# Patient Record
Sex: FEMALE | Race: WHITE | Employment: OTHER | URBAN - METROPOLITAN AREA
[De-identification: names, ages, dates, MRNs, and addresses within clinical notes are randomized per-mention and may not be internally consistent; named-entity substitution may affect disease eponyms.]

---

## 2017-05-17 ENCOUNTER — HOSPITAL ENCOUNTER (EMERGENCY)
Facility: CLINIC | Age: 32
Discharge: LEFT BEFORE DISCHARGE | End: 2017-05-17
Admitting: EMERGENCY MEDICINE

## 2017-05-17 VITALS
HEART RATE: 83 BPM | OXYGEN SATURATION: 96 % | RESPIRATION RATE: 20 BRPM | TEMPERATURE: 98.5 F | BODY MASS INDEX: 21.77 KG/M2 | WEIGHT: 160.72 LBS | SYSTOLIC BLOOD PRESSURE: 114 MMHG | HEIGHT: 72 IN | DIASTOLIC BLOOD PRESSURE: 78 MMHG

## 2017-05-17 PROCEDURE — 40000268 ZZH STATISTIC NO CHARGES

## 2017-05-18 LAB — TSH SERPL-ACNC: 2.2 ULU/ML (ref 0.3–4.5)

## 2017-05-18 NOTE — ED NOTES
Pt has had mastitis three times since the birth of her baby six weeks ago. Pt was running a fever yesterday and believes that mastitis was starting again. Today pt has a slight headache, no breast pain, and no fever. Pt has been experiencing some vision changes. /78

## 2018-01-18 LAB
C TRACH DNA SPEC QL PROBE+SIG AMP: NEGATIVE
CREAT SERPL-MCNC: 0.5 MG/DL (ref 0.57–1.11)
GFR SERPL CREATININE-BSD FRML MDRD: >60 ML/MIN/1.73M2
GLUCOSE SERPL-MCNC: 109 MG/DL (ref 65–100)
POTASSIUM SERPL-SCNC: 3.6 MMOL/L (ref 3.5–5)
SPECIMEN DESCRIPTION: NORMAL

## 2018-01-19 ENCOUNTER — TRANSFERRED RECORDS (OUTPATIENT)
Dept: HEALTH INFORMATION MANAGEMENT | Facility: CLINIC | Age: 33
End: 2018-01-19

## 2018-01-19 LAB
N GONORRHOEA DNA SPEC QL PROBE+SIG AMP: NEGATIVE
SPECIMEN DESCRIP: NORMAL

## 2018-03-08 ENCOUNTER — HOSPITAL ENCOUNTER (EMERGENCY)
Facility: CLINIC | Age: 33
Discharge: HOME | End: 2018-03-08
Attending: FAMILY MEDICINE | Admitting: FAMILY MEDICINE
Payer: MEDICARE

## 2018-03-08 ENCOUNTER — APPOINTMENT (OUTPATIENT)
Dept: ULTRASOUND IMAGING | Facility: CLINIC | Age: 33
End: 2018-03-08
Attending: FAMILY MEDICINE
Payer: MEDICARE

## 2018-03-08 VITALS
BODY MASS INDEX: 22.38 KG/M2 | RESPIRATION RATE: 16 BRPM | TEMPERATURE: 99.1 F | OXYGEN SATURATION: 94 % | WEIGHT: 165 LBS | HEART RATE: 67 BPM | DIASTOLIC BLOOD PRESSURE: 72 MMHG | SYSTOLIC BLOOD PRESSURE: 122 MMHG

## 2018-03-08 DIAGNOSIS — M79.621 PAIN OF RIGHT UPPER ARM: ICD-10-CM

## 2018-03-08 PROCEDURE — 93971 EXTREMITY STUDY: CPT | Mod: RT

## 2018-03-08 PROCEDURE — 99284 EMERGENCY DEPT VISIT MOD MDM: CPT | Mod: 25 | Performed by: FAMILY MEDICINE

## 2018-03-08 PROCEDURE — 99284 EMERGENCY DEPT VISIT MOD MDM: CPT | Mod: Z6 | Performed by: FAMILY MEDICINE

## 2018-03-08 RX ORDER — QUETIAPINE FUMARATE 50 MG/1
50 TABLET, FILM COATED ORAL
COMMUNITY
End: 2018-03-25

## 2018-03-08 RX ORDER — METOCLOPRAMIDE 5 MG/1
10 TABLET ORAL EVERY 6 HOURS PRN
COMMUNITY
End: 2018-11-05

## 2018-03-08 NOTE — DISCHARGE INSTRUCTIONS
There are no signs of a deep venous thrombosis on the ultrasound study today.    Monitor for any new or worsening symptoms.    Recheck in the clinic in 5-7 days if symptoms persist.

## 2018-03-08 NOTE — ED NOTES
"Pt comes in with complaints of R arm pain that started this AM. Pt also has petechia on her R upper arm. Pt states she has a \"clotting disorder.\" She is not able to tell me what she has been diagnosed with.   "

## 2018-03-08 NOTE — ED AVS SNAPSHOT
[facility] Emergency Department    [address] [place] [address]    Phone:  [phone]    Fax:  [phone]                                       Isaura Gray   MRN: [id number]    Department:  [facility] Emergency Department   Date of Visit:  3/8/2018           After Visit Summary Signature Page     I have received my discharge instructions, and my questions have been answered. I have discussed any challenges I see with this plan with the nurse or doctor.    ..........................................................................................................................................  Patient/Patient Representative Signature      ..........................................................................................................................................  Patient Representative Print Name and Relationship to Patient    ..................................................               ................................................  Date                                            Time    ..........................................................................................................................................  Reviewed by Signature/Title    ...................................................              ..............................................  Date                                                            Time

## 2018-03-08 NOTE — ED AVS SNAPSHOT
[facility] Emergency Department    [address] [place] [address]    Phone:  [phone]    Fax:  [phone]                                       Isaura Gray   MRN: [id number]    Department:  [facility] Emergency Department   Date of Visit:  3/8/2018           Patient Information     Date Of Birth          1985        Your diagnoses for this visit were:     Pain of right upper arm        You were seen by Zeinab Thornton MD.      Follow-up Information     Follow up with Beti Bermudez MD In 1 week.    Specialty:  OB/Gyn    Why:  if not improving    Contact information:    [facility]  [address] [place] [id number] [phone]          Follow up with [facility] Emergency Department.    Specialty:  EMERGENCY MEDICINE    Why:  If symptoms worsen    Contact information:    [address] [place] [address] [phone]    Additional information:    From [address]: Exit at [address] on south side of [place]. Turn right on [address]. Turn left at stoplight on [address]. [facility] will be in view two blocks ahead        Discharge Instructions       There are no signs of a deep venous thrombosis on the ultrasound study today.    Monitor for any new or worsening symptoms.    Recheck in the clinic in 5-7 days if symptoms persist.    24 Hour Appointment Hotline       To make an appointment at any [facility], call [phone] ([phone]). If you don't have a family doctor or clinic, we will help you find one. [place] clinics are conveniently located to serve the needs of you and your family.             Review of your medicines      Our records show that you are taking the medicines listed below. If these are incorrect, please call your family doctor or clinic.        Dose / Directions Last dose taken    enoxaparin 60 MG/0.6ML injection   Commonly known as:  LOVENOX   Dose:  60 mg        Inject  "60 mg Subcutaneous once   Refills:  0        metoclopramide 5 MG tablet   Commonly known as:  REGLAN   Dose:  10 mg        Take 10 mg by mouth every 6 hours as needed   Refills:  0        QUEtiapine 50 MG tablet   Commonly known as:  SEROquel   Dose:  50 mg        Take 50 mg by mouth nightly as needed   Refills:  0                Procedures and tests performed during your visit     US Upper Extremity Venous Duplex Right      Orders Needing Specimen Collection     None      Pending Results     No orders found from 3/6/2018 to 3/9/2018.            Pending Culture Results     No orders found from 3/6/2018 to 3/9/2018.            Pending Results Instructions     If you had any lab results that were not finalized at the time of your Discharge, you can call the ED Lab Result RN at [phone]. You will be contacted by this team for any positive Lab results or changes in treatment. The nurses are available 7 days a week from 10A to 6:30P.  You can leave a message 24 hours per day and they will return your call.        Thank you for choosing [facility]       Thank you for choosing [facility] for your care. Our goal is always to provide you with excellent care. Hearing back from our patients is one way we can continue to improve our services. Please take a few minutes to complete the written survey that you may receive in the mail after you visit with us. Thank you!        [facility] Information     [facility] lets you send messages to your doctor, view your test results, renew your prescriptions, schedule appointments and more. To sign up, go to [email or web address] . Click on \"Log in\" on the left side of the screen, which will take you to the Welcome page. Then click on \"Sign up Now\" on the right side of the page.     You will be asked to enter the access code listed below, as well as some personal information. Please follow the directions to create your username and password.     Your access code is: [email or web address]  Expires: " 2018  4:50 PM     Your access code will  in 90 days. If you need help or a new code, please call your [place] clinic or [phone].        Care EveryWhere ID     This is your Care EveryWhere ID. This could be used by other organizations to access your [facility] medical records  [id number]        Equal Access to Services     BAILEY PRADO : Lovely Wright, uzma sarmiento, ugo thomas, celso herman . So [facility] [phone].    ATENCIÓN: Si habla español, tiene a cobb disposición servicios gratuitos de asistencia lingüística. Llame al [phone].    We comply with applicable federal civil rights laws and [place] laws. We do not discriminate on the basis of race, color, national origin, age, disability, sex, sexual orientation, or gender identity.            After Visit Summary       This is your record. Keep this with you and show to your community pharmacist(s) and doctor(s) at your next visit.

## 2018-03-08 NOTE — ED PROVIDER NOTES
[facility] ED Provider Note   CC:     Chief Complaint   Patient presents with     Arm Pain     HPI:  Isaura Gray is a 32 year old female currently at 15 weeks gestation with a history of DVT and PEs who presented to the emergency department with right upper arm pain.  Patient reports onset of pain this morning, with a pain shooting up into the right shoulder.  She has a history of a clotting disorder that apparently is genetic but was negative on a complete workup.  She states that she has no history of factor V Leiden, protein C or protein S deficiency.  She is not sure whether she has had lupus anticoagulant testing.  Patient is currently on Lovenox 60 mg subcutaneously once a day during this pregnancy.  She has not used it the last 3 days.  She reports that her symptoms of her PEs were of right shoulder pain.  She does not have any current chest pain or shortness of breath.  She has not noticed any calf pain or swelling.    Problem List:  There are no active problems to display for this patient.      MEDS:   Discharge Medication List as of 3/8/2018  5:03 PM      CONTINUE these medications which have NOT CHANGED    Details   enoxaparin (LOVENOX) 60 MG/0.6ML injection Inject 60 mg Subcutaneous once, Historical      metoclopramide (REGLAN) 5 MG tablet Take 10 mg by mouth every 6 hours as needed, Historical      QUEtiapine (SEROQUEL) 50 MG tablet Take 50 mg by mouth nightly as needed, Historical             ALLERGIES:    Allergies   Allergen Reactions     Ciprofloxacin Hives     Sulfa Drugs Hives       Past Surgical History:   Procedure Laterality Date     APPENDECTOMY       ENT SURGERY      Partial thyroidectomy       Social History   Substance Use Topics     Smoking status: Never Smoker     Smokeless tobacco: Never Used     Alcohol use No         Review of Systems   Except as noted in HPI, all other systems were reviewed and are  negative    Physical Exam     Vitals were reviewed  Patient Vitals for the past 8 hrs:   BP Temp Temp src Pulse Heart Rate Resp SpO2 Weight   03/08/18 1706 122/72 - - 67 - 16 - -   03/08/18 1414 (!) 123/95 99.1  F (37.3  C) Oral - 104 16 94 % 74.8 kg (165 lb)     GENERAL APPEARANCE: Alert and oriented, no distress  FACE: normal facies  EYES: Pupils are equal  HENT: normal external exam  NECK: no adenopathy or asymmetry  RESP: normal respiratory effort; clear breath sounds bilaterally  CV: regular rate and rhythm; no significant murmurs, gallops or rubs  ABD: soft, no tenderness; no rebound or guarding; bowel sounds are normal  MS: no gross deformities noted; normal muscle tone.  EXT: Mild tenderness over the right mid humerus medial aspect, with no definite palpable masses or cords.  Good peripheral pulses  SKIN: no worrisome rash        Available Lab/Imaging Results     Results for orders placed or performed during the hospital encounter of 03/08/18 (from the past 24 hour(s))   US Upper Extremity Venous Duplex Right    Narrative    US UPPER EXTREMITY VENOUS DUPLEX RIGHT  3/8/2018 4:41 PM     HISTORY: Right arm pain. History of pulmonary embolism and deep venous  thrombosis.    COMPARISON: None.    TECHNIQUE: Examination of the upper extremity veins was performed with  graded compression and 2-D ultrasound and color doppler spectral  waveform analysis.     FINDINGS:  There is no evidence of thrombosis of the axillary,  brachial, basilic or cephalic veins.  The veins in the forearm show no  evidence of thrombosis.      Impression    IMPRESSION: Normal upper extremity venous ultrasound.    EBONY WASHINGTON MD         Impression     Final diagnoses:   Pain of right upper arm       ED Course & Medical Decision Making   Isaura Gray is a 32 year old female who presented to the emergency department with a history of multiple DVTs and PEs presenting with right upper arm pain.  Patient has no known trauma although she  states that she lays on her stomach to sleep.  She does not recall hurting herself.  She has pain in the mid medial arm radiating up into the shoulder.  Pain does not radiate into the elbow or forearm.  It is tender in an area where she is concerned might be a vein.  She is on Lovenox, but has not given herself a shot for the last 3 days.  Patient states that her typical symptoms of PE is that of right shoulder pain.  She did not have any significant central chest pain or shortness of breath with her previous episodes.  Patient was seen shortly after arrival.  Vital signs reveal a temperature 99.1, blood pressure 123/95, initial heart rate of 104 but trending down towards normal at 67.  Sats are 94%.  Patient has mild tenderness over the mid right upper arm medial aspect.  There is no definite palpable cord, redness or increased warmth.  With palpation there is increased tenderness and pain up into the shoulder and axilla.  The patient underwent a Doppler ultrasound of the right upper extremity, and there are no signs of deep venous thrombosis.  Patient will continue close monitoring for further signs and symptoms.  She should resume her Lovenox.  She is incidentally pregnant, and dispose to be on Lovenox during this entire pregnancy.  I explained to her that she may need to have a repeat ultrasound in 1-2 weeks if she has any persistent symptoms.      Written after-visit summary and instructions were given at the time of discharge.      Discharge Medication List as of 3/8/2018  5:03 PM            This note was completed in part using Dragon voice recognition, and may contain word and grammatical errors.        Zeinab Thornton MD  03/08/18 1926

## 2018-03-25 ENCOUNTER — HOSPITAL ENCOUNTER (EMERGENCY)
Facility: CLINIC | Age: 33
Discharge: HOME | End: 2018-03-26
Attending: FAMILY MEDICINE | Admitting: FAMILY MEDICINE
Payer: MEDICARE

## 2018-03-25 DIAGNOSIS — Z33.1 PREGNANCY, INCIDENTAL: ICD-10-CM

## 2018-03-25 DIAGNOSIS — O21.9 EXCESSIVE VOMITING DURING PREGNANCY: Primary | ICD-10-CM

## 2018-03-25 DIAGNOSIS — E86.0 DEHYDRATION: ICD-10-CM

## 2018-03-25 DIAGNOSIS — K22.6 MALLORY-WEISS TEAR: ICD-10-CM

## 2018-03-25 DIAGNOSIS — Z79.01 LONG TERM CURRENT USE OF ANTICOAGULANT THERAPY: ICD-10-CM

## 2018-03-25 DIAGNOSIS — G43.909 MIGRAINE WITHOUT STATUS MIGRAINOSUS, NOT INTRACTABLE, UNSPECIFIED MIGRAINE TYPE: ICD-10-CM

## 2018-03-25 LAB
ABO + RH BLD: NORMAL
ABO + RH BLD: NORMAL
ANION GAP SERPL CALCULATED.3IONS-SCNC: 10 MMOL/L (ref 3–14)
BASOPHILS # BLD AUTO: 0 10E9/L (ref 0–0.2)
BASOPHILS NFR BLD AUTO: 0.1 %
BLD GP AB SCN SERPL QL: NORMAL
BLOOD BANK CMNT PATIENT-IMP: NORMAL
BUN SERPL-MCNC: 12 MG/DL (ref 7–30)
CALCIUM SERPL-MCNC: 7.7 MG/DL (ref 8.5–10.1)
CHLORIDE SERPL-SCNC: 109 MMOL/L (ref 94–109)
CO2 SERPL-SCNC: 20 MMOL/L (ref 20–32)
CREAT SERPL-MCNC: 0.48 MG/DL (ref 0.52–1.04)
DIFFERENTIAL METHOD BLD: NORMAL
EOSINOPHIL # BLD AUTO: 0.1 10E9/L (ref 0–0.7)
EOSINOPHIL NFR BLD AUTO: 0.8 %
ERYTHROCYTE [DISTWIDTH] IN BLOOD BY AUTOMATED COUNT: 14.2 % (ref 10–15)
GFR SERPL CREATININE-BSD FRML MDRD: >90 ML/MIN/1.7M2
GLUCOSE SERPL-MCNC: 79 MG/DL (ref 70–99)
HCT VFR BLD AUTO: 35.6 % (ref 35–47)
HGB BLD-MCNC: 12 G/DL (ref 11.7–15.7)
IMM GRANULOCYTES # BLD: 0 10E9/L (ref 0–0.4)
IMM GRANULOCYTES NFR BLD: 0.3 %
INR PPP: 0.88 (ref 0.86–1.14)
LYMPHOCYTES # BLD AUTO: 1.8 10E9/L (ref 0.8–5.3)
LYMPHOCYTES NFR BLD AUTO: 23.3 %
MCH RBC QN AUTO: 29.1 PG (ref 26.5–33)
MCHC RBC AUTO-ENTMCNC: 33.7 G/DL (ref 31.5–36.5)
MCV RBC AUTO: 86 FL (ref 78–100)
MONOCYTES # BLD AUTO: 0.6 10E9/L (ref 0–1.3)
MONOCYTES NFR BLD AUTO: 7.8 %
NEUTROPHILS # BLD AUTO: 5.2 10E9/L (ref 1.6–8.3)
NEUTROPHILS NFR BLD AUTO: 67.7 %
PLATELET # BLD AUTO: 221 10E9/L (ref 150–450)
POTASSIUM SERPL-SCNC: 3.4 MMOL/L (ref 3.4–5.3)
RBC # BLD AUTO: 4.12 10E12/L (ref 3.8–5.2)
SODIUM SERPL-SCNC: 139 MMOL/L (ref 133–144)
SPECIMEN EXP DATE BLD: NORMAL
WBC # BLD AUTO: 7.7 10E9/L (ref 4–11)

## 2018-03-25 PROCEDURE — 80048 BASIC METABOLIC PNL TOTAL CA: CPT | Performed by: FAMILY MEDICINE

## 2018-03-25 PROCEDURE — 85610 PROTHROMBIN TIME: CPT | Performed by: FAMILY MEDICINE

## 2018-03-25 PROCEDURE — 99284 EMERGENCY DEPT VISIT MOD MDM: CPT | Mod: Z6 | Performed by: FAMILY MEDICINE

## 2018-03-25 PROCEDURE — 25000128 H RX IP 250 OP 636: Performed by: FAMILY MEDICINE

## 2018-03-25 PROCEDURE — 86850 RBC ANTIBODY SCREEN: CPT | Performed by: FAMILY MEDICINE

## 2018-03-25 PROCEDURE — 99284 EMERGENCY DEPT VISIT MOD MDM: CPT | Mod: 25 | Performed by: FAMILY MEDICINE

## 2018-03-25 PROCEDURE — 96361 HYDRATE IV INFUSION ADD-ON: CPT | Performed by: FAMILY MEDICINE

## 2018-03-25 PROCEDURE — 96375 TX/PRO/DX INJ NEW DRUG ADDON: CPT | Performed by: FAMILY MEDICINE

## 2018-03-25 PROCEDURE — 86900 BLOOD TYPING SEROLOGIC ABO: CPT | Performed by: FAMILY MEDICINE

## 2018-03-25 PROCEDURE — 85025 COMPLETE CBC W/AUTO DIFF WBC: CPT | Performed by: FAMILY MEDICINE

## 2018-03-25 PROCEDURE — 86901 BLOOD TYPING SEROLOGIC RH(D): CPT | Performed by: FAMILY MEDICINE

## 2018-03-25 PROCEDURE — 96374 THER/PROPH/DIAG INJ IV PUSH: CPT | Performed by: FAMILY MEDICINE

## 2018-03-25 RX ORDER — DEXAMETHASONE SODIUM PHOSPHATE 10 MG/ML
10 INJECTION, SOLUTION INTRAMUSCULAR; INTRAVENOUS ONCE
Status: COMPLETED | OUTPATIENT
Start: 2018-03-25 | End: 2018-03-25

## 2018-03-25 RX ORDER — MAGNESIUM SULFATE 1 G/100ML
1 INJECTION INTRAVENOUS ONCE
Status: COMPLETED | OUTPATIENT
Start: 2018-03-25 | End: 2018-03-25

## 2018-03-25 RX ORDER — DIPHENHYDRAMINE HYDROCHLORIDE 50 MG/ML
25 INJECTION INTRAMUSCULAR; INTRAVENOUS ONCE
Status: COMPLETED | OUTPATIENT
Start: 2018-03-25 | End: 2018-03-25

## 2018-03-25 RX ORDER — ONDANSETRON 4 MG/1
4 TABLET, ORALLY DISINTEGRATING ORAL EVERY 6 HOURS PRN
Qty: 12 TABLET | Refills: 0 | Status: SHIPPED | OUTPATIENT
Start: 2018-03-25 | End: 2018-03-28

## 2018-03-25 RX ORDER — SODIUM CHLORIDE 9 MG/ML
1000 INJECTION, SOLUTION INTRAVENOUS CONTINUOUS
Status: DISCONTINUED | OUTPATIENT
Start: 2018-03-25 | End: 2018-03-26 | Stop reason: HOSPADM

## 2018-03-25 RX ORDER — METOCLOPRAMIDE HYDROCHLORIDE 5 MG/ML
10 INJECTION INTRAMUSCULAR; INTRAVENOUS ONCE
Status: COMPLETED | OUTPATIENT
Start: 2018-03-25 | End: 2018-03-25

## 2018-03-25 RX ADMIN — SODIUM CHLORIDE 1000 ML: 9 INJECTION, SOLUTION INTRAVENOUS at 22:02

## 2018-03-25 RX ADMIN — DEXAMETHASONE SODIUM PHOSPHATE 10 MG: 10 INJECTION, SOLUTION INTRAMUSCULAR; INTRAVENOUS at 23:10

## 2018-03-25 RX ADMIN — MAGNESIUM SULFATE IN DEXTROSE 1 G: 10 INJECTION, SOLUTION INTRAVENOUS at 23:14

## 2018-03-25 RX ADMIN — METOCLOPRAMIDE 10 MG: 5 INJECTION, SOLUTION INTRAMUSCULAR; INTRAVENOUS at 22:07

## 2018-03-25 RX ADMIN — DIPHENHYDRAMINE HYDROCHLORIDE 25 MG: 50 INJECTION, SOLUTION INTRAMUSCULAR; INTRAVENOUS at 22:04

## 2018-03-25 NOTE — ED AVS SNAPSHOT
[facility] Emergency Department    [address] [place] [address]    Phone:  [phone]    Fax:  [phone]                                       Isaura Gray   MRN: [id number]    Department:  [facility] Emergency Department   Date of Visit:  3/25/2018           Patient Information     Date Of Birth          1985        Your diagnoses for this visit were:     Melissa-Villarreal tear     Dehydration     Migraine without status migrainosus, not intractable, unspecified migraine type     Long term current use of anticoagulant therapy     Pregnancy, incidental 18 weeks    Excessive vomiting during pregnancy        You were seen by Michael Juarez MD.      Follow-up Information     Follow up with Timmy Umana MD On 3/27/2018.    Specialty:  Family Practice    Why:  as scheduled    Contact information:    [address] [place] [address] [phone]          Discharge Instructions       You may use the Reglan or Zofran as needed for nausea.  Encourage fluids.  Diet as tolerated.  Keep your appointment this next week in clinic for your OB visit.  Tylenol is the safest thing to use for pain during pregnancy.  Your blood work was reassuring.  It was nice visiting with you this evening.  I am glad you are feeling at least a little bit better and hope you continue to improve.  Good luck with the rest of your pregnancy and delivery!    Thank you for choosing [facility]. We appreciate the opportunity to meet your urgent medical needs. Please let us know if we could have done anything to make your stay more satisfying.    After discharge, please closely monitor for any new or worsening symptoms. Return to the Emergency Department if you develop any acute worsening signs or symptoms.    If you had lab work, cultures or imaging studies done during your stay, the final results may still be pending. We will call you if your plan of care needs to change.  However, if you are not improving as expected, please follow up with your primary care provider or clinic.     Start any prescription medications that were prescribed to you and take them as directed.     Please see additional handouts that may be pertinent to your condition.        Your next 10 appointments already scheduled     Mar 27, 2018 11:00 AM CDT   [facility]ren[facility] with [facility]TA[facility]   [facility]    [address] [address] [phone]              24 Hour Appointment Hotline       To make an appointment at any [facility], call [phone] ([phone]). If you don't have a family doctor or clinic, we will help you find one. [place] clinics are conveniently located to serve the needs of you and your family.             Review of your medicines      START taking        Dose / Directions Last dose taken    ondansetron 4 MG ODT tab   Commonly known as:  ZOFRAN ODT   Dose:  4 mg   Quantity:  12 tablet        Take 1 tablet (4 mg) by mouth every 6 hours as needed for nausea   Refills:  0          Our records show that you are taking the medicines listed below. If these are incorrect, please call your family doctor or clinic.        Dose / Directions Last dose taken    enoxaparin 60 MG/0.6ML injection   Commonly known as:  LOVENOX   Dose:  60 mg        Inject 60 mg Subcutaneous once   Refills:  0        metoclopramide 5 MG tablet   Commonly known as:  REGLAN   Dose:  10 mg        Take 10 mg by mouth every 6 hours as needed   Refills:  0        vitamin B complex with vitamin C Tabs tablet   Dose:  1 tablet        Take 1 tablet by mouth daily   Refills:  0        VITAMIN B6 PO        Take by mouth daily   Refills:  0        VITAMIN D (CHOLECALCIFEROL) PO   Dose:  5000 Units        Take 5,000 Units by mouth daily   Refills:  0                Prescriptions were sent or printed at these locations (1 Prescription)                   [facility] [place]  "[facility] - [place] - [address]   [address] [address]    Telephone:  [phone]   Fax:  [phone]   Hours:                  E-Prescribed (1 of 1)         ondansetron (ZOFRAN ODT) 4 MG ODT tab                Procedures and tests performed during your visit     ABO/Rh type and screen    Basic metabolic panel    CBC with platelets differential    INR      Orders Needing Specimen Collection     None      Pending Results     No orders found for last 3 day(s).            Pending Culture Results     No orders found for last 3 day(s).            Pending Results Instructions     If you had any lab results that were not finalized at the time of your Discharge, you can call the ED Lab Result RN at [phone]. You will be contacted by this team for any positive Lab results or changes in treatment. The nurses are available 7 days a week from 10A to 6:30P.  You can leave a message 24 hours per day and they will return your call.        Thank you for choosing [facility]       Thank you for choosing [facility] for your care. Our goal is always to provide you with excellent care. Hearing back from our patients is one way we can continue to improve our services. Please take a few minutes to complete the written survey that you may receive in the mail after you visit with us. Thank you!        [facility] Information     [facility] lets you send messages to your doctor, view your test results, renew your prescriptions, schedule appointments and more. To sign up, go to [email or web address] . Click on \"Log in\" on the left side of the screen, which will take you to the Welcome page. Then click on \"Sign up Now\" on the right side of the page.     You will be asked to enter the access code listed below, as well as some personal information. Please follow the directions to create your username and password.     Your access code is: [email or web address]  Expires: 6/6/2018  5:50 PM     Your access code " will  in 90 days. If you need help or a new code, please call your [facility] clinic or [phone].        Care EveryWhere ID     This is your Care EveryWhere ID. This could be used by other organizations to access your [facility] medical records  [id number]        Equal Access to Services     BAILEY PRADO : Lovely Wright, waaxnoé luqadaha, qaybta kaalmanoé thomas, celso jennings. So [facility] [phone].    ATENCIÓN: Si habla español, tiene a cobb disposición servicios gratuitos de asistencia lingüística. Llame al [phone].    We comply with applicable federal civil rights laws and [place] laws. We do not discriminate on the basis of race, color, national origin, age, disability, sex, sexual orientation, or gender identity.            After Visit Summary       This is your record. Keep this with you and show to your community pharmacist(s) and doctor(s) at your next visit.

## 2018-03-25 NOTE — ED AVS SNAPSHOT
[facility] Emergency Department    [address] [place] [address]    Phone:  [phone]    Fax:  [phone]                                       Isaura Gray   MRN: [id number]    Department:  [facility] Emergency Department   Date of Visit:  3/25/2018           After Visit Summary Signature Page     I have received my discharge instructions, and my questions have been answered. I have discussed any challenges I see with this plan with the nurse or doctor.    ..........................................................................................................................................  Patient/Patient Representative Signature      ..........................................................................................................................................  Patient Representative Print Name and Relationship to Patient    ..................................................               ................................................  Date                                            Time    ..........................................................................................................................................  Reviewed by Signature/Title    ...................................................              ..............................................  Date                                                            Time

## 2018-03-26 VITALS
SYSTOLIC BLOOD PRESSURE: 114 MMHG | OXYGEN SATURATION: 99 % | WEIGHT: 167 LBS | HEART RATE: 72 BPM | HEIGHT: 72 IN | DIASTOLIC BLOOD PRESSURE: 78 MMHG | TEMPERATURE: 97.3 F | BODY MASS INDEX: 22.62 KG/M2 | RESPIRATION RATE: 16 BRPM

## 2018-03-26 RX ORDER — ONDANSETRON 4 MG/1
8 TABLET, ORALLY DISINTEGRATING ORAL ONCE
Status: DISCONTINUED | OUTPATIENT
Start: 2018-03-26 | End: 2018-03-26 | Stop reason: HOSPADM

## 2018-03-26 NOTE — ED PROVIDER NOTES
History     Chief Complaint   Patient presents with     Headache     HPI  Isaura Gray is a 32 year old female with a past medical history of DVT, follicular cancer of thyroid and PE who presents to the ED via EMS for evaluation of hematemesis. Patient is currently 18 weeks pregnant. She is on Lovenox 60 mg qd for previous DVT and PE's.     She states that she has been feeling fatigue and more nauseated over the past week. This morning around 0730 she started vomiting dark emesis. She has had about 6 episodes of hematemesis today. Each time she vomited, the vomit would get darker. She is also complaining of a headache behind the right eye, shortness of breath with exertions, heart palpitations and dizziness.     She denies diarrhea. Onset of headache was 2030. She doesn't typically get headaches. When she arrived at the ED she felt diaphoretic and has an acidic taste in her mouth. She takes Reglan prn for nausea. She has a history of excessive vomiting in pregnancy.     Blood pressure in the ambulance was 130/78 and glucose was 83. She was given 4 mg of Zofran as well.     Has a history of Melissa-Villarreal tears with previous pregnancies and vomiting.    Problem List:    There are no active problems to display for this patient.       Past Medical History:    Past Medical History:   Diagnosis Date     Cancer (H)      DVT (deep vein thrombosis) in pregnancy (H)      Follicular cancer of thyroid (H)      Mitral valve prolapse      PE (pulmonary thromboembolism) (H)      Thyroid disease        Past Surgical History:    Past Surgical History:   Procedure Laterality Date     APPENDECTOMY       ENT SURGERY      Partial thyroidectomy       Family History:    No family history on file.    Social History:  Marital Status:  Single [1]  Social History   Substance Use Topics     Smoking status: Never Smoker     Smokeless tobacco: Never Used     Alcohol use No        Medications:      VITAMIN D, CHOLECALCIFEROL, PO    Pyridoxine HCl (VITAMIN B6 PO)   vitamin B complex with vitamin C (VITAMIN  B COMPLEX) TABS tablet   ondansetron (ZOFRAN ODT) 4 MG ODT tab   enoxaparin (LOVENOX) 60 MG/0.6ML injection   metoclopramide (REGLAN) 5 MG tablet         Review of Systems   All other systems reviewed and are negative.       All other ROS reviewed and are negative or non-contributory except as stated in HPI.    Physical Exam   BP: 111/77  Pulse: 90  Temp: 97.3  F (36.3  C)  Resp: 16  Height: 182.9 cm (6')  Weight: 75.8 kg (167 lb)  SpO2: 95 %    Physical Exam   Constitutional: She is oriented to person, place, and time. She appears well-developed and well-nourished. No distress.   HENT:   Head: Normocephalic and atraumatic.   Mouth/Throat: Uvula is midline and oropharynx is clear and moist. Mucous membranes are dry. No oral lesions. No oropharyngeal exudate, posterior oropharyngeal edema or posterior oropharyngeal erythema.   Eyes: Conjunctivae are normal. No scleral icterus.   Neck: Normal range of motion. Neck supple.   Cardiovascular: Normal rate, regular rhythm, normal heart sounds and intact distal pulses.  Exam reveals no gallop and no friction rub.    No murmur heard.  Pulmonary/Chest: Effort normal and breath sounds normal. No respiratory distress. She has no decreased breath sounds. She has no wheezes. She has no rhonchi. She has no rales.   Abdominal: Soft. Bowel sounds are normal. She exhibits no distension. There is generalized tenderness. There is no rigidity, no rebound and no guarding.   Lymphadenopathy:     She has no cervical adenopathy.   Neurological: She is alert and oriented to person, place, and time. She has normal strength. No cranial nerve deficit or sensory deficit. She displays a negative Romberg sign. GCS eye subscore is 4. GCS verbal subscore is 5. GCS motor subscore is 6.   Skin: Skin is warm and dry. No rash noted. There is pallor.   Psychiatric: She has a normal mood and affect. Her behavior is normal.    Nursing note and vitals reviewed.    ED Course (with Medical Decision Making)      IV placed.  Labs drawn.  Reglan and Benadryl for nausea and headache.  Slight relief from the headache.  Magnesium sulfate and Decadron ordered.    Hemoglobin normal at 12.0.  The rest of her labs were unremarkable.    Blood type O+.    She is feeling much better.  Headache has improved markedly.  She has no focal neurological deficits.  I do not think a head CT is indicated and she would rather avoid that in pregnancy as well.      Nausea has improved.  She has had no further emesis in the ED.  Vitals remained stable.  She had a Melissa-Villarreal tear with previous pregnancy and vomiting and I suspect that is what is going on again.  Medics noted bilious emesis rather than blood.  She has oral Reglan at home which was ineffective when she was vomiting.  She would like to have some Zofran ODT available and that was provided.  She has an OB appointment in [facility] early this coming week.             ED Course     Procedures               Critical Care time:  none                Results for orders placed or performed during the hospital encounter of 03/25/18 (from the past 24 hour(s))   Basic metabolic panel   Result Value Ref Range    Sodium 139 133 - 144 mmol/L    Potassium 3.4 3.4 - 5.3 mmol/L    Chloride 109 94 - 109 mmol/L    Carbon Dioxide 20 20 - 32 mmol/L    Anion Gap 10 3 - 14 mmol/L    Glucose 79 70 - 99 mg/dL    Urea Nitrogen 12 7 - 30 mg/dL    Creatinine 0.48 (L) 0.52 - 1.04 mg/dL    GFR Estimate >90 >60 mL/min/1.7m2    GFR Estimate If Black >90 >60 mL/min/1.7m2    Calcium 7.7 (L) 8.5 - 10.1 mg/dL   CBC with platelets differential   Result Value Ref Range    WBC 7.7 4.0 - 11.0 10e9/L    RBC Count 4.12 3.8 - 5.2 10e12/L    Hemoglobin 12.0 11.7 - 15.7 g/dL    Hematocrit 35.6 35.0 - 47.0 %    MCV 86 78 - 100 fl    MCH 29.1 26.5 - 33.0 pg    MCHC 33.7 31.5 - 36.5 g/dL    RDW 14.2 10.0 - 15.0 %    Platelet Count 221 150 -  450 10e9/L    Diff Method Automated Method     % Neutrophils 67.7 %    % Lymphocytes 23.3 %    % Monocytes 7.8 %    % Eosinophils 0.8 %    % Basophils 0.1 %    % Immature Granulocytes 0.3 %    Absolute Neutrophil 5.2 1.6 - 8.3 10e9/L    Absolute Lymphocytes 1.8 0.8 - 5.3 10e9/L    Absolute Monocytes 0.6 0.0 - 1.3 10e9/L    Absolute Eosinophils 0.1 0.0 - 0.7 10e9/L    Absolute Basophils 0.0 0.0 - 0.2 10e9/L    Abs Immature Granulocytes 0.0 0 - 0.4 10e9/L   INR   Result Value Ref Range    INR 0.88 0.86 - 1.14   ABO/Rh type and screen   Result Value Ref Range    ABO O     RH(D) Pos     Antibody Screen Neg     Test Valid Only At [facility]        Specimen Expires 03/28/2018        Medications   0.9% sodium chloride BOLUS (0 mLs Intravenous Stopped 3/25/18 2306)   diphenhydrAMINE (BENADRYL) injection 25 mg (25 mg Intravenous Given 3/25/18 2204)   metoclopramide (REGLAN) injection 10 mg (10 mg Intravenous Given 3/25/18 2207)   magnesium sulfate 1 gm in 100mL D5W intermittent infusion (0 g Intravenous Stopped 3/25/18 2324)   dexamethasone (DECADRON) injection 10 mg (10 mg Intravenous Given 3/25/18 2310)       Assessments & Plan     (O21.9) Excessive vomiting during pregnancy  (primary encounter diagnosis)  Comment:   Plan: ondansetron (ZOFRAN ODT) 4 MG ODT tab       (K22.6) Melissa-Villarreal tear  (E86.0) Dehydration  (G43.909) Migraine without status migrainosus, not intractable, unspecified migraine type  (Z79.01) Long term current use of anticoagulant therapy  (Z33.1) Pregnancy, incidental  Comment: 18 weeks    See discussion above and d/c instructions.            I have reviewed the nursing notes.    I have reviewed the findings, diagnosis, plan and need for follow up with the patient.       Discharge Medication List as of 3/26/2018 12:06 AM      START taking these medications    Details   ondansetron (ZOFRAN ODT) 4 MG ODT tab Take 1 tablet (4 mg) by mouth every 6 hours as needed for nausea, Disp-12  tablet, R-0, E-Prescribe             Final diagnoses:   Melissa-Villarreal tear   Dehydration   Migraine without status migrainosus, not intractable, unspecified migraine type   Long term current use of anticoagulant therapy   Pregnancy, incidental - 18 weeks   Excessive vomiting during pregnancy     This document serves as a record of services personally performed by Michael Juarez MD. It was created on their behalf by Zakiya Dick, a trained medical scribe. The creation of this record is based on the provider's personal observations and the statements of the patient. This document has been checked and approved by the attending provider.    Note: Chart documentation done in part with Dragon Voice Recognition software. Although reviewed after completion, some word and grammatical errors may remain.        3/25/2018   [facility] EMERGENCY DEPARTMENT     Michael Juarez MD  03/26/18 0705

## 2018-03-26 NOTE — ED NOTES
2 zofran 4 mg given po for home. Insurance could not cash out zofran Rx. Given to pt to take for later. Instructions given.

## 2018-03-26 NOTE — DISCHARGE INSTRUCTIONS
You may use the Reglan or Zofran as needed for nausea.  Encourage fluids.  Diet as tolerated.  Keep your appointment this next week in clinic for your OB visit.  Tylenol is the safest thing to use for pain during pregnancy.  Your blood work was reassuring.  It was nice visiting with you this evening.  I am glad you are feeling at least a little bit better and hope you continue to improve.  Good luck with the rest of your pregnancy and delivery!    Thank you for choosing [facility]. We appreciate the opportunity to meet your urgent medical needs. Please let us know if we could have done anything to make your stay more satisfying.    After discharge, please closely monitor for any new or worsening symptoms. Return to the Emergency Department if you develop any acute worsening signs or symptoms.    If you had lab work, cultures or imaging studies done during your stay, the final results may still be pending. We will call you if your plan of care needs to change. However, if you are not improving as expected, please follow up with your primary care provider or clinic.     Start any prescription medications that were prescribed to you and take them as directed.     Please see additional handouts that may be pertinent to your condition.

## 2018-03-26 NOTE — ED NOTES
18 weeks gestation. Started with nausea at 1930, then rapid HR and SOA and HA. on lovenox. Hx of PE, and DVT. Pt alert. Blurred vision right eye.

## 2018-03-27 ENCOUNTER — PRENATAL OFFICE VISIT (OUTPATIENT)
Dept: FAMILY MEDICINE | Facility: CLINIC | Age: 33
End: 2018-03-27
Payer: MEDICARE

## 2018-03-27 DIAGNOSIS — Z32.01 PREGNANCY, CONFIRMED, NOT FIRST: Primary | ICD-10-CM

## 2018-03-27 PROCEDURE — 99207 ZZC NO CHARGE NURSE ONLY: CPT

## 2018-03-27 NOTE — PROGRESS NOTES
1. Have you had chicken pox?   Yes    Genetic Screening    Has the patient, baby's father, or anyone in either family had:     1. Thalassemia and and MCV result less than 80?No   2.  Neural tube defect? No   3.  Congenital heart defect?No   4. Down's syndrome?No   5.  Delmar-Sachs disease?No   6. Sickle Cell disease or trait?No   7. Hemophilia or other inherited problems of blood?No   8. Muscular dystropy?No   9.  Cystic fibrosis?No  10. [place]'s Chorea?No  11. Mental Retardation or autism?   Isaura's son and possibly her brother have autism (Asperger's).         If yes, was the person tested for Fragile X?   12. Any other inherited genetic or chromosomal disorders?No  13. Metabolic disorders such as diabetes or PKU?No  14. A child born with defects not listed above?No  15. Recurrent pregnancy loss or stillbirth?No  16.  Has the patient had any medications/street drugs/alcohol since her last menstrual period?No  18.  Does the patient or baby's father have any other genetic risks. No

## 2018-03-27 NOTE — MR AVS SNAPSHOT
"              After Visit Summary   3/27/2018    Isaura Gray    MRN: [id number]           Patient Information     Date Of Birth          1985        Visit Information        Provider Department      3/27/2018 11:00 AM NL OB [facility]TA[facility]        Today's Diagnoses     Pregnancy, confirmed, not first    -  1       Follow-ups after your visit        Your next 10 appointments already scheduled     Mar 28, 2018 10:30 AM CDT   New P[facility] with Timmy Umana MD   [facility]    [address], [place] [address] [phone]              Who to contact     If you have questions or need follow up information about today's clinic visit or your schedule please contact [facility] directly at [phone].  Normal or non-critical lab and imaging results will be communicated to you by MyChart, letter or phone within 4 business days after the clinic has received the results. If you do not hear from us within 7 days, please contact the clinic through MyChart or phone. If you have a critical or abnormal lab result, we will notify you by phone as soon as possible.  Submit refill requests through [facility] or call your pharmacy and they will forward the refill request to us. Please allow 3 business days for your refill to be completed.          Additional Information About Your Visit        MyChart Information     [facility]ita lets you send messages to your doctor, view your test results, renew your prescriptions, schedule appointments and more. To sign up, go to [email or web address] . Click on \"Log in\" on the left side of the screen, which will take you to the Welcome page. Then click on \"Sign up Now\" on the right side of the page.     You will be asked to enter the access code listed below, as well as some personal information. Please follow the directions to create your username and password.     Your access code is: " [email or web address]  Expires: 2018  5:50 PM     Your access code will  in 90 days. If you need help or a new code, please call your [facility] clinic or [phone].        Care EveryWhere ID     This is your Care EveryWhere ID. This could be used by other organizations to access your [facility] medical records  [id number]        Your Vitals Were     Last Period                   (LMP Unknown)            Blood Pressure from Last 3 Encounters:   18 114/78   18 122/72   17 114/78    Weight from Last 3 Encounters:   18 167 lb (75.8 kg)   18 165 lb (74.8 kg)   17 160 lb 11.5 oz (72.9 kg)              Today, you had the following     No orders found for display       Primary Care Provider Office Phone # Fax #    Timmy Umana [phone] [address] [place] [address]        Equal Access to Services     [facility]: Hadii aad ku hadasho Soomaali, waaxda luqadaha, qaybta kaalmada adeegyada, waxay idiin hayaan basil lunaaraerica herman . So [facility] [phone].    ATENCIÓN: Si habla español, tiene a cobb disposición servicios gratuitos de asistencia lingüística. Ll[facility] [phone].    We comply with applicable federal civil rights laws and [place] laws. We do not discriminate on the basis of race, color, national origin, age, disability, sex, sexual orientation, or gender identity.            Thank you!     Thank you for choosing [facility]  for your care. Our goal is always to provide you with excellent care. Hearing back from our patients is one way we can continue to improve our services. Please take a few minutes to complete the written survey that you may receive in the mail after your visit with us. Thank you!             Your Updated Medication List - Protect others around you: Learn how to safely use, store and throw away your medicines at www.disposemymeds.org.          This list is accurate as of 3/27/18 11:17 AM.  Always use  your most recent med list.                   Brand Name Dispense Instructions for use Diagnosis    enoxaparin 60 MG/0.6ML injection    LOVENOX     Inject 60 mg Subcutaneous once        metoclopramide 5 MG tablet    REGLAN     Take 10 mg by mouth every 6 hours as needed        ondansetron 4 MG ODT tab    ZOFRAN ODT    12 tablet    Take 1 tablet (4 mg) by mouth every 6 hours as needed for nausea    Excessive vomiting during pregnancy       vitamin B complex with vitamin C Tabs tablet      Take 1 tablet by mouth daily        VITAMIN B6 PO      Take by mouth daily        VITAMIN D (CHOLECALCIFEROL) PO      Take 5,000 Units by mouth daily

## 2018-03-28 ENCOUNTER — TELEPHONE (OUTPATIENT)
Dept: FAMILY MEDICINE | Facility: OTHER | Age: 33
End: 2018-03-28

## 2018-03-28 ENCOUNTER — PRENATAL OFFICE VISIT (OUTPATIENT)
Dept: FAMILY MEDICINE | Facility: OTHER | Age: 33
End: 2018-03-28
Payer: MEDICARE

## 2018-03-28 VITALS
WEIGHT: 169.3 LBS | RESPIRATION RATE: 16 BRPM | HEART RATE: 92 BPM | HEIGHT: 72 IN | TEMPERATURE: 97.4 F | DIASTOLIC BLOOD PRESSURE: 54 MMHG | SYSTOLIC BLOOD PRESSURE: 110 MMHG | BODY MASS INDEX: 22.93 KG/M2

## 2018-03-28 DIAGNOSIS — G44.019 EPISODIC CLUSTER HEADACHE, NOT INTRACTABLE: ICD-10-CM

## 2018-03-28 DIAGNOSIS — E22.1 HYPERPROLACTINEMIA (H): ICD-10-CM

## 2018-03-28 DIAGNOSIS — E55.9 VITAMIN D DEFICIENCY: ICD-10-CM

## 2018-03-28 DIAGNOSIS — R51.9 SINUS HEADACHE: ICD-10-CM

## 2018-03-28 DIAGNOSIS — Z86.718 HISTORY OF VENOUS THROMBOEMBOLISM: ICD-10-CM

## 2018-03-28 DIAGNOSIS — E73.9 LACTOSE INTOLERANCE IN ADULT: ICD-10-CM

## 2018-03-28 DIAGNOSIS — O09.899 HISTORY OF PRETERM DELIVERY, CURRENTLY PREGNANT: ICD-10-CM

## 2018-03-28 DIAGNOSIS — O21.9 EXCESSIVE VOMITING DURING PREGNANCY: ICD-10-CM

## 2018-03-28 DIAGNOSIS — O09.899 SUPERVISION OF OTHER HIGH RISK PREGNANCIES, UNSPECIFIED TRIMESTER: Primary | ICD-10-CM

## 2018-03-28 DIAGNOSIS — C73 FOLLICULAR CANCER OF THYROID (H): ICD-10-CM

## 2018-03-28 LAB
CA-I SERPL ISE-MCNC: 4.5 MG/DL (ref 4.4–5.2)
MISCELLANEOUS TEST: NORMAL
PROLACTIN SERPL-MCNC: 163 UG/L (ref 3–27)
TSH SERPL DL<=0.005 MIU/L-ACNC: 1.48 MU/L (ref 0.4–4)

## 2018-03-28 PROCEDURE — 99207 ZZC COMPLICATED OB VISIT: CPT | Performed by: FAMILY MEDICINE

## 2018-03-28 PROCEDURE — 36415 COLL VENOUS BLD VENIPUNCTURE: CPT | Performed by: FAMILY MEDICINE

## 2018-03-28 PROCEDURE — 84146 ASSAY OF PROLACTIN: CPT | Performed by: FAMILY MEDICINE

## 2018-03-28 PROCEDURE — 82330 ASSAY OF CALCIUM: CPT | Performed by: FAMILY MEDICINE

## 2018-03-28 PROCEDURE — 82306 VITAMIN D 25 HYDROXY: CPT | Performed by: FAMILY MEDICINE

## 2018-03-28 PROCEDURE — 84443 ASSAY THYROID STIM HORMONE: CPT | Performed by: FAMILY MEDICINE

## 2018-03-28 RX ORDER — VERAPAMIL HYDROCHLORIDE 40 MG/1
40 TABLET ORAL 3 TIMES DAILY
Qty: 90 TABLET | Refills: 1 | Status: SHIPPED | OUTPATIENT
Start: 2018-03-28 | End: 2018-04-23 | Stop reason: SINTOL

## 2018-03-28 RX ORDER — METOCLOPRAMIDE 5 MG/1
10 TABLET ORAL EVERY 6 HOURS PRN
Qty: 240 TABLET | Status: CANCELLED | OUTPATIENT
Start: 2018-03-28

## 2018-03-28 RX ORDER — ONDANSETRON 4 MG/1
4 TABLET, ORALLY DISINTEGRATING ORAL EVERY 8 HOURS PRN
Qty: 20 TABLET | Refills: 1 | Status: SHIPPED | OUTPATIENT
Start: 2018-03-28 | End: 2018-11-05

## 2018-03-28 ASSESSMENT — PAIN SCALES - GENERAL: PAINLEVEL: MODERATE PAIN (4)

## 2018-03-28 NOTE — PATIENT INSTRUCTIONS
Thank you for visiting [facility]    Try the verapamil to reduce frequency of your cluster headaches.  Can use oxygen for treatment of them as well.    Try Flonase along with Zyrtec (cetirizine) for your sinus symptoms.  If not improving over the next week, I would treat with antibiotics.    If your blurry vision isn't improving, then I want you to get an eye exam just to be safe.    Continue your Lovenox.  See hematology when convenient.    As we discussed, Zofran should help, but there are slight risks.    We'll let you know your lab results as soon as we can.     Get your ultrasound in about 2 weeks.    Please see me in 2 weeks for follow up.       If you had imaging scheduled please refer to your radiology prep sheet.    Appointment    Date_______________     Time_____________    Day:   M TU W TH F    With____________________________    Location_________________________    If you need medication refills, please contact your pharmacy 3 days before your prescriptions runs out. If you are out of refills, your pharmacy will contact contact the clinic.    Contact us or return if questions or concerns.     -Your Care Team:  MD Taylor Hernandez PA-C Joel De Haan, PA-C Elizabeth McLean, APRN CNP    General information about your clinic      Clinic hours:     Lab hours:  Phone [phone]  Monday 7:30 am-7 pm    Monday 8:30 am-6:30 pm  Tuesday-Friday 7:30 am-5 pm   Tuesday-Friday 8:30 am-4:30 pm    Pharmacy hours:  Phone [phone]  Monday 8:30 am-7pm  Tuesday-Friday 8:30am-6 pm                                       Mychart assistance [phone]        We would like to hear from you, how was your visit today?    Brook Peace  Patient Information Supervisor   Patient Care Supervisor  [place] [place] [place] [place]  [phone]

## 2018-03-28 NOTE — TELEPHONE ENCOUNTER
Reason for Call: Request for an order or referral:    Order or referral being requested: oxygen     Date needed: as soon as possible    Has the patient been seen by the PCP for this problem? YES    Additional comments: [facility] home oxygen and medical supply needs order faxed. Call patient when done.    Phone number Patient can be reached at:  Home number on file [phone] (home)    Best Time:  any    Can we leave a detailed message on this number?  YES    Call taken on 3/28/2018 at 3:15 PM by Soni Rodríguez

## 2018-03-28 NOTE — MR AVS SNAPSHOT
After Visit Summary   3/28/2018    Isaura Gray    MRN: [id number]           Patient Information     Date Of Birth          1985        Visit Information        Provider Department      3/28/2018 10:30 AM Timmy Umana MD [facility]        Today's Diagnoses     Supervision of other high risk pregnancies, unspecified trimester    -  1    Excessive vomiting during pregnancy        Vitamin D deficiency        Hyperprolactinemia (H)        Follicular cancer of thyroid (H)        Lactose intolerance in adult        History of  delivery, currently pregnant        Episodic cluster headache, not intractable        Sinus headache        History of venous thromboembolism          Care Instructions    Thank you for visiting [facility]    Try the verapamil to reduce frequency of your cluster headaches.  Can use oxygen for treatment of them as well.    Try Flonase along with Zyrtec (cetirizine) for your sinus symptoms.  If not improving over the next week, I would treat with antibiotics.    If your blurry vision isn't improving, then I want you to get an eye exam just to be safe.    Continue your Lovenox.  See hematology when convenient.    As we discussed, Zofran should help, but there are slight risks.    We'll let you know your lab results as soon as we can.     Get your ultrasound in about 2 weeks.    Please see me in 2 weeks for follow up.       If you had imaging scheduled please refer to your radiology prep sheet.    Appointment    Date_______________     Time_____________    Day:       With____________________________    Location_________________________    If you need medication refills, please contact your pharmacy 3 days before your prescriptions runs out. If you are out of refills, your pharmacy will contact contact the clinic.    Contact us or return if questions or concerns.     -Your Care Team:  MD Taylor Hernandez  MARCI Stark PA-C Elizabeth McLean, [place] CHAO    General information about your clinic      Clinic hours:     Lab hours:  Phone [phone]  Monday 7:30 am-7 pm    Monday 8:30 am-6:30 pm  Tuesday-Friday 7:30 am-5 pm   Tuesday-Friday 8:30 am-4:30 pm    Pharmacy hours:  Phone [phone]  Monday 8:30 am-7pm  Tuesday-Friday 8:30am-6 pm                                       Mychart assistance [phone]        We would like to hear from you, how was your visit today?    Brook Peace  Patient Information Supervisor   Patient Care Supervisor  [facility], and [facility], and [facility]  [phone]             Follow-ups after your visit        Additional Services     ONC/HEME ADULT REFERRAL       Your provider has referred you to: [facility]: Cancer Care/Hematology (All Cancer Related Services) - [phone]   [email or web address]    Please be aware that coverage of these services is subject to the terms and limitations of your health insurance plan.  Call member services at your health plan with any benefit or coverage questions.      Please bring the following with you to your appointment:    (1) Any X-Rays, CTs or MRIs which have been performed.  Contact the facility where they were done to arrange for  prior to your scheduled appointment.   (2) List of current medications  (3) This referral request   (4) Any documents/labs given to you for this referral                  Your next 10 appointments already scheduled     Apr 11, 2018  3:30 PM CDT   (Arrive by 3:15 PM)   US OB > 14 WEEKS COMPLETE SINGLE with PHUS1   [facility] ([facility])    [address] [address] [phone]           Please bring a list of your medicines (including vitamins, minerals and over-the-counter drugs). Also, tell your  "doctor about any allergies you may have. Wear comfortable clothes and leave your valuables at home.  If you re less than 20 weeks drink four 8-ounce glasses of fluid an hour before your exam. If you need to empty your bladder before your exam, try to release only a little urine. Then, drink another glass of fluid.  You may have up to two family members in the exam room. If you bring a small child, an adult must be there to care for him or her.  Please call the Imaging Department at your exam site with any questions.              Future tests that were ordered for you today     Open Future Orders        Priority Expected Expires Ordered    US OB > 14 Weeks Complete Single Routine  3/28/2019 3/28/2018            Who to contact     If you have questions or need follow up information about today's clinic visit or your schedule please contact [facility] directly at [phone].  Normal or non-critical lab and imaging results will be communicated to you by MyChart, letter or phone within 4 business days after the clinic has received the results. If you do not hear from us within 7 days, please contact the clinic through [employer] or phone. If you have a critical or abnormal lab result, we will notify you by phone as soon as possible.  Submit refill requests through Wedit or call your pharmacy and they will forward the refill request to us. Please allow 3 business days for your refill to be completed.          Additional Information About Your Visit        [employer] Information     Wedit lets you send messages to your doctor, view your test results, renew your prescriptions, schedule appointments and more. To sign up, go to [email or web address] . Click on \"Log in\" on the left side of the screen, which will take you to the Welcome page. Then click on \"Sign up Now\" on the right side of the page.     You will be asked to enter the access code listed below, as well as some personal information. Please " follow the directions to create your username and password.     Your access code is: [email or web address]  Expires: 2018  5:50 PM     Your access code will  in 90 days. If you need help or a new code, please call your [place] clinic or [phone].        Care EveryWhere ID     This is your Care EveryWhere ID. This could be used by other organizations to access your [place] medical records  [id number]        Your Vitals Were     Pulse Temperature Respirations Height Last Period BMI (Body Mass Index)    92 97.4  F (36.3  C) (Temporal) 16 6' (1.829 m) (LMP Unknown) 22.96 kg/m2       Blood Pressure from Last 3 Encounters:   18 110/54   18 114/78   18 122/72    Weight from Last 3 Encounters:   18 169 lb 4.8 oz (76.8 kg)   18 167 lb (75.8 kg)   18 165 lb (74.8 kg)              We Performed the Following     25 OH Vit D therapy monitoring     Calcium ionized     Maternal quad screen     ONC/HEME ADULT REFERRAL     Prolactin     TSH with free T4 reflex          Today's Medication Changes          These changes are accurate as of 3/28/18 11:19 AM.  If you have any questions, ask your nurse or doctor.               Start taking these medicines.        Dose/Directions    order for DME   Used for:  Episodic cluster headache, not intractable   Started by:  Timmy Umana MD        Equipment being ordered: Oxygen by nasal cannula at 2 L/minute at onset of cluster headaches   Quantity:  1 each   Refills:  0       verapamil 40 MG tablet   Commonly known as:  CALAN   Used for:  Episodic cluster headache, not intractable   Started by:  Timmy Umana MD        Dose:  40 mg   Take 1 tablet (40 mg) by mouth 3 times daily   Quantity:  90 tablet   Refills:  1         These medicines have changed or have updated prescriptions.        Dose/Directions    ondansetron 4 MG ODT tab   Commonly known as:  ZOFRAN ODT   This may have changed:  when to take this   Used for:  Excessive  vomiting during pregnancy   Changed by:  Timmy Umana MD        Dose:  4 mg   Take 1 tablet (4 mg) by mouth every 8 hours as needed for nausea   Quantity:  20 tablet   Refills:  1            Where to get your medicines      These medications were sent to [facility] [place] - [address]   [address] [place] [address]     Phone:  [phone]     ondansetron 4 MG ODT tab    verapamil 40 MG tablet         Some of these will need a paper prescription and others can be bought over the counter.  Ask your nurse if you have questions.     Bring a paper prescription for each of these medications     order for DME                Primary Care Provider Office Phone # Fax #    Timmy Umana [phone] [address] [place] [address]        Equal Access to Services     [facility] AH: Hadii aad ku hadasho Soomaali, waaxda luqadaha, qaybta kaalmada adeegyada, waxay idiin hayaan basil kharaerica herman . So [facility] [phone].    ATENCIÓN: Si habla español, tiene a cobb disposición servicios gratuitos de asistencia lingüística. Ll[facility] [phone].    We comply with applicable federal civil rights laws and [place] laws. We do not discriminate on the basis of race, color, national origin, age, disability, sex, sexual orientation, or gender identity.            Thank you!     Thank you for choosing [facility]  for your care. Our goal is always to provide you with excellent care. Hearing back from our patients is one way we can continue to improve our services. Please take a few minutes to complete the written survey that you may receive in the mail after your visit with us. Thank you!             Your Updated Medication List - Protect others around you: Learn how to safely use, store and throw away your medicines at www.disposemymeds.org.          This list is accurate as of 3/28/18 11:19 AM.  Always use your most recent med list.                    Brand Name Dispense Instructions for use Diagnosis    enoxaparin 60 MG/0.6ML injection    LOVENOX     Inject 60 mg Subcutaneous once        metoclopramide 5 MG tablet    REGLAN     Take 10 mg by mouth every 6 hours as needed        ondansetron 4 MG ODT tab    ZOFRAN ODT    20 tablet    Take 1 tablet (4 mg) by mouth every 8 hours as needed for nausea    Excessive vomiting during pregnancy       order for DME     1 each    Equipment being ordered: Oxygen by nasal cannula at 2 L/minute at onset of cluster headaches    Episodic cluster headache, not intractable       verapamil 40 MG tablet    CALAN    90 tablet    Take 1 tablet (40 mg) by mouth 3 times daily    Episodic cluster headache, not intractable       vitamin B complex with vitamin C Tabs tablet      Take 1 tablet by mouth daily        VITAMIN B6 PO      Take by mouth daily        VITAMIN D (CHOLECALCIFEROL) PO      Take 5,000 Units by mouth daily

## 2018-03-28 NOTE — TELEPHONE ENCOUNTER
Spoke with pt.  Will call [employer] per pt and get their fax number.  Once faxed I will call pt and inform her that's been done.  Jodi Morocho, CMA

## 2018-03-28 NOTE — PROGRESS NOTES
"Pt went to the ER  night, had very significant emesis, some hematemesis.    Then had a HA, briefly lost vision, has been blurry for days.    Has a constant dull HA.  Sharper-type HA at times associated with \"white floaties\" in her eye when they occur.  Does get tears in her eyes.  Also has constant runny nose.  Denies sinus pain.  Pain is all right-sided.  All the HA started .  Suspect cluster HA.  Pt had one in clinic that responded to oxygen by nasal cannula.  Will treat with oxygen for abortive treatment and cautiously try verapamil for preventive treatment.  Still nauseated.  No more vomiting, no hematemesis, no dark tarry stools or bloody stools.  Pt has antiemetics at home.  She does have a history of  delivery, but pt and her previous providers feel this was related to poor nutrition from severe hyperemesis.  She is not interested in progesterone therapy for prevention of  delivery, but will measure her cervical length with her 20-week ultrasound.  Prenatal flowsheet information is reviewed.  Discussed triple/quad screening.  She accepts testing at this time.  Reportable signs and symptoms discussed.  F/u in 2-4 weeks.   "

## 2018-03-28 NOTE — TELEPHONE ENCOUNTER
Pt calling for follow up on getting order faxed to home oxygen. Please advise and contact pt for follow up

## 2018-03-28 NOTE — NURSING NOTE
Chief Complaint   Patient presents with     Prenatal Care     Panel Management     cecile, vince, pap, care everywhere, pregnancy folder, prenatal screen, urine culture, maternal quad screen, 20 week US needs to be scheduled, place gct and hgb orders       Initial /54 (BP Location: Right arm, Patient Position: Chair, Cuff Size: Adult Regular)  Pulse 92  Temp 97.4  F (36.3  C) (Temporal)  Resp 16  Ht 6' (1.829 m)  Wt 169 lb 4.8 oz (76.8 kg)  LMP  (LMP Unknown)  BMI 22.96 kg/m2 Estimated body mass index is 22.96 kg/(m^2) as calculated from the following:    Height as of this encounter: 6' (1.829 m).    Weight as of this encounter: 169 lb 4.8 oz (76.8 kg).  Medication Reconciliation: complete  Jodi Morocho, CMA

## 2018-03-29 NOTE — TELEPHONE ENCOUNTER
Called and informed patient that [facility] does not have O2 and we will need to fax Rx to [facility]. Patient is aware, she questions how she will get the O2 home. She states she will call [facility] to figure that out.     Rx faxed to [facility]    Trina Rose MA

## 2018-03-29 NOTE — TELEPHONE ENCOUNTER
Per [facility] they cannot supply patient with oxygen due to being covered through Medicare,  Here is a list of companies that can supply oxygen: [facility] ph#[phone], fax#[phone], [employer] in [place] ph#[phone], [facility] in [place] ph#[phone], [facility] ph#[phone].  I have informed patient of this, she would like us to send it to [facility].    I have faxed order to [facility] fax#[phone]  Closing encounter  Jemima New RT (R)

## 2018-03-29 NOTE — TELEPHONE ENCOUNTER
[employer] doesn't have oxygen please send to [employer] there phone number is [phone].    Thank you Angela

## 2018-03-31 LAB
RESULT: NORMAL
SEND OUTS MISC TEST CODE: NORMAL
SEND OUTS MISC TEST SPECIMEN: NORMAL
TEST NAME: NORMAL

## 2018-04-01 ENCOUNTER — HEALTH MAINTENANCE LETTER (OUTPATIENT)
Age: 33
End: 2018-04-01

## 2018-04-02 LAB
DEPRECATED CALCIDIOL+CALCIFEROL SERPL-MC: <39 UG/L (ref 20–75)
VITAMIN D2 SERPL-MCNC: <5 UG/L
VITAMIN D3 SERPL-MCNC: 34 UG/L

## 2018-04-03 NOTE — PROGRESS NOTES
Isaura,    All of your labs were normal for you except your prolactin.  This level isn't particularly high for pregnancy.  Any idea what your levels were previously?    Have a nice day!    Dr. Umana

## 2018-04-11 ENCOUNTER — HOSPITAL ENCOUNTER (OUTPATIENT)
Dept: ULTRASOUND IMAGING | Facility: CLINIC | Age: 33
Discharge: HOME | End: 2018-04-11
Attending: FAMILY MEDICINE | Admitting: FAMILY MEDICINE
Payer: MEDICARE

## 2018-04-11 DIAGNOSIS — O09.899 HISTORY OF PRETERM DELIVERY, CURRENTLY PREGNANT: ICD-10-CM

## 2018-04-11 DIAGNOSIS — O09.899 SUPERVISION OF OTHER HIGH RISK PREGNANCIES, UNSPECIFIED TRIMESTER: ICD-10-CM

## 2018-04-11 PROCEDURE — 76805 OB US >/= 14 WKS SNGL FETUS: CPT

## 2018-04-16 ENCOUNTER — TELEPHONE (OUTPATIENT)
Dept: FAMILY MEDICINE | Facility: OTHER | Age: 33
End: 2018-04-16

## 2018-04-16 NOTE — LETTER
[facility]  [address] [address]  Phone: [phone]        April 16, 2018      Isaura Gray                                                                                                                                [address] [address] [place] [id number]            Dear Ms. Gray,    We are concerned about your health care. We received a notice that you are to be scheduled with a specialty clinic. The referral has been placed by your provider and you can call to schedule an appointment directly.     Enclosed, you will find the referral with the phone number to call to schedule an appointment.  If you have already scheduled this, you may disregard this letter.    Please call us if you have any questions or concerns.      Thank you,      Your [facility] Team

## 2018-04-16 NOTE — TELEPHONE ENCOUNTER
You placed a referral for patient to onc/heme on 3/28/18.  Patient has not scheduled as of yet.      Please review and forward to team if follow up with the patient is needed.     Thank you!  /Clinic Referrals Dyad II

## 2018-04-23 ENCOUNTER — PRENATAL OFFICE VISIT (OUTPATIENT)
Dept: FAMILY MEDICINE | Facility: OTHER | Age: 33
End: 2018-04-23
Payer: MEDICARE

## 2018-04-23 VITALS
TEMPERATURE: 97.3 F | WEIGHT: 172 LBS | SYSTOLIC BLOOD PRESSURE: 106 MMHG | HEART RATE: 92 BPM | BODY MASS INDEX: 23.3 KG/M2 | RESPIRATION RATE: 16 BRPM | DIASTOLIC BLOOD PRESSURE: 70 MMHG | HEIGHT: 72 IN

## 2018-04-23 DIAGNOSIS — K21.9 GASTROESOPHAGEAL REFLUX DISEASE, ESOPHAGITIS PRESENCE NOT SPECIFIED: ICD-10-CM

## 2018-04-23 DIAGNOSIS — R82.90 ABNORMAL URINE ODOR: ICD-10-CM

## 2018-04-23 DIAGNOSIS — R11.2 INTRACTABLE VOMITING WITH NAUSEA, UNSPECIFIED VOMITING TYPE: ICD-10-CM

## 2018-04-23 DIAGNOSIS — C73 FOLLICULAR CANCER OF THYROID (H): ICD-10-CM

## 2018-04-23 DIAGNOSIS — I26.99 PE (PULMONARY THROMBOEMBOLISM) (H): ICD-10-CM

## 2018-04-23 DIAGNOSIS — E22.1 HYPERPROLACTINEMIA (H): ICD-10-CM

## 2018-04-23 DIAGNOSIS — O09.899 SUPERVISION OF OTHER HIGH RISK PREGNANCIES, UNSPECIFIED TRIMESTER: Primary | ICD-10-CM

## 2018-04-23 LAB
ALBUMIN UR-MCNC: NEGATIVE MG/DL
APPEARANCE UR: CLEAR
BILIRUB UR QL STRIP: NEGATIVE
COLOR UR AUTO: YELLOW
GLUCOSE UR STRIP-MCNC: NEGATIVE MG/DL
HGB UR QL STRIP: NEGATIVE
KETONES UR STRIP-MCNC: NEGATIVE MG/DL
LEUKOCYTE ESTERASE UR QL STRIP: NEGATIVE
NITRATE UR QL: NEGATIVE
PH UR STRIP: 6 PH (ref 5–7)
SOURCE: NORMAL
SP GR UR STRIP: 1.02 (ref 1–1.03)
UROBILINOGEN UR STRIP-ACNC: 0.2 EU/DL (ref 0.2–1)

## 2018-04-23 PROCEDURE — 81003 URINALYSIS AUTO W/O SCOPE: CPT | Performed by: FAMILY MEDICINE

## 2018-04-23 PROCEDURE — 99207 ZZC COMPLICATED OB VISIT: CPT | Performed by: FAMILY MEDICINE

## 2018-04-23 ASSESSMENT — PAIN SCALES - GENERAL: PAINLEVEL: NO PAIN (0)

## 2018-04-23 NOTE — NURSING NOTE
Chief Complaint   Patient presents with     Prenatal Care       Initial /70 (BP Location: Right arm, Patient Position: Chair, Cuff Size: Adult Regular)  Pulse 92  Temp 97.3  F (36.3  C) (Temporal)  Resp 16  Ht 6' (1.829 m)  Wt 172 lb (78 kg)  LMP  (LMP Unknown)  BMI 23.33 kg/m2 Estimated body mass index is 23.33 kg/(m^2) as calculated from the following:    Height as of this encounter: 6' (1.829 m).    Weight as of this encounter: 172 lb (78 kg).  Medication Reconciliation: complete  Jodi Morocho, CMA

## 2018-04-23 NOTE — MR AVS SNAPSHOT
After Visit Summary   4/23/2018    Isaura Gray    MRN: [id number]           Patient Information     Date Of Birth          1985        Visit Information        Provider Department      4/23/2018 6:15 PM Timmy Umana MD [facility]        Today's Diagnoses     Supervision of other high risk pregnancies, unspecified trimester    -  1    Intractable vomiting with nausea, unspecified vomiting type        Abnormal urine odor        Gastroesophageal reflux disease, esophagitis presence not specified          Care Instructions    Thank you for visiting [facility]    We'll let you know your lab results as soon as we can.     Try ranitidine to help with reflux and nausea at night.    OK to use milk of magnesia and glycerin suppositories for your constipation.    Have your oxygen company get me paperwork to hopefully get your oxygen covered.    Resume lovenox.  See hematology as planned.      Please Follow Up when indicated on the section below this one on your After-Visit Summary.      If you had imaging scheduled please refer to your radiology prep sheet.    Appointment    Date_______________     Time_____________    Day:   M TU W TH F    With____________________________    Location_________________________    If you need medication refills, please contact your pharmacy 3 days before your prescriptions runs out. If you are out of refills, your pharmacy will contact contact the clinic.    Contact us or return if questions or concerns.     -Your Care Team:  MD Taylor Hernandez PA-C Joel De Haan, PA-C Elizabeth McLean, APRN CNP    General information about your clinic      Clinic hours:     Lab hours:  Phone [phone]  Monday 7:30 am-7 pm    Monday 8:30 am-6:30 pm  Tuesday-Friday 7:30 am-5 pm   Tuesday-Friday 8:30 am-4:30 pm    Pharmacy hours:  Phone [phone]  Monday 8:30 am-7pm  Tuesday-Friday 8:30am-6 pm                                        Mychart assistance [phone]        We would like to hear from you, how was your visit today?    Brook Peace  Patient Information Supervisor   Patient Care Supervisor  [facility], [facility], and [facility]  [phone]             Follow-ups after your visit        Follow-up notes from your care team     Return in about 4 weeks (around 5/21/2018) for Recheck.      Who to contact     If you have questions or need follow up information about today's clinic visit or your schedule please contact [facility] directly at [phone].  Normal or non-critical lab and imaging results will be communicated to you by [employer], letter or phone within 4 business days after the clinic has received the results. If you do not hear from us within 7 days, please contact the clinic through [employer] or phone. If you have a critical or abnormal lab result, we will notify you by phone as soon as possible.  Submit refill requests through [employer] or call your pharmacy and they will forward the refill request to us. Please allow 3 business days for your refill to be completed.          Additional Information About Your Visit        [email or web address] Information     [employer] gives you secure access to your electronic health record. If you see a primary care provider, you can also send messages to your care team and make appointments. If you have questions, please call your primary care clinic.  If you do not have a primary care provider, please call [phone] and they will assist you.        Care EveryWhere ID     This is your Care EveryWhere ID. This could be used by other organizations to access your [place] medical records  [id number]        Your Vitals Were     Pulse Temperature Respirations Height Last Period BMI (Body Mass Index)    92 97.3  F (36.3  C) (Temporal) 16 6' (1.829 m) (LMP Unknown) 23.33 kg/m2       Blood  Pressure from Last 3 Encounters:   04/23/18 106/70   03/28/18 110/54   03/26/18 114/78    Weight from Last 3 Encounters:   04/23/18 172 lb (78 kg)   03/28/18 169 lb 4.8 oz (76.8 kg)   03/25/18 167 lb (75.8 kg)              We Performed the Following     *UA reflex to Microscopic and Culture ([place] and [facility] (except [place] and [place])          Today's Medication Changes          These changes are accurate as of 4/23/18  7:06 PM.  If you have any questions, ask your nurse or doctor.               Start taking these medicines.        Dose/Directions    ranitidine 300 MG tablet   Commonly known as:  ZANTAC   Used for:  Gastroesophageal reflux disease, esophagitis presence not specified, Intractable vomiting with nausea, unspecified vomiting type   Started by:  Timmy Umana MD        Dose:  300 mg   Take 1 tablet (300 mg) by mouth At Bedtime   Quantity:  30 tablet   Refills:  1         Stop taking these medicines if you haven't already. Please contact your care team if you have questions.     verapamil 40 MG tablet   Commonly known as:  CALAN   Stopped by:  Timmy Umana MD                Where to get your medicines      These medications were sent to [facility] [place] - [address]   [address] [place] [address]     Phone:  [phone]     ranitidine 300 MG tablet                Primary Care Provider Office Phone # Fax #    Timmy Umana [phone] [address] [place] [address]        Equal Access to Services     [facility] AH: Hadii aad ku hadasho Soomaali, waaxda luqadaha, qaybta kaalmada adeegyada, waxay idiin hayjacieln basil jennings. So [facility] [phone].    ATENCIÓN: Si habla español, tiene a cobb disposición servicios gratuitos de asistencia lingüística. Llame al [phone].    We comply with applicable federal civil rights laws and [place] laws. We do not discriminate on the basis of  race, color, national origin, age, disability, sex, sexual orientation, or gender identity.            Thank you!     Thank you for choosing [facility]  for your care. Our goal is always to provide you with excellent care. Hearing back from our patients is one way we can continue to improve our services. Please take a few minutes to complete the written survey that you may receive in the mail after your visit with us. Thank you!             Your Updated Medication List - Protect others around you: Learn how to safely use, store and throw away your medicines at www.disposemymeds.org.          This list is accurate as of 4/23/18  7:06 PM.  Always use your most recent med list.                   Brand Name Dispense Instructions for use Diagnosis    enoxaparin 60 MG/0.6ML injection    LOVENOX     Inject 60 mg Subcutaneous once        metoclopramide 5 MG tablet    REGLAN     Take 10 mg by mouth every 6 hours as needed        ondansetron 4 MG ODT tab    ZOFRAN ODT    20 tablet    Take 1 tablet (4 mg) by mouth every 8 hours as needed for nausea    Excessive vomiting during pregnancy       order for DME     1 each    Equipment being ordered: Oxygen by nasal cannula at 2 L/minute at onset of cluster headaches    Episodic cluster headache, not intractable       ranitidine 300 MG tablet    ZANTAC    30 tablet    Take 1 tablet (300 mg) by mouth At Bedtime    Gastroesophageal reflux disease, esophagitis presence not specified, Intractable vomiting with nausea, unspecified vomiting type       vitamin B complex with vitamin C Tabs tablet      Take 1 tablet by mouth daily        VITAMIN B6 PO      Take by mouth daily        VITAMIN D (CHOLECALCIFEROL) PO      Take 5,000 Units by mouth daily

## 2018-04-23 NOTE — PROGRESS NOTES
Feeling really tired, bloating and vomiting still - Will feel bloated and very full all day long, then as soon as 10 pm comes around, regardless of what she eats, she vomits until around 4 am. Did have a recent GI bug 1 week ago, but this is unrelated. Zofran does not help at all. Not sure if the Reglan helps at all. Takes Unisom and vitamin B6, this doesn't seem to help either. Feels like it's getting worse. No hematemesis, no dark tarry stools, or bloody stools. She reports terrible heartburn. Will start patient on Ranitidine 300 mg every night, to see if this is helpful.   She also reports harder and more volume of stools. She only goes every 3-4 days normally, and still going this amount of time between. She's more concerned about the amount of stool. Has had to do digital extractions - sometimes blood with this. Has been taking Miralax - 17 gm (capful) every day, with no change. Metamucil makes it worse. Has not tried any stool softeners or anything else. Will have patient do milk of magnesia and glycerin suppositories for constipation - hopeful this will help with her N/V as well.   Still has HAs every 2-3 days, oxygen works really well for these. Unfortunately, her insurance does not cover this. Instructed her to have [facility] [employer] contact clinic for authorization. Not taking the verapamil, stopped taking 2 days ago because she was getting dizzy. Still has runny nose constantly, and prolactin was elevated   Had her ultrasound (they're having a boy!) and everything looked great.  Was taken off Lovenox ~ 1.5 weeks ago, per patient, until she sees hematology. She reports she was feeling very lightheaded, and thinks she had a very high INR. No record of this in chart, may be she is thinking of 3/25/18 ED visit but unclear. No note of stopping Lovenox at that time. I'm not comfortable with patient being off of Lovenox, will have her restart it (can do 40 mg) and make appt with heme onc.   She endorses a strange  smell when she pees, x a couple of days. Cloudy in the morning. Light yellow. No dysuria, no hematuria. Will test urine.   No chest pain. SOB with walking because of feeling so bloated.   Denies LOF, no vaginal bleeding, no discharge.    F/u in 2-4 weeks.    This patient was seen and examined by myself as well as the medical student.  The medical student has scribed the note and I have reviewed it, edited it appropriately and agree with the final documentation. Electronically signed by Timmy Umana MD

## 2018-04-24 NOTE — PATIENT INSTRUCTIONS
Thank you for visiting [facility]    We'll let you know your lab results as soon as we can.     Try ranitidine to help with reflux and nausea at night.    OK to use milk of magnesia and glycerin suppositories for your constipation.    Have your oxygen company get me paperwork to hopefully get your oxygen covered.    Resume lovenox.  See hematology as planned.      Please Follow Up when indicated on the section below this one on your After-Visit Summary.      If you had imaging scheduled please refer to your radiology prep sheet.    Appointment    Date_______________     Time_____________    Day:   M TU W TH F    With____________________________    Location_________________________    If you need medication refills, please contact your pharmacy 3 days before your prescriptions runs out. If you are out of refills, your pharmacy will contact contact the clinic.    Contact us or return if questions or concerns.     -Your Care Team:  MD Taylor Hernandez PA-C Joel De Haan, PA-C Elizabeth McLean, APRN CNP    General information about your clinic      Clinic hours:     Lab hours:  Phone [phone]  Monday 7:30 am-7 pm    Monday 8:30 am-6:30 pm  Tuesday-Friday 7:30 am-5 pm   Tuesday-Friday 8:30 am-4:30 pm    Pharmacy hours:  Phone [phone]  Monday 8:30 am-7pm  Tuesday-Friday 8:30am-6 pm                                       Mychart assistance [phone]        We would like to hear from you, how was your visit today?    Bro[place]  Patient Information Supervisor   Patient Care Supervisor  [facility], and hospitals, and [facility]  [phone]

## 2018-05-09 ENCOUNTER — OFFICE VISIT (OUTPATIENT)
Dept: URGENT CARE | Facility: URGENT CARE | Age: 33
End: 2018-05-09
Payer: MEDICARE

## 2018-05-09 ENCOUNTER — RADIANT APPOINTMENT (OUTPATIENT)
Dept: GENERAL RADIOLOGY | Facility: CLINIC | Age: 33
End: 2018-05-09
Attending: NURSE PRACTITIONER
Payer: MEDICARE

## 2018-05-09 VITALS
HEART RATE: 87 BPM | TEMPERATURE: 97.6 F | BODY MASS INDEX: 23.46 KG/M2 | OXYGEN SATURATION: 95 % | RESPIRATION RATE: 20 BRPM | DIASTOLIC BLOOD PRESSURE: 71 MMHG | SYSTOLIC BLOOD PRESSURE: 120 MMHG | WEIGHT: 173 LBS

## 2018-05-09 DIAGNOSIS — S96.911A MUSCLE STRAIN OF RIGHT FOOT, INITIAL ENCOUNTER: ICD-10-CM

## 2018-05-09 DIAGNOSIS — S99.921A INJURY OF RIGHT FOOT, INITIAL ENCOUNTER: Primary | ICD-10-CM

## 2018-05-09 PROCEDURE — 73630 X-RAY EXAM OF FOOT: CPT | Mod: RT

## 2018-05-09 PROCEDURE — 99213 OFFICE O/P EST LOW 20 MIN: CPT | Performed by: NURSE PRACTITIONER

## 2018-05-09 ASSESSMENT — ENCOUNTER SYMPTOMS
NAUSEA: 0
DIARRHEA: 0
COUGH: 0
VOMITING: 0
HEADACHES: 0
RHINORRHEA: 0
SORE THROAT: 0
SHORTNESS OF BREATH: 0
FEVER: 0
CHILLS: 0

## 2018-05-09 ASSESSMENT — PAIN SCALES - GENERAL: PAINLEVEL: SEVERE PAIN (7)

## 2018-05-09 NOTE — PATIENT INSTRUCTIONS
Muscle Strain in the Extremities  A muscle strain is a stretching and tearing of muscle fibers. This causes pain, especially when you move that muscle. There may also be some swelling and bruising.  Home care    Keep the hurt area raised to reduce pain and swelling. This is especially important during the first 48 hours.    Apply an ice pack over the injured area for 15 to 20 minutes every 3 to 6 hours. You should do this for the first 24 to 48 hours. You can make an ice pack by filling a plastic bag that seals at the top with ice cubes and then wrapping it with a thin towel. Be careful not to injure your skin with the ice treatments. Ice should never be applied directly to skin. Continue the use of ice packs for relief of pain and swelling as needed. After 48 hours, apply heat (warm shower or warm bath) for 15 to 20 minutes several times a day, or alternate ice and heat.    You may use over-the-counter pain medicine to control pain, unless another medicine was prescribed. If you have chronic liver or kidney disease or ever had a stomach ulcer or GI bleeding, talk with your healthcare provider before using these medicines.    For leg strains: If crutches have been recommended, don t put full weight on the hurt leg until you can do so without pain. You can return to sports when you are able to hop and run on the injured leg without pain.  Follow-up care  Follow up with your healthcare provider, or as advised.  When to seek medical advice  Call your healthcare provider right away if any of these occur:    The toes of the injured leg become swollen, cold, blue, numb, or tingly    Pain or swelling increases  Date Last Reviewed: 11/19/2015 2000-2017 [employer]. [address] [place] [address], [place] [address]. All rights reserved. This information is not intended as a substitute for professional medical care. Always follow your healthcare professional's instructions.

## 2018-05-09 NOTE — PROGRESS NOTES
"SUBJECTIVE:   Isaura Gray is a 32 year old female presenting with a chief complaint of   Chief Complaint   Patient presents with     Musculoskeletal Problem     right foot pain       She is an established patient of [facility] [place].    MS Injury/Pain    Onset of symptoms was 2 day(s) ago.  Location: right foot  Context:       The injury happened while at home      Mechanism: fall , twisting      Patient experienced immediate pain, delayed swelling  Course of symptoms is worsening.    Severity moderate  Current and Associated symptoms: Decreased range of motion  Denies  Bruising, Redness and Stiffness  Aggravating Factors: walking, repetitive motion and flexion/extension  Therapies to improve symptoms include: none  This is the first time this type of problem has occurred for this patient.   Review of Systems   Constitutional: Negative for chills and fever.   HENT: Negative for congestion, ear pain, rhinorrhea and sore throat.    Respiratory: Negative for cough and shortness of breath.    Gastrointestinal: Negative for diarrhea, nausea and vomiting.   Musculoskeletal:        Positive for right foot injury   Neurological: Negative for headaches.       Past Medical History:   Diagnosis Date     Cancer (H)      Follicular cancer of thyroid (H)      Hyperprolactinemia (H)      Mitral valve prolapse      PE (pulmonary thromboembolism) (H)      Pituitary tumor      Thyroid disease      Family History   Problem Relation Age of Onset     No Known Problems Mother      Hypertension Father      CEREBROVASCULAR DISEASE Father 56      of stroke      Asthma Brother      CANCER Maternal Grandfather      lung     No Known Problems Paternal Grandmother      No Known Problems Paternal Grandfather      Autism Spectrum Disorder Son      KIDNEY DISEASE Son      \"has a third kidney\"     No Known Problems Daughter      Current Outpatient Prescriptions   Medication Sig Dispense Refill     enoxaparin (LOVENOX) 60 MG/0.6ML injection Inject " 60 mg Subcutaneous once       order for DME Equipment being ordered: Oxygen by nasal cannula at 2 L/minute at onset of cluster headaches 1 each 0     Pyridoxine HCl (VITAMIN B6 PO) Take by mouth daily       ranitidine (ZANTAC) 300 MG tablet Take 1 tablet (300 mg) by mouth At Bedtime 30 tablet 1     vitamin B complex with vitamin C (VITAMIN  B COMPLEX) TABS tablet Take 1 tablet by mouth daily       VITAMIN D, CHOLECALCIFEROL, PO Take 5,000 Units by mouth daily       metoclopramide (REGLAN) 5 MG tablet Take 10 mg by mouth every 6 hours as needed       ondansetron (ZOFRAN ODT) 4 MG ODT tab Take 1 tablet (4 mg) by mouth every 8 hours as needed for nausea (Patient not taking: Reported on 4/23/2018) 20 tablet 1     Social History   Substance Use Topics     Smoking status: Never Smoker     Smokeless tobacco: Never Used     Alcohol use No       OBJECTIVE  /71 (BP Location: Left arm, Patient Position: Chair, Cuff Size: Adult Regular)  Pulse 87  Temp 97.6  F (36.4  C) (Oral)  Resp 20  Wt 173 lb (78.5 kg)  LMP  (LMP Unknown)  SpO2 95%  BMI 23.46 kg/m2    Physical Exam   Constitutional: She appears well-developed and well-nourished. No distress.   HENT:   Head: Normocephalic and atraumatic.   Right Ear: Tympanic membrane and external ear normal.   Left Ear: Tympanic membrane and external ear normal.   Mouth/Throat: Oropharynx is clear and moist.   Eyes: EOM are normal. Pupils are equal, round, and reactive to light.   Neck: Normal range of motion. Neck supple.   Pulmonary/Chest: Effort normal and breath sounds normal. No respiratory distress.   Musculoskeletal:   Tenderness and swelling of anterior right upper foot. Tenderness worse with bearing weight and inversion and eversion of foot.  Reduced ROM on flexion and extension.   Lymphadenopathy:     She has no cervical adenopathy.   Neurological: She is alert. No cranial nerve deficit.   Skin: Skin is warm and dry. She is not diaphoretic.   Psychiatric: She has a  normal mood and affect.   Nursing note and vitals reviewed.    ASSESSMENT:    ICD-10-CM    1. Injury of right foot, initial encounter S99.921A XR Foot Right G/E 3 Views   2. Muscle strain of right foot, initial encounter S96.911A       PLAN:  I discussed xray results with the patient.  Rest the foot as much as possible.  Apply ice for 15-20 minutes intermittently as needed and especially after any offending activity. Daily stretching.  As pain recedes, begin normal activities slowly as tolerated.  Consider Physical Therapy if symptoms not better with symptomatic care.      Patient Instructions     Muscle Strain in the Extremities  A muscle strain is a stretching and tearing of muscle fibers. This causes pain, especially when you move that muscle. There may also be some swelling and bruising.  Home care    Keep the hurt area raised to reduce pain and swelling. This is especially important during the first 48 hours.    Apply an ice pack over the injured area for 15 to 20 minutes every 3 to 6 hours. You should do this for the first 24 to 48 hours. You can make an ice pack by filling a plastic bag that seals at the top with ice cubes and then wrapping it with a thin towel. Be careful not to injure your skin with the ice treatments. Ice should never be applied directly to skin. Continue the use of ice packs for relief of pain and swelling as needed. After 48 hours, apply heat (warm shower or warm bath) for 15 to 20 minutes several times a day, or alternate ice and heat.    You may use over-the-counter pain medicine to control pain, unless another medicine was prescribed. If you have chronic liver or kidney disease or ever had a stomach ulcer or GI bleeding, talk with your healthcare provider before using these medicines.    For leg strains: If crutches have been recommended, don t put full weight on the hurt leg until you can do so without pain. You can return to sports when you are able to hop and run on the injured leg  without pain.  Follow-up care  Follow up with your healthcare provider, or as advised.  When to seek medical advice  Call your healthcare provider right away if any of these occur:    The toes of the injured leg become swollen, cold, blue, numb, or tingly    Pain or swelling increases  Date Last Reviewed: 11/19/2015 2000-2017 [employer]. [address] [place] [address]. All rights reserved. This information is not intended as a substitute for professional medical care. Always follow your healthcare professional's instructions.

## 2018-05-09 NOTE — MR AVS SNAPSHOT
After Visit Summary   5/9/2018    Isaura Gray    MRN: [id number]           Patient Information     Date Of Birth          1985        Visit Information        Provider Department      5/9/2018 11:40 AM Stephy Kunz NP [facility]        Today's Diagnoses     Injury of right foot, initial encounter    -  1      Care Instructions      Muscle Strain in the Extremities  A muscle strain is a stretching and tearing of muscle fibers. This causes pain, especially when you move that muscle. There may also be some swelling and bruising.  Home care    Keep the hurt area raised to reduce pain and swelling. This is especially important during the first 48 hours.    Apply an ice pack over the injured area for 15 to 20 minutes every 3 to 6 hours. You should do this for the first 24 to 48 hours. You can make an ice pack by filling a plastic bag that seals at the top with ice cubes and then wrapping it with a thin towel. Be careful not to injure your skin with the ice treatments. Ice should never be applied directly to skin. Continue the use of ice packs for relief of pain and swelling as needed. After 48 hours, apply heat (warm shower or warm bath) for 15 to 20 minutes several times a day, or alternate ice and heat.    You may use over-the-counter pain medicine to control pain, unless another medicine was prescribed. If you have chronic liver or kidney disease or ever had a stomach ulcer or GI bleeding, talk with your healthcare provider before using these medicines.    For leg strains: If crutches have been recommended, don t put full weight on the hurt leg until you can do so without pain. You can return to sports when you are able to hop and run on the injured leg without pain.  Follow-up care  Follow up with your healthcare provider, or as advised.  When to seek medical advice  Call your healthcare provider right away if any of these occur:    The toes of the injured leg  become swollen, cold, blue, numb, or tingly    Pain or swelling increases  Date Last Reviewed: 11/19/2015 2000-2017 [employer]. [address] [place] [address], [place] [address]. All rights reserved. This information is not intended as a substitute for professional medical care. Always follow your healthcare professional's instructions.                Follow-ups after your visit        Who to contact     If you have questions or need follow up information about today's clinic visit or your schedule please contact [facility] directly at [phone].  Normal or non-critical lab and imaging results will be communicated to you by [employer], letter or phone within 4 business days after the clinic has received the results. If you do not hear from us within 7 days, please contact the clinic through [employer] or phone. If you have a critical or abnormal lab result, we will notify you by phone as soon as possible.  Submit refill requests through [employer] or call your pharmacy and they will forward the refill request to us. Please allow 3 business days for your refill to be completed.          Additional Information About Your Visit        MyChart Information     [employer] gives you secure access to your electronic health record. If you see a primary care provider, you can also send messages to your care team and make appointments. If you have questions, please call your primary care clinic.  If you do not have a primary care provider, please call [phone] and they will assist you.        Care EveryWhere ID     This is your Care EveryWhere ID. This could be used by other organizations to access your [facility] medical records  [id number]        Your Vitals Were     Pulse Temperature Respirations Last Period Pulse Oximetry BMI (Body Mass Index)    87 97.6  F (36.4  C) (Oral) 20 (LMP Unknown) 95% 23.46 kg/m2       Blood Pressure from Last 3 Encounters:   05/09/18 120/71   04/23/18 106/70   03/28/18  110/54    Weight from Last 3 Encounters:   05/09/18 173 lb (78.5 kg)   04/23/18 172 lb (78 kg)   03/28/18 169 lb 4.8 oz (76.8 kg)              We Performed the Following     XR Foot Right G/E 3 Views        Primary Care Provider Office Phone # Fax #    Timmy Markos Umana [phone] [address] [place] [address]        Equal Access to Services     [facility]: Hadii aad ku hadasho Soomaali, waaxda luqadaha, qaybta kaalmada adeegyada, waxay idiin hayaan adeeg kharash la'aan . So [facility] [phone].    ATENCIÓN: Si habla español, tiene a cobb disposición servicios gratuitos de asistencia lingüística. [facility] [phone].    We comply with applicable federal civil rights laws and [place] laws. We do not discriminate on the basis of race, color, national origin, age, disability, sex, sexual orientation, or gender identity.            Thank you!     Thank you for choosing [facility]  for your care. Our goal is always to provide you with excellent care. Hearing back from our patients is one way we can continue to improve our services. Please take a few minutes to complete the written survey that you may receive in the mail after your visit with us. Thank you!             Your Updated Medication List - Protect others around you: Learn how to safely use, store and throw away your medicines at www.disposemymeds.org.          This list is accurate as of 5/9/18 12:46 PM.  Always use your most recent med list.                   Brand Name Dispense Instructions for use Diagnosis    enoxaparin 60 MG/0.6ML injection    LOVENOX     Inject 60 mg Subcutaneous once        metoclopramide 5 MG tablet    REGLAN     Take 10 mg by mouth every 6 hours as needed        ondansetron 4 MG ODT tab    ZOFRAN ODT    20 tablet    Take 1 tablet (4 mg) by mouth every 8 hours as needed for nausea    Excessive vomiting during pregnancy       order for DME     1 each    Equipment being ordered:  Oxygen by nasal cannula at 2 L/minute at onset of cluster headaches    Episodic cluster headache, not intractable       ranitidine 300 MG tablet    ZANTAC    30 tablet    Take 1 tablet (300 mg) by mouth At Bedtime    Gastroesophageal reflux disease, esophagitis presence not specified, Intractable vomiting with nausea, unspecified vomiting type       vitamin B complex with vitamin C Tabs tablet      Take 1 tablet by mouth daily        VITAMIN B6 PO      Take by mouth daily        VITAMIN D (CHOLECALCIFEROL) PO      Take 5,000 Units by mouth daily

## 2018-05-23 ENCOUNTER — PRENATAL OFFICE VISIT (OUTPATIENT)
Dept: FAMILY MEDICINE | Facility: OTHER | Age: 33
End: 2018-05-23
Payer: MEDICARE

## 2018-05-23 VITALS
DIASTOLIC BLOOD PRESSURE: 64 MMHG | HEART RATE: 92 BPM | SYSTOLIC BLOOD PRESSURE: 110 MMHG | RESPIRATION RATE: 16 BRPM | TEMPERATURE: 98 F | WEIGHT: 176.6 LBS | BODY MASS INDEX: 23.95 KG/M2

## 2018-05-23 DIAGNOSIS — E22.1 HYPERPROLACTINEMIA (H): ICD-10-CM

## 2018-05-23 DIAGNOSIS — I26.99 PE (PULMONARY THROMBOEMBOLISM) (H): ICD-10-CM

## 2018-05-23 DIAGNOSIS — Z13.1 ENCOUNTER FOR SCREENING FOR DIABETES MELLITUS: ICD-10-CM

## 2018-05-23 DIAGNOSIS — O09.899 SUPERVISION OF OTHER HIGH RISK PREGNANCIES, UNSPECIFIED TRIMESTER: Primary | ICD-10-CM

## 2018-05-23 PROCEDURE — 99207 ZZC COMPLICATED OB VISIT: CPT | Performed by: FAMILY MEDICINE

## 2018-05-23 ASSESSMENT — PAIN SCALES - GENERAL: PAINLEVEL: NO PAIN (0)

## 2018-05-23 NOTE — MR AVS SNAPSHOT
After Visit Summary   5/23/2018    Isaura Gray    MRN: [id number]           Patient Information     Date Of Birth          1985        Visit Information        Provider Department      5/23/2018 3:00 PM Timmy Umana MD [facility]        Today's Diagnoses     Supervision of other high risk pregnancies, unspecified trimester    -  1    PE (pulmonary thromboembolism) (H)        Hyperprolactinemia (H)        Encounter for screening for diabetes mellitus           Care Instructions    Thank you for visiting [facility]    Please return for diabetes screen in the next week or two.  This will take at least an hour.    Contact us or return if questions or concerns.     Can try the decongestant for your nasal symptoms.      Please Follow Up when indicated on the section below this one on your After-Visit Summary.      If you had imaging scheduled please refer to your radiology prep sheet.    Appointment    Date_______________     Time_____________    Day:   M TU W TH F    With____________________________    Location_________________________    If you need medication refills, please contact your pharmacy 3 days before your prescriptions runs out. If you are out of refills, your pharmacy will contact contact the clinic.    Contact us or return if questions or concerns.     -Your Care Team:  MD Taylor Hernandez PA-C Joel De Haan, PA-C Elizabeth McLean, [place] CHAO    General information about your clinic      Clinic hours:     Lab hours:  Phone [phone]  Monday 7:30 am-7 pm    Monday 8:30 am-6:30 pm  Tuesday-Friday 7:30 am-5 pm   Tuesday-Friday 8:30 am-4:30 pm    Pharmacy hours:  Phone [phone]  Monday 8:30 am-7pm  Tuesday-Friday 8:30am-6 pm                                       Mychart assistance [phone]        We would like to hear from you, how was your visit today?    Brook Peace  Patient  Information Supervisor   Patient Care Supervisor  [facility]  [phone]             Follow-ups after your visit        Follow-up notes from your care team     Return in about 2 weeks (around 6/6/2018).      Future tests that were ordered for you today     Open Future Orders        Priority Expected Expires Ordered    OB Hemoglobin Routine  6/23/2018 5/23/2018    Glucose Challenge Test (GCT) 1 Hour Routine  6/23/2018 5/23/2018    Treponema Abs w Reflex to RPR and Titer Routine  5/23/2019 5/23/2018            Who to contact     If you have questions or need follow up information about today's clinic visit or your schedule please contact [facility] directly at [phone].  Normal or non-critical lab and imaging results will be communicated to you by MyChart, letter or phone within 4 business days after the clinic has received the results. If you do not hear from us within 7 days, please contact the clinic through [employer]Mil[employer] or phone. If you have a critical or abnormal lab result, we will notify you by phone as soon as possible.  Submit refill requests through [employer] or call your pharmacy and they will forward the refill request to us. Please allow 3 business days for your refill to be completed.          Additional Information About Your Visit        MyChart Information     [employer] gives you secure access to your electronic health record. If you see a primary care provider, you can also send messages to your care team and make appointments. If you have questions, please call your primary care clinic.  If you do not have a primary care provider, please call [phone] and they will assist you.        Care EveryWhere ID     This is your Care EveryWhere ID. This could be used by other organizations to access your [place] medical records  [id number]        Your Vitals Were     Pulse Temperature Respirations  Last Period BMI (Body Mass Index)       92 98  F (36.7  C) (Temporal) 16 (LMP Unknown) 23.95 kg/m2        Blood Pressure from Last 3 Encounters:   05/23/18 110/64   05/09/18 120/71   04/23/18 106/70    Weight from Last 3 Encounters:   05/23/18 176 lb 9.6 oz (80.1 kg)   05/09/18 173 lb (78.5 kg)   04/23/18 172 lb (78 kg)               Primary Care Provider Office Phone # Fax #    Timmy Umana [phone] [address] [place] [address]        Equal Access to Services     [facility]: Hadii aad ku hadasho Sozina, waaxda luqadaha, qaybta kaalmada basilyanoé, celso jennings. So [facility] [phone].    ATENCIÓN: Si habla español, tiene a cobb disposición servicios gratuitos de asistencia lingüística. Llame al [phone].    We comply with applicable federal civil rights laws and [place] laws. We do not discriminate on the basis of race, color, national origin, age, disability, sex, sexual orientation, or gender identity.            Thank you!     Thank you for choosing [facility]  for your care. Our goal is always to provide you with excellent care. Hearing back from our patients is one way we can continue to improve our services. Please take a few minutes to complete the written survey that you may receive in the mail after your visit with us. Thank you!             Your Updated Medication List - Protect others around you: Learn how to safely use, store and throw away your medicines at www.disposemymeds.org.          This list is accurate as of 5/23/18  3:50 PM.  Always use your most recent med list.                   Brand Name Dispense Instructions for use Diagnosis    enoxaparin 60 MG/0.6ML injection    LOVENOX     Inject 60 mg Subcutaneous once        metoclopramide 5 MG tablet    REGLAN     Take 10 mg by mouth every 6 hours as needed        ondansetron 4 MG ODT tab    ZOFRAN ODT    20 tablet    Take 1 tablet (4 mg) by mouth every 8  hours as needed for nausea    Excessive vomiting during pregnancy       order for DME     1 each    Equipment being ordered: Oxygen by nasal cannula at 2 L/minute at onset of cluster headaches    Episodic cluster headache, not intractable       ranitidine 300 MG tablet    ZANTAC    30 tablet    Take 1 tablet (300 mg) by mouth At Bedtime    Gastroesophageal reflux disease, esophagitis presence not specified, Intractable vomiting with nausea, unspecified vomiting type       TUMS PO       Supervision of other high risk pregnancies, unspecified trimester       UNISOM PO       Supervision of other high risk pregnancies, unspecified trimester       vitamin B complex with vitamin C Tabs tablet      Take 1 tablet by mouth daily        VITAMIN B6 PO      Take by mouth daily        VITAMIN D (CHOLECALCIFEROL) PO      Take 5,000 Units by mouth daily        ZYRTEC ALLERGY PO       Supervision of other high risk pregnancies, unspecified trimester

## 2018-05-23 NOTE — PATIENT INSTRUCTIONS
Thank you for visiting [facility]    Please return for diabetes screen in the next week or two.  This will take at least an hour.    Contact us or return if questions or concerns.     Can try the decongestant for your nasal symptoms.      Please Follow Up when indicated on the section below this one on your After-Visit Summary.      If you had imaging scheduled please refer to your radiology prep sheet.    Appointment    Date_______________     Time_____________    Day:   M TU W TH F    With____________________________    Location_________________________    If you need medication refills, please contact your pharmacy 3 days before your prescriptions runs out. If you are out of refills, your pharmacy will contact contact the clinic.    Contact us or return if questions or concerns.     -Your Care Team:  MD Taylor Hernandez PA-C Joel De Haan, PA-C Elizabeth McLean, APRN CNP    General information about your clinic      Clinic hours:     Lab hours:  Phone [phone]  Monday 7:30 am-7 pm    Monday 8:30 am-6:30 pm  Tuesday-Friday 7:30 am-5 pm   Tuesday-Friday 8:30 am-4:30 pm    Pharmacy hours:  Phone [phone]  Monday 8:30 am-7pm  Tuesday-Friday 8:30am-6 pm                                       Mychart assistance [phone]        We would like to hear from you, how was your visit today?    Brook Peace  Patient Information Supervisor   Patient Care Supervisor  [facility] [place]  [phone]

## 2018-05-23 NOTE — PROGRESS NOTES
"  Feels like her nasal membranes, sinuses, and throat are all swollen. She thought she was congested but cannot actually blow nose. Endorses post-nasal drip. Tried zyrtec daily for 1 week. Tried mucinex. Neither helped. Tried Flonase, didn't help at all. Neti pot didn't help either. Only thing that sort of helps is hot compress on nose and Vicks vapor rub. Not taking anything currently. Recommended decongestant.     Baby feels really low. He'll kick and she feels a lot of pressure in her pelvis.     Has had really bad diarrhea for the past 3 days - last time was yesterday. Only going 1-2 times per day, but it's immediate and watery stools. Nobody else is sick. Previously had been having very hard stools. Did the MOM and that helped her be more regular. Then wasn't doing anything, and now diarrhea. Likely a viral GI infection, hopeful it is resolved.      Nausea has been improved, she has to slowly eat constantly and that has been helpful. Eating a lot of fruit. Just taking TUMs for the heartburn. Ranitidine made her feel \"gross\", only took 3 times. \"Like she needed to run but was tired\".    Will do GTT in the next week.     HAs still being maintained with oxygen. Insurance still not covering this. None of her medications have been covered lately. She is working on this. Back on the Lovenox, doing 60 mg. Has not been able to get in with heme oncology, she is working on this. Still has strange smell and cloudy urine, but clears up during the day.     No LOF, no bleeding, no discharge. No chest pain, no shortness of breath. She is reporting [place], some more painful than usual. Very irregular/rare. Reportable signs and symptoms discussed.     This document serves as a record of the services and decisions personally performed and made by Timmy Umana MD.  It was created on his/her behalf by Nichole Tena, a trained medical student and scribe.  The creation of this record is based on the provider's personal " observations and the statements of the patient.     Nichole Tena, MS3  May 23, 2018    This patient was seen and examined by myself as well as the medical student.  The medical student has scribed the note and I have reviewed it, edited it appropriately and agree with the final documentation. Electronically signed by Timmy Umana MD    Depression, unspecified depression type

## 2018-06-01 ENCOUNTER — OFFICE VISIT (OUTPATIENT)
Dept: OBGYN | Facility: OTHER | Age: 33
End: 2018-06-01
Payer: MEDICARE

## 2018-06-01 VITALS
BODY MASS INDEX: 23.73 KG/M2 | HEART RATE: 84 BPM | WEIGHT: 175 LBS | DIASTOLIC BLOOD PRESSURE: 68 MMHG | SYSTOLIC BLOOD PRESSURE: 104 MMHG

## 2018-06-01 DIAGNOSIS — N89.8 VAGINAL DISCHARGE: ICD-10-CM

## 2018-06-01 DIAGNOSIS — F20.89 OTHER SCHIZOPHRENIA (H): ICD-10-CM

## 2018-06-01 DIAGNOSIS — R30.0 DYSURIA: Primary | ICD-10-CM

## 2018-06-01 DIAGNOSIS — Z33.1 PREGNANCY, INCIDENTAL: ICD-10-CM

## 2018-06-01 LAB
ALBUMIN UR-MCNC: NEGATIVE MG/DL
APPEARANCE UR: CLEAR
BILIRUB UR QL STRIP: NEGATIVE
COLOR UR AUTO: YELLOW
GLUCOSE UR STRIP-MCNC: NEGATIVE MG/DL
HGB UR QL STRIP: NEGATIVE
KETONES UR STRIP-MCNC: NEGATIVE MG/DL
LEUKOCYTE ESTERASE UR QL STRIP: NEGATIVE
NITRATE UR QL: NEGATIVE
PH UR STRIP: 7 PH (ref 5–7)
SOURCE: NORMAL
SP GR UR STRIP: 1.02 (ref 1–1.03)
SPECIMEN SOURCE: ABNORMAL
UROBILINOGEN UR STRIP-ACNC: 0.2 EU/DL (ref 0.2–1)
WET PREP SPEC: ABNORMAL

## 2018-06-01 PROCEDURE — 81003 URINALYSIS AUTO W/O SCOPE: CPT | Performed by: ADVANCED PRACTICE MIDWIFE

## 2018-06-01 PROCEDURE — 99203 OFFICE O/P NEW LOW 30 MIN: CPT | Performed by: ADVANCED PRACTICE MIDWIFE

## 2018-06-01 PROCEDURE — 87210 SMEAR WET MOUNT SALINE/INK: CPT | Performed by: ADVANCED PRACTICE MIDWIFE

## 2018-06-01 RX ORDER — METRONIDAZOLE 500 MG/1
500 TABLET ORAL 2 TIMES DAILY
Qty: 14 TABLET | Refills: 0 | Status: SHIPPED | OUTPATIENT
Start: 2018-06-01 | End: 2018-07-31

## 2018-06-01 NOTE — PROGRESS NOTES
SUBJECTIVE:    Isaura Gray is a 32 year old female who presents today with 2 day(s) of dysuria.   Also noted pink vaginal discharge running down her leg today.   UTI HX: occasional.  ADDITIONAL SX: pregnancy incidental.  Baby active no contra/LOF    Patient Active Problem List   Diagnosis     Vitamin D deficiency     Supervision of other high risk pregnancies, unspecified trimester     PE (pulmonary thromboembolism) (H)     Hyperprolactinemia (H)     Follicular cancer of thyroid (H)     Lactose intolerance in adult     Schizophrenia (H)     Past Medical History:   Diagnosis Date     Cancer (H)      Follicular cancer of thyroid (H)      Hyperprolactinemia (H)      Mitral valve prolapse      PE (pulmonary thromboembolism) (H)      Pituitary tumor      Schizophrenia (H)     reports spontaneous remission during last pregnancy     Thyroid disease      Past Surgical History:   Procedure Laterality Date     APPENDECTOMY       ENT SURGERY      Partial thyroidectomy     Current Outpatient Prescriptions   Medication Sig Dispense Refill     Calcium Carbonate Antacid (TUMS PO)        Cetirizine HCl (ZYRTEC ALLERGY PO)        Doxylamine Succinate, Sleep, (UNISOM PO)        metroNIDAZOLE (FLAGYL) 500 MG tablet Take 1 tablet (500 mg) by mouth 2 times daily 14 tablet 0     Pyridoxine HCl (VITAMIN B6 PO) Take by mouth daily       vitamin B complex with vitamin C (VITAMIN  B COMPLEX) TABS tablet Take 1 tablet by mouth daily       enoxaparin (LOVENOX) 60 MG/0.6ML injection Inject 60 mg Subcutaneous once       metoclopramide (REGLAN) 5 MG tablet Take 10 mg by mouth every 6 hours as needed       ondansetron (ZOFRAN ODT) 4 MG ODT tab Take 1 tablet (4 mg) by mouth every 8 hours as needed for nausea (Patient not taking: Reported on 5/23/2018) 20 tablet 1     order for DME Equipment being ordered: Oxygen by nasal cannula at 2 L/minute at onset of cluster headaches (Patient not taking: Reported on 5/23/2018) 1 each 0     ranitidine  (ZANTAC) 300 MG tablet Take 1 tablet (300 mg) by mouth At Bedtime (Patient not taking: Reported on 5/23/2018) 30 tablet 1     VITAMIN D, CHOLECALCIFEROL, PO Take 5,000 Units by mouth daily       Allergies   Allergen Reactions     Ciprofloxacin Hives     Sulfa Drugs Hives     Oxycodone-Acetaminophen Itching         ROS:   ROS: 10 point ROS neg other than the symptoms noted above in the HPI.    EXAM  /68 (BP Location: Left arm, Patient Position: Chair, Cuff Size: Adult Regular)  Pulse 84  Wt 175 lb (79.4 kg)  LMP  (LMP Unknown)  BMI 23.73 kg/m2  Patient appears well, afebrile.   ABD: Soft without supra pubic pain or tenderness  BACK: Neg CVAT.   Urine dip shows   Color Urine (no units)   Date Value   06/01/2018 Yellow     Appearance Urine (no units)   Date Value   06/01/2018 Clear     Glucose Urine (mg/dL)   Date Value   06/01/2018 Negative     Bilirubin Urine (no units)   Date Value   06/01/2018 Negative     Ketones Urine (mg/dL)   Date Value   06/01/2018 Negative     Specific Gravity Urine (no units)   Date Value   06/01/2018 1.025     pH Urine (pH)   Date Value   06/01/2018 7.0     Protein Albumin Urine (mg/dL)   Date Value   06/01/2018 Negative     Urobilinogen Urine (EU/dL)   Date Value   06/01/2018 0.2     Nitrite Urine (no units)   Date Value   06/01/2018 Negative     Leukocyte Esterase Urine (no units)   Date Value   06/01/2018 Negative   PELVIC EXAM:  Vulva: No external lesions, normal hair distribution, no adenopathy, BUS WNL  Vagina: Moist, pink, discharge consistent with BV  well rugated, no lesions  Cervix: smooth, pink, no visible lesions,closed  Rectal exam: deferred    Wet prep with clue  ASSESSMENT: no evidence of UTI  Evidence of BV with Hx of PTL    PLAN:   (R30.0) Dysuria  (primary encounter diagnosis)  Comment:   Plan: *UA reflex to Microscopic and Culture ([place]         and [place] [facility] (except [place] and         [place])            (N89.8) Vaginal discharge  Comment:   Plan:  Wet prep, metroNIDAZOLE (FLAGYL) 500 MG tablet            (F20.89) Other schizophrenia (H)  Comment:  Noted from CE.  States that she is in spontaneous remission since third trimester of her last pregnancy.  Talks to a therapist weekly  Plan:     (Z33.1) Pregnancy, incidental  Comment:   Plan:       Urine was not sent for culture.   Push fluids    Follow up with PCP for pregnancy.

## 2018-06-01 NOTE — MR AVS SNAPSHOT
After Visit Summary   6/1/2018    Isaura Gray    MRN: [id number]           Patient Information     Date Of Birth          1985        Visit Information        Provider Department      6/1/2018 2:45 PM Maritza Shipman APRN CNM [facility]        Today's Diagnoses     Dysuria    -  1    Vaginal discharge        Other schizophrenia (H)        Pregnancy, incidental           Follow-ups after your visit        Follow-up notes from your care team     Return if symptoms worsen or fail to improve.      Your next 10 appointments already scheduled     Jun 04, 2018  8:30 AM CDT   LAB with NL LAB [facility] [place] ([facility])    [address] [address] [phone]           Please do not eat 10-12 hours before your appointment if you are coming in fasting for labs on lipids, cholesterol, or glucose (sugar). This does not apply to pregnant women. Water, hot tea and black coffee (with nothing added) are okay. Do not drink other fluids, diet soda or chew gum.              Who to contact     If you have questions or need follow up information about today's clinic visit or your schedule please contact [facility] directly at [phone].  Normal or non-critical lab and imaging results will be communicated to you by MyChart, letter or phone within 4 business days after the clinic has received the results. If you do not hear from us within 7 days, please contact the clinic through [employer] or phone. If you have a critical or abnormal lab result, we will notify you by phone as soon as possible.  Submit refill requests through Class[employer] or call your pharmacy and they will forward the refill request to us. Please allow 3 business days for your refill to be completed.          Additional Information About Your Visit        [employer] Information     Class[employer] gives you secure access to your electronic health record. If you see a  primary care provider, you can also send messages to your care team and make appointments. If you have questions, please call your primary care clinic.  If you do not have a primary care provider, please call [phone] and they will assist you.        Care EveryWhere ID     This is your Care EveryWhere ID. This could be used by other organizations to access your [place] medical records  [id number]        Your Vitals Were     Pulse Last Period BMI (Body Mass Index)             84 (LMP Unknown) 23.73 kg/m2          Blood Pressure from Last 3 Encounters:   06/01/18 104/68   05/23/18 110/64   05/09/18 120/71    Weight from Last 3 Encounters:   06/01/18 175 lb (79.4 kg)   05/23/18 176 lb 9.6 oz (80.1 kg)   05/09/18 173 lb (78.5 kg)              We Performed the Following     *UA reflex to Microscopic and Culture ([place] and [facility] (except [place] and [place])     Wet prep          Today's Medication Changes          These changes are accurate as of 6/1/18  3:48 PM.  If you have any questions, ask your nurse or doctor.               Start taking these medicines.        Dose/Directions    metroNIDAZOLE 500 MG tablet   Commonly known as:  FLAGYL   Used for:  Vaginal discharge   Started by:  Maritza Shipman APRN CNELIZABETH        Dose:  500 mg   Take 1 tablet (500 mg) by mouth 2 times daily   Quantity:  14 tablet   Refills:  0            Where to get your medicines      These medications were sent to [facility] - [address]   [address] [place] [address]     Phone:  [phone]     metroNIDAZOLE 500 MG tablet                Primary Care Provider Office Phone # Fax #    Timmy Umana [phone] [address] [place] [address]        Equal Access to Services     DEBORAH PRADO AH: Lovely Wright, uzma sarmiento, qaybta kaalmanoé thomas, celso jennings. So [facility]  [phone].    ATENCIÓN: Si eduardo askew, tiene a cobb disposición servicios gratuitos de asistencia lingüística. Oscar baez [phone].    We comply with applicable federal civil rights laws and [place] laws. We do not discriminate on the basis of race, color, national origin, age, disability, sex, sexual orientation, or gender identity.            Thank you!     Thank you for choosing [facility]  for your care. Our goal is always to provide you with excellent care. Hearing back from our patients is one way we can continue to improve our services. Please take a few minutes to complete the written survey that you may receive in the mail after your visit with us. Thank you!             Your Updated Medication List - Protect others around you: Learn how to safely use, store and throw away your medicines at www.disposemymeds.org.          This list is accurate as of 6/1/18  3:48 PM.  Always use your most recent med list.                   Brand Name Dispense Instructions for use Diagnosis    enoxaparin 60 MG/0.6ML injection    LOVENOX     Inject 60 mg Subcutaneous once        metoclopramide 5 MG tablet    REGLAN     Take 10 mg by mouth every 6 hours as needed        metroNIDAZOLE 500 MG tablet    FLAGYL    14 tablet    Take 1 tablet (500 mg) by mouth 2 times daily    Vaginal discharge       ondansetron 4 MG ODT tab    ZOFRAN ODT    20 tablet    Take 1 tablet (4 mg) by mouth every 8 hours as needed for nausea    Excessive vomiting during pregnancy       order for DME     1 each    Equipment being ordered: Oxygen by nasal cannula at 2 L/minute at onset of cluster headaches    Episodic cluster headache, not intractable       ranitidine 300 MG tablet    ZANTAC    30 tablet    Take 1 tablet (300 mg) by mouth At Bedtime    Gastroesophageal reflux disease, esophagitis presence not specified, Intractable vomiting with nausea, unspecified vomiting type       TUMS PO       Supervision of other high risk  pregnancies, unspecified trimester       UNISOM PO       Supervision of other high risk pregnancies, unspecified trimester       vitamin B complex with vitamin C Tabs tablet      Take 1 tablet by mouth daily        VITAMIN B6 PO      Take by mouth daily        VITAMIN D (CHOLECALCIFEROL) PO      Take 5,000 Units by mouth daily        ZYRTEC ALLERGY PO       Supervision of other high risk pregnancies, unspecified trimester

## 2018-06-01 NOTE — NURSING NOTE
Chief Complaint   Patient presents with     Prenatal Care     check for UTI       Initial /68 (BP Location: Left arm, Patient Position: Chair, Cuff Size: Adult Regular)  Pulse 84  Wt 175 lb (79.4 kg)  LMP  (LMP Unknown)  BMI 23.73 kg/m2 Estimated body mass index is 23.73 kg/(m^2) as calculated from the following:    Height as of 4/23/18: 6' (1.829 m).    Weight as of this encounter: 175 lb (79.4 kg).  Medication Reconciliation: bella Ayoub CMA

## 2018-06-04 ENCOUNTER — PRENATAL OFFICE VISIT (OUTPATIENT)
Dept: FAMILY MEDICINE | Facility: OTHER | Age: 33
End: 2018-06-04
Payer: MEDICARE

## 2018-06-04 VITALS
TEMPERATURE: 97 F | BODY MASS INDEX: 23.54 KG/M2 | DIASTOLIC BLOOD PRESSURE: 60 MMHG | SYSTOLIC BLOOD PRESSURE: 98 MMHG | WEIGHT: 173.6 LBS | RESPIRATION RATE: 16 BRPM | HEART RATE: 94 BPM

## 2018-06-04 DIAGNOSIS — C73 FOLLICULAR CANCER OF THYROID (H): ICD-10-CM

## 2018-06-04 DIAGNOSIS — L29.9 ITCHING: ICD-10-CM

## 2018-06-04 DIAGNOSIS — Z13.1 ENCOUNTER FOR SCREENING FOR DIABETES MELLITUS: ICD-10-CM

## 2018-06-04 DIAGNOSIS — O09.899 SUPERVISION OF OTHER HIGH RISK PREGNANCIES, UNSPECIFIED TRIMESTER: Primary | ICD-10-CM

## 2018-06-04 DIAGNOSIS — R10.11 ABDOMINAL PAIN, RIGHT UPPER QUADRANT: ICD-10-CM

## 2018-06-04 DIAGNOSIS — R73.9 BORDERLINE HYPERGLYCEMIA: ICD-10-CM

## 2018-06-04 DIAGNOSIS — O09.899 SUPERVISION OF OTHER HIGH RISK PREGNANCIES, UNSPECIFIED TRIMESTER: ICD-10-CM

## 2018-06-04 DIAGNOSIS — I26.99 PE (PULMONARY THROMBOEMBOLISM) (H): ICD-10-CM

## 2018-06-04 DIAGNOSIS — F20.89 OTHER SCHIZOPHRENIA (H): ICD-10-CM

## 2018-06-04 LAB
ALBUMIN SERPL-MCNC: 2.7 G/DL (ref 3.4–5)
ALP SERPL-CCNC: 71 U/L (ref 40–150)
ALT SERPL W P-5'-P-CCNC: 18 U/L (ref 0–50)
ANION GAP SERPL CALCULATED.3IONS-SCNC: 10 MMOL/L (ref 3–14)
AST SERPL W P-5'-P-CCNC: 14 U/L (ref 0–45)
BILIRUB SERPL-MCNC: 0.7 MG/DL (ref 0.2–1.3)
BUN SERPL-MCNC: 10 MG/DL (ref 7–30)
CALCIUM SERPL-MCNC: 9.2 MG/DL (ref 8.5–10.1)
CHLORIDE SERPL-SCNC: 106 MMOL/L (ref 94–109)
CO2 SERPL-SCNC: 22 MMOL/L (ref 20–32)
CREAT SERPL-MCNC: 0.67 MG/DL (ref 0.52–1.04)
GFR SERPL CREATININE-BSD FRML MDRD: >90 ML/MIN/1.7M2
GLUCOSE 1H P 50 G GLC PO SERPL-MCNC: 130 MG/DL (ref 60–129)
GLUCOSE SERPL-MCNC: 130 MG/DL (ref 70–99)
HGB BLD-MCNC: 11.9 G/DL (ref 11.7–15.7)
POTASSIUM SERPL-SCNC: 3.7 MMOL/L (ref 3.4–5.3)
PROT SERPL-MCNC: 6.8 G/DL (ref 6.8–8.8)
SODIUM SERPL-SCNC: 138 MMOL/L (ref 133–144)
TSH SERPL DL<=0.005 MIU/L-ACNC: 2.36 MU/L (ref 0.4–4)

## 2018-06-04 PROCEDURE — 83789 MASS SPECTROMETRY QUAL/QUAN: CPT | Mod: 90 | Performed by: FAMILY MEDICINE

## 2018-06-04 PROCEDURE — 36415 COLL VENOUS BLD VENIPUNCTURE: CPT | Performed by: FAMILY MEDICINE

## 2018-06-04 PROCEDURE — 99207 ZZC COMPLICATED OB VISIT: CPT | Performed by: FAMILY MEDICINE

## 2018-06-04 PROCEDURE — 80053 COMPREHEN METABOLIC PANEL: CPT | Performed by: FAMILY MEDICINE

## 2018-06-04 PROCEDURE — 00000218 ZZHCL STATISTIC OBHBG - HEMOGLOBIN: Performed by: FAMILY MEDICINE

## 2018-06-04 PROCEDURE — 82950 GLUCOSE TEST: CPT | Performed by: FAMILY MEDICINE

## 2018-06-04 PROCEDURE — 84443 ASSAY THYROID STIM HORMONE: CPT | Performed by: FAMILY MEDICINE

## 2018-06-04 PROCEDURE — 86780 TREPONEMA PALLIDUM: CPT | Performed by: FAMILY MEDICINE

## 2018-06-04 ASSESSMENT — PAIN SCALES - GENERAL: PAINLEVEL: NO PAIN (0)

## 2018-06-04 NOTE — MR AVS SNAPSHOT
After Visit Summary   6/4/2018    Isaura Gray    MRN: [id number]           Patient Information     Date Of Birth          1985        Visit Information        Provider Department      6/4/2018 9:30 AM Timmy Umana MD [facility]        Today's Diagnoses     Supervision of other high risk pregnancies, unspecified trimester    -  1    PE (pulmonary thromboembolism) (H)        Itching        Abdominal pain, right upper quadrant        Follicular cancer of thyroid (H)        Other schizophrenia (H)          Care Instructions    Thank you for visiting [facility]    We'll let you know your lab results as soon as we can.     OK to use hydrocortisone cream and antihistamines for your itching.  If not improving, let us know.    Contact us or return if questions or concerns.     Please Follow Up when indicated on the section below this one on your After-Visit Summary.      If you had imaging scheduled please refer to your radiology prep sheet.    Appointment    Date_______________     Time_____________    Day:   M TU W TH F    With____________________________    Location_________________________    If you need medication refills, please contact your pharmacy 3 days before your prescriptions runs out. If you are out of refills, your pharmacy will contact contact the clinic.    Contact us or return if questions or concerns.     -Your Care Team:  MD Taylor Hernandez PA-C Joel De Haan, PA-C Elizabeth McLean, APRN CNP    General information about your clinic      Clinic hours:     Lab hours:  Phone [phone]  Monday 7:30 am-7 pm    Monday 8:30 am-6:30 pm  Tuesday-Friday 7:30 am-5 pm   Tuesday-Friday 8:30 am-4:30 pm    Pharmacy hours:  Phone [phone]  Monday 8:30 am-7pm  Tuesday-Friday 8:30am-6 pm                                       Mychart assistance [phone]        We would like to hear from you, how was your visit  today?    Brook Peace  Patient Information Supervisor   Patient Care Supervisor  [facility] [place], [facility] [place] [place], and [facility]  [phone]             Follow-ups after your visit        Your next 10 appointments already scheduled     Jun 04, 2018  9:30 AM CDT   [facility] Established [facility] with [facility]my [facility]   [facility]    [address] [address] [phone]              Future tests that were ordered for you today     Open Future Orders        Priority Expected Expires Ordered    US Abdomen Limited Routine  6/4/2019 6/4/2018            Who to contact     If you have questions or need follow up information about today's clinic visit or your schedule please contact [facility] directly at [phone].  Normal or non-critical lab and imaging results will be communicated to you by MyChart, letter or phone within 4 business days after the clinic has received the results. If you do not hear from us within 7 days, please contact the clinic through [employer] or phone. If you have a critical or abnormal lab result, we will notify you by phone as soon as possible.  Submit refill requests through [employer] or call your pharmacy and they will forward the refill request to us. Please allow 3 business days for your refill to be completed.          Additional Information About Your Visit        MyChart Information     [employer] gives you secure access to your electronic health record. If you see a primary care provider, you can also send messages to your care team and make appointments. If you have questions, please call your primary care clinic.  If you do not have a primary care provider, please call [phone] and they will assist you.        Care EveryWhere ID     This is your Care EveryWhere ID. This could be used by other organizations to  access your [place] medical records  [id number]        Your Vitals Were     Pulse Temperature Respirations Last Period BMI (Body Mass Index)       94 97  F (36.1  C) (Temporal) 16 (LMP Unknown) 23.54 kg/m2        Blood Pressure from Last 3 Encounters:   06/04/18 98/60   06/01/18 104/68   05/23/18 110/64    Weight from Last 3 Encounters:   06/04/18 173 lb 9.6 oz (78.7 kg)   06/01/18 175 lb (79.4 kg)   05/23/18 176 lb 9.6 oz (80.1 kg)              We Performed the Following     Bile acids fractionated and total     Comprehensive metabolic panel     TSH with free T4 reflex        Primary Care Provider Office Phone # Fax #    Timmy Umana [phone] [address] [place] [address]        Equal Access to Services     [facility]: Hadii vika ng hadasho Soomaali, waaxda luqadaha, qaybta kaalmada adelaniyanoé, celso herman . So [facility] [phone].    ATENCIÓN: Si habla español, tiene a cobb disposición servicios gratuitos de asistencia lingüística. Llame al [phone].    We comply with applicable federal civil rights laws and [place] laws. We do not discriminate on the basis of race, color, national origin, age, disability, sex, sexual orientation, or gender identity.            Thank you!     Thank you for choosing [facility]  for your care. Our goal is always to provide you with excellent care. Hearing back from our patients is one way we can continue to improve our services. Please take a few minutes to complete the written survey that you may receive in the mail after your visit with us. Thank you!             Your Updated Medication List - Protect others around you: Learn how to safely use, store and throw away your medicines at www.disposemymeds.org.          This list is accurate as of 6/4/18  8:47 AM.  Always use your most recent med list.                   Brand Name Dispense Instructions for use Diagnosis    enoxaparin 60 MG/0.6ML  injection    LOVENOX     Inject 60 mg Subcutaneous once        metoclopramide 5 MG tablet    REGLAN     Take 10 mg by mouth every 6 hours as needed        metroNIDAZOLE 500 MG tablet    FLAGYL    14 tablet    Take 1 tablet (500 mg) by mouth 2 times daily    Vaginal discharge       ondansetron 4 MG ODT tab    ZOFRAN ODT    20 tablet    Take 1 tablet (4 mg) by mouth every 8 hours as needed for nausea    Excessive vomiting during pregnancy       order for DME     1 each    Equipment being ordered: Oxygen by nasal cannula at 2 L/minute at onset of cluster headaches    Episodic cluster headache, not intractable       ranitidine 300 MG tablet    ZANTAC    30 tablet    Take 1 tablet (300 mg) by mouth At Bedtime    Gastroesophageal reflux disease, esophagitis presence not specified, Intractable vomiting with nausea, unspecified vomiting type       TUMS PO       Supervision of other high risk pregnancies, unspecified trimester       UNISOM PO       Supervision of other high risk pregnancies, unspecified trimester       vitamin B complex with vitamin C Tabs tablet      Take 1 tablet by mouth daily        VITAMIN B6 PO      Take by mouth daily        VITAMIN D (CHOLECALCIFEROL) PO      Take 5,000 Units by mouth daily        ZYRTEC ALLERGY PO       Supervision of other high risk pregnancies, unspecified trimester

## 2018-06-04 NOTE — PATIENT INSTRUCTIONS
Thank you for visiting [facility]    We'll let you know your lab results as soon as we can.     OK to use hydrocortisone cream and antihistamines for your itching.  If not improving, let us know.    Contact us or return if questions or concerns.     Please Follow Up when indicated on the section below this one on your After-Visit Summary.      If you had imaging scheduled please refer to your radiology prep sheet.    Appointment    Date_______________     Time_____________    Day:   M TU W TH F    With____________________________    Location_________________________    If you need medication refills, please contact your pharmacy 3 days before your prescriptions runs out. If you are out of refills, your pharmacy will contact contact the clinic.    Contact us or return if questions or concerns.     -Your Care Team:  MD Taylor Hernandez PA-C Joel De Haan, PA-C Elizabeth McLean, APRN CNP    General information about your clinic      Clinic hours:     Lab hours:  Phone [phone]  Monday 7:30 am-7 pm    Monday 8:30 am-6:30 pm  Tuesday-Friday 7:30 am-5 pm   Tuesday-Friday 8:30 am-4:30 pm    Pharmacy hours:  Phone [phone]  Monday 8:30 am-7pm  Tuesday-Friday 8:30am-6 pm                                       Mychart assistance [phone]        We would like to hear from you, how was your visit today?    Brook Peace  Patient Information Supervisor   Patient Care Supervisor  [facility] [place], and [facility], and [facility]  [phone]

## 2018-06-04 NOTE — PROGRESS NOTES
Pt notes much more itching on palms and feet.  Started in the last few days.  Also getting RUQ pain if she eats too much or eats fatty food.  Not really that improved with antihistamines.  Some Dewey-Mathur contractions.  No bleeding, no leaking.  Did recently get diagnosed with BV.  Slight pink discharge when this was diagnosed.  Now better.    Cephalic position confirmed by Leopold maneuvers.  1 hour GTT done today.  Discussed PTL, PROM, and when to call or come in.  Reportable signs and symptoms discussed.  F/u in 2 weeks.

## 2018-06-05 LAB — T PALLIDUM AB SER QL: NONREACTIVE

## 2018-06-05 NOTE — PROGRESS NOTES
Isaura,    All of your labs were normal for you except your diabetes screen was barely abnormal.      This does not mean that you definitely have gestational diabetes, but we need to do a 3-hr test to verify if you have gestation diabetes or not.  Please schedule this as soon as convenient in the next week or two.  The orders have been placed.  If this confirms gestational diabetes, then we'll meet again or talk by phone to determine how to manage it best in your situation.    Have a nice day!    Dr. Umana

## 2018-06-09 LAB
BILE AC SERPL-SCNC: 0.4 UMOL/L (ref 0–2.5)
CDCAE SERPL-SCNC: 0.3 UMOL/L (ref 0–3.4)
CHOLATE SERPL-SCNC: 1.3 UMOL/L (ref 0–7)
DO-CHOLATE SERPL-SCNC: 0.3 UMOL/L (ref 0–1.9)
URSODEOXYCHOLATE SERPL-SCNC: 0.3 UMOL/L (ref 0–1)

## 2018-06-12 ENCOUNTER — MYC MEDICAL ADVICE (OUTPATIENT)
Dept: FAMILY MEDICINE | Facility: OTHER | Age: 33
End: 2018-06-12

## 2018-06-12 NOTE — TELEPHONE ENCOUNTER
Spoke with patient informed her of 3 hour test procedure, patient will call back to schedule   Closing encounter  Jemima New RT (R)

## 2018-06-14 ENCOUNTER — RADIANT APPOINTMENT (OUTPATIENT)
Dept: ULTRASOUND IMAGING | Facility: CLINIC | Age: 33
End: 2018-06-14
Attending: FAMILY MEDICINE
Payer: MEDICARE

## 2018-06-14 DIAGNOSIS — L29.9 ITCHING: ICD-10-CM

## 2018-06-14 DIAGNOSIS — R10.11 ABDOMINAL PAIN, RIGHT UPPER QUADRANT: ICD-10-CM

## 2018-06-14 DIAGNOSIS — O09.899 SUPERVISION OF OTHER HIGH RISK PREGNANCIES, UNSPECIFIED TRIMESTER: ICD-10-CM

## 2018-06-14 PROCEDURE — 76705 ECHO EXAM OF ABDOMEN: CPT

## 2018-06-19 ENCOUNTER — NURSE TRIAGE (OUTPATIENT)
Dept: NURSING | Facility: CLINIC | Age: 33
End: 2018-06-19

## 2018-06-19 ENCOUNTER — MYC MEDICAL ADVICE (OUTPATIENT)
Dept: FAMILY MEDICINE | Facility: OTHER | Age: 33
End: 2018-06-19

## 2018-06-19 ENCOUNTER — HOSPITAL ENCOUNTER (OUTPATIENT)
Facility: CLINIC | Age: 33
Discharge: HOME | End: 2018-06-19
Attending: FAMILY MEDICINE | Admitting: FAMILY MEDICINE
Payer: MEDICARE

## 2018-06-19 VITALS
HEIGHT: 72 IN | DIASTOLIC BLOOD PRESSURE: 68 MMHG | HEART RATE: 80 BPM | TEMPERATURE: 98.2 F | BODY MASS INDEX: 22.75 KG/M2 | SYSTOLIC BLOOD PRESSURE: 106 MMHG | RESPIRATION RATE: 16 BRPM | WEIGHT: 168 LBS

## 2018-06-19 DIAGNOSIS — R73.9 BORDERLINE HYPERGLYCEMIA: ICD-10-CM

## 2018-06-19 DIAGNOSIS — K80.50 GALL BLADDER PAIN: ICD-10-CM

## 2018-06-19 DIAGNOSIS — O09.899 SUPERVISION OF OTHER HIGH RISK PREGNANCIES, UNSPECIFIED TRIMESTER: ICD-10-CM

## 2018-06-19 DIAGNOSIS — R10.11 ABDOMINAL PAIN, RIGHT UPPER QUADRANT: Primary | ICD-10-CM

## 2018-06-19 DIAGNOSIS — Z13.1 ENCOUNTER FOR SCREENING FOR DIABETES MELLITUS: ICD-10-CM

## 2018-06-19 PROBLEM — Z36.89 ENCOUNTER FOR TRIAGE IN PREGNANT PATIENT: Status: ACTIVE | Noted: 2018-06-19

## 2018-06-19 LAB
ALBUMIN UR-MCNC: NEGATIVE MG/DL
APPEARANCE UR: CLEAR
BILIRUB UR QL STRIP: NEGATIVE
COLOR UR AUTO: YELLOW
FIBRONECTIN FETAL VAG QL: NEGATIVE
GLUCOSE 1H P 100 G GLC PO SERPL-MCNC: 129 MG/DL (ref 60–179)
GLUCOSE 2H P 100 G GLC PO SERPL-MCNC: 120 MG/DL (ref 60–154)
GLUCOSE 3H P 100 G GLC PO SERPL-MCNC: 120 MG/DL (ref 60–139)
GLUCOSE P FAST SERPL-MCNC: 76 MG/DL (ref 60–94)
GLUCOSE UR STRIP-MCNC: NEGATIVE MG/DL
HGB UR QL STRIP: NEGATIVE
KETONES UR STRIP-MCNC: 80 MG/DL
LEUKOCYTE ESTERASE UR QL STRIP: NEGATIVE
MUCOUS THREADS #/AREA URNS LPF: PRESENT /LPF
NITRATE UR QL: NEGATIVE
PH UR STRIP: 5 PH (ref 5–7)
RBC #/AREA URNS AUTO: 1 /HPF (ref 0–2)
SOURCE: ABNORMAL
SP GR UR STRIP: 1.02 (ref 1–1.03)
SPECIMEN SOURCE: NORMAL
SQUAMOUS #/AREA URNS AUTO: 2 /HPF (ref 0–1)
UROBILINOGEN UR STRIP-MCNC: 0 MG/DL (ref 0–2)
WBC #/AREA URNS AUTO: 2 /HPF (ref 0–5)
WET PREP SPEC: NORMAL

## 2018-06-19 PROCEDURE — 87210 SMEAR WET MOUNT SALINE/INK: CPT | Performed by: FAMILY MEDICINE

## 2018-06-19 PROCEDURE — 82952 GTT-ADDED SAMPLES: CPT | Performed by: FAMILY MEDICINE

## 2018-06-19 PROCEDURE — G0463 HOSPITAL OUTPT CLINIC VISIT: HCPCS

## 2018-06-19 PROCEDURE — 82731 ASSAY OF FETAL FIBRONECTIN: CPT | Performed by: FAMILY MEDICINE

## 2018-06-19 PROCEDURE — 36415 COLL VENOUS BLD VENIPUNCTURE: CPT | Performed by: FAMILY MEDICINE

## 2018-06-19 PROCEDURE — 81001 URINALYSIS AUTO W/SCOPE: CPT | Performed by: FAMILY MEDICINE

## 2018-06-19 PROCEDURE — 82951 GLUCOSE TOLERANCE TEST (GTT): CPT | Performed by: FAMILY MEDICINE

## 2018-06-19 NOTE — IP AVS SNAPSHOT
MRN:[id number]                      After Visit Summary   6/19/2018    Isaura Gray    MRN: [id number]           Thank you!     Thank you for choosing [facility] for your care. Our goal is always to provide you with excellent care. Hearing back from our patients is one way we can continue to improve our services. Please take a few minutes to complete the written survey that you may receive in the mail after you visit with us. Thank you!        Patient Information     Date Of Birth          1985        About your hospital stay     You were admitted on:  June 19, 2018 You last received care in the:  [facility]    You were discharged on:  June 19, 2018       Who to Call     For medical emergencies, please call 911.  For non-urgent questions about your medical care, please call your primary care provider or clinic, [phone]          Attending Provider     Provider Specialty    Lyle Champagne MD Family Practice       Primary Care Provider Office Phone # Fax #    Timmy Markos Umana [phone]      Your next 10 appointments already scheduled     Jun 20, 2018  7:40 AM CDT   Office Visit with [place] Prabhakar [facility]    [address] [address] [phone]           Bring a current list of meds and any records pertaining to this visit. For Physicals, please bring immunization records and any forms needing to be filled out. Please arrive 10 minutes early to complete paperwork.              Further instructions from your care team                OB Triage Discharge Instructions    Diet:  Regular diet as tolerated  Drink 8-10 glasses of water and / or juice every day    Activity:  As tolerated    Other Special Instructions: follow up with provider tomorrow morning as scheduled    Reason for being seen in L&D: right sided abdominal pain with pain shooting to  shoulder, abdominal cramping, low back pain    Treatment/procedures performed in L&D: Uterine and Fetal monitoring, Urinalysis, Wet Prep, Fetal Fibronectin    Call the [facility] at [phone] if you have:  ? 5 or more contractions in one hour  ? Leaking of fluid from your vaginal area  ? Decreased fetal movement (follow kick count instructions)  ? Bleeding from your vaginal area  ? Swelling in your face, or increased swelling in your hands or legs  ? Headaches or vision problems such as blurring or seeing spots or starts  ? Nausea or vomiting lasting for more than 24 hours  ? An increase in weight (5lbs/week)  ? Epigastric pain (sometimes confused with heartburn that does not go away or a very bad stomach ache)    If you have any questions, please follow up with your doctor.        Patient Signature: ______________________________________________________________  By signing the above I acknowledge that a nurse or my care provider has explained the instruction to me and I have had any questions regarding my care explained to me.        Discharge Nurse Signature: _______________________________ 6/19/2018  10:00 PM    Method of discharge: ambulatory    Time of discharge: 2215        Pending Results     No orders found from 6/17/2018 to 6/20/2018.            Admission Information     Date & Time Provider Department Dept. Phone    6/19/2018 Lyle Chapmagne MD [facility] [phone]      Your Vitals Were     Blood Pressure Pulse Temperature Respirations Height Weight    106/68 80 98.2  F (36.8  C) (Oral) 16 1.829 m (6') 76.2 kg (168 lb)    Last Period BMI (Body Mass Index)                (LMP Unknown) 22.78 kg/m2          MyChart Information     [employer] gives you secure access to your electronic health record. If you see a primary care provider, you can also send messages to your care team and make appointments. If you have questions, please call your primary care clinic.  If you do not have  a primary care provider, please call [phone] and they will assist you.        Care EveryWhere ID     This is your Care EveryWhere ID. This could be used by other organizations to access your [place] medical records  [id number]        Equal Access to Services     BAILEY PRADO : Hadii aad ku hadnidiagene Kyle, waleanneda luqadaha, qaybta kaalmada adestevo, waxnidhi gato stormymauricio lunakristenerica jennings. So [facility] [phone].    ATENCIÓN: Si habla español, tiene a cobb disposición servicios gratuitos de asistencia lingüística. Llame al [phone].    We comply with applicable federal civil rights laws and [place] laws. We do not discriminate on the basis of race, color, national origin, age, disability, sex, sexual orientation, or gender identity.               Review of your medicines      UNREVIEWED medicines. Ask your doctor about these medicines        Dose / Directions    enoxaparin 60 MG/0.6ML injection   Commonly known as:  LOVENOX        Dose:  60 mg   Inject 60 mg Subcutaneous once   Refills:  0       metoclopramide 5 MG tablet   Commonly known as:  REGLAN        Dose:  10 mg   Take 10 mg by mouth every 6 hours as needed   Refills:  0       metroNIDAZOLE 500 MG tablet   Commonly known as:  FLAGYL   Used for:  Vaginal discharge        Dose:  500 mg   Take 1 tablet (500 mg) by mouth 2 times daily   Quantity:  14 tablet   Refills:  0       ondansetron 4 MG ODT tab   Commonly known as:  ZOFRAN ODT   Used for:  Excessive vomiting during pregnancy        Dose:  4 mg   Take 1 tablet (4 mg) by mouth every 8 hours as needed for nausea   Quantity:  20 tablet   Refills:  1       ranitidine 300 MG tablet   Commonly known as:  ZANTAC   Used for:  Gastroesophageal reflux disease, esophagitis presence not specified, Intractable vomiting with nausea, unspecified vomiting type        Dose:  300 mg   Take 1 tablet (300 mg) by mouth At Bedtime   Quantity:  30 tablet   Refills:  1       TUMS PO   Used for:  Supervision of other  high risk pregnancies, unspecified trimester        Dose:  500-1000 mg   Take 500-1,000 mg by mouth as needed   Refills:  0       UNISOM PO   Used for:  Supervision of other high risk pregnancies, unspecified trimester        Dose:  50 mg   Take 50 mg by mouth nightly as needed   Refills:  0       vitamin B complex with vitamin C Tabs tablet        Dose:  1 tablet   Take 1 tablet by mouth daily   Refills:  0       VITAMIN B6 PO        Dose:  100 mg   Take 100 mg by mouth daily   Refills:  0       VITAMIN D (CHOLECALCIFEROL) PO        Dose:  5000 Units   Take 5,000 Units by mouth daily   Refills:  0         CONTINUE these medicines which have NOT CHANGED        Dose / Directions    order for DME   Used for:  Episodic cluster headache, not intractable        Equipment being ordered: Oxygen by nasal cannula at 2 L/minute at onset of cluster headaches   Quantity:  1 each   Refills:  0                Protect others around you: Learn how to safely use, store and throw away your medicines at www.disposemymeds.org.             Medication List: This is a list of all your medications and when to take them. Check marks below indicate your daily home schedule. Keep this list as a reference.      Medications           Morning Afternoon Evening Bedtime As Needed    enoxaparin 60 MG/0.6ML injection   Commonly known as:  LOVENOX   Inject 60 mg Subcutaneous once                                metoclopramide 5 MG tablet   Commonly known as:  REGLAN   Take 10 mg by mouth every 6 hours as needed                                metroNIDAZOLE 500 MG tablet   Commonly known as:  FLAGYL   Take 1 tablet (500 mg) by mouth 2 times daily                                ondansetron 4 MG ODT tab   Commonly known as:  ZOFRAN ODT   Take 1 tablet (4 mg) by mouth every 8 hours as needed for nausea                                order for DME   Equipment being ordered: Oxygen by nasal cannula at 2 L/minute at onset of cluster headaches                                 ranitidine 300 MG tablet   Commonly known as:  ZANTAC   Take 1 tablet (300 mg) by mouth At Bedtime                                TUMS PO   Take 500-1,000 mg by mouth as needed                                UNISOM PO   Take 50 mg by mouth nightly as needed                                vitamin B complex with vitamin C Tabs tablet   Take 1 tablet by mouth daily                                VITAMIN B6 PO   Take 100 mg by mouth daily                                VITAMIN D (CHOLECALCIFEROL) PO   Take 5,000 Units by mouth daily

## 2018-06-19 NOTE — IP AVS SNAPSHOT
[facility]    [address]     [place] [address]    Phone:  [phone]                                       After Visit Summary   6/19/2018    Isaura Gray    MRN: [id number]           After Visit Summary Signature Page     I have received my discharge instructions, and my questions have been answered. I have discussed any challenges I see with this plan with the nurse or doctor.    ..........................................................................................................................................  Patient/Patient Representative Signature      ..........................................................................................................................................  Patient Representative Print Name and Relationship to Patient    ..................................................               ................................................  Date                                            Time    ..........................................................................................................................................  Reviewed by Signature/Title    ...................................................              ..............................................  Date                                                            Time

## 2018-06-19 NOTE — TELEPHONE ENCOUNTER
"  Reason for Disposition    [1] SEVERE pain (e.g., excruciating) AND [2] present > 1 hour     \"I have been having upper right side abdominal pain for 4 days. I am 30 weeks pregnant and the MD already did an US of my gallbladder that was normal. I am still having them on and off. When it happens the pain is so severe it's a 10/10.\" denies other sx, denies bleeding or decreased fetal movement. Advised ER. Patient prefers to make an appt with PCP in am. Transferred to scheduling, call back if needed.    Additional Information    Negative: Severe difficulty breathing (e.g., struggling for each breath, speaks in single words)    Negative: Shock suspected (e.g., cold/pale/clammy skin, too weak to stand, low BP, rapid pulse)    Negative: Difficult to awaken or acting confused  (e.g., disoriented, slurred speech)    Negative: Passed out (i.e., lost consciousness, collapsed and was not responding)    Negative: Visible sweat on face or sweat dripping down face    Negative: Sounds like a life-threatening emergency to the triager    Negative: Followed an abdomen (stomach) injury    Negative: Chest pain    Protocols used: ABDOMINAL PAIN - UPPER-ADULT-AH    "

## 2018-06-20 ENCOUNTER — OFFICE VISIT (OUTPATIENT)
Dept: OBGYN | Facility: OTHER | Age: 33
End: 2018-06-20
Payer: MEDICARE

## 2018-06-20 VITALS
HEART RATE: 88 BPM | RESPIRATION RATE: 16 BRPM | WEIGHT: 172 LBS | BODY MASS INDEX: 23.33 KG/M2 | TEMPERATURE: 98.1 F | SYSTOLIC BLOOD PRESSURE: 112 MMHG | DIASTOLIC BLOOD PRESSURE: 70 MMHG

## 2018-06-20 DIAGNOSIS — R10.11 RIGHT UPPER QUADRANT PAIN: Primary | ICD-10-CM

## 2018-06-20 LAB
ALBUMIN SERPL-MCNC: 2.7 G/DL (ref 3.4–5)
ALP SERPL-CCNC: 71 U/L (ref 40–150)
ALT SERPL W P-5'-P-CCNC: 22 U/L (ref 0–50)
AMYLASE SERPL-CCNC: 47 U/L (ref 30–110)
AST SERPL W P-5'-P-CCNC: 21 U/L (ref 0–45)
BASOPHILS # BLD AUTO: 0 10E9/L (ref 0–0.2)
BASOPHILS NFR BLD AUTO: 0.3 %
BILIRUB DIRECT SERPL-MCNC: 0.3 MG/DL (ref 0–0.2)
BILIRUB SERPL-MCNC: 1.2 MG/DL (ref 0.2–1.3)
DIFFERENTIAL METHOD BLD: ABNORMAL
EOSINOPHIL # BLD AUTO: 0 10E9/L (ref 0–0.7)
EOSINOPHIL NFR BLD AUTO: 0.5 %
ERYTHROCYTE [DISTWIDTH] IN BLOOD BY AUTOMATED COUNT: 15.3 % (ref 10–15)
HCT VFR BLD AUTO: 34.6 % (ref 35–47)
HGB BLD-MCNC: 11.6 G/DL (ref 11.7–15.7)
LIPASE SERPL-CCNC: 91 U/L (ref 73–393)
LYMPHOCYTES # BLD AUTO: 1.3 10E9/L (ref 0.8–5.3)
LYMPHOCYTES NFR BLD AUTO: 20.4 %
MCH RBC QN AUTO: 26.1 PG (ref 26.5–33)
MCHC RBC AUTO-ENTMCNC: 33.5 G/DL (ref 31.5–36.5)
MCV RBC AUTO: 78 FL (ref 78–100)
MONOCYTES # BLD AUTO: 0.5 10E9/L (ref 0–1.3)
MONOCYTES NFR BLD AUTO: 7 %
NEUTROPHILS # BLD AUTO: 4.6 10E9/L (ref 1.6–8.3)
NEUTROPHILS NFR BLD AUTO: 71.8 %
PLATELET # BLD AUTO: 194 10E9/L (ref 150–450)
PROT SERPL-MCNC: 6.7 G/DL (ref 6.8–8.8)
RBC # BLD AUTO: 4.45 10E12/L (ref 3.8–5.2)
WBC # BLD AUTO: 6.4 10E9/L (ref 4–11)

## 2018-06-20 PROCEDURE — 82150 ASSAY OF AMYLASE: CPT | Performed by: OBSTETRICS & GYNECOLOGY

## 2018-06-20 PROCEDURE — 36415 COLL VENOUS BLD VENIPUNCTURE: CPT | Performed by: OBSTETRICS & GYNECOLOGY

## 2018-06-20 PROCEDURE — 83690 ASSAY OF LIPASE: CPT | Performed by: OBSTETRICS & GYNECOLOGY

## 2018-06-20 PROCEDURE — 85025 COMPLETE CBC W/AUTO DIFF WBC: CPT | Performed by: OBSTETRICS & GYNECOLOGY

## 2018-06-20 PROCEDURE — 80076 HEPATIC FUNCTION PANEL: CPT | Performed by: OBSTETRICS & GYNECOLOGY

## 2018-06-20 PROCEDURE — 99207 ZZC PRENATAL VISIT: CPT | Performed by: OBSTETRICS & GYNECOLOGY

## 2018-06-20 ASSESSMENT — PAIN SCALES - GENERAL: PAINLEVEL: SEVERE PAIN (6)

## 2018-06-20 NOTE — PROGRESS NOTES
S: MD update  A:  Dr. Champagne informed of patient C/O right sided abdominal pain just below rib cage with shooting pain up to shoulder as well as some low back pain, reviewed lab results from 6/4/18 and US from 6/14/18.  FHT's130 with moderate variability, accels present, 1 early decel noted.  Contractions occasional. UA, wet prep and fetal fibronectin results reviewed with MD.  R: Orders received for D/C to home

## 2018-06-20 NOTE — DISCHARGE INSTRUCTIONS
OB Triage Discharge Instructions    Diet:  Regular diet as tolerated  Drink 8-10 glasses of water and / or juice every day    Activity:  As tolerated    Other Special Instructions: follow up with provider tomorrow morning as scheduled    Reason for being seen in L&D: right sided abdominal pain with pain shooting to shoulder, abdominal cramping, low back pain    Treatment/procedures performed in L&D: Uterine and Fetal monitoring, Urinalysis, Wet Prep, Fetal Fibronectin    Call the [facility] at [phone] if you have:  ? 5 or more contractions in one hour  ? Leaking of fluid from your vaginal area  ? Decreased fetal movement (follow kick count instructions)  ? Bleeding from your vaginal area  ? Swelling in your face, or increased swelling in your hands or legs  ? Headaches or vision problems such as blurring or seeing spots or starts  ? Nausea or vomiting lasting for more than 24 hours  ? An increase in weight (5lbs/week)  ? Epigastric pain (sometimes confused with heartburn that does not go away or a very bad stomach ache)    If you have any questions, please follow up with your doctor.        Patient Signature: ______________________________________________________________  By signing the above I acknowledge that a nurse or my care provider has explained the instruction to me and I have had any questions regarding my care explained to me.        Discharge Nurse Signature: _______________________________ 6/19/2018  10:00 PM    Method of discharge: ambulatory    Time of discharge: 9638

## 2018-06-20 NOTE — TELEPHONE ENCOUNTER
Patient was seen by Dr Gilbert 6/20/2018  Flagging for PCP for FYI  Thanks  Jemima New RT (R)

## 2018-06-20 NOTE — PROGRESS NOTES
Patient is a [facility] patient with continuing RUQ pain. Original workup included a neg RUQ U/S for gallstones. Patient reports she has had gallstones in the past. Her pain gets worse throughout the day with eating and is not present when she awakes in the morning. She is already on Zantac once daily. Good fm, no ob issues. Will check further gb issues and get LFT's, amylase, lipase, CBC with diff.  Consider altering zantac to twice a day.  Will call patient with results. She is going to make her routine ob appt with her provider.

## 2018-06-20 NOTE — MR AVS SNAPSHOT
After Visit Summary   6/20/2018    Isaura Gray    MRN: [id number]           Patient Information     Date Of Birth          1985        Visit Information        Provider Department      6/20/2018 8:00 AM Carina Gilbert MD [facility]        Today's Diagnoses     Right upper quadrant pain    -  1       Follow-ups after your visit        Follow-up notes from your care team     Return in about 2 weeks (around 7/4/2018).      Who to contact     If you have questions or need follow up information about today's clinic visit or your schedule please contact [facility] directly at [phone].  Normal or non-critical lab and imaging results will be communicated to you by MyChart, letter or phone within 4 business days after the clinic has received the results. If you do not hear from us within 7 days, please contact the clinic through [employer] or phone. If you have a critical or abnormal lab result, we will notify you by phone as soon as possible.  Submit refill requests through [facility] [employer] or call your pharmacy and they will forward the refill request to us. Please allow 3 business days for your refill to be completed.          Additional Information About Your Visit        MyChart Information     [employer] gives you secure access to your electronic health record. If you see a primary care provider, you can also send messages to your care team and make appointments. If you have questions, please call your primary care clinic.  If you do not have a primary care provider, please call [phone] and they will assist you.        Care EveryWhere ID     This is your Care EveryWhere ID. This could be used by other organizations to access your [place] medical records  [id number]        Your Vitals Were     Pulse Temperature Respirations Last Period BMI (Body Mass Index)       88 98.1  F (36.7  C) (Temporal) 16 (LMP Unknown) 23.33 kg/m2        Blood Pressure from Last 3  Encounters:   06/20/18 112/70   06/19/18 106/68   06/04/18 98/60    Weight from Last 3 Encounters:   06/20/18 172 lb (78 kg)   06/19/18 168 lb (76.2 kg)   06/04/18 173 lb 9.6 oz (78.7 kg)              We Performed the Following     Amylase     CBC with platelets and differential     Hepatic panel (Albumin, ALT, AST, Bili, Alk Phos, TP)     Lipase        Primary Care Provider Office Phone # Fax #    Timmy Umana [phone] [address] [place] [address]        Equal Access to Services     [facility]: Hadii vika ng hadasho Sozina, waaxda luqadaha, qaybta kaalmada adelaniyanoé, celso herman . So [facility] [phone].    ATENCIÓN: Si habla español, tiene a cobb disposición servicios gratuitos de asistencia lingüística. Llame al [phone].    We comply with applicable federal civil rights laws and [place] laws. We do not discriminate on the basis of race, color, national origin, age, disability, sex, sexual orientation, or gender identity.            Thank you!     Thank you for choosing [facility]  for your care. Our goal is always to provide you with excellent care. Hearing back from our patients is one way we can continue to improve our services. Please take a few minutes to complete the written survey that you may receive in the mail after your visit with us. Thank you!             Your Updated Medication List - Protect others around you: Learn how to safely use, store and throw away your medicines at www.disposemymeds.org.          This list is accurate as of 6/20/18  8:45 AM.  Always use your most recent med list.                   Brand Name Dispense Instructions for use Diagnosis    enoxaparin 60 MG/0.6ML injection    LOVENOX     Inject 60 mg Subcutaneous once        metoclopramide 5 MG tablet    REGLAN     Take 10 mg by mouth every 6 hours as needed        metroNIDAZOLE 500 MG tablet    FLAGYL    14 tablet    Take 1 tablet (500  mg) by mouth 2 times daily    Vaginal discharge       ondansetron 4 MG ODT tab    ZOFRAN ODT    20 tablet    Take 1 tablet (4 mg) by mouth every 8 hours as needed for nausea    Excessive vomiting during pregnancy       order for DME     1 each    Equipment being ordered: Oxygen by nasal cannula at 2 L/minute at onset of cluster headaches    Episodic cluster headache, not intractable       ranitidine 300 MG tablet    ZANTAC    30 tablet    Take 1 tablet (300 mg) by mouth At Bedtime    Gastroesophageal reflux disease, esophagitis presence not specified, Intractable vomiting with nausea, unspecified vomiting type       TUMS PO      Take 500-1,000 mg by mouth as needed    Supervision of other high risk pregnancies, unspecified trimester       UNISOM PO      Take 50 mg by mouth nightly as needed    Supervision of other high risk pregnancies, unspecified trimester       vitamin B complex with vitamin C Tabs tablet      Take 1 tablet by mouth daily        VITAMIN B6 PO      Take 100 mg by mouth daily        VITAMIN D (CHOLECALCIFEROL) PO      Take 5,000 Units by mouth daily

## 2018-06-20 NOTE — PROGRESS NOTES
S:  Discharge from triage.   A:  FHT's 130, Moderate variability, Accels present, 1 early decel and 1 variable decel noted. Contractions occasional.    R:  Written and verbal D/C instruction provided. Pt. Verbalized understanding of D/C instructions and will follow up with Primary provider tomorrow morning as previously scheduled. Encouraged use of Tylenol for discomfort as well as tub bath or shower for comfort  Verbalizes understanding that she will call or return to [facility] with any further questions or concerns.   Pt. D/C'd via ambulation    Nursing Discharge Checklist  Discharge order entered: Yes  Patient care order entered: Yes  Charges entered: Yes

## 2018-06-20 NOTE — PROGRESS NOTES
S: Triage Arrival  B: Isaura is a 32 y.o.  @ 30w 2d who presents with complaint of increasing abdominal pain in right side by rib cage with shooting pain into shoulder as well as some low back pain. Completed antibiotic treatment 2 days ago for BV. Had ultrasound and labs 4 days ago for the right sided pain.  A: EFM applied. FHT's130 with moderate variability, accels present,1 early decels noted on strip. Contractions occasional  R: Will notify MD to obtain further orders.

## 2018-06-25 ENCOUNTER — PRENATAL OFFICE VISIT (OUTPATIENT)
Dept: FAMILY MEDICINE | Facility: OTHER | Age: 33
End: 2018-06-25
Payer: MEDICARE

## 2018-06-25 VITALS
TEMPERATURE: 97.5 F | BODY MASS INDEX: 23.19 KG/M2 | SYSTOLIC BLOOD PRESSURE: 98 MMHG | RESPIRATION RATE: 16 BRPM | DIASTOLIC BLOOD PRESSURE: 70 MMHG | WEIGHT: 171 LBS | HEART RATE: 80 BPM

## 2018-06-25 DIAGNOSIS — Z23 NEED FOR VACCINATION: ICD-10-CM

## 2018-06-25 DIAGNOSIS — F20.89 OTHER SCHIZOPHRENIA (H): ICD-10-CM

## 2018-06-25 DIAGNOSIS — R10.11 ABDOMINAL PAIN, RIGHT UPPER QUADRANT: ICD-10-CM

## 2018-06-25 DIAGNOSIS — I26.99 PE (PULMONARY THROMBOEMBOLISM) (H): ICD-10-CM

## 2018-06-25 DIAGNOSIS — L29.9 ITCHING: ICD-10-CM

## 2018-06-25 DIAGNOSIS — C73 FOLLICULAR CANCER OF THYROID (H): ICD-10-CM

## 2018-06-25 DIAGNOSIS — O09.899 SUPERVISION OF OTHER HIGH RISK PREGNANCIES, UNSPECIFIED TRIMESTER: Primary | ICD-10-CM

## 2018-06-25 DIAGNOSIS — E22.1 HYPERPROLACTINEMIA (H): ICD-10-CM

## 2018-06-25 PROCEDURE — 99207 ZZC COMPLICATED OB VISIT: CPT | Performed by: FAMILY MEDICINE

## 2018-06-25 PROCEDURE — 90471 IMMUNIZATION ADMIN: CPT | Performed by: FAMILY MEDICINE

## 2018-06-25 PROCEDURE — 90715 TDAP VACCINE 7 YRS/> IM: CPT | Performed by: FAMILY MEDICINE

## 2018-06-25 ASSESSMENT — PAIN SCALES - GENERAL: PAINLEVEL: NO PAIN (0)

## 2018-06-25 NOTE — NURSING NOTE
Prior to injection verified patient identity using patient's name and date of birth.  Due to injection administration, patient instructed to remain in clinic for 15 minutes  afterwards, and to report any adverse reaction to me immediately.    Screening Questionnaire for Adult Immunization    Are you sick today?   No   Do you have allergies to medications, food, a vaccine component or latex?   No   Have you ever had a serious reaction after receiving a vaccination?   No   Do you have a long-term health problem with heart disease, lung disease, asthma, kidney disease, metabolic disease (e.g. diabetes), anemia, or other blood disorder?   No   Do you have cancer, leukemia, HIV/AIDS, or any other immune system problem?   No   In the past 3 months, have you taken medications that affect  your immune system, such as prednisone, other steroids, or anticancer drugs; drugs for the treatment of rheumatoid arthritis, Crohn s disease, or psoriasis; or have you had radiation treatments?   No   Have you had a seizure, or a brain or other nervous system problem?   No   During the past year, have you received a transfusion of blood or blood     products, or been given immune (gamma) globulin or antiviral drug?   No   For women: Are you pregnant or is there a chance you could become        pregnant during the next month?   Yes   Have you received any vaccinations in the past 4 weeks?   No     Immunization questionnaire was positive for at least one answer.  Dr. Umana is aware.        Per orders of Dr. Umana, injection of Tdap given by Jodi Morocho. Patient instructed to remain in clinic for 15 minutes afterwards, and to report any adverse reaction to me immediately.       Screening performed by Jodi Morocho on 6/25/2018 at 10:18 AM.

## 2018-06-25 NOTE — PATIENT INSTRUCTIONS
Thank you for visiting [facility]    Can try to slightly liberalize your diet.  If you'd like a stronger acid blocker, or other medications for itching, let me know.    Contact us or return if questions or concerns.     Please Follow Up when indicated on the section below this one on your After-Visit Summary.      If you had imaging scheduled please refer to your radiology prep sheet.    Appointment    Date_______________     Time_____________    Day:   M TU W TH F    With____________________________    Location_________________________    If you need medication refills, please contact your pharmacy 3 days before your prescriptions runs out. If you are out of refills, your pharmacy will contact contact the clinic.    Contact us or return if questions or concerns.     -Your Care Team:  MD Taylor Hernandez PA-C Joel De Haan, PA-C Elizabeth McLean, APRN CNP    General information about your clinic      Clinic hours:     Lab hours:  Phone [phone]  Monday 7:30 am-7 pm    Monday 8:30 am-6:30 pm  Tuesday-Friday 7:30 am-5 pm   Tuesday-Friday 8:30 am-4:30 pm    Pharmacy hours:  Phone [phone]  Monday 8:30 am-7pm  Tuesday-Friday 8:30am-6 pm                                       Mychart assistance [phone]        We would like to hear from you, how was your visit today?    Brook Peace  Patient Information Supervisor   Patient Care Supervisor  [facility] Ayush Byram, and [facility] Ayush Byram, and [facility]  [phone]

## 2018-06-25 NOTE — PROGRESS NOTES
Pt has changed her diet to the sphincter of Oddi dysfunction diet.  But now feels like she's not eating anything.  Helped a lot.  Does feel full in the morning with her vegetable shake, but not the rest of the day.  Still some episodes of RUQ pain.  Some itching.  Not interested in hydroxyzine or topical steroids yet.  Discussed slight liberalization of her diet.  Changed her ranitidine to 150 TWICE DAILY.  This hasn't made much difference.  Declined PPI for this.  No bleeding, no LOF, few ctx recently.  This was when she had more severe pain.  Cephalic position confirmed by Leopold maneuvers.  Prenatal flowsheet information is reviewed.  Discussed PTL, PROM, and when to call or come in.  Reportable signs and symptoms discussed.  Tdap given today.  F/u in 2 weeks.

## 2018-07-03 ENCOUNTER — RADIANT APPOINTMENT (OUTPATIENT)
Dept: ULTRASOUND IMAGING | Facility: OTHER | Age: 33
End: 2018-07-03
Attending: FAMILY MEDICINE
Payer: MEDICARE

## 2018-07-03 ENCOUNTER — PRENATAL OFFICE VISIT (OUTPATIENT)
Dept: FAMILY MEDICINE | Facility: OTHER | Age: 33
End: 2018-07-03
Payer: MEDICARE

## 2018-07-03 VITALS
SYSTOLIC BLOOD PRESSURE: 96 MMHG | HEART RATE: 78 BPM | RESPIRATION RATE: 14 BRPM | BODY MASS INDEX: 23.19 KG/M2 | WEIGHT: 171 LBS | DIASTOLIC BLOOD PRESSURE: 70 MMHG | TEMPERATURE: 97.8 F

## 2018-07-03 DIAGNOSIS — M25.552 HIP PAIN, LEFT: ICD-10-CM

## 2018-07-03 DIAGNOSIS — O47.00: ICD-10-CM

## 2018-07-03 DIAGNOSIS — R82.90 NONSPECIFIC FINDING ON EXAMINATION OF URINE: ICD-10-CM

## 2018-07-03 DIAGNOSIS — R23.8 PAPULE OF SKIN: ICD-10-CM

## 2018-07-03 DIAGNOSIS — M54.50 ACUTE BILATERAL LOW BACK PAIN WITHOUT SCIATICA: ICD-10-CM

## 2018-07-03 DIAGNOSIS — N76.0 BV (BACTERIAL VAGINOSIS): ICD-10-CM

## 2018-07-03 DIAGNOSIS — O09.899 SUPERVISION OF OTHER HIGH RISK PREGNANCIES, UNSPECIFIED TRIMESTER: Primary | ICD-10-CM

## 2018-07-03 DIAGNOSIS — N89.8 VAGINAL DISCHARGE: ICD-10-CM

## 2018-07-03 DIAGNOSIS — O09.899 SUPERVISION OF OTHER HIGH RISK PREGNANCIES, UNSPECIFIED TRIMESTER: ICD-10-CM

## 2018-07-03 DIAGNOSIS — B96.89 BV (BACTERIAL VAGINOSIS): ICD-10-CM

## 2018-07-03 DIAGNOSIS — L29.9 ITCHING: ICD-10-CM

## 2018-07-03 DIAGNOSIS — O36.5990 POOR FETAL GROWTH AFFECTING MANAGEMENT OF MOTHER, ANTEPARTUM, SINGLE OR UNSPECIFIED FETUS: ICD-10-CM

## 2018-07-03 LAB
ALBUMIN UR-MCNC: 30 MG/DL
APPEARANCE UR: CLEAR
BACTERIA #/AREA URNS HPF: ABNORMAL /HPF
BILIRUB UR QL STRIP: ABNORMAL
COLOR UR AUTO: ABNORMAL
GLUCOSE UR STRIP-MCNC: NEGATIVE MG/DL
HGB UR QL STRIP: NEGATIVE
KETONES UR STRIP-MCNC: ABNORMAL MG/DL
LEUKOCYTE ESTERASE UR QL STRIP: ABNORMAL
MUCOUS THREADS #/AREA URNS LPF: PRESENT /LPF
NITRATE UR QL: POSITIVE
NON-SQ EPI CELLS #/AREA URNS LPF: ABNORMAL /LPF
PH UR STRIP: 6 PH (ref 5–7)
RBC #/AREA URNS AUTO: ABNORMAL /HPF
RUPTURE OF FETAL MEMBRANES BY ROM PLUS: NEGATIVE
SOURCE: ABNORMAL
SP GR UR STRIP: 1.02 (ref 1–1.03)
SPECIMEN SOURCE: ABNORMAL
UROBILINOGEN UR STRIP-ACNC: 1 EU/DL (ref 0.2–1)
WBC #/AREA URNS AUTO: ABNORMAL /HPF
WET PREP SPEC: ABNORMAL

## 2018-07-03 PROCEDURE — 99207 ZZC COMPLICATED OB VISIT: CPT | Performed by: FAMILY MEDICINE

## 2018-07-03 PROCEDURE — 87086 URINE CULTURE/COLONY COUNT: CPT | Performed by: FAMILY MEDICINE

## 2018-07-03 PROCEDURE — 76816 OB US FOLLOW-UP PER FETUS: CPT

## 2018-07-03 PROCEDURE — 87210 SMEAR WET MOUNT SALINE/INK: CPT | Performed by: FAMILY MEDICINE

## 2018-07-03 PROCEDURE — 81001 URINALYSIS AUTO W/SCOPE: CPT | Performed by: FAMILY MEDICINE

## 2018-07-03 PROCEDURE — 84112 EVAL AMNIOTIC FLUID PROTEIN: CPT | Mod: 22 | Performed by: FAMILY MEDICINE

## 2018-07-03 RX ORDER — METRONIDAZOLE 500 MG/1
500 TABLET ORAL 2 TIMES DAILY
Qty: 14 TABLET | Refills: 0 | Status: ON HOLD | OUTPATIENT
Start: 2018-07-03 | End: 2018-07-14

## 2018-07-03 RX ORDER — TRIAMCINOLONE ACETONIDE 1 MG/G
CREAM TOPICAL
Qty: 80 G | Refills: 0 | Status: SHIPPED | OUTPATIENT
Start: 2018-07-03 | End: 2018-11-05

## 2018-07-03 NOTE — MR AVS SNAPSHOT
After Visit Summary   7/3/2018    Isaura Gray    MRN: [id number]           Patient Information     Date Of Birth          1985        Visit Information        Provider Department      7/3/2018 8:30 AM Timmy Umana MD [facility]        Today's Diagnoses     Supervision of other high risk pregnancies, unspecified trimester    -  1    Itching        Papule of skin        Acute bilateral low back pain without sciatica        Hip pain, left        Vaginal discharge        Premature uterine contractions, antepartum        Poor fetal growth affecting management of mother, antepartum, single or unspecified fetus        Nonspecific finding on examination of urine        BV (bacterial vaginosis)          Care Instructions    Thank you for visiting [facility]    Take the antibiotics for your infection.    We'll let you know your urine culture results ASAP.    We'll let you know your lab and imaging results as soon as we can.     If not improving, let me know.    Please Follow Up when indicated on the section below this one on your After-Visit Summary.      If you had imaging scheduled please refer to your radiology prep sheet.    Appointment    Date_______________     Time_____________    Day:   M TU W TH F    With____________________________    Location_________________________    If you need medication refills, please contact your pharmacy 3 days before your prescriptions runs out. If you are out of refills, your pharmacy will contact contact the clinic.    Contact us or return if questions or concerns.     -Your Care Team:  MD Taylor Hernandez PA-C Joel De Haan, PA-C Elizabeth McLean, [place] CHAO    General information about your clinic      Clinic hours:     Lab hours:  Phone [phone]  Monday 7:30 am-7 pm    Monday 8:30 am-6:30 pm  Tuesday-Friday 7:30 am-5 pm   Tuesday-Friday 8:30 am-4:30 pm    Pharmacy hours:  Phone  "[phone]  Monday 8:30 am-7pm  Tuesday-Friday 8:30am-6 pm                                       Jose redding [phone]        We would like to hear from you, how was your visit today?    Brook Peace  Patient Information Supervisor   Patient Care Supervisor  [facility], and [facility]  [phone]             Follow-ups after your visit        Additional Services     PHYSICAL THERAPY REFERRAL       *This therapy referral will be filtered to a centralized scheduling office at [facility] and the patient will receive a call to schedule an appointment at a [place] location most convenient for them. *     [facility] provides Physical Therapy evaluation and treatment and many specialty services across the [place] system.  If requesting a specialty program, please choose from the list below.    If you have not heard from the scheduling office within 2 business days, please call [phone] for all locations, with the exception of [place], please call [phone] and [facility], please call [phone]  Treatment: Evaluation & Treatment  Special Instructions/Modalities: pregnancy related joint laxity/pain  Special Programs: as indicated    Please be aware that coverage of these services is subject to the terms and limitations of your health insurance plan.  Call member services at your health plan with any benefit or coverage questions.      **Note to Provider:  If you are referring outside of [place] for the therapy appointment, please list the name of the location in the \"special instructions\" above, print the referral and give to the patient to schedule the appointment.                  Follow-up notes from your care team     Return in about 2 weeks (around 7/17/2018) for Recheck.      Your next 10 appointments already scheduled     Jul 03, 2018  1:40 PM CDT "   US OB SINGLE FOLLOW UP REPEAT with [facility]   [facility]    [address] [address] [phone]           Please bring a list of your medicines (including vitamins, minerals and over-the-counter drugs). Also, tell your doctor about any allergies you may have. Wear comfortable clothes and leave your valuables at home.  If you re less than 20 weeks drink four 8-ounce glasses of fluid an hour before your exam. If you need to empty your bladder before your exam, try to release only a little urine. Then, drink another glass of fluid.  You may have up to two family members in the exam room. If you bring a small child, an adult must be there to care for him or her.  Please call the Imaging Department at your exam site with any questions.              Future tests that were ordered for you today     Open Future Orders        Priority Expected Expires Ordered    US OB Single Follow Up Repeat Routine  7/3/2019 7/3/2018            Who to contact     If you have questions or need follow up information about today's clinic visit or your schedule please contact [facility] directly at [phone].  Normal or non-critical lab and imaging results will be communicated to you by MyChart, letter or phone within 4 business days after the clinic has received the results. If you do not hear from us within 7 days, please contact the clinic through [email or web address] or phone. If you have a critical or abnormal lab result, we will notify you by phone as soon as possible.  Submit refill requests through [email or web address] or call your pharmacy and they will forward the refill request to us. Please allow 3 business days for your refill to be completed.          Additional Information About Your Visit        [email or web address] Information     [email or web address] gives you secure access to your electronic health record. If you see a primary care provider, you can also send messages to your care team and  make appointments. If you have questions, please call your primary care clinic.  If you do not have a primary care provider, please call [phone] and they will assist you.        Care EveryWhere ID     This is your Care EveryWhere ID. This could be used by other organizations to access your [place] medical records  [id number]        Your Vitals Were     Pulse Temperature Respirations Last Period BMI (Body Mass Index)       78 97.8  F (36.6  C) (Oral) 14 (LMP Unknown) 23.19 kg/m2        Blood Pressure from Last 3 Encounters:   07/03/18 96/70   06/25/18 98/70   06/20/18 112/70    Weight from Last 3 Encounters:   07/03/18 171 lb (77.6 kg)   06/25/18 171 lb (77.6 kg)   06/20/18 172 lb (78 kg)              We Performed the Following     *UA reflex to Microscopic and Culture ([facility] and [place] [facility] (except [place] and [place])     PHYSICAL THERAPY REFERRAL     Rupture of Fetal Membranes by ROM Plus     Urine Culture Aerobic Bacterial     Urine Microscopic     Wet prep          Today's Medication Changes          These changes are accurate as of 7/3/18  9:53 AM.  If you have any questions, ask your nurse or doctor.               Start taking these medicines.        Dose/Directions    triamcinolone 0.1 % cream   Commonly known as:  KENALOG   Used for:  Itching, Papule of skin   Started by:  Timmy Umana MD        Apply sparingly to affected area three times daily as needed   Quantity:  80 g   Refills:  0         These medicines have changed or have updated prescriptions.        Dose/Directions    * metroNIDAZOLE 500 MG tablet   Commonly known as:  FLAGYL   This may have changed:  Another medication with the same name was added. Make sure you understand how and when to take each.   Used for:  Vaginal discharge   Changed by:  Timmy Umana MD        Dose:  500 mg   Take 1 tablet (500 mg) by mouth 2 times daily   Quantity:  14 tablet   Refills:  0       * metroNIDAZOLE 500 MG tablet   Commonly  known as:  FLAGYL   This may have changed:  You were already taking a medication with the same name, and this prescription was added. Make sure you understand how and when to take each.   Used for:  BV (bacterial vaginosis)   Changed by:  Timmy Umana MD        Dose:  500 mg   Take 1 tablet (500 mg) by mouth 2 times daily   Quantity:  14 tablet   Refills:  0       * Notice:  This list has 2 medication(s) that are the same as other medications prescribed for you. Read the directions carefully, and ask your doctor or other care provider to review them with you.         Where to get your medicines      These medications were sent to [facility] [place] - [address]   [address] [place] [address]     Phone:  [phone]     metroNIDAZOLE 500 MG tablet    triamcinolone 0.1 % cream                Primary Care Provider Office Phone # Fax #    Timmy Umana [phone] [address] [place] [address]        Equal Access to Services     [facility]: Hadii aad ku hadasho Soomaali, waaxda luqadaha, qaybta kaalmada adeegyada, waxay idiin hayaan sawyereg mat herman . So [facility] [phone].    ATENCIÓN: Si habla español, tiene a cobb disposición servicios gratuitos de asistencia lingüística. Llame al [phone].    We comply with applicable federal civil rights laws and [place] laws. We do not discriminate on the basis of race, color, national origin, age, disability, sex, sexual orientation, or gender identity.            Thank you!     Thank you for choosing [facility]  for your care. Our goal is always to provide you with excellent care. Hearing back from our patients is one way we can continue to improve our services. Please take a few minutes to complete the written survey that you may receive in the mail after your visit with us. Thank you!             Your Updated Medication List - Protect others around you: Learn  how to safely use, store and throw away your medicines at www.disposemymeds.org.          This list is accurate as of 7/3/18  9:53 AM.  Always use your most recent med list.                   Brand Name Dispense Instructions for use Diagnosis    enoxaparin 60 MG/0.6ML injection    LOVENOX     Inject 60 mg Subcutaneous once        metoclopramide 5 MG tablet    REGLAN     Take 10 mg by mouth every 6 hours as needed        * metroNIDAZOLE 500 MG tablet    FLAGYL    14 tablet    Take 1 tablet (500 mg) by mouth 2 times daily    Vaginal discharge       * metroNIDAZOLE 500 MG tablet    FLAGYL    14 tablet    Take 1 tablet (500 mg) by mouth 2 times daily    BV (bacterial vaginosis)       ondansetron 4 MG ODT tab    ZOFRAN ODT    20 tablet    Take 1 tablet (4 mg) by mouth every 8 hours as needed for nausea    Excessive vomiting during pregnancy       order for DME     1 each    Equipment being ordered: Oxygen by nasal cannula at 2 L/minute at onset of cluster headaches    Episodic cluster headache, not intractable       ranitidine 300 MG tablet    ZANTAC    30 tablet    Take 1 tablet (300 mg) by mouth At Bedtime    Gastroesophageal reflux disease, esophagitis presence not specified, Intractable vomiting with nausea, unspecified vomiting type       triamcinolone 0.1 % cream    KENALOG    80 g    Apply sparingly to affected area three times daily as needed    Itching, Papule of skin       TUMS PO      Take 500-1,000 mg by mouth as needed    Supervision of other high risk pregnancies, unspecified trimester       UNISOM PO      Take 50 mg by mouth nightly as needed    Supervision of other high risk pregnancies, unspecified trimester       vitamin B complex with vitamin C Tabs tablet      Take 1 tablet by mouth daily        VITAMIN B6 PO      Take 100 mg by mouth daily        VITAMIN D (CHOLECALCIFEROL) PO      Take 5,000 Units by mouth daily        * Notice:  This list has 2 medication(s) that are the same as other medications  prescribed for you. Read the directions carefully, and ask your doctor or other care provider to review them with you.
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
## 2018-07-03 NOTE — PATIENT INSTRUCTIONS
Thank you for visiting [facility]    Take the antibiotics for your infection.    We'll let you know your urine culture results ASAP.    We'll let you know your lab and imaging results as soon as we can.     If not improving, let me know.    Please Follow Up when indicated on the section below this one on your After-Visit Summary.      If you had imaging scheduled please refer to your radiology prep sheet.    Appointment    Date_______________     Time_____________    Day:   M TU W TH F    With____________________________    Location_________________________    If you need medication refills, please contact your pharmacy 3 days before your prescriptions runs out. If you are out of refills, your pharmacy will contact contact the clinic.    Contact us or return if questions or concerns.     -Your Care Team:  MD Taylor Hernandez PA-C Joel De Haan, PA-C Elizabeth McLean, APRN CNP    General information about your clinic      Clinic hours:     Lab hours:  Phone [phone]  Monday 7:30 am-7 pm    Monday 8:30 am-6:30 pm  Tuesday-Friday 7:30 am-5 pm   Tuesday-Friday 8:30 am-4:30 pm    Pharmacy hours:  Phone [phone]  Monday 8:30 am-7pm  Tuesday-Friday 8:30am-6 pm                                       Mychart assistance [phone]        We would like to hear from you, how was your visit today?    Brook Peace  Patient Information Supervisor   Patient Care Supervisor  [facility], and [facility], and [facility]  [phone]

## 2018-07-03 NOTE — PROGRESS NOTES
Isaura,    All of your labs were normal for you except the ones we discussed at your visit.  Let me know if not improving.    Have a nice day!    Dr. Umana

## 2018-07-03 NOTE — NURSING NOTE
Chief Complaint   Patient presents with     Prenatal Care     cramping and pain       Initial BP 96/70 (BP Location: Left arm, Patient Position: Chair, Cuff Size: Adult Regular)  Pulse 78  Temp 97.8  F (36.6  C) (Oral)  Resp 14  Wt 171 lb (77.6 kg)  LMP  (LMP Unknown)  BMI 23.19 kg/m2 Estimated body mass index is 23.19 kg/(m^2) as calculated from the following:    Height as of 18: 6' (1.829 m).    Weight as of this encounter: 171 lb (77.6 kg).  BP completed using cuff size: regular        Jennifer Reynolds, AVINASH  July 3, 2018

## 2018-07-04 LAB
BACTERIA SPEC CULT: NORMAL
Lab: NORMAL
SPECIMEN SOURCE: NORMAL

## 2018-07-14 ENCOUNTER — HOSPITAL ENCOUNTER (OUTPATIENT)
Facility: CLINIC | Age: 33
Discharge: HOME | End: 2018-07-14
Attending: FAMILY MEDICINE | Admitting: FAMILY MEDICINE
Payer: MEDICARE

## 2018-07-14 ENCOUNTER — APPOINTMENT (OUTPATIENT)
Dept: ULTRASOUND IMAGING | Facility: CLINIC | Age: 33
End: 2018-07-14
Attending: FAMILY MEDICINE
Payer: MEDICARE

## 2018-07-14 VITALS
DIASTOLIC BLOOD PRESSURE: 74 MMHG | TEMPERATURE: 98.4 F | HEART RATE: 126 BPM | RESPIRATION RATE: 16 BRPM | SYSTOLIC BLOOD PRESSURE: 114 MMHG

## 2018-07-14 DIAGNOSIS — Z3A.30 30 WEEKS GESTATION OF PREGNANCY: ICD-10-CM

## 2018-07-14 DIAGNOSIS — Z36.89 ENCOUNTER FOR TRIAGE IN PREGNANT PATIENT: Primary | ICD-10-CM

## 2018-07-14 LAB
ALBUMIN UR-MCNC: 30 MG/DL
APPEARANCE UR: ABNORMAL
BACTERIA #/AREA URNS HPF: ABNORMAL /HPF
BASOPHILS # BLD AUTO: 0 10E9/L (ref 0–0.2)
BASOPHILS NFR BLD AUTO: 0.2 %
BILIRUB UR QL STRIP: NEGATIVE
COLOR UR AUTO: ABNORMAL
DIFFERENTIAL METHOD BLD: NORMAL
EOSINOPHIL NFR BLD AUTO: 0.4 %
GLUCOSE UR STRIP-MCNC: NEGATIVE MG/DL
HGB UR QL STRIP: NEGATIVE
IMM GRANULOCYTES # BLD: 0 10E9/L (ref 0–0.4)
IMM GRANULOCYTES NFR BLD: 0.4 %
KETONES UR STRIP-MCNC: NEGATIVE MG/DL
LEUKOCYTE ESTERASE UR QL STRIP: ABNORMAL
LYMPHOCYTES # BLD AUTO: 1.2 10E9/L (ref 0.8–5.3)
LYMPHOCYTES NFR BLD AUTO: 21.3 %
MONOCYTES # BLD AUTO: 0.4 10E9/L (ref 0–1.3)
MONOCYTES NFR BLD AUTO: 6.8 %
MUCOUS THREADS #/AREA URNS LPF: PRESENT /LPF
NEUTROPHILS # BLD AUTO: 3.9 10E9/L (ref 1.6–8.3)
NEUTROPHILS NFR BLD AUTO: 70.9 %
NITRATE UR QL: NEGATIVE
NRBC # BLD AUTO: 0 10*3/UL
NRBC BLD AUTO-RTO: 0 /100
PH UR STRIP: 5 PH (ref 5–7)
RBC #/AREA URNS AUTO: 7 /HPF (ref 0–2)
SOURCE: ABNORMAL
SP GR UR STRIP: 1.03 (ref 1–1.03)
SPECIMEN SOURCE: NORMAL
SQUAMOUS #/AREA URNS AUTO: 16 /HPF (ref 0–1)
UROBILINOGEN UR STRIP-MCNC: 0 MG/DL (ref 0–2)
WBC # BLD AUTO: 5.4 10E9/L (ref 4–11)
WBC #/AREA URNS AUTO: 18 /HPF (ref 0–5)
WET PREP SPEC: NORMAL

## 2018-07-14 PROCEDURE — 76815 OB US LIMITED FETUS(S): CPT

## 2018-07-14 PROCEDURE — 85004 AUTOMATED DIFF WBC COUNT: CPT | Performed by: FAMILY MEDICINE

## 2018-07-14 PROCEDURE — 81001 URINALYSIS AUTO W/SCOPE: CPT | Performed by: FAMILY MEDICINE

## 2018-07-14 PROCEDURE — G0463 HOSPITAL OUTPT CLINIC VISIT: HCPCS | Mod: 25

## 2018-07-14 PROCEDURE — 87086 URINE CULTURE/COLONY COUNT: CPT | Performed by: FAMILY MEDICINE

## 2018-07-14 PROCEDURE — 87210 SMEAR WET MOUNT SALINE/INK: CPT | Performed by: FAMILY MEDICINE

## 2018-07-14 PROCEDURE — 85048 AUTOMATED LEUKOCYTE COUNT: CPT | Performed by: FAMILY MEDICINE

## 2018-07-14 RX ORDER — NITROFURANTOIN 25; 75 MG/1; MG/1
100 CAPSULE ORAL 2 TIMES DAILY
Qty: 10 CAPSULE | Refills: 0 | Status: SHIPPED | OUTPATIENT
Start: 2018-07-14 | End: 2018-07-31

## 2018-07-14 NOTE — PROGRESS NOTES
S:  Discharge from triage.   A:  FHT's 135, Moderate variability, Accels present, no decels noted. Contractions 0.  Cervical exam 0  R:  Written and verbal D/C instruction provided. Pt. Verbalized understanding of D/C instructions and will follow up with provider as previously scheduled.   Verbalizes understanding that she will call or return to [facility] with any further questions or concerns.   Pt. D/C'd via walk with self    Nursing Discharge Checklist  Discharge order entered: Yes  Patient care order entered: Yes  Charges entered: Yes  IV start and stop times have been documented in Epic? No  NST start and stop times have been documented in Epic Doc F/S? No

## 2018-07-14 NOTE — IP AVS SNAPSHOT
[facility]    [address]     [place] [address]    Phone:  [phone]                                       After Visit Summary   7/14/2018    Isaura Gray    MRN: [id number]           After Visit Summary Signature Page     I have received my discharge instructions, and my questions have been answered. I have discussed any challenges I see with this plan with the nurse or doctor.    ..........................................................................................................................................  Patient/Patient Representative Signature      ..........................................................................................................................................  Patient Representative Print Name and Relationship to Patient    ..................................................               ................................................  Date                                            Time    ..........................................................................................................................................  Reviewed by Signature/Title    ...................................................              ..............................................  Date                                                            Time

## 2018-07-14 NOTE — PROGRESS NOTES
S: Triage Arrival  B: Isaura is a 32 y.o.  @ 33w 6d who presents with complaint of low, constant 4/10 pelvic pain.  Finished second dose of flagyl yesterday.  Whitish clear vaginal discharge.  A: EFM applied. FHT's135  with moderate variability, accels present, no decels noted on strip. Contractions 0  R: Will notify MD to obtain further orders.

## 2018-07-14 NOTE — IP AVS SNAPSHOT
MRN:[id number]                      After Visit Summary   7/14/2018    Isaura Gray    MRN: [id number]           Thank you!     Thank you for choosing [facility] for your care. Our goal is always to provide you with excellent care. Hearing back from our patients is one way we can continue to improve our services. Please take a few minutes to complete the written survey that you may receive in the mail after you visit with us. Thank you!        Patient Information     Date Of Birth          1985        About your hospital stay     You were admitted on:  July 14, 2018 You last received care in the:  [facility]    You were discharged on:  July 14, 2018       Who to Call     For medical emergencies, please call 911.  For non-urgent questions about your medical care, please call your primary care provider or clinic, [phone]          Attending Provider     Provider Specialty    Timmy Umana MD [facility]    Joshua Hinton MD [facility]       Primary Care Provider Office Phone # Fax #    Timmy Umana [phone]      Further instructions from your care team                OB Triage Discharge Instructions    Diet:  Regular diet as tolerated    Activity:  As tolerated    Other Special Instructions: follow up with Dr Umana this week    Reason for being seen in L&D: abd pain    Treatment/procedures performed in L&D: labs, moniotr    Call the [facility] at [phone] if you have:  ? 5 or more contractions in one hour  ? Leaking of fluid from your vaginal area  ? Decreased fetal movement (follow kick count instructions)  ? Bleeding from your vaginal area  ? Swelling in your face, or increased swelling in your hands or legs  ? Headaches or vision problems such as blurring or seeing spots or starts  ? Nausea or vomiting lasting for more than 24 hours  ? An increase in weight (5lbs/week)  ? Epigastric pain (sometimes confused with  heartburn that does not go away or a very bad stomach ache)    If you have any questions, please follow up with your doctor.        Patient Signature: ______________________________________________________________  By signing the above I acknowledge that a nurse or my care provider has explained the instruction to me and I have had any questions regarding my care explained to me.        Discharge Nurse Signature: _______________________________ 7/14/2018  1:36 PM    Method of discharge: walk    Accompanied by: self  Time of discharge: 130        Pending Results     Date and Time Order Name Status Description    7/14/2018 1202 US OB Limited One Or More Fetuses In process     7/14/2018 1130 Urine Culture Aerobic Bacterial In process             Admission Information     Date & Time Provider Department Dept. Phone    7/14/2018 Joshua Hinton MD [facility] [phone]      Your Vitals Were     Blood Pressure Pulse Temperature Respirations Last Period       114/74 126 98.4  F (36.9  C) (Oral) 16 (LMP Unknown)       MyChart Information     [employer] gives you secure access to your electronic health record. If you see a primary care provider, you can also send messages to your care team and make appointments. If you have questions, please call your primary care clinic.  If you do not have a primary care provider, please call [phone] and they will assist you.        Care EveryWhere ID     This is your Care EveryWhere ID. This could be used by other organizations to access your [facility] medical records  [id number]        Equal Access to Services     BAILEY PRADO : Lovely Wright, uzma sarmiento, celso hopkins. So [facility] [phone].    ATENCIÓN: Si habla español, tiene a cobb disposición servicios gratuitos de asistencia lingüística. Llame al [phone].    We comply with applicable federal civil rights laws and [place] laws.  We do not discriminate on the basis of race, color, national origin, age, disability, sex, sexual orientation, or gender identity.               Review of your medicines      UNREVIEWED medicines. Ask your doctor about these medicines        Dose / Directions    enoxaparin 60 MG/0.6ML injection   Commonly known as:  LOVENOX        Dose:  60 mg   Inject 60 mg Subcutaneous once   Refills:  0       metoclopramide 5 MG tablet   Commonly known as:  REGLAN        Dose:  10 mg   Take 10 mg by mouth every 6 hours as needed   Refills:  0       metroNIDAZOLE 500 MG tablet   Commonly known as:  FLAGYL   Used for:  Vaginal discharge        Dose:  500 mg   Take 1 tablet (500 mg) by mouth 2 times daily   Quantity:  14 tablet   Refills:  0       ondansetron 4 MG ODT tab   Commonly known as:  ZOFRAN ODT   Used for:  Excessive vomiting during pregnancy        Dose:  4 mg   Take 1 tablet (4 mg) by mouth every 8 hours as needed for nausea   Quantity:  20 tablet   Refills:  1       ranitidine 300 MG tablet   Commonly known as:  ZANTAC   Used for:  Gastroesophageal reflux disease, esophagitis presence not specified, Intractable vomiting with nausea, unspecified vomiting type        Dose:  300 mg   Take 1 tablet (300 mg) by mouth At Bedtime   Quantity:  30 tablet   Refills:  1       triamcinolone 0.1 % cream   Commonly known as:  KENALOG   Used for:  Itching, Papule of skin        Apply sparingly to affected area three times daily as needed   Quantity:  80 g   Refills:  0       TUMS PO   Used for:  Supervision of other high risk pregnancies, unspecified trimester        Dose:  500-1000 mg   Take 500-1,000 mg by mouth as needed   Refills:  0       UNISOM PO   Used for:  Supervision of other high risk pregnancies, unspecified trimester        Dose:  50 mg   Take 50 mg by mouth nightly as needed   Refills:  0       vitamin B complex with vitamin C Tabs tablet        Dose:  1 tablet   Take 1 tablet by mouth daily   Refills:  0       VITAMIN  B6 PO        Dose:  100 mg   Take 100 mg by mouth daily   Refills:  0       VITAMIN D (CHOLECALCIFEROL) PO        Dose:  5000 Units   Take 5,000 Units by mouth daily   Refills:  0         START taking        Dose / Directions    nitroFURantoin (macrocrystal-monohydrate) 100 MG capsule   Commonly known as:  MACROBID   Used for:  Encounter for triage in pregnant patient        Dose:  100 mg   Take 1 capsule (100 mg) by mouth 2 times daily   Quantity:  10 capsule   Refills:  0         CONTINUE these medicines which have NOT CHANGED        Dose / Directions    order for DME   Used for:  Episodic cluster headache, not intractable        Equipment being ordered: Oxygen by nasal cannula at 2 L/minute at onset of cluster headaches   Quantity:  1 each   Refills:  0            Where to get your medicines      These medications were sent to [facility], [place] - [address]   [address], [facility] [address]     Phone:  [phone]     nitroFURantoin (macrocrystal-monohydrate) 100 MG capsule                Protect others around you: Learn how to safely use, store and throw away your medicines at www.disposemymeds.org.             Medication List: This is a list of all your medications and when to take them. Check marks below indicate your daily home schedule. Keep this list as a reference.      Medications           Morning Afternoon Evening Bedtime As Needed    enoxaparin 60 MG/0.6ML injection   Commonly known as:  LOVENOX   Inject 60 mg Subcutaneous once                                metoclopramide 5 MG tablet   Commonly known as:  REGLAN   Take 10 mg by mouth every 6 hours as needed                                metroNIDAZOLE 500 MG tablet   Commonly known as:  FLAGYL   Take 1 tablet (500 mg) by mouth 2 times daily                                nitroFURantoin (macrocrystal-monohydrate) 100 MG capsule   Commonly known as:  MACROBID   Take 1 capsule (100 mg) by mouth 2 times daily                                 ondansetron 4 MG ODT tab   Commonly known as:  ZOFRAN ODT   Take 1 tablet (4 mg) by mouth every 8 hours as needed for nausea                                order for DME   Equipment being ordered: Oxygen by nasal cannula at 2 L/minute at onset of cluster headaches                                ranitidine 300 MG tablet   Commonly known as:  ZANTAC   Take 1 tablet (300 mg) by mouth At Bedtime                                triamcinolone 0.1 % cream   Commonly known as:  KENALOG   Apply sparingly to affected area three times daily as needed                                TUMS PO   Take 500-1,000 mg by mouth as needed                                UNISOM PO   Take 50 mg by mouth nightly as needed                                vitamin B complex with vitamin C Tabs tablet   Take 1 tablet by mouth daily                                VITAMIN B6 PO   Take 100 mg by mouth daily                                VITAMIN D (CHOLECALCIFEROL) PO   Take 5,000 Units by mouth daily

## 2018-07-14 NOTE — DISCHARGE INSTRUCTIONS
OB Triage Discharge Instructions    Diet:  Regular diet as tolerated    Activity:  As tolerated    Other Special Instructions: follow up with Dr Umana this week    Reason for being seen in L&D: abd pain    Treatment/procedures performed in L&D: labs, moniotr    Call the [facility] at [phone] if you have:  ? 5 or more contractions in one hour  ? Leaking of fluid from your vaginal area  ? Decreased fetal movement (follow kick count instructions)  ? Bleeding from your vaginal area  ? Swelling in your face, or increased swelling in your hands or legs  ? Headaches or vision problems such as blurring or seeing spots or starts  ? Nausea or vomiting lasting for more than 24 hours  ? An increase in weight (5lbs/week)  ? Epigastric pain (sometimes confused with heartburn that does not go away or a very bad stomach ache)    If you have any questions, please follow up with your doctor.        Patient Signature: ______________________________________________________________  By signing the above I acknowledge that a nurse or my care provider has explained the instruction to me and I have had any questions regarding my care explained to me.        Discharge Nurse Signature: _______________________________ 7/14/2018  1:36 PM    Method of discharge: walk    Accompanied by: self  Time of discharge: 130

## 2018-07-15 ENCOUNTER — NURSE TRIAGE (OUTPATIENT)
Dept: NURSING | Facility: CLINIC | Age: 33
End: 2018-07-15

## 2018-07-15 LAB
BACTERIA SPEC CULT: NORMAL
Lab: NORMAL
SPECIMEN SOURCE: NORMAL

## 2018-07-15 NOTE — TELEPHONE ENCOUNTER
Patient calling. Currently 35w 6D and being treated for bladder/kidney infection. Was in ER yesterday. At ER HR was at times over 200's, discharged with 's. Now today it is in 190's and staying there. Caller talking clear and has no CP, difficulty breathing. Does have side pain. Warm transferred to L&D due to pregnancy and that is where she was yesterday when in hospital.   Reason for Disposition    [1] Heart beating very rapidly (e.g., > 140 / minute) AND [2] present now  (Exception: during exercise)    Additional Information    Negative: Passed out (i.e., lost consciousness, collapsed and was not responding)    Negative: Shock suspected (e.g., cold/pale/clammy skin, too weak to stand, low BP, rapid pulse)    Negative: Difficult to awaken or acting confused  (e.g., disoriented, slurred speech)    Negative: Visible sweat on face or sweat dripping down face    Negative: Unable to walk, or can only walk with assistance (e.g., requires support)    Negative: [1] Received SHOCK from implantable cardiac defibrillator AND [2] persisting symptoms (i.e., palpitations, lightheadedness)    Negative: Sounds like a life-threatening emergency to the triager    Negative: Chest pain    Negative: Difficulty breathing    Negative: Dizziness, lightheadedness, or weakness    Protocols used: HEART RATE AND HEARTBEAT QUESTIONS-ADULT-

## 2018-07-16 NOTE — PROGRESS NOTES
"  SUBJECTIVE:   Isaura Gray is a 32 year old  at 34w3d gestational age female who presents to clinic today for the following health issues:    F/u from [facility] ED evaluation 18 for lightheadedness, nausea and urinary symptoms    HPI  Isaura states that she has been feeling \"unwell\" for much of this pregnancy.  She was previously treated for a UTI on 3 separate occasions during this pregnancy.  She presented to the ED 3 days ago for symptoms noted above.  She states that her heart rate was elevated to 190 bpm, had urge to urinate but only able to void a small amount of concentrated urine.  She also notes bilateral back pain, abdominal irregular cramping intermittently.  Isaura reports that a recent ultrasound of the kidneys and ureter was normal.  She notes good fetal movements.  She denies vaginal bleeding, leakage of fluid from the vagina.  In addition to the ED, she was also evaluated in L&D.  She was told that FHR was within normal limits.      She is concerned about the \"scabs\" she develops on her legs with shaving.  She has similar pruritic lesions on her arms.  She was started on antibiotics for this and feels that now they become pustules and then \"pop\".    She is no longer smoking.  Quit more than 2 years ago.    Problem list and histories reviewed & adjusted, as indicated.  Additional history: as documented    ED/UC Followup:    Facility:  [facility]  Date of visit: 18  Reason for visit: lightheaded, nausea, urinary urgency  Current Status: stable, afebrile, nontender, no leakage of fluid        Back Pain       Duration: months        Specific cause: worsening as pregnancy progresses    Description:   Location of pain: middle of back bilateral  Character of pain: intermittent  Pain radiation:none  New numbness or weakness in legs, not attributed to pain:  no     Intensity: moderate    History:   Pain interferes with job: No  History of back problems: no " "prior back problems  Any previous MRI or X-rays: no  Sees a specialist for back pain:  No  Therapies tried without relief: none    Alleviating factors:   Improved by: none      Precipitating factors:  Worsened by: Nothing    Functional and Psychosocial Screen (Jarocho STarT Back):      Not performed today    URINARY TRACT SYMPTOMS      Duration: months    Description  urgency, incontinence and back pain    Intensity:  mild    Accompanying signs and symptoms:  Fever/chills: no   Flank pain YES  Nausea and vomiting: no   Vaginal symptoms: none  Abdominal/Pelvic Pain: YES    History  History of frequent UTI's: YES  History of kidney stones: no   Sexually Active: YES  Possibility of pregnancy: Yes    Precipitating or alleviating factors: None    Therapies tried and outcome: course of antibiotics - Macrobid   Outcome: no change in sx's      Patient Active Problem List   Diagnosis     Vitamin D deficiency     Supervision of other high risk pregnancies, unspecified trimester     PE (pulmonary thromboembolism) (H)     Hyperprolactinemia (H)     Follicular cancer of thyroid (H)     Lactose intolerance in adult     Schizophrenia (H)     Encounter for triage in pregnant patient     Cough     Past Surgical History:   Procedure Laterality Date     APPENDECTOMY       ENT SURGERY      Partial thyroidectomy       Social History   Substance Use Topics     Smoking status: Former Smoker     Packs/day: 0.50     Years: 5.00     Types: Cigarettes     Smokeless tobacco: Never Used     Alcohol use No     Family History   Problem Relation Age of Onset     No Known Problems Mother      Hypertension Father      Cerebrovascular Disease Father 56     Passed away from double brain stem clot     Mental Illness Father      Asthma Brother      Cancer Maternal Grandfather      lung     No Known Problems Paternal Grandmother      No Known Problems Paternal Grandfather      Autism Spectrum Disorder Son      KIDNEY DISEASE Son      \"has a third kidney\" "     No Known Problems Daughter          Current Outpatient Prescriptions   Medication Sig Dispense Refill     Calcium Carbonate Antacid (TUMS PO) Take 500-1,000 mg by mouth as needed        Doxylamine Succinate, Sleep, (UNISOM PO) Take 50 mg by mouth nightly as needed        enoxaparin (LOVENOX) 60 MG/0.6ML injection Inject 60 mg Subcutaneous once       nitroFURantoin, macrocrystal-monohydrate, (MACROBID) 100 MG capsule Take 1 capsule (100 mg) by mouth 2 times daily 10 capsule 0     Pyridoxine HCl (VITAMIN B6 PO) Take 100 mg by mouth daily        ranitidine (ZANTAC) 300 MG tablet Take 1 tablet (300 mg) by mouth At Bedtime 30 tablet 1     triamcinolone (KENALOG) 0.1 % cream Apply sparingly to affected area three times daily as needed 80 g 0     vitamin B complex with vitamin C (VITAMIN  B COMPLEX) TABS tablet Take 1 tablet by mouth daily       metoclopramide (REGLAN) 5 MG tablet Take 10 mg by mouth every 6 hours as needed       metroNIDAZOLE (FLAGYL) 500 MG tablet Take 1 tablet (500 mg) by mouth 2 times daily (Patient not taking: Reported on 7/17/2018) 14 tablet 0     ondansetron (ZOFRAN ODT) 4 MG ODT tab Take 1 tablet (4 mg) by mouth every 8 hours as needed for nausea (Patient not taking: Reported on 7/17/2018) 20 tablet 1     order for DME Equipment being ordered: Oxygen by nasal cannula at 2 L/minute at onset of cluster headaches (Patient not taking: Reported on 7/17/2018) 1 each 0     VITAMIN D, CHOLECALCIFEROL, PO Take 5,000 Units by mouth daily       Allergies   Allergen Reactions     Ciprofloxacin Hives     Sulfa Drugs Hives     Oxycodone-Acetaminophen Itching     BP Readings from Last 3 Encounters:   07/17/18 94/66   07/14/18 114/74   07/03/18 96/70    Wt Readings from Last 3 Encounters:   07/17/18 170 lb 3.2 oz (77.2 kg)   07/03/18 171 lb (77.6 kg)   06/25/18 171 lb (77.6 kg)                  Labs reviewed in EPIC    7/1418:  WBC 5.4  Urine Cx mpf <10K  Wet prep negative for clue cells, trich, yeast    U/s:   vtx fetus with normal AFV, closed cx    ROS:  Constitutional, HEENT, cardiovascular, pulmonary, gi and gu systems are negative, except as otherwise noted.    OBJECTIVE:     BP 94/66 (Patient Position: Sitting, Cuff Size: Adult Regular)  Pulse 100  Temp 98.1  F (36.7  C) (Oral)  Wt 170 lb 3.2 oz (77.2 kg)  LMP  (LMP Unknown)  BMI 23.08 kg/m2  Body mass index is 23.08 kg/(m^2).  GENERAL: healthy, alert and no distress  RESP: bronchial breath sounds with decreased aeration in LLL  CV: regular rate and rhythm, normal S1 S2  ABDOMEN: soft, nontender  MS: no gross musculoskeletal defects noted, no edema  BACK: no CVA tenderness, no paralumbar tenderness  PSYCH: mentation appears normal, affect normal/bright    Diagnostic Test Results:  No results found for this or any previous visit (from the past 24 hour(s)).    ASSESSMENT/PLAN:     Urinary urgency  Likely due to advanced gestation status.  Urine culture obtained 3 days ago shows no significant growth.  Assessed for ROM 3 days ago with negative amnisure, normal AFV and closed cx.    Back pain  No evidence of pyelonephritis.  Decreased aeration of LLL  H/o PE;  On lovenox  Obtain CXR      Tobacco Cessation:   reports that she has quit smoking. Her smoking use included Cigarettes. She has a 2.50 pack-year smoking history. She has never used smokeless tobacco.    Cough  -     XR Chest 2 Views; Standing      Keep routine prenatal appointment    Deion Woods MD  [facility]

## 2018-07-17 ENCOUNTER — OFFICE VISIT (OUTPATIENT)
Dept: OBGYN | Facility: OTHER | Age: 33
End: 2018-07-17
Payer: MEDICARE

## 2018-07-17 ENCOUNTER — RADIANT APPOINTMENT (OUTPATIENT)
Dept: GENERAL RADIOLOGY | Facility: OTHER | Age: 33
End: 2018-07-17
Attending: OBSTETRICS & GYNECOLOGY
Payer: MEDICARE

## 2018-07-17 ENCOUNTER — TELEPHONE (OUTPATIENT)
Dept: FAMILY MEDICINE | Facility: OTHER | Age: 33
End: 2018-07-17

## 2018-07-17 VITALS
HEART RATE: 100 BPM | TEMPERATURE: 98.1 F | DIASTOLIC BLOOD PRESSURE: 66 MMHG | WEIGHT: 170.2 LBS | BODY MASS INDEX: 23.08 KG/M2 | SYSTOLIC BLOOD PRESSURE: 94 MMHG

## 2018-07-17 DIAGNOSIS — Z12.4 SCREENING FOR MALIGNANT NEOPLASM OF CERVIX: ICD-10-CM

## 2018-07-17 DIAGNOSIS — G89.29 CHRONIC BILATERAL LOW BACK PAIN WITHOUT SCIATICA: ICD-10-CM

## 2018-07-17 DIAGNOSIS — R05.9 COUGH: ICD-10-CM

## 2018-07-17 DIAGNOSIS — Z11.4 SCREENING FOR HIV (HUMAN IMMUNODEFICIENCY VIRUS): ICD-10-CM

## 2018-07-17 DIAGNOSIS — M54.50 CHRONIC BILATERAL LOW BACK PAIN WITHOUT SCIATICA: ICD-10-CM

## 2018-07-17 PROCEDURE — 99214 OFFICE O/P EST MOD 30 MIN: CPT | Performed by: OBSTETRICS & GYNECOLOGY

## 2018-07-17 PROCEDURE — 71046 X-RAY EXAM CHEST 2 VIEWS: CPT | Mod: FY

## 2018-07-17 NOTE — TELEPHONE ENCOUNTER
Spoke to patient to offer OBGYN. Patient wanted to switch. Patient rescheduled.  Shanice Lewis CMA ([facility]SE [facility]IGUEL)

## 2018-07-17 NOTE — MR AVS SNAPSHOT
After Visit Summary   7/17/2018    sIaura Gray    MRN: [id number]           Patient Information     Date Of Birth          1985        Visit Information        Provider Department      7/17/2018 1:00 PM Deion Woods MD [facility]        Today's Diagnoses     Screening for malignant neoplasm of cervix        Screening for HIV (human immunodeficiency virus)        Cough          Care Instructions    Please have chest xray done.    Keep routine prenatal appointment.    Notify your provider if symptoms worsen or additional ones develop.          Follow-ups after your visit        Follow-up notes from your care team     Return in about 1 week (around 7/24/2018) for routine prenatal appointment.      Who to contact     If you have questions or need follow up information about today's clinic visit or your schedule please contact [facility] directly at [phone].  Normal or non-critical lab and imaging results will be communicated to you by Marketfishhart, letter or phone within 4 business days after the clinic has received the results. If you do not hear from us within 7 days, please contact the clinic through Market[employer] or phone. If you have a critical or abnormal lab result, we will notify you by phone as soon as possible.  Submit refill requests through [employer]Ox or call your pharmacy and they will forward the refill request to us. Please allow 3 business days for your refill to be completed.          Additional Information About Your Visit        MyChart Information     [employer] gives you secure access to your electronic health record. If you see a primary care provider, you can also send messages to your care team and make appointments. If you have questions, please call your primary care clinic.  If you do not have a primary care provider, please call [phone] and they will assist you.        Care EveryWhere ID     This is your Care EveryWhere ID. This could  be used by other organizations to access your [place] medical records  [id number]        Your Vitals Were     Pulse Temperature Last Period BMI (Body Mass Index)          100 98.1  F (36.7  C) (Oral) (LMP Unknown) 23.08 kg/m2         Blood Pressure from Last 3 Encounters:   07/17/18 94/66   07/14/18 114/74   07/03/18 96/70    Weight from Last 3 Encounters:   07/17/18 170 lb 3.2 oz (77.2 kg)   07/03/18 171 lb (77.6 kg)   06/25/18 171 lb (77.6 kg)               Primary Care Provider Office Phone # Fax #    Timmy Markos Umana [phone] [address] [place] [address]        Equal Access to Services     BAILEY PRADO : Hadii aad ku hadasho Soomaali, waaxda luqadaha, qaybta kaalmada adeegyada, celso herman . So [facility] [phone].    ATENCIÓN: Si habla español, tiene a cobb disposición servicios gratuitos de asistencia lingüística. Llame al [phone].    We comply with applicable federal civil rights laws and [place] laws. We do not discriminate on the basis of race, color, national origin, age, disability, sex, sexual orientation, or gender identity.            Thank you!     Thank you for choosing [facility]  for your care. Our goal is always to provide you with excellent care. Hearing back from our patients is one way we can continue to improve our services. Please take a few minutes to complete the written survey that you may receive in the mail after your visit with us. Thank you!             Your Updated Medication List - Protect others around you: Learn how to safely use, store and throw away your medicines at www.disposemymeds.org.          This list is accurate as of 7/17/18  1:49 PM.  Always use your most recent med list.                   Brand Name Dispense Instructions for use Diagnosis    enoxaparin 60 MG/0.6ML injection    LOVENOX     Inject 60 mg Subcutaneous once        metoclopramide 5 MG tablet    REGLAN     Take 10 mg by  mouth every 6 hours as needed        metroNIDAZOLE 500 MG tablet    FLAGYL    14 tablet    Take 1 tablet (500 mg) by mouth 2 times daily    Vaginal discharge       nitroFURantoin (macrocrystal-monohydrate) 100 MG capsule    MACROBID    10 capsule    Take 1 capsule (100 mg) by mouth 2 times daily    Encounter for triage in pregnant patient       ondansetron 4 MG ODT tab    ZOFRAN ODT    20 tablet    Take 1 tablet (4 mg) by mouth every 8 hours as needed for nausea    Excessive vomiting during pregnancy       order for DME     1 each    Equipment being ordered: Oxygen by nasal cannula at 2 L/minute at onset of cluster headaches    Episodic cluster headache, not intractable       ranitidine 300 MG tablet    ZANTAC    30 tablet    Take 1 tablet (300 mg) by mouth At Bedtime    Gastroesophageal reflux disease, esophagitis presence not specified, Intractable vomiting with nausea, unspecified vomiting type       triamcinolone 0.1 % cream    KENALOG    80 g    Apply sparingly to affected area three times daily as needed    Itching, Papule of skin       TUMS PO      Take 500-1,000 mg by mouth as needed    Supervision of other high risk pregnancies, unspecified trimester       UNISOM PO      Take 50 mg by mouth nightly as needed    Supervision of other high risk pregnancies, unspecified trimester       vitamin B complex with vitamin C Tabs tablet      Take 1 tablet by mouth daily        VITAMIN B6 PO      Take 100 mg by mouth daily        VITAMIN D (CHOLECALCIFEROL) PO      Take 5,000 Units by mouth daily

## 2018-07-17 NOTE — PATIENT INSTRUCTIONS
Please have chest xray done.    Keep routine prenatal appointment.    Notify your provider if symptoms worsen or additional ones develop.

## 2018-07-19 ENCOUNTER — TELEPHONE (OUTPATIENT)
Dept: FAMILY MEDICINE | Facility: OTHER | Age: 33
End: 2018-07-19

## 2018-07-19 NOTE — TELEPHONE ENCOUNTER
Results given to patient, patient had another question regarding UTI, she has been told that she had a UTI and was started in Macrobid, but then she was told that she did not have a UTI, please confirm if she does and if she needs to keep taking Macrobid  Thanks    Jemima New RT (R)           Notes Recorded by Deion Woods MD on 7/18/2018 at 10:11 AM  Please call the patient with the results. No evidence of pneumonia or pleural effusion.     Deion Woods MD

## 2018-07-31 ENCOUNTER — PRENATAL OFFICE VISIT (OUTPATIENT)
Dept: OBGYN | Facility: OTHER | Age: 33
End: 2018-07-31
Payer: MEDICARE

## 2018-07-31 VITALS
BODY MASS INDEX: 23.15 KG/M2 | HEART RATE: 96 BPM | SYSTOLIC BLOOD PRESSURE: 108 MMHG | WEIGHT: 170.7 LBS | DIASTOLIC BLOOD PRESSURE: 78 MMHG

## 2018-07-31 DIAGNOSIS — I26.99 PE (PULMONARY THROMBOEMBOLISM) (H): ICD-10-CM

## 2018-07-31 DIAGNOSIS — R10.2 PELVIC PRESSURE IN PREGNANCY: ICD-10-CM

## 2018-07-31 DIAGNOSIS — O26.899 PELVIC PRESSURE IN PREGNANCY: ICD-10-CM

## 2018-07-31 DIAGNOSIS — E22.1 HYPERPROLACTINEMIA (H): ICD-10-CM

## 2018-07-31 DIAGNOSIS — O09.623 HIGH RISK PREGNANCY IN YOUNG MULTIGRAVIDA, THIRD TRIMESTER: Primary | ICD-10-CM

## 2018-07-31 DIAGNOSIS — G89.29 CHRONIC BILATERAL LOW BACK PAIN WITHOUT SCIATICA: ICD-10-CM

## 2018-07-31 DIAGNOSIS — F20.9 SCHIZOPHRENIA, UNSPECIFIED TYPE (H): ICD-10-CM

## 2018-07-31 DIAGNOSIS — M54.50 CHRONIC BILATERAL LOW BACK PAIN WITHOUT SCIATICA: ICD-10-CM

## 2018-07-31 PROCEDURE — 99207 ZZC PRENATAL VISIT: CPT | Performed by: OBSTETRICS & GYNECOLOGY

## 2018-07-31 PROCEDURE — 59025 FETAL NON-STRESS TEST: CPT | Performed by: OBSTETRICS & GYNECOLOGY

## 2018-07-31 PROCEDURE — 87653 STREP B DNA AMP PROBE: CPT | Performed by: OBSTETRICS & GYNECOLOGY

## 2018-07-31 ASSESSMENT — PATIENT HEALTH QUESTIONNAIRE - PHQ9
SUM OF ALL RESPONSES TO PHQ QUESTIONS 1-9: 11
SUM OF ALL RESPONSES TO PHQ QUESTIONS 1-9: 11
10. IF YOU CHECKED OFF ANY PROBLEMS, HOW DIFFICULT HAVE THESE PROBLEMS MADE IT FOR YOU TO DO YOUR WORK, TAKE CARE OF THINGS AT HOME, OR GET ALONG WITH OTHER PEOPLE: EXTREMELY DIFFICULT

## 2018-07-31 ASSESSMENT — ANXIETY QUESTIONNAIRES
GAD7 TOTAL SCORE: 10
6. BECOMING EASILY ANNOYED OR IRRITABLE: MORE THAN HALF THE DAYS
7. FEELING AFRAID AS IF SOMETHING AWFUL MIGHT HAPPEN: NOT AT ALL
4. TROUBLE RELAXING: NEARLY EVERY DAY
2. NOT BEING ABLE TO STOP OR CONTROL WORRYING: MORE THAN HALF THE DAYS
1. FEELING NERVOUS, ANXIOUS, OR ON EDGE: SEVERAL DAYS
5. BEING SO RESTLESS THAT IT IS HARD TO SIT STILL: NOT AT ALL
GAD7 TOTAL SCORE: 10
3. WORRYING TOO MUCH ABOUT DIFFERENT THINGS: MORE THAN HALF THE DAYS
7. FEELING AFRAID AS IF SOMETHING AWFUL MIGHT HAPPEN: NOT AT ALL
GAD7 TOTAL SCORE: 10

## 2018-07-31 NOTE — MR AVS SNAPSHOT
After Visit Summary   7/31/2018    Isaura Grya    MRN: [id number]           Patient Information     Date Of Birth          1985        Visit Information        Provider Department      7/31/2018 4:00 PM Carina Gilbert MD [facility]        Today's Diagnoses     High risk pregnancy in young multigravida, third trimester    -  1    Small for gestational age        Schizophrenia, unspecified type (H)        Pelvic pressure in pregnancy        Chronic bilateral low back pain without sciatica        PE (pulmonary thromboembolism) (H)        Hyperprolactinemia (H)           Follow-ups after your visit        Follow-up notes from your care team     Return in about 1 day (around 8/1/2018) for ultrasound.      Your next 10 appointments already scheduled     Aug 01, 2018 11:20 AM CDT   US FETAL BIOPHYS PROFILE W/O NON STRESS TEST with ERUS1   [facility]    [address] [place] [id number] [phone]           Please bring a list of your medicines (including vitamins, minerals and over-the-counter drugs). Also, tell your doctor about any allergies you may have. Wear comfortable clothes and leave your valuables at home.  Drink four 8-ounce glasses of fluid an hour before your exam. If you need to empty your bladder before your exam, try to release only a little urine. Then, drink another glass of fluid.  You may have up to two family members in the exam room. If you bring a small child, an adult must be there to care for him or her. No video or camera photography during the procedure.  Please call the Imaging Department at your exam site with any questions.              Who to contact     If you have questions or need follow up information about today's clinic visit or your schedule please contact [facility] [place] directly at [phone].  Normal or non-critical lab and imaging results will be communicated to you  by Eyesquadt, letter or phone within 4 business days after the clinic has received the results. If you do not hear from us within 7 days, please contact the clinic through [employer] or phone. If you have a critical or abnormal lab result, we will notify you by phone as soon as possible.  Submit refill requests through [employer] or call your pharmacy and they will forward the refill request to us. Please allow 3 business days for your refill to be completed.          Additional Information About Your Visit        WaveMAX[employer]Crowd Sense Information     [employer] gives you secure access to your electronic health record. If you see a primary care provider, you can also send messages to your care team and make appointments. If you have questions, please call your primary care clinic.  If you do not have a primary care provider, please call [phone] and they will assist you.        Care EveryWhere ID     This is your Care EveryWhere ID. This could be used by other organizations to access your [place] medical records  [id number]        Your Vitals Were     Pulse Last Period BMI (Body Mass Index)             96 (LMP Unknown) 23.15 kg/m2          Blood Pressure from Last 3 Encounters:   07/31/18 108/78   07/17/18 94/66   07/14/18 114/74    Weight from Last 3 Encounters:   07/31/18 170 lb 11.2 oz (77.4 kg)   07/17/18 170 lb 3.2 oz (77.2 kg)   07/03/18 171 lb (77.6 kg)              We Performed the Following     FETAL NON-STRESS TEST     Group B strep PCR     US OB Biophys Single Gestation Measure     US OB Ltd One Or More Fetus FU/Repeat        Primary Care Provider Office Phone # Fax #    Timmy Umana [phone] [address] [place] [address]        Equal Access to Services     [facility] : Hadii vika Wright, uzma sarmiento, qaybta celso villar. So [facility] [phone].    ATENCIÓN: Si habla español, tiene a cobb disposición servicios gratuitos  de asistencia lingüística. Oscar baez [phone].    We comply with applicable federal civil rights laws and [place] laws. We do not discriminate on the basis of race, color, national origin, age, disability, sex, sexual orientation, or gender identity.            Thank you!     Thank you for choosing [facility]  for your care. Our goal is always to provide you with excellent care. Hearing back from our patients is one way we can continue to improve our services. Please take a few minutes to complete the written survey that you may receive in the mail after your visit with us. Thank you!             Your Updated Medication List - Protect others around you: Learn how to safely use, store and throw away your medicines at www.disposemymeds.org.          This list is accurate as of 7/31/18 11:59 PM.  Always use your most recent med list.                   Brand Name Dispense Instructions for use Diagnosis    enoxaparin 60 MG/0.6ML injection    LOVENOX     Inject 60 mg Subcutaneous once        metoclopramide 5 MG tablet    REGLAN     Take 10 mg by mouth every 6 hours as needed        ondansetron 4 MG ODT tab    ZOFRAN ODT    20 tablet    Take 1 tablet (4 mg) by mouth every 8 hours as needed for nausea    Excessive vomiting during pregnancy       order for DME     1 each    Equipment being ordered: Oxygen by nasal cannula at 2 L/minute at onset of cluster headaches    Episodic cluster headache, not intractable       ranitidine 300 MG tablet    ZANTAC    30 tablet    Take 1 tablet (300 mg) by mouth At Bedtime    Gastroesophageal reflux disease, esophagitis presence not specified, Intractable vomiting with nausea, unspecified vomiting type       triamcinolone 0.1 % cream    KENALOG    80 g    Apply sparingly to affected area three times daily as needed    Itching, Papule of skin       TUMS PO      Take 500-1,000 mg by mouth as needed    Supervision of other high risk pregnancies, unspecified trimester        UNISOM PO      Take 50 mg by mouth nightly as needed    Supervision of other high risk pregnancies, unspecified trimester       vitamin B complex with vitamin C Tabs tablet      Take 1 tablet by mouth daily        VITAMIN B6 PO      Take 100 mg by mouth daily        VITAMIN D (CHOLECALCIFEROL) PO      Take 5,000 Units by mouth daily

## 2018-08-01 ENCOUNTER — RADIANT APPOINTMENT (OUTPATIENT)
Dept: ULTRASOUND IMAGING | Facility: OTHER | Age: 33
End: 2018-08-01
Attending: OBSTETRICS & GYNECOLOGY
Payer: MEDICARE

## 2018-08-01 ENCOUNTER — HOSPITAL ENCOUNTER (OUTPATIENT)
Facility: CLINIC | Age: 33
Discharge: HOME | End: 2018-08-02
Attending: FAMILY MEDICINE | Admitting: FAMILY MEDICINE
Payer: MEDICARE

## 2018-08-01 VITALS
DIASTOLIC BLOOD PRESSURE: 69 MMHG | TEMPERATURE: 98.3 F | HEART RATE: 88 BPM | SYSTOLIC BLOOD PRESSURE: 106 MMHG | RESPIRATION RATE: 16 BRPM

## 2018-08-01 DIAGNOSIS — Z36.89 ENCOUNTER FOR TRIAGE IN PREGNANT PATIENT: Primary | ICD-10-CM

## 2018-08-01 PROBLEM — Z37.9 NORMAL LABOR: Status: ACTIVE | Noted: 2018-08-01

## 2018-08-01 LAB
GP B STREP DNA SPEC QL NAA+PROBE: NEGATIVE
RUPTURE OF FETAL MEMBRANES BY ROM PLUS: NEGATIVE
SPECIMEN SOURCE: NORMAL

## 2018-08-01 PROCEDURE — 76819 FETAL BIOPHYS PROFIL W/O NST: CPT

## 2018-08-01 PROCEDURE — 25000128 H RX IP 250 OP 636: Performed by: FAMILY MEDICINE

## 2018-08-01 PROCEDURE — 76816 OB US FOLLOW-UP PER FETUS: CPT

## 2018-08-01 PROCEDURE — 84112 EVAL AMNIOTIC FLUID PROTEIN: CPT | Performed by: FAMILY MEDICINE

## 2018-08-01 PROCEDURE — 25000132 ZZH RX MED GY IP 250 OP 250 PS 637: Mod: GY | Performed by: FAMILY MEDICINE

## 2018-08-01 PROCEDURE — 59025 FETAL NON-STRESS TEST: CPT | Mod: 26 | Performed by: FAMILY MEDICINE

## 2018-08-01 PROCEDURE — 96374 THER/PROPH/DIAG INJ IV PUSH: CPT

## 2018-08-01 PROCEDURE — A9270 NON-COVERED ITEM OR SERVICE: HCPCS | Mod: GY | Performed by: FAMILY MEDICINE

## 2018-08-01 RX ORDER — ONDANSETRON 2 MG/ML
INJECTION INTRAMUSCULAR; INTRAVENOUS
Status: DISCONTINUED
Start: 2018-08-01 | End: 2018-08-02 | Stop reason: HOSPADM

## 2018-08-01 RX ORDER — CALCIUM CARBONATE 500 MG/1
1000 TABLET, CHEWABLE ORAL DAILY PRN
Status: DISCONTINUED | OUTPATIENT
Start: 2018-08-01 | End: 2018-08-02 | Stop reason: HOSPADM

## 2018-08-01 RX ORDER — ONDANSETRON 2 MG/ML
4 INJECTION INTRAMUSCULAR; INTRAVENOUS EVERY 6 HOURS PRN
Status: DISCONTINUED | OUTPATIENT
Start: 2018-08-01 | End: 2018-08-02 | Stop reason: HOSPADM

## 2018-08-01 RX ORDER — FENTANYL CITRATE 50 UG/ML
50 INJECTION, SOLUTION INTRAMUSCULAR; INTRAVENOUS
Status: DISCONTINUED | OUTPATIENT
Start: 2018-08-01 | End: 2018-08-02 | Stop reason: HOSPADM

## 2018-08-01 RX ADMIN — CALCIUM CARBONATE (ANTACID) CHEW TAB 500 MG 1000 MG: 500 CHEW TAB at 23:41

## 2018-08-01 RX ADMIN — ONDANSETRON HYDROCHLORIDE 4 MG: 2 INJECTION, SOLUTION INTRAMUSCULAR; INTRAVENOUS at 21:59

## 2018-08-01 ASSESSMENT — ANXIETY QUESTIONNAIRES: GAD7 TOTAL SCORE: 10

## 2018-08-01 ASSESSMENT — PATIENT HEALTH QUESTIONNAIRE - PHQ9: SUM OF ALL RESPONSES TO PHQ QUESTIONS 1-9: 11

## 2018-08-01 NOTE — PROGRESS NOTES
Visit 7/31/18. Patient without complaints except for vaginal pressure. No lof/vb/contractions. Good fm. Cervix exam 3cm/60%/-1.  Measuring small but presenting part low. Patient states measuring small throughoug pregnancy but recent ultrasound shows normal fetal size.  Will review record to verify this. NST today category 1. Tocometer shows no contractions or uterine irritability.  Patient still on her Lovenox.  out of town. GBS performed today.  RTC in 1 week, fkc's every day counseled, labor precautions.    8/1/18  Reviewed the ultrasound report from 7/3/18 for size less than dates.  EFW at 25th% but radiologist comments on asymmetric fetal growth. Normal maria m. Will call patient today and schedule for follow up ultrasound to be done today for efw, maria m, and bpp.  Recommendations will be based on ultrasound result.

## 2018-08-01 NOTE — IP AVS SNAPSHOT
[facility]    [address]     [place] [address]    Phone:  [phone]                                       After Visit Summary   8/1/2018    Isaura Gray    MRN: [id number]           After Visit Summary Signature Page     I have received my discharge instructions, and my questions have been answered. I have discussed any challenges I see with this plan with the nurse or doctor.    ..........................................................................................................................................  Patient/Patient Representative Signature      ..........................................................................................................................................  Patient Representative Print Name and Relationship to Patient    ..................................................               ................................................  Date                                            Time    ..........................................................................................................................................  Reviewed by Signature/Title    ...................................................              ..............................................  Date                                                            Time

## 2018-08-01 NOTE — IP AVS SNAPSHOT
MRN:[id number]                      After Visit Summary   8/1/2018    Isaura Gray    MRN: [id number]           Thank you!     Thank you for choosing [facility] for your care. Our goal is always to provide you with excellent care. Hearing back from our patients is one way we can continue to improve our services. Please take a few minutes to complete the written survey that you may receive in the mail after you visit with us. Thank you!        Patient Information     Date Of Birth          1985        Designated Caregiver       Most Recent Value    Caregiver    Will someone help with your care after discharge? no      About your hospital stay     You were admitted on:  August 1, 2018 You last received care in the:  [facility]    You were discharged on:  August 2, 2018       Who to Call     For medical emergencies, please call 911.  For non-urgent questions about your medical care, please call your primary care provider or clinic, [phone]          Attending Provider     Provider Specialty    [facility]       Primary Care Provider Office Phone # Fax #    Timmy Umana [phone]      Further instructions from your care team                OB Triage Discharge Instructions    Diet:  Regular diet as tolerated  Drink 8-10 glasses of water and / or juice every day    Activity:  As tolerated    Other Special Instructions: Take extra strength Tylenol (975mg-1,000mg) every 6 hrs as needed for pain. Tub baths for comfort. Vistaril 50 mg every 8 hrs as needed for comfort/sleep.   *Macrobid 100 mg twice a day x 3 days for UTI.   Follow up with regular OB provider next week in clinic.     Reason for being seen in L&D: Contractions     Treatment/procedures performed in L&D: fetal and contraction monitoring, vital signs, urinalysis, cervical exam     Call [facility] at [phone] if  you have:  ? 5 or more contractions in one hour  ? Leaking of fluid from your vaginal area  ? Decreased fetal movement (follow kick count instructions)  ? Bleeding from your vaginal area  ? Swelling in your face, or increased swelling in your hands or legs  ? Headaches or vision problems such as blurring or seeing spots or starts  ? Nausea or vomiting lasting for more than 24 hours  ? An increase in weight (5lbs/week)  ? Epigastric pain (sometimes confused with heartburn that does not go away or a very bad stomach ache)    If you have any questions, please follow up with your doctor.        Patient Signature: ______________________________________________________________  By signing the above I acknowledge that a nurse or my care provider has explained the instruction to me and I have had any questions regarding my care explained to me.        Discharge Nurse Signature: _______________________________ 8/2/2018  12:31 AM    Method of discharge: ambulatory    Accompanied by: self  Time of discharge: 0100        Pending Results     Date and Time Order Name Status Description    8/2/2018 0001 Urine Culture Aerobic Bacterial In process             Admission Information     Date & Time Provider Department Dept. Phone    8/1/2018 [facility] [phone]      Your Vitals Were     Blood Pressure Pulse Temperature Respirations Last Period       106/69 88 98.3  F (36.8  C) (Oral) 16 (LMP Unknown)       MyChart Information     [employer] gives you secure access to your electronic health record. If you see a primary care provider, you can also send messages to your care team and make appointments. If you have questions, please call your primary care clinic.  If you do not have a primary care provider, please call [phone] and they will assist you.        Care EveryWhere ID     This is your Care EveryWhere ID. This could be used by other organizations to access your [facility]  records  [id number]        Equal Access to Services     DEBORAH JOHAN : Hadii vika ng marikagene Sonorbertali, waaxda luqadaha, qaybta kadarlenenoé thomas, celso lunakristeneirca jennings. So [facility] [phone].    ATENCIÓN: Si habla español, tiene a cobb disposición servicios gratuitos de asistencia lingüística. Llame al [phone].    We comply with applicable federal civil rights laws and [place] laws. We do not discriminate on the basis of race, color, national origin, age, disability, sex, sexual orientation, or gender identity.               Review of your medicines      UNREVIEWED medicines. Ask your doctor about these medicines        Dose / Directions    enoxaparin 60 MG/0.6ML injection   Commonly known as:  LOVENOX        Dose:  60 mg   Inject 60 mg Subcutaneous once   Refills:  0       metoclopramide 5 MG tablet   Commonly known as:  REGLAN        Dose:  10 mg   Take 10 mg by mouth every 6 hours as needed   Refills:  0       ondansetron 4 MG ODT tab   Commonly known as:  ZOFRAN ODT   Used for:  Excessive vomiting during pregnancy        Dose:  4 mg   Take 1 tablet (4 mg) by mouth every 8 hours as needed for nausea   Quantity:  20 tablet   Refills:  1       ranitidine 300 MG tablet   Commonly known as:  ZANTAC   Used for:  Gastroesophageal reflux disease, esophagitis presence not specified, Intractable vomiting with nausea, unspecified vomiting type        Dose:  300 mg   Take 1 tablet (300 mg) by mouth At Bedtime   Quantity:  30 tablet   Refills:  1       triamcinolone 0.1 % cream   Commonly known as:  KENALOG   Used for:  Itching, Papule of skin        Apply sparingly to affected area three times daily as needed   Quantity:  80 g   Refills:  0       TUMS PO   Used for:  Supervision of other high risk pregnancies, unspecified trimester        Dose:  500-1000 mg   Take 500-1,000 mg by mouth as needed   Refills:  0       UNISOM PO   Used for:  Supervision of other high risk pregnancies, unspecified  trimester        Dose:  50 mg   Take 50 mg by mouth nightly as needed   Refills:  0       vitamin B complex with vitamin C Tabs tablet        Dose:  1 tablet   Take 1 tablet by mouth daily   Refills:  0       VITAMIN B6 PO        Dose:  100 mg   Take 100 mg by mouth daily   Refills:  0       VITAMIN D (CHOLECALCIFEROL) PO        Dose:  5000 Units   Take 5,000 Units by mouth daily   Refills:  0         START taking        Dose / Directions    hydrOXYzine 25 MG tablet   Commonly known as:  ATARAX   Used for:  Encounter for triage in pregnant patient        Dose:  50 mg   Take 2 tablets (50 mg) by mouth every 8 hours as needed for other (sleep)   Quantity:  12 tablet   Refills:  0       nitroFURantoin (macrocrystal-monohydrate) 100 MG capsule   Commonly known as:  MACROBID   Used for:  Encounter for triage in pregnant patient        Dose:  100 mg   Take 1 capsule (100 mg) by mouth 2 times daily for 3 days   Quantity:  6 capsule   Refills:  0         CONTINUE these medicines which have NOT CHANGED        Dose / Directions    order for DME   Used for:  Episodic cluster headache, not intractable        Equipment being ordered: Oxygen by nasal cannula at 2 L/minute at onset of cluster headaches   Quantity:  1 each   Refills:  0            Where to get your medicines      Some of these will need a paper prescription and others can be bought over the counter. Ask your nurse if you have questions.     Bring a paper prescription for each of these medications     hydrOXYzine 25 MG tablet    nitroFURantoin (macrocrystal-monohydrate) 100 MG capsule                Protect others around you: Learn how to safely use, store and throw away your medicines at www.disposemymeds.org.             Medication List: This is a list of all your medications and when to take them. Check marks below indicate your daily home schedule. Keep this list as a reference.      Medications           Morning Afternoon Evening Bedtime As Needed    enoxaparin  60 MG/0.6ML injection   Commonly known as:  LOVENOX   Inject 60 mg Subcutaneous once                                hydrOXYzine 25 MG tablet   Commonly known as:  ATARAX   Take 2 tablets (50 mg) by mouth every 8 hours as needed for other (sleep)                                metoclopramide 5 MG tablet   Commonly known as:  REGLAN   Take 10 mg by mouth every 6 hours as needed                                nitroFURantoin (macrocrystal-monohydrate) 100 MG capsule   Commonly known as:  MACROBID   Take 1 capsule (100 mg) by mouth 2 times daily for 3 days                                ondansetron 4 MG ODT tab   Commonly known as:  ZOFRAN ODT   Take 1 tablet (4 mg) by mouth every 8 hours as needed for nausea                                order for DME   Equipment being ordered: Oxygen by nasal cannula at 2 L/minute at onset of cluster headaches                                ranitidine 300 MG tablet   Commonly known as:  ZANTAC   Take 1 tablet (300 mg) by mouth At Bedtime                                triamcinolone 0.1 % cream   Commonly known as:  KENALOG   Apply sparingly to affected area three times daily as needed                                TUMS PO   Take 500-1,000 mg by mouth as needed   Last time this was given:  1,000 mg on 8/1/2018 11:41 PM                                UNISOM PO   Take 50 mg by mouth nightly as needed                                vitamin B complex with vitamin C Tabs tablet   Take 1 tablet by mouth daily                                VITAMIN B6 PO   Take 100 mg by mouth daily                                VITAMIN D (CHOLECALCIFEROL) PO   Take 5,000 Units by mouth daily

## 2018-08-02 ENCOUNTER — TELEPHONE (OUTPATIENT)
Dept: OBGYN | Facility: OTHER | Age: 33
End: 2018-08-02

## 2018-08-02 ENCOUNTER — NURSE TRIAGE (OUTPATIENT)
Dept: NURSING | Facility: CLINIC | Age: 33
End: 2018-08-02

## 2018-08-02 ENCOUNTER — TELEPHONE (OUTPATIENT)
Dept: OBGYN | Facility: CLINIC | Age: 33
End: 2018-08-02

## 2018-08-02 VITALS
HEART RATE: 79 BPM | DIASTOLIC BLOOD PRESSURE: 77 MMHG | RESPIRATION RATE: 16 BRPM | TEMPERATURE: 98.3 F | SYSTOLIC BLOOD PRESSURE: 119 MMHG

## 2018-08-02 LAB
ALBUMIN UR-MCNC: NEGATIVE MG/DL
APPEARANCE UR: ABNORMAL
BACTERIA #/AREA URNS HPF: ABNORMAL /HPF
BILIRUB UR QL STRIP: NEGATIVE
COLOR UR AUTO: YELLOW
GLUCOSE UR STRIP-MCNC: NEGATIVE MG/DL
HGB UR QL STRIP: NEGATIVE
HYALINE CASTS #/AREA URNS LPF: 1 /LPF (ref 0–2)
KETONES UR STRIP-MCNC: NEGATIVE MG/DL
LEUKOCYTE ESTERASE UR QL STRIP: ABNORMAL
MUCOUS THREADS #/AREA URNS LPF: PRESENT /LPF
NITRATE UR QL: NEGATIVE
PH UR STRIP: 5 PH (ref 5–7)
RBC #/AREA URNS AUTO: 1 /HPF (ref 0–2)
SOURCE: ABNORMAL
SP GR UR STRIP: 1.02 (ref 1–1.03)
SQUAMOUS #/AREA URNS AUTO: 1 /HPF (ref 0–1)
UROBILINOGEN UR STRIP-MCNC: 0 MG/DL (ref 0–2)
WBC #/AREA URNS AUTO: 3 /HPF (ref 0–5)

## 2018-08-02 PROCEDURE — 59025 FETAL NON-STRESS TEST: CPT

## 2018-08-02 PROCEDURE — 87086 URINE CULTURE/COLONY COUNT: CPT | Performed by: FAMILY MEDICINE

## 2018-08-02 PROCEDURE — 96374 THER/PROPH/DIAG INJ IV PUSH: CPT

## 2018-08-02 PROCEDURE — 81001 URINALYSIS AUTO W/SCOPE: CPT | Performed by: FAMILY MEDICINE

## 2018-08-02 PROCEDURE — G0463 HOSPITAL OUTPT CLINIC VISIT: HCPCS | Mod: 25

## 2018-08-02 PROCEDURE — G0463 HOSPITAL OUTPT CLINIC VISIT: HCPCS

## 2018-08-02 RX ORDER — HYDROXYZINE HYDROCHLORIDE 25 MG/1
50 TABLET, FILM COATED ORAL EVERY 8 HOURS PRN
Qty: 12 TABLET | Refills: 0 | Status: SHIPPED | OUTPATIENT
Start: 2018-08-02 | End: 2018-11-05

## 2018-08-02 RX ORDER — NITROFURANTOIN 25; 75 MG/1; MG/1
100 CAPSULE ORAL 2 TIMES DAILY
Qty: 6 CAPSULE | Refills: 0 | Status: SHIPPED | OUTPATIENT
Start: 2018-08-02 | End: 2018-08-05

## 2018-08-02 NOTE — DISCHARGE INSTRUCTIONS
OB Triage Discharge Instructions    Diet:  Regular diet as tolerated  Drink 8-10 glasses of water and / or juice every day    Activity:  As tolerated    Other Special Instructions: Take extra strength Tylenol (975mg-1,000mg) every 6 hrs as needed for pain. Tub baths for comfort. Vistaril 50 mg every 8 hrs as needed for comfort/sleep.   *Macrobid 100 mg twice a day x 3 days for UTI.   Follow up with regular OB provider next week in clinic.     Reason for being seen in L&D: Contractions     Treatment/procedures performed in L&D: fetal and contraction monitoring, vital signs, urinalysis, cervical exam     Call the [facility] at [phone] if you have:  ? 5 or more contractions in one hour  ? Leaking of fluid from your vaginal area  ? Decreased fetal movement (follow kick count instructions)  ? Bleeding from your vaginal area  ? Swelling in your face, or increased swelling in your hands or legs  ? Headaches or vision problems such as blurring or seeing spots or starts  ? Nausea or vomiting lasting for more than 24 hours  ? An increase in weight (5lbs/week)  ? Epigastric pain (sometimes confused with heartburn that does not go away or a very bad stomach ache)    If you have any questions, please follow up with your doctor.        Patient Signature: ______________________________________________________________  By signing the above I acknowledge that a nurse or my care provider has explained the instruction to me and I have had any questions regarding my care explained to me.        Discharge Nurse Signature: _______________________________ 8/2/2018  12:31 AM    Method of discharge: ambulatory    Accompanied by: self  Time of discharge: 0100

## 2018-08-02 NOTE — PROGRESS NOTES
S:  Discharge from triage @ 0100.  A:  FHT's 135 bpm, Moderate variability, Accels present, no decels noted. Contractions every 3-5 minutes apart. Patient is breathing through them at times. Cervical exam 2.5cm/70%/-2 station. Membranes intact. No bleeding.U/A results read to Dr. Galvez. Macrobid ordered along with Vistaril for sleep.   R:  Written and verbal D/C instruction provided. Pt. Verbalized understanding of D/C instructions and will follow up with Dr. Umana or other OB provider as previously scheduled. Pt will call to make appt for next week.  Verbalizes understanding that she will call or return to the [facility] with any further questions or concerns.   Pt. D/C'd via ambulatory with S.O. Will pickup prescriptions at registration desk downstairs.     Nursing Discharge Checklist  Discharge order entered: Yes  Patient care order entered: Yes  Charges entered: Yes  IV start and stop times have been documented in Epic? N/A   NST start and stop times have been documented in Epic Doc F/S? Yes

## 2018-08-02 NOTE — TELEPHONE ENCOUNTER
"Seen in L and D for contractions every 2-3 minutes last night/they sent her home because she was not progressing/ calling to day because she is still having contractions as often/no bleeding/no water leakage/ no fetal parts or cord at vaginal opening/due /but this is her 3rd pregnancy and the other 2 were early/is 2 days short of 37 weeks/ there is fetal movement but the pain has made it difficult to tell but there is still movement/ says the pain is severe and she does have back pain/transferred to the RN at Labor and delivery/as per the guideline  Jaden Arroyo RN [phone]  Reason for Disposition    MODERATE-SEVERE abdominal pain    Additional Information    Negative: Passed out (i.e., lost consciousness, collapsed and was not responding)    Negative: Shock suspected (e.g., cold/pale/clammy skin, too weak to stand, low BP, rapid pulse)    Negative: Difficult to awaken or acting confused  (e.g., disoriented, slurred speech)    Negative: [1] SEVERE abdominal pain (e.g., excruciating) AND [2] constant AND [3] present > 1 hour    Negative: Severe bleeding (e.g., continuous red blood from vagina, or large blood clots)    Negative: Umbilical cord hanging out of the vagina (shiny, white, curled appearance, \"like telephone cord\")    Negative: Uncontrollable urge to push (i.e., feels like baby is coming out now)    Negative: Can see baby    Negative: Sounds like a life-threatening emergency to the triager    Pregnant < 37 weeks (i.e., )    Negative: Passed out (i.e., lost consciousness, collapsed and was not responding)    Negative: Shock suspected (e.g., cold/pale/clammy skin, too weak to stand, low BP, rapid pulse)    Negative: Difficult to awaken or acting confused  (e.g., disoriented, slurred speech)    Negative: [1] SEVERE abdominal pain (e.g., excruciating) AND [2] constant AND [3] present > 1 hour    Negative: Severe bleeding (e.g., continuous red blood from vagina, or large blood clots)    " "Negative: Umbilical cord hanging out of the vagina (shiny, white, curled appearance, \"like telephone cord\")    Negative: Uncontrollable urge to push (i.e., feels like baby is coming out now)    Negative: Can see baby    Negative: Sounds like a life-threatening emergency to the triager    Protocols used: PREGNANCY - LABOR - -ADULT-, PREGNANCY - LABOR-ADULT-AH    "

## 2018-08-02 NOTE — TELEPHONE ENCOUNTER
Reason for Call:  Other appointment    Detailed comments: please call to fu on appt and patient has a few questions to ask. She declined giving any other information    Phone Number Patient can be reached at: Home number on file [phone] (home)    Best Time: any    Can we leave a detailed message on this number? YES    Call taken on 8/2/2018 at 12:21 PM by Soni Rodríguez

## 2018-08-02 NOTE — TELEPHONE ENCOUNTER
"Pt was prescribed Macrobid today for possible UTI.  She is wondering if this is appropriate since this is the \"3rd round of Macrobid\" and it doesn't seem to be helping.  Huddled with Dr. Gilbert who recommends continuing on the Macrobid as prescribed until the UC comes back.      Attempted to reach pt to let her know this.  Left message to return call to clinic.    Flora Carrillo RN    "

## 2018-08-02 NOTE — TELEPHONE ENCOUNTER
Spoke with pt regarding the antibiotic for her UTI.  Pt verbalized understanding and agreed to plan.    Flora Carrillo RN

## 2018-08-02 NOTE — IP AVS SNAPSHOT
[facility]    [address]     [place] [address]    Phone:  [phone]                                       After Visit Summary   8/2/2018    Isaura Gray    MRN: [id number]           After Visit Summary Signature Page     I have received my discharge instructions, and my questions have been answered. I have discussed any challenges I see with this plan with the nurse or doctor.    ..........................................................................................................................................  Patient/Patient Representative Signature      ..........................................................................................................................................  Patient Representative Print Name and Relationship to Patient    ..................................................               ................................................  Date                                            Time    ..........................................................................................................................................  Reviewed by Signature/Title    ...................................................              ..............................................  Date                                                            Time

## 2018-08-02 NOTE — TELEPHONE ENCOUNTER
Isaura Gray is a 32 year old female who calls with contractions.    NURSING ASSESSMENT:  Description:  Pt is wondering when she should go up to L and D.  She was assessed in clinic with Dr. Gilbert on 7/31.  She was dilated to 4 with no contractions.  Pt is now having contractions.  Onset/duration:  yesterday  Precip. factors:  36w4d pregnant  Associated symptoms:  Luis every 5-8 minutes, lasting 60-90 seconds each.  Denies vaginal bleeding, a gush of fluid.  Improves/worsens symptoms:  none  Pain scale (0-10)   5/10    Allergies:   Allergies   Allergen Reactions     Ciprofloxacin Hives     Sulfa Drugs Hives     Oxycodone-Acetaminophen Itching         NURSING PLAN: Huddle with provider, plan includes can either come to clinic to be assessed or go to L and D up at [place]    RECOMMENDED DISPOSITION:  To office now, another person to drive - pt wants to monitor herself for an hour to make sure they are consistent.  If so, she will go up to L&D at [place].  Will comply with recommendation: Yes  If further questions/concerns or if symptoms do not improve, worsen or new symptoms develop, call your PCP or [facility] as soon as possible.      Guideline used: 3rd Trimester Recognizing labor  Telephone Triage for Obstetrics and Gynecology, Jennifer Qureshi and Nanda Carrillo RN

## 2018-08-02 NOTE — TELEPHONE ENCOUNTER
S: Triage telephone call    B: Isaura is a 32 y.o.  @ 36w 4d who presents with complaint of low back pain and contractions    A: [facility] stating that she was her yesterday evening for contractions. At that time, charting states that she was 2 cm. She was sent home with Macrobid and Atarax to treat a diagnosed UTI. She was able to rest during the night with the atarax but has been up today with her toddler and is having consistent low back pain with irregular contractions. Membranes are intact and baby is active.     R: Isaura was advised to use tylenol OTC and the atarax that she was prescribed to help relax the back pain and uterus. She was encouraged to rest and use resources for help with her toddler. She agreed to follow up with the [facility] if needed for increasing pain and frequency with contractions.

## 2018-08-02 NOTE — PROGRESS NOTES
"S: Triage Arrival; late enrty  B: Isaura is a 32 y.o.  @ 36w 3d who presents with complaint of spontaneous labor; reported was 5 cm in clinic yesterday. Hx prodromal labor for 3 weeks with second child. Reports hx Lovenox use for hx PE x 2 from birth control use. 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 Currently on low Vit K diet due to the Lovanox and also low fat since reports so gall bladder issues.   A: EFM applied. FHT's 135 with moderate variability, accels present, no decels noted on strip. Contractions every 4-6 minutes. Prefers to lie low fowlers for greater comfort. 3 cm, 70%-2 on admission at 2100. Dr. Umana unavailable; Leonor Barrios called for orders; will begin client as an outpatient. ROM+ done as bed wet and wanted to confirm it was from toddlers diaper or herself.   R: Will notify MD to obtain further orders.    "

## 2018-08-02 NOTE — IP AVS SNAPSHOT
MRN:[id number]                      After Visit Summary   8/2/2018    Isaura Gray    MRN: [id number]           Thank you!     Thank you for choosing [facility] for your care. Our goal is always to provide you with excellent care. Hearing back from our patients is one way we can continue to improve our services. Please take a few minutes to complete the written survey that you may receive in the mail after you visit with us. Thank you!        Patient Information     Date Of Birth          1985        About your hospital stay     You were admitted on:  August 2, 2018 You last received care in the:  [facility]    You were discharged on:  August 2, 2018       Who to Call     For medical emergencies, please call 911.  For non-urgent questions about your medical care, please call your primary care provider or clinic, [phone]          Attending Provider     Provider Specialty    Lyle Champagne MD Family Practice       Primary Care Provider Office Phone # Fax #    Timmy Umana [phone]      Further instructions from your care team                OB Triage Discharge Instructions    Diet:  Regular diet as tolerated    Activity:  As tolerated    Other Special Instructions: Return if contractions worsen.  Or follow up at nearest hospital.     Reason for being seen in L&D: Back pain    Treatment/procedures performed in L&D: Fetal monitoring, cervical exam.     Call the [facility] at [phone] if you have:  ? 5 or more contractions in one hour  ? Leaking of fluid from your vaginal area  ? Decreased fetal movement (follow kick count instructions)  ? Bleeding from your vaginal area  ? Swelling in your face, or increased swelling in your hands or legs  ? Headaches or vision problems such as blurring or seeing spots or starts  ? Nausea or vomiting lasting for more than 24 hours  ? An increase in weight (5lbs/week)  ? Epigastric pain (sometimes  confused with heartburn that does not go away or a very bad stomach ache)    If you have any questions, please follow up with your doctor.        Patient Signature: ______________________________________________________________  By signing the above I acknowledge that a nurse or my care provider has explained the instruction to me and I have had any questions regarding my care explained to me.        Discharge Nurse Signature: _______________________________ 8/2/2018  8:15 PM    Method of discharge: Ambulatory    Accompanied by: Spouse  Time of discharge: 2020        Pending Results     Date and Time Order Name Status Description    8/2/2018 0001 Urine Culture Aerobic Bacterial Preliminary             Admission Information     Date & Time Provider Department Dept. Phone    8/2/2018 Lyle Champagne MD [facility] [phone]      Your Vitals Were     Blood Pressure Pulse Temperature Respirations Last Period       117/78 79 98.3  F (36.8  C) (Oral) 16 (LMP Unknown)       MyChart Information     [facility] gives you secure access to your electronic health record. If you see a primary care provider, you can also send messages to your care team and make appointments. If you have questions, please call your primary care clinic.  If you do not have a primary care provider, please call [phone] and they will assist you.        Care EveryWhere ID     This is your Care EveryWhere ID. This could be used by other organizations to access your [place] medical records  [id number]        Equal Access to Services     BAILEY PRADO : uzma Cintron qaybta kaalmada adeegyada, waxay idiin hayaan adeeg kharash la'aan ah. So [facility] [phone].    ATENCIÓN: Si habla español, tiene a cobb disposición servicios gratuitos de asistencia lingüística. Llame al [phone].    We comply with applicable federal civil rights laws and [place] laws. We do not discriminate on the basis of  race, color, national origin, age, disability, sex, sexual orientation, or gender identity.               Review of your medicines      UNREVIEWED medicines. Ask your doctor about these medicines        Dose / Directions    enoxaparin 60 MG/0.6ML injection   Commonly known as:  LOVENOX        Dose:  60 mg   Inject 60 mg Subcutaneous once   Refills:  0       hydrOXYzine 25 MG tablet   Commonly known as:  ATARAX   Used for:  Encounter for triage in pregnant patient        Dose:  50 mg   Take 2 tablets (50 mg) by mouth every 8 hours as needed for other (sleep)   Quantity:  12 tablet   Refills:  0       metoclopramide 5 MG tablet   Commonly known as:  REGLAN        Dose:  10 mg   Take 10 mg by mouth every 6 hours as needed   Refills:  0       nitroFURantoin (macrocrystal-monohydrate) 100 MG capsule   Commonly known as:  MACROBID   Used for:  Encounter for triage in pregnant patient        Dose:  100 mg   Take 1 capsule (100 mg) by mouth 2 times daily for 3 days   Quantity:  6 capsule   Refills:  0       ondansetron 4 MG ODT tab   Commonly known as:  ZOFRAN ODT   Used for:  Excessive vomiting during pregnancy        Dose:  4 mg   Take 1 tablet (4 mg) by mouth every 8 hours as needed for nausea   Quantity:  20 tablet   Refills:  1       ranitidine 300 MG tablet   Commonly known as:  ZANTAC   Used for:  Gastroesophageal reflux disease, esophagitis presence not specified, Intractable vomiting with nausea, unspecified vomiting type        Dose:  300 mg   Take 1 tablet (300 mg) by mouth At Bedtime   Quantity:  30 tablet   Refills:  1       triamcinolone 0.1 % cream   Commonly known as:  KENALOG   Used for:  Itching, Papule of skin        Apply sparingly to affected area three times daily as needed   Quantity:  80 g   Refills:  0       TUMS PO   Used for:  Supervision of other high risk pregnancies, unspecified trimester        Dose:  500-1000 mg   Take 500-1,000 mg by mouth as needed   Refills:  0       UNISOM PO   Used for:   Supervision of other high risk pregnancies, unspecified trimester        Dose:  50 mg   Take 50 mg by mouth nightly as needed   Refills:  0       vitamin B complex with vitamin C Tabs tablet        Dose:  1 tablet   Take 1 tablet by mouth daily   Refills:  0       VITAMIN B6 PO        Dose:  100 mg   Take 100 mg by mouth daily   Refills:  0       VITAMIN D (CHOLECALCIFEROL) PO        Dose:  5000 Units   Take 5,000 Units by mouth daily   Refills:  0         CONTINUE these medicines which have NOT CHANGED        Dose / Directions    order for DME   Used for:  Episodic cluster headache, not intractable        Equipment being ordered: Oxygen by nasal cannula at 2 L/minute at onset of cluster headaches   Quantity:  1 each   Refills:  0                Protect others around you: Learn how to safely use, store and throw away your medicines at www.disposemymeds.org.             Medication List: This is a list of all your medications and when to take them. Check marks below indicate your daily home schedule. Keep this list as a reference.      Medications           Morning Afternoon Evening Bedtime As Needed    enoxaparin 60 MG/0.6ML injection   Commonly known as:  LOVENOX   Inject 60 mg Subcutaneous once                                hydrOXYzine 25 MG tablet   Commonly known as:  ATARAX   Take 2 tablets (50 mg) by mouth every 8 hours as needed for other (sleep)                                metoclopramide 5 MG tablet   Commonly known as:  REGLAN   Take 10 mg by mouth every 6 hours as needed                                nitroFURantoin (macrocrystal-monohydrate) 100 MG capsule   Commonly known as:  MACROBID   Take 1 capsule (100 mg) by mouth 2 times daily for 3 days                                ondansetron 4 MG ODT tab   Commonly known as:  ZOFRAN ODT   Take 1 tablet (4 mg) by mouth every 8 hours as needed for nausea                                order for DME   Equipment being ordered: Oxygen by nasal cannula at 2  L/minute at onset of cluster headaches                                ranitidine 300 MG tablet   Commonly known as:  ZANTAC   Take 1 tablet (300 mg) by mouth At Bedtime                                triamcinolone 0.1 % cream   Commonly known as:  KENALOG   Apply sparingly to affected area three times daily as needed                                TUMS PO   Take 500-1,000 mg by mouth as needed                                UNISOM PO   Take 50 mg by mouth nightly as needed                                vitamin B complex with vitamin C Tabs tablet   Take 1 tablet by mouth daily                                VITAMIN B6 PO   Take 100 mg by mouth daily                                VITAMIN D (CHOLECALCIFEROL) PO   Take 5,000 Units by mouth daily

## 2018-08-03 LAB
BACTERIA SPEC CULT: NORMAL
Lab: NORMAL
SPECIMEN SOURCE: NORMAL

## 2018-08-03 NOTE — PROGRESS NOTES
S:  Discharge from triage.   A:  FHT's 135, Moderate variability, Accels present, no decels noted. Contractions q2-4, mild.  Cervical exam unchanged at 3 cm.    R:  Written and verbal D/C instruction provided. Pt. Verbalized understanding of D/C instructions and will follow up with maxine sanders as pt is unwilling to stay for observation.  Verbalizes understanding that she will call or return to the [facility] with any further questions or concerns.   Pt. D/C'd via ambulatory with spouse.     Nursing Discharge Checklist  Discharge order entered: Yes  Patient care order entered: Yes  Charges entered: Yes

## 2018-08-03 NOTE — DISCHARGE INSTRUCTIONS
OB Triage Discharge Instructions    Diet:  Regular diet as tolerated    Activity:  As tolerated    Other Special Instructions: Return if contractions worsen.  Or follow up at nearest hospital.     Reason for being seen in L&D: Back pain    Treatment/procedures performed in L&D: Fetal monitoring, cervical exam.     Call the [facility] at [phone] if you have:  ? 5 or more contractions in one hour  ? Leaking of fluid from your vaginal area  ? Decreased fetal movement (follow kick count instructions)  ? Bleeding from your vaginal area  ? Swelling in your face, or increased swelling in your hands or legs  ? Headaches or vision problems such as blurring or seeing spots or starts  ? Nausea or vomiting lasting for more than 24 hours  ? An increase in weight (5lbs/week)  ? Epigastric pain (sometimes confused with heartburn that does not go away or a very bad stomach ache)    If you have any questions, please follow up with your doctor.        Patient Signature: ______________________________________________________________  By signing the above I acknowledge that a nurse or my care provider has explained the instruction to me and I have had any questions regarding my care explained to me.        Discharge Nurse Signature: _______________________________ 8/2/2018  8:15 PM    Method of discharge: Ambulatory    Accompanied by: Spouse  Time of discharge: 2020

## 2018-08-03 NOTE — PROGRESS NOTES
Pt seen twice by Dr Champagne.  C/O back pain.  Contractons increasing from very irregular Q7 to q2-4.  No cervical change after watching for one hour.  Does complain of vaginal pain and back pain.  Plan with Dr Champagne was to continue to watch pt and if progresses to labor with cervical change we would transfer, otherwise watch and discharge to home.  DARYL Arreola is on diversion at this time.    Pt was not in agreement with this plan.  Stated that she did not want to wait.  Felt that if she did go into labor she wouldn't tolerate the transfer.  Very anxious.  Pt requested to be discharged.  Will go to [facility] by private vehicle with spouse.  Risks reviewed with patient.    Dr Champagne in to see pt and make plans for discharge.    Pt discharged by private vehicle. Was able to to ambulate without discomfort.  Tolerated activity at discharge.

## 2018-08-03 NOTE — PROGRESS NOTES
"S: Triage Arrival  B: Isaura is a 32 y.o.  @ 36w 4d who presents with complaint of vaginal swelling, low back pain, \"popping\" in perineum when she kneeled.  A: EFM applied. FHT's135 with moderate variability, accels present, no decels noted on strip. Contractions irregular, occasional.  R: Will notify MD to obtain further orders.  "

## 2018-08-06 ENCOUNTER — PRENATAL OFFICE VISIT (OUTPATIENT)
Dept: OBGYN | Facility: CLINIC | Age: 33
End: 2018-08-06
Payer: MEDICARE

## 2018-08-06 VITALS
DIASTOLIC BLOOD PRESSURE: 78 MMHG | WEIGHT: 170.5 LBS | BODY MASS INDEX: 23.12 KG/M2 | SYSTOLIC BLOOD PRESSURE: 112 MMHG | HEART RATE: 86 BPM

## 2018-08-06 DIAGNOSIS — F20.9 SCHIZOPHRENIA, UNSPECIFIED TYPE (H): ICD-10-CM

## 2018-08-06 DIAGNOSIS — I26.99 PE (PULMONARY THROMBOEMBOLISM) (H): ICD-10-CM

## 2018-08-06 DIAGNOSIS — O09.899 SUPERVISION OF OTHER HIGH RISK PREGNANCIES, UNSPECIFIED TRIMESTER: Primary | ICD-10-CM

## 2018-08-06 PROCEDURE — 99207 ZZC PRENATAL VISIT: CPT | Performed by: OBSTETRICS & GYNECOLOGY

## 2018-08-06 NOTE — MR AVS SNAPSHOT
After Visit Summary   8/6/2018    Isaura Gray    MRN: [id number]           Patient Information     Date Of Birth          1985        Visit Information        Provider Department      8/6/2018 1:00 PM Carina Gilbert MD [facility]        Today's Diagnoses     Supervision of other high risk pregnancies, unspecified trimester    -  1    PE (pulmonary thromboembolism) (H)        Schizophrenia, unspecified type (H)           Follow-ups after your visit        Follow-up notes from your care team     Return in about 1 week (around 8/13/2018).      Who to contact     If you have questions or need follow up information about today's clinic visit or your schedule please contact [facility] directly at [phone].  Normal or non-critical lab and imaging results will be communicated to you by [employer], letter or phone within 4 business days after the clinic has received the results. If you do not hear from us within 7 days, please contact the clinic through [employer] or phone. If you have a critical or abnormal lab result, we will notify you by phone as soon as possible.  Submit refill requests through [employer] or call your pharmacy and they will forward the refill request to us. Please allow 3 business days for your refill to be completed.          Additional Information About Your Visit        MyChart Information     [employer] gives you secure access to your electronic health record. If you see a primary care provider, you can also send messages to your care team and make appointments. If you have questions, please call your primary care clinic.  If you do not have a primary care provider, please call [phone] and they will assist you.        Care EveryWhere ID     This is your Care EveryWhere ID. This could be used by other organizations to access your [place] medical records  [id number]        Your Vitals Were     Pulse Last Period BMI (Body Mass Index)              86 (LMP Unknown) 23.12 kg/m2          Blood Pressure from Last 3 Encounters:   08/06/18 112/78   08/02/18 119/77   08/01/18 106/69    Weight from Last 3 Encounters:   08/06/18 170 lb 8 oz (77.3 kg)   07/31/18 170 lb 11.2 oz (77.4 kg)   07/17/18 170 lb 3.2 oz (77.2 kg)              Today, you had the following     No orders found for display       Primary Care Provider Office Phone # Fax #    Timmy Markos Umana [phone] [address] [place] [address]        Equal Access to Services     [facility]: Hadii vika Wright, waaxda luqadaha, qaybta kaalmada william, celso jennings. So [facility] [phone].    ATENCIÓN: Si habla español, tiene a cobb disposición servicios gratuitos de asistencia lingüística. [facility] [phone].    We comply with applicable federal civil rights laws and [place] laws. We do not discriminate on the basis of race, color, national origin, age, disability, sex, sexual orientation, or gender identity.            Thank you!     Thank you for choosing [facility]  for your care. Our goal is always to provide you with excellent care. Hearing back from our patients is one way we can continue to improve our services. Please take a few minutes to complete the written survey that you may receive in the mail after your visit with us. Thank you!             Your Updated Medication List - Protect others around you: Learn how to safely use, store and throw away your medicines at www.disposemymeds.org.          This list is accurate as of 8/6/18  1:29 PM.  Always use your most recent med list.                   Brand Name Dispense Instructions for use Diagnosis    hydrOXYzine 25 MG tablet    ATARAX    12 tablet    Take 2 tablets (50 mg) by mouth every 8 hours as needed for other (sleep)    Encounter for triage in pregnant patient       metoclopramide 5 MG tablet    REGLAN     Take 10 mg by mouth every 6 hours as  needed        ondansetron 4 MG ODT tab    ZOFRAN ODT    20 tablet    Take 1 tablet (4 mg) by mouth every 8 hours as needed for nausea    Excessive vomiting during pregnancy       order for DME     1 each    Equipment being ordered: Oxygen by nasal cannula at 2 L/minute at onset of cluster headaches    Episodic cluster headache, not intractable       ranitidine 300 MG tablet    ZANTAC    30 tablet    Take 1 tablet (300 mg) by mouth At Bedtime    Gastroesophageal reflux disease, esophagitis presence not specified, Intractable vomiting with nausea, unspecified vomiting type       triamcinolone 0.1 % cream    KENALOG    80 g    Apply sparingly to affected area three times daily as needed    Itching, Papule of skin       TUMS PO      Take 500-1,000 mg by mouth as needed    Supervision of other high risk pregnancies, unspecified trimester       UNISOM PO      Take 50 mg by mouth nightly as needed    Supervision of other high risk pregnancies, unspecified trimester       vitamin B complex with vitamin C Tabs tablet      Take 1 tablet by mouth daily        VITAMIN B6 PO      Take 100 mg by mouth daily        VITAMIN D (CHOLECALCIFEROL) PO      Take 5,000 Units by mouth daily

## 2018-08-06 NOTE — PROGRESS NOTES
Patient presents because she can no longer sleep. The atarax rx given to her is not helping enough. She is always uncomfortable both with irregular contractions and with low presenting part. At hospital Saturday she was 5 cm and stopped jenniffer. She would like her membranes stripped today. Stated she had that done in her prior pregnancy and it helped with labor. Follow up ultrasound 8/1/18 improved with bpp 8/8, maria m 15, and efw 51st% and no evidence of asymmetric fetal growth. Patient is not jenniffer now, denies lof, vb. Normal fkc's daily. Cervix checked and membranes stripped. RTC in 1 week. Labor precautions. Continue with fkc's daily.  Patient was instructed to discontinue her Lovenox and the decision was made by her provider at the hospital this weekend to not start heparin.  Patient's  is back home.

## 2018-08-07 ENCOUNTER — TELEPHONE (OUTPATIENT)
Dept: FAMILY MEDICINE | Facility: OTHER | Age: 33
End: 2018-08-07

## 2018-08-07 NOTE — TELEPHONE ENCOUNTER
Please abstract the following data from this visit with this patient into the appropriate field in Epic:    Pap smear done on this date: 09/19/2016 (approximately), by this group: [facility], results were in care everywhere.    Geri Nicholson       Summary:    Patient is due/failing the following:   PAP    Action needed:   Patient needs office visit for PAP.    Type of outreach:    none per care everywhere UTD    Questions for provider review:    None                                                                                                                                    Geri Nicholson       Chart routed to Care Team .        Panel Management Review      Patient has the following on her problem list: None      Composite cancer screening  Chart review shows that this patient is due/due soon for the following Pap Smear

## 2018-11-02 NOTE — PROGRESS NOTES
"  SUBJECTIVE:   Isaura Gray is a 33 year old female who presents to clinic today for the following health issues:      History of Present Illness     Depression & Anxiety Follow-up:     Depression/Anxiety:  Depression & Anxiety    Status since last visit::  Worsened    Other associated symptoms of depression and anxiety::  YES (sleeping issues, aggatated, up and down emtions that can be really bad )    Significant life event::  No    Current substance use::  None       Today's PHQ-9         PHQ-9 Total Score:     (P) 26   PHQ-9 Q9 Suicidal ideation:   (P) More than half the days   Thoughts of suicide or self harm:  (P) No   Self-harm Plan:        Self-harm Action:          Safety concerns for self or others: (P) No     AVERY-7 Total Score: (P) 20    Diet:  Regular (no restrictions)  Frequency of exercise:  2-3 days/week  Duration of exercise:  15-30 minutes  Taking medications regularly:  Yes  Medication side effects:  Not applicable  Additional concerns today:  No     has been feeling this way for a year.  It was bad when she was pregnant.  Worse since delivery.  Does not feel it is post-partum.  No thoughts of self harm.  States \" would be okay if didn't wake up- so she could get sleep\".  Last on medication was on seroquel with her- quit as she was not able to get up during night.  Has a online therapist that she speaks with almost every day.  Was told to get in for check. She did start a psoriasis cream with black cohar.  Is formula feeding.  Baby is a good sleeper.  She has trouble falling asleep and staying sleep.  Got care in [place] prior to here.  Has schizophrenia- was hospitalized for six months in past.  Was on maintenance.  Has been on abilify, resperdal, xanax, sdderal, invega- insurance did not cover.  On a monthly shot that she tried.  Then tried lithium for awhile.  She is having more positive symptoms.     Problem list and histories reviewed & adjusted, as indicated.  Additional history: as " "documented    Patient Active Problem List   Diagnosis     Vitamin D deficiency     Supervision of other high risk pregnancies, unspecified trimester     PE (pulmonary thromboembolism) (H)     Hyperprolactinemia (H)     Follicular cancer of thyroid (H)     Lactose intolerance in adult     Schizophrenia (H)     Encounter for triage in pregnant patient     Cough     Chronic bilateral low back pain without sciatica     Normal labor     Past Surgical History:   Procedure Laterality Date     APPENDECTOMY       ENT SURGERY      Partial thyroidectomy       Social History   Substance Use Topics     Smoking status: Former Smoker     Packs/day: 0.50     Years: 5.00     Types: Cigarettes     Quit date: 7/31/2016     Smokeless tobacco: Never Used     Alcohol use No     Family History   Problem Relation Age of Onset     No Known Problems Mother      Hypertension Father      Cerebrovascular Disease Father 56     Passed away from double brain stem clot     Mental Illness Father      Asthma Brother      Cancer Maternal Grandfather      lung     No Known Problems Paternal Grandmother      No Known Problems Paternal Grandfather      Autism Spectrum Disorder Son      KIDNEY DISEASE Son      \"has a third kidney\"     No Known Problems Daughter          No current outpatient prescriptions on file.     Allergies   Allergen Reactions     Ciprofloxacin Hives     Sulfa Drugs Hives     Oxycodone-Acetaminophen Itching       ROS:  Constitutional, HEENT, cardiovascular, pulmonary, gi and gu systems are negative, except as otherwise noted.    OBJECTIVE:     BP 98/62  Pulse 94  Temp 99.2  F (37.3  C) (Temporal)  Resp 12  Wt 150 lb (68 kg)  LMP 10/08/2018  Breastfeeding? Unknown  BMI 20.34 kg/m2  Body mass index is 20.34 kg/(m^2).  GENERAL: healthy, alert and no distress  EYES: Eyes grossly normal to inspection, PERRL and conjunctivae and sclerae normal  HENT: ear canals and TM's normal, nose and mouth without ulcers or lesions  NECK: no " adenopathy, no asymmetry, masses, or scars and thyroid normal to palpation  RESP: lungs clear to auscultation - no rales, rhonchi or wheezes  CV: regular rate and rhythm, normal S1 S2, no S3 or S4, no murmur, click or rub, no peripheral edema and peripheral pulses strong  ABDOMEN: soft, nontender, no hepatosplenomegaly, no masses and bowel sounds normal  MS: no gross musculoskeletal defects noted, no edema  SKIN: no suspicious lesions or rashes  NEURO: Normal strength and tone, mentation intact and speech normal  PSYCH: mentation appears normal, affect flat, judgement and insight intact and appearance well groomed    Diagnostic Test Results:  No results found for this or any previous visit (from the past 24 hour(s)).    ASSESSMENT/PLAN:       1. Schizophrenia, unspecified type (H)  Patient here to discuss sleep and anxiety.  Has known schizophrenia and has been on numerous meds in the past.  She does have a complex medical history that includes hyperprolactinemia, PE, follicular cancer of thyroid.  She was lasting taking medication for her schizophrenia after her daughter was born but the seroquel made it hard for her to wake up at night with her.  Discussed medication management with Dr. Umana as I do not normally treat schizophrenia and he is her PCP.  Could start paxil or prozac or low dose seroquel, zyprexa.  She would like to try paxil and low dose zyprexa until the paxil takes effect.  Option given to follow up with Dr. Umana or Psychiatry.  She would like to follow up with Dr. Umana- suggested 4-6 weeks- sooner if concerns.   She will continue therapy in the meantime.  Will check some basic labwork as she is three months post-partum.      2. Postpartum anxiety  As above  - TSH with free T4 reflex    3. Hyperprolactinemia (H)  Managed by PCP    4. PE (pulmonary thromboembolism) (H)  Was on lovenox with pregnancy.  Currently not on any prevention.      5. Follicular cancer of thyroid (H)  S/p resection.   Monitor.        Juana Bhatti, FEDERICO  [facility]  Answers for HPI/ROS submitted by the patient on 11/5/2018   PHQ-2 Score: 6  If you checked off any problems, how difficult have these problems made it for you to do your work, take care of things at home, or get along with other people?: Extremely difficult  PHQ9 TOTAL SCORE: 26  AVERY 7 TOTAL SCORE: 20  cbc

## 2018-11-05 ENCOUNTER — OFFICE VISIT (OUTPATIENT)
Dept: FAMILY MEDICINE | Facility: OTHER | Age: 33
End: 2018-11-05
Payer: MEDICARE

## 2018-11-05 VITALS
BODY MASS INDEX: 20.34 KG/M2 | TEMPERATURE: 99.2 F | HEART RATE: 94 BPM | SYSTOLIC BLOOD PRESSURE: 98 MMHG | WEIGHT: 150 LBS | RESPIRATION RATE: 12 BRPM | DIASTOLIC BLOOD PRESSURE: 62 MMHG

## 2018-11-05 DIAGNOSIS — C73 FOLLICULAR CANCER OF THYROID (H): ICD-10-CM

## 2018-11-05 DIAGNOSIS — I26.99 PE (PULMONARY THROMBOEMBOLISM) (H): ICD-10-CM

## 2018-11-05 DIAGNOSIS — F41.8 POSTPARTUM ANXIETY: ICD-10-CM

## 2018-11-05 DIAGNOSIS — F41.9 ANXIETY: ICD-10-CM

## 2018-11-05 DIAGNOSIS — F20.9 SCHIZOPHRENIA, UNSPECIFIED TYPE (H): Primary | ICD-10-CM

## 2018-11-05 DIAGNOSIS — E22.1 HYPERPROLACTINEMIA (H): ICD-10-CM

## 2018-11-05 LAB
ALBUMIN SERPL-MCNC: 4.2 G/DL (ref 3.4–5)
ALP SERPL-CCNC: 76 U/L (ref 40–150)
ALT SERPL W P-5'-P-CCNC: 27 U/L (ref 0–50)
ANION GAP SERPL CALCULATED.3IONS-SCNC: 10 MMOL/L (ref 3–14)
AST SERPL W P-5'-P-CCNC: 17 U/L (ref 0–45)
BILIRUB SERPL-MCNC: 1.1 MG/DL (ref 0.2–1.3)
BUN SERPL-MCNC: 15 MG/DL (ref 7–30)
CALCIUM SERPL-MCNC: 9.1 MG/DL (ref 8.5–10.1)
CHLORIDE SERPL-SCNC: 106 MMOL/L (ref 94–109)
CO2 SERPL-SCNC: 23 MMOL/L (ref 20–32)
CREAT SERPL-MCNC: 0.73 MG/DL (ref 0.52–1.04)
ERYTHROCYTE [DISTWIDTH] IN BLOOD BY AUTOMATED COUNT: 16.4 % (ref 10–15)
GFR SERPL CREATININE-BSD FRML MDRD: >90 ML/MIN/1.7M2
GLUCOSE SERPL-MCNC: 89 MG/DL (ref 70–99)
HCT VFR BLD AUTO: 38.8 % (ref 35–47)
HGB BLD-MCNC: 12.9 G/DL (ref 11.7–15.7)
MCH RBC QN AUTO: 26.9 PG (ref 26.5–33)
MCHC RBC AUTO-ENTMCNC: 33.2 G/DL (ref 31.5–36.5)
MCV RBC AUTO: 81 FL (ref 78–100)
PLATELET # BLD AUTO: 287 10E9/L (ref 150–450)
POTASSIUM SERPL-SCNC: 4.1 MMOL/L (ref 3.4–5.3)
PROT SERPL-MCNC: 8.1 G/DL (ref 6.8–8.8)
RBC # BLD AUTO: 4.8 10E12/L (ref 3.8–5.2)
SODIUM SERPL-SCNC: 139 MMOL/L (ref 133–144)
TSH SERPL DL<=0.005 MIU/L-ACNC: 2.05 MU/L (ref 0.4–4)
WBC # BLD AUTO: 6.4 10E9/L (ref 4–11)

## 2018-11-05 PROCEDURE — 36415 COLL VENOUS BLD VENIPUNCTURE: CPT | Performed by: NURSE PRACTITIONER

## 2018-11-05 PROCEDURE — 84443 ASSAY THYROID STIM HORMONE: CPT | Performed by: NURSE PRACTITIONER

## 2018-11-05 PROCEDURE — 99214 OFFICE O/P EST MOD 30 MIN: CPT | Performed by: NURSE PRACTITIONER

## 2018-11-05 PROCEDURE — 85027 COMPLETE CBC AUTOMATED: CPT | Performed by: NURSE PRACTITIONER

## 2018-11-05 PROCEDURE — 80053 COMPREHEN METABOLIC PANEL: CPT | Performed by: NURSE PRACTITIONER

## 2018-11-05 RX ORDER — PAROXETINE HYDROCHLORIDE HEMIHYDRATE 12.5 MG/1
12.5 TABLET, FILM COATED, EXTENDED RELEASE ORAL EVERY MORNING
Qty: 60 TABLET | Refills: 1 | Status: SHIPPED | OUTPATIENT
Start: 2018-11-05 | End: 2018-12-27

## 2018-11-05 RX ORDER — OLANZAPINE 5 MG/1
TABLET ORAL
Qty: 60 TABLET | Refills: 0 | Status: SHIPPED | OUTPATIENT
Start: 2018-11-05 | End: 2018-12-27

## 2018-11-05 ASSESSMENT — ANXIETY QUESTIONNAIRES
7. FEELING AFRAID AS IF SOMETHING AWFUL MIGHT HAPPEN: NEARLY EVERY DAY
5. BEING SO RESTLESS THAT IT IS HARD TO SIT STILL: MORE THAN HALF THE DAYS
GAD7 TOTAL SCORE: 20
4. TROUBLE RELAXING: NEARLY EVERY DAY
6. BECOMING EASILY ANNOYED OR IRRITABLE: NEARLY EVERY DAY
7. FEELING AFRAID AS IF SOMETHING AWFUL MIGHT HAPPEN: NEARLY EVERY DAY
GAD7 TOTAL SCORE: 20
3. WORRYING TOO MUCH ABOUT DIFFERENT THINGS: NEARLY EVERY DAY
GAD7 TOTAL SCORE: 20
2. NOT BEING ABLE TO STOP OR CONTROL WORRYING: NEARLY EVERY DAY
1. FEELING NERVOUS, ANXIOUS, OR ON EDGE: NEARLY EVERY DAY

## 2018-11-05 ASSESSMENT — PATIENT HEALTH QUESTIONNAIRE - PHQ9
10. IF YOU CHECKED OFF ANY PROBLEMS, HOW DIFFICULT HAVE THESE PROBLEMS MADE IT FOR YOU TO DO YOUR WORK, TAKE CARE OF THINGS AT HOME, OR GET ALONG WITH OTHER PEOPLE: EXTREMELY DIFFICULT
SUM OF ALL RESPONSES TO PHQ QUESTIONS 1-9: 26
SUM OF ALL RESPONSES TO PHQ QUESTIONS 1-9: 26

## 2018-11-05 ASSESSMENT — PAIN SCALES - GENERAL: PAINLEVEL: NO PAIN (0)

## 2018-11-05 NOTE — MR AVS SNAPSHOT
After Visit Summary   11/5/2018    Isaura Gray    MRN: [id number]           Patient Information     Date Of Birth          1985        Visit Information        Provider Department      11/5/2018 2:10 PM Juana Bhatti NP [facility]        Today's Diagnoses     Schizophrenia, unspecified type (H)    -  1    Postpartum anxiety        Hyperprolactinemia (H)        PE (pulmonary thromboembolism) (H)        Follicular cancer of thyroid (H)           Follow-ups after your visit        Follow-up notes from your care team     Return in about 3 weeks (around 11/26/2018).      Who to contact     If you have questions or need follow up information about today's clinic visit or your schedule please contact [facility] directly at [phone].  Normal or non-critical lab and imaging results will be communicated to you by Doculogyhart, letter or phone within 4 business days after the clinic has received the results. If you do not hear from us within 7 days, please contact the clinic through Doculogyhart or phone. If you have a critical or abnormal lab result, we will notify you by phone as soon as possible.  Submit refill requests through Instaclustr or call your pharmacy and they will forward the refill request to us. Please allow 3 business days for your refill to be completed.          Additional Information About Your Visit        MyChart Information     Instaclustr gives you secure access to your electronic health record. If you see a primary care provider, you can also send messages to your care team and make appointments. If you have questions, please call your primary care clinic.  If you do not have a primary care provider, please call [phone] and they will assist you.        Care EveryWhere ID     This is your Care EveryWhere ID. This could be used by other organizations to access your [facility] medical records  [id number]        Your Vitals Were     Pulse Temperature  Respirations Last Period Breastfeeding? BMI (Body Mass Index)    94 99.2  F (37.3  C) (Temporal) 12 10/08/2018 Unknown 20.34 kg/m2       Blood Pressure from Last 3 Encounters:   11/05/18 98/62   08/06/18 112/78   08/02/18 119/77    Weight from Last 3 Encounters:   11/05/18 150 lb (68 kg)   08/06/18 170 lb 8 oz (77.3 kg)   07/31/18 170 lb 11.2 oz (77.4 kg)              We Performed the Following     CBC with platelets     Comprehensive metabolic panel (BMP + Alb, Alk Phos, ALT, AST, Total. Bili, TP)     TSH with free T4 reflex          Today's Medication Changes          These changes are accurate as of 11/5/18  5:00 PM.  If you have any questions, ask your nurse or doctor.               Start taking these medicines.        Dose/Directions    OLANZapine 5 MG tablet   Commonly known as:  zyPREXA   Used for:  Schizophrenia, unspecified type (H)   Started by:  Juana Bhatti NP        Take 1 tablet (5 mg) orally at bedtime   Quantity:  60 tablet   Refills:  0       PARoxetine 12.5 MG 24 hr tablet   Commonly known as:  PAXIL-CR   Used for:  Schizophrenia, unspecified type (H)   Started by:  Juana Bhatti NP        Dose:  12.5 mg   Take 1 tablet (12.5 mg) by mouth every morning For seven days then increase to two pills   Quantity:  60 tablet   Refills:  1            Where to get your medicines      These medications were sent to [facility] - [address]   [address] [place] [address]     Phone:  [phone]     OLANZapine 5 MG tablet    PARoxetine 12.5 MG 24 hr tablet                Primary Care Provider Office Phone # Fax #    Timmy Markos Umana [phone] [address] [place] [address]        Equal Access to Services     [facility] JOHAN : Lovely hairstono Sozina, waaxda luqadaha, qaybta kaalmada adeegyada, celso jennings. So [facility] [phone].    ATENCIÓN: Si terrila espagustin, antwon baca cobb  disposición servicios gratuitos de asistencia lingüística. Oscar baez [phone].    We comply with applicable federal civil rights laws and [place] laws. We do not discriminate on the basis of race, color, national origin, age, disability, sex, sexual orientation, or gender identity.            Thank you!     Thank you for choosing [facility]  for your care. Our goal is always to provide you with excellent care. Hearing back from our patients is one way we can continue to improve our services. Please take a few minutes to complete the written survey that you may receive in the mail after your visit with us. Thank you!             Your Updated Medication List - Protect others around you: Learn how to safely use, store and throw away your medicines at www.disposemymeds.org.          This list is accurate as of 11/5/18  5:00 PM.  Always use your most recent med list.                   Brand Name Dispense Instructions for use Diagnosis    OLANZapine 5 MG tablet    zyPREXA    60 tablet    Take 1 tablet (5 mg) orally at bedtime    Schizophrenia, unspecified type (H)       PARoxetine 12.5 MG 24 hr tablet    PAXIL-CR    60 tablet    Take 1 tablet (12.5 mg) by mouth every morning For seven days then increase to two pills    Schizophrenia, unspecified type (H)

## 2018-11-06 ASSESSMENT — ANXIETY QUESTIONNAIRES: GAD7 TOTAL SCORE: 20

## 2018-11-19 PROBLEM — F41.8 POSTPARTUM ANXIETY: Status: ACTIVE | Noted: 2018-11-19

## 2018-11-19 PROBLEM — F41.9 ANXIETY: Status: ACTIVE | Noted: 2018-11-19

## 2018-12-21 NOTE — PROGRESS NOTES
SUBJECTIVE:   Isaura Gray is a 33 year old female who presents to clinic today for the following health issues:      Depression     Depression & Anxiety Follow-up:     Depression/Anxiety:  Depression & Anxiety       Today's PHQ-9         PHQ-9 Total Score:     22   PHQ-9 Q9 Suicidal ideation:   More than half the days   Thoughts of suicide or self harm:  Yes   Self-harm Plan:    Yes   Self-harm Action:      No   Safety concerns for self or others: No     AVERY-7 Total Score: 20    Medication Followup of Paxil, not covered by patient insurance. zyprexa not working    Taking Medication as prescribed: NO zyprexa sometimes takes 10mg    Side Effects:  Sometime makes her sleeping    Medication Helping Symptoms:  NO    Patient is also complaining of dry scalp and feet, peeling and itchy. Patient has tried coal tar remedy. No improvement, present for years, worse in the last 4 months         Pt is interested in getting in with a local therapist.  Has been doing therapy online.      She admits to suicidal ideation.  She did share this with her boyfriend.  Has removed items that would be dangerous.  Was considering pill overdose.      She's had some problem with telling random lies.      Denies improvement in her mood.  Apparently Paxil or Zyprexa isn't covered.  Looks like the Paxil wasn't covered.  Zyprexa isn't helping that much at this point.  Seroquel worked better for sleep, but unable to wake up when taking Seroquel.  Zyprexa doesn't help as much.      Has previously been on Seroquel, Risperdal, Invega, Lithium (didn't like).  Risperdal caused elevated prolactin.      No longer breastfeeding.      Also notes some callus, cracking of skin on her feet, some scalp issues as well.      Problem list and histories reviewed & adjusted, as indicated.  Additional history: as documented      Current Outpatient Medications   Medication Sig Dispense Refill     OLANZapine (ZYPREXA) 5 MG tablet Take 1 tablet (5 mg) orally at  "bedtime 60 tablet 0     Recent Labs   Lab Test 11/05/18  1600 06/20/18  0826 06/04/18  0818   ALT 27 22 18   CR 0.73  --  0.67   GFRESTIMATED >90  --  >90   GFRESTBLACK >90  --  >90   POTASSIUM 4.1  --  3.7   TSH 2.05  --  2.36      BP Readings from Last 3 Encounters:   12/27/18 102/72   11/05/18 98/62   08/06/18 112/78    Wt Readings from Last 3 Encounters:   12/27/18 71.2 kg (157 lb)   11/05/18 68 kg (150 lb)   08/06/18 77.3 kg (170 lb 8 oz)                  ROS:  Constitutional, HEENT, cardiovascular, pulmonary, gi and gu systems are negative, except as otherwise noted.    OBJECTIVE:     /72   Pulse 70   Temp 99.4  F (37.4  C) (Temporal)   Resp 16   Ht 1.784 m (5' 10.25\")   Wt 71.2 kg (157 lb)   LMP 12/16/2018 (Approximate)   BMI 22.37 kg/m    Body mass index is 22.37 kg/m .  GENERAL: healthy, alert and no distress  NECK: no adenopathy, no asymmetry, masses, or scars and thyroid normal to palpation  RESP: lungs clear to auscultation - no rales, rhonchi or wheezes  CV: regular rate and rhythm, normal S1 S2, no S3 or S4, no murmur, click or rub, no peripheral edema and peripheral pulses strong  ABDOMEN: soft, nontender, no hepatosplenomegaly, no masses and bowel sounds normal  MS: no gross musculoskeletal defects noted, no edema  SKIN: dry, callused, cracked skin on hands, feet.  Slight maculopapular erythematous rash on posterior scalp.      Diagnostic Test Results:  Results for orders placed or performed in visit on 11/05/18   CBC with platelets   Result Value Ref Range    WBC 6.4 4.0 - 11.0 10e9/L    RBC Count 4.80 3.8 - 5.2 10e12/L    Hemoglobin 12.9 11.7 - 15.7 g/dL    Hematocrit 38.8 35.0 - 47.0 %    MCV 81 78 - 100 fl    MCH 26.9 26.5 - 33.0 pg    MCHC 33.2 31.5 - 36.5 g/dL    RDW 16.4 (H) 10.0 - 15.0 %    Platelet Count 287 150 - 450 10e9/L   Comprehensive metabolic panel (BMP + Alb, Alk Phos, ALT, AST, Total. Bili, TP)   Result Value Ref Range    Sodium 139 133 - 144 mmol/L    Potassium 4.1 " 3.4 - 5.3 mmol/L    Chloride 106 94 - 109 mmol/L    Carbon Dioxide 23 20 - 32 mmol/L    Anion Gap 10 3 - 14 mmol/L    Glucose 89 70 - 99 mg/dL    Urea Nitrogen 15 7 - 30 mg/dL    Creatinine 0.73 0.52 - 1.04 mg/dL    GFR Estimate >90 >60 mL/min/1.7m2    GFR Estimate If Black >90 >60 mL/min/1.7m2    Calcium 9.1 8.5 - 10.1 mg/dL    Bilirubin Total 1.1 0.2 - 1.3 mg/dL    Albumin 4.2 3.4 - 5.0 g/dL    Protein Total 8.1 6.8 - 8.8 g/dL    Alkaline Phosphatase 76 40 - 150 U/L    ALT 27 0 - 50 U/L    AST 17 0 - 45 U/L   TSH with free T4 reflex   Result Value Ref Range    TSH 2.05 0.40 - 4.00 mU/L       ASSESSMENT/PLAN:       ICD-10-CM    1. Schizophrenia, unspecified type (H) F20.9 MENTAL HEALTH REFERRAL  - Adult; Psychiatry and Medication Management; Psychiatry; Other: [facility] [phone]; We will contact you to schedule the appointment or please call with any questions     FLUoxetine (PROZAC) 20 MG capsule     lamoTRIgine (LAMICTAL) 25 MG tablet     OLANZapine (ZYPREXA) 10 MG tablet   2. Bipolar affective disorder, currently depressed, moderate (H) F31.32    3. Anxiety F41.9 MENTAL HEALTH REFERRAL  - Adult; Psychiatry and Medication Management; Psychiatry; Other: [facility] [phone]; We will contact you to schedule the appointment or please call with any questions     FLUoxetine (PROZAC) 20 MG capsule     lamoTRIgine (LAMICTAL) 25 MG tablet   4. Hyperprolactinemia (H) E22.1 Prolactin   5. Cracked skin on feet R23.4 clobetasol (TEMOVATE) 0.05 % external ointment   6. Need for prophylactic vaccination and inoculation against influenza Z23 FLU VACCINE, SPLIT VIRUS, IM (QUADRIVALENT) [86876]- >3 YRS     Vaccine Administration, Initial [88638]     1, 2, 3.  Mood disorder questionnaire screening today was suspicious for bipolar disorder.  It is possible that she is previously had hallucinations related to this.  But schizoaffective disorder is also certainly possible.  I did  recommend psychiatry referral and management and patient agreed.  Referral was placed.  She will also contact her insurance to determine if there is a preferred provider that she needs to go with.  She does not appear to be under good control at this time with regard to mood.  She is not having any hallucinations, which is reassuring.  Discussed a few treatment options while waiting for her to get in with psychiatry.  Since she seems to be tolerating Zyprexa, will increase the dose to 10 mg nightly.  Can increase further if she notes some benefit with the slight increase.  Additionally, will try to better stabilize her mood with the addition of Lamictal.  Discussed that this needs to be gradually titrated up over time.  Finally, her depression is concerning, and she indicated some desire to take an antidepressant to help with this.  I did discuss the possibility of rapid cycling if she does not have adequate mood stabilization on board, but hopefully the Lamictal and Zyprexa will provide that.  We will cautiously add fluoxetine to her current regimen and follow-up in 2-4 weeks.  Of course if she gets in with psychiatry sooner, can transfer care there.  4.  Unclear if this is simply related to medications or other process.  We will recheck prolactin now that she is no longer pregnant or lactating.  5.  Appears consistent with severe eczema.  Will try topical steroids.  6.  Immunized.    Portions of this note were completed using Dragon dictation software.  Although reviewed, there may be typographical and other inadvertent errors that remain.     Spent at least half of a 40 minute visit counseling patient on options and coordinating patient's care.  See above for additional details.             Patient Instructions   Thank you for visiting [facility] [place]    Increase Zyprexa to 10 mg nightly.  If you notice some benefit from this without too much sedation, can take another 5-10 mg in the morning as  well.    Start Lamictal for mood stabilization.  Increase in 2 weeks if no side effects.    Cautiously start fluoxetine to help with depression/anxiety.  If you notice rapid mood changes every few days, stop the medication and let me know.    We'll let you know your lab results as soon as we can.     See psychiatry as soon as can be arranged.      Let's try clobetasol for your feet    Please Follow Up when indicated on the section below this one on your After-Visit Summary.      If you had imaging scheduled please refer to your radiology prep sheet.    Appointment    Date_______________     Time_____________    Day:   M TU W TH F    With____________________________    Location_________________________    If you need medication refills, please contact your pharmacy 3 days before your prescriptions runs out. If you are out of refills, your pharmacy will contact contact the clinic.    Contact us or return if questions or concerns.     -Your Care Team:  MD Taylor Hernandez PA-C Joel De Haan, PA-C Elizabeth McLean, APRN CNP  FLORIDA Wall, FLORIDA Jaramillo, CNP     General information about your clinic      Clinic hours:     Lab hours:  Phone [phone]  Monday 7:30 am-7 pm    Monday 8:30 am-6:30 pm  Tuesday-Friday 7:30 am-5 pm   Tuesday-Friday 8:30 am-4:30 pm    Pharmacy hours:  Phone [phone]  Monday 8:30 am-7pm  Tuesday-Friday 8:30am-6 pm                                     Mychart assistance [phone]    We would like to hear from you, how was your visit today?    Brook Peace  Patient Information Supervisor   Patient Care Supervisor  [facility], and [facility], [facility]3]  [phone]          Timmy Umana MD, MD  [facility]

## 2018-12-27 ENCOUNTER — OFFICE VISIT (OUTPATIENT)
Dept: FAMILY MEDICINE | Facility: OTHER | Age: 33
End: 2018-12-27
Payer: MEDICARE

## 2018-12-27 VITALS
HEART RATE: 70 BPM | SYSTOLIC BLOOD PRESSURE: 102 MMHG | WEIGHT: 157 LBS | RESPIRATION RATE: 16 BRPM | BODY MASS INDEX: 22.48 KG/M2 | DIASTOLIC BLOOD PRESSURE: 72 MMHG | HEIGHT: 70 IN | TEMPERATURE: 99.4 F

## 2018-12-27 DIAGNOSIS — R23.4 CRACKED SKIN ON FEET: ICD-10-CM

## 2018-12-27 DIAGNOSIS — F41.9 ANXIETY: ICD-10-CM

## 2018-12-27 DIAGNOSIS — Z23 NEED FOR PROPHYLACTIC VACCINATION AND INOCULATION AGAINST INFLUENZA: ICD-10-CM

## 2018-12-27 DIAGNOSIS — E22.1 HYPERPROLACTINEMIA (H): ICD-10-CM

## 2018-12-27 DIAGNOSIS — F20.9 SCHIZOPHRENIA, UNSPECIFIED TYPE (H): Primary | ICD-10-CM

## 2018-12-27 DIAGNOSIS — F31.32 BIPOLAR AFFECTIVE DISORDER, CURRENTLY DEPRESSED, MODERATE (H): ICD-10-CM

## 2018-12-27 LAB — PROLACTIN SERPL-MCNC: 9 UG/L (ref 3–27)

## 2018-12-27 PROCEDURE — 36415 COLL VENOUS BLD VENIPUNCTURE: CPT | Performed by: FAMILY MEDICINE

## 2018-12-27 PROCEDURE — 99215 OFFICE O/P EST HI 40 MIN: CPT | Mod: 25 | Performed by: FAMILY MEDICINE

## 2018-12-27 PROCEDURE — 90686 IIV4 VACC NO PRSV 0.5 ML IM: CPT | Performed by: FAMILY MEDICINE

## 2018-12-27 PROCEDURE — 84146 ASSAY OF PROLACTIN: CPT | Performed by: FAMILY MEDICINE

## 2018-12-27 PROCEDURE — G0008 ADMIN INFLUENZA VIRUS VAC: HCPCS | Performed by: FAMILY MEDICINE

## 2018-12-27 RX ORDER — OLANZAPINE 10 MG/1
10 TABLET ORAL AT BEDTIME
Qty: 30 TABLET | Refills: 1 | Status: SHIPPED | OUTPATIENT
Start: 2018-12-27 | End: 2020-02-13

## 2018-12-27 RX ORDER — LAMOTRIGINE 25 MG/1
TABLET ORAL
Qty: 42 TABLET | Refills: 0 | Status: SHIPPED | OUTPATIENT
Start: 2018-12-27 | End: 2019-01-25

## 2018-12-27 RX ORDER — CLOBETASOL PROPIONATE 0.5 MG/G
OINTMENT TOPICAL 2 TIMES DAILY
Qty: 60 G | Refills: 1 | Status: SHIPPED | OUTPATIENT
Start: 2018-12-27 | End: 2020-07-27

## 2018-12-27 ASSESSMENT — ANXIETY QUESTIONNAIRES
7. FEELING AFRAID AS IF SOMETHING AWFUL MIGHT HAPPEN: NEARLY EVERY DAY
6. BECOMING EASILY ANNOYED OR IRRITABLE: NEARLY EVERY DAY
5. BEING SO RESTLESS THAT IT IS HARD TO SIT STILL: MORE THAN HALF THE DAYS
3. WORRYING TOO MUCH ABOUT DIFFERENT THINGS: NEARLY EVERY DAY
2. NOT BEING ABLE TO STOP OR CONTROL WORRYING: NEARLY EVERY DAY
1. FEELING NERVOUS, ANXIOUS, OR ON EDGE: NEARLY EVERY DAY
GAD7 TOTAL SCORE: 20
IF YOU CHECKED OFF ANY PROBLEMS ON THIS QUESTIONNAIRE, HOW DIFFICULT HAVE THESE PROBLEMS MADE IT FOR YOU TO DO YOUR WORK, TAKE CARE OF THINGS AT HOME, OR GET ALONG WITH OTHER PEOPLE: EXTREMELY DIFFICULT

## 2018-12-27 ASSESSMENT — PATIENT HEALTH QUESTIONNAIRE - PHQ9
5. POOR APPETITE OR OVEREATING: NEARLY EVERY DAY
SUM OF ALL RESPONSES TO PHQ QUESTIONS 1-9: 22

## 2018-12-27 ASSESSMENT — PAIN SCALES - GENERAL: PAINLEVEL: NO PAIN (0)

## 2018-12-27 ASSESSMENT — MIFFLIN-ST. JEOR: SCORE: 1511.37

## 2018-12-27 NOTE — PROGRESS NOTES
Prior to injection, verified patient identity using patient's name and date of birth.  Due to injection administration, patient instructed to remain in clinic for 15 minutes  afterwards, and to report any adverse reaction to me immediately.    Injectable Influenza Immunization Documentation    1.  Is the person to be vaccinated sick today?   No    2. Does the person to be vaccinated have an allergy to a component   of the vaccine?   No  Egg Allergy Algorithm Link    3. Has the person to be vaccinated ever had a serious reaction   to influenza vaccine in the past?   No    4. Has the person to be vaccinated ever had Guillain-Barré syndrome?   No    Form completed by patient

## 2018-12-27 NOTE — PATIENT INSTRUCTIONS
Thank you for visiting [facility]    Increase Zyprexa to 10 mg nightly.  If you notice some benefit from this without too much sedation, can take another 5-10 mg in the morning as well.    Start Lamictal for mood stabilization.  Increase in 2 weeks if no side effects.    Cautiously start fluoxetine to help with depression/anxiety.  If you notice rapid mood changes every few days, stop the medication and let me know.    We'll let you know your lab results as soon as we can.     See psychiatry as soon as can be arranged.      Let's try clobetasol for your feet    Please Follow Up when indicated on the section below this one on your After-Visit Summary.      If you had imaging scheduled please refer to your radiology prep sheet.    Appointment    Date_______________     Time_____________    Day:   M TU W TH F    With____________________________    Location_________________________    If you need medication refills, please contact your pharmacy 3 days before your prescriptions runs out. If you are out of refills, your pharmacy will contact contact the clinic.    Contact us or return if questions or concerns.     -Your Care Team:  MD Taylor Hernandez PA-C Joel De Haan, PA-C Elizabeth McLean, APRN CNP  Hoa Retana, APRILIANA, CNP  Juana Bhatti, APRILIANA, CNP     General information about your clinic      Clinic hours:     Lab hours:  Phone [phone]  Monday 7:30 am-7 pm    Monday 8:30 am-6:30 pm  Tuesday-Friday 7:30 am-5 pm   Tuesday-Friday 8:30 am-4:30 pm    Pharmacy hours:  Phone [phone]  Monday 8:30 am-7pm  Tuesday-Friday 8:30am-6 pm                                     Mychart assistance [phone]    We would like to hear from you, how was your visit today?    Brook Peace  Patient Information Supervisor   Patient Care Supervisor  [place] [place], and [facility] [place], and [facility]  [phone]

## 2018-12-29 ASSESSMENT — ANXIETY QUESTIONNAIRES: GAD7 TOTAL SCORE: 20

## 2019-01-25 ENCOUNTER — MYC REFILL (OUTPATIENT)
Dept: FAMILY MEDICINE | Facility: OTHER | Age: 34
End: 2019-01-25

## 2019-01-25 DIAGNOSIS — F20.9 SCHIZOPHRENIA, UNSPECIFIED TYPE (H): ICD-10-CM

## 2019-01-25 DIAGNOSIS — F41.9 ANXIETY: ICD-10-CM

## 2019-01-25 RX ORDER — OLANZAPINE 10 MG/1
10 TABLET ORAL AT BEDTIME
Qty: 30 TABLET | Refills: 1 | OUTPATIENT
Start: 2019-01-25

## 2019-01-25 NOTE — TELEPHONE ENCOUNTER
"Requested Prescriptions   Pending Prescriptions Disp Refills     FLUoxetine (PROZAC) 20 MG capsule 30 capsule 1     Sig: Take 1 capsule (20 mg) by mouth daily    SSRIs Protocol Passed - 1/25/2019 10:17 AM       Passed - Recent (12 mo) or future (30 days) visit within the authorizing provider's specialty    Patient had office visit in the last 12 months or has a visit in the next 30 days with authorizing provider or within the authorizing provider's specialty.  See \"Patient Info\" tab in inbasket, or \"Choose Columns\" in Meds & Orders section of the refill encounter.    PHQ-9 SCORE 7/31/2018 11/5/2018 12/27/2018   PHQ-9 Total Score MyChart 11 (Moderate depression) 26 (Severe depression) -   PHQ-9 Total Score 11 26 22          Passed - Medication is active on med list       Passed - Patient is age 18 or older       Passed - No active pregnancy on record       Passed - No positive pregnancy test in last 12 months        lamoTRIgine (LAMICTAL) 25 MG tablet 42 tablet 0     Sig: Take 1 tablet (25 mg) by mouth daily for 14 days, THEN 2 tablets (50 mg) daily for 14 days.    Anti-Seizure Meds Protocol  Failed - 1/25/2019 10:17 AM       Failed - Review Authorizing provider's last note.     Refer to last progress notes: confirm request is for original authorizing provider (cannot be through other providers).       Passed - Recent (12 mo) or future (30 days) visit within the authorizing provider's specialty    Patient had office visit in the last 12 months or has a visit in the next 30 days with authorizing provider or within the authorizing provider's specialty.  See \"Patient Info\" tab in inbasket, or \"Choose Columns\" in Meds & Orders section of the refill encounter.           Passed - Normal CBC on file in past 26 months    Recent Labs   Lab Test 11/05/18  1600   WBC 6.4   RBC 4.80   HGB 12.9   HCT 38.8             Passed - Normal ALT or AST on file in past 26 months    Recent Labs   Lab Test 11/05/18  1600   ALT 27 " "    Recent Labs   Lab Test 11/05/18  1600   AST 17          Passed - Normal platelet count on file in past 26 months    Recent Labs   Lab Test 11/05/18  1600             Passed - Medication is active on med list       Passed - No active pregnancy on record       Passed - No positive pregnancy test in last 12 months        OLANZapine (ZYPREXA) 10 MG tablet 30 tablet 1     Sig: Take 1 tablet (10 mg) by mouth At Bedtime    Antipsychotic Medications Failed - 1/25/2019 10:17 AM       Failed - Lipid panel on file within the past 12 months    No lab results found.       Passed - Blood pressure under 140/90 in past 12 months    BP Readings from Last 3 Encounters:   12/27/18 102/72   11/05/18 98/62   08/06/18 112/78          Passed - Patient is 12 years of age or older       Passed - CBC on file in past 12 months    Recent Labs   Lab Test 11/05/18  1600   WBC 6.4   RBC 4.80   HGB 12.9   HCT 38.8             Passed - Heart Rate on file within past 12 months    Pulse Readings from Last 3 Encounters:   12/27/18 70   11/05/18 94   08/06/18 86          Passed - A1c or Glucose on file in past 12 months    Recent Labs   Lab Test 11/05/18  1600   GLC 89   Please review patients last 3 weights. If a weight gain of >10 lbs exists, you may refill the prescription once after instructing the patient to schedule an appointment within the next 30 days.    Wt Readings from Last 3 Encounters:   12/27/18 71.2 kg (157 lb)   11/05/18 68 kg (150 lb)   08/06/18 77.3 kg (170 lb 8 oz)          Passed - Medication is active on med list       Passed - Patient is not pregnant       Passed - No positve pregnancy test on file in past 12 months       Passed - Recent (6 mo) or future (30 days) visit within the authorizing provider's specialty    Patient had office visit in the last 6 months or has a visit in the next 30 days with authorizing provider or within the authorizing provider's specialty.  See \"Patient Info\" tab in inbasket, or " "\"Choose Columns\" in Meds & Orders section of the refill encounter.            FLUoxetine (PROZAC) 20 MG capsule  Rx was sent 12/27/2018 for 30 tabs and 1 refills. Should have 1 refill remaining  Pharmacy notified via E-prescribe refusal.    OLANZapine (ZYPREXA) 10 MG tablet  Rx was sent 12/27/2018 for 30 tabs and 1 refills. Should have 1 refill remaining  Pharmacy notified via E-prescribe refusal.  Gill Bar RN, BSN     lamoTRIgine (LAMICTAL) 25 MG tablet  Routing refill request to provider for review/approval because:  Provider to review per LOV notes 12/27/2018, \"Start Lamictal for mood stabilization.  Increase in 2 weeks if no side effects.\"    When should patient be following up?    Gill Bar RN, BSN     "

## 2019-01-28 DIAGNOSIS — F20.9 SCHIZOPHRENIA, UNSPECIFIED TYPE (H): ICD-10-CM

## 2019-01-28 DIAGNOSIS — F41.9 ANXIETY: ICD-10-CM

## 2019-01-28 NOTE — TELEPHONE ENCOUNTER
Pt is overdue for follow up.  Please schedule follow up visit.  Once scheduled, can refill enough medication to get patient to the visit. E-visit or phone visit would be acceptable if pt is doing OK.    If pt has established with psychiatry, should request from them.

## 2019-01-29 RX ORDER — LAMOTRIGINE 25 MG/1
TABLET ORAL
Qty: 42 TABLET | Refills: 0 | OUTPATIENT
Start: 2019-01-29

## 2019-01-29 NOTE — TELEPHONE ENCOUNTER
Lamictal    Routing refill request to provider for review/approval because:  Update sig  LOV 12/27/2018 states return in 4 weeks    Odessa Calix RN, BSN

## 2019-01-30 RX ORDER — LAMOTRIGINE 100 MG/1
100 TABLET ORAL DAILY
Qty: 14 TABLET | Refills: 0 | Status: SHIPPED | OUTPATIENT
Start: 2019-01-30 | End: 2020-02-13

## 2019-01-30 NOTE — TELEPHONE ENCOUNTER
Patient returned call and was given message below, patient states she only needs a refill because she was referred by Dr Umana to see psychiatrist and they rescheduled her appointment to 02/01/2019. Patient was under the understanding that she was to she the psychiatrist for medication management but they rescheduled her appointment.    Thank you Angela

## 2019-01-30 NOTE — TELEPHONE ENCOUNTER
Left message for pt to return call, when call is returned give information below and help schedule appt. Once scheduled, can refill enough medication to get patient to the visit. E-visit or phone visit would be acceptable if pt is doing OK.    Violet Fulton CMA ([facility])

## 2019-01-31 DIAGNOSIS — F20.9 SCHIZOPHRENIA, UNSPECIFIED TYPE (H): ICD-10-CM

## 2019-01-31 DIAGNOSIS — F41.9 ANXIETY: ICD-10-CM

## 2019-01-31 RX ORDER — LAMOTRIGINE 25 MG/1
TABLET ORAL
Qty: 42 TABLET | Refills: 0 | OUTPATIENT
Start: 2019-01-31

## 2019-01-31 NOTE — TELEPHONE ENCOUNTER
"Requested Prescriptions   Pending Prescriptions Disp Refills     lamoTRIgine (LAMICTAL) 25 MG tablet [Pharmacy Med Name: LAMOTRIGINE 25MG TABS] 42 tablet 0     Sig: TAKE ONE TABLET BY MOUTH DAILY FOR 14 DAYS, THEN TAKE TWO TABLETS BY MOUTH DAILY FOR 14 DAYS    Anti-Seizure Meds Protocol  Failed - 1/31/2019 12:27 PM       Failed - Review Authorizing provider's last note.     Refer to last progress notes: confirm request is for original authorizing provider (cannot be through other providers).         Passed - Recent (12 mo) or future (30 days) visit within the authorizing provider's specialty    Patient had office visit in the last 12 months or has a visit in the next 30 days with authorizing provider or within the authorizing provider's specialty.  See \"Patient Info\" tab in inbasket, or \"Choose Columns\" in Meds & Orders section of the refill encounter.             Passed - Normal CBC on file in past 26 months    Recent Labs   Lab Test 11/05/18  1600   WBC 6.4   RBC 4.80   HGB 12.9   HCT 38.8                   Passed - Normal ALT or AST on file in past 26 months    Recent Labs   Lab Test 11/05/18  1600   ALT 27     Recent Labs   Lab Test 11/05/18  1600   AST 17            Passed - Normal platelet count on file in past 26 months    Recent Labs   Lab Test 11/05/18  1600                 Passed - Medication is active on med list       Passed - No active pregnancy on record       Passed - No positive pregnancy test in last 12 months        Routing refill request to provider for review/approval because:  Drug not on the [facility] refill protocol   Jenny Wells RN          "

## 2019-03-12 ENCOUNTER — HOSPITAL ENCOUNTER (EMERGENCY)
Facility: CLINIC | Age: 34
Discharge: HOME | End: 2019-03-12
Attending: EMERGENCY MEDICINE | Admitting: EMERGENCY MEDICINE
Payer: MEDICARE

## 2019-03-12 ENCOUNTER — APPOINTMENT (OUTPATIENT)
Dept: CT IMAGING | Facility: CLINIC | Age: 34
End: 2019-03-12
Attending: EMERGENCY MEDICINE
Payer: MEDICARE

## 2019-03-12 ENCOUNTER — APPOINTMENT (OUTPATIENT)
Dept: MRI IMAGING | Facility: CLINIC | Age: 34
End: 2019-03-12
Attending: EMERGENCY MEDICINE
Payer: MEDICARE

## 2019-03-12 VITALS
WEIGHT: 172.4 LBS | HEART RATE: 70 BPM | DIASTOLIC BLOOD PRESSURE: 79 MMHG | HEIGHT: 68 IN | RESPIRATION RATE: 18 BRPM | TEMPERATURE: 98.7 F | OXYGEN SATURATION: 99 % | SYSTOLIC BLOOD PRESSURE: 121 MMHG | BODY MASS INDEX: 26.13 KG/M2

## 2019-03-12 DIAGNOSIS — C80.1 CANCER (H): ICD-10-CM

## 2019-03-12 DIAGNOSIS — G43.109 OCULAR MIGRAINE: ICD-10-CM

## 2019-03-12 LAB
ANION GAP SERPL CALCULATED.3IONS-SCNC: 7 MMOL/L (ref 3–14)
APTT PPP: 29 SEC (ref 22–37)
BASOPHILS # BLD AUTO: 0 10E9/L (ref 0–0.2)
BASOPHILS NFR BLD AUTO: 0.3 %
BUN SERPL-MCNC: 14 MG/DL (ref 7–30)
CALCIUM SERPL-MCNC: 8.7 MG/DL (ref 8.5–10.1)
CHLORIDE SERPL-SCNC: 110 MMOL/L (ref 94–109)
CO2 SERPL-SCNC: 24 MMOL/L (ref 20–32)
CREAT SERPL-MCNC: 0.58 MG/DL (ref 0.52–1.04)
DIFFERENTIAL METHOD BLD: ABNORMAL
EOSINOPHIL NFR BLD AUTO: 0.7 %
ERYTHROCYTE [DISTWIDTH] IN BLOOD BY AUTOMATED COUNT: 15.2 % (ref 10–15)
GFR SERPL CREATININE-BSD FRML MDRD: >90 ML/MIN/{1.73_M2}
GLUCOSE BLDC GLUCOMTR-MCNC: 98 MG/DL (ref 70–99)
GLUCOSE SERPL-MCNC: 91 MG/DL (ref 70–99)
HCT VFR BLD AUTO: 39.9 % (ref 35–47)
HGB BLD-MCNC: 13 G/DL (ref 11.7–15.7)
IMM GRANULOCYTES # BLD: 0 10E9/L (ref 0–0.4)
IMM GRANULOCYTES NFR BLD: 0.5 %
INR PPP: 0.91 (ref 0.86–1.14)
LYMPHOCYTES # BLD AUTO: 1.9 10E9/L (ref 0.8–5.3)
LYMPHOCYTES NFR BLD AUTO: 31.1 %
MCH RBC QN AUTO: 26.7 PG (ref 26.5–33)
MCHC RBC AUTO-ENTMCNC: 32.6 G/DL (ref 31.5–36.5)
MCV RBC AUTO: 82 FL (ref 78–100)
MONOCYTES # BLD AUTO: 0.4 10E9/L (ref 0–1.3)
MONOCYTES NFR BLD AUTO: 6.9 %
NEUTROPHILS # BLD AUTO: 3.6 10E9/L (ref 1.6–8.3)
NEUTROPHILS NFR BLD AUTO: 60.5 %
NRBC # BLD AUTO: 0 10*3/UL
NRBC BLD AUTO-RTO: 0 /100
PLATELET # BLD AUTO: 250 10E9/L (ref 150–450)
POTASSIUM SERPL-SCNC: 3.9 MMOL/L (ref 3.4–5.3)
RBC # BLD AUTO: 4.87 10E12/L (ref 3.8–5.2)
SODIUM SERPL-SCNC: 141 MMOL/L (ref 133–144)
TROPONIN I SERPL-MCNC: <0.015 UG/L (ref 0–0.04)
WBC # BLD AUTO: 5.9 10E9/L (ref 4–11)

## 2019-03-12 PROCEDURE — 99285 EMERGENCY DEPT VISIT HI MDM: CPT | Mod: 25 | Performed by: EMERGENCY MEDICINE

## 2019-03-12 PROCEDURE — 99291 CRITICAL CARE FIRST HOUR: CPT | Mod: 25 | Performed by: EMERGENCY MEDICINE

## 2019-03-12 PROCEDURE — 70553 MRI BRAIN STEM W/O & W/DYE: CPT

## 2019-03-12 PROCEDURE — 25000125 ZZHC RX 250: Performed by: RADIOLOGY

## 2019-03-12 PROCEDURE — 93005 ELECTROCARDIOGRAM TRACING: CPT | Performed by: EMERGENCY MEDICINE

## 2019-03-12 PROCEDURE — 96375 TX/PRO/DX INJ NEW DRUG ADDON: CPT | Performed by: EMERGENCY MEDICINE

## 2019-03-12 PROCEDURE — 93010 ELECTROCARDIOGRAM REPORT: CPT | Mod: Z6 | Performed by: EMERGENCY MEDICINE

## 2019-03-12 PROCEDURE — 85730 THROMBOPLASTIN TIME PARTIAL: CPT | Performed by: EMERGENCY MEDICINE

## 2019-03-12 PROCEDURE — 85025 COMPLETE CBC W/AUTO DIFF WBC: CPT | Performed by: EMERGENCY MEDICINE

## 2019-03-12 PROCEDURE — 85610 PROTHROMBIN TIME: CPT | Performed by: EMERGENCY MEDICINE

## 2019-03-12 PROCEDURE — 96361 HYDRATE IV INFUSION ADD-ON: CPT | Performed by: EMERGENCY MEDICINE

## 2019-03-12 PROCEDURE — 84484 ASSAY OF TROPONIN QUANT: CPT | Performed by: EMERGENCY MEDICINE

## 2019-03-12 PROCEDURE — A9585 GADOBUTROL INJECTION: HCPCS | Performed by: RADIOLOGY

## 2019-03-12 PROCEDURE — 96374 THER/PROPH/DIAG INJ IV PUSH: CPT | Mod: 59 | Performed by: EMERGENCY MEDICINE

## 2019-03-12 PROCEDURE — 25500064 ZZH RX 255 OP 636: Performed by: RADIOLOGY

## 2019-03-12 PROCEDURE — 70498 CT ANGIOGRAPHY NECK: CPT

## 2019-03-12 PROCEDURE — 25000128 H RX IP 250 OP 636: Performed by: EMERGENCY MEDICINE

## 2019-03-12 PROCEDURE — 25000128 H RX IP 250 OP 636: Performed by: RADIOLOGY

## 2019-03-12 PROCEDURE — 80048 BASIC METABOLIC PNL TOTAL CA: CPT | Performed by: EMERGENCY MEDICINE

## 2019-03-12 PROCEDURE — 00000146 ZZHCL STATISTIC GLUCOSE BY METER IP

## 2019-03-12 PROCEDURE — 70450 CT HEAD/BRAIN W/O DYE: CPT

## 2019-03-12 RX ORDER — LORAZEPAM 2 MG/ML
1 INJECTION INTRAMUSCULAR ONCE
Status: DISCONTINUED | OUTPATIENT
Start: 2019-03-12 | End: 2019-03-12 | Stop reason: HOSPADM

## 2019-03-12 RX ORDER — DIPHENHYDRAMINE HYDROCHLORIDE 50 MG/ML
25 INJECTION INTRAMUSCULAR; INTRAVENOUS ONCE
Status: COMPLETED | OUTPATIENT
Start: 2019-03-12 | End: 2019-03-12

## 2019-03-12 RX ORDER — KETOROLAC TROMETHAMINE 15 MG/ML
15 INJECTION, SOLUTION INTRAMUSCULAR; INTRAVENOUS ONCE
Status: COMPLETED | OUTPATIENT
Start: 2019-03-12 | End: 2019-03-12

## 2019-03-12 RX ORDER — IOPAMIDOL 755 MG/ML
500 INJECTION, SOLUTION INTRAVASCULAR ONCE
Status: COMPLETED | OUTPATIENT
Start: 2019-03-12 | End: 2019-03-12

## 2019-03-12 RX ORDER — GADOBUTROL 604.72 MG/ML
7.5 INJECTION INTRAVENOUS ONCE
Status: COMPLETED | OUTPATIENT
Start: 2019-03-12 | End: 2019-03-12

## 2019-03-12 RX ADMIN — SODIUM CHLORIDE 500 ML: 9 INJECTION, SOLUTION INTRAVENOUS at 11:37

## 2019-03-12 RX ADMIN — SODIUM CHLORIDE 75 ML: 9 INJECTION, SOLUTION INTRAVENOUS at 11:14

## 2019-03-12 RX ADMIN — KETOROLAC TROMETHAMINE 15 MG: 15 INJECTION, SOLUTION INTRAMUSCULAR; INTRAVENOUS at 14:18

## 2019-03-12 RX ADMIN — GADOBUTROL 7.5 ML: 604.72 INJECTION INTRAVENOUS at 13:22

## 2019-03-12 RX ADMIN — IOPAMIDOL 80 ML: 755 INJECTION, SOLUTION INTRAVENOUS at 11:13

## 2019-03-12 RX ADMIN — PROCHLORPERAZINE EDISYLATE 10 MG: 5 INJECTION INTRAMUSCULAR; INTRAVENOUS at 14:18

## 2019-03-12 RX ADMIN — DIPHENHYDRAMINE HYDROCHLORIDE 25 MG: 50 INJECTION, SOLUTION INTRAMUSCULAR; INTRAVENOUS at 14:17

## 2019-03-12 ASSESSMENT — MIFFLIN-ST. JEOR: SCORE: 1545.5

## 2019-03-12 NOTE — ED NOTES
Prosper RANKIN [facility] notified we are waiting for results for possible TPA order. Paula Allen RN

## 2019-03-12 NOTE — ED TRIAGE NOTES
Pt arrived with symptoms of blurred vision that started yesterday at 1600. Now today, having drooping eyelid on the right, and some confusion, in that she cannot remember her birthday or her phone number. She has a headache.She does have a history of PE's and her Dad  of a Stroke in his 50's. Brought back to room STAT, and called [facility], and 2 RN's and MD, met pt in room immediately.

## 2019-03-12 NOTE — ED NOTES
"Pt up to BR ind. Pt states she still does not feel \"normal.\" Pt complains of a \"really bad\" HA.   "

## 2019-03-12 NOTE — ED NOTES
Called Miguel Angel, pts , at [phone] per pts request. Miguel Angel states pt is normally confused about dates and phone numbers.

## 2019-03-12 NOTE — ED PROVIDER NOTES
History     Chief Complaint   Patient presents with     Altered Mental Status     HPI  Isaura Gray is a 33 year old female who presents to the ED with right eyelid droop that started at 1000 one hour pta in the ED. Yesterday she started having blurriness without diploplia. Nausea this morning. She has a headache that comes and goes and currently is not present.  She couldn't remember her phone number or her birthday at triage. That has never happened before.  She knows it now. She feels off balance but she was able to drive here. No weakness of her arms or legs.  She had cluster headaches but never a migraine.     Father  of stroke in his 50's.  She has a hx of pulmonary embolism - 3 years ago of uncertain etiology.     Allergies:  Allergies   Allergen Reactions     Ciprofloxacin Hives     Sulfa Drugs Hives     Oxycodone-Acetaminophen Itching       Problem List:    Patient Active Problem List    Diagnosis Date Noted     Cancer (H)      Priority: Medium     Anxiety 2018     Priority: Medium     Normal labor 2018     Priority: Medium     Cough 2018     Priority: Medium     Chronic bilateral low back pain without sciatica 2018     Priority: Medium     Encounter for triage in pregnant patient 2018     Priority: Medium     Schizophrenia (H)      Priority: Medium     reports spontaneous remission during last pregnancy       Vitamin D deficiency 2018     Priority: Medium     Supervision of other high risk pregnancies, unspecified trimester 2018     Priority: Medium     Lactose intolerance in adult 2018     Priority: Medium     PE (pulmonary thromboembolism) (H)      Priority: Medium     Hyperprolactinemia (H)      Priority: Medium     Follicular cancer of thyroid (H)      Priority: Medium        Past Medical History:    Past Medical History:   Diagnosis Date     Cancer (H)      Depressive disorder      Follicular cancer of thyroid (H)      Hyperprolactinemia (H)   "    Mitral valve prolapse      PE (pulmonary thromboembolism) (H)      Pituitary tumor      Schizophrenia (H)      Thyroid disease        Past Surgical History:    Past Surgical History:   Procedure Laterality Date     APPENDECTOMY       ENT SURGERY      Partial thyroidectomy       Family History:    Family History   Problem Relation Age of Onset     No Known Problems Mother      Hypertension Father      Cerebrovascular Disease Father 56        Passed away from double brain stem clot     Mental Illness Father      Asthma Brother      Cancer Maternal Grandfather         lung     No Known Problems Paternal Grandmother      No Known Problems Paternal Grandfather      Autism Spectrum Disorder Son      Kidney Disease Son         \"has a third kidney\"     No Known Problems Daughter        Social History:  Marital Status:  Single [1]  Social History     Tobacco Use     Smoking status: Former Smoker     Packs/day: 0.50     Years: 5.00     Pack years: 2.50     Types: Cigarettes     Last attempt to quit: 2016     Years since quittin.6     Smokeless tobacco: Never Used   Substance Use Topics     Alcohol use: No     Drug use: No        Medications:      clobetasol (TEMOVATE) 0.05 % external ointment   FLUoxetine (PROZAC) 20 MG capsule   lamoTRIgine (LAMICTAL) 100 MG tablet   OLANZapine (ZYPREXA) 10 MG tablet         Review of Systems   All other systems reviewed and are negative.      Physical Exam   BP: (!) 138/96  Pulse: 80  Heart Rate: 73  Temp: 98.7  F (37.1  C)  Resp: 12  Height: 172.7 cm (5' 8\")  Weight: 78 kg (172 lb)(Bed scale)  SpO2: 96 %      Physical Exam   Constitutional: She is oriented to person, place, and time. She appears well-developed and well-nourished. No distress.   HENT:   Head: Normocephalic and atraumatic.   Eyes: Conjunctivae and EOM are normal. Pupils are equal, round, and reactive to light. Right eye exhibits no discharge. Left eye exhibits no discharge. No scleral icterus.   Neck: Normal " range of motion. Neck supple.   Cardiovascular: Normal rate and regular rhythm.   Pulmonary/Chest: Effort normal and breath sounds normal.   Abdominal: There is no tenderness.   Musculoskeletal: Normal range of motion. She exhibits no edema or deformity.   Neurological: She is alert and oriented to person, place, and time. No sensory deficit. Coordination normal.   Right eyelid droop, mild   Skin: Skin is warm and dry. No rash noted. She is not diaphoretic. No erythema. No pallor.       ED Course        Procedures               EKG Interpretation:      Interpreted by Timmy Zavaleta  Time reviewed: 1131  Symptoms at time of EKG: visual change   Rhythm: normal sinus   Rate: normal  Axis: normal  Ectopy: none  Conduction: normal  ST Segments/ T Waves: No ST-T wave changes  Q Waves: none  Comparison to prior: No old EKG available    Clinical Impression: normal EKG            The patient has stroke symptoms:           ED Stroke specific documentation           NIHSS PDF          Protocol PDF     Patient last known well time: yesterday 1600   ED Provider first to bedside at: 1101  CT Results received at: 1115  Patient was not treated with TPA due to the following reason(s):  Mild stroke symptoms ( NIHSS < 4 and not globally aphasic)    National Institutes of Health Stroke Scale (Baseline)  Time Performed: 1101      Score    Level of consciousness: (0)   Alert, keenly responsive    LOC questions: (0)   Answers both questions correctly    LOC commands: (0)   Performs both tasks correctly    Best gaze: (0)   Normal    Visual: (1)   Partial left sided homonymous hemianopia    Facial palsy: (1)   Minor paralysis (flat nasolabial fold, smile asymmetry)    Motor arm (left): (0)   No drift    Motor arm (right): (0)   No drift    Motor leg (left): (0)   No drift    Motor leg (right): (0)   No drift    Limb ataxia: (0)   Absent    Sensory: (0)   Normal- no sensory loss    Best language: (0)   Normal- no aphasia    Dysarthria: (0)    Normal    Extinction and inattention: (0)   No abnormality        Total Score:  2        Stroke Mimics were considered (including migraine headache, seizure disorder, hypoglycemia (or hyperglycemia), head or spinal trauma, CNS infection, Toxin ingestion and shock state (e.g. sepsis) .                         Results for orders placed or performed during the hospital encounter of 03/12/19 (from the past 24 hour(s))   Glucose by meter   Result Value Ref Range    Glucose 98 70 - 99 mg/dL   CBC with platelets differential   Result Value Ref Range    WBC 5.9 4.0 - 11.0 10e9/L    RBC Count 4.87 3.8 - 5.2 10e12/L    Hemoglobin 13.0 11.7 - 15.7 g/dL    Hematocrit 39.9 35.0 - 47.0 %    MCV 82 78 - 100 fl    MCH 26.7 26.5 - 33.0 pg    MCHC 32.6 31.5 - 36.5 g/dL    RDW 15.2 (H) 10.0 - 15.0 %    Platelet Count 250 150 - 450 10e9/L    Diff Method Automated Method     % Neutrophils 60.5 %    % Lymphocytes 31.1 %    % Monocytes 6.9 %    % Eosinophils 0.7 %    % Basophils 0.3 %    % Immature Granulocytes 0.5 %    Nucleated RBCs 0 0 /100    Absolute Neutrophil 3.6 1.6 - 8.3 10e9/L    Absolute Lymphocytes 1.9 0.8 - 5.3 10e9/L    Absolute Monocytes 0.4 0.0 - 1.3 10e9/L    Absolute Basophils 0.0 0.0 - 0.2 10e9/L    Abs Immature Granulocytes 0.0 0 - 0.4 10e9/L    Absolute Nucleated RBC 0.0    Basic metabolic panel   Result Value Ref Range    Sodium 141 133 - 144 mmol/L    Potassium 3.9 3.4 - 5.3 mmol/L    Chloride 110 (H) 94 - 109 mmol/L    Carbon Dioxide 24 20 - 32 mmol/L    Anion Gap 7 3 - 14 mmol/L    Glucose 91 70 - 99 mg/dL    Urea Nitrogen 14 7 - 30 mg/dL    Creatinine 0.58 0.52 - 1.04 mg/dL    GFR Estimate >90 >60 mL/min/[1.73_m2]    GFR Estimate If Black >90 >60 mL/min/[1.73_m2]    Calcium 8.7 8.5 - 10.1 mg/dL   INR   Result Value Ref Range    INR 0.91 0.86 - 1.14   Partial thromboplastin time   Result Value Ref Range    PTT 29 22 - 37 sec   Troponin I   Result Value Ref Range    Troponin I ES <0.015 0.000 - 0.045 ug/L   CT  Head w/o Contrast    Narrative    CT SCAN OF THE HEAD WITHOUT CONTRAST   3/12/2019 11:10 AM     HISTORY:  Focal neuro deficit, less than six hours, stroke suspected.    TECHNIQUE:  Axial images of the head and coronal reformations without  IV contrast material. Radiation dose for this scan was reduced using  automated exposure control, adjustment of the mA and/or kV according  to patient size, or iterative reconstruction technique.    COMPARISON: None.    FINDINGS:  The cerebral hemispheres, brainstem, and cerebellum  demonstrate normal morphology and attenuation. No evidence of acute  ischemia, hemorrhage, mass, mass effect, or hydrocephalus. The  visualized calvarium, skull base, paranasal sinuses, and extracranial  soft tissues are unremarkable.      Impression    IMPRESSION:  No evidence of acute ischemia or hemorrhage.    Findings were discussed by phone between Dr. Felder and Dr. Zavaleta at  11:13 AM on 3/12/2019.    EBONY FELDER MD   CT Head Neck Angio w/o & w Contrast    Narrative    CT ANGIOGRAM OF THE HEAD AND NECK WITH CONTRAST  3/12/2019 11:20 AM     HISTORY: Focal neuro deficit.    TECHNIQUE: CT angiography with an injection of 80 mL, Isovue 370 IV  with scans through the head and neck. Images were transferred to a  separate 3-D workstation where multiplanar reformations and 3-D images  were created. Estimates of carotid stenoses are made relative to the  distal internal carotid artery diameters except as noted. Radiation  dose for this scan was reduced using automated exposure control,  adjustment of the mA and/or kV according to patient size, or iterative  reconstruction technique.    COMPARISON: None.     CT ANGIOGRAM HEAD FINDINGS: The vertebral arteries, basilar artery,  and internal carotid arteries, anterior cerebral arteries, middle  cerebral arteries, and posterior cerebral arteries are patent. There  is fetal origin of the right posterior cerebral artery. Patent  anterior communicating  artery is present. No evidence of aneurysm or  vascular malformation. Dural venous sinuses are unremarkable. Right  transverse sinus is dominant.     CT ANGIOGRAM NECK FINDINGS: A four-vessel aortic arch is present with  left vertebral artery origin directly from the arch. The bilateral  common carotid, internal carotid, external carotid, and vertebral  arteries are patent. No evidence of occlusion, dissection, or  stenosis.     Left thyroid gland is hypoplastic or surgically absent. Bilateral  palatine tonsilliths are present.       Impression    IMPRESSION: Patent arteries in the head and neck.     EBONY JALLOH MD   MR Brain w/o & w Contrast    Narrative    MRI OF THE BRAIN WITHOUT AND WITH CONTRAST March 12, 2019 1:47 PM     HISTORY: Focal neuro deficit less than six hours, stroke suspected.     TECHNIQUE: Multisequence, multiplanar MRI images of the brain were  acquired before and after the administration of IV gadolinium (7.5 mL  Gadavist).    COMPARISON: None.    FINDINGS: The ventricles and basal cisterns are normal in  configuration. There is no midline shift. There are no extra-axial  fluid collections. Gray-white differentiation is well maintained.  There is no evidence for stroke or acute intracranial hemorrhage.     There is vascular enhancement in the medial aspect of the mid left  frontal lobe consistent with a developmental venous anomaly. There is  no abnormal contrast enhancement in the brain or its coverings.    There is no sinusitis or mastoiditis.      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 Otherwise, normal brain MRI. No evidence for  acute intracranial pathology.    KELLEN AYALA MD       Medications   LORazepam (ATIVAN) injection 1 mg (1 mg Intravenous Not Given 3/12/19 1411)   0.9% sodium chloride BOLUS (0 mLs Intravenous Stopped 3/12/19 1240)   iopamidol (ISOVUE-370) solution 500 mL (80 mLs Intravenous Given 3/12/19  1113)   sodium chloride (PF) 0.9% PF flush 3 mL (3 mLs Intravenous Given 3/12/19 1113)   sodium chloride 0.9 % bag 100mL for CT scan flush use (75 mLs Intravenous Given 3/12/19 1114)   gadobutrol (GADAVIST) injection 7.5 mL (7.5 mLs Intravenous Given 3/12/19 1322)   ketorolac (TORADOL) injection 15 mg (15 mg Intravenous Given 3/12/19 1418)   prochlorperazine (COMPAZINE) injection 10 mg (10 mg Intravenous Given 3/12/19 1418)   diphenhydrAMINE (BENADRYL) injection 25 mg (25 mg Intravenous Given 3/12/19 1417)       Assessments & Plan (with Medical Decision Making)  D/w Dr. Zamudio at 1135 am, no tpa,. Doubts it is a cva. Recommends mri to definitively rule out.  That was ordered. No aspirin recommended.  MRI performed and unremarkable.  The patient had vast improvement of her symptoms after being treated for migraine.  She had a headache and visual disturbance.  This was improved and her drooping eyelid went away after treatment with Toradol, Compazine, Benadryl and IV fluids.  She was happy to go home at that time.  The differential diagnosis, treatment options, risks and follow up discussed with a competent patient and/or competent family member who agrees with the plan.       I have reviewed the nursing notes.     I have reviewed the findings, diagnosis, plan and need for follow up with the patient.         Medication List      There are no discharge medications for this visit.         Final diagnoses:   Ocular migraine       3/12/2019   [facility] EMERGENCY DEPARTMENT     Timmy Zavaleta MD  03/12/19 [id number]

## 2019-03-12 NOTE — DISCHARGE INSTRUCTIONS
Your symptoms are most likely related to a migraine headache.  Your CT scan, CT angiogram and MRI were fairly unremarkable/normal.  There is no evidence for stroke or other causes.  Return to the ER if you develop new or worsening symptoms.

## 2019-03-13 ENCOUNTER — OFFICE VISIT (OUTPATIENT)
Dept: FAMILY MEDICINE | Facility: OTHER | Age: 34
End: 2019-03-13
Payer: MEDICARE

## 2019-03-13 VITALS
SYSTOLIC BLOOD PRESSURE: 104 MMHG | BODY MASS INDEX: 26.52 KG/M2 | HEIGHT: 68 IN | OXYGEN SATURATION: 98 % | DIASTOLIC BLOOD PRESSURE: 70 MMHG | TEMPERATURE: 98.2 F | RESPIRATION RATE: 16 BRPM | WEIGHT: 175 LBS | HEART RATE: 70 BPM

## 2019-03-13 DIAGNOSIS — G43.001 MIGRAINE WITHOUT AURA AND WITH STATUS MIGRAINOSUS, NOT INTRACTABLE: Primary | ICD-10-CM

## 2019-03-13 PROBLEM — I05.9 MITRAL VALVE DISORDER: Status: ACTIVE | Noted: 2018-01-19

## 2019-03-13 PROBLEM — Z85.850 HISTORY OF THYROID CANCER: Status: ACTIVE | Noted: 2018-08-07

## 2019-03-13 PROCEDURE — 99213 OFFICE O/P EST LOW 20 MIN: CPT | Performed by: NURSE PRACTITIONER

## 2019-03-13 ASSESSMENT — MIFFLIN-ST. JEOR: SCORE: 1557.29

## 2019-03-13 NOTE — PROGRESS NOTES
SUBJECTIVE:   Isaura Gray is a 33 year old female who presents to clinic today for the following health issues:      HPI     ED/UC Followup:    Facility:  [facility]   Date of visit: 3/14/19  Reason for visit: migraine with dropping eye, concerns of a stroke. No stroke just migraine   Current Status: headache is still there but feeling better    Patient was seen in ED yesterday with stroke like symptoms work up included negative MRI, CT and normal lab workup.     Patient states she is doing better today does not know what triggered her migraine has history of cluster headache but not migraine.   States she had been very busy with young children at home and likely had not been drinking enough water.   Today is having mild pain behind right eye denies visual disturbance. She is concerned as she has h/o pituitary adenoma and is wondering if this has changed. Discussed with patient PCP TAMRA and he states that he had completed previous negative lab workup and did not recommend further imaging.     Problem list and histories reviewed & adjusted, as indicated.  Additional history: as documented      Patient Active Problem List   Diagnosis     Vitamin D deficiency     Supervision of other high risk pregnancies, unspecified trimester     PE (pulmonary thromboembolism) (H)     Hyperprolactinemia (H)     Follicular cancer of thyroid (H)     Lactose intolerance in adult     Schizophrenia (H)     Encounter for triage in pregnant patient     Cough     Chronic bilateral low back pain without sciatica     Normal labor     Anxiety     Cancer (H)     H/O  delivery, currently pregnant     History of pulmonary embolism     History of thyroid cancer     Mitral valve disorder      (normal spontaneous vaginal delivery)     Past Surgical History:   Procedure Laterality Date     APPENDECTOMY       ENT SURGERY      Partial thyroidectomy       Social History     Tobacco Use     Smoking status: Former Smoker     Packs/day: 0.50  "    Years: 5.00     Pack years: 2.50     Types: Cigarettes     Last attempt to quit: 2016     Years since quittin.6     Smokeless tobacco: Never Used   Substance Use Topics     Alcohol use: No     Family History   Problem Relation Age of Onset     No Known Problems Mother      Hypertension Father      Cerebrovascular Disease Father 56        Passed away from double brain stem clot     Mental Illness Father      Asthma Brother      Cancer Maternal Grandfather         lung     No Known Problems Paternal Grandmother      No Known Problems Paternal Grandfather      Autism Spectrum Disorder Son      Kidney Disease Son         \"has a third kidney\"     No Known Problems Daughter          Current Outpatient Medications   Medication Sig Dispense Refill     clobetasol (TEMOVATE) 0.05 % external ointment Apply topically 2 times daily 60 g 1     FLUoxetine (PROZAC) 20 MG capsule Take 1 capsule (20 mg) by mouth daily 30 capsule 1     lamoTRIgine (LAMICTAL) 100 MG tablet Take 1 tablet (100 mg) by mouth daily for 14 days 14 tablet 0     OLANZapine (ZYPREXA) 10 MG tablet Take 1 tablet (10 mg) by mouth At Bedtime 30 tablet 1     Allergies   Allergen Reactions     Ciprofloxacin Hives     Sulfa Drugs Hives     Oxycodone-Acetaminophen Itching     BP Readings from Last 3 Encounters:   19 104/70   19 121/79   18 102/72    Wt Readings from Last 3 Encounters:   19 79.4 kg (175 lb)   19 78.2 kg (172 lb 6.4 oz)   18 71.2 kg (157 lb)                  Labs reviewed in EPIC    ROS:  Constitutional, HEENT, cardiovascular, pulmonary, gi and gu systems are negative, except as otherwise noted.    OBJECTIVE:     /70   Pulse 70   Temp 98.2  F (36.8  C) (Temporal)   Resp 16   Ht 1.727 m (5' 8\")   Wt 79.4 kg (175 lb)   LMP 2019   SpO2 98%   BMI 26.61 kg/m    Body mass index is 26.61 kg/m .  GENERAL: healthy, alert and no distress  NECK: no adenopathy, no asymmetry, masses, or scars and " thyroid normal to palpation  RESP: lungs clear to auscultation - no rales, rhonchi or wheezes  CV: regular rate and rhythm, normal S1 S2, no S3 or S4, no murmur, click or rub, no peripheral edema and peripheral pulses strong  NEURO: Normal strength and tone, mentation intact and speech normal  NEURO: Normal strength and tone, sensory exam grossly normal, mentation intact and cranial nerves 2-12 intact    ASSESSMENT/PLAN:     1. Migraine without aura and with status migrainosus, not intractable  No change in treatment plan at this time will monitor symptoms if reoccur will return to clinic      FLORIDA Guo [facility]

## 2019-05-03 ENCOUNTER — MYC REFILL (OUTPATIENT)
Dept: FAMILY MEDICINE | Facility: OTHER | Age: 34
End: 2019-05-03

## 2019-05-03 DIAGNOSIS — F20.9 SCHIZOPHRENIA, UNSPECIFIED TYPE (H): ICD-10-CM

## 2019-05-03 DIAGNOSIS — F41.9 ANXIETY: ICD-10-CM

## 2019-05-03 NOTE — TELEPHONE ENCOUNTER
Lamotrigine    Routing refill request to provider for review/approval because:  A break in medication    Olanzapine    Routing refill request to provider for review/approval because:  A break in medication  Labs out of date: Lipids    Fluoxetine    Routing refill request to provider for review/approval because:  A break in medication  LOV 3/13/2019    Odessa Calix RN, BSN

## 2019-05-06 RX ORDER — LAMOTRIGINE 100 MG/1
100 TABLET ORAL DAILY
Qty: 14 TABLET | Refills: 0 | Status: CANCELLED | OUTPATIENT
Start: 2019-05-06

## 2019-05-06 RX ORDER — OLANZAPINE 10 MG/1
10 TABLET ORAL AT BEDTIME
Qty: 30 TABLET | Refills: 1 | Status: CANCELLED | OUTPATIENT
Start: 2019-05-06

## 2019-05-06 NOTE — TELEPHONE ENCOUNTER
Called patient and she stated that she did tranfers these to her psychiatrist.     Shiloh Garcia MA

## 2019-07-01 ENCOUNTER — MYC REFILL (OUTPATIENT)
Dept: FAMILY MEDICINE | Facility: OTHER | Age: 34
End: 2019-07-01

## 2019-07-01 DIAGNOSIS — F41.9 ANXIETY: ICD-10-CM

## 2019-07-01 DIAGNOSIS — F20.9 SCHIZOPHRENIA, UNSPECIFIED TYPE (H): ICD-10-CM

## 2019-07-03 ENCOUNTER — ANCILLARY PROCEDURE (OUTPATIENT)
Dept: GENERAL RADIOLOGY | Facility: OTHER | Age: 34
End: 2019-07-03
Attending: NURSE PRACTITIONER
Payer: MEDICARE

## 2019-07-03 ENCOUNTER — OFFICE VISIT (OUTPATIENT)
Dept: FAMILY MEDICINE | Facility: OTHER | Age: 34
End: 2019-07-03
Payer: MEDICARE

## 2019-07-03 VITALS
WEIGHT: 174 LBS | RESPIRATION RATE: 16 BRPM | SYSTOLIC BLOOD PRESSURE: 118 MMHG | HEIGHT: 68 IN | BODY MASS INDEX: 26.37 KG/M2 | HEART RATE: 98 BPM | TEMPERATURE: 101 F | OXYGEN SATURATION: 97 % | DIASTOLIC BLOOD PRESSURE: 70 MMHG

## 2019-07-03 DIAGNOSIS — R50.9 FEVER, UNSPECIFIED FEVER CAUSE: ICD-10-CM

## 2019-07-03 DIAGNOSIS — R11.2 NAUSEA AND VOMITING, INTRACTABILITY OF VOMITING NOT SPECIFIED, UNSPECIFIED VOMITING TYPE: Primary | ICD-10-CM

## 2019-07-03 DIAGNOSIS — J01.10 ACUTE NON-RECURRENT FRONTAL SINUSITIS: ICD-10-CM

## 2019-07-03 DIAGNOSIS — K59.00 CONSTIPATION, UNSPECIFIED CONSTIPATION TYPE: ICD-10-CM

## 2019-07-03 LAB
BASOPHILS # BLD AUTO: 0 10E9/L (ref 0–0.2)
BASOPHILS NFR BLD AUTO: 0.3 %
DIFFERENTIAL METHOD BLD: NORMAL
EOSINOPHIL # BLD AUTO: 0 10E9/L (ref 0–0.7)
EOSINOPHIL NFR BLD AUTO: 0.3 %
ERYTHROCYTE [DISTWIDTH] IN BLOOD BY AUTOMATED COUNT: 14.4 % (ref 10–15)
HCT VFR BLD AUTO: 43.1 % (ref 35–47)
HGB BLD-MCNC: 14.8 G/DL (ref 11.7–15.7)
LYMPHOCYTES # BLD AUTO: 1.2 10E9/L (ref 0.8–5.3)
LYMPHOCYTES NFR BLD AUTO: 16.6 %
MCH RBC QN AUTO: 29.8 PG (ref 26.5–33)
MCHC RBC AUTO-ENTMCNC: 34.3 G/DL (ref 31.5–36.5)
MCV RBC AUTO: 87 FL (ref 78–100)
MONOCYTES # BLD AUTO: 0.5 10E9/L (ref 0–1.3)
MONOCYTES NFR BLD AUTO: 7.2 %
NEUTROPHILS # BLD AUTO: 5.6 10E9/L (ref 1.6–8.3)
NEUTROPHILS NFR BLD AUTO: 75.6 %
PLATELET # BLD AUTO: 229 10E9/L (ref 150–450)
RBC # BLD AUTO: 4.96 10E12/L (ref 3.8–5.2)
WBC # BLD AUTO: 7.4 10E9/L (ref 4–11)

## 2019-07-03 PROCEDURE — 99213 OFFICE O/P EST LOW 20 MIN: CPT | Performed by: NURSE PRACTITIONER

## 2019-07-03 PROCEDURE — 74019 RADEX ABDOMEN 2 VIEWS: CPT | Mod: FY

## 2019-07-03 PROCEDURE — 85025 COMPLETE CBC W/AUTO DIFF WBC: CPT | Performed by: NURSE PRACTITIONER

## 2019-07-03 PROCEDURE — 36415 COLL VENOUS BLD VENIPUNCTURE: CPT | Performed by: NURSE PRACTITIONER

## 2019-07-03 RX ORDER — TRAZODONE HYDROCHLORIDE 100 MG/1
100 TABLET ORAL
COMMUNITY
End: 2020-05-20

## 2019-07-03 RX ORDER — ONDANSETRON 8 MG/1
8 TABLET, FILM COATED ORAL EVERY 8 HOURS PRN
Qty: 10 TABLET | Refills: 0 | Status: SHIPPED | OUTPATIENT
Start: 2019-07-03 | End: 2020-01-27

## 2019-07-03 RX ORDER — AMOXICILLIN AND CLAVULANATE POTASSIUM 500; 125 MG/1; MG/1
1 TABLET, FILM COATED ORAL 2 TIMES DAILY
Qty: 20 TABLET | Refills: 0 | Status: SHIPPED | OUTPATIENT
Start: 2019-07-03 | End: 2019-07-24

## 2019-07-03 ASSESSMENT — MIFFLIN-ST. JEOR: SCORE: 1552.76

## 2019-07-03 NOTE — RESULT ENCOUNTER NOTE
Please advise Isaura Gray,  1985, that her lab results were normal complete blood count and xray is indicating moderate amount of stool.   [phone] (home)   Thank you  Hoa Retana CNP

## 2019-07-03 NOTE — PATIENT INSTRUCTIONS
I have prescribed an antibiotic for you today. You will take 2 tablet daily with food for 10 days. Please take a probiotic or yogurt while on this medication as this will help protect the good bacteria in your colon. Drink plenty of fluids. Use saline sinus spray in your sinuses as directed to help with the nasal congestion.I also recommend a nightly humidifier if you have one available.    Zofran for nausea continue to sip on water and bland diet    I will call you with your xray and lab results and any further treatment as indicated    If symptoms are not improving with treatment plan please return to clinic for further evaluation.

## 2019-07-03 NOTE — TELEPHONE ENCOUNTER
Prozacamelia zyprexa, lamictal    Routing refill request to provider for review/approval because:  A break in medication    Mira Li RN on 7/3/2019 at 7:48 AM

## 2019-07-03 NOTE — TELEPHONE ENCOUNTER
Unable to reach pt as only number (home and cell) is no longer in service. Will send [employer] message.    Violet Fulton CMA ([facility])

## 2019-07-05 ENCOUNTER — MYC MEDICAL ADVICE (OUTPATIENT)
Dept: FAMILY MEDICINE | Facility: OTHER | Age: 34
End: 2019-07-05

## 2019-07-05 ENCOUNTER — TELEPHONE (OUTPATIENT)
Dept: FAMILY MEDICINE | Facility: OTHER | Age: 34
End: 2019-07-05

## 2019-07-05 NOTE — TELEPHONE ENCOUNTER
Notes recorded by Noelle Irwin CMA on 2019 at 8:47 AM CDT  Left message for pt to call back.  Please give pt information below.     Noelle Irwin MA    ------    Notes recorded by Hoa Retana APRN CNP on 7/3/2019 at 4:06 PM CDT  Please advise Isaura Gray,  1985, that her lab results were normal complete blood count and xray is indicating moderate amount of stool.   [phone] (home)   Thank you  Hoa Retana CNP

## 2019-07-05 NOTE — LETTER
[facility]  [id number] [place] [id number] [phone]          July 9, 2019    Isaura Gray                                                                                                                     [id number] [address] [place] [id number]            Dear Isaura,    We have tried to contact you by phone but have been unable to connect with you. We wanted to let you know that your lab results were normal and your xray indicated a moderate amount of stool.     If you have any questions please call us at [phone].    Sincerely,     Your [facility] Team

## 2019-07-09 RX ORDER — LAMOTRIGINE 100 MG/1
100 TABLET ORAL DAILY
Qty: 14 TABLET | Refills: 0 | Status: CANCELLED | OUTPATIENT
Start: 2019-07-09

## 2019-07-09 RX ORDER — OLANZAPINE 10 MG/1
10 TABLET ORAL AT BEDTIME
Qty: 30 TABLET | Refills: 1 | Status: CANCELLED | OUTPATIENT
Start: 2019-07-09

## 2019-07-24 ENCOUNTER — OFFICE VISIT (OUTPATIENT)
Dept: FAMILY MEDICINE | Facility: OTHER | Age: 34
End: 2019-07-24
Payer: MEDICARE

## 2019-07-24 VITALS
HEIGHT: 68 IN | HEART RATE: 95 BPM | OXYGEN SATURATION: 98 % | WEIGHT: 177.4 LBS | DIASTOLIC BLOOD PRESSURE: 70 MMHG | RESPIRATION RATE: 16 BRPM | TEMPERATURE: 98 F | SYSTOLIC BLOOD PRESSURE: 104 MMHG | BODY MASS INDEX: 26.89 KG/M2

## 2019-07-24 DIAGNOSIS — R19.7 DIARRHEA, UNSPECIFIED TYPE: Primary | ICD-10-CM

## 2019-07-24 DIAGNOSIS — J32.0 MAXILLARY SINUSITIS, UNSPECIFIED CHRONICITY: ICD-10-CM

## 2019-07-24 LAB
BASOPHILS # BLD AUTO: 0 10E9/L (ref 0–0.2)
BASOPHILS NFR BLD AUTO: 0.3 %
DIFFERENTIAL METHOD BLD: NORMAL
EOSINOPHIL # BLD AUTO: 0 10E9/L (ref 0–0.7)
EOSINOPHIL NFR BLD AUTO: 0.4 %
LYMPHOCYTES # BLD AUTO: 1.4 10E9/L (ref 0.8–5.3)
LYMPHOCYTES NFR BLD AUTO: 21.4 %
MONOCYTES # BLD AUTO: 0.5 10E9/L (ref 0–1.3)
MONOCYTES NFR BLD AUTO: 7.3 %
NEUTROPHILS # BLD AUTO: 4.7 10E9/L (ref 1.6–8.3)
NEUTROPHILS NFR BLD AUTO: 70.6 %
WBC # BLD AUTO: 6.7 10E9/L (ref 4–11)

## 2019-07-24 PROCEDURE — 36415 COLL VENOUS BLD VENIPUNCTURE: CPT | Performed by: PHYSICIAN ASSISTANT

## 2019-07-24 PROCEDURE — 85004 AUTOMATED DIFF WBC COUNT: CPT | Performed by: PHYSICIAN ASSISTANT

## 2019-07-24 PROCEDURE — 85048 AUTOMATED LEUKOCYTE COUNT: CPT | Performed by: PHYSICIAN ASSISTANT

## 2019-07-24 PROCEDURE — 99214 OFFICE O/P EST MOD 30 MIN: CPT | Performed by: PHYSICIAN ASSISTANT

## 2019-07-24 RX ORDER — PREDNISONE 20 MG/1
20 TABLET ORAL DAILY
Qty: 5 TABLET | Refills: 0 | Status: SHIPPED | OUTPATIENT
Start: 2019-07-24 | End: 2020-01-27

## 2019-07-24 ASSESSMENT — MIFFLIN-ST. JEOR: SCORE: 1568.18

## 2019-07-24 ASSESSMENT — PAIN SCALES - GENERAL: PAINLEVEL: NO PAIN (0)

## 2019-07-24 NOTE — PROGRESS NOTES
"Subjective     Isaura Gray is a 33 year old female who presents to clinic today for the following health issues:    HPI   Acute Illness   Acute illness concerns: nausea, vomiting, diarrhea, stuffed nose  Onset: 3 days    Fever: YES- 102 yesterday, no fever today    Chills/Sweats: YES- both    Headache (location?): YES    Sinus Pressure:YES, augmentin seemed to help a bit but symptoms increased 3 days into the prescription.  She did complete all 10 days    Conjunctivitis:  no    Ear Pain: YES- feels itchy    Rhinorrhea: YES clear to dark yellow-green    Congestion: YES, tried Flonase 3 times anything sprayed in her nose increases the pressure she does not wish to continue    Sore Throat: no     Cough: no    Wheeze: no    Decreased Appetite: YES    Nausea: YES    Vomiting: YES, vomited before coming she vomits mucus does not vomit stomach contents    Diarrhea:  YES, she has had diarrhea for 3 days, 4-5 times each day thankfully there is no blood in her stool    Dysuria/Freq.: no    Fatigue/Achiness: YES    Sick/Strep Exposure: no     Therapies Tried and outcome: amoxicillin-augmentin , tylenol, ibuprofen  Humidifier, just used flonase but caused pain so she stopped          BP Readings from Last 3 Encounters:   07/24/19 104/70   07/03/19 118/70   03/13/19 104/70    Wt Readings from Last 3 Encounters:   07/24/19 80.5 kg (177 lb 6.4 oz)   07/03/19 78.9 kg (174 lb)   03/13/19 79.4 kg (175 lb)                    Reviewed and updated as needed this visit by Provider         Review of Systems   As documented above       Objective    /70   Pulse 95   Temp 98  F (36.7  C) (Temporal)   Resp 16   Ht 1.727 m (5' 8\")   Wt 80.5 kg (177 lb 6.4 oz)   SpO2 98%   BMI 26.97 kg/m    Body mass index is 26.97 kg/m .  Physical Exam   GENERAL: healthy, alert and no distress  EYES: Eyes grossly normal to inspection  HENT: normal cephalic/atraumatic, ear canals and TM's normal, nose and mouth without ulcers or lesions, nasal " mucosa edematous , oropharynx clear, oral mucous membranes moist and sinuses: not tender in fact it improves her pressure when palpating the frontal and maxillary sinuses  NECK: no adenopathy, no asymmetry, masses, or scars and thyroid normal to palpation  RESP: lungs clear to auscultation - no rales, rhonchi or wheezes  CV: regular rate and rhythm, normal S1 S2, no S3 or S4, no murmur, click or rub, no peripheral edema and peripheral pulses strong  PSYCH: mentation appears normal, affect normal/bright    Diagnostic Test Results:  Labs reviewed in Epic  Results for orders placed or performed in visit on 07/24/19 (from the past 24 hour(s))   WBC with Diff   Result Value Ref Range    WBC 6.7 4.0 - 11.0 10e9/L    % Neutrophils 70.6 %    % Lymphocytes 21.4 %    % Monocytes 7.3 %    % Eosinophils 0.4 %    % Basophils 0.3 %    Absolute Neutrophil 4.7 1.6 - 8.3 10e9/L    Absolute Lymphocytes 1.4 0.8 - 5.3 10e9/L    Absolute Monocytes 0.5 0.0 - 1.3 10e9/L    Absolute Eosinophils 0.0 0.0 - 0.7 10e9/L    Absolute Basophils 0.0 0.0 - 0.2 10e9/L    Diff Method Automated Method            Assessment & Plan     1. Diarrhea, unspecified type  Concerned with C. difficile since she has recently been on Augmentin,  - Clostridium difficile Toxin B PCR; Future  - WBC with Diff    2. Maxillary sinusitis, unspecified chronicity  We will try prednisone, I did warn her of increase in mental health symptoms especially anxiety and irritability, increased appetite and insomnia, I am hopeful that the prednisone will relieve the symptoms and help to finish resolving the infection without potentially increasing diarrhea.  If C. difficile returns negative and prednisone is not effective we could consider a another antibiotic  - predniSONE (DELTASONE) 20 MG tablet; Take 1 tablet (20 mg) by mouth daily  Dispense: 5 tablet; Refill: 0  Patient is agreeable to our plan      BMI:   Estimated body mass index is 26.97 kg/m  as calculated from the  "following:    Height as of this encounter: 1.727 m (5' 8\").    Weight as of this encounter: 80.5 kg (177 lb 6.4 oz).   Weight management plan: Patient was referred to their PCP to discuss a diet and exercise plan.        This chart documentation was completed in part with Dragon voice recognition software.  Documentation is reviewed after completion, however, some words and grammatical errors may remain.  Taylor Stark PA-C      No follow-ups on file.    Taylor Stark PA-C  [facility]    "

## 2019-08-06 ENCOUNTER — TRANSFERRED RECORDS (OUTPATIENT)
Dept: HEALTH INFORMATION MANAGEMENT | Facility: CLINIC | Age: 34
End: 2019-08-06

## 2019-08-30 ENCOUNTER — OFFICE VISIT (OUTPATIENT)
Dept: FAMILY MEDICINE | Facility: OTHER | Age: 34
End: 2019-08-30
Payer: MEDICARE

## 2019-08-30 VITALS
WEIGHT: 179.5 LBS | BODY MASS INDEX: 27.29 KG/M2 | DIASTOLIC BLOOD PRESSURE: 70 MMHG | OXYGEN SATURATION: 97 % | TEMPERATURE: 98.8 F | SYSTOLIC BLOOD PRESSURE: 122 MMHG | HEART RATE: 90 BPM | RESPIRATION RATE: 16 BRPM

## 2019-08-30 DIAGNOSIS — R40.0 HAS DAYTIME DROWSINESS: Primary | ICD-10-CM

## 2019-08-30 DIAGNOSIS — R06.83 SNORING: ICD-10-CM

## 2019-08-30 DIAGNOSIS — R06.81 APNEA: ICD-10-CM

## 2019-08-30 DIAGNOSIS — F51.3 SLEEP WALKINGS: ICD-10-CM

## 2019-08-30 PROCEDURE — 99213 OFFICE O/P EST LOW 20 MIN: CPT | Performed by: NURSE PRACTITIONER

## 2019-08-30 RX ORDER — BUSPIRONE HYDROCHLORIDE 10 MG/1
TABLET ORAL
COMMUNITY
Start: 2019-08-06 | End: 2020-02-13

## 2019-08-30 NOTE — PROGRESS NOTES
Subjective     Isaura Gray is a 33 year old female who presents to clinic today for the following health issues:    HPI   Concern - Possible Sleep Apnea  Onset: a few weeks    Description:   Pt is lately having some chronic sinus congestion and her boyfriend is concerned about sleep apnea as she is snoring more and stops breathing multiple times a night. Pt is feeling exhausted, not well rested at all.    Therapies Tried and outcome: trazedone for sleep    She was recently treated for sinus infection. She states she feels sinus fullness most of the time she denies headaches from this symptoms have been going on for about 3 years ago noticed symptoms started when she moved to [place].   She states other symptoms include; itchy watery eyes, congestion, and sinus drainage. She has history of asthma as a child she does not have treatment for this.   She lives in home with two dogs long and medium length hair. The home is new has carpet in upstairs only.     She has tried otc for sinus; zyrtec, Claritin, and Flonase with out help.   She states she takes trazodone 100 mg at night for sleep. She states she sleep walks on regular basis this started about two weeks ago same time snoring started and apnea reported by s.o    Patient Active Problem List   Diagnosis     Vitamin D deficiency     Supervision of other high risk pregnancies, unspecified trimester     PE (pulmonary thromboembolism) (H)     Hyperprolactinemia (H)     Follicular cancer of thyroid (H)     Lactose intolerance in adult     Schizophrenia (H)     Encounter for triage in pregnant patient     Cough     Chronic bilateral low back pain without sciatica     Normal labor     Anxiety     Cancer (H)     H/O  delivery, currently pregnant     History of pulmonary embolism     History of thyroid cancer     Mitral valve disorder      (normal spontaneous vaginal delivery)     Past Surgical History:   Procedure Laterality Date     APPENDECTOMY       ENT  "SURGERY      Partial thyroidectomy       Social History     Tobacco Use     Smoking status: Former Smoker     Packs/day: 0.50     Years: 5.00     Pack years: 2.50     Types: Cigarettes     Last attempt to quit: 7/31/2016     Years since quitting: 3.0     Smokeless tobacco: Never Used   Substance Use Topics     Alcohol use: No     Family History   Problem Relation Age of Onset     No Known Problems Mother      Hypertension Father      Cerebrovascular Disease Father 56        Passed away from double brain stem clot     Mental Illness Father      Asthma Brother      Cancer Maternal Grandfather         lung     No Known Problems Paternal Grandmother      No Known Problems Paternal Grandfather      Autism Spectrum Disorder Son      Kidney Disease Son         \"has a third kidney\"     No Known Problems Daughter          Current Outpatient Medications   Medication Sig Dispense Refill     busPIRone (BUSPAR) 10 MG tablet        clobetasol (TEMOVATE) 0.05 % external ointment Apply topically 2 times daily 60 g 1     FLUoxetine (PROZAC) 20 MG capsule Take 1 capsule (20 mg) by mouth daily 30 capsule 1     OLANZapine (ZYPREXA) 10 MG tablet Take 1 tablet (10 mg) by mouth At Bedtime 30 tablet 1     traZODone (DESYREL) 100 MG tablet Take 100 mg by mouth       lamoTRIgine (LAMICTAL) 100 MG tablet Take 1 tablet (100 mg) by mouth daily for 14 days 14 tablet 0     ondansetron (ZOFRAN) 8 MG tablet Take 1 tablet (8 mg) by mouth every 8 hours as needed for nausea (Patient not taking: Reported on 8/30/2019) 10 tablet 0     predniSONE (DELTASONE) 20 MG tablet Take 1 tablet (20 mg) by mouth daily (Patient not taking: Reported on 8/30/2019) 5 tablet 0     Allergies   Allergen Reactions     Ciprofloxacin Hives     Sulfa Drugs Hives     Oxycodone-Acetaminophen Itching     Recent Labs   Lab Test 03/12/19  1110 11/05/18  1600 06/20/18  0826 06/04/18  0818   ALT  --  27 22 18   CR 0.58 0.73  --  0.67   GFRESTIMATED >90 >90  --  >90   GFRESTBLACK >90 " >90  --  >90   POTASSIUM 3.9 4.1  --  3.7   TSH  --  2.05  --  2.36      BP Readings from Last 3 Encounters:   08/30/19 122/70   07/24/19 104/70   07/03/19 118/70    Wt Readings from Last 3 Encounters:   08/30/19 81.4 kg (179 lb 8 oz)   07/24/19 80.5 kg (177 lb 6.4 oz)   07/03/19 78.9 kg (174 lb)                    Reviewed and updated as needed this visit by Provider         Review of Systems   ROS COMP: Constitutional, HEENT, cardiovascular, pulmonary, GI, , musculoskeletal, neuro, skin, endocrine and psych systems are negative, except as otherwise noted.      Objective    /70   Pulse 90   Temp 98.8  F (37.1  C) (Temporal)   Resp 16   Wt 81.4 kg (179 lb 8 oz)   SpO2 97%   BMI 27.29 kg/m    Body mass index is 27.29 kg/m .  Physical Exam   GENERAL: healthy, alert and no distress  EYES: Eyes grossly normal to inspection, PERRL and conjunctivae and sclerae normal  HENT: normal cephalic/atraumatic, ear canals and TM's normal, nose and mouth without ulcers or lesions, nasal mucosa edematous , oropharynx clear, oral mucous membranes moist and tonsillar erythema  NECK: no adenopathy, no asymmetry, masses, or scars and thyroid normal to palpation  RESP: lungs clear to auscultation - no rales, rhonchi or wheezes  CV: regular rate and rhythm, normal S1 S2, no S3 or S4, no murmur, click or rub, no peripheral edema and peripheral pulses strong  ABDOMEN: soft, nontender, no hepatosplenomegaly, no masses and bowel sounds normal  MS: no gross musculoskeletal defects noted, no edema  SKIN: no suspicious lesions or rashes  NEURO: Normal strength and tone, mentation intact and speech normal  PSYCH: mentation appears normal, affect normal/bright         Assessment & Plan     1. Has daytime drowsiness    - SLEEP EVALUATION & MANAGEMENT REFERRAL - [facility] [phone] (Age 13 and up if over 100 lbs); Future    2. Snoring    - SLEEP EVALUATION & MANAGEMENT REFERRAL - [facility] [place] [phone] (Age 13 and up if over 100 lbs); Future    3. Apnea    - SLEEP EVALUATION & MANAGEMENT REFERRAL - [facility] [place] [phone] (Age 13 and up if over 100 lbs); Future    4. Sleep walkings    - SLEEP EVALUATION & MANAGEMENT REFERRAL - [facility] [place] [phone] (Age 13 and up if over 100 lbs); Future    Discussed sleep hygiene, sleep posture currently sleeps on stomach,   1-4 recommend further evaluation through specialty if not improving in 3 mos return to clinic    If cleared through ENT/sleep eval would recommend allergy specialty as well due to history of asthma      BMI:   Estimated body mass index is 27.29 kg/m  as calculated from the following:    Height as of 7/24/19: 1.727 m (5' 8\").    Weight as of this encounter: 81.4 kg (179 lb 8 oz).           Patient Instructions   Recommend follow up with sleep specialty would also be beneficial to see ENT specialty due to symptoms of sinusitis.     If not improving or worsening in 3 mos return to clinic             FLORIDA Guo [facility]    "

## 2019-08-30 NOTE — PATIENT INSTRUCTIONS
Recommend follow up with sleep specialty would also be beneficial to see ENT specialty due to symptoms of sinusitis.     If not improving or worsening in 3 mos return to clinic

## 2019-12-17 ENCOUNTER — TELEPHONE (OUTPATIENT)
Dept: FAMILY MEDICINE | Facility: OTHER | Age: 34
End: 2019-12-17

## 2019-12-17 NOTE — TELEPHONE ENCOUNTER
Panel Management Review      Patient has the following on her problem list: None      Composite cancer screening  Chart review shows that this patient is due/due soon for the following Pap Smear  Summary:    Patient is due/failing the following:   PAP and PHYSICAL    Action needed:   Patient needs office visit for Physical and PAP .    Type of outreach:    Sent [employer] message.    Questions for provider review:    None                                                                                                                                    Geri Ahumada CMA       Chart routed to Care Team .

## 2020-01-24 NOTE — PROGRESS NOTES
Subjective     Isaura Gray is a 34 year old female who presents to clinic today for the following health issues:    HPI   Patients has 2 moles in her armpit and 2 on her abdomen that have changes. They are getting bigger and raised. She also states she has brown spots in her inner thighs. She also states that she has scabs on her both legs that have been there since pregnancy.    She has a few lesions in her inner thighs near her pubic area that have been there for a couple of weeks.  One is pink, thinks it looked like ringworm, but now is brown.      Then lesion on abdomen and both axilla.  Some of these are getting caught on clothing, getting irritated or have changed recently.      She is having a hard time holding in her urine. No other UTI symptoms. Patient would also like to talk about her bowel movements. She is only going about 1 time a week. She has tried mirilax for it. When she does have to poop there is a really sharp pain.     She has been urinating herself twice daily.  Has been having difficulty stooling, fecal urgency, and pain with defecation.  Stools are loose.  Miralax makes it worse.      These symptoms just started about a month ago.  Stools have been loose for the last 2 weeks. No recent antibiotics.  No recent travel.  2 kids with diarrhea.  Both also recently had RSV.      Starting feeling lethargic about 2 months ago.  Not sure why.        Current Outpatient Medications   Medication Sig Dispense Refill     busPIRone (BUSPAR) 10 MG tablet        FLUoxetine (PROZAC) 20 MG capsule Take 1 capsule (20 mg) by mouth daily 90 capsule 0     ketoconazole (NIZORAL) 2 % external cream Apply topically daily 60 g 1     lamoTRIgine (LAMICTAL) 100 MG tablet Take 1 tablet (100 mg) by mouth daily for 14 days 14 tablet 0     OLANZapine (ZYPREXA) 10 MG tablet Take 1 tablet (10 mg) by mouth At Bedtime 30 tablet 1     traZODone (DESYREL) 100 MG tablet Take 100 mg by mouth       Allergies   Allergen Reactions  "    Ciprofloxacin Hives     Sulfa Drugs Hives     Oxycodone-Acetaminophen Itching     Recent Labs   Lab Test 01/27/20  1828 03/12/19  1110 11/05/18  1600 06/20/18  0826 06/04/18  0818   ALT PENDING  --  27 22 18   CR PENDING 0.58 0.73  --  0.67   GFRESTIMATED PENDING >90 >90  --  >90   GFRESTBLACK PENDING >90 >90  --  >90   POTASSIUM 3.8 3.9 4.1  --  3.7   TSH  --   --  2.05  --  2.36      BP Readings from Last 3 Encounters:   01/27/20 124/76   08/30/19 122/70   07/24/19 104/70    Wt Readings from Last 3 Encounters:   01/27/20 81.2 kg (179 lb)   08/30/19 81.4 kg (179 lb 8 oz)   07/24/19 80.5 kg (177 lb 6.4 oz)                  Reviewed and updated as needed this visit by Provider         Review of Systems   ROS COMP: Constitutional, HEENT, cardiovascular, pulmonary, gi and gu systems are negative, except as otherwise noted.  See above.  Constantly cold.        Objective    /76   Pulse 82   Temp 98.3  F (36.8  C) (Temporal)   Resp 16   Ht 1.8 m (5' 10.87\")   Wt 81.2 kg (179 lb)   LMP 12/23/2019 (Approximate)   SpO2 100%   BMI 25.06 kg/m    Body mass index is 25.06 kg/m .  Physical Exam   GENERAL: healthy, alert and no distress  NECK: no adenopathy, no asymmetry, masses, or scars and thyroid normal to palpation  RESP: lungs clear to auscultation - no rales, rhonchi or wheezes  CV: regular rate and rhythm, normal S1 S2, no S3 or S4, no murmur, click or rub, no peripheral edema and peripheral pulses strong  ABDOMEN: mild RLQ tenderness, no masses or HSM  MS: no gross musculoskeletal defects noted, no edema  SKIN: Several salmon-colored brown spots on her inner thighs somewhat suspicious for tinea versicolor.  Also with raised pigmented nevi/skin tags on left thigh, right trunk, and bilateral axillae.    Diagnostic Test Results:  Labs reviewed in Epic  Results for orders placed or performed in visit on 01/27/20   Comprehensive metabolic panel     Status: None (In process)   Result Value Ref Range    Sodium " 141 133 - 144 mmol/L    Potassium 3.8 3.4 - 5.3 mmol/L    Chloride 109 94 - 109 mmol/L    Carbon Dioxide PENDING 20 - 32 mmol/L    Anion Gap PENDING 3 - 14 mmol/L    Glucose PENDING 70 - 99 mg/dL    Urea Nitrogen PENDING 7 - 30 mg/dL    Creatinine PENDING 0.52 - 1.04 mg/dL    GFR Estimate PENDING >60 mL/min/[1.73_m2]    GFR Estimate If Black PENDING >60 mL/min/[1.73_m2]    Calcium PENDING 8.5 - 10.1 mg/dL    Bilirubin Total PENDING 0.2 - 1.3 mg/dL    Albumin PENDING 3.4 - 5.0 g/dL    Protein Total PENDING 6.8 - 8.8 g/dL    Alkaline Phosphatase PENDING 40 - 150 U/L    ALT PENDING 0 - 50 U/L    AST PENDING 0 - 45 U/L   CBC with platelets     Status: None   Result Value Ref Range    WBC 7.0 4.0 - 11.0 10e9/L    RBC Count 4.46 3.8 - 5.2 10e12/L    Hemoglobin 12.6 11.7 - 15.7 g/dL    Hematocrit 38.9 35.0 - 47.0 %    MCV 87 78 - 100 fl    MCH 28.3 26.5 - 33.0 pg    MCHC 32.4 31.5 - 36.5 g/dL    RDW 14.4 10.0 - 15.0 %    Platelet Count 250 150 - 450 10e9/L   *UA reflex to Microscopic and Culture ([facility] (except [place] and [place])     Status: Abnormal   Result Value Ref Range    Color Urine Yellow     Appearance Urine Clear     Glucose Urine Negative NEG^Negative mg/dL    Bilirubin Urine Negative NEG^Negative    Ketones Urine Negative NEG^Negative mg/dL    Specific Gravity Urine 1.025 1.003 - 1.035    Blood Urine Negative NEG^Negative    pH Urine 5.5 5.0 - 7.0 pH    Protein Albumin Urine Negative NEG^Negative mg/dL    Urobilinogen Urine 0.2 0.2 - 1.0 EU/dL    Nitrite Urine Negative NEG^Negative    Leukocyte Esterase Urine Trace (A) NEG^Negative    Source Unspecified Urine    Urine Microscopic     Status: Abnormal   Result Value Ref Range    WBC Urine 0 - 5 OTO5^0 - 5 /HPF    RBC Urine O - 2 OTO2^O - 2 /HPF    Squamous Epithelial /LPF Urine Few FEW^Few /LPF    Bacteria Urine Few (A) NEG^Negative /HPF           Assessment & Plan       ICD-10-CM    1. Tinea versicolor B36.0 ketoconazole (NIZORAL) 2 %  external cream   2. Dermal nevus D23.9    3. Skin tag L91.8    4. Urinary incontinence, unspecified type R32 TSH with free T4 reflex     *UA reflex to Microscopic and Culture ([facility] (except [place] and [place])   5. Fecal urgency R15.2 TSH with free T4 reflex   6. Cold intolerance R68.89 TSH with free T4 reflex     Comprehensive metabolic panel     CBC with platelets   7. Schizophrenia, unspecified type (H) F20.9 FLUoxetine (PROZAC) 20 MG capsule     Urine Microscopic   8. Anxiety F41.9 FLUoxetine (PROZAC) 20 MG capsule   9. Encounter for therapeutic drug monitoring Z51.81 Comprehensive metabolic panel     CBC with platelets     1.  Suspicious for tinea versicolor.  Will treat with topical antifungal.  Discussed that if I am correct on this, it will not ever go away completely, but should be very manageable.  Follow-up at future visit.  2, 3.  Unclear if these are nevi versus skin tags.  Discussed removal at a future visit.  Patient will schedule this when convenient for her.  4, 5.  Clinically I am suspicious that these are connected.  We will do some basic lab evaluation looking at potential causes.  If unable to find a cause on this, would want to pursue abdominal imaging to rule out the possibility of obstipation as a cause.  Could also refer to gastroenterology if need be.  6.  Unclear etiology.  Will recheck TSH and other labs today.  Follow-up if not improving.  7, 8.  Currently under fairly good control.  Primarily seen psychiatry for this, but requests a refill on fluoxetine today.  This was given.  9.  Patient indicates that she has not had any lab monitoring done by her psychiatrist, so we will check some screening labs today.    Patient is also due for a physical.  Recommended she get this scheduled in the next few weeks.    Portions of this note were completed using Dragon dictation software.  Although reviewed, there may be typographical and other inadvertent errors that  "remain.              Patient Instructions   Thank you for visiting [facility]    Follow up appointment for physical and lesion removal.     We will call when we get the lab results. If these are normal we will do a x-ray.     If loose stools persist let me know.     Cream for pink spots has been sent over.     Contact us or return if questions or concerns.     Have a nice day!    Dr. Umana     Return in about 4 weeks (around 2/24/2020) for excision.      If you had imaging scheduled please refer to your radiology prep sheet.      If you need medication refills, please contact your pharmacy 3 days before your prescriptions runs out or download the [facility] henrietta for your smart phone.   If you are out of refills, your pharmacy will contact contact the clinic.    Contact us or return if questions or concerns.     -Your [facility] Care Team:    MD Taylor Hernandez, MARCI Jean Baptiste PA-C Elizabeth \"Gemma\" FLORIDA Baltazar CNP, APRN, [place] Jaramillo, JEREMIE Mata, RN, BSN       General information about your   [facility]      Clinic hours:     Lab hours:  Phone [phone]  Monday 7:30 am-7 pm    Monday 8:30 am-6:30 pm  Tuesday-Friday 7:30 am-5 pm   Tuesday-Friday 8:30 am-4:30 pm    Pharmacy hours:  Phone [phone]  Monday 8:30 am-7pm  Tuesday-Friday 8:30am-6 pm                                     Mychart assistance [phone]    We would like to hear from you, how was your visit today?    Brook Parham  Patient Information Supervisor   Patient Care Supervisor  [facility], and [facility], and [facility]                Return in about 4 weeks (around 2/24/2020) for excision.    Timmy Umana MD, MD  [facility]    "

## 2020-01-27 ENCOUNTER — OFFICE VISIT (OUTPATIENT)
Dept: FAMILY MEDICINE | Facility: OTHER | Age: 35
End: 2020-01-27
Payer: MEDICARE

## 2020-01-27 VITALS
TEMPERATURE: 98.3 F | SYSTOLIC BLOOD PRESSURE: 124 MMHG | WEIGHT: 179 LBS | RESPIRATION RATE: 16 BRPM | HEIGHT: 71 IN | DIASTOLIC BLOOD PRESSURE: 76 MMHG | HEART RATE: 82 BPM | BODY MASS INDEX: 25.06 KG/M2 | OXYGEN SATURATION: 100 %

## 2020-01-27 DIAGNOSIS — Z51.81 ENCOUNTER FOR THERAPEUTIC DRUG MONITORING: ICD-10-CM

## 2020-01-27 DIAGNOSIS — F41.9 ANXIETY: ICD-10-CM

## 2020-01-27 DIAGNOSIS — B36.0 TINEA VERSICOLOR: Primary | ICD-10-CM

## 2020-01-27 DIAGNOSIS — F20.9 SCHIZOPHRENIA, UNSPECIFIED TYPE (H): ICD-10-CM

## 2020-01-27 DIAGNOSIS — R15.2 FECAL URGENCY: ICD-10-CM

## 2020-01-27 DIAGNOSIS — L91.8 SKIN TAG: ICD-10-CM

## 2020-01-27 DIAGNOSIS — R32 URINARY INCONTINENCE, UNSPECIFIED TYPE: ICD-10-CM

## 2020-01-27 DIAGNOSIS — D23.9 DERMAL NEVUS: ICD-10-CM

## 2020-01-27 DIAGNOSIS — R68.89 COLD INTOLERANCE: ICD-10-CM

## 2020-01-27 LAB
ALBUMIN UR-MCNC: NEGATIVE MG/DL
APPEARANCE UR: CLEAR
BACTERIA #/AREA URNS HPF: ABNORMAL /HPF
BILIRUB UR QL STRIP: NEGATIVE
COLOR UR AUTO: YELLOW
ERYTHROCYTE [DISTWIDTH] IN BLOOD BY AUTOMATED COUNT: 14.4 % (ref 10–15)
GLUCOSE UR STRIP-MCNC: NEGATIVE MG/DL
HCT VFR BLD AUTO: 38.9 % (ref 35–47)
HGB BLD-MCNC: 12.6 G/DL (ref 11.7–15.7)
HGB UR QL STRIP: NEGATIVE
KETONES UR STRIP-MCNC: NEGATIVE MG/DL
LEUKOCYTE ESTERASE UR QL STRIP: ABNORMAL
MCH RBC QN AUTO: 28.3 PG (ref 26.5–33)
MCHC RBC AUTO-ENTMCNC: 32.4 G/DL (ref 31.5–36.5)
MCV RBC AUTO: 87 FL (ref 78–100)
NITRATE UR QL: NEGATIVE
NON-SQ EPI CELLS #/AREA URNS LPF: ABNORMAL /LPF
PH UR STRIP: 5.5 PH (ref 5–7)
PLATELET # BLD AUTO: 250 10E9/L (ref 150–450)
RBC # BLD AUTO: 4.46 10E12/L (ref 3.8–5.2)
RBC #/AREA URNS AUTO: ABNORMAL /HPF
SOURCE: ABNORMAL
SP GR UR STRIP: 1.02 (ref 1–1.03)
UROBILINOGEN UR STRIP-ACNC: 0.2 EU/DL (ref 0.2–1)
WBC # BLD AUTO: 7 10E9/L (ref 4–11)
WBC #/AREA URNS AUTO: ABNORMAL /HPF

## 2020-01-27 PROCEDURE — 85027 COMPLETE CBC AUTOMATED: CPT | Performed by: FAMILY MEDICINE

## 2020-01-27 PROCEDURE — 84443 ASSAY THYROID STIM HORMONE: CPT | Performed by: FAMILY MEDICINE

## 2020-01-27 PROCEDURE — 99214 OFFICE O/P EST MOD 30 MIN: CPT | Performed by: FAMILY MEDICINE

## 2020-01-27 PROCEDURE — 36415 COLL VENOUS BLD VENIPUNCTURE: CPT | Performed by: FAMILY MEDICINE

## 2020-01-27 PROCEDURE — 81001 URINALYSIS AUTO W/SCOPE: CPT | Performed by: FAMILY MEDICINE

## 2020-01-27 PROCEDURE — 80053 COMPREHEN METABOLIC PANEL: CPT | Performed by: FAMILY MEDICINE

## 2020-01-27 RX ORDER — KETOCONAZOLE 20 MG/G
CREAM TOPICAL DAILY
Qty: 60 G | Refills: 1 | Status: SHIPPED | OUTPATIENT
Start: 2020-01-27 | End: 2020-07-27

## 2020-01-27 ASSESSMENT — PAIN SCALES - GENERAL: PAINLEVEL: NO PAIN (0)

## 2020-01-27 ASSESSMENT — MIFFLIN-ST. JEOR: SCORE: 1615.94

## 2020-01-28 LAB
ALBUMIN SERPL-MCNC: 3.9 G/DL (ref 3.4–5)
ALP SERPL-CCNC: 70 U/L (ref 40–150)
ALT SERPL W P-5'-P-CCNC: 15 U/L (ref 0–50)
ANION GAP SERPL CALCULATED.3IONS-SCNC: 7 MMOL/L (ref 3–14)
AST SERPL W P-5'-P-CCNC: 11 U/L (ref 0–45)
BILIRUB SERPL-MCNC: 0.8 MG/DL (ref 0.2–1.3)
BUN SERPL-MCNC: 17 MG/DL (ref 7–30)
CALCIUM SERPL-MCNC: 8.4 MG/DL (ref 8.5–10.1)
CHLORIDE SERPL-SCNC: 109 MMOL/L (ref 94–109)
CO2 SERPL-SCNC: 25 MMOL/L (ref 20–32)
CREAT SERPL-MCNC: 0.79 MG/DL (ref 0.52–1.04)
GFR SERPL CREATININE-BSD FRML MDRD: >90 ML/MIN/{1.73_M2}
GLUCOSE SERPL-MCNC: 83 MG/DL (ref 70–99)
POTASSIUM SERPL-SCNC: 3.8 MMOL/L (ref 3.4–5.3)
PROT SERPL-MCNC: 7.1 G/DL (ref 6.8–8.8)
SODIUM SERPL-SCNC: 141 MMOL/L (ref 133–144)
TSH SERPL DL<=0.005 MIU/L-ACNC: 2.25 MU/L (ref 0.4–4)

## 2020-01-28 NOTE — RESULT ENCOUNTER NOTE
Isaura,    All of your labs were normal for you.  Let's get an abdominal xray to evaluate what's going on with your gut.    Have a nice day!    Dr. Umana

## 2020-01-28 NOTE — PATIENT INSTRUCTIONS
"Thank you for visiting [facility]    Follow up appointment for physical and lesion removal.     We will call when we get the lab results. If these are normal we will do a x-ray.     If loose stools persist let me know.     Cream for pink spots has been sent over.     Contact us or return if questions or concerns.     Have a nice day!    Dr. Umana     Return in about 4 weeks (around 2/24/2020) for excision.      If you had imaging scheduled please refer to your radiology prep sheet.      If you need medication refills, please contact your pharmacy 3 days before your prescriptions runs out or download the [facility] henrietta for your smart phone.   If you are out of refills, your pharmacy will contact contact the clinic.    Contact us or return if questions or concerns.     -Your [facility] Care Team:    MD Taylor Hernandez, MARCI Jean Baptiste PA-C Elizabeth \"Gemma\" FLORIDA Baltazar CNP, APRN, [place] Jaramillo, JEREMIE Mata, RN, BSN       General information about your   [facility]      Clinic hours:     Lab hours:  Phone [phone]  Monday 7:30 am-7 pm    Monday 8:30 am-6:30 pm  Tuesday-Friday 7:30 am-5 pm   Tuesday-Friday 8:30 am-4:30 pm    Pharmacy hours:  Phone [phone]  Monday 8:30 am-7pm  Tuesday-Friday 8:30am-6 pm                                     Mychart assistance [phone]    We would like to hear from you, how was your visit today?    Brook Parham  Patient Information Supervisor   Patient Care Supervisor  [facility], and hospitals, and [facility]            "

## 2020-02-06 NOTE — PATIENT INSTRUCTIONS
Wound Care Instructions    1.  Keep Vaseline or antibiotic ointment on wound for a few days until the skin edges have come together.  Do not let it dry out and form a scab as this will make the resulting scar more noticeable.    2.  After 24 hours, may wash gently with soap and water.  After 48 hours, you can soak it, if needed.    3.  If desired, vitamin E oil for 2 weeks after antibiotic ointment may help to decrease scarring.    4.  Protect the area from sun for up to one year afterward as the scar is continuing to remodel.  Sun exposure will also make the resulting scar more noticeable.    5.  Call if the area is very red, tender, has a discharge or is very itchy while healing, or if you have any other questions.  These may be signs of early infection or allergy.      If you have increasing abdominal pain, fever, foul-smelling vaginal discharge or bleeding more than a period, let me know.      Can try Flonase to help with your ear symptoms.   If not improving next month, we can consider oral steroids.    Cut your dose of Zyprexa to 7.5 mg to help with your morning sleepiness.    Increase your fluoxetine to 40 mg daily to help with mood.      We'll let you know your lab results as soon as we can.       Preventive Health Recommendations  Female Ages 26 - 39  Yearly exam:   See your health care provider every year in order to    Review health changes.     Discuss preventive care.      Review your medicines if you your doctor has prescribed any.    Until age 30: Get a Pap test every three years (more often if you have had an abnormal result).    After age 30: Talk to your doctor about whether you should have a Pap test every 3 years or have a Pap test with HPV screening every 5 years.   You do not need a Pap test if your uterus was removed (hysterectomy) and you have not had cancer.  You should be tested each year for STDs (sexually transmitted diseases), if you're at risk.   Talk to your provider about how often to  have your cholesterol checked.  If you are at risk for diabetes, you should have a diabetes test (fasting glucose).  Shots: Get a flu shot each year. Get a tetanus shot every 10 years.   Nutrition:     Eat at least 5 servings of fruits and vegetables each day.    Eat whole-grain bread, whole-wheat pasta and brown rice instead of white grains and rice.    Get adequate Calcium and Vitamin D.     Lifestyle    Exercise at least 150 minutes a week (30 minutes a day, 5 days of the week). This will help you control your weight and prevent disease.    Limit alcohol to one drink per day.    No smoking.     Wear sunscreen to prevent skin cancer.    See your dentist every six months for an exam and cleaning.    Patient Education   Personalized Prevention Plan  You are due for the preventive services outlined below.  Your care team is available to assist you in scheduling these services.  If you have already completed any of these items, please share that information with your care team to update in your medical record.  Health Maintenance Due   Topic Date Due     HIV Screening  10/14/2002     Annual Wellness Visit  10/14/2005     Flu Vaccine (1) 09/01/2019     HPV Screening  09/19/2019     PAP Smear  09/19/2019       Signs of Hearing Loss     Hearing much better with one ear can be a sign of hearing loss.     Hearing loss is a problem shared by many people. In fact, it is one of the most common health conditions, particularly as people age. Most people over age 65 have some hearing loss, and by age 80, almost everyone does. Because hearing loss usually occurs slowly over the years, you may not realize your hearing ability has gotten worse.  Have your hearing checked  Contact your healthcare provider if you:    Have to strain to hear normal conversation    Have to watch other people s faces very carefully to follow what they re saying    Need to ask people to repeat what they ve said    Often misunderstand what people are  saying    Turn the volume of the television or radio up so high that others complain    Feel that people are mumbling when they re talking to you    Find that the effort to hear leaves you feeling tired and irritated    Notice, when using the phone, that you hear better with one ear than the other  Date Last Reviewed: 12/1/2016 2000-2019 [employer]. [address] [place] [address]. All rights reserved. This information is not intended as a substitute for professional medical care. Always follow your healthcare professional's instructions.          Urinary Incontinence, Female (Adult)  Urinary incontinence means loss of control of the bladder. This problem affects many women, especially as they get older. If you have incontinence, you may be embarrassed to ask for help. But know that this problem can be treated.  Types of Incontinence  There are different types of incontinence. Two of the main types are described here. You can have more than one type.    Stress incontinence. With this type, urine leaks when pressure (stress) is put on the bladder. This may happen when you cough, sneeze, or laugh. Stress incontinence most often occurs because the pelvic floor muscles that support the bladder and urethra are weak. This can happen after pregnancy and vaginal childbirth or a hysterectomy. It can also be due to excess body weight or hormone changes.    Urge incontinence (also called overactive bladder). With this type, a sudden urge to urinate is felt often. This may happen even though there may not be much urine in the bladder. The need to urinate often during the night is common. Urge incontinence most often occurs because of bladder spasms. This may be due to bladder irritation or infection. Damage to bladder nerves or pelvic muscles, constipation, and certain medicines can also lead to urge incontinence.  Treatment of urinary incontinence depends on the cause. Further evaluation is needed  to find the type you have. This will likely include an exam and certain tests. Based on the results, you and your healthcare provider can then plan treatment. Until a diagnosis is made, the home care tips below can help relieve symptoms.  Home care    Do pelvic floor muscle exercises, if they are prescribed. The pelvic floor muscles help support the bladder and urethra. Many women find that their symptoms improve when doing special exercises that strengthen these muscles. To do the exercises contract the muscles you would use to stop your stream of urine, but do this when you re not urinating. Hold for 10 seconds, then relax. Repeat 10 to 20 times in a row, at least 3 times a day. Your provider may give you other instructions for how to do the exercises and how often.    Keep a bladder diary. This helps track how often and how much you urinate over a set period of time. Bring this diary with you to your next visit with the provider. The information can help your provider learn more about your bladder problem.    Lose weight, if advised to by your provider. Excess weight puts pressure on the bladder. Your provider can help you create a weight-loss plan that s right for you. This may include exercising more and making certain diet changes.    Don't consume foods and drinks that may irritate the bladder. These can include alcohol and caffeinated drinks.    Quit smoking. Smoking and other tobacco use can lead to chronic cough that strains the pelvic floor muscles. Smoking may also damage the bladder and urethra. Talk with your provider about treatments or methods you can use to quit smoking.    If drinking large amounts of fluid causes you to have symptoms, you may be advised to limit your fluid intake. You may also be advised to drink most of your fluids during the day and to limit fluids at night.    If you re worried about urine leakage or accidents, you may wear absorbent pads to catch urine. Change the pads  often. This helps reduce discomfort. It may also reduce the risk of skin or bladder infections.  Follow-up care  Follow up with your healthcare provider, or as directed. It may take some to find the right treatment for your problem. Your treatment plan may include special therapies or medicines. Certain procedures or surgery may also be options. Be sure to discuss any questions you have with your provider.  When to seek medical advice  Call the healthcare provider right away if any of these occur:    Fever of 100.4 F (38 C) or higher, or as directed by your provider    Bladder pain or fullness    Abdominal swelling    Nausea or vomiting    Back pain    Weakness, dizziness or fainting  Date Last Reviewed: 10/1/2017    3540-9391 [employer]. [address] [place] [address], [place] [address]. All rights reserved. This information is not intended as a substitute for professional medical care. Always follow your healthcare professional's instructions.        Your Health Risk Assessment indicates you feel you are not in good emotional health.    Recreation   Recreation is not limited to sports and team events. It includes any activity that provides relaxation, interest, enjoyment, and exercise. Recreation provides an outlet for physical, mental, and social energy. It can give a sense of worth and achievement. It can help you stay healthy.    Mental Exercise and Social Involvement  Mental and emotional health is as important as physical health. Keep in touch with friends and family. Stay as active as possible. Continue to learn and challenge yourself.   Things you can do to stay mentally active are:    Learn something new, like a foreign language or musical instrument.     Play SCRABBLE or do crossword puzzles. If you cannot find people to play these games with you at home, you can play them with others on your computer through the Internet.     Join a games club--anything from card games to chess or checkers or lawn  bowling.     Start a new hobby.     Go back to school.     Volunteer.     Read.   Keep up with world events.

## 2020-02-06 NOTE — PROGRESS NOTES
"   SUBJECTIVE:   CC: Isaura Gray is an 34 year old woman who presents for preventive health visit.     Healthy Habits:     In general, how would you rate your overall health?  Good    Frequency of exercise:  4-5 days/week    Duration of exercise:  15-30 minutes    Do you usually eat at least 4 servings of fruit and vegetables a day, include whole grains    & fiber and avoid regularly eating high fat or \"junk\" foods?  Yes    Taking medications regularly:  No    Barriers to taking medications:  Side effects    Medication side effects:  Other    Ability to successfully perform activities of daily living:  No assistance needed    Home Safety:  No safety concerns identified    Hearing Impairment:  Feel that people are mumbling or not speaking clearly, difficulty following dialogue in the theater, need to ask people to speak up or repeat themselves, difficulty understanding soft or whispered speech and difficulty understanding speech on the telephone    In the past 6 months, have you been bothered by leaking of urine? Yes    In general, how would you rate your overall mental or emotional health?  Fair      PHQ-2 Total Score: 3    Additional concerns today:  No    Discussed that any evaluation and treatment that is not considered preventive will incur additional charges.  Pt indicated that he understood and did want to proceed with an evaluation and management portion of the visit in addition to the preventive portion.    Notes a lot of sedation in the mornings from her medications.  Only taking trazodone on the weekends.      Has had more trouble with hearing people.  Sounds like there's a tin can over her ears at times.      Mood has been worse lately.  Will cry for no reason.  Has a hard time getting up and doing her job.      PHQ-9 SCORE 11/5/2018 12/27/2018 2/13/2020   PHQ-9 Total Score Bobby 26 (Severe depression) - 18 (Moderately severe depression)   PHQ-9 Total Score 26 22 18         Today's PHQ-2 Score: "   PHQ-2 (  [employer]) 2020   Q1: Little interest or pleasure in doing things 1   Q2: Feeling down, depressed or hopeless 2   PHQ-2 Score 3   Q1: Little interest or pleasure in doing things Several days   Q2: Feeling down, depressed or hopeless More than half the days   PHQ-2 Score 3       Abuse: Current or Past(Physical, Sexual or Emotional)- No  Do you feel safe in your environment? Yes        Social History     Tobacco Use     Smoking status: Former Smoker     Packs/day: 0.50     Years: 5.00     Pack years: 2.50     Types: Cigarettes     Last attempt to quit: 2016     Years since quitting: 3.5     Smokeless tobacco: Never Used   Substance Use Topics     Alcohol use: No         Alcohol Use 2020   Prescreen: >3 drinks/day or >7 drinks/week? No     Did try medical marijuana recently.  Worked well for her, she feels.      Reviewed orders with patient.  Reviewed health maintenance and updated orders accordingly - Yes  BP Readings from Last 3 Encounters:   20 117/79   20 124/76   19 122/70    Wt Readings from Last 3 Encounters:   20 77.9 kg (171 lb 12.8 oz)   20 81.2 kg (179 lb)   19 81.4 kg (179 lb 8 oz)                  Patient Active Problem List   Diagnosis     Vitamin D deficiency     Supervision of other high risk pregnancies, unspecified trimester     PE (pulmonary thromboembolism) (H)     Hyperprolactinemia (H)     Follicular cancer of thyroid (H)     Lactose intolerance in adult     Schizophrenia (H)     Encounter for triage in pregnant patient     Cough     Chronic bilateral low back pain without sciatica     Normal labor     Anxiety     Cancer (H)     H/O  delivery, currently pregnant     History of pulmonary embolism     History of thyroid cancer     Mitral valve disorder      (normal spontaneous vaginal delivery)     Past Surgical History:   Procedure Laterality Date     APPENDECTOMY       ENT SURGERY      Partial thyroidectomy       Social  "History     Tobacco Use     Smoking status: Former Smoker     Packs/day: 0.50     Years: 5.00     Pack years: 2.50     Types: Cigarettes     Last attempt to quit: 7/31/2016     Years since quitting: 3.5     Smokeless tobacco: Never Used   Substance Use Topics     Alcohol use: No     Family History   Problem Relation Age of Onset     No Known Problems Mother      Hypertension Father      Cerebrovascular Disease Father 56        Passed away from double brain stem clot     Mental Illness Father      Asthma Brother      Cancer Maternal Grandfather         lung     No Known Problems Paternal Grandmother      No Known Problems Paternal Grandfather      Autism Spectrum Disorder Son      Kidney Disease Son         \"has a third kidney\"     No Known Problems Daughter          Current Outpatient Medications   Medication Sig Dispense Refill     busPIRone (BUSPAR) 10 MG tablet        FLUoxetine (PROZAC) 20 MG capsule Take 1 capsule (20 mg) by mouth daily 90 capsule 0     ketoconazole (NIZORAL) 2 % external cream Apply topically daily 60 g 1     OLANZapine (ZYPREXA) 10 MG tablet Take 1 tablet (10 mg) by mouth At Bedtime 30 tablet 1     traZODone (DESYREL) 100 MG tablet Take 100 mg by mouth       lamoTRIgine (LAMICTAL) 100 MG tablet Take 1 tablet (100 mg) by mouth daily for 14 days 14 tablet 0     Allergies   Allergen Reactions     Ciprofloxacin Hives     Sulfa Drugs Hives     Oxycodone-Acetaminophen Itching     Recent Labs   Lab Test 01/27/20  1828 03/12/19  1110 11/05/18  1600 06/20/18  0826   ALT 15  --  27 22   CR 0.79 0.58 0.73  --    GFRESTIMATED >90 >90 >90  --    GFRESTBLACK >90 >90 >90  --    POTASSIUM 3.8 3.9 4.1  --    TSH 2.25  --  2.05  --         Mammogram not appropriate for this patient based on age.    Pertinent mammograms are reviewed under the imaging tab.  History of abnormal Pap smear: NO - age 30-65 PAP every 5 years with negative HPV co-testing recommended     Reviewed and updated as needed this visit by " "clinical staff  Tobacco  Allergies  Meds  Med Hx  Surg Hx  Fam Hx  Soc Hx        Reviewed and updated as needed this visit by Provider          Review of Systems   Constitutional: Negative for chills and fever.   HENT: Negative for congestion, ear pain, hearing loss and sore throat.    Eyes: Negative for pain and visual disturbance.   Respiratory: Negative for cough and shortness of breath.    Cardiovascular: Negative for chest pain, palpitations and peripheral edema.   Gastrointestinal: Negative for abdominal pain, constipation, diarrhea, heartburn, hematochezia and nausea.   Breasts:  Negative for tenderness, breast mass and discharge.   Genitourinary: Negative for dysuria, frequency, genital sores, hematuria, pelvic pain, urgency, vaginal bleeding and vaginal discharge.   Musculoskeletal: Negative for arthralgias, joint swelling and myalgias.   Skin: Negative for rash.   Neurological: Negative for dizziness, weakness, headaches and paresthesias.   Psychiatric/Behavioral: Positive for mood changes. The patient is not nervous/anxious.           OBJECTIVE:   /79   Pulse 90   Temp 97.6  F (36.4  C) (Oral)   Resp 16   Ht 1.82 m (5' 11.65\")   Wt 77.9 kg (171 lb 12.8 oz)   LMP 01/14/2020 (Approximate)   SpO2 99%   BMI 23.53 kg/m    Physical Exam  GENERAL: healthy, alert and no distress  EYES: Eyes grossly normal to inspection, PERRL and conjunctivae and sclerae normal  HENT: ear canals and TM's normal, ear effusion on the left, nose and mouth without ulcers or lesions  NECK: no adenopathy, no asymmetry, masses, or scars and thyroid normal to palpation  RESP: lungs clear to auscultation - no rales, rhonchi or wheezes  BREAST: normal without masses, tenderness or nipple discharge and no palpable axillary masses or adenopathy  CV: regular rate and rhythm, normal S1 S2, no S3 or S4, no murmur, click or rub, no peripheral edema and peripheral pulses strong  ABDOMEN: soft, nontender, no hepatosplenomegaly, " no masses and bowel sounds normal   (female): normal female external genitalia, normal urethral meatus, vaginal mucosa pink, moist, well rugated, and normal cervix/adnexa/retroverted uterus without masses or discharge  MS: no gross musculoskeletal defects noted, no edema  SKIN: 4 raised  Pigmented lesions (left thigh, right  Chest, bilateral axilllae)  NEURO: Normal strength and tone, mentation intact and speech normal  PSYCH: mentation appears normal, affect normal/bright    Diagnostic Test Results:  Labs reviewed in Epic  Results for orders placed or performed in visit on 02/13/20   HCG Qual, Urine ([id number])     Status: None   Result Value Ref Range    HCG Qual Urine Negative NEG^Negative       ASSESSMENT/PLAN:       ICD-10-CM    1. Encounter for Medicare annual wellness exam Z00.00    2. Screening for malignant neoplasm of cervix Z12.4 Pap imaged thin layer screen with HPV - recommended age 30 - 65 years (select HPV order below)     HPV High Risk Types DNA Cervical   3. Schizophrenia, unspecified type (H) F20.9 lamoTRIgine (LAMICTAL) 100 MG tablet     OLANZapine (ZYPREXA) 7.5 MG tablet     FLUoxetine (PROZAC) 40 MG capsule   4. Anxiety F41.9 lamoTRIgine (LAMICTAL) 100 MG tablet     busPIRone (BUSPAR) 10 MG tablet     FLUoxetine (PROZAC) 40 MG capsule   5. Moderate major depression (H) F32.1    6. Encounter for female birth control Z30.019 HCG Qual, Urine ([id number])   7. Encounter for intrauterine device placement Z30.430 NEISSERIA GONORRHOEA PCR     CHLAMYDIA TRACHOMATIS PCR     levonorgestrel (MIRENA) 20 MCG/24HR IUD     levonorgestrel (MIRENA) 20 MCG/24HR IUD 20 mcg     INSERTION INTRAUTERINE DEVICE   8. Dermal nevus D23.9 <5MM TRUNK,ARM,LEG     0.6-1CM TRUNK,ARM,LEG     Dermatological path order and indications   9. Need for prophylactic vaccination and inoculation against influenza Z23 INFLUENZA VACCINE IM > 6 MONTHS VALENT IIV4 [42093]     Vaccine Administration, Initial [84072]   10. Screen for STD  (sexually transmitted disease) Z11.3 NEISSERIA GONORRHOEA PCR     CHLAMYDIA TRACHOMATIS PCR   11. CARDIOVASCULAR SCREENING; LDL GOAL LESS THAN 130 Z13.6 Lipid panel reflex to direct LDL Fasting     1, 2, 11, 12.  Reviewed recommended screenings and ordered appropriate testing for pt's risks and per pt's request(s).   3, 4, 5.  Mood is not fully controlled.  Patient reports excessive sedation in early mornings.  Discussed a trial of reducing her Zyprexa to 7.5 mg nightly.  We will also increase her fluoxetine to 40 mg daily and follow-up in 1 month.  6.  Suspected ear effusion is present.  Trial of Flonase to help with this.  If not responding next month, consider oral steroids or referral to ENT.  7, 8.  See procedure note.  Discussed various options for contraception and benefits/risks of each.  Patient has a history of blood clots, so estrogen-containing products were not an option for her.  9.  See procedure note.  10.  Immunized.    Portions of this note were completed using Dragon dictation software.  Although reviewed, there may be typographical and other inadvertent errors that remain.     Procedure: Shave Removal x 4  The patient was informed of the nature of the procedure and was informed that the procedure will heal with a scar. She  was also informed of the risk of unattractive scarring, recurrence of the lesion, infection, bleeding and pain and that further treatment may be needed. She  consents to the procedure.  The area surrounding the lesion(s) were  prepped with alcohol. Local anesthesia (1% xylocaine with epinephrine) was injected. The lesion(s) were  biopsied with a blue blade. The specimen was sent to Pathology. Electrodessication was used for hemostasis. The wound(s) were  dressed with bacitracin ointment and a bandage. Wound care instructions were explained and a wound care sheet was given. Follow up with any wound problems, poor healing or infection.     The following lesions were  "removed/biopsied:  A) Location: left thigh (4 mm) Clinical Diagnosis: SK vs atypical nevus, r/o melanoma  B) Location: right chest (7 mm) Clinical Diagnosis: SK vs atypical nevus, r/o melanoma  C) Location: left axilla (5 mm) Clinical Diagnosis: SK vs atypical nevus, r/o melanoma  D) Location: right axilla (5 mm) Clinical Diagnosis: SK vs atypical nevus, r/o melanoma          COUNSELING:  Reviewed preventive health counseling, as reflected in patient instructions       Regular exercise       Healthy diet/nutrition       Immunizations    Vaccinated for: Influenza             Contraception       Osteoporosis Prevention/Bone Health    Estimated body mass index is 23.53 kg/m  as calculated from the following:    Height as of this encounter: 1.82 m (5' 11.65\").    Weight as of this encounter: 77.9 kg (171 lb 12.8 oz).         reports that she quit smoking about 3 years ago. Her smoking use included cigarettes. She has a 2.50 pack-year smoking history. She has never used smokeless tobacco.      Counseling Resources:  ATP IV Guidelines  Pooled Cohorts Equation Calculator  Breast Cancer Risk Calculator  FRAX Risk Assessment  ICSI Preventive Guidelines  Dietary Guidelines for Americans,   [employer]'s MyPlate  ASA Prophylaxis  Lung CA Screening    Timmy Umana MD, MD  [facility]  Answers for HPI/ROS submitted by the patient on 2020   Annual Exam:  If you checked off any problems, how difficult have these problems made it for you to do your work, take care of things at home, or get along with other people?: Very difficult  PHQ9 TOTAL SCORE: 18    IUD Insertion:  CONSULT:    Is a pregnancy test required: Yes.  Was it positive or negative?  Negative  Was a consent obtained?  Yes    Subjective: Isaura Gray is a 34 year old  presents for IUD and desires Mirena type IUD.    Patient has been given the opportunity to ask questions about all forms of birth control, including all options " "appropriate for Isaura Gray. Discussed that no method of birth control, except abstinence is 100% effective against pregnancy or sexually transmitted infection.     Isaura Gray understands she may have the IUD removed at any time. IUD should be removed by a health care provider.    The entire insertion procedure was reviewed with the patient, including care after placement.    Patient's last menstrual period was 01/14/2020 (approximate). . No allergy to betadine or shellfish. Patient desires STD screening  HCG Qual Urine   Date Value Ref Range Status   02/13/2020 Negative NEG^Negative Final     Comment:     This test is for screening purposes.  Results should be interpreted along with   the clinical picture.  Confirmation testing is available if warranted by   ordering [id number], HCG Quantitative Pregnancy.           /79   Pulse 90   Temp 97.6  F (36.4  C) (Oral)   Resp 16   Ht 1.82 m (5' 11.65\")   Wt 77.9 kg (171 lb 12.8 oz)   LMP 01/14/2020 (Approximate)   SpO2 99%   BMI 23.53 kg/m      Pelvic Exam:   EG/BUS: normal genital architecture without lesions, erythema or abnormal secretions.   Vagina: moist, pink, rugae with physiologic discharge and secretions  Cervix: parous no lesions and pink, moist, closed, without lesion or CMT  Uterus: retroverted position, mobile, no pain  Adnexa: within normal limits and no masses, nodularity, tenderness    PROCEDURE NOTE: -- IUD Insertion    Reason for Insertion: contraception    Premedicated with ibuprofen.  Under sterile technique, cervix was visualized with speculum and prepped with Betadine solution swab x 3. Tenaculum was placed for stability. The uterus was gently straightened and sounded to 7.0 cm. IUD prepared for placement, and IUD inserted according to 's instructions without difficulty or significant resitance, and deployed at the fundus. The strings were visualized and trimmed to 6.0 cm from the external os. Tenaculum was removed " and hemostasis noted. Speculum removed.  Patient tolerated procedure well.    Lot # [id number]  Exp: May 2021    EBL: minimal    Complications: none    ASSESSMENT:     ICD-10-CM    1. Encounter for female birth control Z30.019 HCG Qual, Urine ([id number])   2. Routine general medical examination at a health care facility Z00.00 Lipid panel reflex to direct LDL Fasting     NEISSERIA GONORRHOEA PCR     CHLAMYDIA TRACHOMATIS PCR   3. Screening for malignant neoplasm of cervix Z12.4 Pap imaged thin layer screen with HPV - recommended age 30 - 65 years (select HPV order below)     HPV High Risk Types DNA Cervical   4. Schizophrenia, unspecified type (H) F20.9 lamoTRIgine (LAMICTAL) 100 MG tablet     OLANZapine (ZYPREXA) 7.5 MG tablet     FLUoxetine (PROZAC) 40 MG capsule   5. Anxiety F41.9 lamoTRIgine (LAMICTAL) 100 MG tablet     busPIRone (BUSPAR) 10 MG tablet     FLUoxetine (PROZAC) 40 MG capsule   6. Moderate major depression (H) F32.1    7. Encounter for intrauterine device placement Z30.430 NEISSERIA GONORRHOEA PCR     CHLAMYDIA TRACHOMATIS PCR     levonorgestrel (MIRENA) 20 MCG/24HR IUD     levonorgestrel (MIRENA) 20 MCG/24HR IUD 20 mcg     INSERTION INTRAUTERINE DEVICE   8. Dermal nevus D23.9 <5MM TRUNK,ARM,LEG     0.6-1CM TRUNK,ARM,LEG     Dermatological path order and indications   9. Encounter for insertion of intrauterine contraceptive device Z30.430         PLAN:    Given 's handouts, including when to have IUD removed, list of danger s/sx, side effects and follow up recommended. Encouraged condom use for prevention of STD. Back up contraception advised for 7 days if progestin method. Advised to call for any fever, for prolonged or severe pain or bleeding, abnormal vaginal discharge, or unable to palpate strings. She was advised to use pain medications (ibuprofen) as needed for mild to moderate pain. Advised to follow-up in clinic in 4-6 weeks for IUD string check if unable to find strings or as  directed by provider.     Timmy Umana MD, MD      The patient was provided with written information regarding signs of hearing loss.  Information on urinary incontinence and treatment options given to patient.  The patient was provided with suggestions to help her develop a healthy emotional lifestyle.

## 2020-02-11 NOTE — PROGRESS NOTES
Subjective     Isaura Gray is a 33 year old female who presents to clinic today for the following health issues:    HPI   Acute Illness   Acute illness concerns: vomiting   Onset: one week     Fever: no     Chills/Sweats: YES- chills     Headache (location?): YES- after vomiting     Sinus Pressure:no    Conjunctivitis:  no    Ear Pain: YES- feels plugged     Rhinorrhea: YES    Congestion: no     Sore Throat: no      Cough: no    Wheeze: no     Decreased Appetite: no     Nausea: YES    Vomiting: YES    Diarrhea:  no (no BM for 4 days)    Dysuria/Freq.: no     Fatigue/Achiness: YES- exhausted     Sick/Strep Exposure: no      Therapies Tried and outcome: excedrin, patient states noticed about a week ago having throat pain, then progressed into nausea and is vomiting this am was brown.   She is drinking water and able to keep soup down.   No BM in 3 days she is passing gas denies abdominal pain other than nausea.   Fever LG today 101 this is after taking Tylenol  She has taken 3 neg pregnancy test.         Patient Active Problem List   Diagnosis     Vitamin D deficiency     Supervision of other high risk pregnancies, unspecified trimester     PE (pulmonary thromboembolism) (H)     Hyperprolactinemia (H)     Follicular cancer of thyroid (H)     Lactose intolerance in adult     Schizophrenia (H)     Encounter for triage in pregnant patient     Cough     Chronic bilateral low back pain without sciatica     Normal labor     Anxiety     Cancer (H)     H/O  delivery, currently pregnant     History of pulmonary embolism     History of thyroid cancer     Mitral valve disorder      (normal spontaneous vaginal delivery)     Past Surgical History:   Procedure Laterality Date     APPENDECTOMY       ENT SURGERY      Partial thyroidectomy       Social History     Tobacco Use     Smoking status: Former Smoker     Packs/day: 0.50     Years: 5.00     Pack years: 2.50     Types: Cigarettes     Last attempt to quit:  "2016     Years since quittin.9     Smokeless tobacco: Never Used   Substance Use Topics     Alcohol use: No     Family History   Problem Relation Age of Onset     No Known Problems Mother      Hypertension Father      Cerebrovascular Disease Father 56        Passed away from double brain stem clot     Mental Illness Father      Asthma Brother      Cancer Maternal Grandfather         lung     No Known Problems Paternal Grandmother      No Known Problems Paternal Grandfather      Autism Spectrum Disorder Son      Kidney Disease Son         \"has a third kidney\"     No Known Problems Daughter          Current Outpatient Medications   Medication Sig Dispense Refill     amoxicillin-clavulanate (AUGMENTIN) 500-125 MG tablet Take 1 tablet by mouth 2 times daily for 10 days 20 tablet 0     clobetasol (TEMOVATE) 0.05 % external ointment Apply topically 2 times daily 60 g 1     FLUoxetine (PROZAC) 20 MG capsule Take 1 capsule (20 mg) by mouth daily 30 capsule 1     lamoTRIgine (LAMICTAL) 100 MG tablet Take 1 tablet (100 mg) by mouth daily for 14 days 14 tablet 0     OLANZapine (ZYPREXA) 10 MG tablet Take 1 tablet (10 mg) by mouth At Bedtime 30 tablet 1     ondansetron (ZOFRAN) 8 MG tablet Take 1 tablet (8 mg) by mouth every 8 hours as needed for nausea 10 tablet 0     traZODone (DESYREL) 100 MG tablet Take 100 mg by mouth       Allergies   Allergen Reactions     Ciprofloxacin Hives     Sulfa Drugs Hives     Oxycodone-Acetaminophen Itching     Recent Labs   Lab Test 19  1110 18  1600 18  0826 18  0818   ALT  --  27 22 18   CR 0.58 0.73  --  0.67   GFRESTIMATED >90 >90  --  >90   GFRESTBLACK >90 >90  --  >90   POTASSIUM 3.9 4.1  --  3.7   TSH  --  2.05  --  2.36      BP Readings from Last 3 Encounters:   19 118/70   19 104/70   19 121/79    Wt Readings from Last 3 Encounters:   19 78.9 kg (174 lb)   19 79.4 kg (175 lb)   19 78.2 kg (172 lb 6.4 oz)          " "          Reviewed and updated as needed this visit by Provider  Allergies  Meds  Problems  Med Hx  Surg Hx         Review of Systems   ROS COMP: Constitutional, HEENT, cardiovascular, pulmonary, GI, , musculoskeletal, neuro, skin, endocrine and psych systems are negative, except as otherwise noted.      Objective    /70   Pulse 98   Temp 101  F (38.3  C) (Temporal)   Resp 16   Ht 1.727 m (5' 8\")   Wt 78.9 kg (174 lb)   SpO2 97%   BMI 26.46 kg/m    Body mass index is 26.46 kg/m .  Physical Exam   GENERAL: alert, no distress and fatigued  EYES: Eyes grossly normal to inspection, PERRL and conjunctivae and sclerae normal  HENT: normal cephalic/atraumatic, both ears: erythematous, nose and mouth without ulcers or lesions, nasal mucosa edematous , rhinorrhea yellow, oropharynx clear, oral mucous membranes moist, tonsillar erythema and sinuses: maxillary tenderness on bilateral  NECK: bilateral anterior cervical adenopathy, no asymmetry, masses, or scars and thyroid normal to palpation  RESP: lungs clear to auscultation - no rales, rhonchi or wheezes  CV: regular rate and rhythm, normal S1 S2, no S3 or S4, no murmur, click or rub, no peripheral edema and peripheral pulses strong  ABDOMEN: tenderness epigastric, bowel sounds normal, no palpable or pulsatile masses, no bruits heard and no palpable renal abnormalities   SKIN: no suspicious lesions or rashes            Assessment & Plan     1. Nausea and vomiting, intractability of vomiting not specified, unspecified vomiting type  zofran given for nausea continue to sip water.   - ondansetron (ZOFRAN) 8 MG tablet; Take 1 tablet (8 mg) by mouth every 8 hours as needed for nausea  Dispense: 10 tablet; Refill: 0    2. Constipation, unspecified constipation type  Recommend due to constipation and brown vomit negative for severe abd pain mild epigastric  - XR Abdomen 2 Views; Future    3. Acute non-recurrent frontal sinusitis  Home care instructions were " "reviewed with the patient. The risks, benefits and treatment options of prescribed medications or other treatments have been discussed with the patient. The patient verbalized their understanding and should call or follow up if no improvement or if they develop further problems.    - amoxicillin-clavulanate (AUGMENTIN) 500-125 MG tablet; Take 1 tablet by mouth 2 times daily for 10 days  Dispense: 20 tablet; Refill: 0    4. Fever, unspecified fever cause    - CBC with platelets and differential     BMI:   Estimated body mass index is 26.46 kg/m  as calculated from the following:    Height as of this encounter: 1.727 m (5' 8\").    Weight as of this encounter: 78.9 kg (174 lb).           Patient Instructions   I have prescribed an antibiotic for you today. You will take 2 tablet daily with food for 10 days. Please take a probiotic or yogurt while on this medication as this will help protect the good bacteria in your colon. Drink plenty of fluids. Use saline sinus spray in your sinuses as directed to help with the nasal congestion.I also recommend a nightly humidifier if you have one available.    Zofran for nausea continue to sip on water and bland diet    I will call you with your xray and lab results and any further treatment as indicated    If symptoms are not improving with treatment plan please return to clinic for further evaluation.        No follow-ups on file.    FLORIDA Guo [facility]    " no

## 2020-02-13 ENCOUNTER — OFFICE VISIT (OUTPATIENT)
Dept: FAMILY MEDICINE | Facility: OTHER | Age: 35
End: 2020-02-13
Payer: MEDICARE

## 2020-02-13 VITALS
BODY MASS INDEX: 23.27 KG/M2 | WEIGHT: 171.8 LBS | OXYGEN SATURATION: 99 % | HEIGHT: 72 IN | SYSTOLIC BLOOD PRESSURE: 117 MMHG | RESPIRATION RATE: 16 BRPM | TEMPERATURE: 97.6 F | HEART RATE: 90 BPM | DIASTOLIC BLOOD PRESSURE: 79 MMHG

## 2020-02-13 DIAGNOSIS — Z00.00 ENCOUNTER FOR MEDICARE ANNUAL WELLNESS EXAM: Primary | ICD-10-CM

## 2020-02-13 DIAGNOSIS — Z13.6 CARDIOVASCULAR SCREENING; LDL GOAL LESS THAN 130: ICD-10-CM

## 2020-02-13 DIAGNOSIS — F32.1 MODERATE MAJOR DEPRESSION (H): ICD-10-CM

## 2020-02-13 DIAGNOSIS — Z30.019 ENCOUNTER FOR FEMALE BIRTH CONTROL: ICD-10-CM

## 2020-02-13 DIAGNOSIS — Z12.4 SCREENING FOR MALIGNANT NEOPLASM OF CERVIX: ICD-10-CM

## 2020-02-13 DIAGNOSIS — F41.9 ANXIETY: ICD-10-CM

## 2020-02-13 DIAGNOSIS — Z30.430 ENCOUNTER FOR INTRAUTERINE DEVICE PLACEMENT: ICD-10-CM

## 2020-02-13 DIAGNOSIS — F20.9 SCHIZOPHRENIA, UNSPECIFIED TYPE (H): ICD-10-CM

## 2020-02-13 DIAGNOSIS — D23.9 DERMAL NEVUS: ICD-10-CM

## 2020-02-13 DIAGNOSIS — H91.90 DECREASED HEARING, UNSPECIFIED LATERALITY: ICD-10-CM

## 2020-02-13 DIAGNOSIS — Z23 NEED FOR PROPHYLACTIC VACCINATION AND INOCULATION AGAINST INFLUENZA: ICD-10-CM

## 2020-02-13 DIAGNOSIS — Z11.3 SCREEN FOR STD (SEXUALLY TRANSMITTED DISEASE): ICD-10-CM

## 2020-02-13 LAB — HCG UR QL: NEGATIVE

## 2020-02-13 PROCEDURE — G0476 HPV COMBO ASSAY CA SCREEN: HCPCS | Performed by: FAMILY MEDICINE

## 2020-02-13 PROCEDURE — 90686 IIV4 VACC NO PRSV 0.5 ML IM: CPT | Performed by: FAMILY MEDICINE

## 2020-02-13 PROCEDURE — 81025 URINE PREGNANCY TEST: CPT | Performed by: FAMILY MEDICINE

## 2020-02-13 PROCEDURE — 11103 TANGNTL BX SKIN EA SEP/ADDL: CPT | Performed by: FAMILY MEDICINE

## 2020-02-13 PROCEDURE — 11102 TANGNTL BX SKIN SINGLE LES: CPT | Performed by: FAMILY MEDICINE

## 2020-02-13 PROCEDURE — 99395 PREV VISIT EST AGE 18-39: CPT | Mod: 25 | Performed by: FAMILY MEDICINE

## 2020-02-13 PROCEDURE — 87491 CHLMYD TRACH DNA AMP PROBE: CPT | Performed by: FAMILY MEDICINE

## 2020-02-13 PROCEDURE — G0145 SCR C/V CYTO,THINLAYER,RESCR: HCPCS | Performed by: FAMILY MEDICINE

## 2020-02-13 PROCEDURE — 88305 TISSUE EXAM BY PATHOLOGIST: CPT | Mod: TC | Performed by: FAMILY MEDICINE

## 2020-02-13 PROCEDURE — G0008 ADMIN INFLUENZA VIRUS VAC: HCPCS | Performed by: FAMILY MEDICINE

## 2020-02-13 PROCEDURE — 87591 N.GONORRHOEAE DNA AMP PROB: CPT | Performed by: FAMILY MEDICINE

## 2020-02-13 PROCEDURE — 58300 INSERT INTRAUTERINE DEVICE: CPT | Performed by: FAMILY MEDICINE

## 2020-02-13 PROCEDURE — 99214 OFFICE O/P EST MOD 30 MIN: CPT | Mod: 25 | Performed by: FAMILY MEDICINE

## 2020-02-13 RX ORDER — LAMOTRIGINE 100 MG/1
100 TABLET ORAL DAILY
Qty: 90 TABLET | Refills: 0 | Status: SHIPPED | OUTPATIENT
Start: 2020-02-13 | End: 2020-05-20

## 2020-02-13 RX ORDER — BUSPIRONE HYDROCHLORIDE 10 MG/1
10 TABLET ORAL 3 TIMES DAILY
COMMUNITY
Start: 2020-02-13 | End: 2020-07-27

## 2020-02-13 RX ORDER — FLUOXETINE 40 MG/1
40 CAPSULE ORAL DAILY
Qty: 30 CAPSULE | Refills: 1 | Status: SHIPPED | OUTPATIENT
Start: 2020-02-13 | End: 2020-05-20

## 2020-02-13 RX ORDER — OLANZAPINE 7.5 MG/1
7.5 TABLET, FILM COATED ORAL AT BEDTIME
Qty: 30 TABLET | Refills: 1 | Status: SHIPPED | OUTPATIENT
Start: 2020-02-13 | End: 2020-05-20

## 2020-02-13 ASSESSMENT — ENCOUNTER SYMPTOMS
EYE PAIN: 0
JOINT SWELLING: 0
CONSTIPATION: 0
HEARTBURN: 0
ARTHRALGIAS: 0
WEAKNESS: 0
FREQUENCY: 0
HEADACHES: 0
SORE THROAT: 0
NAUSEA: 0
COUGH: 0
ABDOMINAL PAIN: 0
HEMATURIA: 0
SHORTNESS OF BREATH: 0
MYALGIAS: 0
HEMATOCHEZIA: 0
PALPITATIONS: 0
DIZZINESS: 0
FEVER: 0
BREAST MASS: 0
DYSURIA: 0
CHILLS: 0
PARESTHESIAS: 0
DIARRHEA: 0
NERVOUS/ANXIOUS: 0

## 2020-02-13 ASSESSMENT — PATIENT HEALTH QUESTIONNAIRE - PHQ9
SUM OF ALL RESPONSES TO PHQ QUESTIONS 1-9: 18
10. IF YOU CHECKED OFF ANY PROBLEMS, HOW DIFFICULT HAVE THESE PROBLEMS MADE IT FOR YOU TO DO YOUR WORK, TAKE CARE OF THINGS AT HOME, OR GET ALONG WITH OTHER PEOPLE: VERY DIFFICULT
SUM OF ALL RESPONSES TO PHQ QUESTIONS 1-9: 18

## 2020-02-13 ASSESSMENT — MIFFLIN-ST. JEOR: SCORE: 1595.79

## 2020-02-13 ASSESSMENT — PAIN SCALES - GENERAL: PAINLEVEL: NO PAIN (0)

## 2020-02-13 ASSESSMENT — ACTIVITIES OF DAILY LIVING (ADL): CURRENT_FUNCTION: NO ASSISTANCE NEEDED

## 2020-02-14 LAB
C TRACH DNA SPEC QL NAA+PROBE: NEGATIVE
N GONORRHOEA DNA SPEC QL NAA+PROBE: NEGATIVE
SPECIMEN SOURCE: NORMAL
SPECIMEN SOURCE: NORMAL

## 2020-02-14 ASSESSMENT — PATIENT HEALTH QUESTIONNAIRE - PHQ9: SUM OF ALL RESPONSES TO PHQ QUESTIONS 1-9: 18

## 2020-02-17 LAB — COPATH REPORT: NORMAL
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
## 2020-02-17 NOTE — RESULT ENCOUNTER NOTE
Isaura,    These were benign (NOT cancerous) growths, basically various types of moles.  It's possible that they could recur.    Labs were OK.    Thanks,    Dr. Umana

## 2020-02-18 ENCOUNTER — OFFICE VISIT (OUTPATIENT)
Dept: FAMILY MEDICINE | Facility: OTHER | Age: 35
End: 2020-02-18
Payer: MEDICARE

## 2020-02-18 VITALS
WEIGHT: 174.8 LBS | DIASTOLIC BLOOD PRESSURE: 72 MMHG | HEART RATE: 92 BPM | SYSTOLIC BLOOD PRESSURE: 100 MMHG | BODY MASS INDEX: 23.94 KG/M2 | TEMPERATURE: 98.9 F | RESPIRATION RATE: 16 BRPM

## 2020-02-18 DIAGNOSIS — F20.89 OTHER SCHIZOPHRENIA (H): ICD-10-CM

## 2020-02-18 DIAGNOSIS — Z79.899 OTHER LONG TERM (CURRENT) DRUG THERAPY: ICD-10-CM

## 2020-02-18 DIAGNOSIS — F41.9 ANXIETY: ICD-10-CM

## 2020-02-18 DIAGNOSIS — C73 FOLLICULAR CANCER OF THYROID (H): ICD-10-CM

## 2020-02-18 DIAGNOSIS — F32.1 MODERATE MAJOR DEPRESSION (H): ICD-10-CM

## 2020-02-18 DIAGNOSIS — F43.10 POSTTRAUMATIC STRESS DISORDER: Primary | ICD-10-CM

## 2020-02-18 LAB
COPATH REPORT: NORMAL
PAP: NORMAL
VIT B12 SERPL-MCNC: 459 PG/ML (ref 193–986)

## 2020-02-18 PROCEDURE — 82607 VITAMIN B-12: CPT | Performed by: PHYSICIAN ASSISTANT

## 2020-02-18 PROCEDURE — 36415 COLL VENOUS BLD VENIPUNCTURE: CPT | Performed by: PHYSICIAN ASSISTANT

## 2020-02-18 PROCEDURE — 82306 VITAMIN D 25 HYDROXY: CPT | Performed by: PHYSICIAN ASSISTANT

## 2020-02-18 PROCEDURE — 96127 BRIEF EMOTIONAL/BEHAV ASSMT: CPT | Performed by: PHYSICIAN ASSISTANT

## 2020-02-18 PROCEDURE — 99214 OFFICE O/P EST MOD 30 MIN: CPT | Performed by: PHYSICIAN ASSISTANT

## 2020-02-18 ASSESSMENT — ANXIETY QUESTIONNAIRES
4. TROUBLE RELAXING: MORE THAN HALF THE DAYS
7. FEELING AFRAID AS IF SOMETHING AWFUL MIGHT HAPPEN: NEARLY EVERY DAY
3. WORRYING TOO MUCH ABOUT DIFFERENT THINGS: MORE THAN HALF THE DAYS
1. FEELING NERVOUS, ANXIOUS, OR ON EDGE: NEARLY EVERY DAY
6. BECOMING EASILY ANNOYED OR IRRITABLE: NEARLY EVERY DAY
GAD7 TOTAL SCORE: 16
5. BEING SO RESTLESS THAT IT IS HARD TO SIT STILL: SEVERAL DAYS
GAD7 TOTAL SCORE: 16
2. NOT BEING ABLE TO STOP OR CONTROL WORRYING: MORE THAN HALF THE DAYS
7. FEELING AFRAID AS IF SOMETHING AWFUL MIGHT HAPPEN: NEARLY EVERY DAY
GAD7 TOTAL SCORE: 16

## 2020-02-18 ASSESSMENT — PATIENT HEALTH QUESTIONNAIRE - PHQ9
SUM OF ALL RESPONSES TO PHQ QUESTIONS 1-9: 17
SUM OF ALL RESPONSES TO PHQ QUESTIONS 1-9: 17
10. IF YOU CHECKED OFF ANY PROBLEMS, HOW DIFFICULT HAVE THESE PROBLEMS MADE IT FOR YOU TO DO YOUR WORK, TAKE CARE OF THINGS AT HOME, OR GET ALONG WITH OTHER PEOPLE: VERY DIFFICULT

## 2020-02-18 ASSESSMENT — PAIN SCALES - GENERAL: PAINLEVEL: NO PAIN (0)

## 2020-02-19 LAB
DEPRECATED CALCIDIOL+CALCIFEROL SERPL-MC: 15 UG/L (ref 20–75)
FINAL DIAGNOSIS: NORMAL
HPV HR 12 DNA CVX QL NAA+PROBE: NEGATIVE
HPV16 DNA SPEC QL NAA+PROBE: NEGATIVE
HPV18 DNA SPEC QL NAA+PROBE: NEGATIVE
SPECIMEN DESCRIPTION: NORMAL
SPECIMEN SOURCE CVX/VAG CYTO: NORMAL

## 2020-02-19 ASSESSMENT — PATIENT HEALTH QUESTIONNAIRE - PHQ9: SUM OF ALL RESPONSES TO PHQ QUESTIONS 1-9: 17

## 2020-02-19 ASSESSMENT — ANXIETY QUESTIONNAIRES: GAD7 TOTAL SCORE: 16

## 2020-03-19 ENCOUNTER — NURSE TRIAGE (OUTPATIENT)
Dept: FAMILY MEDICINE | Facility: OTHER | Age: 35
End: 2020-03-19

## 2020-03-19 NOTE — TELEPHONE ENCOUNTER
Was seen at [facility] for fever, cough, and fatigue starting about 10 days ago. Was tested for COVID-19, with a negative result she reports. Reinforced with continued symptoms she should stay home. Discussed according to [employer] employers should not be seeking notes for acute respiratory illnesses.    Is still having dry cough, fever, fatigue and now developing diarrhea for past 3 days. No recent antibiotics, denies recent illness, denies any blood in stool.  developed similar loose stools after patient.    Went over home cares for loose stools, to maintain hydration. If continued stools through weekend, recommended to re contact clinic.      Additional Information    Negative: Shock suspected (e.g., cold/pale/clammy skin, too weak to stand, low BP, rapid pulse)    Negative: Difficult to awaken or acting confused (e.g., disoriented, slurred speech)    Negative: Sounds like a life-threatening emergency to the triager    Negative: Vomiting also present and worse than the diarrhea    Negative: Blood in stool and without diarrhea    Negative: SEVERE abdominal pain (e.g., excruciating) and present > 1 hour    Negative: SEVERE abdominal pain and age > 60    Negative: Bloody, black, or tarry bowel movements    Negative: SEVERE diarrhea (e.g., 7 or more times / day more than normal) and age > 60 years    Negative: Constant abdominal pain lasting > 2 hours    Negative: Drinking very little and has signs of dehydration (e.g., no urine > 12 hours, very dry mouth, very lightheaded)    Negative: Patient sounds very sick or weak to the triager    Negative: SEVERE diarrhea (e.g., 7 or more times / day more than normal) and present > 24 hours (1 day)    Negative: MODERATE diarrhea (e.g., 4-6 times / day more than normal) and present > 48 hours (2 days)    Negative: MODERATE diarrhea (e.g., 4-6 times / day more than normal) and age > 70 years    Negative: Abdominal pain  (Exception: pain clears completely with  "each passage of diarrhea stool)    Negative: Fever > 101 F (38.3 C)    Negative: Blood in the stool    Negative: Mucus or pus in stool has been present > 2 days and diarrhea is more than mild    Negative: Weak immune system (e.g., HIV positive, cancer chemo, splenectomy, organ transplant, chronic steroids)    Negative: Travel to a foreign country in past month    Negative: Recent antibiotic therapy (i.e., within last 2 months) and diarrhea present > 3 days since antibiotic was stopped    Negative: Recent hospitalization and diarrhea present > 3 days    Negative: Tube feedings (e.g., nasogastric, g-tube, j-tube)    Negative: MILD diarrhea (e.g., 1-3 or more stools than normal in past 24 hours) diarrhea without known cause and present > 7 days    Negative: Patient wants to be seen    Negative: Diarrhea is a chronic symptom (recurrent or ongoing AND lasting > 4 weeks)    MILD-MODERATE diarrhea (e.g., 1-6 times / day more than normal)    Answer Assessment - Initial Assessment Questions  1. DIARRHEA SEVERITY: \"How bad is the diarrhea?\" \"How many extra stools have you had in the past 24 hours than normal?\"     - MILD: Few loose or mushy BMs; increase of 1-3 stools over normal daily number of stools; mild increase in ostomy output.    - MODERATE: Increase of 4-6 stools daily over normal; moderate increase in ostomy output.    - SEVERE (or Worst Possible): Increase of 7 or more stools daily over normal; moderate increase in ostomy output; incontinence.      Moderate  2. ONSET: \"When did the diarrhea begin?\"       3 days ago  3. BM CONSISTENCY: \"How loose or watery is the diarrhea?\"       Loose/watery  4. VOMITING: \"Are you also vomiting?\" If so, ask: \"How many times in the past 24 hours?\"       No  5. ABDOMINAL PAIN: \"Are you having any abdominal pain?\" If yes: \"What does it feel like?\" (e.g., crampy, dull, intermittent, constant)       No  6. ABDOMINAL PAIN SEVERITY: If present, ask: \"How bad is the pain?\"  (e.g., Scale " "1-10; mild, moderate, or severe)     - MILD (1-3): doesn't interfere with normal activities, abdomen soft and not tender to touch      - MODERATE (4-7): interferes with normal activities or awakens from sleep, tender to touch      - SEVERE (8-10): excruciating pain, doubled over, unable to do any normal activities        No  7. ORAL INTAKE: If vomiting, \"Have you been able to drink liquids?\" \"How much fluids have you had in the past 24 hours?\"      Yes - using gatorade  8. HYDRATION: \"Any signs of dehydration?\" (e.g., dry mouth [not just dry lips], too weak to stand, dizziness, new weight loss) \"When did you last urinate?\"      Denies  9. EXPOSURE: \"Have you traveled to a foreign country recently?\" \"Have you been exposed to anyone with diarrhea?\" \"Could you have eaten any food that was spoiled?\"       travels and developed symptoms after patient  10. ANTIBIOTIC USE: \"Are you taking antibiotics now or have you taken antibiotics in the past 2 months?\"        Denies  11. OTHER SYMPTOMS: \"Do you have any other symptoms?\" (e.g., fever, blood in stool)        Has had fever, cough prior to diarrhea  12. PREGNANCY: \"Is there any chance you are pregnant?\" \"When was your last menstrual period?\"        Denies.    Protocols used: DIARRHEA-A-OH      Solo Richards, YAMILA, BSN    "

## 2020-03-19 NOTE — TELEPHONE ENCOUNTER
Reason for call:  Patient reporting a symptom    Symptom or request: fever and diarrhea    Duration (how long have symptoms been present):     Have you been treated for this before? No    Additional comments: needs a letter to go back to work    Phone Number patient can be reached at: [phone]     Best Time:      Can we leave a detailed message on this number:  NO    Call taken on 3/19/2020 at 10:05 AM by Flora Parsons

## 2020-05-15 ENCOUNTER — TELEPHONE (OUTPATIENT)
Dept: FAMILY MEDICINE | Facility: OTHER | Age: 35
End: 2020-05-15

## 2020-05-15 NOTE — TELEPHONE ENCOUNTER
Panel Management Review    Summary:    Patient is due/failing the following:   FOLLOW UP, PAP and PHQ9    Action needed:   Patient needs video/telephone visit for Depression, Anxiety, PTSD.  Patient needs to do PHQ9.    Type of outreach:    Phone, left message for patient to call back.     Questions for provider review:    None                                                                                                                                    Paula Cuellar CMA ([facility])       Chart routed to Care Team .      Patient has the following on her problem list:     Depression / Dysthymia review    Measure:  Needs PHQ-9 score of 4 or less during index window.  Administer PHQ-9 and if score is 5 or more, send encounter to provider for next steps.    5 - 7 month window range:     PHQ-9 SCORE 12/27/2018 2/13/2020 2/18/2020   PHQ-9 Total Score MyChart - 18 (Moderately severe depression) 17 (Moderately severe depression)   PHQ-9 Total Score 22 18 17       If PHQ-9 recheck is 5 or more, route to provider for next steps.    Patient is due for:  PHQ9      Composite cancer screening  Chart review shows that this patient is due/due soon for the following Pap Smear

## 2020-05-20 ENCOUNTER — VIRTUAL VISIT (OUTPATIENT)
Dept: FAMILY MEDICINE | Facility: OTHER | Age: 35
End: 2020-05-20
Payer: MEDICARE

## 2020-05-20 DIAGNOSIS — F41.9 ANXIETY: Primary | ICD-10-CM

## 2020-05-20 DIAGNOSIS — F20.9 SCHIZOPHRENIA, UNSPECIFIED TYPE (H): ICD-10-CM

## 2020-05-20 DIAGNOSIS — F32.1 MODERATE MAJOR DEPRESSION (H): ICD-10-CM

## 2020-05-20 PROCEDURE — 99214 OFFICE O/P EST MOD 30 MIN: CPT | Mod: 95 | Performed by: FAMILY MEDICINE

## 2020-05-20 PROCEDURE — 96127 BRIEF EMOTIONAL/BEHAV ASSMT: CPT | Mod: 95 | Performed by: FAMILY MEDICINE

## 2020-05-20 RX ORDER — LAMOTRIGINE 150 MG/1
150 TABLET ORAL DAILY
Qty: 30 TABLET | Refills: 1 | Status: SHIPPED | OUTPATIENT
Start: 2020-05-20 | End: 2020-07-27

## 2020-05-20 RX ORDER — FLUOXETINE 40 MG/1
40 CAPSULE ORAL DAILY
Qty: 30 CAPSULE | Refills: 1 | Status: SHIPPED | OUTPATIENT
Start: 2020-05-20 | End: 2020-07-27

## 2020-05-20 RX ORDER — OLANZAPINE 5 MG/1
5 TABLET ORAL AT BEDTIME
Qty: 30 TABLET | Refills: 1 | Status: SHIPPED | OUTPATIENT
Start: 2020-05-20 | End: 2020-07-27

## 2020-05-20 ASSESSMENT — ANXIETY QUESTIONNAIRES
6. BECOMING EASILY ANNOYED OR IRRITABLE: NEARLY EVERY DAY
1. FEELING NERVOUS, ANXIOUS, OR ON EDGE: NEARLY EVERY DAY
7. FEELING AFRAID AS IF SOMETHING AWFUL MIGHT HAPPEN: NOT AT ALL
5. BEING SO RESTLESS THAT IT IS HARD TO SIT STILL: NEARLY EVERY DAY
GAD7 TOTAL SCORE: 15
2. NOT BEING ABLE TO STOP OR CONTROL WORRYING: NEARLY EVERY DAY
IF YOU CHECKED OFF ANY PROBLEMS ON THIS QUESTIONNAIRE, HOW DIFFICULT HAVE THESE PROBLEMS MADE IT FOR YOU TO DO YOUR WORK, TAKE CARE OF THINGS AT HOME, OR GET ALONG WITH OTHER PEOPLE: VERY DIFFICULT
3. WORRYING TOO MUCH ABOUT DIFFERENT THINGS: NEARLY EVERY DAY

## 2020-05-20 ASSESSMENT — PATIENT HEALTH QUESTIONNAIRE - PHQ9
SUM OF ALL RESPONSES TO PHQ QUESTIONS 1-9: 17
5. POOR APPETITE OR OVEREATING: NOT AT ALL

## 2020-05-20 NOTE — PROGRESS NOTES
"Isaura Gray is a 34 year old female who is being evaluated via a billable video visit.      The patient has been notified of following:     \"This video visit will be conducted via a call between you and your physician/provider. We have found that certain health care needs can be provided without the need for an in-person physical exam.  This service lets us provide the care you need with a video conversation.  If a prescription is necessary we can send it directly to your pharmacy.  If lab work is needed we can place an order for that and you can then stop by our lab to have the test done at a later time.    Video visits are billed at different rates depending on your insurance coverage.  Please reach out to your insurance provider with any questions.    If during the course of the call the physician/provider feels a video visit is not appropriate, you will not be charged for this service.\"    Patient has given verbal consent for Video visit? Yes    How would you like to obtain your AVS? [facility]    Patient would like the video invitation sent by: Text to cell phone: [phone]    Will anyone else be joining your video visit? No      Subjective     Isaura Gray is a 34 year old female who presents today via video visit for the following health issues:    HPI     Currently not working.  Was working before.      Currently working on a certification for further work.    Depression and Anxiety Follow-Up    How are you doing with your depression since your last visit? Worsened, recent manic symptoms.    How are you doing with your anxiety since your last visit?  Worsened, worries about being able to get everything done in the day.      Are you having other symptoms that might be associated with depression or anxiety? No    Have you had a significant life event? No     Do you have any concerns with your use of alcohol or other drugs? No    Some days, she's really tired.  She knows it may be related to her " medications.  These were all started around the same time.  She stopped the trazodone because of this, but isn't improving all that much.        Social History     Tobacco Use     Smoking status: Former Smoker     Packs/day: 0.50     Years: 5.00     Pack years: 2.50     Types: Cigarettes     Last attempt to quit: 7/31/2016     Years since quitting: 3.8     Smokeless tobacco: Never Used   Substance Use Topics     Alcohol use: No     Drug use: No     PHQ 2/13/2020 2/18/2020 5/20/2020   PHQ-9 Total Score 18 17 17   Q9: Thoughts of better off dead/self-harm past 2 weeks Several days Several days Not at all   F/U: Thoughts of suicide or self-harm No No -   F/U: Self harm-plan - - -   F/U: Self-harm action - - -   F/U: Safety concerns No No -     AVERY-7 SCORE 12/27/2018 2/18/2020 5/20/2020   Total Score - 16 (severe anxiety) -   Total Score 20 16 15     Last PHQ-9 5/20/2020   1.  Little interest or pleasure in doing things 2   2.  Feeling down, depressed, or hopeless 2   3.  Trouble falling or staying asleep, or sleeping too much 3   4.  Feeling tired or having little energy 3   5.  Poor appetite or overeating 3   6.  Feeling bad about yourself 1   7.  Trouble concentrating 3   8.  Moving slowly or restless 0   Q9: Thoughts of better off dead/self-harm past 2 weeks 0   PHQ-9 Total Score 17   Difficulty at work, home, or with people Very difficult   In the past two weeks have you had thoughts of suicide or self harm? -   Do you have concerns about your personal safety or the safety of others? -   In the past 2 weeks have you thought about a plan or had intention to harm yourself? -   In the past 2 weeks have you acted on these thoughts in any way? -     AVERY-7  5/20/2020   1. Feeling nervous, anxious, or on edge 3   2. Not being able to stop or control worrying 3   3. Worrying too much about different things 3   4. Trouble relaxing 0   5. Being so restless that it is hard to sit still 3   6. Becoming easily annoyed or  irritable 3   7. Feeling afraid, as if something awful might happen 0   AVERY-7 Total Score 15   If you checked any problems, how difficult have they made it for you to do your work, take care of things at home, or get along with other people? Very difficult         Suicide Assessment Five-step Evaluation and Treatment (SAFE-T)      How many servings of fruits and vegetables do you eat daily?  2-3    On average, how many sweetened beverages do you drink each day (Examples: soda, juice, sweet tea, etc.  Do NOT count diet or artificially sweetened beverages)?   0    How many days per week do you exercise enough to make your heart beat faster? 3 or less    How many minutes a day do you exercise enough to make your heart beat faster? 9 or less    How many days per week do you miss taking your medication? 0         Video Start Time: 5:30 PM      Current Outpatient Medications   Medication Sig Dispense Refill     busPIRone (BUSPAR) 10 MG tablet Take 1 tablet (10 mg) by mouth 3 times daily       FLUoxetine (PROZAC) 40 MG capsule Take 1 capsule (40 mg) by mouth daily 30 capsule 1     lamoTRIgine (LAMICTAL) 150 MG tablet Take 1 tablet (150 mg) by mouth daily 30 tablet 1     levonorgestrel (MIRENA) 20 MCG/24HR IUD 1 each (20 mcg) by Intrauterine route once       OLANZapine (ZYPREXA) 5 MG tablet Take 1 tablet (5 mg) by mouth At Bedtime 30 tablet 1     ketoconazole (NIZORAL) 2 % external cream Apply topically daily (Patient not taking: Reported on 5/20/2020) 60 g 1     Allergies   Allergen Reactions     Ciprofloxacin Hives     Sulfa Drugs Hives     Oxycodone-Acetaminophen Itching     Recent Labs   Lab Test 01/27/20  1828 03/12/19  1110 11/05/18  1600 06/20/18  0826   ALT 15  --  27 22   CR 0.79 0.58 0.73  --    GFRESTIMATED >90 >90 >90  --    GFRESTBLACK >90 >90 >90  --    POTASSIUM 3.8 3.9 4.1  --    TSH 2.25  --  2.05  --       BP Readings from Last 3 Encounters:   02/18/20 100/72   02/13/20 117/79   01/27/20 124/76    Wt  "Readings from Last 3 Encounters:   02/18/20 79.3 kg (174 lb 12.8 oz)   02/13/20 77.9 kg (171 lb 12.8 oz)   01/27/20 81.2 kg (179 lb)                  Reviewed and updated as needed this visit by Provider         Review of Systems   Constitutional, HEENT, cardiovascular, pulmonary, gi and gu systems are negative, except as otherwise noted.      Objective    There were no vitals taken for this visit.  Estimated body mass index is 23.94 kg/m  as calculated from the following:    Height as of 2/13/20: 1.82 m (5' 11.65\").    Weight as of 2/18/20: 79.3 kg (174 lb 12.8 oz).  Physical Exam     GENERAL: Healthy, alert and no distress  EYES: Eyes grossly normal to inspection.  No discharge or erythema, or obvious scleral/conjunctival abnormalities.  RESP: No audible wheeze, cough, or visible cyanosis.  No visible retractions or increased work of breathing.    SKIN: Visible skin clear. No significant rash, abnormal pigmentation or lesions.  NEURO: Cranial nerves grossly intact.  Mentation and speech appropriate for age.  PSYCH: Mentation appears normal, affect normal/bright, judgement and insight intact, normal speech and appearance well-groomed.      Diagnostic Test Results:  Labs reviewed in Epic        Assessment & Plan       ICD-10-CM    1. Anxiety  F41.9 lamoTRIgine (LAMICTAL) 150 MG tablet     FLUoxetine (PROZAC) 40 MG capsule   2. Moderate major depression (H)  F32.1    3. Schizophrenia, unspecified type (H)  F20.9 lamoTRIgine (LAMICTAL) 150 MG tablet     OLANZapine (ZYPREXA) 5 MG tablet     FLUoxetine (PROZAC) 40 MG capsule     Patient noted significant increase in manic symptoms and anxiety lately.  She also reports significant sedation.  She feels is related to her medications, but is uncertain which ones.  Olanzapine would be the most likely to contribute to her symptoms.  We will go ahead and reduce her dose of this medication while increasing the Motrin gene for added mood stabilization.  This will also hopefully " provide some improvement with her anxiety.  If not, we could consider an increase in BuSpar, but patient indicates that she does not feel like it is doing anything for her lately.  That could be secondary to interaction between the BuSpar and the fluoxetine.  We will need to consider that possibility if continued difficulties in managing this.  We may also have to refer back to psychiatry to help with this.  I did not clarify today with patient why she has been following up with me rather than with psychiatry.    Portions of this note were completed using Dragon dictation software.  Although reviewed, there may be typographical and other inadvertent errors that remain.     This virtual visit was performed in an attempt to minimize potential patient exposures in clinic during the COVID-19 pandemic.          Patient Instructions   [facility] you [facility]    Let us reduce your Zyprexa to 5 mg nightly to see if that helps with your sedation.  Increase your Lamictal to 150 mg daily.  If you feel that Lamictal contributes to sedation, you can dose it at night.    If significant worsening of your mood over the week following these adjustments, let me know, and will probably return to your previous regimen.    If continued increased anxiety despite these adjustments, we can consider an adjustment to your buspirone.  Changes in fluoxetine might also be beneficial.    Follow-up in 2 to 4 weeks to ensure improvement.    Contact us or return if questions or concerns.     Have a nice day!    Dr. Umana     No follow-ups on file.      If you had imaging scheduled please refer to your radiology prep sheet.      If you need medication refills, please contact your pharmacy 3 days before your prescriptions runs out or download the [facility] henrietta for your smart phone.   If you are out of refills, your pharmacy will contact contact the clinic.    Contact us or return if questions or concerns.  "    -Your [facility] Care Team:    MD Taylor Hernandez PA-C Joel De Haan, PA-C Anna Niesen, PA-C Elizabeth \"Gemma\" Giovanny, APRN CNP  Hoa Retana, APRN, CNP  Juana Bhatti, APRN, CNP  Charles Mata, RN, BSN       General information about your   [facility]      Clinic hours:     Lab hours:  Phone [phone]  Monday 7:30 am-7 pm    Monday 8:30 am-6:30 pm  Tuesday-Friday 7:30 am-5 pm   Tuesday-Friday 8:30 am-4:30 pm    Pharmacy hours:  Phone [phone]  Monday 8:30 am-7pm  Tuesday-Friday 8:30am-6 pm                                     Mychart assistance [phone]    We would like to hear from you, how was your visit today?    Brook Parham  Patient Information Supervisor   Patient Care Supervisor  [facility], and [facility], and [facility]                Return in about 4 weeks (around 6/17/2020) for E-visit, video, or phone visit.    Timmy Umana MD, MD  [facility]      Video-Visit Details    Type of service:  Video Visit    Video End Time:5:45 PM    Originating Location (pt. Location): Home    Distant Location (provider location):  [facility]     Platform used for Video Visit: Doximity, started on [place]well, but had too many technical issues    Return in about 4 weeks (around 6/17/2020) for E-visit, video, or phone visit.       Timmy Umana MD, MD      "

## 2020-05-20 NOTE — PATIENT INSTRUCTIONS
"Thank you for visiting [facility]    Let us reduce your Zyprexa to 5 mg nightly to see if that helps with your sedation.  Increase your Lamictal to 150 mg daily.  If you feel that Lamictal contributes to sedation, you can dose it at night.    If significant worsening of your mood over the week following these adjustments, let me know, and will probably return to your previous regimen.    If continued increased anxiety despite these adjustments, we can consider an adjustment to your buspirone.  Changes in fluoxetine might also be beneficial.    Follow-up in 2 to 4 weeks to ensure improvement.    Contact us or return if questions or concerns.     Have a nice day!    Dr. Umana     No follow-ups on file.      If you had imaging scheduled please refer to your radiology prep sheet.      If you need medication refills, please contact your pharmacy 3 days before your prescriptions runs out or download the [facility] henrietta for your smart phone.   If you are out of refills, your pharmacy will contact contact the clinic.    Contact us or return if questions or concerns.     -Your [facility] Care Team:    MD Taylor Hernandez PA-C Joel De Haan, PA-C Anna Niesen, PA-C Elizabeth \"Gemma\" [place] Baltazar CNP, APRN, [place] Jaramillo, JEREMIE Mata, RN, BSN       General information about your   [facility]      Clinic hours:     Lab hours:  Phone [phone]  Monday 7:30 am-7 pm    Monday 8:30 am-6:30 pm  Tuesday-Friday 7:30 am-5 pm   Tuesday-Friday 8:30 am-4:30 pm    Pharmacy hours:  Phone [phone]  Monday 8:30 am-7pm  Tuesday-Friday 8:30am-6 pm                                     Mychart assistance [phone]    We would like to hear from you, how was your visit today?    Brook Parham  Patient Information Supervisor   Patient Care Supervisor  [facility], and [facility], " [place] [facility], and [facility]

## 2020-05-21 ASSESSMENT — ANXIETY QUESTIONNAIRES: GAD7 TOTAL SCORE: 15

## 2020-05-28 ENCOUNTER — TELEPHONE (OUTPATIENT)
Dept: FAMILY MEDICINE | Facility: OTHER | Age: 35
End: 2020-05-28

## 2020-05-28 NOTE — TELEPHONE ENCOUNTER
Reason for call:  Patient reporting a symptom    Symptom or request: nausea diarrhea since changing dose of olanzapine    Duration (how long have symptoms been present):     Have you been treated for this before? No    Additional comments: please call to advise    Phone Number patient can be reached at:  Home number on file [phone] (home)    Best Time:  any    Can we leave a detailed message on this number:  YES    Call taken on 5/28/2020 at 10:36 AM by Soni Rodríguez

## 2020-05-28 NOTE — TELEPHONE ENCOUNTER
DJ: with her does changes: would this be common? Its only been a week, would you like her to wait another week then follow up?    From visit: Let us reduce your Zyprexa to 5 mg nightly to see if that helps with your sedation.  Increase your Lamictal to 150 mg daily.     Charles Mata, RN, BSN

## 2020-05-28 NOTE — TELEPHONE ENCOUNTER
Diarrhea from the Lamictal increased would be uncommon.  Nausea is possible, but given the concomittant diarrhea, I wonder if she just happened to  a virus at the same time that her medications were adjusted.    I personally would recommend she given another week.  If she is not willing to, can resume her previous medical regimen.

## 2020-05-28 NOTE — TELEPHONE ENCOUNTER
Spoke with patient.  Will try another week and follow up as planned.    Charles Mata, RN, BSN

## 2020-07-22 ENCOUNTER — TELEPHONE (OUTPATIENT)
Dept: FAMILY MEDICINE | Facility: OTHER | Age: 35
End: 2020-07-22

## 2020-07-22 NOTE — TELEPHONE ENCOUNTER
Summary:    Patient is due/failing the following:   Mood follow up, LDL and PHQ9    Action needed:   Patient needs office visit for follow up, PHQ9 and fasting lab.    Type of outreach:    Phone, spoke to patient.  patient scheduled      Questions for provider review:    None                                                                                                                                    Geri Ahumada CMA       Chart routed to Care Team .

## 2020-07-23 NOTE — PROGRESS NOTES
"Isaura Gray is a 34 year old female who is being evaluated via a billable video visit.      The patient has been notified of following:     \"This video visit will be conducted via a call between you and your physician/provider. We have found that certain health care needs can be provided without the need for an in-person physical exam.  This service lets us provide the care you need with a video conversation.  If a prescription is necessary we can send it directly to your pharmacy.  If lab work is needed we can place an order for that and you can then stop by our lab to have the test done at a later time.    Video visits are billed at different rates depending on your insurance coverage.  Please reach out to your insurance provider with any questions.    If during the course of the call the physician/provider feels a video visit is not appropriate, you will not be charged for this service.\"    Patient has given verbal consent for Video visit? Yes  How would you like to obtain your AVS? MyChart  If you are dropped from the video visit, the video invite should be resent to: Text to cell phone: [phone]  Will anyone else be joining your video visit? No       Subjective     Isaura Gray is a 34 year old female who presents today via video visit for the following health issues:    HPI    Depression and Anxiety Follow-Up    How are you doing with your depression since your last visit? Worsened     How are you doing with your anxiety since your last visit?  Worsened     Are you having other symptoms that might be associated with depression or anxiety? Yes:  fatigue    Have you had a significant life event? No     Do you have any concerns with your use of alcohol or other drugs? No    Social History     Tobacco Use     Smoking status: Former Smoker     Packs/day: 0.50     Years: 5.00     Pack years: 2.50     Types: Cigarettes     Last attempt to quit: 7/31/2016     Years since quitting: 3.9     Smokeless " "tobacco: Never Used   Substance Use Topics     Alcohol use: No     Drug use: No     PHQ 2/13/2020 2/18/2020 5/20/2020   PHQ-9 Total Score 18 17 17   Q9: Thoughts of better off dead/self-harm past 2 weeks Several days Several days Not at all   F/U: Thoughts of suicide or self-harm No No -   F/U: Self harm-plan - - -   F/U: Self-harm action - - -   F/U: Safety concerns No No -     AVERY-7 SCORE 12/27/2018 2/18/2020 5/20/2020   Total Score - 16 (severe anxiety) -   Total Score 20 16 15     {Last PHQ9 or GAD7 Responses (Optional):733034}    Suicide Assessment Five-step Evaluation and Treatment (SAFE-T)        {PEDS Chronic and Acute Problems (Optional):194795}     Video Start Time: {video visit start/end time for provider to select:436450}    {additonal problems for provider to add (Optional):668252}    {HIST REVIEW/ LINKS 2 (Optional):873910}    Reviewed and updated as needed this visit by Provider         Review of Systems   {ROS COMP (Optional):348453}      Objective             Physical Exam     {video visit exam brief selected:693639::\"GENERAL: Healthy, alert and no distress\",\"EYES: Eyes grossly normal to inspection.  No discharge or erythema, or obvious scleral/conjunctival abnormalities.\",\"RESP: No audible wheeze, cough, or visible cyanosis.  No visible retractions or increased work of breathing.  \",\"SKIN: Visible skin clear. No significant rash, abnormal pigmentation or lesions.\",\"NEURO: Cranial nerves grossly intact.  Mentation and speech appropriate for age.\",\"PSYCH: Mentation appears normal, affect normal/bright, judgement and insight intact, normal speech and appearance well-groomed.\"}      {Diagnostic Test Results (Optional):259436::\"Diagnostic Test Results:\",\"Labs reviewed in Epic\"}        {PROVIDER CHARTING PREFERENCE:837208}      Video-Visit Details    Type of service:  Video Visit    Video End Time:{video visit start/end time for provider to select:888421}    Originating Location (pt. Location): {video " "visit patient location:200878::\"Home\"}    Distant Location (provider location):  [facility]     Platform used for Video Visit: {Virtual Visit Platforms:908376::\"AmWell\"}    No follow-ups on file.       {signature options:772955}        "

## 2020-07-27 ENCOUNTER — VIRTUAL VISIT (OUTPATIENT)
Dept: FAMILY MEDICINE | Facility: OTHER | Age: 35
End: 2020-07-27
Payer: MEDICARE

## 2020-07-27 DIAGNOSIS — F20.9 SCHIZOPHRENIA, UNSPECIFIED TYPE (H): ICD-10-CM

## 2020-07-27 DIAGNOSIS — F32.1 MODERATE MAJOR DEPRESSION (H): Primary | ICD-10-CM

## 2020-07-27 DIAGNOSIS — F41.9 ANXIETY: ICD-10-CM

## 2020-07-27 DIAGNOSIS — F20.89 OTHER SCHIZOPHRENIA (H): ICD-10-CM

## 2020-07-27 PROCEDURE — 99214 OFFICE O/P EST MOD 30 MIN: CPT | Mod: 95 | Performed by: PHYSICIAN ASSISTANT

## 2020-07-27 RX ORDER — OLANZAPINE 5 MG/1
5 TABLET ORAL AT BEDTIME
Qty: 30 TABLET | Refills: 1 | Status: SHIPPED | OUTPATIENT
Start: 2020-07-27 | End: 2020-10-10

## 2020-07-27 RX ORDER — LAMOTRIGINE 150 MG/1
150 TABLET ORAL DAILY
Qty: 30 TABLET | Refills: 1 | Status: SHIPPED | OUTPATIENT
Start: 2020-07-27 | End: 2020-10-10

## 2020-07-27 RX ORDER — FLUOXETINE 40 MG/1
40 CAPSULE ORAL DAILY
Qty: 30 CAPSULE | Refills: 1 | Status: SHIPPED | OUTPATIENT
Start: 2020-07-27 | End: 2020-10-10

## 2020-07-27 RX ORDER — BUSPIRONE HYDROCHLORIDE 10 MG/1
20 TABLET ORAL 3 TIMES DAILY
Qty: 180 TABLET | Refills: 1 | Status: SHIPPED | OUTPATIENT
Start: 2020-07-27 | End: 2020-12-07

## 2020-07-27 RX ORDER — TRAZODONE HYDROCHLORIDE 100 MG/1
TABLET ORAL
COMMUNITY
Start: 2020-04-16 | End: 2020-12-07

## 2020-07-27 ASSESSMENT — ANXIETY QUESTIONNAIRES
6. BECOMING EASILY ANNOYED OR IRRITABLE: NEARLY EVERY DAY
3. WORRYING TOO MUCH ABOUT DIFFERENT THINGS: NEARLY EVERY DAY
GAD7 TOTAL SCORE: 18
7. FEELING AFRAID AS IF SOMETHING AWFUL MIGHT HAPPEN: NEARLY EVERY DAY
2. NOT BEING ABLE TO STOP OR CONTROL WORRYING: NEARLY EVERY DAY
1. FEELING NERVOUS, ANXIOUS, OR ON EDGE: NEARLY EVERY DAY
5. BEING SO RESTLESS THAT IT IS HARD TO SIT STILL: NOT AT ALL
IF YOU CHECKED OFF ANY PROBLEMS ON THIS QUESTIONNAIRE, HOW DIFFICULT HAVE THESE PROBLEMS MADE IT FOR YOU TO DO YOUR WORK, TAKE CARE OF THINGS AT HOME, OR GET ALONG WITH OTHER PEOPLE: EXTREMELY DIFFICULT

## 2020-07-27 ASSESSMENT — PATIENT HEALTH QUESTIONNAIRE - PHQ9
5. POOR APPETITE OR OVEREATING: NEARLY EVERY DAY
SUM OF ALL RESPONSES TO PHQ QUESTIONS 1-9: 23

## 2020-07-27 NOTE — PATIENT INSTRUCTIONS
1. Await call from [facility] psychiatrist scheduling team     2. Increase Buspar from 10 mg three times a day to 20 mg (2 tablets) three times a day     3. Recheck 1 month

## 2020-07-27 NOTE — PROGRESS NOTES
"Isaura Gray is a 34 year old female who is being evaluated via a billable video visit.      The patient has been notified of following:     \"This video visit will be conducted via a call between you and your physician/provider. We have found that certain health care needs can be provided without the need for an in-person physical exam.  This service lets us provide the care you need with a video conversation.  If a prescription is necessary we can send it directly to your pharmacy.  If lab work is needed we can place an order for that and you can then stop by our lab to have the test done at a later time.    Video visits are billed at different rates depending on your insurance coverage.  Please reach out to your insurance provider with any questions.    If during the course of the call the physician/provider feels a video visit is not appropriate, you will not be charged for this service.\"    Patient has given verbal consent for Video visit? Yes  How would you like to obtain your AVS? MyChart  If you are dropped from the video visit, the video invite should be resent to: Text to cell phone: [phone]  Will anyone else be joining your video visit? No    Subjective     Isaura Gray is a 34 year old female who presents today via video visit for the following health issues:    HPI    Depression and Anxiety Follow-Up    How are you doing with your depression since your last visit? Worsened     How are you doing with your anxiety since your last visit?  Worsened     Are you having other symptoms that might be associated with depression or anxiety? Yes:  fatigue    Have you had a significant life event? No     Do you have any concerns with your use of alcohol or other drugs? No    - Increased Lamictal and decreased Zyprex at the last visit   - Was good for about 2 weeks but then things went down   - Not sleeping, no motivation to get things done   - Had a few panic attacks but small   - Zyprexa made really " tired       Social History     Tobacco Use     Smoking status: Former Smoker     Packs/day: 0.50     Years: 5.00     Pack years: 2.50     Types: Cigarettes     Last attempt to quit: 7/31/2016     Years since quitting: 3.9     Smokeless tobacco: Never Used   Substance Use Topics     Alcohol use: No     Drug use: No     PHQ 2/18/2020 5/20/2020 7/27/2020   PHQ-9 Total Score 17 17 23   Q9: Thoughts of better off dead/self-harm past 2 weeks Several days Not at all Not at all   F/U: Thoughts of suicide or self-harm No - -   F/U: Self harm-plan - - -   F/U: Self-harm action - - -   F/U: Safety concerns No - -     AVERY-7 SCORE 2/18/2020 5/20/2020 7/27/2020   Total Score 16 (severe anxiety) - -   Total Score 16 15 18       Plan from last visit   Patient noted significant increase in manic symptoms and anxiety lately.  She also reports significant sedation.  She feels is related to her medications, but is uncertain which ones.  Olanzapine would be the most likely to contribute to her symptoms.  We will go ahead and reduce her dose of this medication while increasing the Motrin gene for added mood stabilization.  This will also hopefully provide some improvement with her anxiety.  If not, we could consider an increase in BuSpar, but patient indicates that she does not feel like it is doing anything for her lately.  That could be secondary to interaction between the BuSpar and the fluoxetine.  We will need to consider that possibility if continued difficulties in managing this.  We may also have to refer back to psychiatry to help with this.  I did not clarify today with patient why she has been following up with me rather than with psychiatry.      Video Start Time: 3:00 PM      Reviewed and updated as needed this visit by Provider  Allergies  Meds  Problems  Med Hx  Surg Hx         Review of Systems   Constitutional, HEENT, cardiovascular, pulmonary, gi and gu systems are negative, except as otherwise noted.       Objective       Physical Exam   GENERAL: Healthy, alert and no distress  EYES: Eyes grossly normal to inspection.  No discharge or erythema, or obvious scleral/conjunctival abnormalities.  RESP: No audible wheeze, cough, or visible cyanosis.  No visible retractions or increased work of breathing.    SKIN: Visible skin clear. No significant rash, abnormal pigmentation or lesions.  NEURO: Cranial nerves grossly intact.  Mentation and speech appropriate for age.  PSYCH: Mentation appears normal, affect normal/bright, judgement and insight intact, normal speech and appearance well-groomed.      Diagnostic Test Results:  Labs reviewed in Epic  none         Assessment & Plan       ICD-10-CM    1. Moderate major depression (H)  F32.1 MENTAL HEALTH REFERRAL  - Adult; Psychiatry; Psychiatry; G: [facility]/Bridge to Long-Term Psychiatry as indicated ([phone]); Yes; Several Medications tried and failed; Yes; We will contact you to schedule the a...   2. Anxiety  F41.9 busPIRone (BUSPAR) 10 MG tablet     FLUoxetine (PROZAC) 40 MG capsule     lamoTRIgine (LAMICTAL) 150 MG tablet     MENTAL HEALTH REFERRAL  - Adult; Psychiatry; Psychiatry; G: [facility] Psych[facility]/Bridge to Long-Term Psychiatry as indicated ([phone]); Yes; Several Medications tried and failed; Yes; We will contact you to schedule the a...   3. Other schizophrenia (H)  F20.89 MENTAL HEALTH REFERRAL  - Adult; Psychiatry; Psychiatry; FMG: [facility]/Bridge to Long-Term Psychiatry as indicated ([phone]); Yes; Several Medications tried and failed; Yes; We will contact you to schedule the a...   4. Schizophrenia, unspecified type (H)  F20.9 FLUoxetine (PROZAC) 40 MG capsule     lamoTRIgine (LAMICTAL) 150 MG tablet     OLANZapine (ZYPREXA) 5 MG tablet     MENTAL HEALTH REFERRAL  - Adult; Psychiatry; Psychiatry; G: [facility] Psych[facility]ry [facility]/Bridge to Long-Term  Psychiatry as indicated ([phone]); Yes; Several Medications tried and failed; Yes; We will contact you to schedule the a...     - Patient with known mental health issues, was having issues with fatigue and sedation previously, Zyprexa was decreased and Lamictal was increased   - Reports was doing well for 2 weeks but now worsened again   - Her symptoms are not quite clear for arsh or depression   - Reviewed PCP's recommendations with patient   - Discussed increase Zyprexa vs. Increase Buspar vs. Psychiatry referral   - Patient elects the last 2     Will increase Buspar from 10 mg TID to 20 mg TID, reviewed use and side effects     Also refer to collaborative care psychiatry  - Reviewed all medication use and side effects, refilled as needed   - Recheck 1 month with PCP        The patient indicates understanding of these issues and agrees with the plan.    Return in about 1 month (around 8/27/2020) for Recheck.    Matilde Jiang PA-C  [facility]      Video-Visit Details    Type of service:  Video Visit    Video End Time:3:07 PM    Originating Location (pt. Location): Home    Distant Location (provider location):  [facility] - provider off site      Platform used for Video Visit: Laura

## 2020-07-28 ASSESSMENT — ANXIETY QUESTIONNAIRES: GAD7 TOTAL SCORE: 18

## 2020-07-30 ENCOUNTER — TELEPHONE (OUTPATIENT)
Dept: FAMILY MEDICINE | Facility: OTHER | Age: 35
End: 2020-07-30

## 2020-07-30 NOTE — TELEPHONE ENCOUNTER
We did not reach Isaura Gray, we left a message with our contact number [phone].  If we do not hear from them within the next 3 business days we will mail a letter offering our scheduling services.    If they do call to schedule, in the future, we will inform you of the outcome.    Thank you for your referral,  [facility]

## 2020-08-27 NOTE — TELEPHONE ENCOUNTER
Summary:    Patient is due/failing the following:   Depression follow up    Action needed:   Patient needs office visit for Follow up.    Type of outreach:    Phone, spoke to patient.  patient scheduled     Questions for provider review:    None                                                                                                                                    Geri Ahumada CMA       Chart routed to Care Team .          Panel Management Review      Patient has the following on her problem list:     Depression / Dysthymia review    Measure:  Needs PHQ-9 score of 4 or less during index window.  Administer PHQ-9 and if score is 5 or more, send encounter to provider for next steps.      PHQ-9 SCORE 2/18/2020 5/20/2020 7/27/2020   PHQ-9 Total Score MyChart 17 (Moderately severe depression) - -   PHQ-9 Total Score 17 17 23       If PHQ-9 recheck is 5 or more, route to provider for next steps.    Patient is due for:  PHQ9      Composite cancer screening  Chart review shows that this patient is due/due soon for the following None

## 2020-10-09 DIAGNOSIS — F41.9 ANXIETY: ICD-10-CM

## 2020-10-09 DIAGNOSIS — F20.9 SCHIZOPHRENIA, UNSPECIFIED TYPE (H): ICD-10-CM

## 2020-10-10 RX ORDER — OLANZAPINE 5 MG/1
5 TABLET ORAL AT BEDTIME
Qty: 30 TABLET | Refills: 0 | Status: SHIPPED | OUTPATIENT
Start: 2020-10-10 | End: 2020-12-07

## 2020-10-10 RX ORDER — FLUOXETINE 40 MG/1
40 CAPSULE ORAL DAILY
Qty: 30 CAPSULE | Refills: 0 | Status: SHIPPED | OUTPATIENT
Start: 2020-10-10 | End: 2020-12-07

## 2020-10-10 RX ORDER — LAMOTRIGINE 150 MG/1
150 TABLET ORAL DAILY
Qty: 30 TABLET | Refills: 0 | Status: SHIPPED | OUTPATIENT
Start: 2020-10-10 | End: 2020-12-07

## 2020-10-10 NOTE — TELEPHONE ENCOUNTER
Pending Prescriptions:                       Disp   Refills    lamoTRIgine (LAMICTAL) 150 MG tablet      30 tab*0            Sig: Take 1 tablet (150 mg) by mouth daily    OLANZapine (ZYPREXA) 5 MG tablet          30 tab*0            Sig: Take 1 tablet (5 mg) by mouth At Bedtime    FLUoxetine (PROZAC) 40 MG capsule         30 cap*0            Sig: Take 1 capsule (40 mg) by mouth daily    Medication is being filled for 1 time cecil refill only due to:  Patient is due for med check with labs    Please call and help schedule.  Thank you!

## 2020-10-16 NOTE — TELEPHONE ENCOUNTER
Called patient left a message. Need to schedule a medication check and labs per note below. Thank you

## 2020-11-23 ENCOUNTER — NURSE TRIAGE (OUTPATIENT)
Dept: NURSING | Facility: CLINIC | Age: 35
End: 2020-11-23

## 2020-11-23 ENCOUNTER — HOSPITAL ENCOUNTER (EMERGENCY)
Facility: CLINIC | Age: 35
Discharge: HOME | End: 2020-11-23
Attending: FAMILY MEDICINE | Admitting: FAMILY MEDICINE
Payer: MEDICARE

## 2020-11-23 VITALS
SYSTOLIC BLOOD PRESSURE: 115 MMHG | OXYGEN SATURATION: 98 % | RESPIRATION RATE: 16 BRPM | HEART RATE: 63 BPM | TEMPERATURE: 97.3 F | DIASTOLIC BLOOD PRESSURE: 87 MMHG

## 2020-11-23 DIAGNOSIS — R53.83 OTHER FATIGUE: ICD-10-CM

## 2020-11-23 LAB
ALBUMIN SERPL-MCNC: 4 G/DL (ref 3.4–5)
ALP SERPL-CCNC: 61 U/L (ref 40–150)
ALT SERPL W P-5'-P-CCNC: 13 U/L (ref 0–50)
ANION GAP SERPL CALCULATED.3IONS-SCNC: 7 MMOL/L (ref 3–14)
AST SERPL W P-5'-P-CCNC: 10 U/L (ref 0–45)
BASE DEFICIT BLDV-SCNC: 0.4 MMOL/L
BASOPHILS # BLD AUTO: 0 10E9/L (ref 0–0.2)
BASOPHILS NFR BLD AUTO: 0.6 %
BILIRUB SERPL-MCNC: 2.1 MG/DL (ref 0.2–1.3)
BUN SERPL-MCNC: 18 MG/DL (ref 7–30)
CALCIUM SERPL-MCNC: 8.8 MG/DL (ref 8.5–10.1)
CHLORIDE SERPL-SCNC: 109 MMOL/L (ref 94–109)
CO2 SERPL-SCNC: 24 MMOL/L (ref 20–32)
CREAT SERPL-MCNC: 0.82 MG/DL (ref 0.52–1.04)
D DIMER PPP FEU-MCNC: 0.6 UG/ML FEU (ref 0–0.5)
DIFFERENTIAL METHOD BLD: NORMAL
EOSINOPHIL NFR BLD AUTO: 0.4 %
ERYTHROCYTE [DISTWIDTH] IN BLOOD BY AUTOMATED COUNT: 12.9 % (ref 10–15)
GFR SERPL CREATININE-BSD FRML MDRD: >90 ML/MIN/{1.73_M2}
GLUCOSE SERPL-MCNC: 90 MG/DL (ref 70–99)
HCO3 BLDV-SCNC: 24 MMOL/L (ref 21–28)
HCT VFR BLD AUTO: 40.2 % (ref 35–47)
HGB BLD-MCNC: 14 G/DL (ref 11.7–15.7)
IMM GRANULOCYTES # BLD: 0 10E9/L (ref 0–0.4)
IMM GRANULOCYTES NFR BLD: 0.1 %
LYMPHOCYTES # BLD AUTO: 1.6 10E9/L (ref 0.8–5.3)
LYMPHOCYTES NFR BLD AUTO: 22.3 %
MCH RBC QN AUTO: 30.4 PG (ref 26.5–33)
MCHC RBC AUTO-ENTMCNC: 34.8 G/DL (ref 31.5–36.5)
MCV RBC AUTO: 87 FL (ref 78–100)
MONOCYTES # BLD AUTO: 0.5 10E9/L (ref 0–1.3)
MONOCYTES NFR BLD AUTO: 7.3 %
NEUTROPHILS # BLD AUTO: 4.9 10E9/L (ref 1.6–8.3)
NEUTROPHILS NFR BLD AUTO: 69.3 %
NRBC # BLD AUTO: 0 10*3/UL
NRBC BLD AUTO-RTO: 0 /100
O2/TOTAL GAS SETTING VFR VENT: ABNORMAL %
PCO2 BLDV: 39 MM HG (ref 40–50)
PH BLDV: 7.4 PH (ref 7.32–7.43)
PLATELET # BLD AUTO: 255 10E9/L (ref 150–450)
PO2 BLDV: 37 MM HG (ref 25–47)
POTASSIUM SERPL-SCNC: 3.6 MMOL/L (ref 3.4–5.3)
PROT SERPL-MCNC: 7.1 G/DL (ref 6.8–8.8)
RBC # BLD AUTO: 4.61 10E12/L (ref 3.8–5.2)
SODIUM SERPL-SCNC: 140 MMOL/L (ref 133–144)
TROPONIN I SERPL-MCNC: <0.015 UG/L (ref 0–0.04)
WBC # BLD AUTO: 7 10E9/L (ref 4–11)

## 2020-11-23 PROCEDURE — 99284 EMERGENCY DEPT VISIT MOD MDM: CPT | Mod: 25 | Performed by: FAMILY MEDICINE

## 2020-11-23 PROCEDURE — 80053 COMPREHEN METABOLIC PANEL: CPT | Performed by: FAMILY MEDICINE

## 2020-11-23 PROCEDURE — 82803 BLOOD GASES ANY COMBINATION: CPT | Performed by: FAMILY MEDICINE

## 2020-11-23 PROCEDURE — 99284 EMERGENCY DEPT VISIT MOD MDM: CPT | Performed by: FAMILY MEDICINE

## 2020-11-23 PROCEDURE — 93010 ELECTROCARDIOGRAM REPORT: CPT | Performed by: FAMILY MEDICINE

## 2020-11-23 PROCEDURE — 85379 FIBRIN DEGRADATION QUANT: CPT | Performed by: FAMILY MEDICINE

## 2020-11-23 PROCEDURE — 85025 COMPLETE CBC W/AUTO DIFF WBC: CPT | Performed by: FAMILY MEDICINE

## 2020-11-23 PROCEDURE — 93005 ELECTROCARDIOGRAM TRACING: CPT | Performed by: FAMILY MEDICINE

## 2020-11-23 PROCEDURE — 84484 ASSAY OF TROPONIN QUANT: CPT | Performed by: FAMILY MEDICINE

## 2020-11-23 ASSESSMENT — ENCOUNTER SYMPTOMS
MYALGIAS: 1
SHORTNESS OF BREATH: 1
NUMBNESS: 0
ENDOCRINE NEGATIVE: 1
GASTROINTESTINAL NEGATIVE: 1
PSYCHIATRIC NEGATIVE: 1
TREMORS: 0
EYES NEGATIVE: 1
WEAKNESS: 0
PALPITATIONS: 0
FEVER: 1
HEADACHES: 1
ACTIVITY CHANGE: 1
DIZZINESS: 0
FATIGUE: 1
LIGHT-HEADEDNESS: 0
COUGH: 1
CHILLS: 1

## 2020-11-23 NOTE — ED AVS SNAPSHOT
[facility]  [address] [place] [address]  Phone: [phone]  Fax: [phone]                                    Isaura Gray   MRN: [id number]    Department: [facility]   Date of Visit: 11/23/2020           After Visit Summary Signature Page    I have received my discharge instructions, and my questions have been answered. I have discussed any challenges I see with this plan with the nurse or doctor.    ..........................................................................................................................................  Patient/Patient Representative Signature      ..........................................................................................................................................  Patient Representative Print Name and Relationship to Patient    ..................................................               ................................................  Date                                   Time    ..........................................................................................................................................  Reviewed by Signature/Title    ...................................................              ..............................................  Date                                               Time          22EPIC Rev 08/18

## 2020-11-23 NOTE — TELEPHONE ENCOUNTER
"Chills, temperature  tympanic, feels a little short of breath \"all the time.\" Able to complete sentences and O2 sats are 98%.    Dr. Pau Jeffries paged for second level triage, and recommended for patient's history of PE should be seen in ER.  Patient updated who will go to ER.    Brynn Velasquez RN  [facility]       Reason for Disposition    MODERATE difficulty breathing (e.g., speaks in phrases, SOB even at rest, pulse 100-120)    Additional Information    Negative: SEVERE difficulty breathing (e.g., struggling for each breath, speaks in single words)    Negative: Difficult to awaken or acting confused (e.g., disoriented, slurred speech)    Negative: Bluish (or gray) lips or face now    Negative: Shock suspected (e.g., cold/pale/clammy skin, too weak to stand, low BP, rapid pulse)    Negative: Sounds like a life-threatening emergency to the triager    Negative: SEVERE or constant chest pain or pressure (Exception: mild central chest pain, present only when coughing)    Protocols used: CORONAVIRUS (COVID-19) DIAGNOSED OR [id number] 8.4.20      "

## 2020-11-24 ENCOUNTER — TELEPHONE (OUTPATIENT)
Dept: FAMILY MEDICINE | Facility: OTHER | Age: 35
End: 2020-11-24

## 2020-11-24 NOTE — DISCHARGE INSTRUCTIONS
Your screening test results looked normal today.  I included liver function tests which is what I assume your clinic was going to check because of your medications.  Please call them tomorrow to notify them that your blood test came back normal so that you can get your refills and restarted on her medications.  Hopefully, your COVID-19 test comes back negative as well.  I hope you feel better soon.

## 2020-11-24 NOTE — ED TRIAGE NOTES
Swabbed for  COVID last week and does not have results yet.  Exposed to COVid.  Having headache, body aches, chills and shortness of breath.
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
## 2020-11-24 NOTE — LETTER
[facility]  [address]  [place] [address]  Phone: [phone]  November 25, 2020      Isaura Gray  [address] [address] [place] [id number]      Dear Isaura,    We care about your health and have reviewed your health plan including your medical conditions, medications, and lab results.  Based on this review, it is recommended that you follow up regarding the following health topic(s):  -Depression  -Cervical Cancer Screening    We recommend you take the following action(s):  -schedule a FOLLOWUP OFFICE APPOINTMENT.  We will perform the following labs:  Lipids (fasting cholesterol - nothing to eat except water and/or meds for 8-10 hours).  -schedule a PAP SMEAR EXAM which is due.  Please disregard this reminder if you have had this exam elsewhere within the last year.  It would be helpful for us to have a copy of your recent pap smear report to update your records.     Please call us at the [facility] - [phone] (or use [employer]) to address the above recommendations.     Thank you for trusting [facility] and we appreciate the opportunity to serve you.  We look forward to supporting your healthcare needs in the future.    Healthy Regards,    Your Health Care Team  [facility]

## 2020-11-25 NOTE — TELEPHONE ENCOUNTER
LM for patient to return phone call to clinic about message below.  Letter sent.  Paula Cuellar CMA ([facility])

## 2020-12-04 ENCOUNTER — NURSE TRIAGE (OUTPATIENT)
Dept: NURSING | Facility: CLINIC | Age: 35
End: 2020-12-04

## 2020-12-04 DIAGNOSIS — F41.9 ANXIETY: ICD-10-CM

## 2020-12-04 DIAGNOSIS — F20.9 SCHIZOPHRENIA, UNSPECIFIED TYPE (H): ICD-10-CM

## 2020-12-04 NOTE — TELEPHONE ENCOUNTER
3 weeks ago psych meds ran out. MD wanted a blood test before renewing her prescriptions. Blood tests were done in ER - has been off meds for 2 weeks now and she isn't feeling good. ER MD said that it wasn't Covid - that the symptoms were withdrawal symptoms from the medications.     Medications: Lamictal, Buspar, trazodone, olanzepine and Prozac.     Pharmacy - [facility] [place]     Kassi Sadler RN on 12/4/2020 at 4:54 PM

## 2020-12-06 ENCOUNTER — NURSE TRIAGE (OUTPATIENT)
Dept: NURSING | Facility: CLINIC | Age: 35
End: 2020-12-06

## 2020-12-06 ENCOUNTER — OFFICE VISIT (OUTPATIENT)
Dept: URGENT CARE | Facility: URGENT CARE | Age: 35
End: 2020-12-06
Payer: MEDICARE

## 2020-12-06 VITALS
HEART RATE: 90 BPM | TEMPERATURE: 98.2 F | BODY MASS INDEX: 22.57 KG/M2 | WEIGHT: 164.8 LBS | OXYGEN SATURATION: 100 % | SYSTOLIC BLOOD PRESSURE: 121 MMHG | DIASTOLIC BLOOD PRESSURE: 84 MMHG | RESPIRATION RATE: 20 BRPM

## 2020-12-06 DIAGNOSIS — N30.00 ACUTE CYSTITIS WITHOUT HEMATURIA: ICD-10-CM

## 2020-12-06 DIAGNOSIS — L29.9 ITCH OF SKIN: Primary | ICD-10-CM

## 2020-12-06 DIAGNOSIS — R35.0 URINARY FREQUENCY: ICD-10-CM

## 2020-12-06 LAB
ALBUMIN SERPL-MCNC: 4.2 G/DL (ref 3.4–5)
ALBUMIN UR-MCNC: NEGATIVE MG/DL
ALP SERPL-CCNC: 52 U/L (ref 40–150)
ALT SERPL W P-5'-P-CCNC: 18 U/L (ref 0–50)
APPEARANCE UR: ABNORMAL
AST SERPL W P-5'-P-CCNC: 8 U/L (ref 0–45)
BACTERIA #/AREA URNS HPF: ABNORMAL /HPF
BILIRUB DIRECT SERPL-MCNC: 0.3 MG/DL (ref 0–0.2)
BILIRUB SERPL-MCNC: 2.4 MG/DL (ref 0.2–1.3)
BILIRUB UR QL STRIP: NEGATIVE
COLOR UR AUTO: YELLOW
GLUCOSE UR STRIP-MCNC: NEGATIVE MG/DL
HGB UR QL STRIP: ABNORMAL
KETONES UR STRIP-MCNC: NEGATIVE MG/DL
LEUKOCYTE ESTERASE UR QL STRIP: ABNORMAL
NITRATE UR QL: NEGATIVE
NON-SQ EPI CELLS #/AREA URNS LPF: ABNORMAL /LPF
PH UR STRIP: 6 PH (ref 5–7)
PROT SERPL-MCNC: 7.6 G/DL (ref 6.8–8.8)
RBC #/AREA URNS AUTO: ABNORMAL /HPF
SOURCE: ABNORMAL
SP GR UR STRIP: 1.02 (ref 1–1.03)
UROBILINOGEN UR STRIP-ACNC: 0.2 EU/DL (ref 0.2–1)
WBC #/AREA URNS AUTO: ABNORMAL /HPF

## 2020-12-06 PROCEDURE — 80076 HEPATIC FUNCTION PANEL: CPT | Performed by: NURSE PRACTITIONER

## 2020-12-06 PROCEDURE — 99214 OFFICE O/P EST MOD 30 MIN: CPT | Performed by: NURSE PRACTITIONER

## 2020-12-06 PROCEDURE — 36415 COLL VENOUS BLD VENIPUNCTURE: CPT | Performed by: NURSE PRACTITIONER

## 2020-12-06 PROCEDURE — 81001 URINALYSIS AUTO W/SCOPE: CPT | Performed by: NURSE PRACTITIONER

## 2020-12-06 RX ORDER — NITROFURANTOIN 25; 75 MG/1; MG/1
100 CAPSULE ORAL 2 TIMES DAILY
Qty: 10 CAPSULE | Refills: 0 | Status: SHIPPED | OUTPATIENT
Start: 2020-12-06 | End: 2020-12-11

## 2020-12-06 RX ORDER — HYDROXYZINE HYDROCHLORIDE 25 MG/1
25 TABLET, FILM COATED ORAL 3 TIMES DAILY PRN
Qty: 21 TABLET | Refills: 0 | Status: SHIPPED | OUTPATIENT
Start: 2020-12-06 | End: 2020-12-13

## 2020-12-06 ASSESSMENT — ENCOUNTER SYMPTOMS
RHINORRHEA: 0
ROS SKIN COMMENTS: ITCHY SKIN
VOMITING: 0
FREQUENCY: 1
COUGH: 0
SORE THROAT: 0
FEVER: 0
NAUSEA: 0
SHORTNESS OF BREATH: 0
HEADACHES: 0
CHILLS: 0
DIARRHEA: 0

## 2020-12-06 NOTE — TELEPHONE ENCOUNTER
"Pt calls in with concern of widespread \" itching \"  Started 2-3 days ago     Pt says it is all over her body  No new linen - soaps - med's - etc    Has tried benadryl - hydrocortisone cream with minimal relief    Says it is especially bad > eyes and bottom of her feet     No resp issues   No other symptoms    Per protocol > to be seen within 24 hours > may go to  Urgent care yet today for evaluation     Protocol and care advice reviewed  Caller states understanding of the recommended disposition  Advised to call back if further questions or concerns    Yomi Bangura RN / [facility]    Additional Information    Negative: Patient sounds very sick or weak to the triager    [1] MODERATE-SEVERE widespread itching (i.e., interferes with sleep, normal activities or school) AND [2] not improved after 24 hours of itching Care Advice    Negative: [1] Life-threatening reaction (anaphylaxis) in the past to similar substance (e.g., food, insect bite/sting, chemical, etc.) AND [2] < 2 hours since exposure    Negative: Difficulty breathing or wheezing    Negative: [1] Difficulty swallowing or slurred speech AND [2] sudden onset    Negative: Sounds like a life-threatening emergency to the triager    Protocols used: ITCHING - [id number]-AH      "

## 2020-12-06 NOTE — PROGRESS NOTES
"SUBJECTIVE:   Isaura Gray is a 35 year old female presenting with a chief complaint of   Chief Complaint   Patient presents with     Fatigue     \"really tired\" for about a week     Derm Problem     bumps-itching all over for about a week, worse on eyes, wrists, and bottom of feet        She is an established patient of [facility].    Itchy skin and fatigue    Onset of symptoms was 1 week(s) ago.  Course of illness is worsening.    Severity moderate  Current and Associated symptoms: fatigue, itchy skin  Treatment measures tried include benadryl, not helping much.  Predisposing factors include None.  Seen in ER on 11/23/2020 with fatigue, bilirubin was elevated.     UTI    Onset of symptoms was 2day(s).  Course of illness is worsening  Severity moderate  Current and associated symptoms: urinary frequency  Treatment and measures tried None  Predisposing factors include none  Patient denies rigors, flank pain, temperature > 101 degrees F. and vomiting          Review of Systems   Constitutional: Negative for chills and fever.   HENT: Negative for congestion, ear pain, rhinorrhea and sore throat.    Respiratory: Negative for cough and shortness of breath.    Gastrointestinal: Negative for diarrhea, nausea and vomiting.   Genitourinary: Positive for frequency.   Skin: Negative for rash.        Itchy skin   Neurological: Negative for headaches.   All other systems reviewed and are negative.      Past Medical History:   Diagnosis Date     ASCUS of cervix with negative high risk HPV 09/19/2016    [facility]     Cancer (H)      Depressive disorder      Follicular cancer of thyroid (H)      Hyperprolactinemia (H)      Mitral valve prolapse      PE (pulmonary thromboembolism) (H)      Pituitary tumor      Schizophrenia (H)     reports spontaneous remission during last pregnancy     Thyroid disease      Family History   Problem Relation Age of Onset     No Known Problems Mother      Hypertension Father      " "Cerebrovascular Disease Father 56        Passed away from double brain stem clot     Mental Illness Father      Asthma Brother      Cancer Maternal Grandfather         lung     No Known Problems Paternal Grandmother      No Known Problems Paternal Grandfather      Autism Spectrum Disorder Son      Kidney Disease Son         \"has a third kidney\"     No Known Problems Daughter      Current Outpatient Medications   Medication Sig Dispense Refill     hydrOXYzine (ATARAX) 25 MG tablet Take 1 tablet (25 mg) by mouth 3 times daily as needed for itching 21 tablet 0     levonorgestrel (MIRENA) 20 MCG/24HR IUD 1 each (20 mcg) by Intrauterine route once       nitroFURantoin macrocrystal-monohydrate (MACROBID) 100 MG capsule Take 1 capsule (100 mg) by mouth 2 times daily for 5 days 10 capsule 0     busPIRone (BUSPAR) 10 MG tablet Take 2 tablets (20 mg) by mouth 3 times daily (Patient not taking: Reported on 2020) 180 tablet 1     FLUoxetine (PROZAC) 40 MG capsule Take 1 capsule (40 mg) by mouth daily (Patient not taking: Reported on 2020) 30 capsule 0     lamoTRIgine (LAMICTAL) 150 MG tablet Take 1 tablet (150 mg) by mouth daily (Patient not taking: Reported on 2020) 30 tablet 0     OLANZapine (ZYPREXA) 5 MG tablet Take 1 tablet (5 mg) by mouth At Bedtime (Patient not taking: Reported on 2020) 30 tablet 0     traZODone (DESYREL) 100 MG tablet        Social History     Tobacco Use     Smoking status: Former Smoker     Packs/day: 0.50     Years: 5.00     Pack years: 2.50     Types: Cigarettes     Quit date: 2016     Years since quittin.3     Smokeless tobacco: Never Used   Substance Use Topics     Alcohol use: No       OBJECTIVE  /84 (BP Location: Left arm, Patient Position: Sitting, Cuff Size: Adult Regular)   Pulse 90   Temp 98.2  F (36.8  C) (Tympanic)   Resp 20   Wt 74.8 kg (164 lb 12.8 oz)   SpO2 100%   BMI 22.57 kg/m      Physical Exam  Vitals signs and nursing note reviewed. "   Constitutional:       General: She is not in acute distress.     Appearance: She is well-developed. She is not diaphoretic.   HENT:      Head: Normocephalic and atraumatic.      Right Ear: Tympanic membrane and external ear normal.      Left Ear: Tympanic membrane and external ear normal.   Eyes:      Pupils: Pupils are equal, round, and reactive to light.   Neck:      Musculoskeletal: Normal range of motion and neck supple.   Pulmonary:      Effort: Pulmonary effort is normal. No respiratory distress.      Breath sounds: Normal breath sounds.   Lymphadenopathy:      Cervical: No cervical adenopathy.   Skin:     General: Skin is warm and dry.   Neurological:      Mental Status: She is alert.      Cranial Nerves: No cranial nerve deficit.           ASSESSMENT:      ICD-10-CM    1. Itch of skin  L29.9 Hepatic panel (Albumin, ALT, AST, Bili, Alk Phos, TP)     hydrOXYzine (ATARAX) 25 MG tablet     Urine Microscopic   2. Urinary frequency  R35.0 *UA reflex to Microscopic and Culture ([facility] (except [place] and [place])   3. Acute cystitis without hematuria  N30.00 nitroFURantoin macrocrystal-monohydrate (MACROBID) 100 MG capsule        PLAN:  I discussed lab results with the patient.  Patient will follow up with the rest of the results with her PCP  Patient educational/instructional material provided including reasons for follow-up    The patient indicates understanding of these issues and agrees with the plan.    .There are no Patient Instructions on file for this visit.

## 2020-12-07 ENCOUNTER — VIRTUAL VISIT (OUTPATIENT)
Dept: FAMILY MEDICINE | Facility: OTHER | Age: 35
End: 2020-12-07
Payer: MEDICARE

## 2020-12-07 DIAGNOSIS — E80.6 UNCONJUGATED HYPERBILIRUBINEMIA: Primary | ICD-10-CM

## 2020-12-07 DIAGNOSIS — Z11.59 ENCOUNTER FOR SCREENING FOR OTHER VIRAL DISEASES: ICD-10-CM

## 2020-12-07 DIAGNOSIS — L29.9 ITCHING: ICD-10-CM

## 2020-12-07 DIAGNOSIS — N30.01 ACUTE CYSTITIS WITH HEMATURIA: ICD-10-CM

## 2020-12-07 DIAGNOSIS — T14.8XXA BRUISING: ICD-10-CM

## 2020-12-07 PROCEDURE — 99442 PR PHYSICIAN TELEPHONE EVALUATION 11-20 MIN: CPT | Mod: 95 | Performed by: FAMILY MEDICINE

## 2020-12-07 RX ORDER — TRIAMCINOLONE ACETONIDE 1 MG/G
CREAM TOPICAL 2 TIMES DAILY
Qty: 80 G | Refills: 1 | Status: SHIPPED | OUTPATIENT
Start: 2020-12-07 | End: 2021-10-28

## 2020-12-07 RX ORDER — CEPHALEXIN 500 MG/1
500 CAPSULE ORAL 3 TIMES DAILY
Qty: 9 CAPSULE | Refills: 0 | Status: SHIPPED | OUTPATIENT
Start: 2020-12-07 | End: 2020-12-10

## 2020-12-07 NOTE — PROGRESS NOTES
"Isaura Gray is a 35 year old female who is being evaluated via a billable telephone visit.      The patient has been notified of following:     \"This telephone visit will be conducted via a call between you and your physician/provider. We have found that certain health care needs can be provided without the need for a physical exam.  This service lets us provide the care you need with a short phone conversation.  If a prescription is necessary we can send it directly to your pharmacy.  If lab work is needed we can place an order for that and you can then stop by our lab to have the test done at a later time.    Telephone visits are billed at different rates depending on your insurance coverage. During this emergency period, for some insurers they may be billed the same as an in-person visit.  Please reach out to your insurance provider with any questions.    If during the course of the call the physician/provider feels a telephone visit is not appropriate, you will not be charged for this service.\"    Patient has given verbal consent for Telephone visit?  Yes    What phone number would you like to be contacted at? [phone] MUST DIAL 1    How would you like to obtain your AVS? Xen[place]art    Subjective     Isaura Gray is a 35 year old female who presents via phone visit today for the following health issues:    HPI     ED/UC Followup:    Facility:  [facility]  Date of visit: 12/6/2020  Reason for visit: itching and fatigue. Bladder symptoms    Current Status: itching is much worse. Now has body aches as well      During the last week or two.  Has had a lot of itching, is really tired.  Also having some body aches.  She denies a fever.  But will have chills or hot flashes, but no fever.  This has been going on for the past week or so.      She went to the ER, who felt she was going through medication withdrawal.    She noted the elevated bilirubin level, so she went to the " "urgent care to further evaluate this.       Hydroxyzine isn't helping with her itching.      Has also tried benadryl, hydrocortisone cream, oatmeal baths, otc psoriasis medication.      She denies any new medications.  Denies herbs/supplements.      Started on nitrofurantoin for suspected UTI as well at the urgent care.  She report polyuria, nausea if she holds her urine much at all.  Did have some back pain, but that has resolved, now has pain \"everywhere\".      Does note bruising easily for years.  Most of her life.      Denies recent illness either.      Review of Systems   Constitutional, HEENT, cardiovascular, pulmonary, gi and gu systems are negative, except as otherwise noted.       Objective          Vitals:  No vitals were obtained today due to virtual visit.      PSYCH: Alert and oriented times 3; coherent speech, normal   rate and volume, able to articulate logical thoughts, able   to abstract reason, no tangential thoughts, no hallucinations   or delusions  Her affect is normal  RESP: No cough, no audible wheezing, able to talk in full sentences  Remainder of exam unable to be completed due to telephone visits    No results found for any visits on 12/07/20.  Office Visit on 12/06/2020   Component Date Value Ref Range Status     Bilirubin Direct 12/06/2020 0.3* 0.0 - 0.2 mg/dL Final     Bilirubin Total 12/06/2020 2.4* 0.2 - 1.3 mg/dL Final     Albumin 12/06/2020 4.2  3.4 - 5.0 g/dL Final     Protein Total 12/06/2020 7.6  6.8 - 8.8 g/dL Final     Alkaline Phosphatase 12/06/2020 52  40 - 150 U/L Final     ALT 12/06/2020 18  0 - 50 U/L Final     AST 12/06/2020 8  0 - 45 U/L Final     Color Urine 12/06/2020 Yellow   Final     Appearance Urine 12/06/2020 Slightly Cloudy   Final     Glucose Urine 12/06/2020 Negative  NEG^Negative mg/dL Final     Bilirubin Urine 12/06/2020 Negative  NEG^Negative Final     Ketones Urine 12/06/2020 Negative  NEG^Negative mg/dL Final     Specific Gravity Urine 12/06/2020 1.025  " 1.003 - 1.035 Final     Blood Urine 12/06/2020 Trace* NEG^Negative Final     pH Urine 12/06/2020 6.0  5.0 - 7.0 pH Final     Protein Albumin Urine 12/06/2020 Negative  NEG^Negative mg/dL Final     Urobilinogen Urine 12/06/2020 0.2  0.2 - 1.0 EU/dL Final     Nitrite Urine 12/06/2020 Negative  NEG^Negative Final     Leukocyte Esterase Urine 12/06/2020 Small* NEG^Negative Final     Source 12/06/2020 Midstream Urine   Final     WBC Urine 12/06/2020 5-10* OTO5^0 - 5 /HPF Final     RBC Urine 12/06/2020 O - 2  OTO2^O - 2 /HPF Final     Squamous Epithelial /LPF Urine 12/06/2020 Moderate* FEW^Few /LPF Final     Bacteria Urine 12/06/2020 Many* NEG^Negative /HPF Final           Assessment/Plan:    Assessment & Plan       ICD-10-CM    1. Unconjugated hyperbilirubinemia  E80.6 US Abdomen Limited     Comprehensive metabolic panel     TSH with free T4 reflex     CBC with platelets     Hepatitis B surface antigen     Hepatitis C antibody     Anti Nuclear Columba IgG by IFA with Reflex     **Ferritin FUTURE 2mo     Iron and iron binding capacity     Ceruloplasmin   2. Itching  L29.9 US Abdomen Limited     Comprehensive metabolic panel     TSH with free T4 reflex     CBC with platelets     triamcinolone (KENALOG) 0.1 % external cream     Hepatitis B surface antigen     Hepatitis C antibody     Anti Nuclear Columba IgG by IFA with Reflex     **Ferritin FUTURE 2mo     Iron and iron binding capacity     Ceruloplasmin   3. Bruising  T14.8XXA US Abdomen Limited     Comprehensive metabolic panel     TSH with free T4 reflex     CBC with platelets   4. Encounter for screening for other viral diseases   Z11.59 Hepatitis B surface antigen   5. Acute cystitis with hematuria  N30.01 cephALEXin (KEFLEX) 500 MG capsule      1.  Etiology is not her percent clear.  Gilbert's is unlikely given her previously normal bilirubin levels over several years.  It is certainly possible that she is having increased turnover of red cells given her bruising, but it  sounds like this is actually lifelong.  I am worried about a a problem with her liver, so we will check some screening labs in a week if her ultrasound is normal.  Patient is adamant that she is not taking any herbs, supplements, vitamins, or over-the-counter or prescription medications that are likely to be affecting this.  If this work-up is negative, may need to refer to gastroenterology.  2.  Unclear etiology.  Likely related to what ever is causing her hyperbilirubinemia.  Can continue current treatments, but will also add some triamcinolone cream to help with symptom control while we are working up her liver.  3.  Sounds like is actually quite longstanding, but it is present, so certainly could be contributing to above.  Check labs as noted.  Consider referral to hematology.  4.  Testing ordered.  5.  Nitrofurantoin can worsen bilirubin conjugation, so will change to Keflex.  This was diagnosed elsewhere.  Unfortunately, no culture was done.    Portions of this note were completed using Dragon dictation software.  Although reviewed, there may be typographical and other inadvertent errors that remain.           Patient Instructions   Thank you for visiting [facility]    Let's get a liver/GB ultrasound to further evaluate what's going on.    If this is normal, we should repeat labs in about a week.      Let's change your antibiotics to Keflex from what you're currently taking.  Let me know if worsening.    You can try triamcinolone cream for your itching.    For your eye symptoms, please try Zaditor or similar eye antihistamine.      Contact us or return if questions or concerns.     Have a nice day!    Dr. Umana     No follow-ups on file.      If you need medication refills, please contact your pharmacy 3 days before your prescriptions runs out or download the [facility] henrietta for your smart phone.   If you are out of refills, your pharmacy will contact contact the clinic.                                      [facility] assistance [phone]                       Return in about 2 weeks (around 12/21/2020) for Recheck, E-visit, video, or phone visit.    Timmy Umana MD, MD  [facility]    Phone call duration:  19 minutes

## 2020-12-07 NOTE — PATIENT INSTRUCTIONS
Thank you for visiting [facility]    Let's get a liver/GB ultrasound to further evaluate what's going on.    If this is normal, we should repeat labs in about a week.      Let's change your antibiotics to Keflex from what you're currently taking.  Let me know if worsening.    You can try triamcinolone cream for your itching.    For your eye symptoms, please try Zaditor or similar eye antihistamine.      Contact us or return if questions or concerns.     Have a nice day!    Dr. Umana     No follow-ups on file.      If you need medication refills, please contact your pharmacy 3 days before your prescriptions runs out or download the [facility] henrietta for your smart phone.   If you are out of refills, your pharmacy will contact contact the clinic.                                     [facility] assistance [phone]

## 2020-12-08 NOTE — TELEPHONE ENCOUNTER
I had a visit with patient yesterday.  She stated she stopped these on her own, that she didn't run out.  Does she want to resume them?  She didn't seem to yesterday when I spoke with her.

## 2020-12-09 RX ORDER — FLUOXETINE 40 MG/1
40 CAPSULE ORAL DAILY
Qty: 30 CAPSULE | Refills: 0 | Status: CANCELLED | OUTPATIENT
Start: 2020-12-09

## 2020-12-09 RX ORDER — OLANZAPINE 5 MG/1
5 TABLET ORAL AT BEDTIME
Qty: 30 TABLET | Refills: 0 | Status: CANCELLED | OUTPATIENT
Start: 2020-12-09

## 2020-12-09 RX ORDER — BUSPIRONE HYDROCHLORIDE 10 MG/1
20 TABLET ORAL 3 TIMES DAILY
Qty: 180 TABLET | Refills: 1 | Status: CANCELLED | OUTPATIENT
Start: 2020-12-09

## 2020-12-09 RX ORDER — TRAZODONE HYDROCHLORIDE 100 MG/1
TABLET ORAL
Status: CANCELLED | OUTPATIENT
Start: 2020-12-09

## 2020-12-09 RX ORDER — LAMOTRIGINE 150 MG/1
150 TABLET ORAL DAILY
Qty: 30 TABLET | Refills: 0 | Status: CANCELLED | OUTPATIENT
Start: 2020-12-09

## 2020-12-11 ENCOUNTER — ANCILLARY PROCEDURE (OUTPATIENT)
Dept: ULTRASOUND IMAGING | Facility: OTHER | Age: 35
End: 2020-12-11
Attending: FAMILY MEDICINE
Payer: MEDICARE

## 2020-12-11 ENCOUNTER — VIRTUAL VISIT (OUTPATIENT)
Dept: FAMILY MEDICINE | Facility: OTHER | Age: 35
End: 2020-12-11
Payer: MEDICARE

## 2020-12-11 ENCOUNTER — MYC MEDICAL ADVICE (OUTPATIENT)
Dept: FAMILY MEDICINE | Facility: OTHER | Age: 35
End: 2020-12-11

## 2020-12-11 DIAGNOSIS — T14.8XXA BRUISING: ICD-10-CM

## 2020-12-11 DIAGNOSIS — N30.01 ACUTE CYSTITIS WITH HEMATURIA: ICD-10-CM

## 2020-12-11 DIAGNOSIS — E80.6 UNCONJUGATED HYPERBILIRUBINEMIA: ICD-10-CM

## 2020-12-11 DIAGNOSIS — Z13.6 CARDIOVASCULAR SCREENING; LDL GOAL LESS THAN 130: ICD-10-CM

## 2020-12-11 DIAGNOSIS — Z11.59 ENCOUNTER FOR SCREENING FOR OTHER VIRAL DISEASES: ICD-10-CM

## 2020-12-11 DIAGNOSIS — F51.04 PSYCHOPHYSIOLOGICAL INSOMNIA: ICD-10-CM

## 2020-12-11 DIAGNOSIS — Z83.49 FAMILY HISTORY OF HASHIMOTO THYROIDITIS: ICD-10-CM

## 2020-12-11 DIAGNOSIS — L29.9 ITCHING: ICD-10-CM

## 2020-12-11 DIAGNOSIS — K76.0 NON-ALCOHOLIC FATTY LIVER DISEASE: ICD-10-CM

## 2020-12-11 DIAGNOSIS — F32.1 MODERATE MAJOR DEPRESSION (H): ICD-10-CM

## 2020-12-11 DIAGNOSIS — F20.9 SCHIZOPHRENIA, UNSPECIFIED TYPE (H): Primary | ICD-10-CM

## 2020-12-11 LAB
ALBUMIN SERPL-MCNC: 4.3 G/DL (ref 3.4–5)
ALP SERPL-CCNC: 62 U/L (ref 40–150)
ALT SERPL W P-5'-P-CCNC: 21 U/L (ref 0–50)
ANION GAP SERPL CALCULATED.3IONS-SCNC: 5 MMOL/L (ref 3–14)
AST SERPL W P-5'-P-CCNC: 15 U/L (ref 0–45)
BILIRUB SERPL-MCNC: 2.1 MG/DL (ref 0.2–1.3)
BUN SERPL-MCNC: 19 MG/DL (ref 7–30)
CALCIUM SERPL-MCNC: 8.9 MG/DL (ref 8.5–10.1)
CHLORIDE SERPL-SCNC: 107 MMOL/L (ref 94–109)
CHOLEST SERPL-MCNC: 130 MG/DL
CO2 SERPL-SCNC: 25 MMOL/L (ref 20–32)
CREAT SERPL-MCNC: 0.69 MG/DL (ref 0.52–1.04)
ERYTHROCYTE [DISTWIDTH] IN BLOOD BY AUTOMATED COUNT: 13.5 % (ref 10–15)
FERRITIN SERPL-MCNC: 50 NG/ML (ref 12–150)
GFR SERPL CREATININE-BSD FRML MDRD: >90 ML/MIN/{1.73_M2}
GLUCOSE SERPL-MCNC: 87 MG/DL (ref 70–99)
HCT VFR BLD AUTO: 41.7 % (ref 35–47)
HDLC SERPL-MCNC: 48 MG/DL
HGB BLD-MCNC: 14.4 G/DL (ref 11.7–15.7)
IRON SATN MFR SERPL: 62 % (ref 15–46)
IRON SERPL-MCNC: 194 UG/DL (ref 35–180)
LDLC SERPL CALC-MCNC: 68 MG/DL
MCH RBC QN AUTO: 30 PG (ref 26.5–33)
MCHC RBC AUTO-ENTMCNC: 34.5 G/DL (ref 31.5–36.5)
MCV RBC AUTO: 87 FL (ref 78–100)
NONHDLC SERPL-MCNC: 82 MG/DL
PLATELET # BLD AUTO: 291 10E9/L (ref 150–450)
POTASSIUM SERPL-SCNC: 3.8 MMOL/L (ref 3.4–5.3)
PROT SERPL-MCNC: 7.6 G/DL (ref 6.8–8.8)
RBC # BLD AUTO: 4.8 10E12/L (ref 3.8–5.2)
SODIUM SERPL-SCNC: 137 MMOL/L (ref 133–144)
T3FREE SERPL-MCNC: 2.6 PG/ML (ref 2.3–4.2)
T4 FREE SERPL-MCNC: 0.98 NG/DL (ref 0.76–1.46)
TIBC SERPL-MCNC: 315 UG/DL (ref 240–430)
TRIGL SERPL-MCNC: 70 MG/DL
TSH SERPL DL<=0.005 MIU/L-ACNC: 2.07 MU/L (ref 0.4–4)
WBC # BLD AUTO: 7.3 10E9/L (ref 4–11)

## 2020-12-11 PROCEDURE — 36415 COLL VENOUS BLD VENIPUNCTURE: CPT | Performed by: FAMILY MEDICINE

## 2020-12-11 PROCEDURE — 84439 ASSAY OF FREE THYROXINE: CPT | Performed by: FAMILY MEDICINE

## 2020-12-11 PROCEDURE — 83540 ASSAY OF IRON: CPT | Performed by: FAMILY MEDICINE

## 2020-12-11 PROCEDURE — 87340 HEPATITIS B SURFACE AG IA: CPT | Performed by: FAMILY MEDICINE

## 2020-12-11 PROCEDURE — 82728 ASSAY OF FERRITIN: CPT | Performed by: FAMILY MEDICINE

## 2020-12-11 PROCEDURE — 82390 ASSAY OF CERULOPLASMIN: CPT | Performed by: FAMILY MEDICINE

## 2020-12-11 PROCEDURE — 84443 ASSAY THYROID STIM HORMONE: CPT | Performed by: FAMILY MEDICINE

## 2020-12-11 PROCEDURE — 80053 COMPREHEN METABOLIC PANEL: CPT | Performed by: FAMILY MEDICINE

## 2020-12-11 PROCEDURE — 86038 ANTINUCLEAR ANTIBODIES: CPT | Performed by: FAMILY MEDICINE

## 2020-12-11 PROCEDURE — 83550 IRON BINDING TEST: CPT | Performed by: FAMILY MEDICINE

## 2020-12-11 PROCEDURE — 86803 HEPATITIS C AB TEST: CPT | Performed by: FAMILY MEDICINE

## 2020-12-11 PROCEDURE — 80061 LIPID PANEL: CPT | Performed by: FAMILY MEDICINE

## 2020-12-11 PROCEDURE — 84481 FREE ASSAY (FT-3): CPT | Performed by: FAMILY MEDICINE

## 2020-12-11 PROCEDURE — 99443 PR PHYSICIAN TELEPHONE EVALUATION 21-30 MIN: CPT | Mod: 95 | Performed by: FAMILY MEDICINE

## 2020-12-11 PROCEDURE — 85027 COMPLETE CBC AUTOMATED: CPT | Performed by: FAMILY MEDICINE

## 2020-12-11 PROCEDURE — 86039 ANTINUCLEAR ANTIBODIES (ANA): CPT | Performed by: FAMILY MEDICINE

## 2020-12-11 RX ORDER — QUETIAPINE FUMARATE 25 MG/1
25-50 TABLET, FILM COATED ORAL
Qty: 60 TABLET | Refills: 1 | Status: SHIPPED | OUTPATIENT
Start: 2020-12-11 | End: 2021-01-11

## 2020-12-11 RX ORDER — ZIPRASIDONE HYDROCHLORIDE 40 MG/1
40 CAPSULE ORAL 2 TIMES DAILY WITH MEALS
Qty: 60 CAPSULE | Refills: 1 | Status: SHIPPED | OUTPATIENT
Start: 2020-12-11 | End: 2021-01-11

## 2020-12-11 NOTE — PATIENT INSTRUCTIONS
Thank you for visiting [facility]    Let us go ahead and try Geodon to help with your mood stabilization and schizophrenia.  We will also try to get you in with psychiatry for further help.  If there is a delay in this, we could consider trying Invega down the road.  Latuda would also be another option.    Can try Seroquel to help with sleep.  Let me know if any problems.    We will let you know the rest of your lab results as soon as they are available.    Continue to try to eat healthy and maintain a healthy weight while avoiding simple carbohydrates and alcohol.  This should help with your fatty liver disease.  This may be related to past medications you have been on.    Follow-up in 2 to 4 weeks.    Contact us or return if questions or concerns.     Have a nice day!    Dr. Umana     Return in about 4 weeks (around 1/8/2021) for Recheck.      If you need medication refills, please contact your pharmacy 3 days before your prescriptions runs out or download the [facility] henrietta for your smart phone.   If you are out of refills, your pharmacy will contact contact the clinic.                                     [facility] assistance [phone]

## 2020-12-11 NOTE — TELEPHONE ENCOUNTER
Spoke to patient and she is wanting to change one of the medications. Scheduled a phone visit today to review all meds to see what is needed for refills and adjustments.

## 2020-12-11 NOTE — PROGRESS NOTES
"Isaura Gray is a 35 year old female who is being evaluated via a billable telephone visit.      The patient has been notified of following:     \"This telephone visit will be conducted via a call between you and your physician/provider. We have found that certain health care needs can be provided without the need for a physical exam.  This service lets us provide the care you need with a short phone conversation.  If a prescription is necessary we can send it directly to your pharmacy.  If lab work is needed we can place an order for that and you can then stop by our lab to have the test done at a later time.    Telephone visits are billed at different rates depending on your insurance coverage. During this emergency period, for some insurers they may be billed the same as an in-person visit.  Please reach out to your insurance provider with any questions.    If during the course of the call the physician/provider feels a telephone visit is not appropriate, you will not be charged for this service.\"    Patient has given verbal consent for Telephone visit?  Yes    What phone number would you like to be contacted at? [phone]    How would you like to obtain your AVS? Bobby    Subjective     Isaura Gray is a 35 year old female who presents via phone visit today for the following health issues:    HPI    Pt was on medication for her mood previously.  She stopped the medication because she was due for a blood test, then had a covid test.  Then, she went through withdrawals, so chose to stay off for a while.  Also didn't really like the medications she was taking at the time.      She notes that her bipolar/schizophrenia symptoms  Have been recurring.      Depression Followup    How are you doing with your depression since your last visit? Worsened along with Schizophrenia symptoms worsening since stopping previous medications     Are you having other symptoms that might be associated with depression? " Yes:  .    Have you had a significant life event?  No     Are you feeling anxious or having panic attacks?   No    Do you have any concerns with your use of alcohol or other drugs? No    Social History     Tobacco Use     Smoking status: Former Smoker     Packs/day: 0.50     Years: 5.00     Pack years: 2.50     Types: Cigarettes     Quit date: 2016     Years since quittin.3     Smokeless tobacco: Never Used   Substance Use Topics     Alcohol use: No     Drug use: No     PHQ 2020   PHQ-9 Total Score 17 17 23   Q9: Thoughts of better off dead/self-harm past 2 weeks Several days Not at all Not at all   F/U: Thoughts of suicide or self-harm No - -   F/U: Self harm-plan - - -   F/U: Self-harm action - - -   F/U: Safety concerns No - -     AVERY-7 SCORE 2020   Total Score 16 (severe anxiety) - -   Total Score 16 15 18       Invega worked well for her, but it wasn't covered by her insurance.      Had hyperprolactinemia with Risperdal.  Didn't like Seroquel as it made her agitated.      Also, she never really slept well with things.    lamotrigine - noted withdrawal effects if she missed her medications.      She was also interested in additional thyroid testing.          Suicide Assessment Five-step Evaluation and Treatment (SAFE-T)             Review of Systems   Constitutional, HEENT, cardiovascular, pulmonary, gi and gu systems are negative, except as otherwise noted.  Chills, chest tightness, feet feel numb.  At night, feels like someone is choking her.  Eyes feel like they're swelling.         Objective          Vitals:  No vitals were obtained today due to virtual visit.      PSYCH: Alert and oriented times 3; coherent speech, normal   rate and volume, able to articulate logical thoughts, able   to abstract reason, no tangential thoughts, no hallucinations   or delusions  Her affect is normal  RESP: No cough, no audible wheezing, able to talk in full  sentences  Remainder of exam unable to be completed due to telephone visits    Results for orders placed or performed in visit on 12/11/20   CBC with platelets     Status: None   Result Value Ref Range    WBC 7.3 4.0 - 11.0 10e9/L    RBC Count 4.80 3.8 - 5.2 10e12/L    Hemoglobin 14.4 11.7 - 15.7 g/dL    Hematocrit 41.7 35.0 - 47.0 %    MCV 87 78 - 100 fl    MCH 30.0 26.5 - 33.0 pg    MCHC 34.5 31.5 - 36.5 g/dL    RDW 13.5 10.0 - 15.0 %    Platelet Count 291 150 - 450 10e9/L   Results for orders placed or performed in visit on 12/11/20   US Abdomen Limited     Status: None    Narrative    ULTRASOUND ABDOMEN LIMITED  12/11/2020 8:53 AM    CLINICAL HISTORY: Unconjugated hyperbilirubinemia with associated  itching. Unconjugated hyperbilirubinemia. Bruising. Itching.    TECHNIQUE: Limited abdominal ultrasound.    COMPARISON: None.    FINDINGS:  GALLBLADDER: The gallbladder is normal. No gallstones, wall  thickening, or pericholecystic fluid. Negative sonographic Pinon's  sign.    BILE DUCTS: There is no biliary dilatation. The common duct measures 3  mm.    LIVER: The liver is increased in echogenicity without focal mass.     RIGHT KIDNEY: Unremarkable.    PANCREAS: The visualized portions of the pancreas are normal.    No ascites.      Impression    IMPRESSION:  Fatty infiltration of the liver. No gallstones or bile  duct dilatation.    GLADYS RAJPUT MD     Orders Only on 12/11/2020   Component Date Value Ref Range Status     WBC 12/11/2020 7.3  4.0 - 11.0 10e9/L Final     RBC Count 12/11/2020 4.80  3.8 - 5.2 10e12/L Final     Hemoglobin 12/11/2020 14.4  11.7 - 15.7 g/dL Final     Hematocrit 12/11/2020 41.7  35.0 - 47.0 % Final     MCV 12/11/2020 87  78 - 100 fl Final     MCH 12/11/2020 30.0  26.5 - 33.0 pg Final     MCHC 12/11/2020 34.5  31.5 - 36.5 g/dL Final     RDW 12/11/2020 13.5  10.0 - 15.0 % Final     Platelet Count 12/11/2020 291  150 - 450 10e9/L Final           Assessment/Plan:    Assessment & Plan        ICD-10-CM    1. Schizophrenia, unspecified type (H)  F20.9 ziprasidone (GEODON) 40 MG capsule     MENTAL HEALTH REFERRAL  - Adult; Psychiatry; [place]iat[place] [facility] [place] [facility]/Bridge to Long-Term Psychiatry as indicated ([phone]); Yes; Several Medications tried and failed; Yes; We will contact you to schedule the a...     T4, free     T3, Free     QUEtiapine (SEROQUEL) 25 MG tablet   2. Moderate major depression (H)  F32.1 ziprasidone (GEODON) 40 MG capsule     MENTAL HEALTH REFERRAL  - Adult; Psychiatry; [place]iatry; [facility] [place] [facility]/Bridge to Long-Term Psychiatry as indicated ([phone]); Yes; Several Medications tried and failed; Yes; We will contact you to schedule the a...     T4, free     T3, Free     QUEtiapine (SEROQUEL) 25 MG tablet   3. Family history of Hashimoto thyroiditis  Z83.49 T4, free     T3, Free   4. Acute cystitis with hematuria  N30.01 UA reflex to Microscopic and Culture   5. Psychophysiological insomnia  F51.04 QUEtiapine (SEROQUEL) 25 MG tablet   6. Non-alcoholic fatty liver disease  K76.0       1, 2.  Patient starting to have some hallucinations and increased mood instability.  Discussed various options with her.  We will go and start Geodon as this is somewhat different from anything she is tried before.  It is unlikely to make her more sedated.  We will also refer her to psychiatry for additional assistance.  Check monitoring labs today.  3.  Check labs.  4.  Recheck urinalysis in the near future.  Patient has just finished her antibiotic regimen but still has some residual symptoms.  5.  Trial of Seroquel to help with insomnia.  This will possibly interact with the Geodon, but hopefully will be synergistic.  6.  Ultrasound showed evidence of this.  May need to refer to gastroenterology.  Discussed lifestyle modifications to help with this.    Portions of this note were completed using Dragon dictation software.  Although  reviewed, there may be typographical and other inadvertent errors that remain.               Patient Instructions   Thank you for visiting [facility]    Let us go ahead and try Geodon to help with your mood stabilization and schizophrenia.  We will also try to get you in with psychiatry for further help.  If there is a delay in this, we could consider trying Invega down the road.  Latuda would also be another option.    Can try Seroquel to help with sleep.  Let me know if any problems.    We will let you know the rest of your lab results as soon as they are available.    Continue to try to eat healthy and maintain a healthy weight while avoiding simple carbohydrates and alcohol.  This should help with your fatty liver disease.  This may be related to past medications you have been on.    Follow-up in 2 to 4 weeks.    Contact us or return if questions or concerns.     Have a nice day!    Dr. Umana     Return in about 4 weeks (around 1/8/2021) for Recheck.      If you need medication refills, please contact your pharmacy 3 days before your prescriptions runs out or download the [facility] henrietta for your smart phone.   If you are out of refills, your pharmacy will contact contact the clinic.                                     [facility] assistance [phone]                       Return in about 4 weeks (around 1/8/2021) for Recheck.    Timmy Umana MD, MD  [facility]    Phone call duration:  21 minutes

## 2020-12-13 ENCOUNTER — MYC MEDICAL ADVICE (OUTPATIENT)
Dept: FAMILY MEDICINE | Facility: OTHER | Age: 35
End: 2020-12-13

## 2020-12-13 DIAGNOSIS — R79.0 ABNORMAL IRON SATURATION: ICD-10-CM

## 2020-12-13 DIAGNOSIS — K76.0 NON-ALCOHOLIC FATTY LIVER DISEASE: Primary | ICD-10-CM

## 2020-12-13 DIAGNOSIS — E80.6 UNCONJUGATED HYPERBILIRUBINEMIA: ICD-10-CM

## 2020-12-13 DIAGNOSIS — E83.10 DISORDER OF IRON METABOLISM, UNSPECIFIED: ICD-10-CM

## 2020-12-13 LAB
HBV SURFACE AG SERPL QL IA: NONREACTIVE
HCV AB SERPL QL IA: NONREACTIVE

## 2020-12-13 NOTE — RESULT ENCOUNTER NOTE
Isaura,    All of your labs were normal for you except your bilirubin and some of your iron studies.  It's possible that you have an iron absorption disorder called hemochromatosis based on these studies.  We can consider a genetic test or refer you to a gastroenterologist for the next step in diagnosis.      Bilirubin is slightly better, so we should recheck this in a few weeks regardless of what we decide.  Let me know what you'd like to do next.    Have a nice day!    Dr. Umana

## 2020-12-14 LAB
ANA PAT SER IF-IMP: ABNORMAL
ANA SER QL IF: ABNORMAL
ANA TITR SER IF: ABNORMAL {TITER}
CERULOPLASMIN SERPL-MCNC: 24 MG/DL (ref 20–60)

## 2020-12-15 ENCOUNTER — TELEPHONE (OUTPATIENT)
Dept: FAMILY MEDICINE | Facility: OTHER | Age: 35
End: 2020-12-15

## 2020-12-15 NOTE — TELEPHONE ENCOUNTER
This is a screening test for autoimmune disease.  This is not a convincing result, but not a reassuring result either.  If confirmed to be positive, this could explain her elevated bilirubin and/or fatigue.  I would still recommend a GI consult as the next step.  IF she has other symptoms, we could consider other evaluation to look for other causes of this borderline result.

## 2020-12-15 NOTE — TELEPHONE ENCOUNTER
Patient informed of below message and no further questions at time of call. Closing encounter.    Antonella Hunt MA

## 2020-12-15 NOTE — TELEPHONE ENCOUNTER
RECORDS RECEIVED FROM: Internal   Appt Date: 12.17.2020   NOTES STATUS DETAILS   OFFICE NOTE from referring provider Internal 12.13.2020 Timmy Umana MD   OFFICE NOTES from other specialists N/A    DISCHARGE SUMMARY from hospital N/A    MEDICATION LIST Internal / CE    LIVER BIOSPY (IF APPLICABLE)      PATHOLOGY REPORTS  N/A    IMAGING     ENDOSCOPY (IF AVAILABLE) N/A    COLONOSCOPY (IF AVAILABLE) N/A    ULTRASOUND LIVER Internal 12.11.2020 Limited abdominal ultrasound   CT OF ABDOMEN N/A    MRI OF LIVER N/A    FIBROSCAN, US ELASTOGRAPHY, FIBROSIS SCAN, MR ELASTOGRAPHY N/A    LABS     HEPATIC PANEL (LIVER PANEL) Internal 12.06.2020   BASIC METABOLIC PANEL Internal 03.12.2019   COMPLETE METABOLIC PANEL Internal 12.11.2020   COMPLETE BLOOD COUNT (CBC) Internal 12.11.2020   INTERNATIONAL NORMALIZED RATIO (INR) Internal 03.12.2019   HEPATITIS C ANTIBODY Internal 12.11.2020   HEPATITIS C VIRAL LOAD/PCR N/A    HEPATITIS C GENOTYPE N/A    HEPATITIS B SURFACE ANTIGEN Internal 12.11.2020   HEPATITIS B SURFACE ANTIBODY N/A    HEPATITIS B DNA QUANT LEVEL N/A    HEPATITIS B CORE ANTIBODY N/A

## 2020-12-15 NOTE — RESULT ENCOUNTER NOTE
Isaura,    Your SUDHA is borderline positive.  It's possible that you are developing an autoimmune disease (or this could be a false result).  Until we have more definitive findings, these can be difficult to diagnose and nearly impossible to treat without a definitive diagnosis.  If you have other specific symptoms, we should reevaluate things.  Otherwise, follow up with GI for next steps.    Have a nice day!    Dr. Umana

## 2020-12-15 NOTE — TELEPHONE ENCOUNTER
Called patient about labs, her question is about    SUDHA interpretation NEG^Negative Borderline PositiveAbnormal      Comment:                                    Reference range:   <1:40  NEGATIVE   1:40 - 1:80  BORDERLINE POSITIVE   >1:80 POSITIVE     USDHA pattern 1  SPECKLED     SUDHA titer 1  1:80    Resulting Agency           She is wondering what this is and being border line positive, is this something she should be concerned about.     Patient aware the DJ is out today and we may not respond to her until 12/16

## 2020-12-16 DIAGNOSIS — E80.6 UNCONJUGATED HYPERBILIRUBINEMIA: ICD-10-CM

## 2020-12-16 DIAGNOSIS — E83.10 DISORDER OF IRON METABOLISM, UNSPECIFIED: ICD-10-CM

## 2020-12-16 DIAGNOSIS — N30.01 ACUTE CYSTITIS WITH HEMATURIA: ICD-10-CM

## 2020-12-16 DIAGNOSIS — R79.0 ABNORMAL IRON SATURATION: ICD-10-CM

## 2020-12-16 DIAGNOSIS — K76.0 NON-ALCOHOLIC FATTY LIVER DISEASE: ICD-10-CM

## 2020-12-16 LAB
ALBUMIN UR-MCNC: NEGATIVE MG/DL
APPEARANCE UR: CLEAR
BILIRUB UR QL STRIP: NEGATIVE
COLOR UR AUTO: YELLOW
GLUCOSE UR STRIP-MCNC: NEGATIVE MG/DL
HGB UR QL STRIP: NEGATIVE
KETONES UR STRIP-MCNC: NEGATIVE MG/DL
LEUKOCYTE ESTERASE UR QL STRIP: NEGATIVE
NITRATE UR QL: NEGATIVE
PH UR STRIP: 7 PH (ref 5–7)
SOURCE: NORMAL
SP GR UR STRIP: 1.02 (ref 1–1.03)
UROBILINOGEN UR STRIP-ACNC: 0.2 EU/DL (ref 0.2–1)

## 2020-12-16 PROCEDURE — 81003 URINALYSIS AUTO W/O SCOPE: CPT | Performed by: FAMILY MEDICINE

## 2020-12-16 PROCEDURE — G0452 MOLECULAR PATHOLOGY INTERPR: HCPCS | Mod: 59 | Performed by: PATHOLOGY

## 2020-12-16 PROCEDURE — 36415 COLL VENOUS BLD VENIPUNCTURE: CPT | Performed by: FAMILY MEDICINE

## 2020-12-16 PROCEDURE — 81256 HFE GENE: CPT | Performed by: FAMILY MEDICINE

## 2020-12-17 ENCOUNTER — VIRTUAL VISIT (OUTPATIENT)
Dept: GASTROENTEROLOGY | Facility: CLINIC | Age: 35
End: 2020-12-17
Attending: INTERNAL MEDICINE
Payer: MEDICARE

## 2020-12-17 ENCOUNTER — PRE VISIT (OUTPATIENT)
Dept: GASTROENTEROLOGY | Facility: CLINIC | Age: 35
End: 2020-12-17

## 2020-12-17 DIAGNOSIS — E80.6 UNCONJUGATED HYPERBILIRUBINEMIA: ICD-10-CM

## 2020-12-17 DIAGNOSIS — K76.0 NON-ALCOHOLIC FATTY LIVER DISEASE: ICD-10-CM

## 2020-12-17 DIAGNOSIS — T50.915A ADVERSE EFFECT OF MULTIPLE UNSPECIFIED DRUGS, MEDICAMENTS AND BIOLOGICAL SUBSTANCES, INITIAL ENCOUNTER: ICD-10-CM

## 2020-12-17 DIAGNOSIS — E80.4 GILBERT SYNDROME: Primary | ICD-10-CM

## 2020-12-17 DIAGNOSIS — R79.0 ABNORMAL IRON SATURATION: ICD-10-CM

## 2020-12-17 PROCEDURE — 99203 OFFICE O/P NEW LOW 30 MIN: CPT | Mod: 95 | Performed by: INTERNAL MEDICINE

## 2020-12-17 ASSESSMENT — PAIN SCALES - GENERAL: PAINLEVEL: MODERATE PAIN (5)

## 2020-12-17 NOTE — PROGRESS NOTES
"Isaura Gray is a 35 year old female who is being evaluated via a billable video visit.      The patient has been notified of following:     \"This video visit will be conducted via a call between you and your physician/provider. We have found that certain health care needs can be provided without the need for an in-person physical exam.  This service lets us provide the care you need with a video conversation.  If a prescription is necessary we can send it directly to your pharmacy.  If lab work is needed we can place an order for that and you can then stop by our lab to have the test done at a later time.    Video visits are billed at different rates depending on your insurance coverage.  Please reach out to your insurance provider with any questions.    If during the course of the call the physician/provider feels a video visit is not appropriate, you will not be charged for this service.\"     Patient has given verbal consent for Video visit? Yes  How would you like to obtain your AVS? MyChart  If you are dropped from the video visit, the video invite should be resent to: Text to cell phone: [phone]  Will anyone else be joining your video visit? No    Date of Service: 12/17/2020     Referring Provider: Timmy Umana MD    Subjective:            Isaura Gray is a 35 year old female presenting for evaluation of abnormal liver tests    History of Present Illness   Isaura Gray is a 35 year old female with past medical history of depression and schizophrenia who presents with concerns of elevated serum bilirubin and multiple systemic concerns including profound fatigue.    Notes that she has had significant changes in overall mental health and drug regimen over the past 3 months, initially with escalations and ultimately discontinuation of BuSpar, Lamictal, olanzapine, Prozac all within the past month or so.  Notes that she has symptoms of profound fatigue, intermittent pruritus, and leg " swelling.  She was seen by her primary care doctor that given her symptoms ordered liver tests.  Liver tests from November 23 demonstrated AST 10, ALT 13, alkaline phosphatase 61, total bilirubin 2.1.  Repeat labs on December 6 demonstrated total bilirubin 2.4 with direct bilirubin of 0.3.  These labs were repeated again on December 11 and demonstrated total bilirubin of 2.1, ferritin 50, iron 194, iron binding capacity 315, iron saturation 62%.  Hepatitis B surface antigen was negative, hepatitis C antibody negative, SUDHA 1: 80, ceruloplasmin 24.  An abdominal ultrasound was performed on December 11 which demonstrated changes consistent with fatty liver, otherwise was within normal limits.    She notes a rare history of use of cannabis-containing products.  Notes that she does use nicotine products on a daily basis.  Denies significant alcohol use currently, notes that approximately 3 months ago that she would have several drinks per week, typically on the weekends, but none since that time.  Denies a history of IV or inhaled drugs of abuse.  Denies a significant family history of cirrhosis or liver disease.  Notes that she has a paternal grandmother who has rheumatoid arthritis    She notes that she was significantly jaundiced at birth and required the use of bili lights and a BiliBlanket.  Notes that on that her son also had similar issues.    Past Medical History:  Past Medical History:   Diagnosis Date     ASCUS of cervix with negative high risk HPV 09/19/2016    [facility]     Cancer (H)      Depressive disorder      Follicular cancer of thyroid (H)      Hyperprolactinemia (H)      Mitral valve prolapse      PE (pulmonary thromboembolism) (H)      Pituitary tumor      Schizophrenia (H)     reports spontaneous remission during last pregnancy     Thyroid disease        Surgical History:  Past Surgical History:   Procedure Laterality Date     APPENDECTOMY       ENT SURGERY      Partial thyroidectomy  "      Social History:  Social History     Tobacco Use     Smoking status: Former Smoker     Packs/day: 0.50     Years: 5.00     Pack years: 2.50     Types: Cigarettes     Quit date: 2016     Years since quittin.3     Smokeless tobacco: Never Used   Substance Use Topics     Alcohol use: No     Drug use: No        Family History:  Family History   Problem Relation Age of Onset     No Known Problems Mother      Hypertension Father      Cerebrovascular Disease Father 56        Passed away from double brain stem clot     Mental Illness Father      Asthma Brother      Cancer Maternal Grandfather         lung     No Known Problems Paternal Grandmother      No Known Problems Paternal Grandfather      Autism Spectrum Disorder Son      Kidney Disease Son         \"has a third kidney\"     No Known Problems Daughter        Medications:  Current Outpatient Medications   Medication     levonorgestrel (MIRENA) 20 MCG/24HR IUD     QUEtiapine (SEROQUEL) 25 MG tablet     triamcinolone (KENALOG) 0.1 % external cream     ziprasidone (GEODON) 40 MG capsule     No current facility-administered medications for this visit.        Review of Systems  A complete 10 point review of systems was asked and answered in the negative unless specifically commented upon in the HPI    Objective:         There were no vitals filed for this visit.  There is no height or weight on file to calculate BMI.     Physical Exam    Vitals reviewed.   Constitutional: Well-developed, well-nourished, in no apparent distress.    HEENT: Normocephalic. no scleral icterus.   Neck/Lymph: Normal ROM  Respiratory: Normal respiratory excursion   Skin:  No overt jaundice.   Musculoskeletal:  ROM intact  Psychiatric: Normal mood and affect. Behavior is normal.    Labs and Diagnostic tests:  Lab Results   Component Value Date    BILITOTAL 2.1 2020    ALT 21 2020    AST 15 2020    ALKPHOS 62 2020     Lab Results   Component Value Date    ALBUMIN " 4.3 12/11/2020    PROTTOTAL 7.6 12/11/2020          Imaging:  Abd US 12/11/2020  FINDINGS:  GALLBLADDER: The gallbladder is normal. No gallstones, wall  thickening, or pericholecystic fluid. Negative sonographic Pinon's  sign.  BILE DUCTS: There is no biliary dilatation. The common duct measures 3  mm.  LIVER: The liver is increased in echogenicity without focal mass.   RIGHT KIDNEY: Unremarkable.  PANCREAS: The visualized portions of the pancreas are normal.  No ascites.                                                          IMPRESSION:  Fatty infiltration of the liver. No gallstones or bile  duct dilatation.    Assessment and Plan:    Abnormal Liver Tests:    -Based on review of labs, she does have an indirect hyperbilirubinemia.  Based on normal CBC and otherwise review of health history, I do feel that this is most likely consistent with Gilbert's syndrome (as per below)  -It is noted that she does have an SUDHA of 1:80 and she does have an elevated iron saturation on the setting of a fatty liver.  Unclear how all these issues tied together.  I doubt that the SUDHA is of clinical significance for her liver given her normal liver tests.  While I initially have concerns about occult alcohol consumption, she denies alcohol use over the past 3+ months.  - She has lost weight in the past 6 months, and is not obese, so would be more likely that the fatty liver was due to drug effect than organic metabolic condition.  - We will await HFE gene testing  - We will screen for autoimmune liver serologies  - We will complete lab eval for hemolysis    Gilbert s Syndrome  The most common inherited disorder of bilirubin glucuronidation is Gilbert syndrome, which is characterized by recurrent episodes of jaundice due to unconjugated (or indirect) hyperbilirubinemia. Gilbert syndrome is the result of a defect in the promotor of the gene that encodes the enzyme uridine diphosphoglucuronate-glucuronosyltransferase 1A1, which is  responsible for the conjugation of bilirubin with glucuronic acid. With the exception of intermittent episode of jaundice, patients with Gilbert syndrome are asymptomatic and have normal physical examination findings. Laboratory testing reveals unconjugated hyperbilirubinemia, with total bilirubin levels that are usually less than 3 mg/dL (though in the setting of increased bilirubin production the levels may be higher.)  Periods of physiologic stress, illness, or fasting may exacerbate the hyperbilirubinemia in these patients.    No specific therapy is required for patients with Gilbert syndrome. The most important aspect of the care of these patients is recognition of the disorder and its benign nature.  This condition may be based on to offspring, but reassuringly, this is a benign condition and of little clinical importance    Follow Up:  6 months     Thank you very much for the opportunity to participate in the care of this patient.  If you have any further questions, please don't hesitate to contact our office.    Thomas M. Leventhal, M.D.   of Medicine  Advanced & Transplant Hepatology  [facility]    Video-Visit Details    Type of service:  Video Visit    Video Start Time: 0830  Video End Time: 0852    Originating Location (pt. Location): Home    Distant Location (provider location):  [facility] [place]     Platform used for Video Visit: Comic Reply

## 2020-12-17 NOTE — LETTER
"    12/17/2020         RE: Isaura Gray  [address] [place] [address]        Dear Colleague,    Thank you for referring your patient, Isaura Gray, to the [facility] [place]. Please see a copy of my visit note below.    Isaura Gray is a 35 year old female who is being evaluated via a billable video visit.      The patient has been notified of following:     \"This video visit will be conducted via a call between you and your physician/provider. We have found that certain health care needs can be provided without the need for an in-person physical exam.  This service lets us provide the care you need with a video conversation.  If a prescription is necessary we can send it directly to your pharmacy.  If lab work is needed we can place an order for that and you can then stop by our lab to have the test done at a later time.    Video visits are billed at different rates depending on your insurance coverage.  Please reach out to your insurance provider with any questions.    If during the course of the call the physician/provider feels a video visit is not appropriate, you will not be charged for this service.\"     Patient has given verbal consent for Video visit? Yes  How would you like to obtain your AVS? MyChart  If you are dropped from the video visit, the video invite should be resent to: Text to cell phone: [phone]  Will anyone else be joining your video visit? No    Date of Service: 12/17/2020     Referring Provider: Timmy Umana MD    Subjective:            Isaura Gray is a 35 year old female presenting for evaluation of abnormal liver tests    History of Present Illness   Isaura Gray is a 35 year old female with past medical history of depression and schizophrenia who presents with concerns of elevated serum bilirubin and multiple systemic concerns including profound fatigue.    Notes that she has had significant changes in overall mental " health and drug regimen over the past 3 months, initially with escalations and ultimately discontinuation of BuSpar, Lamictal, olanzapine, Prozac all within the past month or so.  Notes that she has symptoms of profound fatigue, intermittent pruritus, and leg swelling.  She was seen by her primary care doctor that given her symptoms ordered liver tests.  Liver tests from November 23 demonstrated AST 10, ALT 13, alkaline phosphatase 61, total bilirubin 2.1.  Repeat labs on December 6 demonstrated total bilirubin 2.4 with direct bilirubin of 0.3.  These labs were repeated again on December 11 and demonstrated total bilirubin of 2.1, ferritin 50, iron 194, iron binding capacity 315, iron saturation 62%.  Hepatitis B surface antigen was negative, hepatitis C antibody negative, SUDHA 1: 80, ceruloplasmin 24.  An abdominal ultrasound was performed on December 11 which demonstrated changes consistent with fatty liver, otherwise was within normal limits.    She notes a rare history of use of cannabis-containing products.  Notes that she does use nicotine products on a daily basis.  Denies significant alcohol use currently, notes that approximately 3 months ago that she would have several drinks per week, typically on the weekends, but none since that time.  Denies a history of IV or inhaled drugs of abuse.  Denies a significant family history of cirrhosis or liver disease.  Notes that she has a paternal grandmother who has rheumatoid arthritis    She notes that she was significantly jaundiced at birth and required the use of bili lights and a BiliBlanket.  Notes that on that her son also had similar issues.    Past Medical History:  Past Medical History:   Diagnosis Date     ASCUS of cervix with negative high risk HPV 09/19/2016    [facility]     Cancer (H)      Depressive disorder      Follicular cancer of thyroid (H)      Hyperprolactinemia (H)      Mitral valve prolapse      PE (pulmonary thromboembolism) (H)       "Pituitary tumor      Schizophrenia (H)     reports spontaneous remission during last pregnancy     Thyroid disease        Surgical History:  Past Surgical History:   Procedure Laterality Date     APPENDECTOMY       ENT SURGERY      Partial thyroidectomy       Social History:  Social History     Tobacco Use     Smoking status: Former Smoker     Packs/day: 0.50     Years: 5.00     Pack years: 2.50     Types: Cigarettes     Quit date: 2016     Years since quittin.3     Smokeless tobacco: Never Used   Substance Use Topics     Alcohol use: No     Drug use: No        Family History:  Family History   Problem Relation Age of Onset     No Known Problems Mother      Hypertension Father      Cerebrovascular Disease Father 56        Passed away from double brain stem clot     Mental Illness Father      Asthma Brother      Cancer Maternal Grandfather         lung     No Known Problems Paternal Grandmother      No Known Problems Paternal Grandfather      Autism Spectrum Disorder Son      Kidney Disease Son         \"has a third kidney\"     No Known Problems Daughter        Medications:  Current Outpatient Medications   Medication     levonorgestrel (MIRENA) 20 MCG/24HR IUD     QUEtiapine (SEROQUEL) 25 MG tablet     triamcinolone (KENALOG) 0.1 % external cream     ziprasidone (GEODON) 40 MG capsule     No current facility-administered medications for this visit.        Review of Systems  A complete 10 point review of systems was asked and answered in the negative unless specifically commented upon in the HPI    Objective:         There were no vitals filed for this visit.  There is no height or weight on file to calculate BMI.     Physical Exam    Vitals reviewed.   Constitutional: Well-developed, well-nourished, in no apparent distress.    HEENT: Normocephalic. no scleral icterus.   Neck/Lymph: Normal ROM  Respiratory: Normal respiratory excursion   Skin:  No overt jaundice.   Musculoskeletal:  ROM intact  Psychiatric: " Normal mood and affect. Behavior is normal.    Labs and Diagnostic tests:  Lab Results   Component Value Date    BILITOTAL 2.1 12/11/2020    ALT 21 12/11/2020    AST 15 12/11/2020    ALKPHOS 62 12/11/2020     Lab Results   Component Value Date    ALBUMIN 4.3 12/11/2020    PROTTOTAL 7.6 12/11/2020          Imaging:  Abd US 12/11/2020  FINDINGS:  GALLBLADDER: The gallbladder is normal. No gallstones, wall  thickening, or pericholecystic fluid. Negative sonographic Pinon's  sign.  BILE DUCTS: There is no biliary dilatation. The common duct measures 3  mm.  LIVER: The liver is increased in echogenicity without focal mass.   RIGHT KIDNEY: Unremarkable.  PANCREAS: The visualized portions of the pancreas are normal.  No ascites.                                                          IMPRESSION:  Fatty infiltration of the liver. No gallstones or bile  duct dilatation.    Assessment and Plan:    Abnormal Liver Tests:    -Based on review of labs, she does have an indirect hyperbilirubinemia.  Based on normal CBC and otherwise review of health history, I do feel that this is most likely consistent with Gilbert's syndrome (as per below)  -It is noted that she does have an SUDHA of 1:80 and she does have an elevated iron saturation on the setting of a fatty liver.  Unclear how all these issues tied together.  I doubt that the SUDHA is of clinical significance for her liver given her normal liver tests.  While I initially have concerns about occult alcohol consumption, she denies alcohol use over the past 3+ months.  - She has lost weight in the past 6 months, and is not obese, so would be more likely that the fatty liver was due to drug effect than organic metabolic condition.  - We will await HFE gene testing  - We will screen for autoimmune liver serologies  - We will complete lab eval for hemolysis    Gilbert s Syndrome  The most common inherited disorder of bilirubin glucuronidation is Gilbert syndrome, which is characterized  by recurrent episodes of jaundice due to unconjugated (or indirect) hyperbilirubinemia. Gilbert syndrome is the result of a defect in the promotor of the gene that encodes the enzyme uridine diphosphoglucuronate-glucuronosyltransferase 1A1, which is responsible for the conjugation of bilirubin with glucuronic acid. With the exception of intermittent episode of jaundice, patients with Gilbert syndrome are asymptomatic and have normal physical examination findings. Laboratory testing reveals unconjugated hyperbilirubinemia, with total bilirubin levels that are usually less than 3 mg/dL (though in the setting of increased bilirubin production the levels may be higher.)  Periods of physiologic stress, illness, or fasting may exacerbate the hyperbilirubinemia in these patients.    No specific therapy is required for patients with Gilbert syndrome. The most important aspect of the care of these patients is recognition of the disorder and its benign nature.  This condition may be based on to offspring, but reassuringly, this is a benign condition and of little clinical importance    Follow Up:  6 months     Thank you very much for the opportunity to participate in the care of this patient.  If you have any further questions, please don't hesitate to contact our office.    Thomas M. Leventhal, M.D.   of Medicine  Advanced & Transplant Hepatology  [facility]    Video-Visit Details    Type of service:  Video Visit    Video Start Time: 0830  Video End Time: 0852    Originating Location (pt. Location): Home    Distant Location (provider location):  [facility] [place]     Platform used for Video Visit: JalenWell            Again, thank you for allowing me to participate in the care of your patient.        Sincerely,        Thomas M. Leventhal, MD

## 2020-12-24 LAB — COPATH REPORT: NORMAL

## 2020-12-24 NOTE — RESULT ENCOUNTER NOTE
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  This probably reflects the minor abnormalities in your iron studies that we saw, but is probably not related to your bilirubin elevation or your fatty liver findings on the ultrasound.    Have a nice day!    Dr. Umana

## 2020-12-27 ENCOUNTER — HEALTH MAINTENANCE LETTER (OUTPATIENT)
Age: 35
End: 2020-12-27

## 2021-01-01 ENCOUNTER — MYC MEDICAL ADVICE (OUTPATIENT)
Dept: FAMILY MEDICINE | Facility: OTHER | Age: 36
End: 2021-01-01

## 2021-01-05 NOTE — TELEPHONE ENCOUNTER
Noted in appt note and message sent to management.    Violet Fulton CMA ([facility])

## 2021-01-05 NOTE — TELEPHONE ENCOUNTER
Spoke with pt and gave information from "[employer] message. She is scheduled and is wondering if its ok for her to bring her 2 toddlers with as she does not have anyone else to watch them? Provider please review and advise.    Violet Fulton CMA ([facility])

## 2021-01-06 ENCOUNTER — HOSPITAL ENCOUNTER (OUTPATIENT)
Dept: CT IMAGING | Facility: CLINIC | Age: 36
Discharge: HOME | End: 2021-01-06
Attending: PHYSICIAN ASSISTANT | Admitting: PHYSICIAN ASSISTANT
Payer: MEDICARE

## 2021-01-06 ENCOUNTER — OFFICE VISIT (OUTPATIENT)
Dept: FAMILY MEDICINE | Facility: OTHER | Age: 36
End: 2021-01-06
Payer: MEDICARE

## 2021-01-06 VITALS
BODY MASS INDEX: 24.26 KG/M2 | OXYGEN SATURATION: 98 % | HEIGHT: 70 IN | RESPIRATION RATE: 16 BRPM | HEART RATE: 94 BPM | TEMPERATURE: 98.4 F | WEIGHT: 169.5 LBS | DIASTOLIC BLOOD PRESSURE: 80 MMHG | SYSTOLIC BLOOD PRESSURE: 112 MMHG

## 2021-01-06 DIAGNOSIS — R10.11 ABDOMINAL PAIN, RIGHT UPPER QUADRANT: ICD-10-CM

## 2021-01-06 DIAGNOSIS — R10.11 ABDOMINAL PAIN, RIGHT UPPER QUADRANT: Primary | ICD-10-CM

## 2021-01-06 DIAGNOSIS — E80.6 UNCONJUGATED HYPERBILIRUBINEMIA: ICD-10-CM

## 2021-01-06 LAB
ALBUMIN SERPL-MCNC: 4 G/DL (ref 3.4–5)
ALP SERPL-CCNC: 67 U/L (ref 40–150)
ALT SERPL W P-5'-P-CCNC: 44 U/L (ref 0–50)
ANION GAP SERPL CALCULATED.3IONS-SCNC: 4 MMOL/L (ref 3–14)
AST SERPL W P-5'-P-CCNC: 24 U/L (ref 0–45)
BASOPHILS # BLD AUTO: 0 10E9/L (ref 0–0.2)
BASOPHILS NFR BLD AUTO: 0.2 %
BILIRUB SERPL-MCNC: 1.5 MG/DL (ref 0.2–1.3)
BUN SERPL-MCNC: 18 MG/DL (ref 7–30)
CALCIUM SERPL-MCNC: 8.9 MG/DL (ref 8.5–10.1)
CHLORIDE SERPL-SCNC: 109 MMOL/L (ref 94–109)
CO2 SERPL-SCNC: 25 MMOL/L (ref 20–32)
CREAT SERPL-MCNC: 0.69 MG/DL (ref 0.52–1.04)
DIFFERENTIAL METHOD BLD: NORMAL
EOSINOPHIL # BLD AUTO: 0 10E9/L (ref 0–0.7)
EOSINOPHIL NFR BLD AUTO: 0.8 %
ERYTHROCYTE [DISTWIDTH] IN BLOOD BY AUTOMATED COUNT: 13.5 % (ref 10–15)
GFR SERPL CREATININE-BSD FRML MDRD: >90 ML/MIN/{1.73_M2}
GLUCOSE SERPL-MCNC: 92 MG/DL (ref 70–99)
HCT VFR BLD AUTO: 39.6 % (ref 35–47)
HGB BLD-MCNC: 13.6 G/DL (ref 11.7–15.7)
LDH SERPL L TO P-CCNC: 163 U/L (ref 81–234)
LYMPHOCYTES # BLD AUTO: 1.4 10E9/L (ref 0.8–5.3)
LYMPHOCYTES NFR BLD AUTO: 28.7 %
MCH RBC QN AUTO: 29.8 PG (ref 26.5–33)
MCHC RBC AUTO-ENTMCNC: 34.3 G/DL (ref 31.5–36.5)
MCV RBC AUTO: 87 FL (ref 78–100)
MONOCYTES # BLD AUTO: 0.4 10E9/L (ref 0–1.3)
MONOCYTES NFR BLD AUTO: 8.1 %
NEUTROPHILS # BLD AUTO: 3 10E9/L (ref 1.6–8.3)
NEUTROPHILS NFR BLD AUTO: 62.2 %
PLATELET # BLD AUTO: 250 10E9/L (ref 150–450)
POTASSIUM SERPL-SCNC: 4.1 MMOL/L (ref 3.4–5.3)
PROT SERPL-MCNC: 7.3 G/DL (ref 6.8–8.8)
RBC # BLD AUTO: 4.56 10E12/L (ref 3.8–5.2)
SODIUM SERPL-SCNC: 138 MMOL/L (ref 133–144)
WBC # BLD AUTO: 4.8 10E9/L (ref 4–11)

## 2021-01-06 PROCEDURE — 250N000009 HC RX 250: Performed by: PHYSICIAN ASSISTANT

## 2021-01-06 PROCEDURE — 99214 OFFICE O/P EST MOD 30 MIN: CPT | Performed by: PHYSICIAN ASSISTANT

## 2021-01-06 PROCEDURE — 83010 ASSAY OF HAPTOGLOBIN QUANT: CPT | Performed by: PHYSICIAN ASSISTANT

## 2021-01-06 PROCEDURE — 36415 COLL VENOUS BLD VENIPUNCTURE: CPT | Performed by: PHYSICIAN ASSISTANT

## 2021-01-06 PROCEDURE — 82784 ASSAY IGA/IGD/IGG/IGM EACH: CPT | Performed by: PHYSICIAN ASSISTANT

## 2021-01-06 PROCEDURE — G1004 CDSM NDSC: HCPCS

## 2021-01-06 PROCEDURE — 80053 COMPREHEN METABOLIC PANEL: CPT | Performed by: PHYSICIAN ASSISTANT

## 2021-01-06 PROCEDURE — 250N000011 HC RX IP 250 OP 636: Performed by: PHYSICIAN ASSISTANT

## 2021-01-06 PROCEDURE — 83615 LACTATE (LD) (LDH) ENZYME: CPT | Performed by: PHYSICIAN ASSISTANT

## 2021-01-06 PROCEDURE — 85025 COMPLETE CBC W/AUTO DIFF WBC: CPT | Performed by: PHYSICIAN ASSISTANT

## 2021-01-06 PROCEDURE — 83516 IMMUNOASSAY NONANTIBODY: CPT | Performed by: PHYSICIAN ASSISTANT

## 2021-01-06 PROCEDURE — 83516 IMMUNOASSAY NONANTIBODY: CPT | Mod: 90 | Performed by: PHYSICIAN ASSISTANT

## 2021-01-06 RX ORDER — IOPAMIDOL 755 MG/ML
100 INJECTION, SOLUTION INTRAVASCULAR ONCE
Status: COMPLETED | OUTPATIENT
Start: 2021-01-06 | End: 2021-01-06

## 2021-01-06 RX ADMIN — SODIUM CHLORIDE 60 ML: 9 INJECTION, SOLUTION INTRAVENOUS at 13:18

## 2021-01-06 RX ADMIN — IOPAMIDOL 85 ML: 755 INJECTION, SOLUTION INTRAVENOUS at 13:19

## 2021-01-06 ASSESSMENT — MIFFLIN-ST. JEOR: SCORE: 1558.48

## 2021-01-06 NOTE — PROGRESS NOTES
Assessment & Plan     Abdominal pain, right upper quadrant  Differential diagnosis includes but not limited to gallbladder or liver cause including infection and stone, gastritis, GERD, abdominal mass, Crohns, ulcerative colitis, IBS, constipation.  Her symptoms have been chronic but severe in the last 2 weeks.  She is still able to eat/drink but causing her to feel nauseated and vomit because she feels full, she has been having bowel movements but still constipation that she treats with OTC therapies. Given the tenderness on exam and more severe worsening symptoms recommended CT Abdomen/Pelvis, we discussed risks, benefits of this.  Did obtain labs as well. CBC shows normal WBC which is reassuring in regards to acute infection or acute abdomen.  We will notify of other results as well.  Consider Endoscopy as well given her type of symptoms if the imaging is negative.     CT showed no signs of cause of her symptoms.  Possibility constipation is so severe it is causing her to feel full which makes her feel nauseated.  Recommend treating the constipation more regularly with Miralax.  But this was an acute change 4 months ago dealing with more constipation therefore consider endoscopy but recommend further evaluation with GI.   - CBC with platelets differential  - Comprehensive metabolic panel  - CT Abdomen Pelvis w Contrast; Future    Unconjugated hyperbilirubinemia  Labs obtained for GI  - IgM  - Mitochondrial M2 Antibody IgG  - IgG  - F Actin EIA with reflex  - Lactate Dehydrogenase  - Haptoglobin    Review of the result(s) of each unique test - CBC, CMP, CT abdomen/Pelvis    25 minutes spent on the date of the encounter doing chart review, review of test results, interpretation of tests, patient visit and documentation     Return in about 1 week (around 1/13/2021) for If not improving, sooner if worse or new concerns.     Options for treatment and follow-up care were reviewed with the patient and/or guardian.  Patient and/or guardian engaged in the decision making process and verbalized understanding of the options discussed and agreed with the final plan.     MARCI Ruff [facility]     Isaura Gray is a 35 year old who presents to clinic today for the following health issues     HPI     Abdominal/Flank Pain  Onset/Duration: about 4 months  Description:   Character: Sharp if bending over a lot, goes to her shoulder. Full and uncomfortable.   Location: right upper quadrant  Radiation: Shoulder  Intensity: 6/10  Progression of Symptoms:  Intermittent, always there for pain.   Accompanying Signs & Symptoms:  Fever/Chills: YES, chills  Gas/Bloating: YES- bloating.   Nausea: YES  Vomitting: YES- when she eats, anything solid comes up.   Diarrhea: no  Constipation: YES  Dysuria or Hematuria: no  History:   Trauma: no  Previous similar pain: no  Previous tests done: Ultrasound of liver about a month ago.   Precipitating factors:   Does the pain change with:     Food: YES- gets worse.     Bowel Movement: no    Urination: YES- frequency.    Other factors:  no  Therapies tried and outcome: increasing fluid intake, magnesium citrate.     Pain started 2 weeks ago.  Constant feeling like something is stuck on the ribs on right side.  Sharp pain.  Rest of stomach is very tight.  Worse with movement.  Last couple of days she has noticed more bloating.   Nausea and vomiting for 4 months.  Last 2 weeks are worse, she more so feels this way because when she eats/drinks she feels so full already.  Anything she eats it is worse but again not necessarily because of the food it's more because she feels so full to begin with.   Constipation is chronic. Mag citrate helped. This started 4 months ago as well but hasn't really changed since it started.   She did see GI and they recommended lab work which she has not yet done.   No problems with urination.  No fevers but has felt chilled.    No  "blood in stool.  No chest pain or shortness of breath.  No heartburn.  No diet changes prior to symptoms starting or recently with worsening symptoms.   Has a history of appendectomy but no other abdominal surgeries.   She denies family history of GI disorders.   No LMP recorded. (Menstrual status: IUD).      Review of Systems   Constitutional, HEENT, cardiovascular, pulmonary, gi and gu systems are negative, except as otherwise noted.      Objective    /80   Pulse 94   Temp 98.4  F (36.9  C) (Temporal)   Resp 16   Ht 1.785 m (5' 10.28\")   Wt 76.9 kg (169 lb 8 oz)   SpO2 98%   BMI 24.13 kg/m    Body mass index is 24.13 kg/m .  Physical Exam   GENERAL: appears alert and no distress  NECK: no adenopathy, no asymmetry, masses, or scars and thyroid normal to palpation  RESP: lungs clear to auscultation - no rales, rhonchi or wheezes  CV: regular rate and rhythm, normal S1 S2, no S3 or S4, no murmur, click or rub, no peripheral edema and peripheral pulses strong  ABDOMEN: bowel sounds normal and soft, no HSM, mildly distended, moderate tenderness to palpation RUQ, epigastric, periumbilical, no abdominal rigidity, positive rebound tenderness. Positive [place].   MS: no gross musculoskeletal defects noted, no edema  PSYCH: mentation appears normal    Results for orders placed or performed in visit on 01/06/21 (from the past 24 hour(s))   CBC with platelets differential   Result Value Ref Range    WBC 4.8 4.0 - 11.0 10e9/L    RBC Count 4.56 3.8 - 5.2 10e12/L    Hemoglobin 13.6 11.7 - 15.7 g/dL    Hematocrit 39.6 35.0 - 47.0 %    MCV 87 78 - 100 fl    MCH 29.8 26.5 - 33.0 pg    MCHC 34.3 31.5 - 36.5 g/dL    RDW 13.5 10.0 - 15.0 %    Platelet Count 250 150 - 450 10e9/L    % Neutrophils 62.2 %    % Lymphocytes 28.7 %    % Monocytes 8.1 %    % Eosinophils 0.8 %    % Basophils 0.2 %    Absolute Neutrophil 3.0 1.6 - 8.3 10e9/L    Absolute Lymphocytes 1.4 0.8 - 5.3 10e9/L    Absolute Monocytes 0.4 0.0 - 1.3 10e9/L    " Absolute Eosinophils 0.0 0.0 - 0.7 10e9/L    Absolute Basophils 0.0 0.0 - 0.2 10e9/L    Diff Method Automated Method    Comprehensive metabolic panel   Result Value Ref Range    Sodium 138 133 - 144 mmol/L    Potassium 4.1 3.4 - 5.3 mmol/L    Chloride 109 94 - 109 mmol/L    Carbon Dioxide 25 20 - 32 mmol/L    Anion Gap 4 3 - 14 mmol/L    Glucose 92 70 - 99 mg/dL    Urea Nitrogen 18 7 - 30 mg/dL    Creatinine 0.69 0.52 - 1.04 mg/dL    GFR Estimate >90 >60 mL/min/[1.73_m2]    GFR Estimate If Black >90 >60 mL/min/[1.73_m2]    Calcium 8.9 8.5 - 10.1 mg/dL    Bilirubin Total 1.5 (H) 0.2 - 1.3 mg/dL    Albumin 4.0 3.4 - 5.0 g/dL    Protein Total 7.3 6.8 - 8.8 g/dL    Alkaline Phosphatase 67 40 - 150 U/L    ALT 44 0 - 50 U/L    AST 24 0 - 45 U/L   Lactate Dehydrogenase   Result Value Ref Range    Lactate Dehydrogenase 163 81 - 234 U/L     Ct Abdomen Pelvis W Contrast    Result Date: 1/6/2021  CT ABDOMEN PELVIS W CONTRAST 1/6/2021 1:28 PM CLINICAL HISTORY: Right upper quadrant pain, nausea and vomiting; abnormal bilirubin. TECHNIQUE: CT scan of the abdomen and pelvis was performed following injection of IV contrast. Multiplanar reformats were obtained. Dose reduction techniques were used. CONTRAST: Isovue-370, 85mL COMPARISON: Ultrasound 12/11/2020 FINDINGS: LOWER CHEST: Normal. HEPATOBILIARY: Normal. No biliary ductal dilation. PANCREAS: Normal. SPLEEN: Normal. ADRENAL GLANDS: Normal. KIDNEYS/BLADDER: Normal. BOWEL: Moderate in fecal debris throughout the colon. No evidence for obstruction. PELVIC ORGANS: Satisfactory position of IUD. Corpus luteum right ovary, no follow-up needed. ADDITIONAL FINDINGS: None. MUSCULOSKELETAL: Normal.     IMPRESSION: 1.  No CT abnormality present to explain right upper quadrant pain, nausea and vomiting. 2.  No biliary ductal dilation present. 3.  Moderate retained fecal debris throughout the colon. No intestinal obstruction. CHARLY ALVARENGA MD

## 2021-01-07 LAB
HAPTOGLOB SERPL-MCNC: 152 MG/DL (ref 32–197)
IGG SERPL-MCNC: 958 MG/DL (ref 610–1616)
IGM SERPL-MCNC: 205 MG/DL (ref 35–242)
MITOCHONDRIA M2 IGG SER-ACNC: 1 U/ML

## 2021-01-07 NOTE — PROGRESS NOTES
Assessment & Plan       ICD-10-CM    1. Schizophrenia, unspecified type (H)  F20.9 ziprasidone (GEODON) 80 MG capsule     QUEtiapine (SEROQUEL) 25 MG tablet   2. Anxiety  F41.9    3. Moderate major depression (H)  F32.1 ziprasidone (GEODON) 80 MG capsule     QUEtiapine (SEROQUEL) 25 MG tablet   4. Multiple joint pain  M25.50    5. Nausea  R11.0    6. Psychophysiological insomnia  F51.04 QUEtiapine (SEROQUEL) 25 MG tablet   7. Need for prophylactic vaccination and inoculation against influenza  Z23 INFLUENZA VACCINE IM > 6 MONTHS VALENT IIV4 [06485]      1, 2, 3.  Not fully controlled.  Discussed a few options with patient.  Offered to start an SSRI, but patient wished to try increasing Geodon first.  We will increase her dose to 80 mg twice daily.  Can use Seroquel on an as-needed basis for more significant anxiety symptoms.  Follow-up in 1 month.  Continue plans to have her see psychiatry.  Unfortunately, her appointment is not until March.  4, 5.  Unclear etiology for symptoms.  Extensive work-up has been relatively reassuring.  These may be manifestations of her underlying anxiety/schizophrenia.  See management plan for these above.  Consider further evaluation if not improving at next visit.  6.  Currently controlled.  Will continue current regimen of Seroquel, but she can take extra doses this as needed for anxiety and other symptom control.  7.  Immunized.    Portions of this note were completed using Dragon dictation software.  Although reviewed, there may be typographical and other inadvertent errors that remain.           Review of the result(s) of each unique test - SUDHA, CMP, haptoglobin, etc.  Discussion of management or test interpretation with external physician/other qualified healthcare professional/appropriate source - GI      38 minutes spent on the date of the encounter doing chart review, history and exam, documentation and further activities as noted above           Patient Instructions   Thank  you for visiting [facility]    Let's try increasing your Geodon to 80 mg twice daily to help with your mood and other symptoms.      Can use Seroquel as needed for anxiety/panic symptoms.  If desired, we can add back Buspar or try a different selective serotonin reuptake inhibitor like sertraline.      If your joint/other symptoms aren't improving, then we can consider lab workup down the road like we talked about today.  There are inflammatory joint conditions we can screen for.  You might find out what inflammatory condition your mother has.      Contact us or return if questions or concerns.     Have a nice day!    Dr. Umana     Return in about 4 weeks (around 2/8/2021).      If you need medication refills, please contact your pharmacy 3 days before your prescriptions runs out or download the [facility] henrietta for your smart phone.   If you are out of refills, your pharmacy will contact contact the clinic.                                     [employer] assistance [phone]                       Return in about 4 weeks (around 2/8/2021) for Recheck, E-visit, video, or phone visit.    Timmy Umana MD, MD  [facility]     Isaura Gray is a 35 year old who presents to clinic today for the following health issues  accompanied by her SELF:    HPI     Ongoing symptoms unchanged since last visit except she is only experiencing symptoms starting at about 4pm.     Feels good in the morning.      As the day goes on, gets nausea, feels like she's going to vomit, at it's worst, feels like her throat is closing off.  Occasionally does vomit.  Also reports joint pain, mostly in her hips.  Feels a bit like she's having a period.  Also getting some shoulder pain.      Denies reflux/heartburn symptoms.  Feels like a long-term panic attack.      Busy all day with children, trying to keep quiet since her  works nights.      If she doesn't lie down and  "sleep, feels even worse.      Her rib pain resolved after stooling.  Taking miralax nightly now.  Working well for her.      Has been \"over-eating\".      Previously was on fluoxetine, but was feeling \"all over the place\".  Also was taking Lamictal at the same time.      Currently sleeping well with Seroquel.  She does feel the geodon has helped with her mood.      She didn't like how she felt on the Buspar.  Made her feel \"high\".      Some hallucinations, but not \"terrible\".  She wakes up at night a lot in sheer terror.  Thinks there's someone in the room or hearing noises.      Review of Systems   Constitutional, HEENT, cardiovascular, pulmonary, gi and gu systems are negative, except as otherwise noted.  Chills.        Objective    /62   Pulse 78   Temp 98.2  F (36.8  C) (Temporal)   Resp 18   Ht 1.78 m (5' 10.08\")   Wt 77.3 kg (170 lb 6.4 oz)   SpO2 100%   BMI 24.39 kg/m    Body mass index is 24.39 kg/m .  Physical Exam   GENERAL: healthy, alert and no distress  NECK: no adenopathy, no asymmetry, masses, or scars and thyroid normal to palpation  RESP: lungs clear to auscultation - no rales, rhonchi or wheezes  CV: regular rate and rhythm, normal S1 S2, no S3 or S4, no murmur, click or rub, no peripheral edema and peripheral pulses strong  ABDOMEN: soft, nontender, no hepatosplenomegaly, no masses and bowel sounds normal  MS: no gross musculoskeletal defects noted, no edema    Office Visit on 01/06/2021   Component Date Value Ref Range Status     WBC 01/06/2021 4.8  4.0 - 11.0 10e9/L Final     RBC Count 01/06/2021 4.56  3.8 - 5.2 10e12/L Final     Hemoglobin 01/06/2021 13.6  11.7 - 15.7 g/dL Final     Hematocrit 01/06/2021 39.6  35.0 - 47.0 % Final     MCV 01/06/2021 87  78 - 100 fl Final     MCH 01/06/2021 29.8  26.5 - 33.0 pg Final     MCHC 01/06/2021 34.3  31.5 - 36.5 g/dL Final     RDW 01/06/2021 13.5  10.0 - 15.0 % Final     Platelet Count 01/06/2021 250  150 - 450 10e9/L Final     % Neutrophils " 01/06/2021 62.2  % Final     % Lymphocytes 01/06/2021 28.7  % Final     % Monocytes 01/06/2021 8.1  % Final     % Eosinophils 01/06/2021 0.8  % Final     % Basophils 01/06/2021 0.2  % Final     Absolute Neutrophil 01/06/2021 3.0  1.6 - 8.3 10e9/L Final     Absolute Lymphocytes 01/06/2021 1.4  0.8 - 5.3 10e9/L Final     Absolute Monocytes 01/06/2021 0.4  0.0 - 1.3 10e9/L Final     Absolute Eosinophils 01/06/2021 0.0  0.0 - 0.7 10e9/L Final     Absolute Basophils 01/06/2021 0.0  0.0 - 0.2 10e9/L Final     Diff Method 01/06/2021 Automated Method   Final     Sodium 01/06/2021 138  133 - 144 mmol/L Final     Potassium 01/06/2021 4.1  3.4 - 5.3 mmol/L Final     Chloride 01/06/2021 109  94 - 109 mmol/L Final     Carbon Dioxide 01/06/2021 25  20 - 32 mmol/L Final     Anion Gap 01/06/2021 4  3 - 14 mmol/L Final     Glucose 01/06/2021 92  70 - 99 mg/dL Final     Urea Nitrogen 01/06/2021 18  7 - 30 mg/dL Final     Creatinine 01/06/2021 0.69  0.52 - 1.04 mg/dL Final     GFR Estimate 01/06/2021 >90  >60 mL/min/[1.73_m2] Final    Comment: Non  GFR Calc  Starting 12/18/2018, serum creatinine based estimated GFR (eGFR) will be   calculated using the Chronic Kidney Disease Epidemiology Collaboration   (CKD-EPI) equation.       GFR Estimate If Black 01/06/2021 >90  >60 mL/min/[1.73_m2] Final    Comment:  GFR Calc  Starting 12/18/2018, serum creatinine based estimated GFR (eGFR) will be   calculated using the Chronic Kidney Disease Epidemiology Collaboration   (CKD-EPI) equation.       Calcium 01/06/2021 8.9  8.5 - 10.1 mg/dL Final     Bilirubin Total 01/06/2021 1.5* 0.2 - 1.3 mg/dL Final     Albumin 01/06/2021 4.0  3.4 - 5.0 g/dL Final     Protein Total 01/06/2021 7.3  6.8 - 8.8 g/dL Final     Alkaline Phosphatase 01/06/2021 67  40 - 150 U/L Final     ALT 01/06/2021 44  0 - 50 U/L Final     AST 01/06/2021 24  0 - 45 U/L Final     IGM 01/06/2021 205  35 - 242 mg/dL Final     Mitochondrial M2 Antibody  IgG 01/06/2021 1  <4 U/mL Final    Negative     IGG 01/06/2021 958  610 - 1,616 mg/dL Final     F-Actin Antibody IgG 01/06/2021 8  0 - 19 Units Final    Comment: (Note)  If F-Actin (Smooth Muscle) Antibody, IgG is negative, the   Smooth Muscle Antibody titer by IFA is not performed.  REFERENCE INTERVAL: F-Actin (Smooth Muscle) Antibody, IgG   by                      GENE   19 Units or less ....... Negative   20 - 30 Units .......... Weak Positive-Suggest repeat                            testing in two to three weeks                            with fresh specimen.   31 Units or greater..... Positive-Suggestive of                            autoimmune hepatitis type 1                            or chronic active hepatitis.  F-actin IgG antibodies have been shown to have increased   sensitivity for autoimmune hepatitis (AIH) but lower   specificity than smooth muscle antibodies (SMA). F-actin   IgG antibodies can also be seen in SMA-negative disease   controls (non-AIH), especially in patients with primary   biliary cirrhosis and chronic hepatitis C infections. Some   patients with AIH may be SMA-positive but negative for   F-actin IgG. Consider                            testing for SMA by IFA if suspicion   for AIH is strong.  Performed By: [facility]  [address] [place] [id number]  : Genoveva Dumont MD       Lactate Dehydrogenase 01/06/2021 163  81 - 234 U/L Final     Haptoglobin 01/06/2021 152  32 - 197 mg/dL Final

## 2021-01-08 LAB — SMA IGG SER-ACNC: 8 UNITS (ref 0–19)

## 2021-01-11 ENCOUNTER — OFFICE VISIT (OUTPATIENT)
Dept: FAMILY MEDICINE | Facility: OTHER | Age: 36
End: 2021-01-11
Payer: MEDICARE

## 2021-01-11 VITALS
OXYGEN SATURATION: 100 % | RESPIRATION RATE: 18 BRPM | SYSTOLIC BLOOD PRESSURE: 104 MMHG | BODY MASS INDEX: 24.39 KG/M2 | HEART RATE: 78 BPM | TEMPERATURE: 98.2 F | WEIGHT: 170.4 LBS | HEIGHT: 70 IN | DIASTOLIC BLOOD PRESSURE: 62 MMHG

## 2021-01-11 DIAGNOSIS — Z23 NEED FOR PROPHYLACTIC VACCINATION AND INOCULATION AGAINST INFLUENZA: ICD-10-CM

## 2021-01-11 DIAGNOSIS — M25.50 MULTIPLE JOINT PAIN: ICD-10-CM

## 2021-01-11 DIAGNOSIS — F51.04 PSYCHOPHYSIOLOGICAL INSOMNIA: ICD-10-CM

## 2021-01-11 DIAGNOSIS — F32.1 MODERATE MAJOR DEPRESSION (H): ICD-10-CM

## 2021-01-11 DIAGNOSIS — R11.0 NAUSEA: ICD-10-CM

## 2021-01-11 DIAGNOSIS — F41.9 ANXIETY: ICD-10-CM

## 2021-01-11 DIAGNOSIS — F20.9 SCHIZOPHRENIA, UNSPECIFIED TYPE (H): Primary | ICD-10-CM

## 2021-01-11 PROCEDURE — G0008 ADMIN INFLUENZA VIRUS VAC: HCPCS | Performed by: FAMILY MEDICINE

## 2021-01-11 PROCEDURE — 90686 IIV4 VACC NO PRSV 0.5 ML IM: CPT | Performed by: FAMILY MEDICINE

## 2021-01-11 PROCEDURE — 99214 OFFICE O/P EST MOD 30 MIN: CPT | Mod: 25 | Performed by: FAMILY MEDICINE

## 2021-01-11 RX ORDER — ZIPRASIDONE HYDROCHLORIDE 80 MG/1
80 CAPSULE ORAL 2 TIMES DAILY WITH MEALS
Qty: 60 CAPSULE | Refills: 1 | Status: SHIPPED | OUTPATIENT
Start: 2021-01-11 | End: 2021-03-16

## 2021-01-11 RX ORDER — QUETIAPINE FUMARATE 25 MG/1
25-50 TABLET, FILM COATED ORAL AT BEDTIME
Qty: 90 TABLET | Refills: 1 | Status: SHIPPED | OUTPATIENT
Start: 2021-01-11 | End: 2021-03-30

## 2021-01-11 ASSESSMENT — MIFFLIN-ST. JEOR: SCORE: 1559.43

## 2021-01-11 ASSESSMENT — ANXIETY QUESTIONNAIRES
7. FEELING AFRAID AS IF SOMETHING AWFUL MIGHT HAPPEN: NEARLY EVERY DAY
1. FEELING NERVOUS, ANXIOUS, OR ON EDGE: NEARLY EVERY DAY
5. BEING SO RESTLESS THAT IT IS HARD TO SIT STILL: NEARLY EVERY DAY
6. BECOMING EASILY ANNOYED OR IRRITABLE: NEARLY EVERY DAY
GAD7 TOTAL SCORE: 21
2. NOT BEING ABLE TO STOP OR CONTROL WORRYING: NEARLY EVERY DAY
3. WORRYING TOO MUCH ABOUT DIFFERENT THINGS: NEARLY EVERY DAY
IF YOU CHECKED OFF ANY PROBLEMS ON THIS QUESTIONNAIRE, HOW DIFFICULT HAVE THESE PROBLEMS MADE IT FOR YOU TO DO YOUR WORK, TAKE CARE OF THINGS AT HOME, OR GET ALONG WITH OTHER PEOPLE: VERY DIFFICULT

## 2021-01-11 ASSESSMENT — PAIN SCALES - GENERAL: PAINLEVEL: NO PAIN (0)

## 2021-01-11 ASSESSMENT — PATIENT HEALTH QUESTIONNAIRE - PHQ9
SUM OF ALL RESPONSES TO PHQ QUESTIONS 1-9: 23
5. POOR APPETITE OR OVEREATING: NEARLY EVERY DAY

## 2021-01-11 NOTE — PATIENT INSTRUCTIONS
Thank you for visiting [facility]    Let's try increasing your Geodon to 80 mg twice daily to help with your mood and other symptoms.      Can use Seroquel as needed for anxiety/panic symptoms.  If desired, we can add back Buspar or try a different selective serotonin reuptake inhibitor like sertraline.      If your joint/other symptoms aren't improving, then we can consider lab workup down the road like we talked about today.  There are inflammatory joint conditions we can screen for.  You might find out what inflammatory condition your mother has.      Contact us or return if questions or concerns.     Have a nice day!    Dr. Umana     Return in about 4 weeks (around 2/8/2021).      If you need medication refills, please contact your pharmacy 3 days before your prescriptions runs out or download the [facility] henrietta for your smart phone.   If you are out of refills, your pharmacy will contact contact the clinic.                                     [employer] assistance [phone]

## 2021-01-12 ASSESSMENT — ANXIETY QUESTIONNAIRES: GAD7 TOTAL SCORE: 21

## 2021-02-05 DIAGNOSIS — F32.1 MODERATE MAJOR DEPRESSION (H): ICD-10-CM

## 2021-02-05 DIAGNOSIS — F20.9 SCHIZOPHRENIA, UNSPECIFIED TYPE (H): ICD-10-CM

## 2021-02-08 ENCOUNTER — VIRTUAL VISIT (OUTPATIENT)
Dept: FAMILY MEDICINE | Facility: OTHER | Age: 36
End: 2021-02-08
Payer: MEDICARE

## 2021-02-08 DIAGNOSIS — R10.11 RUQ ABDOMINAL PAIN: Primary | ICD-10-CM

## 2021-02-08 DIAGNOSIS — R11.14 BILIOUS VOMITING WITH NAUSEA: ICD-10-CM

## 2021-02-08 DIAGNOSIS — F20.9 SCHIZOPHRENIA, UNSPECIFIED TYPE (H): ICD-10-CM

## 2021-02-08 DIAGNOSIS — F41.9 ANXIETY: ICD-10-CM

## 2021-02-08 DIAGNOSIS — I73.00 RAYNAUD'S DISEASE WITHOUT GANGRENE: ICD-10-CM

## 2021-02-08 DIAGNOSIS — R50.9 FEVER AND CHILLS: ICD-10-CM

## 2021-02-08 DIAGNOSIS — E80.4 GILBERT SYNDROME: ICD-10-CM

## 2021-02-08 DIAGNOSIS — F32.1 MODERATE MAJOR DEPRESSION (H): ICD-10-CM

## 2021-02-08 PROCEDURE — 99215 OFFICE O/P EST HI 40 MIN: CPT | Mod: 95 | Performed by: FAMILY MEDICINE

## 2021-02-08 RX ORDER — ONDANSETRON 4 MG/1
4 TABLET, ORALLY DISINTEGRATING ORAL EVERY 8 HOURS PRN
Qty: 30 TABLET | Refills: 1 | Status: SHIPPED | OUTPATIENT
Start: 2021-02-08 | End: 2021-04-19

## 2021-02-08 RX ORDER — ZIPRASIDONE HYDROCHLORIDE 40 MG/1
CAPSULE ORAL
Qty: 60 CAPSULE | Refills: 1 | OUTPATIENT
Start: 2021-02-08

## 2021-02-08 RX ORDER — BUSPIRONE HYDROCHLORIDE 15 MG/1
15 TABLET ORAL 2 TIMES DAILY
Qty: 180 TABLET | Refills: 0 | Status: SHIPPED | OUTPATIENT
Start: 2021-02-08 | End: 2021-04-19 | Stop reason: ALTCHOICE

## 2021-02-08 NOTE — PROGRESS NOTES
Isaura is a 35 year old who is being evaluated via a billable video visit.      How would you like to obtain your AVS? MyChart  If the video visit is dropped, the invitation should be resent by: Text to cell phone: [phone]  Will anyone else be joining your video visit? No      Video Start Time: 1:34 PM  Assessment & Plan       ICD-10-CM    1. RUQ abdominal pain  R10.11 NM Hepatobiliary Scan w GB EF     Comprehensive metabolic panel     CBC with platelets     ESR: Erythrocyte sedimentation rate     CRP, inflammation     JUST IN CASE   2. Bilious vomiting with nausea  R11.14 NM Hepatobiliary Scan w GB EF     Comprehensive metabolic panel     CBC with platelets     ESR: Erythrocyte sedimentation rate     CRP, inflammation     JUST IN CASE     ondansetron (ZOFRAN-ODT) 4 MG ODT tab   3. Schizophrenia, unspecified type (H)  F20.9 busPIRone (BUSPAR) 15 MG tablet   4. Anxiety  F41.9 busPIRone (BUSPAR) 15 MG tablet   5. Moderate major depression (H)  F32.1    6. Gilbert syndrome  E80.4 Comprehensive metabolic panel     CBC with platelets     ESR: Erythrocyte sedimentation rate     CRP, inflammation     JUST IN CASE   7. Fever and chills  R50.9 Comprehensive metabolic panel     CBC with platelets     ESR: Erythrocyte sedimentation rate     CRP, inflammation     JUST IN CASE   8. Raynaud's disease without gangrene  I73.00 Comprehensive metabolic panel     CBC with platelets     ESR: Erythrocyte sedimentation rate     CRP, inflammation     JUST IN CASE      1, 2.  Exact etiology of her symptoms continues to be somewhat elusive.  Overall, her history is most suggestive of gallbladder disease.  Since her ultrasound was normal, will order a HIDA scan.  If this is also reassuring, may need to refer back to gastroenterology for further assessment.  We will also recheck labs to look at other potential causes of her symptoms.  We will add Zofran to current regimen to help with symptom control.  3, 4, 5.  Clinically much  improved.  Will continue current regimen.  Patient plans to establish with psychiatry next month.  6.  Incidental finding previously.  We will recheck bilirubin level as this was thought atypical and how it presented.  7.  May be related to gallbladder symptoms above.  At this time, not highly suspicious for infectious cholecystitis, but will need to monitor for any clinical worsening.  Check monitoring labs.  Related to this.  Consider the possibility of fever of unknown origin.  Follow-up soon.  8.  With borderline SUDHA, will recheck inflammatory markers.  Consider repeat SUDHA or other evaluation, but patient has no other specific localizing signs/symptoms.    Portions of this note were completed using Dragon dictation software.  Although reviewed, there may be typographical and other inadvertent errors that remain.       Review of the result(s) of each unique test - CT abdomen, right upper quadrant ultrasound, CMP, CBC, SUDHA, etc.      40 minutes spent on the date of the encounter doing chart review, history and exam, documentation and further activities as noted above           Patient Instructions   [facility]    Continue on current psych regimen until you follow up with psychiatry.      Let's get a HIDA scan.  If this is normal, we'll need to consider a gastric emptying study and/or referral to GI (again).      Contact us or return if questions or concerns.     Have a nice day!    Dr. Umana     No follow-ups on file.      If you need medication refills, please contact your pharmacy 3 days before your prescriptions runs out or download the [facility] henrietta for your smart phone.   If you are out of refills, your pharmacy will contact contact the clinic.                                     [employer] assistance [phone]                       Return in about 4 weeks (around 3/8/2021) for Recheck, E-visit, video, or phone visit.    Timmy Umana MD, MD  [facility]  [facility] SHAI Delarosa is a 35 year old who presents to clinic today for the following health issues     HPI     Pt notes significant weight loss since last month.  Lost a lot of weight, reports persistent fever.    Hasn't had a period in 7 months.  Had Mirena placed 2/2020.    CT was normal in January, RUQ us showed fatty liver in December.  Saw GI, who just commended on the fatty liver.  Other workup with him was OK.      Still having a lot of nausea and vomiting.  Ate eggs this morning, which is the first solid food in days.  This felt like she was extremely full, then vomited a few hours later.    Continues to have right-sided abdominal discomfort.  Pain starts about 20 minutes after eating.  Feels a bit better after eating, but not great.      Has had constipation and then loose stools at times.      Raynaud's has been worse lately.  Has bilateral hip pain at night, but no other joint pains.  No real stiffness.      Medication Followup of seroquel and geodon    Taking Medication as prescribed: yes    Side Effects:  None    Medication Helping Symptoms:  yes   Mood has been pretty good with the Geodon.      Seroquel isn't helping with sleep like it used to.    Has resumed Buspar, which has been helpful.  Only uses it at night.      Concern - stomach concerns  Onset: few months  Description: nausea and vomiting. Lost 9 lbs in 1 month  Intensity: moderate  Progression of Symptoms:  worsening  Accompanying Signs & Symptoms: becoming frequent gets worse in the afternoon   Previous history of similar problem: no  Precipitating factors:        Worsened by: unknown  Alleviating factors:        Improved by: [email or web address]  Therapies tried and outcome:  none       Review of Systems   Constitutional, HEENT, cardiovascular, pulmonary, gi and gu systems are negative, except as otherwise noted.      Objective           Vitals:  No vitals were obtained today due to virtual visit.    Physical Exam    GENERAL: Healthy, alert and no distress  EYES: Eyes grossly normal to inspection.  No discharge or erythema, or obvious scleral/conjunctival abnormalities.  RESP: No audible wheeze, cough, or visible cyanosis.  No visible retractions or increased work of breathing.    SKIN: Visible skin clear. No significant rash, abnormal pigmentation or lesions.  NEURO: Cranial nerves grossly intact.  Mentation and speech appropriate for age.  PSYCH: Mentation appears normal, affect normal/bright, judgement and insight intact, normal speech and appearance well-groomed.    Office Visit on 01/06/2021   Component Date Value Ref Range Status     WBC 01/06/2021 4.8  4.0 - 11.0 10e9/L Final     RBC Count 01/06/2021 4.56  3.8 - 5.2 10e12/L Final     Hemoglobin 01/06/2021 13.6  11.7 - 15.7 g/dL Final     Hematocrit 01/06/2021 39.6  35.0 - 47.0 % Final     MCV 01/06/2021 87  78 - 100 fl Final     MCH 01/06/2021 29.8  26.5 - 33.0 pg Final     MCHC 01/06/2021 34.3  31.5 - 36.5 g/dL Final     RDW 01/06/2021 13.5  10.0 - 15.0 % Final     Platelet Count 01/06/2021 250  150 - 450 10e9/L Final     % Neutrophils 01/06/2021 62.2  % Final     % Lymphocytes 01/06/2021 28.7  % Final     % Monocytes 01/06/2021 8.1  % Final     % Eosinophils 01/06/2021 0.8  % Final     % Basophils 01/06/2021 0.2  % Final     Absolute Neutrophil 01/06/2021 3.0  1.6 - 8.3 10e9/L Final     Absolute Lymphocytes 01/06/2021 1.4  0.8 - 5.3 10e9/L Final     Absolute Monocytes 01/06/2021 0.4  0.0 - 1.3 10e9/L Final     Absolute Eosinophils 01/06/2021 0.0  0.0 - 0.7 10e9/L Final     Absolute Basophils 01/06/2021 0.0  0.0 - 0.2 10e9/L Final     Diff Method 01/06/2021 Automated Method   Final     Sodium 01/06/2021 138  133 - 144 mmol/L Final     Potassium 01/06/2021 4.1  3.4 - 5.3 mmol/L Final     Chloride 01/06/2021 109  94 - 109 mmol/L Final     Carbon Dioxide 01/06/2021 25  20 - 32 mmol/L Final     Anion Gap 01/06/2021 4  3 - 14 mmol/L Final     Glucose 01/06/2021 92  70 - 99  mg/dL Final     Urea Nitrogen 01/06/2021 18  7 - 30 mg/dL Final     Creatinine 01/06/2021 0.69  0.52 - 1.04 mg/dL Final     GFR Estimate 01/06/2021 >90  >60 mL/min/[1.73_m2] Final    Comment: Non  GFR Calc  Starting 12/18/2018, serum creatinine based estimated GFR (eGFR) will be   calculated using the Chronic Kidney Disease Epidemiology Collaboration   (CKD-EPI) equation.       GFR Estimate If Black 01/06/2021 >90  >60 mL/min/[1.73_m2] Final    Comment:  GFR Calc  Starting 12/18/2018, serum creatinine based estimated GFR (eGFR) will be   calculated using the Chronic Kidney Disease Epidemiology Collaboration   (CKD-EPI) equation.       Calcium 01/06/2021 8.9  8.5 - 10.1 mg/dL Final     Bilirubin Total 01/06/2021 1.5* 0.2 - 1.3 mg/dL Final     Albumin 01/06/2021 4.0  3.4 - 5.0 g/dL Final     Protein Total 01/06/2021 7.3  6.8 - 8.8 g/dL Final     Alkaline Phosphatase 01/06/2021 67  40 - 150 U/L Final     ALT 01/06/2021 44  0 - 50 U/L Final     AST 01/06/2021 24  0 - 45 U/L Final     IGM 01/06/2021 205  35 - 242 mg/dL Final     Mitochondrial M2 Antibody IgG 01/06/2021 1  <4 U/mL Final    Negative     IGG 01/06/2021 958  610 - 1,616 mg/dL Final     F-Actin Antibody IgG 01/06/2021 8  0 - 19 Units Final    Comment: (Note)  If F-Actin (Smooth Muscle) Antibody, IgG is negative, the   Smooth Muscle Antibody titer by IFA is not performed.  REFERENCE INTERVAL: F-Actin (Smooth Muscle) Antibody, IgG   by                      GENE   19 Units or less ....... Negative   20 - 30 Units .......... Weak Positive-Suggest repeat                            testing in two to three weeks                            with fresh specimen.   31 Units or greater..... Positive-Suggestive of                            autoimmune hepatitis type 1                            or chronic active hepatitis.  F-actin IgG antibodies have been shown to have increased   sensitivity for autoimmune hepatitis (AIH) but lower    specificity than smooth muscle antibodies (SMA). F-actin   IgG antibodies can also be seen in SMA-negative disease   controls (non-AIH), especially in patients with primary   biliary cirrhosis and chronic hepatitis C infections. Some   patients with AIH may be SMA-positive but negative for   F-actin IgG. Consider                            testing for SMA by IFA if suspicion   for AIH is strong.  Performed By: [facility]  [address] [place] [id number]  : Genoveva Dumont MD       Lactate Dehydrogenase 01/06/2021 163  81 - 234 U/L Final     Haptoglobin 01/06/2021 152  32 - 197 mg/dL Final     No results found for this or any previous visit (from the past 24 hour(s)).            Video-Visit Details    Type of service:  Video Visit    Video End Time:1:52 PM    Originating Location (pt. Location): Home    Distant Location (provider location):  [facility]     Platform used for Video Visit: Improve Digital

## 2021-02-08 NOTE — PATIENT INSTRUCTIONS
Thank you for visiting [facility]    Continue on current psych regimen until you follow up with psychiatry.      Let's get a HIDA scan.  If this is normal, we'll need to consider a gastric emptying study and/or referral to GI (again).      Contact us or return if questions or concerns.     Have a nice day!    Dr. Umana     No follow-ups on file.      If you need medication refills, please contact your pharmacy 3 days before your prescriptions runs out or download the [facility] henrietta for your smart phone.   If you are out of refills, your pharmacy will contact contact the clinic.                                     [employer] assistance [phone]

## 2021-02-10 ENCOUNTER — HOSPITAL ENCOUNTER (OUTPATIENT)
Dept: NUCLEAR MEDICINE | Facility: CLINIC | Age: 36
Setting detail: NUCLEAR MEDICINE
Discharge: HOME | End: 2021-02-10
Attending: FAMILY MEDICINE | Admitting: FAMILY MEDICINE
Payer: MEDICARE

## 2021-02-10 DIAGNOSIS — R11.14 BILIOUS VOMITING WITH NAUSEA: ICD-10-CM

## 2021-02-10 DIAGNOSIS — R10.11 RUQ ABDOMINAL PAIN: ICD-10-CM

## 2021-02-10 PROCEDURE — 250N000011 HC RX IP 250 OP 636: Performed by: FAMILY MEDICINE

## 2021-02-10 PROCEDURE — A9537 TC99M MEBROFENIN: HCPCS | Performed by: FAMILY MEDICINE

## 2021-02-10 PROCEDURE — 343N000001 HC RX 343: Performed by: FAMILY MEDICINE

## 2021-02-10 PROCEDURE — 78227 HEPATOBIL SYST IMAGE W/DRUG: CPT | Mod: ME

## 2021-02-10 RX ORDER — KIT FOR THE PREPARATION OF TECHNETIUM TC 99M MEBROFENIN 45 MG/10ML
5 INJECTION, POWDER, LYOPHILIZED, FOR SOLUTION INTRAVENOUS ONCE
Status: COMPLETED | OUTPATIENT
Start: 2021-02-10 | End: 2021-02-10

## 2021-02-10 RX ADMIN — SINCALIDE 1.5 MCG: 5 INJECTION, POWDER, LYOPHILIZED, FOR SOLUTION INTRAVENOUS at 10:37

## 2021-02-10 RX ADMIN — MEBROFENIN 5 MILLICURIE: 45 INJECTION, POWDER, LYOPHILIZED, FOR SOLUTION INTRAVENOUS at 09:08

## 2021-02-11 ENCOUNTER — MYC MEDICAL ADVICE (OUTPATIENT)
Dept: FAMILY MEDICINE | Facility: OTHER | Age: 36
End: 2021-02-11

## 2021-02-11 DIAGNOSIS — R94.8 ABNORMAL BILIARY HIDA SCAN: ICD-10-CM

## 2021-02-11 DIAGNOSIS — R10.11 RUQ ABDOMINAL PAIN: Primary | ICD-10-CM

## 2021-02-11 NOTE — RESULT ENCOUNTER NOTE
Isaura,    Your HIDA scan indicates that you probably have gallbladder disease that is causing your symptoms.  Let me know if you need help getting a surgical consult set up to discuss removal.    Thanks,    Dr. Umana

## 2021-02-12 NOTE — TELEPHONE ENCOUNTER
Patient is scheduled with Dr. TAMAYO on Tuesday for consult gall bladder, if you can finish referral and close encounter.

## 2021-02-16 ENCOUNTER — OFFICE VISIT (OUTPATIENT)
Dept: SURGERY | Facility: CLINIC | Age: 36
End: 2021-02-16
Payer: MEDICARE

## 2021-02-16 VITALS
WEIGHT: 170 LBS | BODY MASS INDEX: 24.34 KG/M2 | TEMPERATURE: 97.7 F | DIASTOLIC BLOOD PRESSURE: 74 MMHG | HEIGHT: 70 IN | SYSTOLIC BLOOD PRESSURE: 128 MMHG

## 2021-02-16 DIAGNOSIS — I26.99 PE (PULMONARY THROMBOEMBOLISM) (H): ICD-10-CM

## 2021-02-16 DIAGNOSIS — I05.9 MITRAL VALVE DISORDER: ICD-10-CM

## 2021-02-16 DIAGNOSIS — K82.8 BILIARY DYSKINESIA: Primary | ICD-10-CM

## 2021-02-16 PROCEDURE — 99204 OFFICE O/P NEW MOD 45 MIN: CPT | Performed by: SURGERY

## 2021-02-16 ASSESSMENT — MIFFLIN-ST. JEOR: SCORE: 1556.36

## 2021-02-16 NOTE — LETTER
2/16/2021         RE: Isaura Gray  [address] [facility] [id number]        Dear Colleague,    Thank you for referring your patient, Isaura Gray, to the [facility]. Please see a copy of my visit note below.        Assessment & Plan   Problem List Items Addressed This Visit        Digestive    Biliary dyskinesia - Primary    Relevant Orders    Case Request: Laparoscopic cholecystectomy, possible open       Circulatory    PE (pulmonary thromboembolism) (H)    Mitral valve disorder         The patient was thoroughly counseled regarding their Biliary dyskinesia [K82.8].     The patient was informed that the proposed procedure or medical intervention involves removal of the gallbladder laparoscopically (with small incisions and camera) possibly open and does offer a very good likelihood of symptom relief.     The patient was made aware of the risks of the procedure, including but not limited to:    bleeding, conversion to open, bile leak, bile duct injury or other adjacent organ injury, retained gallstones, cardiac or pulmonary related complications and anesthesia complications. Also that difficulties may be encountered during recovery to include: wound or deep intraabdominal infection, wound dehiscence, incisional hernia, bile leak or retained stone requiring ERCP.     In the course of the evaluation we did discuss other therapeutic options with the patient, including antibiotics and/or drainage. The risks and benefits of these options were also discussed which include but are not limited to: recurrent worsening episodes.     Also discussed were possible problems or difficulties the patient may encounter if treatment was not pursued at this time. These include: worsening episodes, cholangitis and/or pancreatitis.     The patient was informed that [facility] will be primarily responsible for the procedure. Assistance during the procedure and during hospitalization may  "also be provided by other physicians, nurses and technicians.     The patient was also informed that if exposure to the patient s blood or body fluids occurs during the procedure, HIV testing of the patient will occur unless they refuse at this time. Risk of blood transfusion is minimal.     The patient will be provided additional education resources by the support staff. If there are ever any questions regarding their diagnosis or the procedure, the patient is encouraged to ask.     All of the patient s or their legal representative s questions have been answered to their satisfaction and they have indicated a clear understanding of this discussion.   Isaura expressed understanding of risks, benefits and alternatives and wished to proceed.     All findings, test results, and diagnosis were discussed with the patient. Isaura  participated in the decision making process and agreed with the plan of care. Questions were sought and answered.     No follow-ups on file.    [place]-Shanna Smith MD  [facility]    Chester Delarosa is a 35 year old who presents for the following health issues:    Last 5 months - been having constant nausea.  Anytime she eats anything at all - she has severe nausea and RUQ pain.  +vomiting.  Those symptoms been constants; but now more frequent and increased severity.  During her HIDA with CCK 30% - the injection reproduce her symptoms.  Pain at RUQ; radiates into back and at times - pain radiates into right shoulder.  Hx of PEs 2x - had a full work-up - negative.  Thus, 'they\" contributed to her smoking and new OCP.  She was on coumadin for 1 year and has been off of that.  No anticogulation.  Hx percocet - got \"sick\" and itchy.      No previous abdominal surgeries.  No melena; no hematochezia.           Review of Systems   Constitutional, HEENT, cardiovascular, pulmonary, GI, , musculoskeletal, neuro, skin, endocrine and psych systems are negative, except as otherwise " "noted.      Objective    /74   Temp 97.7  F (36.5  C) (Temporal)   Ht 1.778 m (5' 10\")   Wt 77.1 kg (170 lb)   BMI 24.39 kg/m    Body mass index is 24.39 kg/m .  Physical Exam  Vitals signs reviewed.   Eyes:      Conjunctiva/sclera: Conjunctivae normal.   Neck:      Musculoskeletal: Normal range of motion.   Cardiovascular:      Pulses: Normal pulses.   Abdominal:      General: There is no distension.      Palpations: Abdomen is soft.      Tenderness: There is no abdominal tenderness. There is no guarding.      Hernia: No hernia is present.   Musculoskeletal: Normal range of motion.   Skin:     General: Skin is warm.      Capillary Refill: Capillary refill takes less than 2 seconds.   Neurological:      General: No focal deficit present.   Psychiatric:         Mood and Affect: Mood normal.        NM HEPATOBILIARY SCAN WITH GB EF   2/10/2021 11:01 AM      TECHNIQUE:  5.0 mCi of technetium 99m labeled Mebrofenin were  intravenously given while dynamically imaging the right upper abdomen.   Approximately one hour later, 1.5 mcg of cholecystokinin (CCK) was  intravenously given over 12 minutes while evaluating the gallbladder  ejection fraction.       HISTORY:  RUQ abdominal pain, US nondiagnostic; Bilious vomiting with  nausea. Right upper quadrant abdominal pain for 5 months.     COMPARISON:   Nuclear Study: None.     Other Relevant Studies: CT abdomen and pelvis dated 1/6/2021, limited  abdominal ultrasound dated 12/11/2020.     FINDINGS:  There is normal, homogeneous uptake of radiotracer in the  liver. The gallbladder is seen by the 20 minute image and the small  bowel is seen by the 84 minute image.     After the administration of CCK, a gallbladder ejection fraction of  30% was measured.  The patient described 7/10 severity right upper  quadrant abdominal pain with the cholecystokinin infusion.                                                                        IMPRESSION:    1. Abnormally low " gallbladder ejection fraction of 30%. Gallbladder  dyskinesis can be associated with a calculus or chronic cholecystitis.  2. Patient described 7/10 severity right upper quadrant abdominal pain  with cholecystokinin infusion.   3. No evidence for common bile duct or cystic duct obstruction is  identified on this study.     EFRAIN CORTEZ MD                Again, thank you for allowing me to participate in the care of your patient.        Sincerely,        Osmany Smith MD

## 2021-02-16 NOTE — PROGRESS NOTES
Assessment & Plan   Problem List Items Addressed This Visit        Digestive    Biliary dyskinesia - Primary    Relevant Orders    Case Request: Laparoscopic cholecystectomy, possible open       Circulatory    PE (pulmonary thromboembolism) (H)    Mitral valve disorder         The patient was thoroughly counseled regarding their Biliary dyskinesia [K82.8].     The patient was informed that the proposed procedure or medical intervention involves removal of the gallbladder laparoscopically (with small incisions and camera) possibly open and does offer a very good likelihood of symptom relief.     The patient was made aware of the risks of the procedure, including but not limited to:    bleeding, conversion to open, bile leak, bile duct injury or other adjacent organ injury, retained gallstones, cardiac or pulmonary related complications and anesthesia complications. Also that difficulties may be encountered during recovery to include: wound or deep intraabdominal infection, wound dehiscence, incisional hernia, bile leak or retained stone requiring ERCP.     In the course of the evaluation we did discuss other therapeutic options with the patient, including antibiotics and/or drainage. The risks and benefits of these options were also discussed which include but are not limited to: recurrent worsening episodes.     Also discussed were possible problems or difficulties the patient may encounter if treatment was not pursued at this time. These include: worsening episodes, cholangitis and/or pancreatitis.     The patient was informed that [facility], DO will be primarily responsible for the procedure. Assistance during the procedure and during hospitalization may also be provided by other physicians, nurses and technicians.     The patient was also informed that if exposure to the patient s blood or body fluids occurs during the procedure, HIV testing of the patient will occur unless they refuse at this time. Risk  "of blood transfusion is minimal.     The patient will be provided additional education resources by the support staff. If there are ever any questions regarding their diagnosis or the procedure, the patient is encouraged to ask.     All of the patient s or their legal representative s questions have been answered to their satisfaction and they have indicated a clear understanding of this discussion.   Isaura expressed understanding of risks, benefits and alternatives and wished to proceed.     All findings, test results, and diagnosis were discussed with the patient. Isaura  participated in the decision making process and agreed with the plan of care. Questions were sought and answered.     No follow-ups on file.    [facility] Smith MD  [facility]    Chester Delarosa is a 35 year old who presents for the following health issues:    Last 5 months - been having constant nausea.  Anytime she eats anything at all - she has severe nausea and RUQ pain.  +vomiting.  Those symptoms been constants; but now more frequent and increased severity.  During her HIDA with CCK 30% - the injection reproduce her symptoms.  Pain at RUQ; radiates into back and at times - pain radiates into right shoulder.  Hx of PEs 2x - had a full work-up - negative.  Thus, 'they\" contributed to her smoking and new OCP.  She was on coumadin for 1 year and has been off of that.  No anticogulation.  Hx percocet - got \"sick\" and itchy.      No previous abdominal surgeries.  No melena; no hematochezia.           Review of Systems   Constitutional, HEENT, cardiovascular, pulmonary, GI, , musculoskeletal, neuro, skin, endocrine and psych systems are negative, except as otherwise noted.      Objective    /74   Temp 97.7  F (36.5  C) (Temporal)   Ht 1.778 m (5' 10\")   Wt 77.1 kg (170 lb)   BMI 24.39 kg/m    Body mass index is 24.39 kg/m .  Physical Exam  Vitals signs reviewed.   Eyes:      Conjunctiva/sclera: Conjunctivae " normal.   Neck:      Musculoskeletal: Normal range of motion.   Cardiovascular:      Pulses: Normal pulses.   Abdominal:      General: There is no distension.      Palpations: Abdomen is soft.      Tenderness: There is no abdominal tenderness. There is no guarding.      Hernia: No hernia is present.   Musculoskeletal: Normal range of motion.   Skin:     General: Skin is warm.      Capillary Refill: Capillary refill takes less than 2 seconds.   Neurological:      General: No focal deficit present.   Psychiatric:         Mood and Affect: Mood normal.        NM HEPATOBILIARY SCAN WITH GB EF   2/10/2021 11:01 AM      TECHNIQUE:  5.0 mCi of technetium 99m labeled Mebrofenin were  intravenously given while dynamically imaging the right upper abdomen.   Approximately one hour later, 1.5 mcg of cholecystokinin (CCK) was  intravenously given over 12 minutes while evaluating the gallbladder  ejection fraction.       HISTORY:  RUQ abdominal pain, US nondiagnostic; Bilious vomiting with  nausea. Right upper quadrant abdominal pain for 5 months.     COMPARISON:   Nuclear Study: None.     Other Relevant Studies: CT abdomen and pelvis dated 1/6/2021, limited  abdominal ultrasound dated 12/11/2020.     FINDINGS:  There is normal, homogeneous uptake of radiotracer in the  liver. The gallbladder is seen by the 20 minute image and the small  bowel is seen by the 84 minute image.     After the administration of CCK, a gallbladder ejection fraction of  30% was measured.  The patient described 7/10 severity right upper  quadrant abdominal pain with the cholecystokinin infusion.                                                                        IMPRESSION:    1. Abnormally low gallbladder ejection fraction of 30%. Gallbladder  dyskinesis can be associated with a calculus or chronic cholecystitis.  2. Patient described 7/10 severity right upper quadrant abdominal pain  with cholecystokinin infusion.   3. No evidence for common bile  duct or cystic duct obstruction is  identified on this study.     EFRAIN CORTEZ MD

## 2021-02-17 ENCOUNTER — TELEPHONE (OUTPATIENT)
Dept: SURGERY | Facility: CLINIC | Age: 36
End: 2021-02-17

## 2021-02-17 PROBLEM — K82.8 BILIARY DYSKINESIA: Status: ACTIVE | Noted: 2021-02-17

## 2021-02-17 NOTE — TELEPHONE ENCOUNTER
Type of surgery: Laparoscopic cholecystectomy, possible open (N/A)   Location of surgery: [facility] OR  Date and time of surgery: 3/4  Surgeon: Sarah  Pre-Op Appt Date: 3/1  Post-Op Appt Date: 3/15   Packet sent out: Yes  Pre-cert/Authorization completed:  Not Applicable  Date: na

## 2021-02-18 ENCOUNTER — TELEPHONE (OUTPATIENT)
Dept: SURGERY | Facility: CLINIC | Age: 36
End: 2021-02-18

## 2021-02-18 DIAGNOSIS — K82.8 BILIARY DYSKINESIA: Primary | ICD-10-CM

## 2021-02-18 NOTE — TELEPHONE ENCOUNTER
Pt is coming into lab 3/1 for a Pre-Procedure COVID test. Please place orders in chart.    Thanks,  Vanessa

## 2021-02-22 ENCOUNTER — NURSE TRIAGE (OUTPATIENT)
Dept: NURSING | Facility: CLINIC | Age: 36
End: 2021-02-22

## 2021-02-22 NOTE — TELEPHONE ENCOUNTER
"Patient calling and she has surgery scheduled on 3/4 to remove her galbladder.  She has been having some issues with the pain increasing and \"I know I can't keep going to the ER\".  The pain right now is a 7/10, it comes and goes constantly and is at times higher.    For the past 3 days she has not been able to eat anything solid, but is only taking water and juices.      She is concerned as it is greatly affecting her mental health now, trying to manage the pain and decreased nutirent intake.    She would like to know if there is something medication wise that can help manage the pain until she has her surgery next week.    Routing to Surgery team to connect with patient for possible virtual visit or plan.      Valeri Almaguer RN on 2/22/2021 at 3:50 PM      [phone]      Reason for Disposition    Caller requesting a NON-URGENT new prescription or refill and triager unable to refill per department policy    Additional Information    Negative: Drug overdose and triager unable to answer question    Negative: Caller requesting information unrelated to medicine    Negative: Caller requesting a prescription for Strep throat and has a positive culture result    Negative: Rash while taking a medication or within 3 days of stopping it    Negative: Immunization reaction suspected    Negative: Asthma and having symptoms of asthma (cough, wheezing, etc.)    Negative: Breastfeeding questions about mother's medicines and diet    Negative: MORE THAN A DOUBLE DOSE of a prescription or over-the-counter (OTC) drug    Negative: DOUBLE DOSE (an extra dose or lesser amount) of over-the-counter (OTC) drug and any symptoms (e.g., dizziness, nausea, pain, sleepiness)    Negative: DOUBLE DOSE (an extra dose or lesser amount) of prescription drug and any symptoms (e.g., dizziness, nausea, pain, sleepiness)    Negative: Took another person's prescription drug    Negative: DOUBLE DOSE (an extra dose or lesser amount) of prescription drug " and NO symptoms (Exception: a double dose of antibiotics)    Negative: Diabetes drug error or overdose (e.g., took wrong type of insulin or took extra dose)    Negative: Caller has medication question about med not prescribed by PCP and triager unable to answer question (e.g., compatibility with other med, storage)    Negative: Request for URGENT new prescription or refill of 'essential' medication (i.e., likelihood of harm to patient if not taken) and triager unable to fill per department policy    Negative: Prescription not at pharmacy and was prescribed today by PCP    Negative: Pharmacy calling with prescription questions and triager unable to answer question    Negative: Caller has urgent medication question about med that PCP prescribed and triager unable to answer question    Negative: Caller has NON-URGENT medication question about med that PCP prescribed and triager unable to answer question    Protocols used: MEDICATION QUESTION CALL-A-OH

## 2021-02-23 NOTE — TELEPHONE ENCOUNTER
Informed patient. She would like the surgery schedulers to call her tomorrow to discuss possibly moving surgery up. She would like to take today to discuss this with her significant other as they have kids and work to coordinate.     Dina PAEZ, Lead RN, BSN. . .  2/23/2021, 9:22 AM

## 2021-02-23 NOTE — TELEPHONE ENCOUNTER
Osmany Smith MD  You 15 hours ago (4:39 PM)     There isn t any medications I can give her.  I recommend eating less than 15g of fats.  Or see if we can move her surgery up sooner.

## 2021-02-24 NOTE — TELEPHONE ENCOUNTER
Spoke to patient, offered 33/1.  She declined as she has no . She wants to proceed with scheduled  3/4/21 surgery date.

## 2021-02-28 ENCOUNTER — HOSPITAL ENCOUNTER (EMERGENCY)
Facility: CLINIC | Age: 36
Discharge: HOME | End: 2021-02-28
Attending: NURSE PRACTITIONER | Admitting: NURSE PRACTITIONER
Payer: MEDICARE

## 2021-02-28 VITALS
OXYGEN SATURATION: 100 % | DIASTOLIC BLOOD PRESSURE: 76 MMHG | TEMPERATURE: 98.4 F | SYSTOLIC BLOOD PRESSURE: 112 MMHG | HEART RATE: 62 BPM | RESPIRATION RATE: 20 BRPM

## 2021-02-28 DIAGNOSIS — K82.8 BILIARY DYSKINESIA: ICD-10-CM

## 2021-02-28 LAB
ALBUMIN SERPL-MCNC: 4.2 G/DL (ref 3.4–5)
ALP SERPL-CCNC: 72 U/L (ref 40–150)
ALT SERPL W P-5'-P-CCNC: 19 U/L (ref 0–50)
ANION GAP SERPL CALCULATED.3IONS-SCNC: 10 MMOL/L (ref 3–14)
AST SERPL W P-5'-P-CCNC: 10 U/L (ref 0–45)
BASOPHILS # BLD AUTO: 0.1 10E9/L (ref 0–0.2)
BASOPHILS NFR BLD AUTO: 0.8 %
BILIRUB SERPL-MCNC: 1.5 MG/DL (ref 0.2–1.3)
BUN SERPL-MCNC: 10 MG/DL (ref 7–30)
CALCIUM SERPL-MCNC: 9.2 MG/DL (ref 8.5–10.1)
CHLORIDE SERPL-SCNC: 109 MMOL/L (ref 94–109)
CO2 SERPL-SCNC: 23 MMOL/L (ref 20–32)
CREAT SERPL-MCNC: 0.7 MG/DL (ref 0.52–1.04)
DIFFERENTIAL METHOD BLD: NORMAL
EOSINOPHIL NFR BLD AUTO: 0.3 %
ERYTHROCYTE [DISTWIDTH] IN BLOOD BY AUTOMATED COUNT: 12.5 % (ref 10–15)
GFR SERPL CREATININE-BSD FRML MDRD: >90 ML/MIN/{1.73_M2}
GLUCOSE SERPL-MCNC: 102 MG/DL (ref 70–99)
HCT VFR BLD AUTO: 41 % (ref 35–47)
HGB BLD-MCNC: 14.4 G/DL (ref 11.7–15.7)
IMM GRANULOCYTES # BLD: 0 10E9/L (ref 0–0.4)
IMM GRANULOCYTES NFR BLD: 0.2 %
INR PPP: 0.99 (ref 0.86–1.14)
LACTATE BLD-SCNC: 1.1 MMOL/L (ref 0.7–2)
LIPASE SERPL-CCNC: 50 U/L (ref 73–393)
LYMPHOCYTES # BLD AUTO: 1.2 10E9/L (ref 0.8–5.3)
LYMPHOCYTES NFR BLD AUTO: 19.9 %
MCH RBC QN AUTO: 30.2 PG (ref 26.5–33)
MCHC RBC AUTO-ENTMCNC: 35.1 G/DL (ref 31.5–36.5)
MCV RBC AUTO: 86 FL (ref 78–100)
MONOCYTES # BLD AUTO: 0.4 10E9/L (ref 0–1.3)
MONOCYTES NFR BLD AUTO: 6.4 %
NEUTROPHILS # BLD AUTO: 4.3 10E9/L (ref 1.6–8.3)
NEUTROPHILS NFR BLD AUTO: 72.4 %
NRBC # BLD AUTO: 0 10*3/UL
NRBC BLD AUTO-RTO: 0 /100
PLATELET # BLD AUTO: 297 10E9/L (ref 150–450)
POTASSIUM SERPL-SCNC: 3.7 MMOL/L (ref 3.4–5.3)
PROT SERPL-MCNC: 7.5 G/DL (ref 6.8–8.8)
RBC # BLD AUTO: 4.77 10E12/L (ref 3.8–5.2)
SODIUM SERPL-SCNC: 142 MMOL/L (ref 133–144)
WBC # BLD AUTO: 5.9 10E9/L (ref 4–11)

## 2021-02-28 PROCEDURE — 96374 THER/PROPH/DIAG INJ IV PUSH: CPT | Performed by: EMERGENCY MEDICINE

## 2021-02-28 PROCEDURE — 99285 EMERGENCY DEPT VISIT HI MDM: CPT | Mod: 25 | Performed by: EMERGENCY MEDICINE

## 2021-02-28 PROCEDURE — 83605 ASSAY OF LACTIC ACID: CPT | Performed by: EMERGENCY MEDICINE

## 2021-02-28 PROCEDURE — 96375 TX/PRO/DX INJ NEW DRUG ADDON: CPT | Performed by: EMERGENCY MEDICINE

## 2021-02-28 PROCEDURE — 80053 COMPREHEN METABOLIC PANEL: CPT | Performed by: EMERGENCY MEDICINE

## 2021-02-28 PROCEDURE — 250N000011 HC RX IP 250 OP 636: Performed by: EMERGENCY MEDICINE

## 2021-02-28 PROCEDURE — 96361 HYDRATE IV INFUSION ADD-ON: CPT | Performed by: EMERGENCY MEDICINE

## 2021-02-28 PROCEDURE — 85025 COMPLETE CBC W/AUTO DIFF WBC: CPT | Performed by: EMERGENCY MEDICINE

## 2021-02-28 PROCEDURE — 83690 ASSAY OF LIPASE: CPT | Performed by: EMERGENCY MEDICINE

## 2021-02-28 PROCEDURE — 250N000009 HC RX 250: Performed by: EMERGENCY MEDICINE

## 2021-02-28 PROCEDURE — 250N000013 HC RX MED GY IP 250 OP 250 PS 637: Performed by: EMERGENCY MEDICINE

## 2021-02-28 PROCEDURE — 99285 EMERGENCY DEPT VISIT HI MDM: CPT | Performed by: EMERGENCY MEDICINE

## 2021-02-28 PROCEDURE — 258N000003 HC RX IP 258 OP 636: Performed by: EMERGENCY MEDICINE

## 2021-02-28 PROCEDURE — C9113 INJ PANTOPRAZOLE SODIUM, VIA: HCPCS | Performed by: EMERGENCY MEDICINE

## 2021-02-28 PROCEDURE — 85610 PROTHROMBIN TIME: CPT | Performed by: EMERGENCY MEDICINE

## 2021-02-28 RX ORDER — METOCLOPRAMIDE HYDROCHLORIDE 5 MG/ML
10 INJECTION INTRAMUSCULAR; INTRAVENOUS ONCE
Status: COMPLETED | OUTPATIENT
Start: 2021-02-28 | End: 2021-02-28

## 2021-02-28 RX ORDER — HYDROMORPHONE HYDROCHLORIDE 1 MG/ML
0.5 INJECTION, SOLUTION INTRAMUSCULAR; INTRAVENOUS; SUBCUTANEOUS
Status: DISCONTINUED | OUTPATIENT
Start: 2021-02-28 | End: 2021-02-28 | Stop reason: HOSPADM

## 2021-02-28 RX ORDER — ONDANSETRON 2 MG/ML
4 INJECTION INTRAMUSCULAR; INTRAVENOUS EVERY 30 MIN PRN
Status: DISCONTINUED | OUTPATIENT
Start: 2021-02-28 | End: 2021-02-28 | Stop reason: HOSPADM

## 2021-02-28 RX ORDER — SODIUM CHLORIDE 9 MG/ML
INJECTION, SOLUTION INTRAVENOUS CONTINUOUS
Status: DISCONTINUED | OUTPATIENT
Start: 2021-02-28 | End: 2021-02-28 | Stop reason: HOSPADM

## 2021-02-28 RX ORDER — LORAZEPAM 0.5 MG/1
0.5 TABLET ORAL EVERY 6 HOURS PRN
Qty: 10 TABLET | Refills: 0 | Status: SHIPPED | OUTPATIENT
Start: 2021-02-28 | End: 2021-03-23

## 2021-02-28 RX ORDER — LORAZEPAM 2 MG/ML
1 INJECTION INTRAMUSCULAR ONCE
Status: COMPLETED | OUTPATIENT
Start: 2021-02-28 | End: 2021-02-28

## 2021-02-28 RX ORDER — KETOROLAC TROMETHAMINE 30 MG/ML
30 INJECTION, SOLUTION INTRAMUSCULAR; INTRAVENOUS ONCE
Status: COMPLETED | OUTPATIENT
Start: 2021-02-28 | End: 2021-02-28

## 2021-02-28 RX ADMIN — HYDROMORPHONE HYDROCHLORIDE 0.5 MG: 1 INJECTION, SOLUTION INTRAMUSCULAR; INTRAVENOUS; SUBCUTANEOUS at 16:56

## 2021-02-28 RX ADMIN — SODIUM CHLORIDE: 9 INJECTION, SOLUTION INTRAVENOUS at 19:11

## 2021-02-28 RX ADMIN — ONDANSETRON 4 MG: 2 INJECTION INTRAMUSCULAR; INTRAVENOUS at 16:56

## 2021-02-28 RX ADMIN — LIDOCAINE HYDROCHLORIDE 30 ML: 20 SOLUTION ORAL; TOPICAL at 19:01

## 2021-02-28 RX ADMIN — KETOROLAC TROMETHAMINE 30 MG: 30 INJECTION, SOLUTION INTRAMUSCULAR at 17:59

## 2021-02-28 RX ADMIN — PANTOPRAZOLE SODIUM 40 MG: 40 INJECTION, POWDER, FOR SOLUTION INTRAVENOUS at 19:04

## 2021-02-28 RX ADMIN — SODIUM CHLORIDE 1000 ML: 9 INJECTION, SOLUTION INTRAVENOUS at 16:56

## 2021-02-28 RX ADMIN — LORAZEPAM 1 MG: 2 INJECTION INTRAMUSCULAR; INTRAVENOUS at 18:48

## 2021-02-28 RX ADMIN — METOCLOPRAMIDE HYDROCHLORIDE 10 MG: 5 INJECTION INTRAMUSCULAR; INTRAVENOUS at 18:02
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
## 2021-02-28 NOTE — ED TRIAGE NOTES
S: abdominal pain and vomiting  B: Four month of gallbladder issues and is schedule for surgery 3/4/2021. Today abdominal discomfort going to the chest. Pt vomiting and nausea.  A: Zofran taken at home.  R: Ready for provider evaluation.

## 2021-03-01 ENCOUNTER — OFFICE VISIT (OUTPATIENT)
Dept: FAMILY MEDICINE | Facility: OTHER | Age: 36
End: 2021-03-01
Payer: MEDICARE

## 2021-03-01 VITALS
WEIGHT: 175 LBS | BODY MASS INDEX: 25.05 KG/M2 | TEMPERATURE: 98.1 F | DIASTOLIC BLOOD PRESSURE: 78 MMHG | HEIGHT: 70 IN | OXYGEN SATURATION: 98 % | RESPIRATION RATE: 18 BRPM | SYSTOLIC BLOOD PRESSURE: 108 MMHG | HEART RATE: 80 BPM

## 2021-03-01 DIAGNOSIS — F51.04 PSYCHOPHYSIOLOGICAL INSOMNIA: ICD-10-CM

## 2021-03-01 DIAGNOSIS — K76.0 NON-ALCOHOLIC FATTY LIVER DISEASE: ICD-10-CM

## 2021-03-01 DIAGNOSIS — F32.1 MODERATE MAJOR DEPRESSION (H): ICD-10-CM

## 2021-03-01 DIAGNOSIS — I26.99 PE (PULMONARY THROMBOEMBOLISM) (H): ICD-10-CM

## 2021-03-01 DIAGNOSIS — Z01.818 PREOP GENERAL PHYSICAL EXAM: Primary | ICD-10-CM

## 2021-03-01 DIAGNOSIS — E73.9 LACTOSE INTOLERANCE IN ADULT: ICD-10-CM

## 2021-03-01 DIAGNOSIS — K82.8 BILIARY DYSKINESIA: ICD-10-CM

## 2021-03-01 DIAGNOSIS — E55.9 VITAMIN D DEFICIENCY: ICD-10-CM

## 2021-03-01 DIAGNOSIS — F20.89 OTHER SCHIZOPHRENIA (H): ICD-10-CM

## 2021-03-01 DIAGNOSIS — E22.1 HYPERPROLACTINEMIA (H): ICD-10-CM

## 2021-03-01 DIAGNOSIS — I05.9 MITRAL VALVE DISORDER: ICD-10-CM

## 2021-03-01 DIAGNOSIS — C73 FOLLICULAR CANCER OF THYROID (H): ICD-10-CM

## 2021-03-01 LAB
ALBUMIN SERPL-MCNC: 3.9 G/DL (ref 3.4–5)
ALP SERPL-CCNC: 59 U/L (ref 40–150)
ALT SERPL W P-5'-P-CCNC: 18 U/L (ref 0–50)
ANION GAP SERPL CALCULATED.3IONS-SCNC: 6 MMOL/L (ref 3–14)
AST SERPL W P-5'-P-CCNC: 8 U/L (ref 0–45)
BASOPHILS # BLD AUTO: 0 10E9/L (ref 0–0.2)
BASOPHILS NFR BLD AUTO: 0.1 %
BILIRUB SERPL-MCNC: 1.6 MG/DL (ref 0.2–1.3)
BUN SERPL-MCNC: 10 MG/DL (ref 7–30)
CALCIUM SERPL-MCNC: 8.8 MG/DL (ref 8.5–10.1)
CHLORIDE SERPL-SCNC: 107 MMOL/L (ref 94–109)
CO2 SERPL-SCNC: 27 MMOL/L (ref 20–32)
CREAT SERPL-MCNC: 0.75 MG/DL (ref 0.52–1.04)
DIFFERENTIAL METHOD BLD: NORMAL
EOSINOPHIL # BLD AUTO: 0 10E9/L (ref 0–0.7)
EOSINOPHIL NFR BLD AUTO: 0.3 %
ERYTHROCYTE [DISTWIDTH] IN BLOOD BY AUTOMATED COUNT: 13.3 % (ref 10–15)
GFR SERPL CREATININE-BSD FRML MDRD: >90 ML/MIN/{1.73_M2}
GLUCOSE SERPL-MCNC: 72 MG/DL (ref 70–99)
HCT VFR BLD AUTO: 39.2 % (ref 35–47)
HGB BLD-MCNC: 13.3 G/DL (ref 11.7–15.7)
LYMPHOCYTES # BLD AUTO: 1.6 10E9/L (ref 0.8–5.3)
LYMPHOCYTES NFR BLD AUTO: 22.2 %
MCH RBC QN AUTO: 29.8 PG (ref 26.5–33)
MCHC RBC AUTO-ENTMCNC: 33.9 G/DL (ref 31.5–36.5)
MCV RBC AUTO: 88 FL (ref 78–100)
MONOCYTES # BLD AUTO: 0.4 10E9/L (ref 0–1.3)
MONOCYTES NFR BLD AUTO: 6.1 %
NEUTROPHILS # BLD AUTO: 5.1 10E9/L (ref 1.6–8.3)
NEUTROPHILS NFR BLD AUTO: 71.3 %
PLATELET # BLD AUTO: 277 10E9/L (ref 150–450)
POTASSIUM SERPL-SCNC: 3.7 MMOL/L (ref 3.4–5.3)
PROT SERPL-MCNC: 7.2 G/DL (ref 6.8–8.8)
RBC # BLD AUTO: 4.47 10E12/L (ref 3.8–5.2)
SARS-COV-2 RNA RESP QL NAA+PROBE: NORMAL
SODIUM SERPL-SCNC: 140 MMOL/L (ref 133–144)
SPECIMEN SOURCE: NORMAL
WBC # BLD AUTO: 7.1 10E9/L (ref 4–11)

## 2021-03-01 PROCEDURE — U0003 INFECTIOUS AGENT DETECTION BY NUCLEIC ACID (DNA OR RNA); SEVERE ACUTE RESPIRATORY SYNDROME CORONAVIRUS 2 (SARS-COV-2) (CORONAVIRUS DISEASE [COVID-19]), AMPLIFIED PROBE TECHNIQUE, MAKING USE OF HIGH THROUGHPUT TECHNOLOGIES AS DESCRIBED BY CMS-2020-01-R: HCPCS | Performed by: SURGERY

## 2021-03-01 PROCEDURE — 99214 OFFICE O/P EST MOD 30 MIN: CPT | Performed by: NURSE PRACTITIONER

## 2021-03-01 PROCEDURE — 85025 COMPLETE CBC W/AUTO DIFF WBC: CPT | Performed by: NURSE PRACTITIONER

## 2021-03-01 PROCEDURE — 93000 ELECTROCARDIOGRAM COMPLETE: CPT | Performed by: NURSE PRACTITIONER

## 2021-03-01 PROCEDURE — 80053 COMPREHEN METABOLIC PANEL: CPT | Performed by: NURSE PRACTITIONER

## 2021-03-01 PROCEDURE — 36415 COLL VENOUS BLD VENIPUNCTURE: CPT | Performed by: NURSE PRACTITIONER

## 2021-03-01 PROCEDURE — U0005 INFEC AGEN DETEC AMPLI PROBE: HCPCS | Performed by: SURGERY

## 2021-03-01 ASSESSMENT — PAIN SCALES - GENERAL: PAINLEVEL: SEVERE PAIN (6)

## 2021-03-01 ASSESSMENT — MIFFLIN-ST. JEOR: SCORE: 1579.04

## 2021-03-01 NOTE — PROGRESS NOTES
[facility]  [address]  [place] [address]  Phone: [phone]  Primary Provider: Timmy Umana  Pre-op Performing Provider: BUNNY STARKS      PREOPERATIVE EVALUATION:  Today's date: 3/1/2021    Isaura Gray is a 35 year old female who presents for a preoperative evaluation.    Surgical Information:  Surgery/Procedure: Biliary dyskinesia  Surgery Location: [facility]  Surgeon: Dr. Smith  Surgery Date: 3/4/21  Time of Surgery: 230pm   Where patient plans to recover: At home with family  Fax number for surgical facility: Note does not need to be faxed, will be available electronically in Epic.    Type of Anesthesia Anticipated: General    Assessment & Plan     The proposed surgical procedure is considered INTERMEDIATE risk.    Preop general physical exam  - See below   - CBC with platelets and differential  - EKG 12-lead complete w/read - Clinics  - Comprehensive metabolic panel (BMP + Alb, Alk Phos, ALT, AST, Total. Bili, TP)    Biliary dyskinesia  - Surgical intervention recommended     Lactose intolerance in adult      Hyperprolactinemia (H)      Mitral valve disorder  - EKG today   -NSR today and last echocardiogram   - No symptoms of palpitations, Shortness of breath or chest pain.     Moderate major depression (H)  - Worsened  - Followed by Dr. Umana PCP, has appointment with Psychiatry end of the month.     Other schizophrenia (H)  - As noted above     Follicular cancer of thyroid (H)  Stable TSH, last checked as noted below.     Non-alcoholic fatty liver disease  - Normal ALT and AST.     Psychophysiological insomnia      PE (pulmonary thromboembolism) (H)  - Uncertain cause of this, occurred about 6 years ago.   - Work up did not reveal any anticoagulation implications.   - Unfortunately I do not have access to this today or in careeverywhere.   - Recommend coagulation monitoring pre and post operatively.               Risks and Recommendations:  The patient has the following additional risks and recommendations for perioperative complications:   - No identified additional risk factors other than previously addressed    Medication Instructions:   - SSRIs, SNRIs, TCAs, Antipsychotics: Continue without modification.     RECOMMENDATION:  APPROVAL GIVEN to proceed with proposed procedure, without further diagnostic evaluation.            Subjective     HPI related to upcoming procedure:     Patient with history of biliary dyskinesia with continued pain for the last month. Surgical intervention by Dr. TAMAYO.    Preop Questions 3/1/2021   1. Have you ever had a heart attack or stroke? No   2. Have you ever had surgery on your heart or blood vessels, such as a stent placement, a coronary artery bypass, or surgery on an artery in your head, neck, heart, or legs? No   3. Do you have chest pain with activity? No   4. Do you have a history of  heart failure? No   5. Do you currently have a cold, bronchitis or symptoms of other infection? No   6. Do you have a cough, shortness of breath, or wheezing? No   7. Do you or anyone in your family have previous history of blood clots? YES - pulmonary embolisms   8. Do you or does anyone in your family have a serious bleeding problem such as prolonged bleeding following surgeries or cuts? No   9. Have you ever had problems with anemia or been told to take iron pills? No   10. Have you had any abnormal blood loss such as black, tarry or bloody stools, or abnormal vaginal bleeding? No   11. Have you ever had a blood transfusion? No   12. Are you willing to have a blood transfusion if it is medically needed before, during, or after your surgery? Yes   13. Have you or any of your relatives ever had problems with anesthesia? No   14. Do you have sleep apnea, excessive snoring or daytime drowsiness? No   14a. Do you have a CPAP machine? No   15. Do you have any artifical heart valves or other  implanted medical devices like a pacemaker, defibrillator, or continuous glucose monitor? No   16. Do you have artificial joints? No   17. Are you allergic to latex? No   18. Is there any chance that you may be pregnant? No       Health Care Directive:  Patient does not have a Health Care Directive or Living Will: Discussed advance care planning with patient; however, patient declined at this time.    Preoperative Review of :   reviewed - no record of controlled substances prescribed.      Status of Chronic Conditions:  DEPRESSION - Patient has a long history of Depression of moderate severity requiring medication for control with recent symptoms being gradually worsening..Current symptoms of depression include starting to follow psychiatry on March 23rd, depressed mood.       Review of Systems  CONSTITUTIONAL:POSITIVE  for chills, fatigue and fever (on and off)  INTEGUMENTARY/SKIN: NEGATIVE for worrisome rashes, moles or lesions  EYES: NEGATIVE for vision changes or irritation  ENT/MOUTH: NEGATIVE for ear, mouth and throat problems  RESP: NEGATIVE for significant cough or SOB  BREAST: NEGATIVE for masses, tenderness or discharge  CV: NEGATIVE for chest pain, palpitations or peripheral edema  GI: POSITIVE for abdominal pain epigastric/RUQ  : NEGATIVE for frequency, dysuria, or hematuria  MUSCULOSKELETAL: NEGATIVE for significant arthralgias or myalgia  NEURO: NEGATIVE for weakness, dizziness or paresthesias  ENDOCRINE: NEGATIVE for temperature intolerance, skin/hair changes  HEME: NEGATIVE for bleeding problems  PSYCHIATRIC: POSITIVE forHx depression    Patient Active Problem List    Diagnosis Date Noted     Biliary dyskinesia 02/17/2021     Priority: Medium     Added automatically from request for surgery [id number]       Non-alcoholic fatty liver disease 12/11/2020     Priority: Medium     Psychophysiological insomnia 12/11/2020     Priority: Medium     Family history of Hashimoto thyroiditis 12/11/2020      Priority: Medium     Moderate major depression (H) 2020     Priority: Medium     Cancer (H)      Priority: Medium     Anxiety 2018     Priority: Medium     History of thyroid cancer 2018     Priority: Medium      (normal spontaneous vaginal delivery) 2018     Priority: Medium     Normal labor 2018     Priority: Medium     Cough 2018     Priority: Medium     Chronic bilateral low back pain without sciatica 2018     Priority: Medium     Encounter for triage in pregnant patient 2018     Priority: Medium     Schizophrenia (H)      Priority: Medium     reports spontaneous remission during last pregnancy       Vitamin D deficiency 2018     Priority: Medium     Supervision of other high risk pregnancies, unspecified trimester 2018     Priority: Medium     Lactose intolerance in adult 2018     Priority: Medium     PE (pulmonary thromboembolism) (H)      Priority: Medium     Hyperprolactinemia (H)      Priority: Medium     Follicular cancer of thyroid (H)      Priority: Medium     Mitral valve disorder 2018     Priority: Medium     H/O  delivery, currently pregnant 2016     Priority: Medium     History of pulmonary embolism 10/19/2016     Priority: Medium     ASCUS of cervix with negative high risk HPV 2016     Priority: Medium     16 ASCUS pap, neg HPV. Done at [facility]. Plan: Cotest in 3 yr  20 NIL Pap, Neg HPV. Plan: pending provider.        PTSD (post-traumatic stress disorder) 2015     Priority: Medium      Past Medical History:   Diagnosis Date     ASCUS of cervix with negative high risk HPV 2016    [facility]     Cancer (H)      Depressive disorder      Follicular cancer of thyroid (H)      Hyperprolactinemia (H)      Mitral valve prolapse      PE (pulmonary thromboembolism) (H)      Pituitary tumor      Schizophrenia (H)     reports spontaneous remission during last pregnancy     Thyroid  "disease      Past Surgical History:   Procedure Laterality Date     APPENDECTOMY       ENT SURGERY      Partial thyroidectomy     Current Outpatient Medications   Medication Sig Dispense Refill     busPIRone (BUSPAR) 15 MG tablet Take 1 tablet (15 mg) by mouth 2 times daily 180 tablet 0     levonorgestrel (MIRENA) 20 MCG/24HR IUD 1 each (20 mcg) by Intrauterine route once       LORazepam (ATIVAN) 0.5 MG tablet Take 1 tablet (0.5 mg) by mouth every 6 hours as needed for anxiety or vomiting 10 tablet 0     ondansetron (ZOFRAN-ODT) 4 MG ODT tab Take 1 tablet (4 mg) by mouth every 8 hours as needed for nausea or vomiting 30 tablet 1     QUEtiapine (SEROQUEL) 25 MG tablet Take 1-2 tablets (25-50 mg) by mouth At Bedtime And as needed up to 2 additional doses/day. 90 tablet 1     triamcinolone (KENALOG) 0.1 % external cream Apply topically 2 times daily 80 g 1     ziprasidone (GEODON) 80 MG capsule Take 1 capsule (80 mg) by mouth 2 times daily (with meals) 60 capsule 1       Allergies   Allergen Reactions     Ciprofloxacin Hives     Sulfa Drugs Hives     Oxycodone-Acetaminophen Itching        Social History     Tobacco Use     Smoking status: Former Smoker     Packs/day: 0.50     Years: 5.00     Pack years: 2.50     Types: Cigarettes     Quit date: 2016     Years since quittin.5     Smokeless tobacco: Never Used   Substance Use Topics     Alcohol use: No     Family History   Problem Relation Age of Onset     No Known Problems Mother      Hypertension Father      Cerebrovascular Disease Father 56        Passed away from double brain stem clot     Mental Illness Father      Asthma Brother      Cancer Maternal Grandfather         lung     No Known Problems Paternal Grandmother      No Known Problems Paternal Grandfather      Autism Spectrum Disorder Son      Kidney Disease Son         \"has a third kidney\"     No Known Problems Daughter      History   Drug Use No         Objective     /78   Pulse 80   Temp " "98.1  F (36.7  C) (Temporal)   Resp 18   Ht 1.778 m (5' 10\")   Wt 79.4 kg (175 lb)   SpO2 98%   BMI 25.11 kg/m      Physical Exam    GENERAL APPEARANCE: healthy, alert and no distress     EYES: EOMI, PERRL     HENT: ear canals and TM's normal and nose and mouth without ulcers or lesions     NECK: no adenopathy, no asymmetry, masses, or scars and thyroid normal to palpation     RESP: lungs clear to auscultation - no rales, rhonchi or wheezes     CV: regular rates and rhythm, normal S1 S2, no S3 or S4 and no murmur, click or rub     ABDOMEN: bowel sounds normal and Tenderness along the upper/right epigastric region     MS: extremities normal- no gross deformities noted, no evidence of inflammation in joints, FROM in all extremities.     SKIN: no suspicious lesions or rashes     NEURO: Normal strength and tone, sensory exam grossly normal, mentation intact and speech normal     PSYCH: mentation appears normal. and affect normal/bright     LYMPHATICS: No cervical adenopathy    Recent Labs   Lab Test 02/28/21  1700 01/06/21  0951 03/12/19  1110 03/12/19  1110   HGB 14.4 13.6   < > 13.0    250   < > 250   INR 0.99  --   --  0.91    138   < > 141   POTASSIUM 3.7 4.1   < > 3.9   CR 0.70 0.69   < > 0.58    < > = values in this interval not displayed.          Diagnostics:  Recent Results (from the past 168 hour(s))   CBC with platelets differential    Collection Time: 02/28/21  5:00 PM   Result Value Ref Range    WBC 5.9 4.0 - 11.0 10e9/L    RBC Count 4.77 3.8 - 5.2 10e12/L    Hemoglobin 14.4 11.7 - 15.7 g/dL    Hematocrit 41.0 35.0 - 47.0 %    MCV 86 78 - 100 fl    MCH 30.2 26.5 - 33.0 pg    MCHC 35.1 31.5 - 36.5 g/dL    RDW 12.5 10.0 - 15.0 %    Platelet Count 297 150 - 450 10e9/L    Diff Method Automated Method     % Neutrophils 72.4 %    % Lymphocytes 19.9 %    % Monocytes 6.4 %    % Eosinophils 0.3 %    % Basophils 0.8 %    % Immature Granulocytes 0.2 %    Nucleated RBCs 0 0 /100    Absolute Neutrophil " 4.3 1.6 - 8.3 10e9/L    Absolute Lymphocytes 1.2 0.8 - 5.3 10e9/L    Absolute Monocytes 0.4 0.0 - 1.3 10e9/L    Absolute Basophils 0.1 0.0 - 0.2 10e9/L    Abs Immature Granulocytes 0.0 0 - 0.4 10e9/L    Absolute Nucleated RBC 0.0    INR    Collection Time: 02/28/21  5:00 PM   Result Value Ref Range    INR 0.99 0.86 - 1.14   Comprehensive metabolic panel    Collection Time: 02/28/21  5:00 PM   Result Value Ref Range    Sodium 142 133 - 144 mmol/L    Potassium 3.7 3.4 - 5.3 mmol/L    Chloride 109 94 - 109 mmol/L    Carbon Dioxide 23 20 - 32 mmol/L    Anion Gap 10 3 - 14 mmol/L    Glucose 102 (H) 70 - 99 mg/dL    Urea Nitrogen 10 7 - 30 mg/dL    Creatinine 0.70 0.52 - 1.04 mg/dL    GFR Estimate >90 >60 mL/min/[1.73_m2]    GFR Estimate If Black >90 >60 mL/min/[1.73_m2]    Calcium 9.2 8.5 - 10.1 mg/dL    Bilirubin Total 1.5 (H) 0.2 - 1.3 mg/dL    Albumin 4.2 3.4 - 5.0 g/dL    Protein Total 7.5 6.8 - 8.8 g/dL    Alkaline Phosphatase 72 40 - 150 U/L    ALT 19 0 - 50 U/L    AST 10 0 - 45 U/L   Lipase    Collection Time: 02/28/21  5:00 PM   Result Value Ref Range    Lipase 50 (L) 73 - 393 U/L   Lactic acid whole blood    Collection Time: 02/28/21  5:00 PM   Result Value Ref Range    Lactic Acid 1.1 0.7 - 2.0 mmol/L   Asymptomatic COVID-19 Virus (Coronavirus) by PCR    Collection Time: 03/01/21  3:13 PM    Specimen: Nasopharyngeal   Result Value Ref Range    COVID-19 Virus PCR to [facility] - Source Nasopharyngeal     COVID-19 Virus PCR to [facility] - Result       Test received-See reflex to IDDL test SARS CoV2 (COVID-19) Virus RT-PCR   CBC with platelets and differential    Collection Time: 03/01/21  4:36 PM   Result Value Ref Range    WBC 7.1 4.0 - 11.0 10e9/L    RBC Count 4.47 3.8 - 5.2 10e12/L    Hemoglobin 13.3 11.7 - 15.7 g/dL    Hematocrit 39.2 35.0 - 47.0 %    MCV 88 78 - 100 fl    MCH 29.8 26.5 - 33.0 pg    MCHC 33.9 31.5 - 36.5 g/dL    RDW 13.3 10.0 - 15.0 %    Platelet Count 277 150 - 450 10e9/L    % Neutrophils 71.3 %     % Lymphocytes 22.2 %    % Monocytes 6.1 %    % Eosinophils 0.3 %    % Basophils 0.1 %    Absolute Neutrophil 5.1 1.6 - 8.3 10e9/L    Absolute Lymphocytes 1.6 0.8 - 5.3 10e9/L    Absolute Monocytes 0.4 0.0 - 1.3 10e9/L    Absolute Eosinophils 0.0 0.0 - 0.7 10e9/L    Absolute Basophils 0.0 0.0 - 0.2 10e9/L    Diff Method Automated Method    Comprehensive metabolic panel (BMP + Alb, Alk Phos, ALT, AST, Total. Bili, TP)    Collection Time: 03/01/21  4:36 PM   Result Value Ref Range    Sodium 140 133 - 144 mmol/L    Potassium 3.7 3.4 - 5.3 mmol/L    Chloride 107 94 - 109 mmol/L    Carbon Dioxide 27 20 - 32 mmol/L    Anion Gap 6 3 - 14 mmol/L    Glucose 72 70 - 99 mg/dL    Urea Nitrogen 10 7 - 30 mg/dL    Creatinine 0.75 0.52 - 1.04 mg/dL    GFR Estimate >90 >60 mL/min/[1.73_m2]    GFR Estimate If Black >90 >60 mL/min/[1.73_m2]    Calcium 8.8 8.5 - 10.1 mg/dL    Bilirubin Total 1.6 (H) 0.2 - 1.3 mg/dL    Albumin 3.9 3.4 - 5.0 g/dL    Protein Total 7.2 6.8 - 8.8 g/dL    Alkaline Phosphatase 59 40 - 150 U/L    ALT 18 0 - 50 U/L    AST 8 0 - 45 U/L      EKG: appears normal, NSR, normal axis, normal intervals, no acute ST/T changes c/w ischemia, no LVH by voltage criteria, unchanged from previous tracings    Revised Cardiac Risk Index (RCRI):  The patient has the following serious cardiovascular risks for perioperative complications:   - No serious cardiac risks = 0 points     RCRI Interpretation: 0 points: Class I (very low risk - 0.4% complication rate)         Signed Electronically by: FLORIDA Lloyd CNP  Copy of this evaluation report is provided to requesting physician.    [facility] Guidelines    Revised Cardiac Risk Index

## 2021-03-01 NOTE — H&P (VIEW-ONLY)
[facility]  [address]  [facility] [address]  Phone: [phone]  Primary Provider: Timmy Umana  Pre-op Performing Provider: BUNNY STARKS      PREOPERATIVE EVALUATION:  Today's date: 3/1/2021    Isaura Gray is a 35 year old female who presents for a preoperative evaluation.    Surgical Information:  Surgery/Procedure: Biliary dyskinesia  Surgery Location: [facility]  Surgeon: Dr. Smith  Surgery Date: 3/4/21  Time of Surgery: 230pm   Where patient plans to recover: At home with family  Fax number for surgical facility: Note does not need to be faxed, will be available electronically in Epic.    Type of Anesthesia Anticipated: General    Assessment & Plan     The proposed surgical procedure is considered INTERMEDIATE risk.    Preop general physical exam  - See below   - CBC with platelets and differential  - EKG 12-lead complete w/read - Clinics  - Comprehensive metabolic panel (BMP + Alb, Alk Phos, ALT, AST, Total. Bili, TP)    Biliary dyskinesia  - Surgical intervention recommended     Lactose intolerance in adult      Hyperprolactinemia (H)      Mitral valve disorder  - EKG today   -NSR today and last echocardiogram   - No symptoms of palpitations, Shortness of breath or chest pain.     Moderate major depression (H)  - Worsened  - Followed by Dr. Umana PCP, has appointment with Psychiatry end of the month.     Other schizophrenia (H)  - As noted above     Follicular cancer of thyroid (H)  Stable TSH, last checked as noted below.     Non-alcoholic fatty liver disease  - Normal ALT and AST.     Psychophysiological insomnia      PE (pulmonary thromboembolism) (H)  - Uncertain cause of this, occurred about 6 years ago.   - Work up did not reveal any anticoagulation implications.   - Unfortunately I do not have access to this today or in careeverywhere.   - Recommend coagulation monitoring pre and post operatively.               Risks and Recommendations:  The patient has the following additional risks and recommendations for perioperative complications:   - No identified additional risk factors other than previously addressed    Medication Instructions:   - SSRIs, SNRIs, TCAs, Antipsychotics: Continue without modification.     RECOMMENDATION:  APPROVAL GIVEN to proceed with proposed procedure, without further diagnostic evaluation.            Subjective     HPI related to upcoming procedure:     Patient with history of biliary dyskinesia with continued pain for the last month. Surgical intervention by Dr. TAMAYO.    Preop Questions 3/1/2021   1. Have you ever had a heart attack or stroke? No   2. Have you ever had surgery on your heart or blood vessels, such as a stent placement, a coronary artery bypass, or surgery on an artery in your head, neck, heart, or legs? No   3. Do you have chest pain with activity? No   4. Do you have a history of  heart failure? No   5. Do you currently have a cold, bronchitis or symptoms of other infection? No   6. Do you have a cough, shortness of breath, or wheezing? No   7. Do you or anyone in your family have previous history of blood clots? YES - pulmonary embolisms   8. Do you or does anyone in your family have a serious bleeding problem such as prolonged bleeding following surgeries or cuts? No   9. Have you ever had problems with anemia or been told to take iron pills? No   10. Have you had any abnormal blood loss such as black, tarry or bloody stools, or abnormal vaginal bleeding? No   11. Have you ever had a blood transfusion? No   12. Are you willing to have a blood transfusion if it is medically needed before, during, or after your surgery? Yes   13. Have you or any of your relatives ever had problems with anesthesia? No   14. Do you have sleep apnea, excessive snoring or daytime drowsiness? No   14a. Do you have a CPAP machine? No   15. Do you have any artifical heart valves or other  implanted medical devices like a pacemaker, defibrillator, or continuous glucose monitor? No   16. Do you have artificial joints? No   17. Are you allergic to latex? No   18. Is there any chance that you may be pregnant? No       Health Care Directive:  Patient does not have a Health Care Directive or Living Will: Discussed advance care planning with patient; however, patient declined at this time.    Preoperative Review of :   reviewed - no record of controlled substances prescribed.      Status of Chronic Conditions:  DEPRESSION - Patient has a long history of Depression of moderate severity requiring medication for control with recent symptoms being gradually worsening..Current symptoms of depression include starting to follow psychiatry on March 23rd, depressed mood.       Review of Systems  CONSTITUTIONAL:POSITIVE  for chills, fatigue and fever (on and off)  INTEGUMENTARY/SKIN: NEGATIVE for worrisome rashes, moles or lesions  EYES: NEGATIVE for vision changes or irritation  ENT/MOUTH: NEGATIVE for ear, mouth and throat problems  RESP: NEGATIVE for significant cough or SOB  BREAST: NEGATIVE for masses, tenderness or discharge  CV: NEGATIVE for chest pain, palpitations or peripheral edema  GI: POSITIVE for abdominal pain epigastric/RUQ  : NEGATIVE for frequency, dysuria, or hematuria  MUSCULOSKELETAL: NEGATIVE for significant arthralgias or myalgia  NEURO: NEGATIVE for weakness, dizziness or paresthesias  ENDOCRINE: NEGATIVE for temperature intolerance, skin/hair changes  HEME: NEGATIVE for bleeding problems  PSYCHIATRIC: POSITIVE forHx depression    Patient Active Problem List    Diagnosis Date Noted     Biliary dyskinesia 02/17/2021     Priority: Medium     Added automatically from request for surgery [id number]       Non-alcoholic fatty liver disease 12/11/2020     Priority: Medium     Psychophysiological insomnia 12/11/2020     Priority: Medium     Family history of Hashimoto thyroiditis 12/11/2020      Priority: Medium     Moderate major depression (H) 2020     Priority: Medium     Cancer (H)      Priority: Medium     Anxiety 2018     Priority: Medium     History of thyroid cancer 2018     Priority: Medium      (normal spontaneous vaginal delivery) 2018     Priority: Medium     Normal labor 2018     Priority: Medium     Cough 2018     Priority: Medium     Chronic bilateral low back pain without sciatica 2018     Priority: Medium     Encounter for triage in pregnant patient 2018     Priority: Medium     Schizophrenia (H)      Priority: Medium     reports spontaneous remission during last pregnancy       Vitamin D deficiency 2018     Priority: Medium     Supervision of other high risk pregnancies, unspecified trimester 2018     Priority: Medium     Lactose intolerance in adult 2018     Priority: Medium     PE (pulmonary thromboembolism) (H)      Priority: Medium     Hyperprolactinemia (H)      Priority: Medium     Follicular cancer of thyroid (H)      Priority: Medium     Mitral valve disorder 2018     Priority: Medium     H/O  delivery, currently pregnant 2016     Priority: Medium     History of pulmonary embolism 10/19/2016     Priority: Medium     ASCUS of cervix with negative high risk HPV 2016     Priority: Medium     16 ASCUS pap, neg HPV. Done at [facility]. Plan: Cotest in 3 yr  20 NIL Pap, Neg HPV. Plan: pending provider.        PTSD (post-traumatic stress disorder) 2015     Priority: Medium      Past Medical History:   Diagnosis Date     ASCUS of cervix with negative high risk HPV 2016    [facility]     Cancer (H)      Depressive disorder      Follicular cancer of thyroid (H)      Hyperprolactinemia (H)      Mitral valve prolapse      PE (pulmonary thromboembolism) (H)      Pituitary tumor      Schizophrenia (H)     reports spontaneous remission during last pregnancy     Thyroid  "disease      Past Surgical History:   Procedure Laterality Date     APPENDECTOMY       ENT SURGERY      Partial thyroidectomy     Current Outpatient Medications   Medication Sig Dispense Refill     busPIRone (BUSPAR) 15 MG tablet Take 1 tablet (15 mg) by mouth 2 times daily 180 tablet 0     levonorgestrel (MIRENA) 20 MCG/24HR IUD 1 each (20 mcg) by Intrauterine route once       LORazepam (ATIVAN) 0.5 MG tablet Take 1 tablet (0.5 mg) by mouth every 6 hours as needed for anxiety or vomiting 10 tablet 0     ondansetron (ZOFRAN-ODT) 4 MG ODT tab Take 1 tablet (4 mg) by mouth every 8 hours as needed for nausea or vomiting 30 tablet 1     QUEtiapine (SEROQUEL) 25 MG tablet Take 1-2 tablets (25-50 mg) by mouth At Bedtime And as needed up to 2 additional doses/day. 90 tablet 1     triamcinolone (KENALOG) 0.1 % external cream Apply topically 2 times daily 80 g 1     ziprasidone (GEODON) 80 MG capsule Take 1 capsule (80 mg) by mouth 2 times daily (with meals) 60 capsule 1       Allergies   Allergen Reactions     Ciprofloxacin Hives     Sulfa Drugs Hives     Oxycodone-Acetaminophen Itching        Social History     Tobacco Use     Smoking status: Former Smoker     Packs/day: 0.50     Years: 5.00     Pack years: 2.50     Types: Cigarettes     Quit date: 2016     Years since quittin.5     Smokeless tobacco: Never Used   Substance Use Topics     Alcohol use: No     Family History   Problem Relation Age of Onset     No Known Problems Mother      Hypertension Father      Cerebrovascular Disease Father 56        Passed away from double brain stem clot     Mental Illness Father      Asthma Brother      Cancer Maternal Grandfather         lung     No Known Problems Paternal Grandmother      No Known Problems Paternal Grandfather      Autism Spectrum Disorder Son      Kidney Disease Son         \"has a third kidney\"     No Known Problems Daughter      History   Drug Use No         Objective     /78   Pulse 80   Temp " "98.1  F (36.7  C) (Temporal)   Resp 18   Ht 1.778 m (5' 10\")   Wt 79.4 kg (175 lb)   SpO2 98%   BMI 25.11 kg/m      Physical Exam    GENERAL APPEARANCE: healthy, alert and no distress     EYES: EOMI, PERRL     HENT: ear canals and TM's normal and nose and mouth without ulcers or lesions     NECK: no adenopathy, no asymmetry, masses, or scars and thyroid normal to palpation     RESP: lungs clear to auscultation - no rales, rhonchi or wheezes     CV: regular rates and rhythm, normal S1 S2, no S3 or S4 and no murmur, click or rub     ABDOMEN: bowel sounds normal and Tenderness along the upper/right epigastric region     MS: extremities normal- no gross deformities noted, no evidence of inflammation in joints, FROM in all extremities.     SKIN: no suspicious lesions or rashes     NEURO: Normal strength and tone, sensory exam grossly normal, mentation intact and speech normal     PSYCH: mentation appears normal. and affect normal/bright     LYMPHATICS: No cervical adenopathy    Recent Labs   Lab Test 02/28/21  1700 01/06/21  0951 03/12/19  1110 03/12/19  1110   HGB 14.4 13.6   < > 13.0    250   < > 250   INR 0.99  --   --  0.91    138   < > 141   POTASSIUM 3.7 4.1   < > 3.9   CR 0.70 0.69   < > 0.58    < > = values in this interval not displayed.          Diagnostics:  Recent Results (from the past 168 hour(s))   CBC with platelets differential    Collection Time: 02/28/21  5:00 PM   Result Value Ref Range    WBC 5.9 4.0 - 11.0 10e9/L    RBC Count 4.77 3.8 - 5.2 10e12/L    Hemoglobin 14.4 11.7 - 15.7 g/dL    Hematocrit 41.0 35.0 - 47.0 %    MCV 86 78 - 100 fl    MCH 30.2 26.5 - 33.0 pg    MCHC 35.1 31.5 - 36.5 g/dL    RDW 12.5 10.0 - 15.0 %    Platelet Count 297 150 - 450 10e9/L    Diff Method Automated Method     % Neutrophils 72.4 %    % Lymphocytes 19.9 %    % Monocytes 6.4 %    % Eosinophils 0.3 %    % Basophils 0.8 %    % Immature Granulocytes 0.2 %    Nucleated RBCs 0 0 /100    Absolute Neutrophil " 4.3 1.6 - 8.3 10e9/L    Absolute Lymphocytes 1.2 0.8 - 5.3 10e9/L    Absolute Monocytes 0.4 0.0 - 1.3 10e9/L    Absolute Basophils 0.1 0.0 - 0.2 10e9/L    Abs Immature Granulocytes 0.0 0 - 0.4 10e9/L    Absolute Nucleated RBC 0.0    INR    Collection Time: 02/28/21  5:00 PM   Result Value Ref Range    INR 0.99 0.86 - 1.14   Comprehensive metabolic panel    Collection Time: 02/28/21  5:00 PM   Result Value Ref Range    Sodium 142 133 - 144 mmol/L    Potassium 3.7 3.4 - 5.3 mmol/L    Chloride 109 94 - 109 mmol/L    Carbon Dioxide 23 20 - 32 mmol/L    Anion Gap 10 3 - 14 mmol/L    Glucose 102 (H) 70 - 99 mg/dL    Urea Nitrogen 10 7 - 30 mg/dL    Creatinine 0.70 0.52 - 1.04 mg/dL    GFR Estimate >90 >60 mL/min/[1.73_m2]    GFR Estimate If Black >90 >60 mL/min/[1.73_m2]    Calcium 9.2 8.5 - 10.1 mg/dL    Bilirubin Total 1.5 (H) 0.2 - 1.3 mg/dL    Albumin 4.2 3.4 - 5.0 g/dL    Protein Total 7.5 6.8 - 8.8 g/dL    Alkaline Phosphatase 72 40 - 150 U/L    ALT 19 0 - 50 U/L    AST 10 0 - 45 U/L   Lipase    Collection Time: 02/28/21  5:00 PM   Result Value Ref Range    Lipase 50 (L) 73 - 393 U/L   Lactic acid whole blood    Collection Time: 02/28/21  5:00 PM   Result Value Ref Range    Lactic Acid 1.1 0.7 - 2.0 mmol/L   Asymptomatic COVID-19 Virus (Coronavirus) by PCR    Collection Time: 03/01/21  3:13 PM    Specimen: Nasopharyngeal   Result Value Ref Range    COVID-19 Virus PCR to [facility] - Source Nasopharyngeal     COVID-19 Virus PCR to [facility] - Result       Test received-See reflex to IDDL test SARS CoV2 (COVID-19) Virus RT-PCR   CBC with platelets and differential    Collection Time: 03/01/21  4:36 PM   Result Value Ref Range    WBC 7.1 4.0 - 11.0 10e9/L    RBC Count 4.47 3.8 - 5.2 10e12/L    Hemoglobin 13.3 11.7 - 15.7 g/dL    Hematocrit 39.2 35.0 - 47.0 %    MCV 88 78 - 100 fl    MCH 29.8 26.5 - 33.0 pg    MCHC 33.9 31.5 - 36.5 g/dL    RDW 13.3 10.0 - 15.0 %    Platelet Count 277 150 - 450 10e9/L    % Neutrophils 71.3 %     % Lymphocytes 22.2 %    % Monocytes 6.1 %    % Eosinophils 0.3 %    % Basophils 0.1 %    Absolute Neutrophil 5.1 1.6 - 8.3 10e9/L    Absolute Lymphocytes 1.6 0.8 - 5.3 10e9/L    Absolute Monocytes 0.4 0.0 - 1.3 10e9/L    Absolute Eosinophils 0.0 0.0 - 0.7 10e9/L    Absolute Basophils 0.0 0.0 - 0.2 10e9/L    Diff Method Automated Method    Comprehensive metabolic panel (BMP + Alb, Alk Phos, ALT, AST, Total. Bili, TP)    Collection Time: 03/01/21  4:36 PM   Result Value Ref Range    Sodium 140 133 - 144 mmol/L    Potassium 3.7 3.4 - 5.3 mmol/L    Chloride 107 94 - 109 mmol/L    Carbon Dioxide 27 20 - 32 mmol/L    Anion Gap 6 3 - 14 mmol/L    Glucose 72 70 - 99 mg/dL    Urea Nitrogen 10 7 - 30 mg/dL    Creatinine 0.75 0.52 - 1.04 mg/dL    GFR Estimate >90 >60 mL/min/[1.73_m2]    GFR Estimate If Black >90 >60 mL/min/[1.73_m2]    Calcium 8.8 8.5 - 10.1 mg/dL    Bilirubin Total 1.6 (H) 0.2 - 1.3 mg/dL    Albumin 3.9 3.4 - 5.0 g/dL    Protein Total 7.2 6.8 - 8.8 g/dL    Alkaline Phosphatase 59 40 - 150 U/L    ALT 18 0 - 50 U/L    AST 8 0 - 45 U/L      EKG: appears normal, NSR, normal axis, normal intervals, no acute ST/T changes c/w ischemia, no LVH by voltage criteria, unchanged from previous tracings    Revised Cardiac Risk Index (RCRI):  The patient has the following serious cardiovascular risks for perioperative complications:   - No serious cardiac risks = 0 points     RCRI Interpretation: 0 points: Class I (very low risk - 0.4% complication rate)         Signed Electronically by: FLORIDA Lloyd CNP  Copy of this evaluation report is provided to requesting physician.    [facility]    Revised Cardiac Risk Index

## 2021-03-01 NOTE — DISCHARGE INSTRUCTIONS
Continue your modified diet.  You can take Ativan for recurrence of some of your symptoms.  Hopefully you will be able to tolerate the symptoms until your planned surgery this coming week.

## 2021-03-01 NOTE — Clinical Note
Approved for procedure. Just a heads up she had a PE years ago. I do not see anything in our system and she states that she had a work up that was negative for coagulation factor deficiencies/disorders. Just wanted you to know.     FLORIDA Lloyd CNP  Questions or concerns please feel free to send me a [employer] message or call me  Phone : [phone]

## 2021-03-01 NOTE — PATIENT INSTRUCTIONS

## 2021-03-02 LAB
LABORATORY COMMENT REPORT: NORMAL
SARS-COV-2 RNA RESP QL NAA+PROBE: NEGATIVE
SPECIMEN SOURCE: NORMAL

## 2021-03-04 ENCOUNTER — ANESTHESIA EVENT (OUTPATIENT)
Dept: SURGERY | Facility: CLINIC | Age: 36
End: 2021-03-04
Payer: MEDICARE

## 2021-03-04 ENCOUNTER — HOSPITAL ENCOUNTER (OUTPATIENT)
Facility: CLINIC | Age: 36
Discharge: HOME | End: 2021-03-04
Attending: SURGERY | Admitting: SURGERY
Payer: MEDICARE

## 2021-03-04 ENCOUNTER — ANESTHESIA (OUTPATIENT)
Dept: SURGERY | Facility: CLINIC | Age: 36
End: 2021-03-04
Payer: MEDICARE

## 2021-03-04 VITALS
SYSTOLIC BLOOD PRESSURE: 124 MMHG | RESPIRATION RATE: 16 BRPM | BODY MASS INDEX: 25.11 KG/M2 | HEART RATE: 64 BPM | OXYGEN SATURATION: 96 % | DIASTOLIC BLOOD PRESSURE: 80 MMHG | TEMPERATURE: 99 F | WEIGHT: 175 LBS

## 2021-03-04 DIAGNOSIS — K82.8 BILIARY DYSKINESIA: ICD-10-CM

## 2021-03-04 DIAGNOSIS — Z90.49 S/P LAPAROSCOPIC CHOLECYSTECTOMY: Primary | ICD-10-CM

## 2021-03-04 LAB — HCG UR QL: NEGATIVE

## 2021-03-04 PROCEDURE — 81025 URINE PREGNANCY TEST: CPT | Performed by: NURSE ANESTHETIST, CERTIFIED REGISTERED

## 2021-03-04 PROCEDURE — 250N000011 HC RX IP 250 OP 636: Performed by: NURSE ANESTHETIST, CERTIFIED REGISTERED

## 2021-03-04 PROCEDURE — 710N000012 HC RECOVERY PHASE 2, PER MINUTE: Performed by: SURGERY

## 2021-03-04 PROCEDURE — 250N000009 HC RX 250: Performed by: SURGERY

## 2021-03-04 PROCEDURE — 272N000001 HC OR GENERAL SUPPLY STERILE: Performed by: SURGERY

## 2021-03-04 PROCEDURE — 250N000025 HC SEVOFLURANE, PER MIN: Performed by: SURGERY

## 2021-03-04 PROCEDURE — 250N000009 HC RX 250: Performed by: NURSE ANESTHETIST, CERTIFIED REGISTERED

## 2021-03-04 PROCEDURE — 88304 TISSUE EXAM BY PATHOLOGIST: CPT | Mod: TC | Performed by: SURGERY

## 2021-03-04 PROCEDURE — 999N000141 HC STATISTIC PRE-PROCEDURE NURSING ASSESSMENT: Performed by: SURGERY

## 2021-03-04 PROCEDURE — 710N000010 HC RECOVERY PHASE 1, LEVEL 2, PER MIN: Performed by: SURGERY

## 2021-03-04 PROCEDURE — 250N000013 HC RX MED GY IP 250 OP 250 PS 637: Performed by: SURGERY

## 2021-03-04 PROCEDURE — 360N000076 HC SURGERY LEVEL 3, PER MIN: Performed by: SURGERY

## 2021-03-04 PROCEDURE — 258N000003 HC RX IP 258 OP 636: Performed by: NURSE ANESTHETIST, CERTIFIED REGISTERED

## 2021-03-04 PROCEDURE — 250N000011 HC RX IP 250 OP 636: Performed by: SURGERY

## 2021-03-04 PROCEDURE — 88304 TISSUE EXAM BY PATHOLOGIST: CPT | Mod: 26 | Performed by: PATHOLOGY

## 2021-03-04 PROCEDURE — 370N000017 HC ANESTHESIA TECHNICAL FEE, PER MIN: Performed by: SURGERY

## 2021-03-04 PROCEDURE — 47562 LAPAROSCOPIC CHOLECYSTECTOMY: CPT | Performed by: SURGERY

## 2021-03-04 RX ORDER — LIDOCAINE HYDROCHLORIDE 20 MG/ML
INJECTION, SOLUTION INFILTRATION; PERINEURAL PRN
Status: DISCONTINUED | OUTPATIENT
Start: 2021-03-04 | End: 2021-03-04

## 2021-03-04 RX ORDER — HYDROCODONE BITARTRATE AND ACETAMINOPHEN 5; 325 MG/1; MG/1
1 TABLET ORAL EVERY 6 HOURS PRN
Qty: 12 TABLET | Refills: 0 | Status: SHIPPED | OUTPATIENT
Start: 2021-03-04 | End: 2021-03-07

## 2021-03-04 RX ORDER — CEFAZOLIN SODIUM 2 G/100ML
2 INJECTION, SOLUTION INTRAVENOUS SEE ADMIN INSTRUCTIONS
Status: DISCONTINUED | OUTPATIENT
Start: 2021-03-04 | End: 2021-03-04 | Stop reason: HOSPADM

## 2021-03-04 RX ORDER — NALOXONE HYDROCHLORIDE 0.4 MG/ML
0.4 INJECTION, SOLUTION INTRAMUSCULAR; INTRAVENOUS; SUBCUTANEOUS
Status: DISCONTINUED | OUTPATIENT
Start: 2021-03-04 | End: 2021-03-04 | Stop reason: HOSPADM

## 2021-03-04 RX ORDER — CEFAZOLIN SODIUM 2 G/100ML
2 INJECTION, SOLUTION INTRAVENOUS
Status: DISCONTINUED | OUTPATIENT
Start: 2021-03-04 | End: 2021-03-04 | Stop reason: HOSPADM

## 2021-03-04 RX ORDER — ACETAMINOPHEN 325 MG/1
975 TABLET ORAL ONCE
Status: COMPLETED | OUTPATIENT
Start: 2021-03-04 | End: 2021-03-04

## 2021-03-04 RX ORDER — DIMENHYDRINATE 50 MG/ML
25 INJECTION, SOLUTION INTRAMUSCULAR; INTRAVENOUS
Status: DISCONTINUED | OUTPATIENT
Start: 2021-03-04 | End: 2021-03-04 | Stop reason: HOSPADM

## 2021-03-04 RX ORDER — ONDANSETRON 2 MG/ML
4 INJECTION INTRAMUSCULAR; INTRAVENOUS EVERY 30 MIN PRN
Status: DISCONTINUED | OUTPATIENT
Start: 2021-03-04 | End: 2021-03-04 | Stop reason: HOSPADM

## 2021-03-04 RX ORDER — DIPHENHYDRAMINE HYDROCHLORIDE 50 MG/ML
25 INJECTION INTRAMUSCULAR; INTRAVENOUS EVERY 6 HOURS PRN
Status: DISCONTINUED | OUTPATIENT
Start: 2021-03-04 | End: 2021-03-04 | Stop reason: HOSPADM

## 2021-03-04 RX ORDER — FENTANYL CITRATE 50 UG/ML
INJECTION, SOLUTION INTRAMUSCULAR; INTRAVENOUS PRN
Status: DISCONTINUED | OUTPATIENT
Start: 2021-03-04 | End: 2021-03-04

## 2021-03-04 RX ORDER — FENTANYL CITRATE 50 UG/ML
25-50 INJECTION, SOLUTION INTRAMUSCULAR; INTRAVENOUS
Status: DISCONTINUED | OUTPATIENT
Start: 2021-03-04 | End: 2021-03-04 | Stop reason: HOSPADM

## 2021-03-04 RX ORDER — NALOXONE HYDROCHLORIDE 0.4 MG/ML
0.2 INJECTION, SOLUTION INTRAMUSCULAR; INTRAVENOUS; SUBCUTANEOUS
Status: DISCONTINUED | OUTPATIENT
Start: 2021-03-04 | End: 2021-03-04 | Stop reason: HOSPADM

## 2021-03-04 RX ORDER — PROPOFOL 10 MG/ML
INJECTION, EMULSION INTRAVENOUS CONTINUOUS PRN
Status: DISCONTINUED | OUTPATIENT
Start: 2021-03-04 | End: 2021-03-04

## 2021-03-04 RX ORDER — HEPARIN SODIUM 5000 [USP'U]/.5ML
5000 INJECTION, SOLUTION INTRAVENOUS; SUBCUTANEOUS
Status: COMPLETED | OUTPATIENT
Start: 2021-03-04 | End: 2021-03-04

## 2021-03-04 RX ORDER — DEXAMETHASONE SODIUM PHOSPHATE 10 MG/ML
INJECTION, SOLUTION INTRAMUSCULAR; INTRAVENOUS PRN
Status: DISCONTINUED | OUTPATIENT
Start: 2021-03-04 | End: 2021-03-04

## 2021-03-04 RX ORDER — SODIUM CHLORIDE, SODIUM LACTATE, POTASSIUM CHLORIDE, CALCIUM CHLORIDE 600; 310; 30; 20 MG/100ML; MG/100ML; MG/100ML; MG/100ML
INJECTION, SOLUTION INTRAVENOUS CONTINUOUS
Status: DISCONTINUED | OUTPATIENT
Start: 2021-03-04 | End: 2021-03-04 | Stop reason: HOSPADM

## 2021-03-04 RX ORDER — HYDROCODONE BITARTRATE AND ACETAMINOPHEN 5; 325 MG/1; MG/1
1 TABLET ORAL
Status: COMPLETED | OUTPATIENT
Start: 2021-03-04 | End: 2021-03-04

## 2021-03-04 RX ORDER — ONDANSETRON 2 MG/ML
INJECTION INTRAMUSCULAR; INTRAVENOUS PRN
Status: DISCONTINUED | OUTPATIENT
Start: 2021-03-04 | End: 2021-03-04

## 2021-03-04 RX ORDER — HYDROMORPHONE HYDROCHLORIDE 1 MG/ML
.3-.5 INJECTION, SOLUTION INTRAMUSCULAR; INTRAVENOUS; SUBCUTANEOUS EVERY 10 MIN PRN
Status: DISCONTINUED | OUTPATIENT
Start: 2021-03-04 | End: 2021-03-04 | Stop reason: HOSPADM

## 2021-03-04 RX ORDER — PHENYLEPHRINE HYDROCHLORIDE 10 MG/ML
INJECTION INTRAVENOUS PRN
Status: DISCONTINUED | OUTPATIENT
Start: 2021-03-04 | End: 2021-03-04

## 2021-03-04 RX ORDER — PROPOFOL 10 MG/ML
INJECTION, EMULSION INTRAVENOUS PRN
Status: DISCONTINUED | OUTPATIENT
Start: 2021-03-04 | End: 2021-03-04

## 2021-03-04 RX ORDER — ONDANSETRON 4 MG/1
4 TABLET, ORALLY DISINTEGRATING ORAL EVERY 30 MIN PRN
Status: DISCONTINUED | OUTPATIENT
Start: 2021-03-04 | End: 2021-03-04 | Stop reason: HOSPADM

## 2021-03-04 RX ORDER — LIDOCAINE 40 MG/G
CREAM TOPICAL
Status: DISCONTINUED | OUTPATIENT
Start: 2021-03-04 | End: 2021-03-04 | Stop reason: HOSPADM

## 2021-03-04 RX ORDER — BUPIVACAINE HYDROCHLORIDE AND EPINEPHRINE 2.5; 5 MG/ML; UG/ML
INJECTION, SOLUTION INFILTRATION; PERINEURAL PRN
Status: DISCONTINUED | OUTPATIENT
Start: 2021-03-04 | End: 2021-03-04 | Stop reason: HOSPADM

## 2021-03-04 RX ADMIN — HYDROCODONE BITARTRATE AND ACETAMINOPHEN 1 TABLET: 5; 325 TABLET ORAL at 18:36

## 2021-03-04 RX ADMIN — MIDAZOLAM 2 MG: 1 INJECTION INTRAMUSCULAR; INTRAVENOUS at 15:31

## 2021-03-04 RX ADMIN — FENTANYL CITRATE 50 MCG: 50 INJECTION, SOLUTION INTRAMUSCULAR; INTRAVENOUS at 15:47

## 2021-03-04 RX ADMIN — PROPOFOL 150 MCG/KG/MIN: 10 INJECTION, EMULSION INTRAVENOUS at 15:35

## 2021-03-04 RX ADMIN — SUGAMMADEX 200 MG: 100 INJECTION, SOLUTION INTRAVENOUS at 16:22

## 2021-03-04 RX ADMIN — PHENYLEPHRINE HYDROCHLORIDE 50 MCG: 10 INJECTION INTRAVENOUS at 16:03

## 2021-03-04 RX ADMIN — LIDOCAINE HYDROCHLORIDE 80 MG: 20 INJECTION, SOLUTION INFILTRATION; PERINEURAL at 15:35

## 2021-03-04 RX ADMIN — PROPOFOL 200 MG: 10 INJECTION, EMULSION INTRAVENOUS at 15:35

## 2021-03-04 RX ADMIN — DEXAMETHASONE SODIUM PHOSPHATE 5 MG: 10 INJECTION, SOLUTION INTRAMUSCULAR; INTRAVENOUS at 15:47

## 2021-03-04 RX ADMIN — HYDROMORPHONE HYDROCHLORIDE 0.5 MG: 1 INJECTION, SOLUTION INTRAMUSCULAR; INTRAVENOUS; SUBCUTANEOUS at 16:15

## 2021-03-04 RX ADMIN — FENTANYL CITRATE 50 MCG: 50 INJECTION, SOLUTION INTRAMUSCULAR; INTRAVENOUS at 16:02

## 2021-03-04 RX ADMIN — ACETAMINOPHEN 975 MG: 325 TABLET, FILM COATED ORAL at 13:08

## 2021-03-04 RX ADMIN — ONDANSETRON 4 MG: 2 INJECTION INTRAMUSCULAR; INTRAVENOUS at 18:12

## 2021-03-04 RX ADMIN — ROCURONIUM BROMIDE 50 MG: 10 INJECTION INTRAVENOUS at 15:35

## 2021-03-04 RX ADMIN — CEFAZOLIN SODIUM 2 G: 2 INJECTION, SOLUTION INTRAVENOUS at 15:43

## 2021-03-04 RX ADMIN — LIDOCAINE HYDROCHLORIDE 1 ML: 10 INJECTION, SOLUTION EPIDURAL; INFILTRATION; INTRACAUDAL; PERINEURAL at 13:31

## 2021-03-04 RX ADMIN — HEPARIN SODIUM 5000 UNITS: 10000 INJECTION, SOLUTION INTRAVENOUS; SUBCUTANEOUS at 15:50

## 2021-03-04 RX ADMIN — ONDANSETRON 4 MG: 2 INJECTION INTRAMUSCULAR; INTRAVENOUS at 16:21

## 2021-03-04 RX ADMIN — SODIUM CHLORIDE, POTASSIUM CHLORIDE, SODIUM LACTATE AND CALCIUM CHLORIDE: 600; 310; 30; 20 INJECTION, SOLUTION INTRAVENOUS at 16:21

## 2021-03-04 RX ADMIN — SODIUM CHLORIDE, POTASSIUM CHLORIDE, SODIUM LACTATE AND CALCIUM CHLORIDE: 600; 310; 30; 20 INJECTION, SOLUTION INTRAVENOUS at 13:31

## 2021-03-04 ASSESSMENT — LIFESTYLE VARIABLES: TOBACCO_USE: 1

## 2021-03-04 NOTE — ANESTHESIA PROCEDURE NOTES
Airway   Date/Time: 3/4/2021 3:37 PM   Patient location during procedure: OR  Staff -   CRNA: Susan Chang APRN CRNA  Other Anesthesia Staff: Merced Stubbs  Performed By: CRNA and SRNA    Consent for Airway   Urgency: elective    Indications and Patient Condition  Indications for airway management: stacy-procedural  Induction type:intravenousMask difficulty assessment: 1 - vent by mask    Final Airway Details  Final airway type: endotracheal airway  Successful airway:ETT - single  Endotracheal Airway Details   ETT size (mm): 6.5  Cuffed: yes  Successful intubation technique: direct laryngoscopy  Grade View of Cords: 1  Adjucts: stylet  Measured from: lips  Secured with: plastic tape  Bite block used: Oral Airway    Post intubation assessment   Placement verified by: capnometry, equal breath sounds and chest rise   Number of attempts at approach: 1  Number of other approaches attempted: 0  Secured with:plastic tape  Ease of procedure: easy  Dentition: Intact and Unchanged

## 2021-03-04 NOTE — ANESTHESIA PREPROCEDURE EVALUATION
Anesthesia Pre-Procedure Evaluation    Patient: Isaura Gray   MRN: [id number] : 1985        Preoperative Diagnosis: Biliary dyskinesia [K82.8]   Procedure : Procedure(s):  Laparoscopic cholecystectomy, possible open     Past Medical History:   Diagnosis Date     ASCUS of cervix with negative high risk HPV 2016    [facility]     Cancer (H)      Depressive disorder      Follicular cancer of thyroid (H)      Hyperprolactinemia (H)      Mitral valve prolapse      PE (pulmonary thromboembolism) (H)      Pituitary tumor      Schizophrenia (H)     reports spontaneous remission during last pregnancy     Thyroid disease       Past Surgical History:   Procedure Laterality Date     APPENDECTOMY       ENT SURGERY      Partial thyroidectomy      Allergies   Allergen Reactions     Ciprofloxacin Hives     Sulfa Drugs Hives     Hydrocodone-Acetaminophen      Pt stated that this is med she is allergic to vs the listed Percocet listed (3/1/21)     Oxycodone-Acetaminophen Itching      Social History     Tobacco Use     Smoking status: Former Smoker     Packs/day: 0.50     Years: 5.00     Pack years: 2.50     Types: Cigarettes     Quit date: 2016     Years since quittin.5     Smokeless tobacco: Never Used   Substance Use Topics     Alcohol use: No      Wt Readings from Last 1 Encounters:   21 79.4 kg (175 lb)        Anesthesia Evaluation   Pt has had prior anesthetic. Type: General.    No history of anesthetic complications       ROS/MED HX  ENT/Pulmonary:     (+) tobacco use, Past use,     Neurologic:  - neg neurologic ROS     Cardiovascular:     (+) -----valvular problems/murmurs type: MVP Previous cardiac testing   Echo: Date: Results:    Stress Test: Date: Results:    ECG Reviewed: Date: 3/1/21 Results:  Appears normal, NSR, normal axis, normal intervals, no acute ST/T changes c/w ischemia, no LVH by voltage criteria, unchanged from previous tracings  Cath: Date: Results:      METS/Exercise  Tolerance: >4 METS    Hematologic: Comments: PE    (+) History of blood clots (6 years ago. Unknown cause), pt is not anticoagulated,     Musculoskeletal:       GI/Hepatic: Comment: Non-alcoholic fatty liver disease    (+) liver disease,     Renal/Genitourinary:       Endo:     (+) thyroid problem,  Hashimoto thyroiditis,     Psychiatric/Substance Use:     (+) psychiatric history depression, schizophrenia, other (comment) and anxiety (PTSD)     Infectious Disease:       Malignancy:   (+) Malignancy, Other CA pituitary tumor, follicular CA of thyroid status post.    Other:      (+) , H/O Chronic Pain,        Physical Exam    Airway  airway exam normal      Mallampati: II   TM distance: > 3 FB   Neck ROM: full   Mouth opening: > 3 cm    Respiratory Devices and Support         Dental  no notable dental history         Cardiovascular   cardiovascular exam normal       Rhythm and rate: regular and normal     Pulmonary   pulmonary exam normal        breath sounds clear to auscultation           OUTSIDE LABS:  CBC:   Lab Results   Component Value Date    WBC 7.1 03/01/2021    WBC 5.9 02/28/2021    HGB 13.3 03/01/2021    HGB 14.4 02/28/2021    HCT 39.2 03/01/2021    HCT 41.0 02/28/2021     03/01/2021     02/28/2021     BMP:   Lab Results   Component Value Date     03/01/2021     02/28/2021    POTASSIUM 3.7 03/01/2021    POTASSIUM 3.7 02/28/2021    CHLORIDE 107 03/01/2021    CHLORIDE 109 02/28/2021    CO2 27 03/01/2021    CO2 23 02/28/2021    BUN 10 03/01/2021    BUN 10 02/28/2021    CR 0.75 03/01/2021    CR 0.70 02/28/2021    GLC 72 03/01/2021     (H) 02/28/2021     COAGS:   Lab Results   Component Value Date    PTT 29 03/12/2019    INR 0.99 02/28/2021     POC:   Lab Results   Component Value Date    BGM 98 03/12/2019    HCG Negative 02/13/2020     HEPATIC:   Lab Results   Component Value Date    ALBUMIN 3.9 03/01/2021    PROTTOTAL 7.2 03/01/2021    ALT 18 03/01/2021    AST 8 03/01/2021     ALKPHOS 59 03/01/2021    BILITOTAL 1.6 (H) 03/01/2021     OTHER:   Lab Results   Component Value Date    LACT 1.1 02/28/2021    DEBI 8.8 03/01/2021    LIPASE 50 (L) 02/28/2021    AMYLASE 47 06/20/2018    TSH 2.07 12/11/2020    T4 0.98 12/11/2020       Anesthesia Plan    ASA Status:  2   NPO Status:  NPO Appropriate    Anesthesia Type: General.     - Airway: ETT   Induction: Intravenous, Propofol.   Maintenance: Balanced.        Consents    Anesthesia Plan(s) and associated risks, benefits, and realistic alternatives discussed. Questions answered and patient/representative(s) expressed understanding.     - Discussed with:  Patient      - Extended Intubation/Ventilatory Support Discussed: No.      - Patient is DNR/DNI Status: No    Use of blood products discussed: Yes.     - Discussed with: Patient.     - Consented: consented to blood products     Postoperative Care    Pain management: IV analgesics, Oral pain medications, Multi-modal analgesia.   PONV prophylaxis: Ondansetron (or other 5HT-3), Dexamethasone or Solumedrol, Background Propofol Infusion     Comments:                Merced Stubbs

## 2021-03-04 NOTE — ANESTHESIA CARE TRANSFER NOTE
Patient: Isaura Gray    Procedure(s):  Laparoscopic cholecystectomy, possible open    Diagnosis: Biliary dyskinesia [K82.8]  Diagnosis Additional Information: No value filed.    Anesthesia Type:   General     Note:    Oropharynx: oral airway in place  Level of Consciousness: drowsy  Oxygen Supplementation: face mask    Independent Airway: airway patency satisfactory and stable  Dentition: dentition unchanged  Vital Signs Stable: post-procedure vital signs reviewed and stable  Report to RN Given: handoff report given  Patient transferred to: PACU    Handoff Report: Identifed the Patient, Identified the Reponsible Provider, Reviewed the pertinent medical history, Discussed the surgical course, Reviewed Intra-OP anesthesia mangement and issues during anesthesia, Set expectations for post-procedure period and Allowed opportunity for questions and acknowledgement of understanding      Vitals: (Last set prior to Anesthesia Care Transfer)  CRNA VITALS  3/4/2021 1617 - 3/4/2021 1652      3/4/2021             Pulse:  80    SpO2:  100 %    Resp Rate (observed):  (!) 5        Electronically Signed By: FLORIDA Avendano CRNA  March 4, 2021  4:52 PM

## 2021-03-04 NOTE — DISCHARGE INSTRUCTIONS
[employer]    Home Care Following Gallbladder Surgery    [facility]    Pain meds:     600mg ibuprofen every 6hrs prn (if needed)    650mg of acetaminophen every 6hrs prn (if needed)    You can alternate these meds so that you take one every 3 hrs.      Make sure you do not go over 4000mg of acetaminophen every 24hrs, especially if you are taking Norco or percocet 5/325mg.    Care of the Incision:    Remove gauze dressing (if present) after 48 hours.    Surgical glue was used on your incision so keep it dry for 24 hours.  Then you may shower (Friday 3/5 in afternoon) but don t submerge under water for at least 2 weeks.  Gently pat your incision dry with a freshly laundered towel.    Do not touch your incision with bare hands or pick at scabs. Do not apply ointment to your incisions.    Leave your incision open to air.  Cover it only if draining, clothing rubs or irritates it.    Activity:    Gradually increase your activity.  Walk short distances several times each day and increase the distance as your strength allows.    To promote circulation, do not cross your legs while sitting.    No strenuous lifting or straining for 2-3 weeks.  Do not lift anything over 10-20 pounds until your doctor approves an increase.    Return to work will be determined by the type of work you do and should be discussed with your physician.    Do not drive or operate equipment while taking prescription pain medicines.  You may drive 1 week after surgery if you have stopped taking prescription pain medicines and can react quickly enough to make an emergency stop if necessary.    Diet:    Return to the diet you were on before surgery. (You may experience loose stools for the first few weeks after your surgery as your system adjusts to not having a gallbladder).    Drink plenty of water.    Avoid foods that cause constipation (cheese, bananas, pasta, white bread)    REMEMBER--most prescription pain pills cause  constipation.  Walking, extra fluids, and increased fiber (fresh fruits and vegetables, etc.) are natural remedies for constipation.  You can also take mineral oil, 1-2 Tablespoons per day.  If still constipated you may try a stool softener such as Colace or Miralax.    Call Your Physician if You Have:    Redness, increased swelling or cloudy drainage from your incision.    A temperature of more than 101 degrees F.    Worsening pain in your incision not relieved by your prescription pain pills and/or a short rest.    Jaundice (yellowing of skin or eyes)    Abdominal distention (stomach getting very large)    Swelling in your legs    Productive cough    Burning with urination    Any questions or concerns about your recovery, please call     [facility] Business hours [phone]    After hours [phone] [facility] Nurse Advice Line (24 hours a day)    Follow-up Care:    Make an appointment 2-3 week after your surgery.  Call [phone].    [facility] Same-Day Surgery   Adult Discharge Orders & Instructions     For 24 hours after surgery    1. Get plenty of rest.  A responsible adult must stay with you for at least 24 hours after you leave the hospital.   2. Do not drive or use heavy equipment.  If you have weakness or tingling, don't drive or use heavy equipment until this feeling goes away.  3. Do not drink alcohol.  4. Avoid strenuous or risky activities.  Ask for help when climbing stairs.   5. You may feel lightheaded.  If so, sit for a few minutes before standing.  Have someone help you get up.   6. You may have a slight fever. Call the doctor if your fever is over 100 F (37.7 C) (taken under the tongue) or lasts longer than 24 hours.  7. You may have a dry mouth, a sore throat, muscle aches or trouble sleeping.  These should go away after 24 hours.  8. Do not make important or legal decisions.  We don t expect you to have any problems from the surgery or treatment you had today.  Just in case, here s what to do if you have pain, upset stomach (nausea), bleeding or infection:  Pain:  Take medicines your doctor has prescribed or over-the-counter medicine they have suggested. Resting and using ice packs can help, too. For surgery on an arm or leg, raise it on a pillow to ease swelling. Call your doctor if these methods don t work.  Copyright [employer], Licensed under [employer]  Upset stomach (nausea):  Take anti-nausea medicine approved by your doctor. Drink clear liquids like apple juice, ginger ale, broth or 7-Up. Be sure to drink enough fluids. Rest can help, too. Move to normal foods when you re ready. Bleeding:  In the first 24 hours, you may see a little blood on your dressing, about the size of a quarter. You don t need to worry about this much blood, but if the blood spot keeps getting bigger:    Put pressure on the wound if you can, AND    Call your doctor.  Copyright [employer], Licensed under [employer]  Fever/Infection: Please call your doctor if you have any of these signs:    Redness    Swelling    Wound feels warm    Pain gets worse    Bad-smelling fluid leaks from wound    Fever or chills  Call your doctor for any of the followin.  It has been over 8 to 10 hours since surgery and you are still not able to urinate (pass water).    2.  Headache for over 24 hours.     [facility] Nurse advice line: [phone] (24 hour line)

## 2021-03-04 NOTE — OP NOTE
OPERATIVE NOTE  [facility] SURGERY    DATE:  3/4/2021    SURGEON:  Osmany Smith DO    ASSISTANT:  Single scrub tech    PREOPERATIVE DIAGNOSIS:  Biliary dyskinesia [K82.8]    POSTOPERATIVE DIAGNOSIS:  same    OPERATION:  Laparoscopic cholecystectomy .    ANESTHESIA:  General endotracheal.    INDICATIONS FOR PROCEDURE:  The patient is a 35 year old year old female with biliary dyskinesia; HIDA with CCK 30% with reproduction of her symptoms.  Based on this, laparoscopic cholecystectomy was recommended.    FINDINGS:  Normal appearing gallbladder    PROCEDURE:  The patient was brought to the operating room and placed in the supine position upon the operating table. Nursing and anesthesia assured proper positioning prior to the procedure. A time-out was taken to assure proper patient, site and procedure with all in the operating room.  Local anesthesia was instilled inferior to the umbilicus and a curvilinear incision was made.  This was carried to the level of the fascia bluntly with S-retractors.  Kochers were used to elevated the fascia and incised with a #15 blade.  0-Vicryl stay sutures were placed on both cut fascial edges.  The abdomen was entered and a Tobi port was placed.  Pneumoperitoneum was obtained.  The laparoscope was placed and a brief 4-quadrant scan of the abdomen revealed no obvious intraabdominal pathology.  We next placed 3 additional ports.  The first of these was an 5-millimeter port in the epigastric position.  The next 2 were 5-millimeter ports in the right subcostal position, all were placed after local anesthesia and under direct laparoscopic visualization.  The gallbladder was then grasped using a ratcheted grasper and retracted superiorly and laterally.  The infundibulum was retracted laterally and the peritoneum was stripped from the infundibulum and cystic duct both bluntly and with electrocautery.  The triangle of Calot was dissected noting the cystic duct which was dissected  circumferentially.  This could be seen coming clearly from the infundibulum visualizing the critical view.  The cystic duct was triple clipped proximal, once distal, and divided.  The triangle of Calot was then dissected and the cystic artery was identified.  This was dissected circumferentially and double clipped proximal, single distal, and divided sharply.  The gallbladder was then dissected from the gallbladder fossa using electrocautery.   After the gallbladder was dissected free, it was removed from the abdomen via the 12mm port site.  The right upper quadrant was then inspected and found to be hemostatic.  The ports were removed under direct visualization and pneumoperitoneum was released.  The Tobi port was removed.  A single 0-Vicryl suture was placed and the previously place 0-Vicryl stay sutures were secured.  The skin was closed using 4-0 Monocryl and Steri-Strips were applied.  The patient tolerated the procedure well and there were no complications. All counts were correct at the end of the case. The patient was transferred to the Post Anesthesia Recovery Room in stable condition.    [facility] General Surgery

## 2021-03-04 NOTE — INTERVAL H&P NOTE
The History and Physical has been reviewed, the patient has been examined and no changes have occurred in the patient's condition since the H & P was completed.     [facility], DO

## 2021-03-05 ENCOUNTER — TELEPHONE (OUTPATIENT)
Dept: SURGERY | Facility: CLINIC | Age: 36
End: 2021-03-05

## 2021-03-05 NOTE — TELEPHONE ENCOUNTER
Patient called into clinic s/p dustin davidson 3/4/21 to report she is allergic to Norco. Medication is on her allergy list.   Will route to provider for recommendations.  Antonella Murphy RN on 3/5/2021 at 3:58 PM

## 2021-03-05 NOTE — ANESTHESIA POSTPROCEDURE EVALUATION
Patient: Isaura Gray    Procedure(s):  Laparoscopic cholecystectomy, possible open    Diagnosis:Biliary dyskinesia [K82.8]  Diagnosis Additional Information: No value filed.    Anesthesia Type:  General    Note:  Disposition: Outpatient   Postop Pain Control: Uneventful            Sign Out: Well controlled pain   PONV: No   Neuro/Psych: Uneventful            Sign Out: Acceptable/Baseline neuro status   Airway/Respiratory: Uneventful            Sign Out: Acceptable/Baseline resp. status   CV/Hemodynamics: Uneventful            Sign Out: Acceptable CV status   Other NRE: NONE   DID A NON-ROUTINE EVENT OCCUR? No    Event details/Postop Comments:  I looked at the records for this patient and no issues to discharge.  I attempted to call her at the [phone] number listed on her face sheet and there is no connection.  I will follow up with the pt If needed.         Last vitals:  Vitals:    03/04/21 1800 03/04/21 1815 03/04/21 1845   BP: 123/81 126/84 124/80   Pulse: 66 64    Resp: 20 20 16   Temp:  99  F (37.2  C) 99  F (37.2  C)   SpO2: 96% 98% 96%       Last vitals prior to Anesthesia Care Transfer:  CRNA VITALS  3/4/2021 1617 - 3/4/2021 1717      3/4/2021             Pulse:  80    SpO2:  100 %    Resp Rate (observed):  (!) 5          Electronically Signed By: FLORIDA Suarez CRNA  March 5, 2021  11:16 AM

## 2021-03-05 NOTE — TELEPHONE ENCOUNTER
Patient was offered an alternative medication to the [place], but decided she would rather continue to take the Norco with benadryl as she has been doing. Patient reports she is also taking Tylenol and Ibuprofen alternately.   Pain is well controlled.   Patient advised to continue to rest and ice as needed and call clinic if she is having any other symptoms.   Antonella Murphy RN on 3/5/2021 at 4:12 PM

## 2021-03-08 LAB — COPATH REPORT: NORMAL

## 2021-03-10 ENCOUNTER — VIRTUAL VISIT (OUTPATIENT)
Dept: SURGERY | Facility: CLINIC | Age: 36
End: 2021-03-10
Payer: MEDICARE

## 2021-03-10 ENCOUNTER — NURSE TRIAGE (OUTPATIENT)
Dept: NURSING | Facility: CLINIC | Age: 36
End: 2021-03-10

## 2021-03-10 ENCOUNTER — TELEPHONE (OUTPATIENT)
Dept: SURGERY | Facility: CLINIC | Age: 36
End: 2021-03-10

## 2021-03-10 DIAGNOSIS — I26.99 PE (PULMONARY THROMBOEMBOLISM) (H): ICD-10-CM

## 2021-03-10 DIAGNOSIS — Z90.49 S/P LAPAROSCOPIC CHOLECYSTECTOMY: Primary | ICD-10-CM

## 2021-03-10 DIAGNOSIS — K82.8 BILIARY DYSKINESIA: ICD-10-CM

## 2021-03-10 PROCEDURE — 99024 POSTOP FOLLOW-UP VISIT: CPT | Performed by: SURGERY

## 2021-03-10 RX ORDER — IBUPROFEN 200 MG
200 TABLET ORAL EVERY 4 HOURS PRN
COMMUNITY
End: 2021-04-22

## 2021-03-10 NOTE — TELEPHONE ENCOUNTER
Reason for Call:  Other call back    Detailed comments: pt had procedure with Smith 03/04 and has been running a fever for 3 days (highest today at 101) - stomach pain around belly button started today.     Phone Number Patient can be reached at: Home number on file [phone] (home)    Best Time: anytime    Can we leave a detailed message on this number? YES    Call taken on 3/10/2021 at 2:09 PM by Erika Sorto

## 2021-03-10 NOTE — TELEPHONE ENCOUNTER
"Reporting gallbladder surgery last Thursday 3/7/21 with Dr Smith.    Patient reporting increased pain today around naval  rating \"7\" on 1-10. Patient is taking Ibuprofen.  Temp 101 Tympanic now, Ibuprofen in past few minutes.  Reporting incisions are dry intact. Denies signs or symptoms of infection.    Warm transferred to [facility].  Disposition see in clinic now.    Aruna Leung RN  [facility]      COVID 19 Nurse Triage Plan/Patient Instructions    Please be aware that novel coronavirus (COVID-19) may be circulating in the community. If you develop symptoms such as fever, cough, or SOB or if you have concerns about the presence of another infection including coronavirus (COVID-19), please contact your health care provider or visit [email or web address].     Disposition/Instructions    In-Person Visit with provider recommended. Reference Visit Selection Guide.    Thank you for taking steps to prevent the spread of this virus.  o Limit your contact with others.  o Wear a simple mask to cover your cough.  o Wash your hands well and often.    Resources    [facility]: About COVID-19: [email or web address]    CDC: What to Do If You're Sick: www.cdc.gov/coronavirus/2019-ncov/about/steps-when-sick.html    CDC: Ending Home Isolation: www.cdc.gov/coronavirus/2019-ncov/hcp/disposition-in-home-patients.html     CDC: Caring for Someone: www.cdc.gov/coronavirus/2019-ncov/if-you-are-sick/care-for-someone.html     [facility]: Interim Guidance for Hospital Discharge to Home: www.health.[facility].[place]/diseases/coronavirus/hcp/hospdischarge.pdf    [facility] clinical trials (COVID-19 research studies): [email or web address]/um-[email or web address]     Below are the COVID-19 hotlines at the [place] Department of Health ([facility]). Interpreters are available.   o For health questions: Call 594-099-7983 or 1-103.649.9121 (7 a.m. to 7 p.m.)  o For questions about schools and childcare: Call " [phone] or 1-448.766.3885 (7 a.m. to 7 p.m.)                     Additional Information    Negative: Sounds like a life-threatening emergency to the triager    Negative: Chest pain    Negative: Difficulty breathing    Negative: Surgical incision symptoms and questions    Negative: Discomfort (pain, burning or stinging) when passing urine and male    Negative: Discomfort (pain, burning or stinging) when passing urine and female    Negative: Constipation    Negative: New or worsening leg (calf, thigh) pain    Negative: New or worsening leg swelling    Negative: Dizziness is severe, or persists > 24 hours after surgery    Negative: Symptoms arising from use of a urinary catheter (Coker or Coude)    Negative: Cast problems or questions    Negative: Bright red, wide-spread, sunburn-like rash    Negative: SEVERE headache and after spinal (epidural) anesthesia    Negative: Vomiting and persists > 4 hours    Negative: Vomiting and abdomen looks much more swollen than usual    Negative: Drinking very little and dehydration suspected (e.g., no urine > 12 hours, very dry mouth, very lightheaded)    Negative: Patient sounds very sick or weak to the triager    Negative: Sounds like a serious complication to the triager    Fever > 100.4 F (38.0 C)    Protocols used: POST-OP SYMPTOMS AND [id number]

## 2021-03-10 NOTE — TELEPHONE ENCOUNTER
Call to patient-patient states she took 400mg of IBU and fever is down to 100.4, she has been feeling nausa's with no vomiting, no diarrhea or redness around surgical area, does have some slight pain and numbness around belly button.  Feels she may be constipated/    Patient scheduled for virtual visit today with Dr. Smith....................Zhanna Gray CMA  ([facility])

## 2021-03-10 NOTE — LETTER
"    3/10/2021         RE: Isaura Gray  [address] [address] [place] [id number]        Dear Colleague,    Thank you for referring your patient, Isaura Gray, to the [facility]. Please see a copy of my visit note below.    Isaura is a 35 year old who is being evaluated via a billable video visit.      How would you like to obtain your AVS? MyChart  If the video visit is dropped, the invitation should be resent by: Text to cell phone: [phone]  Will anyone else be joining your video visit? No    Video Start Time: total video visit 7 mins    Assessment & Plan   Problem List Items Addressed This Visit        Digestive    Biliary dyskinesia       Circulatory    PE (pulmonary thromboembolism) (H)      Other Visit Diagnoses     S/P laparoscopic cholecystectomy    -  Primary           Reassured the patient that her periumbilical pain is normal.  I closed her 12mm umbilical incision with a figure of 8 stitch; thus this will be more tender especially when she is more up and about.  As for her low grade fever - I recommend we just watch this for now; if this continues to persist for gets higher - I recommend going to the ED for further work-up.  All her questions were answered.      BMI:   Estimated body mass index is 25.11 kg/m  as calculated from the following:    Height as of 3/1/21: 1.778 m (5' 10\").    Weight as of 3/4/21: 79.4 kg (175 lb).           No follow-ups on file.    Osmany Smith MD  [facility]    Subjective   Isaura is a 35 year old who presents for the following health issues:    S/p lap stuart on 3/4/2021  Doing well until yesterday - increased periumbilical pain.  Incisions are all CDI; no redness.  +subjective fevers.  Normal BMs; pre-op abdominal pain much improved.  No issues with BMs, no urinary issues.        Review of Systems   Constitutional, HEENT, cardiovascular, pulmonary, GI, , musculoskeletal, neuro, skin, endocrine and psych " systems are negative, except as otherwise noted.      Objective           Vitals:  No vitals were obtained today due to virtual visit.    Physical Exam   GENERAL: Healthy, alert and no distress  EYES: Eyes grossly normal to inspection.  No discharge or erythema, or obvious scleral/conjunctival abnormalities.  RESP: No audible wheeze, cough, or visible cyanosis.  No visible retractions or increased work of breathing.    SKIN: Visible skin clear. No significant rash, abnormal pigmentation or lesions.  NEURO: Cranial nerves grossly intact.  Mentation and speech appropriate for age.  PSYCH: Mentation appears normal, affect normal/bright, judgement and insight intact, normal speech and appearance well-groomed.        Video-Visit Details    Type of service:  Video Visit    Video End Time:7 mins total    Originating Location (pt. Location): Home    Distant Location (provider location):  [facility]     Platform used for Video Visit: Andres      Again, thank you for allowing me to participate in the care of your patient.        Sincerely,        Osmany Smith MD

## 2021-03-15 DIAGNOSIS — F20.9 SCHIZOPHRENIA, UNSPECIFIED TYPE (H): ICD-10-CM

## 2021-03-15 DIAGNOSIS — F32.1 MODERATE MAJOR DEPRESSION (H): ICD-10-CM

## 2021-03-16 NOTE — TELEPHONE ENCOUNTER
Pending Prescriptions:                       Disp   Refills    ziprasidone (GEODON) 80 MG capsule         60 cap*5        Sig: Take 1 capsule (80 mg) by mouth 2 times daily (with           meals)    Routing refill request to provider for review/approval because:  Drug interaction warning

## 2021-03-17 RX ORDER — ZIPRASIDONE HYDROCHLORIDE 80 MG/1
80 CAPSULE ORAL 2 TIMES DAILY WITH MEALS
Qty: 60 CAPSULE | Refills: 0 | Status: SHIPPED | OUTPATIENT
Start: 2021-03-17 | End: 2021-03-23 | Stop reason: ALTCHOICE

## 2021-03-17 NOTE — TELEPHONE ENCOUNTER
Your medication has been refilled.  Please transition medication to psychiatry or schedule a visit to follow up on how this medication is working for you before your next refill.  We need to be sure everything is working well and stable prior to further refills.

## 2021-03-18 ENCOUNTER — MYC MEDICAL ADVICE (OUTPATIENT)
Dept: FAMILY MEDICINE | Facility: OTHER | Age: 36
End: 2021-03-18

## 2021-03-23 ENCOUNTER — VIRTUAL VISIT (OUTPATIENT)
Dept: PSYCHIATRY | Facility: CLINIC | Age: 36
End: 2021-03-23
Attending: FAMILY MEDICINE
Payer: MEDICARE

## 2021-03-23 ENCOUNTER — VIRTUAL VISIT (OUTPATIENT)
Dept: BEHAVIORAL HEALTH | Facility: CLINIC | Age: 36
End: 2021-03-23
Payer: MEDICARE

## 2021-03-23 DIAGNOSIS — F20.9 SCHIZOPHRENIA, UNSPECIFIED TYPE (H): Primary | ICD-10-CM

## 2021-03-23 DIAGNOSIS — F31.81 BIPOLAR II DISORDER (H): ICD-10-CM

## 2021-03-23 DIAGNOSIS — F32.1 MODERATE MAJOR DEPRESSION (H): Primary | ICD-10-CM

## 2021-03-23 DIAGNOSIS — F20.9 SCHIZOPHRENIA, UNSPECIFIED TYPE (H): ICD-10-CM

## 2021-03-23 DIAGNOSIS — F41.9 ANXIETY: ICD-10-CM

## 2021-03-23 DIAGNOSIS — F43.10 PTSD (POST-TRAUMATIC STRESS DISORDER): ICD-10-CM

## 2021-03-23 PROCEDURE — 99417 PROLNG OP E/M EACH 15 MIN: CPT | Mod: GT | Performed by: PSYCHIATRY & NEUROLOGY

## 2021-03-23 PROCEDURE — 90791 PSYCH DIAGNOSTIC EVALUATION: CPT | Mod: 52 | Performed by: SOCIAL WORKER

## 2021-03-23 PROCEDURE — 99205 OFFICE O/P NEW HI 60 MIN: CPT | Mod: GT | Performed by: PSYCHIATRY & NEUROLOGY

## 2021-03-23 RX ORDER — LORAZEPAM 0.5 MG/1
0.5 TABLET ORAL DAILY
Qty: 30 TABLET | Refills: 1 | Status: SHIPPED | OUTPATIENT
Start: 2021-03-23 | End: 2021-09-16

## 2021-03-23 RX ORDER — PALIPERIDONE 6 MG/1
6 TABLET, EXTENDED RELEASE ORAL EVERY MORNING
Qty: 30 TABLET | Refills: 1 | Status: SHIPPED | OUTPATIENT
Start: 2021-03-23 | End: 2021-06-03

## 2021-03-23 SDOH — HEALTH STABILITY: MENTAL HEALTH: HOW MANY STANDARD DRINKS CONTAINING ALCOHOL DO YOU HAVE ON A TYPICAL DAY?: NOT ASKED

## 2021-03-23 SDOH — HEALTH STABILITY: MENTAL HEALTH: HOW OFTEN DO YOU HAVE A DRINK CONTAINING ALCOHOL?: NOT ASKED

## 2021-03-23 SDOH — HEALTH STABILITY: MENTAL HEALTH: HOW OFTEN DO YOU HAVE 6 OR MORE DRINKS ON ONE OCCASION?: NOT ASKED

## 2021-03-23 ASSESSMENT — ANXIETY QUESTIONNAIRES
7. FEELING AFRAID AS IF SOMETHING AWFUL MIGHT HAPPEN: NEARLY EVERY DAY
5. BEING SO RESTLESS THAT IT IS HARD TO SIT STILL: NOT AT ALL
IF YOU CHECKED OFF ANY PROBLEMS ON THIS QUESTIONNAIRE, HOW DIFFICULT HAVE THESE PROBLEMS MADE IT FOR YOU TO DO YOUR WORK, TAKE CARE OF THINGS AT HOME, OR GET ALONG WITH OTHER PEOPLE: EXTREMELY DIFFICULT
GAD7 TOTAL SCORE: 18
2. NOT BEING ABLE TO STOP OR CONTROL WORRYING: NEARLY EVERY DAY
6. BECOMING EASILY ANNOYED OR IRRITABLE: NEARLY EVERY DAY
1. FEELING NERVOUS, ANXIOUS, OR ON EDGE: NEARLY EVERY DAY
3. WORRYING TOO MUCH ABOUT DIFFERENT THINGS: NEARLY EVERY DAY

## 2021-03-23 ASSESSMENT — PATIENT HEALTH QUESTIONNAIRE - PHQ9
SUM OF ALL RESPONSES TO PHQ QUESTIONS 1-9: 24
5. POOR APPETITE OR OVEREATING: NEARLY EVERY DAY
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
## 2021-03-23 NOTE — Clinical Note
Noah,    I met with Isaura today.  I will work with her to stabilize her symptoms, but would recommend that she work with a community psychiatrist over the long term to manage her symptoms.    Please feel free to contact me with questions or concerns.  Thank you for the opportunity to be involved in the care of this patient.    Regards,  Erin Montgomery MD  [facility]  [facility]
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
## 2021-03-23 NOTE — PATIENT INSTRUCTIONS
Discontinue Geodon.    Start Invega 6 mg daily.    Start lorazepam 0.5 mg daily at 4 PM.    Decrease buspirone to 15 mg daily for 1 week, then decrease to 7.5 mg daily for 1 week, then discontinue.    Expect a call from Ginette in about 2 weeks to check on progress and assist in facilitating getting a targeted .    Expect a call from behavioral access to schedule therapy appointment.      Continue all other medications per your primary care provider.    Schedule an appointment with me in 4 weeks or sooner as needed.  You may call [facility] at [phone] to schedule.    [place] Resources:      Go to the Emergency Department as needed or call after hours crisis line at [phone].      To schedule individual or family therapy, call [facility] at [phone].     Follow up with primary care provider as planned or sooner for acute medical concerns.    Call the psychiatric nurse line with medication questions or concerns at [phone].    [employer] may be used to communicate with your provider, but this is not intended to be used for emergencies.    Community Resources:      National Suicide Prevention Lifeline: 726.437.5753 (TTY: 466.413.9975). Call anytime for help.  (www.suicidepreventionlifeline.org)    National [place] on Mental Illness (www.alexi.org): 908.360.9443 or 450-755-5277.     Mental Health Association (www.mentalhealth.org): 943.575.1210 or 451-016-8637.    [place] Crisis Text Line: Text [place] to 514684    Suicide LifeLine Chat: suicidepreIci [place]illifeline.org/chat

## 2021-03-24 ENCOUNTER — VIRTUAL VISIT (OUTPATIENT)
Dept: FAMILY MEDICINE | Facility: OTHER | Age: 36
End: 2021-03-24
Payer: MEDICARE

## 2021-03-24 ENCOUNTER — TELEPHONE (OUTPATIENT)
Dept: PSYCHOLOGY | Facility: CLINIC | Age: 36
End: 2021-03-24

## 2021-03-24 DIAGNOSIS — K04.7 DENTAL ABSCESS: Primary | ICD-10-CM

## 2021-03-24 PROCEDURE — 99213 OFFICE O/P EST LOW 20 MIN: CPT | Mod: 95 | Performed by: PHYSICIAN ASSISTANT

## 2021-03-24 ASSESSMENT — ANXIETY QUESTIONNAIRES: GAD7 TOTAL SCORE: 18

## 2021-03-24 ASSESSMENT — PAIN SCALES - GENERAL: PAINLEVEL: SEVERE PAIN (6)

## 2021-03-24 NOTE — PATIENT INSTRUCTIONS
Will prescribe Augmentin to take as directed for 2 weeks.  Continue with the mouth wash.  Monitor for any worsening/spreading swelling, pain or redness or high fevers and you should be seen emergently of this occurs.   Follow up with your dentist.

## 2021-03-24 NOTE — PROGRESS NOTES
"Isaura is a 35 year old who is being evaluated via a billable video visit.      How would you like to obtain your AVS? MyChart  If the video visit is dropped, the invitation should be resent by: Text to cell phone: [phone]  Will anyone else be joining your video visit? No    Video Start Time:  9:47 am    Assessment & Plan       ICD-10-CM    1. Dental abscess  K04.7 amoxicillin-clavulanate (AUGMENTIN) 875-125 MG tablet       Symptoms are consistent with a ruptured dental abscess.   I recommend she continue with her hydrogen peroxide mouthwash and salt water rinses.  Will prescribe a 14 day course of Augmentin until she is able to be seen by her dentist.  If symptoms still present after 14 days, can extend the course of try clindamycin.  Red flag symptoms discussed including spreading pain and swelling up her face or into her neck or high fevers. She should be seen emergently of any of these occur.     Shaka Pineda PA-C  [facility]   Isaura is a 35 year old who presents for the following health issues     HPI     Dental pain and swelling x 5 days   Last night burst open with discharge.'  The pain and swelling are over the left lower gums. Swelling improved significantly after the abscess burst last night. She has been using a \"special\" hydrogen peroxide mouthwash and salt water rinses. She cannot get into her dentist for another month so wants to get on oral antibiotics. No fevers or chills.     Review of Systems   Constitutional, HEENT, cardiovascular, pulmonary, gi and gu systems are negative, except as otherwise noted.      Objective    Vitals - Patient Reported  Pain Score: Severe Pain (6)  Pain Loc: Face      Vitals:  No vitals were obtained today due to virtual visit.    Physical Exam   GENERAL: Healthy, alert and no distress  EYES: Eyes grossly normal to inspection.  No discharge or erythema, or obvious scleral/conjunctival abnormalities.  RESP: No audible " wheeze, cough, or visible cyanosis.  No visible retractions or increased work of breathing.    SKIN: Visible skin clear. No significant rash, abnormal pigmentation or lesions. Soft tissue swelling over left lower jaw line.  NEURO: Cranial nerves grossly intact.  Mentation and speech appropriate for age.  PSYCH: Mentation appears normal, affect normal/bright, judgement and insight intact, normal speech and appearance well-groomed.        Video-Visit Details    Type of service:  Video Visit    Video End Time: 9:52 am    Originating Location (pt. Location): Home    Distant Location (provider location):  [facility]     Platform used for Video Visit: S[employer]

## 2021-03-25 NOTE — TELEPHONE ENCOUNTER
Central Prior Authorization Team   Phone: [phone]      PA Initiation    Medication: Paliperidone-Initiated  Insurance Company: [employer] - Phone [phone] Fax [phone]  Pharmacy Filling the Rx: [id number] - [place], [place] - [address] [facility] [address]  Filling Pharmacy Phone: [phone]  Filling Pharmacy Fax:    Start Date: 3/25/2021

## 2021-03-29 DIAGNOSIS — F32.1 MODERATE MAJOR DEPRESSION (H): ICD-10-CM

## 2021-03-29 DIAGNOSIS — F20.9 SCHIZOPHRENIA, UNSPECIFIED TYPE (H): ICD-10-CM

## 2021-03-29 DIAGNOSIS — F51.04 PSYCHOPHYSIOLOGICAL INSOMNIA: ICD-10-CM

## 2021-03-30 RX ORDER — QUETIAPINE FUMARATE 25 MG/1
25-50 TABLET, FILM COATED ORAL AT BEDTIME
Qty: 90 TABLET | Refills: 1 | Status: SHIPPED | OUTPATIENT
Start: 2021-03-30 | End: 2021-04-19 | Stop reason: ALTCHOICE

## 2021-03-30 NOTE — TELEPHONE ENCOUNTER
Pending Prescriptions:                       Disp   Refills    QUEtiapine (SEROQUEL) 25 MG tablet        90 tab*1            Sig: Take 1-2 tablets (25-50 mg) by mouth At Bedtime           And as needed up to 2 additional doses/day.    Medication is being filled for 1 time cecil refill only due to:  Patient is due for medication follow-up    Please call and help schedule.  Thank you!        \

## 2021-04-19 ENCOUNTER — VIRTUAL VISIT (OUTPATIENT)
Dept: FAMILY MEDICINE | Facility: OTHER | Age: 36
End: 2021-04-19
Payer: MEDICARE

## 2021-04-19 DIAGNOSIS — E80.6 HYPERBILIRUBINEMIA: ICD-10-CM

## 2021-04-19 DIAGNOSIS — M25.551 BILATERAL HIP PAIN: ICD-10-CM

## 2021-04-19 DIAGNOSIS — Z11.4 SCREENING FOR HIV (HUMAN IMMUNODEFICIENCY VIRUS): ICD-10-CM

## 2021-04-19 DIAGNOSIS — R68.89 TEMPERATURE INTOLERANCE: ICD-10-CM

## 2021-04-19 DIAGNOSIS — N91.2 AMENORRHEA: ICD-10-CM

## 2021-04-19 DIAGNOSIS — L29.9 ITCHING: Primary | ICD-10-CM

## 2021-04-19 DIAGNOSIS — R50.9 FEVER AND CHILLS: ICD-10-CM

## 2021-04-19 DIAGNOSIS — F20.9 SCHIZOPHRENIA, UNSPECIFIED TYPE (H): ICD-10-CM

## 2021-04-19 DIAGNOSIS — M25.552 BILATERAL HIP PAIN: ICD-10-CM

## 2021-04-19 DIAGNOSIS — R00.2 PALPITATIONS: ICD-10-CM

## 2021-04-19 PROCEDURE — 99215 OFFICE O/P EST HI 40 MIN: CPT | Mod: 95 | Performed by: FAMILY MEDICINE

## 2021-04-19 RX ORDER — CETIRIZINE HYDROCHLORIDE 10 MG/1
10 TABLET ORAL DAILY
Qty: 90 TABLET | Refills: 1 | Status: SHIPPED | OUTPATIENT
Start: 2021-04-19 | End: 2021-10-18

## 2021-04-19 NOTE — PROGRESS NOTES
Isaura is a 35 year old who is being evaluated via a billable video visit.      How would you like to obtain your AVS? MyChart  If the video visit is dropped, the invitation should be resent by: Text to cell phone: [phone]  Will anyone else be joining your video visit? No      Video Start Time: 2:32 PM    Assessment & Plan       ICD-10-CM    1. Itching  L29.9 Comprehensive metabolic panel     CBC with platelets     TSH with free T4 reflex     cetirizine (ZYRTEC) 10 MG tablet     **ESR FUTURE anytime     CRP, inflammation     Anti Nuclear Columba IgG by IFA with Reflex     Rheumatoid factor     Lactate Dehydrogenase     EKG 12-lead complete w/read - Clinics     Protein electrophoresis   2. Fever and chills  R50.9 Comprehensive metabolic panel     CBC with platelets     TSH with free T4 reflex     cetirizine (ZYRTEC) 10 MG tablet     **ESR FUTURE anytime     CRP, inflammation     Anti Nuclear Columba IgG by IFA with Reflex     Rheumatoid factor     Lactate Dehydrogenase     EKG 12-lead complete w/read - Clinics     Protein electrophoresis   3. Temperature intolerance  R68.89 TSH with free T4 reflex     **ESR FUTURE anytime     CRP, inflammation     Anti Nuclear Columba IgG by IFA with Reflex     Rheumatoid factor     Lactate Dehydrogenase     EKG 12-lead complete w/read - Clinics     Protein electrophoresis   4. Palpitations  R00.2 EKG 12-lead complete w/read - Clinics   5. Schizophrenia, unspecified type (H)  F20.9    6. Amenorrhea  N91.2 Follicle stimulating hormone     **ESR FUTURE anytime     CRP, inflammation     Anti Nuclear Columba IgG by IFA with Reflex     Rheumatoid factor     Lactate Dehydrogenase   7. Bilateral hip pain  M25.551 **ESR FUTURE anytime    M25.552 CRP, inflammation     Anti Nuclear Columba IgG by IFA with Reflex     Rheumatoid factor     Lactate Dehydrogenase   8. Screening for HIV (human immunodeficiency virus)  Z11.4 HIV Antigen Antibody Combo      1, 2, 3, 6, 7.  Unclear etiology.  I suspect that these  "are related.  We will check some screening laboratories to evaluate for possible hepatic, autoimmune, hematologic, or malignant causes.  If these are abnormal, may need to consider referral to oncology or endocrinology depending upon continuing symptoms.  4.  Also likely related to above.  Will obtain EKG to further evaluate and addition to the labs noted above.  Discussed signs and symptoms that should prompt more acute intervention and evaluation.  5.  Primarily managed by psychiatry.  We will continue to follow and assist as able.  I am a bit worried the patient has been off all of her antipsychotics for about a month.  Still, she has done well with this kind of break in the past.  I encouraged her to start her Invega.  Its unfortunate that there was a 1 month delay in getting this approved through her insurance.  Some of her symptoms above certainly could be delusions/hallucinations, but I think this is less likely.  6.  Primarily related to her IUD.  Perimenopause is possible, so we will check an FSH.  7.  Unclear etiology.  Will screen for inflammatory causes.  If this is negative, consider imaging when she comes to the clinic next.  8.  Screening done.    Portions of this note were completed using Dragon dictation software.  Although reviewed, there may be typographical and other inadvertent errors that remain.       Review of the result(s) of each unique test - CMP, CBC, thyroid, etc.  Ordering of each unique test  Prescription drug management  40 minutes spent on the date of the encounter doing chart review, history and exam, documentation and further activities per the note       BMI:   Estimated body mass index is 25.11 kg/m  as calculated from the following:    Height as of 3/1/21: 1.778 m (5' 10\").    Weight as of 3/4/21: 79.4 kg (175 lb).       Patient Instructions   Thank you for visiting [facility]    Please get labs and EKG done as soon as you can.    We'll let you know your lab " results as soon as we can.     If these are all normal, we will probably have you see a specialist.    Try Zyrtec to help with your itching.      Start Invega as planned.    Contact us or return if questions or concerns.     Have a nice day!    Dr. Umana     Return in about 4 weeks (around 5/17/2021).      If you need medication refills, please contact your pharmacy 3 days before your prescriptions runs out or download the [facility] henrietta for your smart phone. If you are out of refills, your pharmacy will contact contact the clinic.                                     Bobby Kwan [phone]                       Return in about 4 weeks (around 5/17/2021) for Recheck, E-visit, video, or phone visit.    Timmy Umana MD, MD  [facility] SHAI Delarosa is a 35 year old who presents for the following health issues     HPI     Pt with concerns of ongoing issues for approximately 7 mos. She complains of itching with no rash. Seems to be worse right away in the morning and then right before bed. Benadryl does not help. The cream that she was prescribed does not seem to help either. She also is very sensitive to cold but always sweating. Almost like hot flashes at times. She runs a low grade temp at times.  She has no happy medium for her temperature, she has a strange feeling in chest that she cannot describe and everything tastes like metal. She was recently taken off all her meds and reports that these symptoms have gotten a little better but not gone.     She continues to be itchy a lot of the time.  Now hot, has been cold at times.  No real rashes.  Benadryl and triamcinolone cream aren't helping.        The itching is all over, but worst on the palms, feet, eyes, head.  She hasn't tried Zyrtec yet.      None of her symptoms above changed after her gall bladder was removed.      Has a Mirena in place.  No menses for 7-8 months.      She is now seeing psychiatry for  her schizophrenia, Dr. Montgomery.  They are changing to Invega.  She is now off Seroquel and Geodon, just about to start the Invega.  She has been off the other antipsychotics for a month.          Review of Systems   Constitutional, HEENT, cardiovascular, pulmonary, gi and gu systems are negative, except as otherwise noted.      Objective           Vitals:  No vitals were obtained today due to virtual visit.    Physical Exam   GENERAL: Healthy, alert and no distress  EYES: Eyes grossly normal to inspection.  No discharge or erythema, or obvious scleral/conjunctival abnormalities.  RESP: No audible wheeze, cough, or visible cyanosis.  No visible retractions or increased work of breathing.    SKIN: Visible skin clear. No significant rash, abnormal pigmentation or lesions.  NEURO: Cranial nerves grossly intact.  Mentation and speech appropriate for age.  PSYCH: Mentation appears normal, affect normal/bright, judgement and insight intact, normal speech and appearance well-groomed.    Admission on 03/04/2021, Discharged on 03/04/2021   Component Date Value Ref Range Status     HCG Qual Urine 03/04/2021 Negative  NEG^Negative Final    Comment: This test is for screening purposes.  Results should be interpreted along with   the clinical picture.  Confirmation testing is available if warranted by   ordering [id number], HCG Quantitative Pregnancy.       Copath Report 03/04/2021    Final                    Value:Patient Name: LEONILA GARCIA  MR#: [id number]  Specimen #: [id number]  Collected: 3/4/2021  Received: 3/5/2021  Reported: 3/8/2021 16:02  Ordering Phy(s): [facility]    For improved result formatting, select 'View Enhanced Report Format' under   Linked Documents section.    SPECIMEN(S):  Gallbladder and contents    FINAL DIAGNOSIS:  Gallbladder, cholecystectomy:  -Scant chronic inflammation; calculi not present    Electronically signed out by:    Cheryl Young M.D.    CLINICAL HISTORY:    Biliary  "dyskinesia    GROSS:  The specimen is received in formalin, labeled with the patient's name and   date of birth, and designated  \"Gallbladder and contents\".  It consists of a green tan, glistening,   intact gallbladder, measuring 8 cm in  length x 3.5 cm in greatest diameter. The cystic duct is grossly patent,   measuring 0.3 cm in diameter, and is  inked black.  The specimen is opened to reveal dark green, viscous bile,   and no calculi within the lumen or  separate in the container T                          he mucosa is green tan and moderately   trabeculated.  The wall measures up to 0.2 cm  in thickness. No discrete lesions are identified.  Representative sections   including the en face cystic duct  margin are submitted in one cassette. (Dictated by: ABHISHEK Fink([facility]) 3/5/2021 01:30 PM)    MICROSCOPIC:  A formal microscopic examination has been performed.    The technical component of this testing was completed at the [facility], with the professional component performed   at the [facility]  [facility]atory, [address] [place] [address]  [phone] ([phone])    CPT Codes:  A: 37667-JU8    COLLECTION SITE:  Client: [facility]  Location: PHOR (P)         No results found for this or any previous visit (from the past 24 hour(s)).            Video-Visit Details    Type of service:  Video Visit    Video End Time:2:48 PM    Originating Location (pt. Location): Home    Distant Location (provider location):  [facility]     Platform used for Video Visit: Laura"

## 2021-04-19 NOTE — PATIENT INSTRUCTIONS
Thank you for visiting [facility]    Please get labs and EKG done as soon as you can.    We'll let you know your lab results as soon as we can.     If these are all normal, we will probably have you see a specialist.    Try Zyrtec to help with your itching.      Start Invega as planned.    Contact us or return if questions or concerns.     Have a nice day!    Dr. Umana     Return in about 4 weeks (around 5/17/2021).      If you need medication refills, please contact your pharmacy 3 days before your prescriptions runs out or download the [facility] henrietta for your smart phone. If you are out of refills, your pharmacy will contact contact the clinic.                                     [facility] Assistance [phone]

## 2021-04-21 DIAGNOSIS — M25.551 BILATERAL HIP PAIN: ICD-10-CM

## 2021-04-21 DIAGNOSIS — R68.89 TEMPERATURE INTOLERANCE: ICD-10-CM

## 2021-04-21 DIAGNOSIS — Z11.4 SCREENING FOR HIV (HUMAN IMMUNODEFICIENCY VIRUS): ICD-10-CM

## 2021-04-21 DIAGNOSIS — L29.9 ITCHING: ICD-10-CM

## 2021-04-21 DIAGNOSIS — N91.2 AMENORRHEA: ICD-10-CM

## 2021-04-21 DIAGNOSIS — R50.9 FEVER AND CHILLS: ICD-10-CM

## 2021-04-21 DIAGNOSIS — M25.552 BILATERAL HIP PAIN: ICD-10-CM

## 2021-04-21 LAB
ALBUMIN SERPL-MCNC: 4.2 G/DL (ref 3.4–5)
ALP SERPL-CCNC: 75 U/L (ref 40–150)
ALT SERPL W P-5'-P-CCNC: 17 U/L (ref 0–50)
ANION GAP SERPL CALCULATED.3IONS-SCNC: 7 MMOL/L (ref 3–14)
AST SERPL W P-5'-P-CCNC: 5 U/L (ref 0–45)
BILIRUB SERPL-MCNC: 1.8 MG/DL (ref 0.2–1.3)
BUN SERPL-MCNC: 13 MG/DL (ref 7–30)
CALCIUM SERPL-MCNC: 9 MG/DL (ref 8.5–10.1)
CHLORIDE SERPL-SCNC: 106 MMOL/L (ref 94–109)
CO2 SERPL-SCNC: 26 MMOL/L (ref 20–32)
CREAT SERPL-MCNC: 0.87 MG/DL (ref 0.52–1.04)
CRP SERPL-MCNC: <2.9 MG/L (ref 0–8)
ERYTHROCYTE [DISTWIDTH] IN BLOOD BY AUTOMATED COUNT: 13.8 % (ref 10–15)
ERYTHROCYTE [SEDIMENTATION RATE] IN BLOOD BY WESTERGREN METHOD: 5 MM/H (ref 0–20)
FSH SERPL-ACNC: 6.4 IU/L
GFR SERPL CREATININE-BSD FRML MDRD: 88 ML/MIN/{1.73_M2}
GLUCOSE SERPL-MCNC: 92 MG/DL (ref 70–99)
HCT VFR BLD AUTO: 42.9 % (ref 35–47)
HGB BLD-MCNC: 14.8 G/DL (ref 11.7–15.7)
LDH SERPL L TO P-CCNC: 136 U/L (ref 81–234)
MCH RBC QN AUTO: 30.2 PG (ref 26.5–33)
MCHC RBC AUTO-ENTMCNC: 34.5 G/DL (ref 31.5–36.5)
MCV RBC AUTO: 88 FL (ref 78–100)
PLATELET # BLD AUTO: 267 10E9/L (ref 150–450)
POTASSIUM SERPL-SCNC: 3.6 MMOL/L (ref 3.4–5.3)
PROT SERPL-MCNC: 7.5 G/DL (ref 6.8–8.8)
RBC # BLD AUTO: 4.9 10E12/L (ref 3.8–5.2)
SODIUM SERPL-SCNC: 139 MMOL/L (ref 133–144)
TSH SERPL DL<=0.005 MIU/L-ACNC: 1.8 MU/L (ref 0.4–4)
WBC # BLD AUTO: 6.7 10E9/L (ref 4–11)

## 2021-04-21 PROCEDURE — 85652 RBC SED RATE AUTOMATED: CPT | Performed by: FAMILY MEDICINE

## 2021-04-21 PROCEDURE — 84165 PROTEIN E-PHORESIS SERUM: CPT | Performed by: PATHOLOGY

## 2021-04-21 PROCEDURE — 82248 BILIRUBIN DIRECT: CPT | Performed by: FAMILY MEDICINE

## 2021-04-21 PROCEDURE — 82977 ASSAY OF GGT: CPT | Performed by: FAMILY MEDICINE

## 2021-04-21 PROCEDURE — 86140 C-REACTIVE PROTEIN: CPT | Performed by: FAMILY MEDICINE

## 2021-04-21 PROCEDURE — 87389 HIV-1 AG W/HIV-1&-2 AB AG IA: CPT | Performed by: FAMILY MEDICINE

## 2021-04-21 PROCEDURE — 83615 LACTATE (LD) (LDH) ENZYME: CPT | Performed by: FAMILY MEDICINE

## 2021-04-21 PROCEDURE — 84443 ASSAY THYROID STIM HORMONE: CPT | Performed by: FAMILY MEDICINE

## 2021-04-21 PROCEDURE — 80053 COMPREHEN METABOLIC PANEL: CPT | Performed by: FAMILY MEDICINE

## 2021-04-21 PROCEDURE — 36415 COLL VENOUS BLD VENIPUNCTURE: CPT | Performed by: FAMILY MEDICINE

## 2021-04-21 PROCEDURE — 86431 RHEUMATOID FACTOR QUANT: CPT | Performed by: FAMILY MEDICINE

## 2021-04-21 PROCEDURE — 85027 COMPLETE CBC AUTOMATED: CPT | Performed by: FAMILY MEDICINE

## 2021-04-21 PROCEDURE — 99N1036 PR STATISTIC TOTAL PROTEIN: Performed by: FAMILY MEDICINE

## 2021-04-21 PROCEDURE — 86038 ANTINUCLEAR ANTIBODIES: CPT | Performed by: FAMILY MEDICINE

## 2021-04-21 PROCEDURE — 83001 ASSAY OF GONADOTROPIN (FSH): CPT | Performed by: FAMILY MEDICINE

## 2021-04-22 ENCOUNTER — VIRTUAL VISIT (OUTPATIENT)
Dept: PSYCHIATRY | Facility: CLINIC | Age: 36
End: 2021-04-22
Payer: MEDICARE

## 2021-04-22 ENCOUNTER — VIRTUAL VISIT (OUTPATIENT)
Dept: BEHAVIORAL HEALTH | Facility: CLINIC | Age: 36
End: 2021-04-22
Payer: MEDICARE

## 2021-04-22 DIAGNOSIS — F43.10 PTSD (POST-TRAUMATIC STRESS DISORDER): ICD-10-CM

## 2021-04-22 DIAGNOSIS — F41.9 ANXIETY: ICD-10-CM

## 2021-04-22 DIAGNOSIS — F20.9 SCHIZOPHRENIA, UNSPECIFIED TYPE (H): Primary | ICD-10-CM

## 2021-04-22 DIAGNOSIS — F20.9 SCHIZOPHRENIA, UNSPECIFIED TYPE (H): ICD-10-CM

## 2021-04-22 DIAGNOSIS — F31.81 BIPOLAR II DISORDER (H): Primary | ICD-10-CM

## 2021-04-22 LAB
ALBUMIN SERPL ELPH-MCNC: 4.5 G/DL (ref 3.7–5.1)
ALPHA1 GLOB SERPL ELPH-MCNC: 0.2 G/DL (ref 0.2–0.4)
ALPHA2 GLOB SERPL ELPH-MCNC: 0.7 G/DL (ref 0.5–0.9)
ANA SER QL IF: NEGATIVE
B-GLOBULIN SERPL ELPH-MCNC: 0.7 G/DL (ref 0.6–1)
BILIRUB DIRECT SERPL-MCNC: 0.4 MG/DL (ref 0–0.2)
GAMMA GLOB SERPL ELPH-MCNC: 1.1 G/DL (ref 0.7–1.6)
GGT SERPL-CCNC: 7 U/L (ref 0–40)
HIV 1+2 AB+HIV1 P24 AG SERPL QL IA: NONREACTIVE
M PROTEIN SERPL ELPH-MCNC: 0 G/DL
PROT PATTERN SERPL ELPH-IMP: NORMAL
RHEUMATOID FACT SER NEPH-ACNC: <7 IU/ML (ref 0–20)

## 2021-04-22 PROCEDURE — 90832 PSYTX W PT 30 MINUTES: CPT | Mod: GT | Performed by: SOCIAL WORKER

## 2021-04-22 PROCEDURE — 99215 OFFICE O/P EST HI 40 MIN: CPT | Mod: GT | Performed by: PSYCHIATRY & NEUROLOGY

## 2021-04-22 RX ORDER — PALIPERIDONE 6 MG/1
6 TABLET, EXTENDED RELEASE ORAL EVERY MORNING
Qty: 30 TABLET | Refills: 1 | Status: CANCELLED | OUTPATIENT
Start: 2021-04-22

## 2021-04-22 ASSESSMENT — ANXIETY QUESTIONNAIRES
3. WORRYING TOO MUCH ABOUT DIFFERENT THINGS: NEARLY EVERY DAY
7. FEELING AFRAID AS IF SOMETHING AWFUL MIGHT HAPPEN: NEARLY EVERY DAY
1. FEELING NERVOUS, ANXIOUS, OR ON EDGE: NEARLY EVERY DAY
GAD7 TOTAL SCORE: 18
2. NOT BEING ABLE TO STOP OR CONTROL WORRYING: NEARLY EVERY DAY
5. BEING SO RESTLESS THAT IT IS HARD TO SIT STILL: NOT AT ALL
IF YOU CHECKED OFF ANY PROBLEMS ON THIS QUESTIONNAIRE, HOW DIFFICULT HAVE THESE PROBLEMS MADE IT FOR YOU TO DO YOUR WORK, TAKE CARE OF THINGS AT HOME, OR GET ALONG WITH OTHER PEOPLE: EXTREMELY DIFFICULT
6. BECOMING EASILY ANNOYED OR IRRITABLE: NEARLY EVERY DAY

## 2021-04-22 ASSESSMENT — PATIENT HEALTH QUESTIONNAIRE - PHQ9
5. POOR APPETITE OR OVEREATING: NEARLY EVERY DAY
SUM OF ALL RESPONSES TO PHQ QUESTIONS 1-9: 22

## 2021-04-22 NOTE — PROGRESS NOTES
Telemedicine Visit: The patient's condition can be safely assessed and treated via synchronous audio and visual telemedicine encounter.      Reason for Telemedicine Visit: Services only offered telehealth    Originating Site (Patient Location): Patient's home    Distant Site (Provider Location): Provider Remote Setting    Consent:  The patient/guardian has verbally consented to: the potential risks and benefits of telemedicine (video visit) versus in person care; bill my insurance or make self-payment for services provided; and responsibility for payment of non-covered services.     Mode of Communication:  Video Conference via [place]    Please send text to patient cell # at [phone]        Outpatient Psychiatric Progress Note    Name: Isaura Gray   : 1985                    Primary Care Provider: Timmy Umana MD, MD   [place]ist[place] [facility]      PHQ-9 scores:  PHQ-9 SCORE 2021 3/23/2021 2021   PHQ-9 Total Score MyChart - - -   PHQ-9 Total Score 23 24 22       AVERY-7 scores:  AVERY-7 SCORE 2021 3/23/2021 2021   Total Score - - -   Total Score 21 18 18       Patient Identification:  Isaura is a 35 year old year old female  who presents for return visit with me.  Patient attended the session alone.     Interim History:  [facility]  Pt reports that she hasn't been doing too well.  Is struggling with a current medical issues; has been having a random fever, nausea, tired to point where needs to sleep right away.  Feeling this way for several weeks and feels that is worsening.  Has seen her provider and had blood work drawn which so far isn't showing anything.  Symptoms seems to be random and come and go and at times feels ok in which she tries to get things done but is then beyond tired.  No recent manic episodes in the past 8 or so months.  Has lost a lot of weight 20+ in the past month as well without even trying hard.  Appetite is decreased  "but does still eat despite food not tasting good.  Has a hard time deciding what to eat for dinner and making the meal with not having cravings for food.    Had to appeal insurance and just got the Invega and has been taking it for about 1 week.  Really likes the invega for her psychosis and denies any hallucinations in the past week and feels that it has helped with nightmares as well.  PCP mentioned in this Epic note that she read that there is the possibility that maybe her physical symptoms could be a hallucination.  Sleep is up and down.  When wakes up feels jittery and like she has a lot of adrenaline running through her but she is still very tired and wants to sleep.  Is no longer accessing [facility] [employer] and isn't having luck with it at this time. Denies SI.  [facility] and pt plan to follow up in 1 week for a check in.    Isaura reports that she is having a lot of physical symptoms as described above.  She saw her primary care provider who did a lot of blood work, nothing that was abnormal was a slightly elevated bilirubin.  She is not sure where the intermittent fevers are coming from.  The itching certainly could be because of the bilirubin.    Isaura wakes up in the morning with \"an adrenaline rush.\"  She feels edgy, jittery, has heart palpitations, and feels short of breath.  It sounds like she may be having panic attacks in the morning.  She is very fatigued in the afternoon, but she is only sleeping about 6 hours at night from around midnight until 6 AM.  She usually naps for about 3 hours in the afternoon when her children nap, so is getting a total of about 9 hours of sleep per day.    She reports that she has had Invega for only a week now, but has had a decrease in nightmares that is significant.  She does not have any intrusive voices.  It is hard for her to tell if she has had a decrease in her auditory hallucinations because they are \"so mundane.\"    She reports that she stopped buspirone and is " feeling better.  She no longer has the nausea and jittery feeling around 5 PM.    We briefly considered a trial of trazodone, but because she is getting a total of 9 hours of sleep per day, we agreed to put that on the back burner for now.  She would like an antidepressant for both her mood and her anxiety.  After a discussion of risks and benefits, we agreed to a trial of sertraline.  She may have had a trial of fluoxetine in the past but does not remember ever having other antidepressants unless they were in combination with several other medications.    Vital Signs:   There were no vitals taken for this visit.      Current Medications:  Current Outpatient Medications   Medication     cetirizine (ZYRTEC) 10 MG tablet     levonorgestrel (MIRENA) 20 MCG/24HR IUD     paliperidone ER (INVEGA) 6 MG 24 hr tablet     sertraline (ZOLOFT) 50 MG tablet     triamcinolone (KENALOG) 0.1 % external cream     ibuprofen (ADVIL/MOTRIN) 200 MG tablet     LORazepam (ATIVAN) 0.5 MG tablet     No current facility-administered medications for this visit.         Mental Status Examination:  Isaura is a 35-year-old woman who appears her stated age and in no acute distress.  She is neatly groomed in casual clothing.  Speech is clear and normal in rate and tone.  Motor behavior is appropriate.  Eye contact is good over the video connection.  Affect slightly blunted.  Mood is anxious and depressed.  Thoughts are logical and spontaneous with no loose associations or flight of ideas.  She reports fewer intrusive voices, but is not sure if other auditory hallucinations have decreased.  No suicidal thoughts.  She is alert and oriented x3.    Assessment and Plan:    ICD-10-CM    1. Schizophrenia, unspecified type (H)  F20.9 sertraline (ZOLOFT) 50 MG tablet   2. Anxiety  F41.9    3. PTSD (post-traumatic stress disorder)  F43.10        Medical comorbidities include:   Patient Active Problem List   Diagnosis     Vitamin D deficiency      Supervision of other high risk pregnancies, unspecified trimester     PE (pulmonary thromboembolism) (H)     Hyperprolactinemia (H)     Follicular cancer of thyroid (H)     Lactose intolerance in adult     Schizophrenia (H)     Encounter for triage in pregnant patient     Cough     Chronic bilateral low back pain without sciatica     Normal labor     Anxiety     Cancer (H)     H/O  delivery, currently pregnant     History of pulmonary embolism     History of thyroid cancer     Mitral valve disorder      (normal spontaneous vaginal delivery)     Moderate major depression (H)     PTSD (post-traumatic stress disorder)     ASCUS of cervix with negative high risk HPV     Non-alcoholic fatty liver disease     Psychophysiological insomnia     Family history of Hashimoto thyroiditis     Biliary dyskinesia     Bipolar II disorder (H)       Treatment Plan:  Patient Instructions   Continu Invega 6 mg daily.     Continue lorazepam 0.5 to 1 mg daily as needed for anxiety.     Start sertraline 25 mg daily for one week, then increase to 50 mg daily.    Continue all other medications per your primary care provider.    Schedule an appointment with me in 4 weeks or sooner as needed.  You may call [facility] at [phone] to schedule.    [place] Resources:      Go to the Emergency Department as needed or call after hours crisis line at [phone].      To schedule individual or family therapy, call [facility] at [phone].     Follow up with primary care provider as planned or sooner for acute medical concerns.    Call the psychiatric nurse line with medication questions or concerns at [phone].    [employer] may be used to communicate with your provider, but this is not intended to be used for emergencies.    Community Resources:      National Suicide Prevention Lifeline: 169.952.5243 (TTY: 112.729.7116). Call anytime for help.   (www.suicidepreventionlifeline.org)    National [place] on Mental Illness (www.alexi.org): 595-669-3419 or 809-030-0548.     Mental Health Association (www.mentalhealth.org): 852.522.3394 or 808-472-0536.    [place] Crisis Text Line: Text [place] to 927413    Suicide LifeLine Chat: suicide[place]tera.org/chat       Administrative Billing:   Video call duration: 33 minutes  Total time spent, including chart review and documentation: 42 minutes    Patient Status:  Patient will continue to be seen for ongoing consultation and stabilization.           As the provider I attest to compliance with applicable laws and regulations related to telemedicine.

## 2021-04-22 NOTE — PATIENT INSTRUCTIONS
Continu Invega 6 mg daily.     Continue lorazepam 0.5 to 1 mg daily as needed for anxiety.     Start sertraline 25 mg daily for one week, then increase to 50 mg daily.    Continue all other medications per your primary care provider.    Schedule an appointment with me in 4 weeks or sooner as needed.  You may call [facility] at [phone] to schedule.    [place] Resources:      Go to the Emergency Department as needed or call after hours crisis line at [phone].      To schedule individual or family therapy, call [facility] at [phone].     Follow up with primary care provider as planned or sooner for acute medical concerns.    Call the psychiatric nurse line with medication questions or concerns at [phone].    [employer] may be used to communicate with your provider, but this is not intended to be used for emergencies.    Community Resources:      National Suicide Prevention Lifeline: 416.284.5370 (TTY: 326.642.1705). Call anytime for help.  (www.suicidepreventionlifeline.org)    National [place] on Mental Illness (www.alexi.org): 894.393.3271 or 563-755-3658.     Mental Health Association (www.mentalhealth.org): 109.607.6112 or 220-371-5274.    [place] Crisis Text Line: Text [place] to 977280    Suicide LifeLine Chat: suicidepreZingline.org/chat

## 2021-04-22 NOTE — RESULT ENCOUNTER NOTE
Isaura,    All of your labs were normal for you except your bilirubin is still mildly elevated.  This might be contributing to your itching.  Let me know if not improving.  We can consider having you see GI again or possibly dermatology.    Have a nice day!    Dr. Umana
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
## 2021-04-23 ASSESSMENT — ANXIETY QUESTIONNAIRES: GAD7 TOTAL SCORE: 18

## 2021-04-24 ENCOUNTER — HEALTH MAINTENANCE LETTER (OUTPATIENT)
Age: 36
End: 2021-04-24

## 2021-04-29 ENCOUNTER — VIRTUAL VISIT (OUTPATIENT)
Dept: BEHAVIORAL HEALTH | Facility: CLINIC | Age: 36
End: 2021-04-29
Payer: MEDICARE

## 2021-04-29 DIAGNOSIS — F31.81 BIPOLAR II DISORDER (H): Primary | ICD-10-CM

## 2021-04-29 DIAGNOSIS — F20.9 SCHIZOPHRENIA, UNSPECIFIED TYPE (H): ICD-10-CM

## 2021-04-29 DIAGNOSIS — F41.9 ANXIETY: ICD-10-CM

## 2021-04-29 PROCEDURE — 90832 PSYTX W PT 30 MINUTES: CPT | Mod: TEL | Performed by: SOCIAL WORKER
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
## 2021-04-30 ENCOUNTER — PATIENT OUTREACH (OUTPATIENT)
Dept: CARE COORDINATION | Facility: CLINIC | Age: 36
End: 2021-04-30

## 2021-04-30 NOTE — PROGRESS NOTES
Clinic Care Coordination Contact  [facility]/Voicemail       Clinical Data: Care Coordinator Outreach  Outreach attempted x 1.  Left message on patient's voicemail with call back information and requested return call.  Plan: Care Coordinator will try to reach patient again in 1-2 business days.

## 2021-05-03 NOTE — PROGRESS NOTES
Clinic Care Coordination Contact  Community Health Worker Initial Outreach    CHW Initial Information Gathering:  Referral Source: Specialist  Preferred Hospital: [facility] [place]  [phone]  Preferred Urgent Care: [facility], [phone]  Current living arrangement:: I live in a private home with family(2 children and boyfriend)  Type of residence:: Private home - stairs  Community Resources: None  Supplies used at home:: None  Equipment Currently Used at Home: none  Informal Support system:: None  No PCP office visit in Past Year: No  Transportation means:: Regular car       Patient accepts CC: Yes. Patient scheduled for assessment with the SW on 5/4/21 at 2:00 pm. Patient noted desire to discuss information from Social Security Income and looking for .

## 2021-05-04 ENCOUNTER — PATIENT OUTREACH (OUTPATIENT)
Dept: FAMILY MEDICINE | Facility: CLINIC | Age: 36
End: 2021-05-04
Payer: MEDICARE

## 2021-05-04 SDOH — ECONOMIC STABILITY: TRANSPORTATION INSECURITY
IN THE PAST 12 MONTHS, HAS THE LACK OF TRANSPORTATION KEPT YOU FROM MEDICAL APPOINTMENTS OR FROM GETTING MEDICATIONS?: NO

## 2021-05-04 SDOH — SOCIAL STABILITY: SOCIAL NETWORK: IN A TYPICAL WEEK, HOW MANY TIMES DO YOU TALK ON THE PHONE WITH FAMILY, FRIENDS, OR NEIGHBORS?: NEVER

## 2021-05-04 SDOH — SOCIAL STABILITY: SOCIAL NETWORK: HOW OFTEN DO YOU ATTEND CHURCH OR RELIGIOUS SERVICES?: NEVER

## 2021-05-04 SDOH — SOCIAL STABILITY: SOCIAL INSECURITY
WITHIN THE LAST YEAR, HAVE YOU BEEN KICKED, HIT, SLAPPED, OR OTHERWISE PHYSICALLY HURT BY YOUR PARTNER OR EX-PARTNER?: NO

## 2021-05-04 SDOH — SOCIAL STABILITY: SOCIAL NETWORK: ARE YOU MARRIED, WIDOWED, DIVORCED, SEPARATED, NEVER MARRIED, OR LIVING WITH A PARTNER?: LIVING WITH PARTNER

## 2021-05-04 SDOH — SOCIAL STABILITY: SOCIAL INSECURITY
WITHIN THE LAST YEAR, HAVE TO BEEN RAPED OR FORCED TO HAVE ANY KIND OF SEXUAL ACTIVITY BY YOUR PARTNER OR EX-PARTNER?: NO

## 2021-05-04 SDOH — SOCIAL STABILITY: SOCIAL NETWORK: HOW OFTEN DO YOU ATTENT MEETINGS OF THE CLUB OR ORGANIZATION YOU BELONG TO?: NEVER

## 2021-05-04 SDOH — HEALTH STABILITY: PHYSICAL HEALTH: ON AVERAGE, HOW MANY MINUTES DO YOU ENGAGE IN EXERCISE AT THIS LEVEL?: 0 MIN

## 2021-05-04 SDOH — HEALTH STABILITY: PHYSICAL HEALTH: ON AVERAGE, HOW MANY DAYS PER WEEK DO YOU ENGAGE IN MODERATE TO STRENUOUS EXERCISE (LIKE A BRISK WALK)?: 0 DAYS

## 2021-05-04 SDOH — SOCIAL STABILITY: SOCIAL NETWORK: HOW OFTEN DO YOU GET TOGETHER WITH FRIENDS OR RELATIVES?: NEVER

## 2021-05-04 SDOH — SOCIAL STABILITY: SOCIAL NETWORK
DO YOU BELONG TO ANY CLUBS OR ORGANIZATIONS SUCH AS CHURCH GROUPS UNIONS, FRATERNAL OR ATHLETIC GROUPS, OR SCHOOL GROUPS?: NO

## 2021-05-04 SDOH — ECONOMIC STABILITY: INCOME INSECURITY: HOW HARD IS IT FOR YOU TO PAY FOR THE VERY BASICS LIKE FOOD, HOUSING, MEDICAL CARE, AND HEATING?: HARD

## 2021-05-04 SDOH — HEALTH STABILITY: MENTAL HEALTH
STRESS IS WHEN SOMEONE FEELS TENSE, NERVOUS, ANXIOUS, OR CAN'T SLEEP AT NIGHT BECAUSE THEIR MIND IS TROUBLED. HOW STRESSED ARE YOU?: VERY MUCH

## 2021-05-04 SDOH — SOCIAL STABILITY: SOCIAL INSECURITY: WITHIN THE LAST YEAR, HAVE YOU BEEN AFRAID OF YOUR PARTNER OR EX-PARTNER?: NO

## 2021-05-04 SDOH — ECONOMIC STABILITY: FOOD INSECURITY: WITHIN THE PAST 12 MONTHS, THE FOOD YOU BOUGHT JUST DIDN'T LAST AND YOU DIDN'T HAVE MONEY TO GET MORE.: SOMETIMES TRUE

## 2021-05-04 SDOH — ECONOMIC STABILITY: FOOD INSECURITY: WITHIN THE PAST 12 MONTHS, YOU WORRIED THAT YOUR FOOD WOULD RUN OUT BEFORE YOU GOT MONEY TO BUY MORE.: SOMETIMES TRUE

## 2021-05-04 SDOH — ECONOMIC STABILITY: TRANSPORTATION INSECURITY
IN THE PAST 12 MONTHS, HAS LACK OF TRANSPORTATION KEPT YOU FROM MEETINGS, WORK, OR FROM GETTING THINGS NEEDED FOR DAILY LIVING?: NO

## 2021-05-04 SDOH — SOCIAL STABILITY: SOCIAL INSECURITY: WITHIN THE LAST YEAR, HAVE YOU BEEN HUMILIATED OR EMOTIONALLY ABUSED IN OTHER WAYS BY YOUR PARTNER OR EX-PARTNER?: NO

## 2021-05-04 NOTE — PROGRESS NOTES
Clinic Care Coordination Contact    Clinic Care Coordination Contact  OUTREACH    Referral Information:  Referral Source: Specialist         Chief Complaint   Patient presents with     Clinic Care Coordination - Initial     sw cc        [place] Utilization: no concerns  Clinic Utilization  Difficulty keeping appointments:: No  Compliance Concerns: No  No-Show Concerns: No  No PCP office visit in Past Year: No  Utilization    Last refreshed: 2021  3:01 PM: Hospital Admissions 1           Last refreshed: 2021  3:01 PM: ED Visits 2           Last refreshed: 2021  3:01 PM: No Show Count (past year) 3              Current as of: 2021  3:01 PM              Clinical Concerns:  Patient Active Problem List   Diagnosis     Vitamin D deficiency     Supervision of other high risk pregnancies, unspecified trimester     PE (pulmonary thromboembolism) (H)     Hyperprolactinemia (H)     Follicular cancer of thyroid (H)     Lactose intolerance in adult     Schizophrenia (H)     Encounter for triage in pregnant patient     Cough     Chronic bilateral low back pain without sciatica     Normal labor     Anxiety     Cancer (H)     H/O  delivery, currently pregnant     History of pulmonary embolism     History of thyroid cancer     Mitral valve disorder      (normal spontaneous vaginal delivery)     Moderate major depression (H)     PTSD (post-traumatic stress disorder)     ASCUS of cervix with negative high risk HPV     Non-alcoholic fatty liver disease     Psychophysiological insomnia     Family history of Hashimoto thyroiditis     Biliary dyskinesia     Bipolar II disorder (H)       Current Medical Concerns:  Pt is following through with medical concerns and had no questions.    Current Behavioral Concerns: pt sees psychiatry and behavioral health clinician.  She is working on getting into a long term counselor.     Education Provided to patient: pt noted her anxiety and depression as her main concerns.   She also has  PTSD and Schizophrenia.  She noted that she use to have an [facility] worker and found it very beneficial.  She would like this again and we discussed a [facility] .  Pt will call the [facility] to try and get this started. Pt also noted that her counselor suggested day care for the young kids about 2 times per week to give her a break yet this is not affordable.  Encouraged talking with the [facility] about options as well and that the [facility]  may have ideas.     Pain  Pain (GOAL):: No  Health Maintenance Reviewed: Due/Overdue   Health Maintenance Due   Topic Date Due     URINE DRUG SCREEN  Never done     ADVANCE CARE PLANNING  Never done     COVID-19 Vaccine (1) Never done     MEDICARE ANNUAL WELLNESS VISIT  02/13/2021     PAP FOLLOW-UP  02/13/2023     HPV FOLLOW-UP  02/13/2023       Clinical Pathway: None - this will be explored on subsequent phone calls    Medication Management:  Pt denied any concerns.  She is concerned about some side effects and working with providers on this. Weight gain is her concern.     Functional Status:  Dependent ADLs:: Independent  Dependent IADLs:: Cleaning, Meal Preparation, Money Management  Bed or wheelchair confined:: No  Mobility Status: Independent  Fallen 2 or more times in the past year?: No  Any fall with injury in the past year?: No    Living Situation:  Current living arrangement:: I live in a private home with family(2 children and boyfriend)  Type [facility]    Lifestyle & Psychosocial Needs:  Lifestyle     Physical activity     Days per week: 0 days     Minutes per session: 0 min     Stress: Very much     Social Needs     Financial resource strain: Hard     Food insecurity     Worry: Sometimes true     Inability: Sometimes true     Transportation needs     Medical: No     Non-medical: No     Diet:: Regular  Inadequate nutrition (GOAL):: No  Tube Feeding: No  Inadequate activity/exercise (GOAL):: Yes  Significant  changes in sleep pattern (GOAL): Yes  Transportation means:: Regular car     Holiness or spiritual beliefs that impact treatment:: No  Mental health DX:: Yes  Mental health DX how managed:: Medication, Outpatient Counseling, Psychiatrist  Mental health management concern (GOAL):: Yes(some but better)  Chemical Dependency Status: No Current Concerns  Informal Support system:: Significant other   Socioeconomic History     Marital status: Single     Spouse name: Not on file     Number of children: 3     Years of education: Not on file     Highest education level: Some college, no degree   Relationships     Social connections     Talks on phone: Never     Gets together: Never     Attends Gnosticism service: Never     Active member of club or organization: No     Attends meetings of clubs or organizations: Never     Relationship status: Living with partner     Intimate partner violence     Fear of current or ex partner: No     Emotionally abused: No     Physically abused: No     Forced sexual activity: No     Tobacco Use     Smoking status: Former Smoker     Packs/day: 0.50     Years: 5.00     Pack years: 2.50     Types: Cigarettes     Quit date: 2016     Years since quittin.7     Smokeless tobacco: Never Used   Substance and Sexual Activity     Alcohol use: No     Comment: Has an issue with her liver (not alcohol related)     Drug use: No     Sexual activity: Yes     Partners: Male     Birth control/protection: None          Pt noted that managing her finances and getting tasks completed.  She noted that with the finances she has had some concerns with her social security disability.  She had been told the inheritance she got affected it and then later that it didn't.  She has not been able to get an answer. Sw will send resources.  She is also looking to return to some work to help her financial situation.  She is a website manager by experience/training.  Discussed workforce centers and sending options.       Pt is looking for support with managing her finances.  Discussed what she is looking for and it is not of a course or agency yet we discussed assistance from SANDIE who is a certified financial .  She was in agreement and we discussed starting to keep track of her money and where it is going.  This will start to form a basis of understanding of where her money is going.  With this information then a personal saving and spending plan can be developed.  She felt comfortable starting this on her own and tracking her spending of cash and bank accounts.      Pt noted that she is not able to get tasks done at home.  One example is the cleaning of upstairs.  She noted that she gets overwhelmed and not able to start.  We talked about breaking this down into smaller tasks.  She did just get an henrietta on her phone that helps with task management.  Talked about the importance of breaking down big jobs into smaller tasks or steps to work on.  Encouraged her to celebrate along the way.  Goal developed.      Resources and Interventions:  Current Resources:      Community Resources: None  Supplies used at home:: None  Equipment Currently Used at Home: none  Employment Status: disabled, employment seeking)   )    Advance Care Plan/Directive  Advanced Care Plans/Directives on file:: No  Advanced Care Plan/Directive Status: Considering Options    Referrals Placed: [facility], [facility] Resources, Mental Health, Disability Linkage line(employment, food)     Goals:   Goals        General    Financial Wellbeing (pt-stated)     Notes - Note created  5/4/2021  2:55 PM by Violette Wren LSW    Goal Statement: I want to improve my financial situation so I can meet my expenses over the next 6 months.   Date Goal set: 5/4/2021   Barriers: not working, SSI  Strengths: Wanting to improve situation, willing to work  Date to Achieve By: 10/31/21   Patient expressed understanding of goal: yes  Action steps to achieve this goal:  1.  I will reach out to the Social security and disability hub to understand what is going on with my social security  2. I will reach out to the employment workforce centers to see what is available for work and education  3. I will start to track my spending so I know exactly what my money is being spent on as a first step in working on a Personal Saving and Spending Plan.  4.  I will reach out to / Care coordination if I need support between set calls.         Functional (pt-stated)     Notes - Note created  5/4/2021  2:58 PM by Violette Wren LSW    Goal Statement: I want to get the upstairs cleaned and will break it down into manageable tasks.   Date Goal set: 5/4/2021   Barriers: my mental health, two young children  Strengths: I just got an henrietta to help with identifying tasks  Date to Achieve By: 10/31/21   Patient expressed understanding of goal: yes  Action steps to achieve this goal:  1. I will stop looking at the job as one big task I need to complete and break it down into manageable steps.  2. I will learn how to use the henrietta and identify steps  3. I will start to work on the tasks at a reasonable rate  4. If I start to feel overwhelmed with what is ahead of me I will look at what I have completed and remind myself to focus on steps and not the entire task              Patient/Caregiver understanding: start to learn her new henrietta and identify tasks to complete that are more manageable.  Pt to call resources given.     Outreach Frequency: monthly  Future Appointments              In 1 month [facility]    In 1 month Barbara Montgomery MD [facility] [place] [facility]          Plan: will send introduction letter with resources and complex care plan.  SW to call in about one month.     TRAMAINE Ramirez, [facility] - Care Coordinator   [facility]  5/4/2021   3:53 PM  [phone]

## 2021-05-04 NOTE — LETTER
[facility] - [facility]  [address] [place]   [address]  Tel. [phone]      May 4, 2021    Isaura Gray  [address] [address] [place] [address]      Dear Isaura,    I am a clinic care coordinator who works with Timmy Umana MD, MD at [facility]. I wanted to thank you for spending the time to talk with me.  At the end of the letter you will find the resources we discussed today. Below is a description of clinic care coordination and how I can further assist you.      The clinic care coordination team is made up of a registered nurse,  and community health worker who understand the health care system. The goal of clinic care coordination is to help you manage your health and improve access to the health care system in the most efficient manner. The team can assist you in meeting your health care goals by providing education, coordinating services, strengthening the communication among your providers and supporting you with any resource needs.    Please feel free to contact me at [phone] with any questions or concerns. We are focused on providing you with the highest-quality healthcare experience possible and that all starts with you.     Sincerely,     Violette Wren [facility] - Care Coordination  [facility]   [phone]      Enclosed: I have enclosed a copy of the Complex Care Plan. This has helpful information and goals that we have talked about. Please keep this in an easy to access place to use as needed and honoring choices    [place] food help line:  7-398-254-9134/[phone]  Social Security Administration - toll free [place]   [phone]  Disability Hub  933.158.3230/[email or web address]://[place].[email or web address]    [facility] health and human services department [phone]   Mental health ,   Employment/workforce center  [facility] . [phone]  [place] Workforce Centers . [place]  [phone]  [email or web address]    [employer] .  .  .  .  .  .  .  .  .  .  .  .  .  .  .  .  .  .  . [phone]  [address] [place] [address].  .  .  .  .  .  .  .  .  .  .  [email or web address]

## 2021-05-04 NOTE — LETTER
[facility]  Complex Care Plan  About Me:    Patient Name:  Isaura Gray    YOB: 1985  Age:         35 year old   Francois MRN:    [id number] Telephone Information:  Home Phone [phone]   Mobile [phone]       Address:  [address] [address] [place] [address] Email address:  [email or web address]      Emergency Contact(s)    Name Relationship Lgl Grd Work Phone Home Phone Mobile Phone   1. FAMILIA AKERS Significant ot*   [phone]           Primary language:  English     needed? No   [place] [facility]:  [phone] op. 1  Other communication barriers: Other(due to MH and Anxiety)  Preferred Method of Communication:  Mail  Current living arrangement: I live in a private home with family(2 children and boyfriend)  Mobility Status/ Medical Equipment: Independent    Health Maintenance  Health Maintenance Reviewed: Due/Overdue   Health Maintenance Due   Topic Date Due     URINE DRUG SCREEN  Never done     ADVANCE CARE PLANNING  Never done     COVID-19 Vaccine (1) Never done     MEDICARE ANNUAL WELLNESS VISIT  02/13/2021     PAP FOLLOW-UP  02/13/2023     HPV FOLLOW-UP  02/13/2023     Please talk with your provider about what is needed or can continue to wait.        My Access Plan  Medical Emergency 911   Primary Clinic Line [facility] [phone]   24 Hour Appointment Line [phone] or  [phone]) (toll-free)   24 Hour Nurse Line [phone] (toll-free)   Preferred Urgent Care [facility] [phone]   [facility]  [phone]   Preferred Pharmacy [facility] [place] [address]     Behavioral Health Crisis Line The National Suicide Prevention Lifeline at 1-368.438.7525 or 911             My Care Team Members  Patient Care Team       Relationship Specialty Notifications Start End     Timmy Umana MD PCP - General Family Practice  3/25/18     Phone: [phone] Fax: [phone]         [address] [facility] [address]    Simi Bearden MD MD Hematology & Oncology Admissions 9/16/16     Phone: [phone] Fax: [phone]         [address] [facility] [address]    Timmy Umana MD Assigned PCP   12/20/20     Phone: [phone] Fax: [phone]         [address] [facility] [address]    Leventhal, Thomas Michael, MD Assigned Gastroenterology Provider   12/20/20     Phone: [phone] Fax: [phone]         [address] [facility] [address]    Osmany Smith MD Assigned Surgical Provider   2/21/21     Phone: [phone] Fax: [phone]         [address] [facility] [address] [place] [address]    Barbara Montgomery MD Assigned Behavioral Health Provider   3/28/21     Phone: [phone] Fax: [phone]         [address] [facility] [address] [place] [address]    Violette Wren LSW Lead Care Coordinator Primary Care - [facility] Admissions 5/4/21     Phone: [phone] Fax: [phone]                My Care Plans  Self Management and Treatment Plan  Goals and (Comments)  Goals        General    Financial Wellbeing (pt-stated)     Notes - Note created  5/4/2021  2:55 PM by Violette Wren LSW    Goal Statement: I want to improve my financial situation so I can meet my expenses over the next 6 months.   Date Goal set: 5/4/2021   Barriers: not working, SSI  Strengths: Wanting to improve situation, willing to work  Date to Achieve By: 10/31/21   Patient expressed understanding of goal: yes  Action steps to achieve this goal:  1. I will reach out to the Social security and disability hub to understand what is going on with my social security  2. I will reach out to the employment workforce centers to see what is available for work and education  3. I will start to track my spending so I know exactly what my money is being spent on as a first step in working on a Personal Saving  and Spending Plan.  4.  I will reach out to / Care coordination if I need support between set calls.         Functional (pt-stated)     Notes - Note created  2021  2:58 PM by Violette Wren LSW    Goal Statement: I want to get the upstairs cleaned and will break it down into manageable tasks.   Date Goal set: 2021   Barriers: my mental health, two young children  Strengths: I just got an henrietta to help with identifying tasks  Date to Achieve By: 10/31/21   Patient expressed understanding of goal: yes  Action steps to achieve this goal:  1. I will stop looking at the job as one big task I need to complete and break it down into manageable steps.  2. I will learn how to use the henrietta and identify steps  3. I will start to work on the tasks at a reasonable rate  4. If I start to feel overwhelmed with what is ahead of me I will look at what I have completed and remind myself to focus on steps and not the entire task               Action Plans on File:                       Advance Care Plans/Directives Type:        My Medical and Care Information  Problem List   Patient Active Problem List   Diagnosis     Vitamin D deficiency     Supervision of other high risk pregnancies, unspecified trimester     PE (pulmonary thromboembolism) (H)     Hyperprolactinemia (H)     Follicular cancer of thyroid (H)     Lactose intolerance in adult     Schizophrenia (H)     Encounter for triage in pregnant patient     Cough     Chronic bilateral low back pain without sciatica     Normal labor     Anxiety     Cancer (H)     H/O  delivery, currently pregnant     History of pulmonary embolism     History of thyroid cancer     Mitral valve disorder      (normal spontaneous vaginal delivery)     Moderate major depression (H)     PTSD (post-traumatic stress disorder)     ASCUS of cervix with negative high risk HPV     Non-alcoholic fatty liver disease     Psychophysiological insomnia     Family history of Hashimoto  thyroiditis     Biliary dyskinesia     Bipolar II disorder (H)      Current Medications and Allergies:       Allergies   Allergen Reactions     Ciprofloxacin Hives     Sulfa Drugs Hives     Hydrocodone-Acetaminophen Itching           Oxycodone-Acetaminophen Itching         Current Outpatient Medications:      cetirizine (ZYRTEC) 10 MG tablet, Take 1 tablet (10 mg) by mouth daily, Disp: 90 tablet, Rfl: 1     levonorgestrel (MIRENA) 20 MCG/24HR IUD, 1 each (20 mcg) by Intrauterine route once, Disp: , Rfl:      LORazepam (ATIVAN) 0.5 MG tablet, Take 1 tablet (0.5 mg) by mouth daily (Patient not taking: Reported on 4/19/2021), Disp: 30 tablet, Rfl: 1     paliperidone ER (INVEGA) 6 MG 24 hr tablet, Take 1 tablet (6 mg) by mouth every morning, Disp: 30 tablet, Rfl: 1     sertraline (ZOLOFT) 50 MG tablet, Take 25 mg PO daily for one week, then take 50 mg PO daily., Disp: 30 tablet, Rfl: 1     triamcinolone (KENALOG) 0.1 % external cream, Apply topically 2 times daily, Disp: 80 g, Rfl: 1    Care Coordination Start Date: 5/4/2021   Frequency of Care Coordination: monthly   Form Last Updated: 05/04/2021

## 2021-05-06 ENCOUNTER — IMMUNIZATION (OUTPATIENT)
Dept: LAB | Facility: OTHER | Age: 36
End: 2021-05-06
Payer: MEDICARE

## 2021-06-03 ENCOUNTER — VIRTUAL VISIT (OUTPATIENT)
Dept: BEHAVIORAL HEALTH | Facility: CLINIC | Age: 36
End: 2021-06-03
Payer: MEDICARE

## 2021-06-03 ENCOUNTER — VIRTUAL VISIT (OUTPATIENT)
Dept: PSYCHIATRY | Facility: CLINIC | Age: 36
End: 2021-06-03
Payer: MEDICARE

## 2021-06-03 DIAGNOSIS — F20.9 SCHIZOPHRENIA, UNSPECIFIED TYPE (H): ICD-10-CM

## 2021-06-03 DIAGNOSIS — F43.10 PTSD (POST-TRAUMATIC STRESS DISORDER): ICD-10-CM

## 2021-06-03 DIAGNOSIS — F41.9 ANXIETY: ICD-10-CM

## 2021-06-03 DIAGNOSIS — F31.81 BIPOLAR II DISORDER (H): Primary | ICD-10-CM

## 2021-06-03 DIAGNOSIS — F20.9 SCHIZOPHRENIA, UNSPECIFIED TYPE (H): Primary | ICD-10-CM

## 2021-06-03 PROCEDURE — 90832 PSYTX W PT 30 MINUTES: CPT | Mod: 95 | Performed by: SOCIAL WORKER

## 2021-06-03 PROCEDURE — 99214 OFFICE O/P EST MOD 30 MIN: CPT | Mod: 95 | Performed by: PSYCHIATRY & NEUROLOGY

## 2021-06-03 RX ORDER — PALIPERIDONE 6 MG/1
6 TABLET, EXTENDED RELEASE ORAL EVERY MORNING
Qty: 30 TABLET | Refills: 5 | Status: SHIPPED | OUTPATIENT
Start: 2021-06-03 | End: 2022-04-11

## 2021-06-03 RX ORDER — SERTRALINE HYDROCHLORIDE 100 MG/1
100 TABLET, FILM COATED ORAL DAILY
Qty: 30 TABLET | Refills: 2 | Status: SHIPPED | OUTPATIENT
Start: 2021-06-03 | End: 2021-09-16
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
## 2021-06-03 NOTE — PATIENT INSTRUCTIONS
Continue Invega 6 mg daily.     Continue lorazepam 0.5 to 1 mg daily as needed for anxiety.     Increase sertraline to 100 mg daily.     Continue all other medications per your primary care provider.    Schedule an appointment with me in 6 weeks or sooner as needed.  You may call [facility] at [phone] to schedule.    [place] Resources:      Go to the Emergency Department as needed or call after hours crisis line at [phone].      To schedule individual or family therapy, call [facility] at [phone].     Follow up with primary care provider as planned or sooner for acute medical concerns.    Call the psychiatric nurse line with medication questions or concerns at [phone].    TopTechPhoto may be used to communicate with your provider, but this is not intended to be used for emergencies.    Community Resources:      National Suicide Prevention Lifeline: 466.854.2854 (TTY: 478.559.5797). Call anytime for help.  (www.suicidepreventionlifeline.org)    National [place] on Mental Illness (www.alexi.org): 760.943.2692 or 176-487-1189.     Mental Health Association (www.mentalhealth.org): 577.759.6764 or 685-588-5597.    [place] Crisis Text Line: Text [place] to [address]69    Suicide LifeLine Chat: suicidepreventionlifeline.org/chat

## 2021-06-03 NOTE — PROGRESS NOTES
"Telemedicine Visit: The patient's condition can be safely assessed and treated via synchronous audio and visual telemedicine encounter.      Reason for Telemedicine Visit: Services only offered telehealth    Originating Site (Patient Location): Patient's home    Distant Site (Provider Location): Provider Remote Setting    Consent:  The patient/guardian has verbally consented to: the potential risks and benefits of telemedicine (video visit) versus in person care; bill my insurance or make self-payment for services provided; and responsibility for payment of non-covered services.     Mode of Communication:  Video Conference via BioVascular    Patient wants a link sent to her cell # at [phone]        Outpatient Psychiatric Progress Note    Name: Isaura Gray   : 1985                    Primary Care Provider: Timmy Umana MD, MD   Therapist: Referred to [facility]     PHQ-9 scores:  PHQ-9 SCORE 3/23/2021 2021 2021   PHQ-9 Total Score MyChart - - 12 (Moderate depression)   PHQ-9 Total Score 24 22 12       AVERY-7 scores:  AVERY-7 SCORE 3/23/2021 2021 2021   Total Score - - 19 (severe anxiety)   Total Score 18 18 19       Patient Identification:  Isaura is a 35 year old year old female  who presents for return visit with me.  Patient attended the session alone.     Interim History:  Isaura has been on Invega now for 1 month and says it is not sedating like other antipsychotics have been.  She reports that her hallucinations have been well controlled.  She only remembers 1 time that she thought she saw a light like a flashlight outside.  Her partner was unable to see it.    We started sertraline at her last appointment.  Initially she felt like she had increased energy and was able to accomplish things more.  That seems to have worn off a little bit.  We did agree to increase it to 100 mg daily.  She says that she had \"tingly\" legs in the morning a few times initially that felt " "like \"growing pains.\"  That has not persisted, but if they recur, she concerning decrease the dose or contact me.    She rates her mood today as 8 out of 10 with 10 being the best.  Her anxiety is still somewhat elevated at 6 out of 10 with 10 being the worst.  The increase in sertraline should address this residual anxiety.    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  She does feel this sertraline has been helpful.    She is hoping she can get an [employer] worker through [facility].  She spoke with a care coordinator, who was not sure how to go about requesting one.    Vital Signs:   No vital signs taken for this encounter.      Current Medications:  Current Outpatient Medications   Medication     cetirizine (ZYRTEC) 10 MG tablet     levonorgestrel (MIRENA) 20 MCG/24HR IUD     paliperidone ER (INVEGA) 6 MG 24 hr tablet     sertraline (ZOLOFT) 100 MG tablet     triamcinolone (KENALOG) 0.1 % external cream     LORazepam (ATIVAN) 0.5 MG tablet     No current facility-administered medications for this visit.       Mental Status Examination:  Isaura is a 35-year-old woman in no acute distress.  She is neatly groomed in casual clothing.  Speech is clear and normal in rate and tone.  Eye contact is good over the video connection.  Affect is full.  Mood is euthymic, but remains anxious.  Thoughts are logical and spontaneous with no loose associations or flight of ideas.  Thought content shows no psychosis.  No suicidal thoughts.  She is alert and oriented x3.    Assessment and Plan:    ICD-10-CM    1. Schizophrenia, unspecified type (H)  F20.9 sertraline (ZOLOFT) 100 MG tablet     paliperidone ER (INVEGA) 6 MG 24 hr tablet   2. PTSD (post-traumatic stress disorder)  F43.10    3. Anxiety  F41.9        Medical comorbidities include:   Patient Active Problem List   Diagnosis     Vitamin D deficiency     Supervision of other high risk pregnancies, unspecified trimester     PE " (pulmonary thromboembolism) (H)     Hyperprolactinemia (H)     Follicular cancer of thyroid (H)     Lactose intolerance in adult     Schizophrenia (H)     Encounter for triage in pregnant patient     Cough     Chronic bilateral low back pain without sciatica     Normal labor     Anxiety     Cancer (H)     H/O  delivery, currently pregnant     History of pulmonary embolism     History of thyroid cancer     Mitral valve disorder      (normal spontaneous vaginal delivery)     Moderate major depression (H)     PTSD (post-traumatic stress disorder)     ASCUS of cervix with negative high risk HPV     Non-alcoholic fatty liver disease     Psychophysiological insomnia     Family history of Hashimoto thyroiditis     Biliary dyskinesia     Bipolar II disorder (H)       Treatment Plan:  Patient Instructions   Continue Invega 6 mg daily.     Continue lorazepam 0.5 to 1 mg daily as needed for anxiety.     Increase sertraline to 100 mg daily.     Continue all other medications per your primary care provider.    Schedule an appointment with me in 6 weeks or sooner as needed.  You may call [facility] at [phone] to schedule.    [place] Resources:      Go to the Emergency Department as needed or call after hours crisis line at [phone].      To schedule individual or family therapy, call [facility] at [phone].     Follow up with primary care provider as planned or sooner for acute medical concerns.    Call the psychiatric nurse line with medication questions or concerns at [phone].    Bizerra.ruhart may be used to communicate with your provider, but this is not intended to be used for emergencies.    Community Resources:      National Suicide Prevention Lifeline: 672.135.1659 (TTY: 858.411.9519). Call anytime for help.  (www.suicidepreventionlifeline.org)    National [place] on Mental Illness (www.alexi.org): 830.333.9238 or 470-520-4650.     Mental Health Association  (www.mentalhealth.org): 583-241-7926 or 726-433-3526.    [place] Crisis Text Line: Text [place] to 042194    Suicide LifeLine Chat: suicidepreLang [place].org/chat         Administrative Billing:   Video call duration: 23 minutes  Total time spent, including chart review documentation: 33 minutes    Patient Status:  Patient will continue to be seen for ongoing consultation and stabilization.           As the provider I attest to compliance with applicable laws and regulations related to telemedicine.

## 2021-06-09 ENCOUNTER — PATIENT OUTREACH (OUTPATIENT)
Dept: FAMILY MEDICINE | Facility: CLINIC | Age: 36
End: 2021-06-09
Payer: MEDICARE

## 2021-06-09 NOTE — PROGRESS NOTES
Clinic Care Coordination Contact    Follow Up Progress Note      Assessment: pt reporting that she is working on getting an [facility] worker and has assessment scheduled.  Her psych provider placed the referral to the [facility] agency.      Pt is working with the [facility] on SNAP and did reach out to [place] Food help line to find additional resources.  We talked about how this will all help with coming up with a good spending and saving plan.  She noted that the majority of her money is used for food and she has limited all other spending.    She is making small steps at cleaning and found a new henrietta for her phone that is more supportive.  She is keeping up on the yard work, which she is happy about.     Goals addressed this encounter:   Goals Addressed                 This Visit's Progress      Financial Wellbeing (pt-stated)   10%     Goal Statement: I want to improve my financial situation so I can meet my expenses over the next 6 months.   Date Goal set: 5/4/2021   Barriers: not working, SSI  Strengths: Wanting to improve situation, willing to work  Date to Achieve By: 10/31/21   Patient expressed understanding of goal: yes  Action steps to achieve this goal:  1. I will reach out to the Social security and disability hub to understand what is going on with my social security  2. I will reach out to the employment workforce centers to see what is available for work and education  3. I will start to track my spending so I know exactly what my money is being spent on as a first step in working on a Personal Saving and Spending Plan.  4.  I will reach out to / Care coordination if I need support between set calls.           Functional (pt-stated)   10%     Goal Statement: I want to get the upstairs cleaned and will break it down into manageable tasks.   Date Goal set: 5/4/2021   Barriers: my mental health, two young children  Strengths: I just got an henrietta to help with identifying tasks  Date to Achieve By: 10/31/21    Patient expressed understanding of goal: yes  Action steps to achieve this goal:  1. I will stop looking at the job as one big task I need to complete and break it down into manageable steps.  2. I will learn how to use the henrietta and identify steps  3. I will start to work on the tasks at a reasonable rate  4. If I start to feel overwhelmed with what is ahead of me I will look at what I have completed and remind myself to focus on steps and not the entire task               Intervention/Education provided during outreach: encouraged continued progress on her goals.  Pt agreed that having the amount of SNAP she will receive and what she can get through food shelf is good to know for planning a saving and spending plan.     Pt to continue with current supports and continue to work with the [facility] services to initiate services.      Outreach Frequency: monthly    Plan:   Pt to complete process for [facility] & SNAP.  Care Coordinator will follow up in 5 weeks (SW out of office in one month).    TRAMAINE Ramirez, Certified Financial   [facility] - Care Coordinator   [facility]  6/9/2021   1:39 PM  [phone]

## 2021-06-22 ENCOUNTER — TELEPHONE (OUTPATIENT)
Dept: PSYCHIATRY | Facility: CLINIC | Age: 36
End: 2021-06-22

## 2021-06-24 NOTE — TELEPHONE ENCOUNTER
Central Prior Authorization Team   Phone: [phone]    PA Initiation    Medication: Paliperidone ER 6mg tab-NOT NEEDED  Insurance Company:    Pharmacy Filling the Rx: [id number] - [place], [place] - [address] [facility] [address]  Filling Pharmacy Phone: [phone]  Filling Pharmacy Fax: [phone]  Start Date: 6/24/2021

## 2021-06-25 NOTE — TELEPHONE ENCOUNTER
Prior Authorization Approval    Authorization Effective Date: 3/26/2021  Authorization Expiration Date: 6/24/2022  Medication: Paliperidone ER 6mg tab-APPROVED  Approved Dose/Quantity:    Reference #:     Insurance Company:    Expected CoPay:       CoPay Card Available:      Foundation Assistance Needed:    Which Pharmacy is filling the prescription (Not needed for infusion/clinic administered): [id number] - [place], [place] - [address] AT [facility] [address] [facility] [address]  Pharmacy Notified: Yes  Patient Notified: Yes  **Instructed pharmacy to notify patient when script is ready to /ship.**

## 2021-07-15 ENCOUNTER — VIRTUAL VISIT (OUTPATIENT)
Dept: BEHAVIORAL HEALTH | Facility: CLINIC | Age: 36
End: 2021-07-15
Payer: MEDICARE

## 2021-07-15 ENCOUNTER — VIRTUAL VISIT (OUTPATIENT)
Dept: PSYCHIATRY | Facility: CLINIC | Age: 36
End: 2021-07-15
Payer: MEDICARE

## 2021-07-15 DIAGNOSIS — F31.81 BIPOLAR II DISORDER (H): ICD-10-CM

## 2021-07-15 DIAGNOSIS — F20.9 SCHIZOPHRENIA, UNSPECIFIED TYPE (H): ICD-10-CM

## 2021-07-15 DIAGNOSIS — F31.81 BIPOLAR II DISORDER (H): Primary | ICD-10-CM

## 2021-07-15 DIAGNOSIS — F41.9 ANXIETY: ICD-10-CM

## 2021-07-15 DIAGNOSIS — F20.9 SCHIZOPHRENIA, UNSPECIFIED TYPE (H): Primary | ICD-10-CM

## 2021-07-15 PROCEDURE — 99213 OFFICE O/P EST LOW 20 MIN: CPT | Mod: 95 | Performed by: PSYCHIATRY & NEUROLOGY

## 2021-07-15 PROCEDURE — 90832 PSYTX W PT 30 MINUTES: CPT | Mod: 95 | Performed by: SOCIAL WORKER

## 2021-07-15 RX ORDER — TRAZODONE HYDROCHLORIDE 50 MG/1
TABLET, FILM COATED ORAL
Qty: 60 TABLET | Refills: 1 | Status: SHIPPED | OUTPATIENT
Start: 2021-07-15 | End: 2021-09-16

## 2021-07-15 NOTE — PROGRESS NOTES
Telemedicine Visit: The patient's condition can be safely assessed and treated via synchronous audio and visual telemedicine encounter.      Reason for Telemedicine Visit: Services only offered telehealth    Originating Site (Patient Location): Patient's home    Distant Site (Provider Location): Provider Remote Setting- Home Office    Consent:  The patient/guardian has verbally consented to: the potential risks and benefits of telemedicine (video visit) versus in person care; bill my insurance or make self-payment for services provided; and responsibility for payment of non-covered services.     Mode of Communication:  Video Conference via [employer]    Patient wants you to send her a link to her cell # [phone]        Outpatient Psychiatric Progress Note    Name: Isaura Gray   : 1985                    Primary Care Provider: Timmy Umana MD, MD   Therapist: Referred to [facility]     PHQ-9 scores:  PHQ-9 SCORE 2021 2021 7/15/2021   PHQ-9 Total Score MyChart - 12 (Moderate depression) 8 (Mild depression)   PHQ-9 Total Score 22 12 8       AVERY-7 scores:  AVERY-7 SCORE 2021 2021 7/15/2021   Total Score - 19 (severe anxiety) 10 (moderate anxiety)   Total Score 18 19 10       Patient Identification:  Isaura is a 35 year old year old female  who presents for return visit with me.  Patient attended the session alone.     Interim History:  [facility]in[facility] Oliver[facility]  Pt shares that she got a job as a .  Just started 2 weeks ago, and so far really likes it.  Shares that her mood has been great since she got her job.  Working 10-12 hours a day right now but did just get a team under her that will be helping take the brunt off of her as her position only allows her to work 40 hours a week. Kids are in day care (home day care down the street) but due to sickness the day care needed to be close this week.  Still noticing anxiety most mornings.  Having a hard time  "falling asleep lately due to trying to adjust to this new schedule and does notice that she has a lot on her mind. Is not taking anything for sleep and when she has in the past hasn't like the medications (seroquel, trazodone).    Had an initial visit but no additional follow up for an [facility] worker.  [facility] Care Coordinator met with pt last month and will be doing so monthly.     Isaura reports that she is working from home now as a  for an [employer] business.  She is enjoying the work, and reports that she has experience in this field.  She does have a care for her 2 small children, but they are sick this week and have been home.  She is feeling somewhat frazzled by trying to manage that and work.    She reports that she has difficulty falling asleep.  She goes to bed about 9 PM and does not fall asleep until midnight.  By 6 in the morning, she has to get up, but has trouble waking.  She is not taking naps during the day anymore.  She has tried Benadryl and melatonin, but neither really help her sleep.  We agreed to retry trazodone.  She has tried it in the past, but had some residual sleepiness in the morning.  She can try between 25 and 100 mg as she needs and tolerates.  She reports low energy, most likely because she is chronically sleep deprived.    She has some \"sudden, loud hallucinations\" in the night that sometimes wake her from sleep.  I suggested that we might try Seroquel for her sleep which may alleviate some of the hallucinations, but she has tried it in the past and was adamant that she does not like it.    Vital Signs:   No vital signs taken for this encounter.    Current Medications:  Current Outpatient Medications   Medication     cetirizine (ZYRTEC) 10 MG tablet     levonorgestrel (MIRENA) 20 MCG/24HR IUD     paliperidone ER (INVEGA) 6 MG 24 hr tablet     sertraline (ZOLOFT) 100 MG tablet     traZODone (DESYREL) 50 MG tablet     triamcinolone (KENALOG) 0.1 % external cream     " LORazepam (ATIVAN) 0.5 MG tablet     No current facility-administered medications for this visit.      Mental Status Examination:  Isaura is a 35-year-old woman in no acute distress.  Grooming is fair in casual clothing.  Speech is clear and normal in rate and tone.  Eye contact is good over the video connection.  Affect is full.  Mood is anxious today, but she reports that overall she is doing well.  Thoughts are logical and spontaneous with no loose associations or flight of ideas.  Thought content shows occasional auditory hallucinations, especially in her sleep.  No suicidal thoughts.    Assessment and Plan:    ICD-10-CM    1. Schizophrenia, unspecified type (H)  F20.9    2. Bipolar II disorder (H)  F31.81    3. Anxiety  F41.9 traZODone (DESYREL) 50 MG tablet       Medical comorbidities include:   Patient Active Problem List   Diagnosis     Vitamin D deficiency     Supervision of other high risk pregnancies, unspecified trimester     PE (pulmonary thromboembolism) (H)     Hyperprolactinemia (H)     Follicular cancer of thyroid (H)     Lactose intolerance in adult     Schizophrenia (H)     Encounter for triage in pregnant patient     Cough     Chronic bilateral low back pain without sciatica     Normal labor     Anxiety     Cancer (H)     H/O  delivery, currently pregnant     History of pulmonary embolism     History of thyroid cancer     Mitral valve disorder      (normal spontaneous vaginal delivery)     Moderate major depression (H)     PTSD (post-traumatic stress disorder)     ASCUS of cervix with negative high risk HPV     Non-alcoholic fatty liver disease     Psychophysiological insomnia     Family history of Hashimoto thyroiditis     Biliary dyskinesia     Bipolar II disorder (H)       Treatment Plan:  Patient Instructions   Start trazodone 25 to 100 mg at bedtime as needed for sleep.    Continue Invega 6 mg daily.     Continue lorazepam 0.5 to 1 mg daily as needed for anxiety.     Continue  sertraline 100 mg daily.     Continue all other medications per your primary care provider.    Schedule an appointment with me in two months or sooner as needed.  You may call [facility] at [phone] to schedule.    [place] Resources:      Go to the Emergency Department as needed or call after hours crisis line at [phone].      To schedule individual or family therapy, call [facility] at [phone].     Follow up with primary care provider as planned or sooner for acute medical concerns.    Call the psychiatric nurse line with medication questions or concerns at [phone].    [employer]Miles[employer] may be used to communicate with your provider, but this is not intended to be used for emergencies.    Community Resources:      National Suicide Prevention Lifeline: 684.483.7332 (TTY: 947.410.1160). Call anytime for help.  (www.suicidepreventionlifeline.org)    National [place] on Mental Illness (www.alexi.org): 309.463.7271 or 748-984-5484.     Mental Health Association (www.mentalhealth.org): 395.353.1242 or 684-442-9892.    [place] Crisis Text Line: Text [place] to 544431    Suicide LifeLine Chat: suicidepreventionlifeline.org/chat       Administrative Billing:   Video call duration: 16 minutes  Total time spent, including chart review and documentation: 26 minutes    Patient Status:  Patient will continue to be seen for ongoing consultation and stabilization.      As the provider I attest to compliance with applicable laws and regulations related to telemedicine.

## 2021-07-15 NOTE — PATIENT INSTRUCTIONS
Start trazodone 25 to 100 mg at bedtime as needed for sleep.    Continue Invega 6 mg daily.     Continue lorazepam 0.5 to 1 mg daily as needed for anxiety.     Continue sertraline 100 mg daily.     Continue all other medications per your primary care provider.    Schedule an appointment with me in two months or sooner as needed.  You may call [facility] at [phone] to schedule.    [place] Resources:      Go to the Emergency Department as needed or call after hours crisis line at [phone].      To schedule individual or family therapy, call [facility] at [phone].     Follow up with primary care provider as planned or sooner for acute medical concerns.    Call the psychiatric nurse line with medication questions or concerns at [phone].    [employer]ax may be used to communicate with your provider, but this is not intended to be used for emergencies.    Community Resources:      National Suicide Prevention Lifeline: 327.151.9371 (TTY: 330.191.9167). Call anytime for help.  (www.suicidepreventionlifeline.org)    National [place] on Mental Illness (www.alexi.org): 368.877.3030 or 081-367-9149.     Mental Health Association (www.mentalhealth.org): 779.131.2980 or 096-503-8399.    [place] Crisis Text Line: Text [place] to 873262    Suicide LifeLine Chat: suicidepreEnerVaultline.org/chat
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
## 2021-07-20 ENCOUNTER — PATIENT OUTREACH (OUTPATIENT)
Dept: FAMILY MEDICINE | Facility: CLINIC | Age: 36
End: 2021-07-20
Payer: MEDICARE

## 2021-07-20 NOTE — PROGRESS NOTES
Clinic Care Coordination Contact    Follow Up Progress Note      Assessment: pt reporting that she now has a job.  She said she is working on getting her savings improved.  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     Pt has not heard back from her [employer] worker.  She is not sure what agency it was from.  Reviewed that maybe she could check with her counselor since they are the one's who did the referral.     Pt was able to hire someone to do the upstairs cleaning.     Goals addressed this encounter:   Goals Addressed                    This Visit's Progress       Financial Wellbeing (pt-stated)   50%      Goal Statement: I want to improve my financial situation so I can meet my expenses over the next 6 months.   Date Goal set: 5/4/2021   Barriers: not working, SSI  Strengths: Wanting to improve situation, willing to work  Date to Achieve By: 10/31/21   Patient expressed understanding of goal: yes  Action steps to achieve this goal:  1. I will reach out to the Social security and disability hub to understand what is going on with my social security  2. I will reach out to the employment workforce centers to see what is available for work and education  3. I will start to track my spending so I know exactly what my money is being spent on as a first step in working on a Personal Saving and Spending Plan.  4.  I will reach out to / Care coordination if I need support between set calls.            Functional (pt-stated)   40%      Goal Statement: I want to get the upstairs cleaned and will break it down into manageable tasks.   Date Goal set: 5/4/2021   Barriers: my mental health, two young children  Strengths: I just got an henrietta to help with identifying tasks  Date to Achieve By: 10/31/21   Patient expressed understanding of goal: yes  Action steps to achieve this goal:  1. I will stop looking at the job as one big task I need to complete and break it down into manageable steps.  2. I will learn how  to use the henrietta and identify steps  3. I will start to work on the tasks at a reasonable rate  4. If I start to feel overwhelmed with what is ahead of me I will look at what I have completed and remind myself to focus on steps and not the entire task               Intervention/Education provided during outreach: encouraged pt to continue to work on her sleep hygiene as she is not sleeping well.  She is working with psychiatry on medications to help.     Offered congratulations on progress she has made.  Encouraged continued work on goals.       Outreach Frequency: monthly    Plan:   Pt to continue with counseling and managing her money.   Care Coordinator will follow up in one month.     TRAMAINE Ramirez, Certified Financial [facility] - Care Coordinator   7/20/2021   10:52 AM  [phone]

## 2021-08-17 ENCOUNTER — HOSPITAL ENCOUNTER (EMERGENCY)
Facility: CLINIC | Age: 36
Discharge: HOME | End: 2021-08-17
Attending: NURSE PRACTITIONER | Admitting: NURSE PRACTITIONER
Payer: MEDICARE

## 2021-08-17 VITALS
WEIGHT: 178 LBS | OXYGEN SATURATION: 96 % | RESPIRATION RATE: 17 BRPM | DIASTOLIC BLOOD PRESSURE: 78 MMHG | HEART RATE: 96 BPM | BODY MASS INDEX: 25.54 KG/M2 | SYSTOLIC BLOOD PRESSURE: 132 MMHG | TEMPERATURE: 98.8 F

## 2021-08-17 DIAGNOSIS — R22.0 FACIAL SWELLING: ICD-10-CM

## 2021-08-17 DIAGNOSIS — K08.89 PAIN, DENTAL: ICD-10-CM

## 2021-08-17 PROCEDURE — 99282 EMERGENCY DEPT VISIT SF MDM: CPT | Performed by: NURSE PRACTITIONER

## 2021-08-17 PROCEDURE — 99282 EMERGENCY DEPT VISIT SF MDM: CPT | Mod: 25 | Performed by: NURSE PRACTITIONER

## 2021-08-17 ASSESSMENT — ENCOUNTER SYMPTOMS
ABDOMINAL DISTENTION: 0
VOICE CHANGE: 0
HEADACHES: 0
EYE REDNESS: 0
MYALGIAS: 1
COUGH: 0
SLEEP DISTURBANCE: 0
TROUBLE SWALLOWING: 0
FATIGUE: 1
DIAPHORESIS: 0
HEMATOLOGIC/LYMPHATIC NEGATIVE: 1
APPETITE CHANGE: 0

## 2021-08-17 NOTE — ED PROVIDER NOTES
Triage Note  1252 Ibuprofen 600 mg at 1050.   1249 Pt presents with concerns of left sided facial swelling.  Pt also complains of the chills, back pain, periumbilical pain, and feels like her breathing has increased and heart rate. Denies dental pain or tooth issues.        History     Chief Complaint   Patient presents with     Facial Swelling     HPI  Isaura Gray is a 35 year old female who presents with body aches and chills no fever being treated for a left wisdom tooth abscess.  She reports she just started amoxicillin yesterday and only has had 2 doses.  She reports she is concerned that she may be septic.  I discussed with the patient that she has normal vital signs and appears well and such my suspicions for her being septic is low.  She reports she did not have significant pain after eating so I suspect less likely salivary gland infection or stone.  Reports she started to have pain to her ingrown lower wisdom tooth on the left side a few days ago and saw her primary which put her on amoxicillin yesterday.  She reports she has a call into oral surgery so that she may have her ingrown wisdom tooth removed.  She has not had any difficulty swallowing, no drooling, no sublingual edema, no neck stiffness.    PCP: Timmy Umana     Allergies:  Allergies   Allergen Reactions     Ciprofloxacin Hives     Sulfa Drugs Hives     Hydrocodone-Acetaminophen Itching           Oxycodone-Acetaminophen Itching       Problem List:    Patient Active Problem List    Diagnosis Date Noted     Bipolar II disorder (H) 03/23/2021     Priority: Medium     Biliary dyskinesia 02/17/2021     Priority: Medium     Added automatically from request for surgery [id number]       Non-alcoholic fatty liver disease 12/11/2020     Priority: Medium     Psychophysiological insomnia 12/11/2020     Priority: Medium     Family history of Hashimoto thyroiditis 12/11/2020     Priority: Medium     Moderate major depression (H) 02/13/2020      Priority: Medium     Cancer (H)      Priority: Medium     Anxiety 2018     Priority: Medium     History of thyroid cancer 2018     Priority: Medium      (normal spontaneous vaginal delivery) 2018     Priority: Medium     Normal labor 2018     Priority: Medium     Cough 2018     Priority: Medium     Chronic bilateral low back pain without sciatica 2018     Priority: Medium     Encounter for triage in pregnant patient 2018     Priority: Medium     Schizophrenia (H)      Priority: Medium     reports spontaneous remission during last pregnancy       Vitamin D deficiency 2018     Priority: Medium     Supervision of other high risk pregnancies, unspecified trimester 2018     Priority: Medium     Lactose intolerance in adult 2018     Priority: Medium     PE (pulmonary thromboembolism) (H)      Priority: Medium     Hyperprolactinemia (H)      Priority: Medium     Follicular cancer of thyroid (H)      Priority: Medium     Mitral valve disorder 2018     Priority: Medium     H/O  delivery, currently pregnant 2016     Priority: Medium     History of pulmonary embolism 10/19/2016     Priority: Medium     ASCUS of cervix with negative high risk HPV 2016     Priority: Medium     16 ASCUS pap, neg HPV. Done at [facility]. Plan: Cotest in 3 yr  20 NIL Pap, Neg HPV. Plan: pending provider.        PTSD (post-traumatic stress disorder) 2015     Priority: Medium        Past Medical History:    Past Medical History:   Diagnosis Date     ASCUS of cervix with negative high risk HPV 2016     Cancer (H)      Depressive disorder      Follicular cancer of thyroid (H)      Hyperprolactinemia (H)      Mitral valve prolapse      PE (pulmonary thromboembolism) (H)      Pituitary tumor      Schizophrenia (H)      Thyroid disease        Past Surgical History:    Past Surgical History:   Procedure Laterality Date     APPENDECTOMY        "ENT SURGERY      Partial thyroidectomy     LAPAROSCOPIC CHOLECYSTECTOMY N/A 3/4/2021    Procedure: Laparoscopic cholecystectomy;  Surgeon: Osmany Smith MD;  Location: [facility] OR       Family History:    Family History   Problem Relation Age of Onset     No Known Problems Mother      Hypertension Father      Cerebrovascular Disease Father 56        Passed away from double brain stem clot     Mental Illness Father      Depression Father      Anxiety Disorder Father      Alcoholism Father      Schizophrenia Father      Asthma Brother      Cancer Maternal Grandfather         lung     No Known Problems Paternal Grandmother      Schizophrenia Paternal Grandfather      Suicide Paternal Grandfather 53     Autism Spectrum Disorder Son      Kidney Disease Son         \"has a third kidney\"     No Known Problems Daughter        Social History:  Marital Status:  Single [1]  Social History     Tobacco Use     Smoking status: Former Smoker     Packs/day: 0.50     Years: 5.00     Pack years: 2.50     Types: Cigarettes     Quit date: 2016     Years since quittin.0     Smokeless tobacco: Never Used   Substance Use Topics     Alcohol use: No     Comment: Has an issue with her liver (not alcohol related)     Drug use: No        Medications:    cetirizine (ZYRTEC) 10 MG tablet  levonorgestrel (MIRENA) 20 MCG/24HR IUD  LORazepam (ATIVAN) 0.5 MG tablet  paliperidone ER (INVEGA) 6 MG 24 hr tablet  sertraline (ZOLOFT) 100 MG tablet  traZODone (DESYREL) 50 MG tablet  triamcinolone (KENALOG) 0.1 % external cream          Review of Systems   Constitutional: Positive for fatigue. Negative for appetite change and diaphoresis.   HENT: Negative for congestion, trouble swallowing and voice change.    Eyes: Negative for redness.   Respiratory: Negative for cough.    Cardiovascular: Negative for chest pain.   Gastrointestinal: Negative for abdominal distention.   Musculoskeletal: Positive for myalgias.   Skin: Negative.    Allergic/Immunologic: " Negative for immunocompromised state.   Neurological: Negative for headaches.   Hematological: Negative.    Psychiatric/Behavioral: Negative for sleep disturbance.       Physical Exam   BP: 132/78  Pulse: 96  Temp: 98.8  F (37.1  C)  Resp: 17  Weight: 80.7 kg (178 lb)  SpO2: 96 %      Physical Exam  Vitals and nursing note reviewed.   Constitutional:       General: She is not in acute distress.     Appearance: Normal appearance. She is not ill-appearing or toxic-appearing.      Comments: Well-appearing 35-year-old female in no distress   HENT:      Head: Normocephalic.      Jaw: There is normal jaw occlusion.      Salivary Glands: Right salivary gland is not diffusely enlarged or tender. Left salivary gland is not diffusely enlarged or tender.        Mouth/Throat:      Lips: Pink.      Mouth: Mucous membranes are moist.      Dentition: Dental tenderness and gingival swelling present. No gum lesions.      Tongue: No lesions. Tongue does not deviate from midline.      Palate: No lesions.      Pharynx: Oropharynx is clear. Uvula midline.        Comments: There is no sublingual edema  Eyes:      Conjunctiva/sclera: Conjunctivae normal.   Pulmonary:      Effort: Pulmonary effort is normal.   Lymphadenopathy:      Head:      Left side of head: No submental, submandibular, tonsillar, preauricular, posterior auricular or occipital adenopathy.      Cervical:      Left cervical: No superficial, deep or posterior cervical adenopathy.      Comments: I do not appreciate adenopathy on exam   Skin:     General: Skin is warm and dry.   Neurological:      General: No focal deficit present.      Mental Status: She is alert.   Psychiatric:         Mood and Affect: Mood normal.         ED Course  I reviewed with the patient that her body aches are is most likely due to dental abscess.  She does not appear toxic on exam and she has normal vital signs.  She has been afebrile.  I discussed with the patient to give the amoxicillin a little  bit longer than just 2 doses to treat her potential dental abscess.  I recommend that she continue on the antibiotic for at least 48 to 72 hours prior to changing.  I discussed red flag symptoms concerning dental abscesses and Dov's angina and facial cellulitis with the patient and if noting certainly return back to the ED for evaluation and management.  She is to take Tylenol and Motrin for pain and body aches.  SHe develops high-grade fever, worsening cough, severe abdominal pain certainly return back to the ED for evaluation and management.  All questions answered and patient discharged home in stable condition.        Procedures             No results found for this or any previous visit (from the past 24 hour(s)).    Medications - No data to display    Assessments & Plan (with Medical Decision Making)     I have reviewed the nursing notes.    I have reviewed the findings, diagnosis, plan and need for follow up with the patient.      New Prescriptions    No medications on file       Final diagnoses:   Pain, dental   Facial swelling       8/17/2021   [facility] EMERGENCY DEPT     Mary Kay Penn, [place] CNP  08/17/21 0116

## 2021-08-17 NOTE — ED TRIAGE NOTES
Pt presents with concerns of left sided facial swelling.  Pt also complains of the chills, back pain, periumbilical pain, and feels like her breathing has increased and heart rate. Denies dental pain or tooth issues.

## 2021-08-19 ENCOUNTER — PATIENT OUTREACH (OUTPATIENT)
Dept: FAMILY MEDICINE | Facility: CLINIC | Age: 36
End: 2021-08-19
Payer: MEDICARE

## 2021-08-19 NOTE — LETTER
It was a pleasure to speak with you.  I would like to provide you with the enclosed information for your records.  As part of care coordination, we are developing care plans to assist in accomplishing your health care goals.  When we speak next, please feel free to let me know if you want to add or change any information on your care plans.    As always, feel free to contact me if you have any questions or concerns.  I look forward to working with you in the effort to achieve your health care and wellness goals .        Sincerely,      Violette Wren, [facility]  Clinic Care Coordination  [phone]      [facility]  Patient Centered Plan of Care  About Me:        Patient Name:  Isaura Gray    YOB: 1985  Age:         35 year old   [facility] MRN:    [id number] Telephone Information:  Home Phone [phone]   Mobile [phone]       Address:  [address] [address] [place] [address] Email address:  [email or web address]      Emergency Contact(s)    Name Relationship Lgl Grd Work Phone Home Phone Mobile Phone   1. FAMILIA AKERS Significant ot*   [phone]           Primary language:  English     needed? No   [facility]:  [phone] op. 1  Other communication barriers: Other (due to MH and Anxiety)  Preferred Method of Communication:  Mail  Current living arrangement:    Mobility Status/ Medical Equipment:      Health Maintenance  Health Maintenance Reviewed: Due/Overdue   Health Maintenance Due   Topic Date Due     URINE DRUG SCREEN  Never done     ADVANCE CARE PLANNING  Never done     MEDICARE ANNUAL WELLNESS VISIT  02/13/2021     Please talk with your provider about what is needed or can continue to wait.        My Access Plan  Medical Emergency 911   Primary Clinic Line [facility] - [phone]   24 Hour Appointment Line [phone] or  [phone] ([phone]) (toll-free)   24 Hour Nurse Line [phone]  (toll-free)   Preferred Urgent Care     Preferred Hospital     Preferred Pharmacy [id number] - [place], [place] - [address]     Behavioral Health Crisis Line The National Suicide Prevention Lifeline at 1-905.679.3250 or 911             My Care Team Members  Patient Care Team       Relationship Specialty Notifications Start End    Timmy Umana MD PCP - General Family Practice  3/25/18     Phone: [phone] Fax: [phone]         [address] [facility] [address]    Simi Bearden MD MD Hematology & Oncology Admissions 9/16/16     Phone: [phone] Fax: [phone]         [address] [facility] [address]    Timmy Umana MD Assigned PCP   12/20/20     Phone: [phone] Fax: [phone]         [address] [facility] [address]    Leventhal, Thomas Michael, MD Assigned Gastroenterology Provider   12/20/20     Phone: [phone] Fax: [phone]         [address] [facility] [address]    Osmany Smith MD Assigned Surgical Provider   2/21/21     Phone: [phone] Fax: [phone]         [address] [place] [address]    Barbara Montgomery MD Assigned Behavioral Health Provider   3/28/21     Phone: [phone] Fax: [phone]         [address] [place] [address]    Violette Wren LSW Lead Care Coordinator Primary Care -  Admissions 5/4/21     Phone: [phone] Fax: [phone]                My Care Plans  Self Management and Treatment Plan  Goals and (Comments)  Goals        General     Financial Wellbeing (pt-stated)      Notes - Note created  5/4/2021  2:55 PM by Violette Wren LSW     Goal Statement: I want to improve my financial situation so I can meet my expenses over the next 6 months.   Date Goal set: 5/4/2021   Barriers: not working, SSI  Strengths: Wanting to improve situation, willing to work  Date to Achieve By: 10/31/21   Patient expressed understanding of goal: yes  Action steps to achieve this goal:  1. I will  reach out to the Social security and disability hub to understand what is going on with my social security  2. I will reach out to the employment workforce centers to see what is available for work and education  3. I will start to track my spending so I know exactly what my money is being spent on as a first step in working on a Personal Saving and Spending Plan.  4.  I will reach out to / Care coordination if I need support between set calls.          Functional (pt-stated)      Notes - Note created  5/4/2021  2:58 PM by Violette Wren LSW     Goal Statement: I want to get the upstairs cleaned and will break it down into manageable tasks.   Date Goal set: 5/4/2021   Barriers: my mental health, two young children  Strengths: I just got an henrietta to help with identifying tasks  Date to Achieve By: 10/31/21   Patient expressed understanding of goal: yes  Action steps to achieve this goal:  1. I will stop looking at the job as one big task I need to complete and break it down into manageable steps.  2. I will learn how to use the henrietta and identify steps  3. I will start to work on the tasks at a reasonable rate  4. If I start to feel overwhelmed with what is ahead of me I will look at what I have completed and remind myself to focus on steps and not the entire task               Action Plans on File:                       Advance Care Plans/Directives Type: nothing on file. Let us know when you are ready and we can send an honoring choices form for your convenience.       My Medical and Care Information  Problem List   Patient Active Problem List   Diagnosis     Vitamin D deficiency     Supervision of other high risk pregnancies, unspecified trimester     PE (pulmonary thromboembolism) (H)     Hyperprolactinemia (H)     Follicular cancer of thyroid (H)     Lactose intolerance in adult     Schizophrenia (H)     Encounter for triage in pregnant patient     Cough     Chronic bilateral low back pain without  sciatica     Normal labor     Anxiety     Cancer (H)     H/O  delivery, currently pregnant     History of pulmonary embolism     History of thyroid cancer     Mitral valve disorder      (normal spontaneous vaginal delivery)     Moderate major depression (H)     PTSD (post-traumatic stress disorder)     ASCUS of cervix with negative high risk HPV     Non-alcoholic fatty liver disease     Psychophysiological insomnia     Family history of Hashimoto thyroiditis     Biliary dyskinesia     Bipolar II disorder (H)      Current Medications and Allergies:       Allergies   Allergen Reactions     Ciprofloxacin Hives     Sulfa Drugs Hives     Hydrocodone-Acetaminophen Itching           Oxycodone-Acetaminophen Itching         Current Outpatient Medications:      cetirizine (ZYRTEC) 10 MG tablet, Take 1 tablet (10 mg) by mouth daily, Disp: 90 tablet, Rfl: 1     levonorgestrel (MIRENA) 20 MCG/24HR IUD, 1 each (20 mcg) by Intrauterine route once, Disp: , Rfl:      LORazepam (ATIVAN) 0.5 MG tablet, Take 1 tablet (0.5 mg) by mouth daily (Patient not taking: Reported on 2021), Disp: 30 tablet, Rfl: 1     paliperidone ER (INVEGA) 6 MG 24 hr tablet, Take 1 tablet (6 mg) by mouth every morning, Disp: 30 tablet, Rfl: 5     sertraline (ZOLOFT) 100 MG tablet, Take 1 tablet (100 mg) by mouth daily, Disp: 30 tablet, Rfl: 2     traZODone (DESYREL) 50 MG tablet, Take 25 to 100 mg PO at bedtime as needed for sleep., Disp: 60 tablet, Rfl: 1     triamcinolone (KENALOG) 0.1 % external cream, Apply topically 2 times daily, Disp: 80 g, Rfl: 1      Care Coordination Start Date: 2021   Frequency of Care Coordination: monthly   Form Last Updated: 2021

## 2021-08-19 NOTE — PROGRESS NOTES
Clinic Care Coordination Contact    Follow Up Progress Note      Assessment: pt was to the ED for mouth pain.  She received an ABX that she has been taking, for an infected tooth.  We talked about ensuring she has a good diet and supplementing it with something line Ensure, and how this helps with healing.  She has not scheduled a dental appointment yet and will work on scheduling that soon. Also educated the pt to use heat to help the blood circulate and get the ABX to the area vs ice all the time that sends the blood away.     Pt said that she has been having periods of extreme exhaustion and will fall asleep anywhere.  Her boyfriend wonders if she has narcolepsy.  We talked about the signs and symptoms found on the [facility] website.  Encouraged her to keep a log of what is going on with her sleep, falling asleep in non sleep time/places and what was going on just before this occurred.  Talked about keeping track of what she was feeling just prior to the event as this can help identify what is the cause.     Pt reported good progress on both goals.  She has been working more which helps with the financial goal and being able to hire someone to do the cleaning.     Care Gaps:    Health Maintenance Due   Topic Date Due     URINE DRUG SCREEN  Never done     ADVANCE CARE PLANNING  Never done     MEDICARE ANNUAL WELLNESS VISIT  02/13/2021       Declined due to focus on her mouth pain and other health concerns.      Patient is knowledgeable on medications and is adherent.  No financial concerns reported at this time.  Medication review was completed with the patient and there are no questions or concerns at this time.    Goals addressed this encounter:   Goals Addressed                    This Visit's Progress       Financial Wellbeing (pt-stated)   60%      Goal Statement: I want to improve my financial situation so I can meet my expenses over the next 6 months.   Date Goal set: 5/4/2021   Barriers: not working,  SSI  Strengths: Wanting to improve situation, willing to work  Date to Achieve By: 10/31/21   Patient expressed understanding of goal: yes  Action steps to achieve this goal:  1. I will reach out to the Social security and disability hub to understand what is going on with my social security  2. I will reach out to the employment workforce centers to see what is available for work and education  3. I will start to track my spending so I know exactly what my money is being spent on as a first step in working on a Personal Saving and Spending Plan.  4.  I will reach out to / Care coordination if I need support between set calls.            Functional (pt-stated)   60%      Goal Statement: I want to get the upstairs cleaned and will break it down into manageable tasks.   Date Goal set: 5/4/2021   Barriers: my mental health, two young children  Strengths: I just got an henrietta to help with identifying tasks  Date to Achieve By: 10/31/21   Patient expressed understanding of goal: yes  Action steps to achieve this goal:  1. I will stop looking at the job as one big task I need to complete and break it down into manageable steps.  2. I will learn how to use the henrietta and identify steps  3. I will start to work on the tasks at a reasonable rate  4. If I start to feel overwhelmed with what is ahead of me I will look at what I have completed and remind myself to focus on steps and not the entire task              Intervention/Education provided during outreach: discussed the benefits of a good journal/log of what is going on.  Reviewed how her infection, diet, and work can all have an affect on her sleep and feeling exhausted.  Encouraged good diet and logging of what is going on to review with her providers for better identification/diagnoses.      Outreach Frequency: monthly  Updated patient centered plan of care sent via [employer].     Plan:   Pt to complete ABX and log her sleep and other health experiences.  Pt to  continue to work on her budget and task completion.   Care Coordinator will follow up in one month.     TRAMAINE Ramirez, Certified Financial [facility] - Care Coordinator   8/19/2021   2:35 PM  [phone]

## 2021-09-14 ENCOUNTER — PATIENT OUTREACH (OUTPATIENT)
Dept: FAMILY MEDICINE | Facility: CLINIC | Age: 36
End: 2021-09-14
Payer: MEDICARE

## 2021-09-14 NOTE — PROGRESS NOTES
Clinic Care Coordination Contact    Follow Up Progress Note      Assessment: pt reported that she was not feeling too well.  She continues to make progress on her goals.  Her dental pain has improved.     Care Gaps:    Health Maintenance Due   Topic Date Due     URINE DRUG SCREEN  Never done     ADVANCE CARE PLANNING  Never done     MEDICARE ANNUAL WELLNESS VISIT  02/13/2021     INFLUENZA VACCINE (1) 09/01/2021       not discussed as pt was not feeling well    Goals addressed this encounter:   Goals Addressed                    This Visit's Progress       Financial Wellbeing (pt-stated)   60%      Goal Statement: I want to improve my financial situation so I can meet my expenses over the next 6 months.   Date Goal set: 5/4/2021   Barriers: not working, SSI  Strengths: Wanting to improve situation, willing to work  Date to Achieve By: 10/31/21   Patient expressed understanding of goal: yes  Action steps to achieve this goal:  1. I will reach out to the Social security and disability hub to understand what is going on with my social security  2. I will reach out to the employment workforce centers to see what is available for work and education  3. I will start to track my spending so I know exactly what my money is being spent on as a first step in working on a Personal Saving and Spending Plan.  4.  I will reach out to / Care coordination if I need support between set calls.            Functional (pt-stated)   70%      Goal Statement: I want to get the upstairs cleaned and will break it down into manageable tasks.   Date Goal set: 5/4/2021   Barriers: my mental health, two young children  Strengths: I just got an henrietta to help with identifying tasks  Date to Achieve By: 10/31/21   Patient expressed understanding of goal: yes  Action steps to achieve this goal:  1. I will stop looking at the job as one big task I need to complete and break it down into manageable steps.  2. I will learn how to use the henrietta  and identify steps  3. I will start to work on the tasks at a reasonable rate  4. If I start to feel overwhelmed with what is ahead of me I will look at what I have completed and remind myself to focus on steps and not the entire task              Intervention/Education provided during outreach: pt had no new concerns.  She was congratulated on her progress.      Outreach Frequency: monthly    Plan:   Pt to continue to work on managing her finances and cleaning the upstairs.   Care Coordinator will follow up in 5 weeks.     TRAMAINE Ramirez, Certified Financial [facility] - Care Coordinator   9/14/2021   1:18 PM  [phone]

## 2021-09-16 ENCOUNTER — VIRTUAL VISIT (OUTPATIENT)
Dept: BEHAVIORAL HEALTH | Facility: CLINIC | Age: 36
End: 2021-09-16
Payer: MEDICARE

## 2021-09-16 ENCOUNTER — VIRTUAL VISIT (OUTPATIENT)
Dept: PSYCHIATRY | Facility: CLINIC | Age: 36
End: 2021-09-16
Payer: MEDICARE

## 2021-09-16 DIAGNOSIS — F20.9 SCHIZOPHRENIA, UNSPECIFIED TYPE (H): Primary | ICD-10-CM

## 2021-09-16 DIAGNOSIS — G47.00 PERSISTENT INSOMNIA: ICD-10-CM

## 2021-09-16 DIAGNOSIS — F20.9 SCHIZOPHRENIA, UNSPECIFIED TYPE (H): ICD-10-CM

## 2021-09-16 DIAGNOSIS — F31.81 BIPOLAR II DISORDER (H): Primary | ICD-10-CM

## 2021-09-16 DIAGNOSIS — F43.10 PTSD (POST-TRAUMATIC STRESS DISORDER): ICD-10-CM

## 2021-09-16 DIAGNOSIS — F41.9 ANXIETY: ICD-10-CM

## 2021-09-16 PROCEDURE — 90832 PSYTX W PT 30 MINUTES: CPT | Mod: 95 | Performed by: SOCIAL WORKER

## 2021-09-16 PROCEDURE — 99214 OFFICE O/P EST MOD 30 MIN: CPT | Mod: 95 | Performed by: PSYCHIATRY & NEUROLOGY

## 2021-09-16 RX ORDER — SERTRALINE HYDROCHLORIDE 100 MG/1
100 TABLET, FILM COATED ORAL DAILY
Qty: 30 TABLET | Refills: 11 | Status: SHIPPED | OUTPATIENT
Start: 2021-09-16 | End: 2022-04-11

## 2021-09-16 RX ORDER — ZOLPIDEM TARTRATE 5 MG/1
5 TABLET ORAL
Qty: 30 TABLET | Refills: 1 | Status: SHIPPED | OUTPATIENT
Start: 2021-09-16 | End: 2021-11-18

## 2021-09-16 ASSESSMENT — PATIENT HEALTH QUESTIONNAIRE - PHQ9
SUM OF ALL RESPONSES TO PHQ QUESTIONS 1-9: 6
5. POOR APPETITE OR OVEREATING: NEARLY EVERY DAY

## 2021-09-16 ASSESSMENT — ANXIETY QUESTIONNAIRES
IF YOU CHECKED OFF ANY PROBLEMS ON THIS QUESTIONNAIRE, HOW DIFFICULT HAVE THESE PROBLEMS MADE IT FOR YOU TO DO YOUR WORK, TAKE CARE OF THINGS AT HOME, OR GET ALONG WITH OTHER PEOPLE: EXTREMELY DIFFICULT
5. BEING SO RESTLESS THAT IT IS HARD TO SIT STILL: NEARLY EVERY DAY
1. FEELING NERVOUS, ANXIOUS, OR ON EDGE: MORE THAN HALF THE DAYS
6. BECOMING EASILY ANNOYED OR IRRITABLE: NEARLY EVERY DAY
GAD7 TOTAL SCORE: 19
2. NOT BEING ABLE TO STOP OR CONTROL WORRYING: MORE THAN HALF THE DAYS
3. WORRYING TOO MUCH ABOUT DIFFERENT THINGS: NEARLY EVERY DAY
7. FEELING AFRAID AS IF SOMETHING AWFUL MIGHT HAPPEN: NEARLY EVERY DAY

## 2021-09-16 NOTE — PATIENT INSTRUCTIONS
Stop trazodone.    Start Ambien 5 mg at bedtime as needed for sleep.     Continue Invega 6 mg daily.     Continue sertraline 100 mg daily.    Discontinue lorazepam, as you have not been needing it.     Continue all other medications per your primary care provider.    Schedule an appointment with me in 2 months or sooner as needed.  You may call [facility] at [phone] to schedule.    [place] Resources:      Go to the Emergency Department as needed or call after hours crisis line at [phone].      To schedule individual or family therapy, call [facility] at [phone].     Follow up with primary care provider as planned or sooner for acute medical concerns.    Call the psychiatric nurse line with medication questions or concerns at [phone].    MyScienceWork may be used to communicate with your provider, but this is not intended to be used for emergencies.    Community Resources:      National Suicide Prevention Lifeline: 259.336.4221 (TTY: 198.561.3175). Call anytime for help.  (www.suicidepreventionlifeline.org)    National [place] on Mental Illness (www.alexi.org): 121.185.6561 or 244-108-0927.     Mental Health Association (www.mentalhealth.org): 163.722.3396 or 053-293-1251.    [place] Crisis Text Line: Text [place] to [address]1008    Suicide LifeLine Chat: suicidepreOz Sonotekline.org/chat
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
## 2021-09-16 NOTE — PROGRESS NOTES
"Telemedicine Visit: The patient's condition can be safely assessed and treated via synchronous audio and visual telemedicine encounter.      Reason for Telemedicine Visit: Services only offered telehealth    Originating Site (Patient Location): Patient's home    Distant Site (Provider Location): Provider Remote Setting- Home Office    Consent:  The patient/guardian has verbally consented to: the potential risks and benefits of telemedicine (video visit) versus in person care; bill my insurance or make self-payment for services provided; and responsibility for payment of non-covered services.     Mode of Communication:  Video Conference via [employer]    Patient want you to sent link cell # [phone]        Outpatient Psychiatric Progress Note    Name: Isaura Gray   : 1985                    Primary Care Provider: Timmy Umana MD, MD   Therapist: Referred to [facility]      PHQ-9 scores:  PHQ-9 SCORE 2021 7/15/2021 2021   PHQ-9 Total Score MyChart 12 (Moderate depression) 8 (Mild depression) -   PHQ-9 Total Score 12 8 6       AVERY-7 scores:  AVERY-7 SCORE 2021 7/15/2021 2021   Total Score 19 (severe anxiety) 10 (moderate anxiety) -   Total Score 19 10 19       Patient Identification:  Isaura is a 35 year old year old female  who presents for return visit with me.  Patient attended the session alone.     Interim History:  Per Ginette Oliver, LICSW:  Pt shares that she is doing ok but the entire family has RSV an the kids both have croup as well.  Pt shares that she has the kids home with her and she is also trying to work as well.  Is having a hard time keeping her work confined to a work day and ends up working through the day and evening.  Feels that she doesn't have enough time or energy for \"everything\".  Really likes her job but is finding difficult to do both work and housework.  She shares that she understands her house will be messy but it causes her anxiety and is " "overwhelming. Does feel that when her kids are at day care it is more manageable.  Did have some help around the house in the summer but that individual has returned to college.    Pt reports that she went to urgent care recently and needs to schedule an appt with her PCP as it was suggested that she may have POTS.  She shares that there has also thoughts that she could have narcolepsy but favoring more on the side of POTS.  Anxiety continues and she feels that her anxiety leads to almost paranoia.  She shares that it is unfounded as she worries a lot about her work performance despite getting good feedback and reviews.    Not having luck with trazodone despite dosage taken.  Benadryl and melatonin don't work as well.  Most nights lays in bed and worries and rehashes her day.  At times during the day she gets to a point about 2-3 times a week where she has to \"shut down\" and sleep for 1-3 hours.  She shares that is reason there was some suspicion about possible narcolepsy.  Finding that she cannot drink coffee as it makes her sick.    Isaura reports that in spite of the stressors described above, her depression has been doing well.  She continues to have high anxiety and constantly feels that she is \"not good enough.\"  She has been working full-time and sometimes works a significant amount of overtime.  If her children are ill, they stay home with her while she is trying to work from home.    She continues to have problems with sleep.  She has tried several different approaches to insomnia.  Trazodone and Benadryl were not helpful.  She has tried lorazepam at bedtime which makes her tired, but does not really help her fall asleep.  She has one serving of caffeine per day and is not using caffeine after 2:00 in the afternoon.  We discussed risks and benefits associated with zolpidem, and agreed to a trial.    Vital Signs:   No vital signs taken for this encounter.    Current Medications:  Current Outpatient " Medications   Medication     levonorgestrel (MIRENA) 20 MCG/24HR IUD     paliperidone ER (INVEGA) 6 MG 24 hr tablet     sertraline (ZOLOFT) 100 MG tablet     triamcinolone (KENALOG) 0.1 % external cream     zolpidem (AMBIEN) 5 MG tablet     cetirizine (ZYRTEC) 10 MG tablet     No current facility-administered medications for this visit.        Mental Status Examination:  Isaura is a 35-year-old woman in no acute distress.  Grooming is fair in casual clothing.  Speech is clear and normal in rate and tone.  Eye contact is good over the video connection.  Motor behavior is appropriate.  Affect is full.  Mood is slightly anxious.  Thoughts are logical and spontaneous with no loose associations or flight of ideas.  No psychoses.  No suicidal thoughts.  She is alert and oriented x3.    Assessment and Plan:    ICD-10-CM    1. Schizophrenia, unspecified type (H)  F20.9 sertraline (ZOLOFT) 100 MG tablet   2. Anxiety  F41.9    3. PTSD (post-traumatic stress disorder)  F43.10    4. Persistent insomnia  G47.00 zolpidem (AMBIEN) 5 MG tablet       Medical comorbidities include:   Patient Active Problem List   Diagnosis     Vitamin D deficiency     Supervision of other high risk pregnancies, unspecified trimester     PE (pulmonary thromboembolism) (H)     Hyperprolactinemia (H)     Follicular cancer of thyroid (H)     Lactose intolerance in adult     Schizophrenia (H)     Encounter for triage in pregnant patient     Cough     Chronic bilateral low back pain without sciatica     Normal labor     Anxiety     Cancer (H)     H/O  delivery, currently pregnant     History of pulmonary embolism     History of thyroid cancer     Mitral valve disorder      (normal spontaneous vaginal delivery)     Moderate major depression (H)     PTSD (post-traumatic stress disorder)     ASCUS of cervix with negative high risk HPV     Non-alcoholic fatty liver disease     Psychophysiological insomnia     Family history of Hashimoto thyroiditis      Biliary dyskinesia     Bipolar II disorder (H)       Treatment Plan:  Patient Instructions   Stop trazodone.    Start Ambien 5 mg at bedtime as needed for sleep.     Continue Invega 6 mg daily.     Continue sertraline 100 mg daily.    Discontinue lorazepam, as you have not been needing it.     Continue all other medications per your primary care provider.    Schedule an appointment with me in 2 months or sooner as needed.  You may call [facility] at [phone] to schedule.    [place] Resources:      Go to the Emergency Department as needed or call after hours crisis line at [phone].      To schedule individual or family therapy, call [facility] at [phone].     Follow up with primary care provider as planned or sooner for acute medical concerns.    Call the psychiatric nurse line with medication questions or concerns at [phone].    D4Pt may be used to communicate with your provider, but this is not intended to be used for emergencies.    Community Resources:      National Suicide Prevention Lifeline: 924.607.3004 (TTY: 516.507.1319). Call anytime for help.  (www.suicidepreventionlifeline.org)    National [place] on Mental Illness (www.alexi.org): 235.813.3009 or 733-643-2376.     Mental Health Association (www.mentalhealth.org): 559.701.7049 or 935-615-7595.    [place] Crisis Text Line: Text [place] to 271329    Suicide LifeLine Chat: suicideCAL [email or web address] Airlinesline.org/chat       Administrative Billing:   Video call duration: 26 minutes  Total time spent, including chart review documentation: 36 minutes    Patient Status:  Patient will continue to be seen for ongoing consultation and stabilization.      As the provider I attest to compliance with applicable laws and regulations related to telemedicine.

## 2021-09-17 ASSESSMENT — ANXIETY QUESTIONNAIRES: GAD7 TOTAL SCORE: 19

## 2021-10-09 ENCOUNTER — HEALTH MAINTENANCE LETTER (OUTPATIENT)
Age: 36
End: 2021-10-09

## 2021-10-18 ENCOUNTER — HOSPITAL ENCOUNTER (EMERGENCY)
Facility: CLINIC | Age: 36
End: 2021-10-18
Payer: MEDICARE

## 2021-10-18 ENCOUNTER — E-VISIT (OUTPATIENT)
Dept: URGENT CARE | Facility: URGENT CARE | Age: 36
End: 2021-10-18
Payer: MEDICARE

## 2021-10-18 ENCOUNTER — MYC MEDICAL ADVICE (OUTPATIENT)
Dept: PSYCHIATRY | Facility: CLINIC | Age: 36
End: 2021-10-18

## 2021-10-18 DIAGNOSIS — R06.02 SOB (SHORTNESS OF BREATH): Primary | ICD-10-CM

## 2021-10-18 PROCEDURE — 99207 PR NON-BILLABLE SERV PER CHARTING: CPT | Performed by: FAMILY MEDICINE

## 2021-10-18 RX ORDER — ONDANSETRON 4 MG/1
TABLET, ORALLY DISINTEGRATING ORAL
COMMUNITY
Start: 2021-10-13 | End: 2021-11-18

## 2021-10-18 NOTE — PATIENT INSTRUCTIONS
Dear Isaura Gray,    We are sorry you are not feeling well. Based on the responses you provided, it is recommended that you be seen in-person in urgent care so we can better evaluate your symptoms. Please click here to find the nearest urgent care location to you. Thank you for trusting us with your care.    Prosper Graham, DO

## 2021-10-19 PROBLEM — F32.9 MAJOR DEPRESSION: Status: ACTIVE | Noted: 2020-02-13

## 2021-10-22 ENCOUNTER — PATIENT OUTREACH (OUTPATIENT)
Dept: FAMILY MEDICINE | Facility: CLINIC | Age: 36
End: 2021-10-22
Payer: MEDICARE

## 2021-10-22 NOTE — PROGRESS NOTES
Clinic Care Coordination Contact    Follow Up Progress Note      Assessment: pt reports that she is meeting her financial goal and it was completed.      She is struggling a little with the cleaning and has someone helping her.  Her past  went back to college and she now gets some PM help.  She reported feeling guilty that her kids are in day care and then with someone else one full day.  Discussed the importance of good self care.     Pt is not sure if the new medication for sleep (ambian) is helping or not. She continues with night time awakenings that last up to several hours.  She is concerned that she may have POTS and when she looked into the symptoms she feels it is a good fit.  We talked about getting this evaluated and if ruled out then maybe a sleep study would be beneficial.     Care Gaps:    Health Maintenance Due   Topic Date Due     URINE DRUG SCREEN  Never done     ADVANCE CARE PLANNING  Never done     MEDICARE ANNUAL WELLNESS VISIT  02/13/2021     INFLUENZA VACCINE (1) 09/01/2021       Care Gaps Last addressed on 8-19-21     Patient is knowledgeable on medications and is adherent.  No financial concerns reported at this time.  Medication review was completed with the patient and there are no questions or concerns at this time.      Goals addressed this encounter:   Goals Addressed                    This Visit's Progress       COMPLETED: Financial Wellbeing (pt-stated)   90%      Goal Statement: I want to improve my financial situation so I can meet my expenses over the next 6 months.   Date Goal set: 5/4/2021   Barriers: not working, SSI  Strengths: Wanting to improve situation, willing to work  Date to Achieve By: 10/31/21   Patient expressed understanding of goal: yes  Action steps to achieve this goal:  1. I will reach out to the Social security and disability hub to understand what is going on with my social security  2. I will reach out to the employment workforce centers to see what  is available for work and education  3. I will start to track my spending so I know exactly what my money is being spent on as a first step in working on a Personal Saving and Spending Plan.  4.  I will reach out to / Care coordination if I need support between set calls.            Functional (pt-stated)   70%      Goal Statement: I want to get the upstairs cleaned and will break it down into manageable tasks.   Date Goal set: 5/4/2021   Barriers: my mental health, two young children  Strengths: I just got an henrietta to help with identifying tasks  Date to Achieve By: 5/4/2022  Patient expressed understanding of goal: yes  Action steps to achieve this goal:  1. I will stop looking at the job as one big task I need to complete and break it down into manageable steps.  2. I will learn how to use the henrietta and identify steps  3. I will start to work on the tasks at a reasonable rate  4. If I start to feel overwhelmed with what is ahead of me I will look at what I have completed and remind myself to focus on steps and not the entire task              Intervention/Education provided during outreach: pt was encouraged to talk with her provider about potential POTS.      Talked about the importance of good sleep hygiene as well.      Outreach Frequency: monthly    Plan:   Pt to talk with provider about evaluation for POTS.  Pt to continue to take time for herself and balance work/personal life.   Care Coordinator will follow up in one month.     TRAMAINE Ramirez, Certified Financial [facility] - Care Coordinator   10/22/2021   10:48 AM  [phone]

## 2021-10-28 ENCOUNTER — VIRTUAL VISIT (OUTPATIENT)
Dept: URGENT CARE | Facility: CLINIC | Age: 36
End: 2021-10-28
Payer: MEDICARE

## 2021-10-28 DIAGNOSIS — Z53.9 ERRONEOUS ENCOUNTER--DISREGARD: Primary | ICD-10-CM

## 2021-10-28 NOTE — PROGRESS NOTES
"Isaura is a 36 year old who is being evaluated via a billable video visit.      How would you like to obtain your AVS? MyChart  If the video visit is dropped, the invitation should be resent by: Text to cell phone: [phone]  Will anyone else be joining your video visit? No    Video Start Time: 0740    Assessment & Plan     Malaise and fatigue  Salt craving  Persistent insomnia  Weight gain  Pituitary tumor - history  Snoring  Sleep disorder  Concerns discussed for her signs and symptoms as noted above and possibly related to her history.  Further evaluation with labs as noted below and specialist as noted below as well is strongly recommended today.  We did advise that this could certainly part of her psychiatric overlay but for now we will try to figure out if there is anything more pressing it needs to be addressed.  Follow-up in person in the near future for physical exam is strongly recommended.  - Comprehensive metabolic panel (BMP + Alb, Alk Phos, ALT, AST, Total. Bili, TP); Future  - **A1C FUTURE 3mo; Future  - TSH with free T4 reflex; Future  - CBC with platelets and differential; Future  - Follicle stimulating hormone; Future  - Parathyroid Hormone Intact; Future  - Adult Endocrinology Referral; Future  - SLEEP EVALUATION & MANAGEMENT REFERRAL - ADULT -; Future    Other long term (current) drug therapy   - **A1C FUTURE 3mo; Future       BMI:   Estimated body mass index is 25.54 kg/m  as calculated from the following:    Height as of 3/1/21: 1.778 m (5' 10\").    Weight as of 8/17/21: 80.7 kg (178 lb).   Weight management plan: Patient was referred to their PCP to discuss a diet and exercise plan.    Regular exercise  No follow-ups on file.    Blair Murray PA-C  [facility]    Past Medical History:   Diagnosis Date     ASCUS of cervix with negative high risk HPV 09/19/2016    [facility]     Cancer (H)      Depressive disorder      Follicular cancer of thyroid (H)      " Hyperprolactinemia (H)      Mitral valve prolapse      PE (pulmonary thromboembolism) (H)      Pituitary tumor      Schizophrenia (H)     reports spontaneous remission during last pregnancy     Thyroid disease      Past Surgical History:   Procedure Laterality Date     APPENDECTOMY       ENT SURGERY      Partial thyroidectomy     LAPAROSCOPIC CHOLECYSTECTOMY N/A 3/4/2021    Procedure: Laparoscopic cholecystectomy;  Surgeon: Osmany Smith MD;  Location:  OR     Social History     Socioeconomic History     Marital status: Single     Spouse name: None     Number of children: 3     Years of education: None     Highest education level: Some college, no degree   Occupational History     None   Tobacco Use     Smoking status: Former Smoker     Packs/day: 0.50     Years: 5.00     Pack years: 2.50     Types: Cigarettes     Quit date: 2016     Years since quittin.2     Smokeless tobacco: Never Used   Vaping Use     Vaping Use: Never used   Substance and Sexual Activity     Alcohol use: No     Comment: Has an issue with her liver (not alcohol related)     Drug use: No     Sexual activity: Yes     Partners: Male     Birth control/protection: None   Other Topics Concern     Parent/sibling w/ CABG, MI or angioplasty before 65F 55M? No   Social History Narrative    3/2018  Lives in [place] with S.O.Edgar and her daughter, Sari and son Ralph.  No smokers in the home.  No indoor cats/kittens.  No concerns about domestic violence.       Social Determinants of Health     Financial Resource Strain: High Risk     Difficulty of Paying Living Expenses: Hard   Food Insecurity: Food Insecurity Present     Worried About Running Out of Food in the Last Year: Sometimes true     Ran Out of Food in the Last Year: Sometimes true   Transportation Needs: No Transportation Needs     Lack of Transportation (Medical): No     Lack of Transportation (Non-Medical): No   Physical Activity: Inactive     Days of Exercise per Week:  0 days     Minutes of Exercise per Session: 0 min   Stress: Stress Concern Present     Feeling of Stress : Very much   Social Connections: Socially Isolated     Frequency of Communication with Friends and Family: Never     Frequency of Social Gatherings with Friends and Family: Never     Attends [employer] Services: Never     Active Member of Clubs or Organizations: No     Attends Club or Organization Meetings: Never     Marital Status: Living with partner   Intimate Partner Violence: Not At Risk     Fear of Current or Ex-Partner: No     Emotionally Abused: No     Physically Abused: No     Sexually Abused: No       Subjective   Isaura is a 36 year old who presents for the following health issues     HPI   Concerns: Ongoing issue since gallbladder was removed last year sometime. States she has issues with salt cravings, weight gain (40+#) and fatigue.     Review of Systems   Constitutional, HEENT, cardiovascular, pulmonary, GI, , musculoskeletal, neuro, skin, endocrine and psych systems are negative, except as otherwise noted.      Objective           Vitals:  No vitals were obtained today due to virtual visit.    Physical Exam   GENERAL: Healthy, alert and no distress  EYES: Eyes grossly normal to inspection.  No discharge or erythema, or obvious scleral/conjunctival abnormalities.  RESP: No audible wheeze, cough, or visible cyanosis.  No visible retractions or increased work of breathing.    SKIN: Visible skin clear. No significant rash, abnormal pigmentation or lesions.  NEURO: Cranial nerves grossly intact.  Mentation and speech appropriate for age.  PSYCH: Mentation appears normal, affect normal/bright, judgement and insight intact, normal speech and appearance well-groomed.    No results found for this or any previous visit (from the past 24 hour(s)).            Video-Visit Details    Type of service:  Video Visit    Video End Time:0750    Originating Location (pt. Location): Home    Distant Location  (provider location):  [facility]     Platform used for Video Visit: Laura

## 2021-10-29 ENCOUNTER — VIRTUAL VISIT (OUTPATIENT)
Dept: FAMILY MEDICINE | Facility: OTHER | Age: 36
End: 2021-10-29
Payer: MEDICARE

## 2021-10-29 DIAGNOSIS — R63.8 SALT CRAVING: ICD-10-CM

## 2021-10-29 DIAGNOSIS — Z79.899 OTHER LONG TERM (CURRENT) DRUG THERAPY: ICD-10-CM

## 2021-10-29 DIAGNOSIS — G47.00 PERSISTENT INSOMNIA: ICD-10-CM

## 2021-10-29 DIAGNOSIS — R53.81 MALAISE AND FATIGUE: Primary | ICD-10-CM

## 2021-10-29 DIAGNOSIS — G47.9 SLEEP DISORDER: ICD-10-CM

## 2021-10-29 DIAGNOSIS — R63.5 WEIGHT GAIN: ICD-10-CM

## 2021-10-29 DIAGNOSIS — R06.83 SNORING: ICD-10-CM

## 2021-10-29 DIAGNOSIS — R53.83 MALAISE AND FATIGUE: Primary | ICD-10-CM

## 2021-10-29 DIAGNOSIS — D49.7 PITUITARY TUMOR: ICD-10-CM

## 2021-10-29 PROCEDURE — 99214 OFFICE O/P EST MOD 30 MIN: CPT | Mod: 95 | Performed by: PHYSICIAN ASSISTANT

## 2021-10-31 ENCOUNTER — LAB (OUTPATIENT)
Dept: LAB | Facility: CLINIC | Age: 36
End: 2021-10-31
Payer: MEDICARE

## 2021-10-31 DIAGNOSIS — R06.83 SNORING: ICD-10-CM

## 2021-10-31 DIAGNOSIS — R53.81 MALAISE AND FATIGUE: ICD-10-CM

## 2021-10-31 DIAGNOSIS — Z79.899 OTHER LONG TERM (CURRENT) DRUG THERAPY: ICD-10-CM

## 2021-10-31 DIAGNOSIS — R53.83 MALAISE AND FATIGUE: ICD-10-CM

## 2021-10-31 DIAGNOSIS — D49.7 PITUITARY TUMOR: ICD-10-CM

## 2021-10-31 DIAGNOSIS — R63.8 SALT CRAVING: ICD-10-CM

## 2021-10-31 DIAGNOSIS — G47.00 PERSISTENT INSOMNIA: ICD-10-CM

## 2021-10-31 DIAGNOSIS — G47.9 SLEEP DISORDER: ICD-10-CM

## 2021-10-31 DIAGNOSIS — R63.5 WEIGHT GAIN: ICD-10-CM

## 2021-10-31 LAB
ALBUMIN SERPL-MCNC: 3.7 G/DL (ref 3.4–5)
ALP SERPL-CCNC: 91 U/L (ref 40–150)
ALT SERPL W P-5'-P-CCNC: 90 U/L (ref 0–50)
ANION GAP SERPL CALCULATED.3IONS-SCNC: 3 MMOL/L (ref 3–14)
AST SERPL W P-5'-P-CCNC: 52 U/L (ref 0–45)
BASOPHILS # BLD AUTO: 0.1 10E3/UL (ref 0–0.2)
BASOPHILS NFR BLD AUTO: 1 %
BILIRUB SERPL-MCNC: 0.7 MG/DL (ref 0.2–1.3)
BUN SERPL-MCNC: 12 MG/DL (ref 7–30)
CALCIUM SERPL-MCNC: 8.2 MG/DL (ref 8.5–10.1)
CHLORIDE BLD-SCNC: 108 MMOL/L (ref 94–109)
CO2 SERPL-SCNC: 27 MMOL/L (ref 20–32)
CREAT SERPL-MCNC: 0.6 MG/DL (ref 0.52–1.04)
EOSINOPHIL # BLD AUTO: 0.1 10E3/UL (ref 0–0.7)
EOSINOPHIL NFR BLD AUTO: 2 %
ERYTHROCYTE [DISTWIDTH] IN BLOOD BY AUTOMATED COUNT: 13.9 % (ref 10–15)
FSH SERPL-ACNC: 3.2 IU/L
GFR SERPL CREATININE-BSD FRML MDRD: >90 ML/MIN/1.73M2
GLUCOSE BLD-MCNC: 92 MG/DL (ref 70–99)
HBA1C MFR BLD: 5 % (ref 0–5.6)
HCT VFR BLD AUTO: 40.4 % (ref 35–47)
HGB BLD-MCNC: 13.7 G/DL (ref 11.7–15.7)
IMM GRANULOCYTES # BLD: 0 10E3/UL
IMM GRANULOCYTES NFR BLD: 0 %
LYMPHOCYTES # BLD AUTO: 2 10E3/UL (ref 0.8–5.3)
LYMPHOCYTES NFR BLD AUTO: 34 %
MCH RBC QN AUTO: 29.7 PG (ref 26.5–33)
MCHC RBC AUTO-ENTMCNC: 33.9 G/DL (ref 31.5–36.5)
MCV RBC AUTO: 87 FL (ref 78–100)
MONOCYTES # BLD AUTO: 0.5 10E3/UL (ref 0–1.3)
MONOCYTES NFR BLD AUTO: 9 %
NEUTROPHILS # BLD AUTO: 3.1 10E3/UL (ref 1.6–8.3)
NEUTROPHILS NFR BLD AUTO: 54 %
NRBC # BLD AUTO: 0 10E3/UL
NRBC BLD AUTO-RTO: 0 /100
PLATELET # BLD AUTO: 266 10E3/UL (ref 150–450)
POTASSIUM BLD-SCNC: 4 MMOL/L (ref 3.4–5.3)
PROT SERPL-MCNC: 7.2 G/DL (ref 6.8–8.8)
PTH-INTACT SERPL-MCNC: 126 PG/ML (ref 18–80)
RBC # BLD AUTO: 4.62 10E6/UL (ref 3.8–5.2)
SODIUM SERPL-SCNC: 138 MMOL/L (ref 133–144)
TSH SERPL DL<=0.005 MIU/L-ACNC: 2.78 MU/L (ref 0.4–4)
WBC # BLD AUTO: 5.8 10E3/UL (ref 4–11)

## 2021-10-31 PROCEDURE — 84443 ASSAY THYROID STIM HORMONE: CPT

## 2021-10-31 PROCEDURE — 36415 COLL VENOUS BLD VENIPUNCTURE: CPT

## 2021-10-31 PROCEDURE — 83970 ASSAY OF PARATHORMONE: CPT

## 2021-10-31 PROCEDURE — 85025 COMPLETE CBC W/AUTO DIFF WBC: CPT

## 2021-10-31 PROCEDURE — 83036 HEMOGLOBIN GLYCOSYLATED A1C: CPT

## 2021-10-31 PROCEDURE — 80053 COMPREHEN METABOLIC PANEL: CPT

## 2021-10-31 PROCEDURE — 83001 ASSAY OF GONADOTROPIN (FSH): CPT

## 2021-11-01 ENCOUNTER — NURSE TRIAGE (OUTPATIENT)
Dept: NURSING | Facility: CLINIC | Age: 36
End: 2021-11-01

## 2021-11-01 NOTE — TELEPHONE ENCOUNTER
Para thyroid is high per Blood work.she got back today.    ALT and AST are elevated..    Patient states she does not feel good.  Has been vomiting anytime she eats today.    Patient is asking for Dr. Trivedi to call her, because she states she is just not feeling good today.      [phone]    Urinating every 5 minutes.  Urine smells like bleach which is new.  She is not concerned about having a UTI.    She would like Dr Trivedi to please call her today.    Krystal Gee, RN RN  [facility] Triage/refill nurse    Additional Information    Information only question and nurse able to answer    Protocols used: NO PROTOCOL AVAILABLE - INFORMATION ONLY-A-[place]

## 2021-11-02 NOTE — TELEPHONE ENCOUNTER
This nurse attempted to call the patient to discuss further. The patient's phone rings and goes straight to a busy signal.      If the patient calls back. Please inform her that Blair is requesting that the patient have a face to face visit to discuss abnormal labs and have a physical. Please assist with scheduling.     LEXIE RochaN, RN, MARY LOUN  [place]  November 2, 2021

## 2021-11-15 ENCOUNTER — TELEPHONE (OUTPATIENT)
Dept: PSYCHOLOGY | Facility: CLINIC | Age: 36
End: 2021-11-15
Payer: MEDICARE

## 2021-11-15 NOTE — TELEPHONE ENCOUNTER
Reason for Call:  Medication or medication refill:    Do you use a [facility]?  Name of the pharmacy and phone number for the current request:   Received call from Misbah @ [facility] in [facility]# [phone]   About a refill On Invega. Per Misbah it is not recommenced that clients take both Ambien and Invega together   The other concern is we have client utilizing [facility] for refills    [id number] - [facility] [address] AT [facility]  [address]    He would like call back to confirm that  wants those med's together.     Name of the medication requested:   paliperidone ER (INVEGA) 6 MG 24 hr tablet    Other request: call back after 0900 @ [phone]    Can we leave a detailed message on this number? YES    Phone number patient can be reached at: Other phone number:  See above    Best Time: after 09200    Call taken on 11/15/2021 at 8:34 AM by Malcolm Burger

## 2021-11-15 NOTE — TELEPHONE ENCOUNTER
Thank you for the information.  Isaura has been doing well on her current medications, please continue them.    Erin Montgomery MD  [facility]  [facility]

## 2021-11-18 ENCOUNTER — VIRTUAL VISIT (OUTPATIENT)
Dept: BEHAVIORAL HEALTH | Facility: CLINIC | Age: 36
End: 2021-11-18
Payer: MEDICARE

## 2021-11-18 ENCOUNTER — VIRTUAL VISIT (OUTPATIENT)
Dept: PSYCHIATRY | Facility: CLINIC | Age: 36
End: 2021-11-18
Payer: MEDICARE

## 2021-11-18 VITALS — WEIGHT: 184 LBS | BODY MASS INDEX: 26.4 KG/M2

## 2021-11-18 DIAGNOSIS — F31.81 BIPOLAR II DISORDER (H): Primary | ICD-10-CM

## 2021-11-18 DIAGNOSIS — F20.9 SCHIZOPHRENIA, UNSPECIFIED TYPE (H): Primary | ICD-10-CM

## 2021-11-18 DIAGNOSIS — F41.9 ANXIETY: ICD-10-CM

## 2021-11-18 DIAGNOSIS — F20.9 SCHIZOPHRENIA, UNSPECIFIED TYPE (H): ICD-10-CM

## 2021-11-18 DIAGNOSIS — G47.00 PERSISTENT INSOMNIA: ICD-10-CM

## 2021-11-18 DIAGNOSIS — F43.10 PTSD (POST-TRAUMATIC STRESS DISORDER): ICD-10-CM

## 2021-11-18 PROCEDURE — 90832 PSYTX W PT 30 MINUTES: CPT | Mod: 95 | Performed by: SOCIAL WORKER

## 2021-11-18 PROCEDURE — 99443 PR PHYSICIAN TELEPHONE EVALUATION 21-30 MIN: CPT | Mod: 95 | Performed by: PSYCHIATRY & NEUROLOGY

## 2021-11-18 RX ORDER — ZOLPIDEM TARTRATE 5 MG/1
5 TABLET ORAL
Qty: 30 TABLET | Refills: 1 | Status: SHIPPED | OUTPATIENT
Start: 2021-11-18 | End: 2022-06-02 | Stop reason: SINTOL

## 2021-11-18 NOTE — PROGRESS NOTES
"Telemedicine Visit: The patient's condition can be safely assessed and treated via synchronous audio and visual telemedicine encounter.      Reason for Telemedicine Visit: Services only offered telehealth    Originating Site (Patient Location): Patient's home    Distant Site (Provider Location): Provider Remote Setting- Home Office    Consent:  The patient/guardian has verbally consented to: the potential risks and benefits of telemedicine (video visit) versus in person care; bill my insurance or make self-payment for services provided; and responsibility for payment of non-covered services.     Mode of Communication:  Video Conference via [employer]    As the provider I attest to compliance with applicable laws and regulations related to telemedicine.          Outpatient Psychiatric Progress Note    Name: Isaura Gray   : 1985                    Primary Care Provider: Timmy Umana MD   [place] [facility]       PHQ-9 scores:  PHQ-9 SCORE 7/15/2021 2021 2021   PHQ-9 Total Score MyChart 8 (Mild depression) - 10 (Moderate depression)   PHQ-9 Total Score 8 6 -   Some encounter information is confidential and restricted. Go to Review Flowsheets activity to see all data.       AVERY-7 scores:  AVERY-7 SCORE 7/15/2021 2021 2021   Total Score 10 (moderate anxiety) - 8 (mild anxiety)   Total Score 10 19 -   Some encounter information is confidential and restricted. Go to Review Flowsheets activity to see all data.       Patient Identification:  Isaura is a 36 year old year old female  who presents for return visit with me.  Patient attended the session alone.     Interim History:  Per Ginette Oliver, LICSW:  Pt shares that she is doing ok but shares that \"health wise\" she is not feeling great.  Has an appointment with an initial endocrinologist next week to discuss her itchiness, tiredness, all over body pain and some labs that are \"off\".    Continues to work full time " "despite all this and kids have been home a lot with RSV, flu, etc.    Does have a helper who has been helping with cleaning but she hasn't been able to come with the kids being positive for influenza but will be back next week.  Continues to have a hard time with sleep and shares the Ambien didn't work well at all.  Finding that she can't get comfortable and when wakes in the morning she has pain all over.  Does also have a sleep study set up for January 3, 2022.    She shares that her overall anxiety and depression have been pretty good despite not feeling well and being very busy at work.  She feels she is keeping up pretty well at work but shares that her boss is a micromanager and makes things more stressful than it needs to be.     Isaura was treated for thyroid cancer in the past, and now has an elevated parathyroid hormone level, with a decreased calcium level.  She also has elevated liver functions and has been having trouble with pain and itching.  She is not using alcohol at all.  She has been referred to an endocrinologist, and will be seen later this month.  She also has a sleep study scheduled early next year.    She reports that her mood \"seems fine.\"  She is feeling a little tired and grumpy, but still working full-time or even more.  She denies having any hallucinations at present.  She does report some memory problems, such as putting things in the wrong place, and then not remembering where they are.  She denies that this is associated with use of Ambien.  She denies any adverse side effects with her current medications.    Vital Signs:   Wt 83.5 kg (184 lb)   LMP  (LMP Unknown)   Breastfeeding No   BMI 26.40 kg/m      Labs:      Ref Range & Units 2 wk ago    Parathyroid Hormone Intact 18 - 80 pg/mL 126 High          TSH   Date Value Ref Range Status   10/31/2021 2.78 0.40 - 4.00 mU/L Final   04/21/2021 1.80 0.40 - 4.00 mU/L Final     Lab Results   Component Value Date    WBC 5.8 10/31/2021    " WBC 6.7 04/21/2021     Lab Results   Component Value Date    RBC 4.62 10/31/2021    RBC 4.90 04/21/2021     Lab Results   Component Value Date    HGB 13.7 10/31/2021    HGB 14.8 04/21/2021     Lab Results   Component Value Date    HCT 40.4 10/31/2021    HCT 42.9 04/21/2021     No components found for: MCT  Lab Results   Component Value Date    MCV 87 10/31/2021    MCV 88 04/21/2021     Lab Results   Component Value Date    MCH 29.7 10/31/2021    MCH 30.2 04/21/2021     Lab Results   Component Value Date    MCHC 33.9 10/31/2021    MCHC 34.5 04/21/2021     Lab Results   Component Value Date    RDW 13.9 10/31/2021    RDW 13.8 04/21/2021     Lab Results   Component Value Date     10/31/2021     04/21/2021     Last Comprehensive Metabolic Panel:  Sodium   Date Value Ref Range Status   10/31/2021 138 133 - 144 mmol/L Final   04/21/2021 139 133 - 144 mmol/L Final     Potassium   Date Value Ref Range Status   10/31/2021 4.0 3.4 - 5.3 mmol/L Final   04/21/2021 3.6 3.4 - 5.3 mmol/L Final     Chloride   Date Value Ref Range Status   10/31/2021 108 94 - 109 mmol/L Final   04/21/2021 106 94 - 109 mmol/L Final     Carbon Dioxide   Date Value Ref Range Status   04/21/2021 26 20 - 32 mmol/L Final     Carbon Dioxide (CO2)   Date Value Ref Range Status   10/31/2021 27 20 - 32 mmol/L Final     Anion Gap   Date Value Ref Range Status   10/31/2021 3 3 - 14 mmol/L Final   04/21/2021 7 3 - 14 mmol/L Final     Glucose   Date Value Ref Range Status   10/31/2021 92 70 - 99 mg/dL Final   04/21/2021 92 70 - 99 mg/dL Final     Urea Nitrogen   Date Value Ref Range Status   10/31/2021 12 7 - 30 mg/dL Final   04/21/2021 13 7 - 30 mg/dL Final     Creatinine   Date Value Ref Range Status   10/31/2021 0.60 0.52 - 1.04 mg/dL Final   04/21/2021 0.87 0.52 - 1.04 mg/dL Final     GFR Estimate   Date Value Ref Range Status   10/31/2021 >90 >60 mL/min/1.73m2 Final     Comment:     As of July 11, 2021, eGFR is calculated by the CKD-EPI  creatinine equation, without race adjustment. eGFR can be influenced by muscle mass, exercise, and diet. The reported eGFR is an estimation only and is only applicable if the renal function is stable.   04/21/2021 88 >60 mL/min/[1.73_m2] Final     Comment:     Non  GFR Calc  Starting 12/18/2018, serum creatinine based estimated GFR (eGFR) will be   calculated using the Chronic Kidney Disease Epidemiology Collaboration   (CKD-EPI) equation.       Calcium   Date Value Ref Range Status   10/31/2021 8.2 (L) 8.5 - 10.1 mg/dL Final   04/21/2021 9.0 8.5 - 10.1 mg/dL Final     Bilirubin Total   Date Value Ref Range Status   10/31/2021 0.7 0.2 - 1.3 mg/dL Final   04/21/2021 1.8 (H) 0.2 - 1.3 mg/dL Final     Alkaline Phosphatase   Date Value Ref Range Status   10/31/2021 91 40 - 150 U/L Final   04/21/2021 75 40 - 150 U/L Final     ALT   Date Value Ref Range Status   10/31/2021 90 (H) 0 - 50 U/L Final   04/21/2021 17 0 - 50 U/L Final     AST   Date Value Ref Range Status   10/31/2021 52 (H) 0 - 45 U/L Final   04/21/2021 5 0 - 45 U/L Final       Current Medications:  Current Outpatient Medications   Medication     levonorgestrel (MIRENA) 20 MCG/24HR IUD     paliperidone ER (INVEGA) 6 MG 24 hr tablet     sertraline (ZOLOFT) 100 MG tablet     zolpidem (AMBIEN) 5 MG tablet     ondansetron (ZOFRAN-ODT) 4 MG ODT tab     No current facility-administered medications for this visit.        The [place] Prescription Monitoring Program has been reviewed and there are no concerns about diversionary activity for controlled substances at this time.      Mental Status Examination:  Isaura is a 36-year-old woman in no acute distress.  Speech is clear and normal in rate and tone.  Mood is fair.  Thoughts are logical and spontaneous with no loose associations or flight of ideas.  Thought content shows no psychosis.  No suicidal thoughts.  She is alert and oriented x3.      Assessment and Plan:    ICD-10-CM    1. Schizophrenia,  unspecified type (H)  F20.9    2. Persistent insomnia  G47.00 zolpidem (AMBIEN) 5 MG tablet   3. PTSD (post-traumatic stress disorder)  F43.10    4. Anxiety  F41.9        Medical comorbidities include:   Patient Active Problem List   Diagnosis     Vitamin D deficiency     Supervision of other high risk pregnancies, unspecified trimester     PE (pulmonary thromboembolism) (H)     Hyperprolactinemia (H)     Follicular cancer of thyroid (H)     Lactose intolerance in adult     Schizophrenia (H)     Encounter for triage in pregnant patient     Cough     Chronic bilateral low back pain without sciatica     Normal labor     Anxiety     Cancer (H)     H/O  delivery, currently pregnant     History of pulmonary embolism     History of thyroid cancer     Mitral valve disorder      (normal spontaneous vaginal delivery)     Moderate major depression (H)     PTSD (post-traumatic stress disorder)     ASCUS of cervix with negative high risk HPV     Non-alcoholic fatty liver disease     Psychophysiological insomnia     Family history of Hashimoto thyroiditis     Biliary dyskinesia     Bipolar II disorder (H)     Pituitary tumor - history       Treatment Plan:  Patient Instructions   Continue Ambien 5 mg at bedtime as needed for sleep.     Continue Invega 6 mg daily.     Continue sertraline 100 mg daily.     Continue all other medications per your primary care provider.    Schedule an appointment with me in 2 months or sooner as needed.  You may call [facility] at [phone] to schedule.    [place] Resources:      Go to the Emergency Department as needed or call after hours crisis line at [phone].      To schedule individual or family therapy, call [facility] at [phone].     Follow up with primary care provider as planned or sooner for acute medical concerns.    Call the psychiatric nurse line with medication questions or concerns at [phone].    [employer] may be used  to communicate with your provider, but this is not intended to be used for emergencies.    Community Resources:      National Suicide Prevention Lifeline: 170.655.3625 (TTY: 782.200.8950). Call anytime for help.  (www.suicidepreventionlifeline.org)    National [place] [employer]ness (www.alexi.org): 312.112.4583 or 314-380-3456.     Mental Health Association (www.mentalhealth.org): 450.356.3248 or 545-666-1438.    [place] Crisis Text Line: Text [place] to 989903    Suicide LifeLine Chat: suicideGenZum Life Sciences.org/chat       Administrative Billing:   Isaura was scheduled for a video appointment, but we were unable to establish a connection, although both of us theoretically were in the virtual waiting room.    Telephone call duration: 17 minutes  Total time spent, including chart review and documentation: 30 minutes    Patient Status:  Patient will continue to be seen for ongoing consultation and stabilization.
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
## 2021-11-18 NOTE — PATIENT INSTRUCTIONS
Continue Ambien 5 mg at bedtime as needed for sleep.     Continue Invega 6 mg daily.     Continue sertraline 100 mg daily.     Continue all other medications per your primary care provider.    Schedule an appointment with me in 2 months or sooner as needed.  You may call [facility] at [phone] to schedule.    [place] Resources:      Go to the Emergency Department as needed or call after hours crisis line at [phone].      To schedule individual or family therapy, call [facility] at [phone].     Follow up with primary care provider as planned or sooner for acute medical concerns.    Call the psychiatric nurse line with medication questions or concerns at [phone].    [employer] may be used to communicate with your provider, but this is not intended to be used for emergencies.    Community Resources:      National Suicide Prevention Lifeline: 366.210.1983 (TTY: 657.785.9251). Call anytime for help.  (www.suicidepreventionlifeline.org)    National [place] on Mental Illness (www.alexi.org): 525.344.1913 or 216-379-1727.     Mental Health Association (www.mentalhealth.org): 106.615.9745 or 758-842-7259.    [place] Crisis Text Line: Text [place] to 144552    Suicide LifeLine Chat: suicidepreventionlifeline.org/chat

## 2021-11-22 ENCOUNTER — PATIENT OUTREACH (OUTPATIENT)
Dept: FAMILY MEDICINE | Facility: CLINIC | Age: 36
End: 2021-11-22
Payer: MEDICARE

## 2021-11-22 ASSESSMENT — ENCOUNTER SYMPTOMS
CONSTIPATION: 1
PARALYSIS: 0
NUMBNESS: 0
DYSURIA: 0
NAUSEA: 1
FATIGUE: 1
ALTERED TEMPERATURE REGULATION: 1
DIARRHEA: 1
INCREASED ENERGY: 1
SMELL DISTURBANCE: 0
EXERCISE INTOLERANCE: 1
POLYDIPSIA: 1
VOMITING: 1
TREMORS: 0
NERVOUS/ANXIOUS: 1
SEIZURES: 0
EYE IRRITATION: 0
ORTHOPNEA: 1
SYNCOPE: 1
INSOMNIA: 1
FEVER: 0
ABDOMINAL PAIN: 1
EYE WATERING: 1
RECTAL PAIN: 0
SORE THROAT: 0
WEIGHT LOSS: 0
MYALGIAS: 1
HEADACHES: 1
STIFFNESS: 1
POOR WOUND HEALING: 0
PANIC: 1
EYE PAIN: 0
BLOOD IN STOOL: 0
CHILLS: 1
HEARTBURN: 0
SINUS CONGESTION: 1
ARTHRALGIAS: 1
BLOATING: 1
BREAST PAIN: 0
SPEECH CHANGE: 0
DECREASED APPETITE: 0
JOINT SWELLING: 1
NIGHT SWEATS: 1
LOSS OF CONSCIOUSNESS: 1
DISTURBANCES IN COORDINATION: 1
MUSCLE WEAKNESS: 1
DOUBLE VISION: 0
WEAKNESS: 1
EYE REDNESS: 0
DECREASED CONCENTRATION: 1
BOWEL INCONTINENCE: 0
TROUBLE SWALLOWING: 1
NECK PAIN: 1
PALPITATIONS: 1
FLANK PAIN: 0
HOARSE VOICE: 0
JAUNDICE: 0
BACK PAIN: 1
LEG PAIN: 1
SLEEP DISTURBANCES DUE TO BREATHING: 1
WEIGHT GAIN: 1
SINUS PAIN: 1
MUSCLE CRAMPS: 1
NAIL CHANGES: 0
DIZZINESS: 1
DEPRESSION: 1
DIFFICULTY URINATING: 0
HALLUCINATIONS: 1
TASTE DISTURBANCE: 0
HYPOTENSION: 1
SKIN CHANGES: 0
HEMATURIA: 0
NECK MASS: 1
LIGHT-HEADEDNESS: 1
TINGLING: 1
MEMORY LOSS: 1
POLYPHAGIA: 1
HYPERTENSION: 0
BREAST MASS: 0

## 2021-11-22 NOTE — LETTER
It was a pleasure to speak with you.  I would like to provide you with the enclosed information for your records.  As part of care coordination, we are developing care plans to assist in accomplishing your health care goals.  When we speak next, please feel free to let me know if you want to add or change any information on your care plans.    As always, feel free to contact me if you have any questions or concerns.  I look forward to working with you in the effort to achieve your health care and wellness goals .        Sincerely,      Violette Wren, [facility]  Clinic Care Coordination  [phone]    [facility]  Patient Centered Plan of Care  About Me:        Patient Name:  Isaura Gray    YOB: 1985  Age:         36 year old   [facility] MRN:    [id number] Telephone Information:  Home Phone [phone]   Mobile [phone]       Address:  [address] [address] [place] [address] Email address:  [email or web address]      Emergency Contact(s)    Name Relationship Lgl Grd Work Phone Home Phone Mobile Phone   1. FAMILIA AKERS Significant ot*   [phone]           Primary language:  English     needed? No   [facility] [employer]:  [phone] op. 1  Other communication barriers:Other (due to MH and Anxiety)    Preferred Method of Communication:  Mail  Current living arrangement: I live in a private home with family (2 children and boyfriend)    Mobility Status/ Medical Equipment: Independent        Health Maintenance  Health Maintenance Reviewed: Due/Overdue   Health Maintenance Due   Topic Date Due     URINE DRUG SCREEN  Never done     ADVANCE CARE PLANNING  Never done     MEDICARE ANNUAL WELLNESS VISIT  02/13/2021     COVID-19 Vaccine (2 - Booster for William series) 07/01/2021     INFLUENZA VACCINE (1) 09/01/2021           My Access Plan  Medical Emergency 911   Primary Clinic Line [facility] - [phone]   24 Hour  Appointment Line [phone] or  [phone]) (toll-free)   24 Hour Nurse Line [phone] (toll-free)   Preferred Urgent Care [facility], [phone]     [facility]  [phone]     Preferred Pharmacy [facility] [address] [place], [place] - [address]     Behavioral Health Crisis Line The National Suicide Prevention Lifeline at 1-909.210.1528 or 910             My Care Team Members  Patient Care Team       Relationship Specialty Notifications Start End    Timmy Umana MD PCP - General Family Practice  3/25/18     Phone: [phone] Fax: [phone]         [address] [facility] [address]    Simi Bearden MD MD Hematology & Oncology Admissions 9/16/16     Phone: [phone] Fax: [phone]          [facility] [address]    Timmy Umana MD Assigned PCP   12/20/20     Phone: [phone] Fax: [phone]         [address] [facility] [address]    Leventhal, Thomas Michael, MD Assigned Gastroenterology Provider   12/20/20     Phone: [phone] Fax: [phone]         [address] [facility] [address]    Osmany Smith MD Assigned Surgical Provider   2/21/21     Phone: [phone] Fax: [phone]         [address] [place] [address]    Barbara Montgomery MD Assigned Behavioral Health Provider   3/28/21     Phone: [phone] Fax: [phone]         [address] [place] [address]    Violette Wren LSW Lead Care Coordinator Primary Care - CC Admissions 5/4/21     Phone: [phone] Fax: [phone]        Merced Ko MD MD Endocrinology, Diabetes, and Metabolism  10/29/21     Phone: [phone] Fax: [phone] [address] [facility] [facility] [address]    Blair Wan PA-C Referring Physician Family Medicine  10/29/21     Phone: [phone] Fax: [phone]         [address] [place] [address] [facility]  [place] [address]            My Care Plans  Self Management and Treatment Plan  Goals and (Comments)  Goals        General     Functional (pt-stated)      Notes - Note edited  10/22/2021 10:36 AM by Violette Wren LSW     Goal Statement: I want to get the upstairs cleaned and will break it down into manageable tasks.   Date Goal set: 2021   Barriers: my mental health, two young children  Strengths: I just got an henrietta to help with identifying tasks  Date to Achieve By: 2022  Patient expressed understanding of goal: yes  Action steps to achieve this goal:  1. I will stop looking at the job as one big task I need to complete and break it down into manageable steps.  2. I will learn how to use the henrietta and identify steps  3. I will start to work on the tasks at a reasonable rate  4. If I start to feel overwhelmed with what is ahead of me I will look at what I have completed and remind myself to focus on steps and not the entire task               Action Plans on File:                       Advance Care Plans/Directives Type:   No data recorded    My Medical and Care Information  Problem List   Patient Active Problem List   Diagnosis     Vitamin D deficiency     Supervision of other high risk pregnancies, unspecified trimester     PE (pulmonary thromboembolism) (H)     Hyperprolactinemia (H)     Follicular cancer of thyroid (H)     Lactose intolerance in adult     Schizophrenia (H)     Encounter for triage in pregnant patient     Cough     Chronic bilateral low back pain without sciatica     Normal labor     Anxiety     Cancer (H)     H/O  delivery, currently pregnant     History of pulmonary embolism     History of thyroid cancer     Mitral valve disorder      (normal spontaneous vaginal delivery)     Moderate major depression (H)     PTSD (post-traumatic stress disorder)     ASCUS of cervix with negative high risk HPV     Non-alcoholic fatty liver disease     Psychophysiological insomnia     Family  history of Hashimoto thyroiditis     Biliary dyskinesia     Bipolar II disorder (H)     Pituitary tumor - history      Current Medications and Allergies:       Allergies   Allergen Reactions     Ciprofloxacin Hives     Sulfa Drugs Hives     Hydrocodone-Acetaminophen Itching           Oxycodone-Acetaminophen Itching         Current Outpatient Medications:      levonorgestrel (MIRENA) 20 MCG/24HR IUD, 1 each (20 mcg) by Intrauterine route once, Disp: , Rfl:      paliperidone ER (INVEGA) 6 MG 24 hr tablet, Take 1 tablet (6 mg) by mouth every morning, Disp: 30 tablet, Rfl: 5     sertraline (ZOLOFT) 100 MG tablet, Take 1 tablet (100 mg) by mouth daily, Disp: 30 tablet, Rfl: 11     zolpidem (AMBIEN) 5 MG tablet, Take 1 tablet (5 mg) by mouth nightly as needed for sleep, Disp: 30 tablet, Rfl: 1      Care Coordination Start Date: 5/4/2021   Frequency of Care Coordination: monthly     Form Last Updated: 11/22/2021

## 2021-11-22 NOTE — PROGRESS NOTES
Clinic Care Coordination Contact    Follow Up Progress Note      Assessment: pt reporting that she is not feeling up to par.  She has been tired and not feeling good.  She does have an endocrinologist appointment tomorrow.     She does still have a cleaning lady coming to help with cleaning.      meds not reviewed as pt did not feel well.     Care Gaps:    Health Maintenance Due   Topic Date Due     URINE DRUG SCREEN  Never done     ADVANCE CARE PLANNING  Never done     MEDICARE ANNUAL WELLNESS VISIT  02/13/2021     COVID-19 Vaccine (2 - Booster for William series) 07/01/2021     INFLUENZA VACCINE (1) 09/01/2021       not discussed as pt is not feeling well.    Goals addressed this encounter:   Goals Addressed                    This Visit's Progress       Functional (pt-stated)   80%      Goal Statement: I want to get the upstairs cleaned and will break it down into manageable tasks.   Date Goal set: 5/4/2021   Barriers: my mental health, two young children  Strengths: I just got an henrietta to help with identifying tasks  Date to Achieve By: 5/4/2022  Patient expressed understanding of goal: yes  Action steps to achieve this goal:  1. I will stop looking at the job as one big task I need to complete and break it down into manageable steps.  2. I will learn how to use the henrietta and identify steps  3. I will start to work on the tasks at a reasonable rate  4. If I start to feel overwhelmed with what is ahead of me I will look at what I have completed and remind myself to focus on steps and not the entire task              Intervention/Education provided during outreach: encouraged pt to drink plenty of fluids and get some rest.      Outreach Frequency: monthly    Plan:   Pt to attend appointment tomorrow. Will send updated care plan via [employer].   Care Coordinator will follow up in one month.     TRAMAINE Ramirez, Certified Financial [facility] Primary Care - Care Coordinator   11/22/2021   2:11  PM  [phone]

## 2021-11-23 ENCOUNTER — VIRTUAL VISIT (OUTPATIENT)
Dept: ENDOCRINOLOGY | Facility: CLINIC | Age: 36
End: 2021-11-23
Attending: PHYSICIAN ASSISTANT
Payer: MEDICARE

## 2021-11-23 DIAGNOSIS — Z86.39 H/O HYPERPROLACTINEMIA: ICD-10-CM

## 2021-11-23 DIAGNOSIS — R53.83 MALAISE AND FATIGUE: ICD-10-CM

## 2021-11-23 DIAGNOSIS — C73 FOLLICULAR THYROID CANCER (H): ICD-10-CM

## 2021-11-23 DIAGNOSIS — E21.3 HYPERPARATHYROIDISM (H): Primary | ICD-10-CM

## 2021-11-23 DIAGNOSIS — R53.81 MALAISE AND FATIGUE: ICD-10-CM

## 2021-11-23 DIAGNOSIS — E83.51 HYPOCALCEMIA: ICD-10-CM

## 2021-11-23 PROCEDURE — 99204 OFFICE O/P NEW MOD 45 MIN: CPT | Mod: 95 | Performed by: INTERNAL MEDICINE

## 2021-11-23 NOTE — PROGRESS NOTES
Video-Visit Details    Type of service:  Video Visit    Video call duration:  Start: 11/23/2021 11:13 am  Stop: 11/23/2021 12:00 pm    Originating Location (pt. Location): Home  Distant Location (provider location):  [facility]   Platform used for Video Visit: AmWell    Due to the COVID 19 pandemic this visit was converted to a video visit in order to help prevent spread of infection in this patient and the general population.     The patient is seen in consultation at the request of Dr. Blair Wan.     Isaura Gray is a 36 year old female with a past medical history significant for depression, follicular thyroid carcinoma, pulmonary embolism (x 2, possible related to BCP), ? pituitary tumor, schizophrenia (dx at age 25), nonalcoholic fatty disease and bipolar disorder.     The patient complains of the following symptoms:  - Persistent fatigue and generalized weakness over the last 2 years  - Diffuse musculoskeletal body aches present for the last 6 months and getting worse.  She perceives the pain as being present in her muscles and joints, mainly in her stomach, shoulders and elbows.  The pain is present in the morning when she wakes up and it is persistent throughout the day.  She cannot identify any aggravating or alleviating factors.  - Intermittent episodes of nausea for the last 2 to 3 years.  When present, the nausea persists throughout the day.  She does not associate it with fasting or food intake.  She very rarely vomits.  - Episodes of increased hunger.  She reports a weight gain of 40 pounds in the last year.  - Cold intolerance noticed for the last 6 months.  When she warms up, she gets overheated very easily.  It is hard for her to find the right temperature.  - Recurrent episodes of numbness and tingling sensation in her hands and feet.  With prolonged walking, her feet feel numb and very sensitive.  - Longstanding episodes of lightheadedness when she stands up from a  sitting or lying position, present for many years.  At times, she blacks out for a few seconds.  She describes experiencing an episode when she fell on the stairs.  Denies losing consciousness.  She was never told her blood pressure is low.  - Salt craving.  She eats a lot of salty snacks.    Isaura was diagnosed with an elevated prolactin while being treated with Seroquel, in 2018.  On follow-up blood work, the prolactin level normalized.  She also reports being diagnosed with a pituitary tumor, for which no treatment or follow-up was recommended.  I reviewed the brain MRI images from March 2019.  The pituitary gland is normal looking.    Around the age of 23, she was diagnosed with follicular thyroid cancer.  The patient does not remember details regarding thyroid surgery but she does remember being treated with radioiodine treatment.  Surgery was done in [place] and the patient has not been able to retrieve the prior medical records.  Denies ever being treated with thyroid hormone replacement.  There is no family history of thyroid disease or thyroid cancer and no personal history of radiation exposure. On the 3/2019 CT head and neck angio the left thyroid gland was reported to be hypoplastic or surgically absent.  I reviewed the CT images.  The right thyroid lobe is present, with no identifiable nodules.    Parathyroid hormone level was elevated at 126 on 10/31/2021, associated with a low calcium of 8.2, for a normal albumin of 3.7. Alkaline phosphatase was normal. The liver enzymes were mildly elevated.  The patient has been experiencing intermittent episodes of mild hypocalcemia, dating back to 2018.  The patient reports taking a liquid vitamin D3 supplement ([employer]), 10,000 units daily (2 daily drops), for the last 4 years.  She has tried other vitamin D supplements but she was not able to tolerate them.  She might have a glass of milk a day and she eats a lot of cheese.  For the last 3 to 4  years, she has been taking Tums, 3 to 4 pills several times a day, to try to alleviate the stomach pain.    Pertinent labs reviewed:  12/2020 ferritin 50, free T4 2.6, free T4 0.98, TSH 2.07, normal ceruloplasmin. Most recent TSH 2.78, on 10/31/2021.  Normal FSH in April 2021 and October 2020  10/31/21 A1c 5%   2/2020 vitamin B12 459 and vitamin D 15  10/31/2021 normal CBC, TSH 2.78     1/6/2021 - adrenal glands grossly unremarkable on the abd CT. Images reviewed by me.     Her mother was recently diagnosed with hemochromatosis.    Past Medical History   Past Medical History:   Diagnosis Date     ASCUS of cervix with negative high risk HPV 09/19/2016    [facility]     Cancer (H)      Depressive disorder      Follicular cancer of thyroid (H)      Hyperprolactinemia (H)      Mitral valve prolapse      PE (pulmonary thromboembolism) (H)      Pituitary tumor      Schizophrenia (H)     reports spontaneous remission during last pregnancy     Thyroid disease    Melanocytic nevi  Mitral valve prolapse - mild   Posttraumatic stress disorder  Nonalcoholic fatty liver disease  Psychophysiological insomnia  Biliary dyskinesia  Bipolar disorder  Anxiety  On Mirena IUD - since 2019    Past Surgical History   Past Surgical History:   Procedure Laterality Date     APPENDECTOMY       ENT SURGERY      Partial thyroidectomy     LAPAROSCOPIC CHOLECYSTECTOMY N/A 3/4/2021    Procedure: Laparoscopic cholecystectomy;  Surgeon: Osmany Smith MD;  Location:  OR       Current Medications    Current Outpatient Medications:      levonorgestrel (MIRENA) 20 MCG/24HR IUD, 1 each (20 mcg) by Intrauterine route once, Disp: , Rfl:      paliperidone ER (INVEGA) 6 MG 24 hr tablet, Take 1 tablet (6 mg) by mouth every morning, Disp: 30 tablet, Rfl: 5     sertraline (ZOLOFT) 100 MG tablet, Take 1 tablet (100 mg) by mouth daily, Disp: 30 tablet, Rfl: 11     zolpidem (AMBIEN) 5 MG tablet, Take 1 tablet (5 mg) by mouth nightly as needed for sleep,  "Disp: 30 tablet, Rfl: 1    Family History   Problem Relation Age of Onset     Hypertension  Father      Cerebrovascular Disease Father 56        Passed away from double brain stem clot     Mental Illness Father      Depression Father      Anxiety Disorder Father      Alcoholism Father      Schizophrenia Father      Asthma Brother      Cancer Maternal Grandfather         lung     Schizophrenia Paternal Grandfather      Suicide Paternal Grandfather 53     Autism Spectrum Disorder Son      Kidney Disease Son         \"has a third kidney\"   Mother - hemochromatosis.     Social History  , she has 3 children, 2 living with her. She uses e-ciggs, intermittently for 3 years. She denies drinking alcohol or using illicit drugs. Occupation:  - works from home.     Review of Systems   Systemic:             As above   Eye:                      Episodes of blurred vision    Nam-Laryngeal:     Sometimes she has a pressure sensation on her neck - intermittently, no dysphagia, no hoarseness, no cough     Breast:                  No breast symptoms  Cardiovascular:    Chest tightness; pounding palpitations   Pulmonary:           SOB with exertion; she hasn't been able to exercise in the last month  Gastrointestinal:   Alternating episodes of diarrhea or constipation; no heartburns    Genitourinary:      increased thirst and urination; urinates 3-4 times a night - more often in the last 3-4 months; doesn't drink water during the night; during daytime, she drinks approximately 60 ounces of liquids daily  Endocrine:            Hard time finding the right temperature   Neurological:        Headaches present a couple of times a week for the last year- temporal areas and back of the skull; no tremor  Musculoskeletal:  As above   Skin:                     chronic dry skin, no hair falling out, no significant acne or facial hair    Psychological:     Anxiety and depression are controlled               Vital Signs   "   Previous Weights:    Wt Readings from Last 10 Encounters:   11/18/21 83.5 kg (184 lb)   08/17/21 80.7 kg (178 lb)   03/04/21 79.4 kg (175 lb)   03/01/21 79.4 kg (175 lb)   02/16/21 77.1 kg (170 lb)   01/11/21 77.3 kg (170 lb 6.4 oz)   01/06/21 76.9 kg (169 lb 8 oz)   12/06/20 74.8 kg (164 lb 12.8 oz)   02/18/20 79.3 kg (174 lb 12.8 oz)   02/13/20 77.9 kg (171 lb 12.8 oz)        BP Readings from Last 6 Encounters:   08/17/21 132/78   03/04/21 124/80   03/01/21 108/78   02/28/21 112/76   02/16/21 128/74   01/11/21 104/62     LMP  (LMP Unknown)     Physical Exam  General Appearance: alert, no distress noted   Eyes: grossly normal to inspection, conjunctivae and sclerae normal, no lid lag or stare   Respiratory: no audible wheeze, cough, or visible cyanosis.  No visible retractions or increased work of breathing.  Able to speak fully in complete sentences.  Neurological: Cranial nerves grossly intact, mentation intact and speech normal; no tremor of the hands   Skin: no lesions on exposed skin   Psychological: mentation appears normal, affect normal, judgement and insight intact, normal speech and appearance well-groomed    Lab Results  I reviewed prior lab results documented in Epic.  TSH   Date Value Ref Range Status   10/31/2021 2.78 0.40 - 4.00 mU/L Final   04/21/2021 1.80 0.40 - 4.00 mU/L Final   12/11/2020 2.07 0.40 - 4.00 mU/L Final   01/27/2020 2.25 0.40 - 4.00 mU/L Final   11/05/2018 2.05 0.40 - 4.00 mU/L Final   06/04/2018 2.36 0.40 - 4.00 mU/L Final     Assessment     Isaura Gray is a 36 year old female with a past medical history significant for schizophrenia (dx at age 25), anxiety, depression, follicular thyroid carcinoma, pulmonary embolism (x 2, possible related to BCP), seen in consultation for evaluation of multiple symptoms and recently diagnosed hyperparathyroidism, on lab work.    Clinically, the patient complains of generalized fatigue and weakness, diffuse musculoskeletal pain, numbness  and tingling sensation, nausea, lightheadedness, weight gain, polyuria and polydipsia, cold intolerance.      1.  Fatigue, lightheadedness, nausea  Overall low suspicion for adrenal insufficiency based on clinical presentation.  Recommended to screen for this by checking morning ACTH and cortisol level.    2.  Intermittent episodes of mild hypocalcemia/hypoparathyroidism  It is interesting the vitamin D level was low in February 2020, while taking high-dose of vitamin D3, of 10,000 units daily.  I suspect the hyperparathyroidism is a consequence of hypocalcemia and expect it to correct once the calcium level normalizes.  Recommendations:  Screen for celiac disease by checking tissue transglutaminase antibodies, to evaluate for possible malabsorption  Start taking 600 mg calcium twice daily  Have the calcium, magnesium, phosphorus, PTH and vitamin D level checked in 1 month.    3.  History of hyperprolactinemia  The recent MRI was negative for pituitary tumor.  I suspect the elevated prolactin was a consequence of treatment with antipsychotics.  Follow-up prolactin level.    4.  History of follicular thyroid cancer, status post left hemithyroidectomy and radioiodine treatment  The patient has not required treatment with thyroid hormone.  Prior TSH levels have been normal.  I added a free T4 to her next lab work, to confirm the euthyroid status.    Given her symptoms, I recommended to consult neurology.    Orders Placed This Encounter   Procedures     Prolactin     Tissue transglutaminase antibody IgA     Basic metabolic panel     Albumin level     25 Hydroxyvitamin D2 and D3     Parathyroid Hormone Intact     Adrenal corticotropin     Cortisol     T4 free     Magnesium     65 minutes spent on the date of the encounter doing chart review, history and exam, documentation and further activities as noted above.       Answers for HPI/ROS submitted by the patient on 11/22/2021  General Symptoms: Yes  Skin Symptoms:  Yes  HENT Symptoms: Yes  EYE SYMPTOMS: Yes  HEART SYMPTOMS: Yes  LUNG SYMPTOMS: No  INTESTINAL SYMPTOMS: Yes  URINARY SYMPTOMS: Yes  GYNECOLOGIC SYMPTOMS: No  BREAST SYMPTOMS: Yes  SKELETAL SYMPTOMS: Yes  BLOOD SYMPTOMS: No  NERVOUS SYSTEM SYMPTOMS: Yes  MENTAL HEALTH SYMPTOMS: Yes  Ear pain: No  Ear discharge: No  Hearing loss: No  Tinnitus: Yes  Nosebleeds: No  Congestion: Yes  Sinus pain: Yes  Trouble swallowing: Yes   Voice hoarseness: No  Mouth sores: No  Sore throat: No  Tooth pain: No  Gum tenderness: No  Bleeding gums: No  Change in taste: No  Change in sense of smell: No  Dry mouth: No  Hearing aid used: No  Neck lump: Yes  Fever: No  Loss of appetite: No  Weight loss: No  Weight gain: Yes  Fatigue: Yes  Night sweats: Yes  Chills: Yes  Increased stress: Yes  Excessive hunger: Yes  Excessive thirst: Yes  Feeling hot or cold when others believe the temperature is normal: Yes  Loss of height: No  Post-operative complications: No  Surgical site pain: No  Hallucinations: Yes  Change in or Loss of Energy: Yes  Hyperactivity: No  Confusion: Yes  Changes in hair: No  Changes in moles/birth marks: No  Itching: Yes  Rashes: No  Changes in nails: No  Acne: No  Hair in places you don't want it: No  Change in facial hair: No  Warts: No  Non-healing sores: No  Scarring: No  Flaking of skin: Yes  Color changes of hands/feet in cold : Yes  Sun sensitivity: No  Skin thickening: No  Eye pain: No  Vision loss: Yes  Dry eyes: No  Watery eyes: Yes  Eye bulging: No  Double vision: No  Flashing of lights: No  Spots: No  Floaters: No  Redness: No  Crossed eyes: No  Tunnel Vision: No  Yellowing of eyes: No  Eye irritation: No  Chest pain or pressure: Yes  Fast or irregular heartbeat: Yes  Pain in legs with walking: Yes  Trouble breathing while lying down: Yes  Fingers or toes appear blue: No  High blood pressure: No  Low blood pressure: Yes  Fainting: Yes  Murmurs: No  Pacemaker: No  Varicose veins: No  Edema or swelling: Yes  Wake  up at night with shortness of breath: Yes  Light-headedness: Yes  Exercise intolerance: Yes  Heart burn or indigestion: No  Nausea: Yes  Vomiting: Yes  Abdominal pain: Yes  Bloating: Yes  Constipation: Yes  Diarrhea: Yes  Blood in stool: No  Black stools: No  Rectal or Anal pain: No  Fecal incontinence: No  Yellowing of skin or eyes: No  Vomit with blood: No  Change in stools: No  Trouble holding urine or incontinence: Yes  Pain or burning: No  Trouble starting or stopping: No  Increased frequency of urination: Yes  Blood in urine: No  Decreased frequency of urination: No  Frequent nighttime urination: Yes  Flank pain: No  Difficulty emptying bladder: No  Discharge: Yes  Lumps: No  Pain: No  Nipple retraction: No  Back pain: Yes  Muscle aches: Yes  Neck pain: Yes  Swollen joints: Yes  Joint pain: Yes  Bone pain: Yes  Muscle cramps: Yes  Muscle weakness: Yes  Joint stiffness: Yes  Bone fracture: No  Trouble with coordination: Yes  Dizziness or trouble with balance: Yes  Fainting or black-out spells: Yes  Memory loss: Yes  Headache: Yes  Seizures: No  Speech problems: No  Tingling: Yes  Tremor: No  Weakness: Yes  Difficulty walking: No  Paralysis: No  Numbness: No  Nervous or Anxious: Yes  Depression: Yes  Trouble sleeping: Yes  Trouble thinking or concentrating: Yes  Mood changes: Yes  Panic attacks: Yes    65 minutes spent on the date of the encounter doing chart review, history and exam, documentation and further activities as noted above.

## 2021-11-23 NOTE — LETTER
11/23/2021         RE: Isaura Gray  [address] [facility] [address]        Dear Colleague,    Thank you for referring your patient, Isaura Gray, to the [facility]. Please see a copy of my visit note below.    Video-Visit Details    Type of service:  Video Visit    Video call duration:  Start: 11/23/2021 11:13 am  Stop: 11/23/2021 12:00 pm    Originating Location (pt. Location): Home  Distant Location (provider location):  [facility]   Platform used for Video Visit: AmWell    Due to the COVID 19 pandemic this visit was converted to a video visit in order to help prevent spread of infection in this patient and the general population.     The patient is seen in consultation at the request of Dr. Blair Wan.     Isaura Gray is a 36 year old female with a past medical history significant for depression, follicular thyroid carcinoma, pulmonary embolism (x 2, possible related to BCP), ? pituitary tumor, schizophrenia (dx at age 25), nonalcoholic fatty disease and bipolar disorder.     The patient complains of the following symptoms:  - Persistent fatigue and generalized weakness over the last 2 years  - Diffuse musculoskeletal body aches present for the last 6 months and getting worse.  She perceives the pain as being present in her muscles and joints, mainly in her stomach, shoulders and elbows.  The pain is present in the morning when she wakes up and it is persistent throughout the day.  She cannot identify any aggravating or alleviating factors.  - Intermittent episodes of nausea for the last 2 to 3 years.  When present, the nausea persists throughout the day.  She does not associate it with fasting or food intake.  She very rarely vomits.  - Episodes of increased hunger.  She reports a weight gain of 40 pounds in the last year.  - Cold intolerance noticed for the last 6 months.  When she warms up, she gets overheated very easily.  It is hard  for her to find the right temperature.  - Recurrent episodes of numbness and tingling sensation in her hands and feet.  With prolonged walking, her feet feel numb and very sensitive.  - Longstanding episodes of lightheadedness when she stands up from a sitting or lying position, present for many years.  At times, she blacks out for a few seconds.  She describes experiencing an episode when she fell on the stairs.  Denies losing consciousness.  She was never told her blood pressure is low.  - Salt craving.  She eats a lot of salty snacks.    Isaura was diagnosed with an elevated prolactin while being treated with Seroquel, in 2018.  On follow-up blood work, the prolactin level normalized.  She also reports being diagnosed with a pituitary tumor, for which no treatment or follow-up was recommended.  I reviewed the brain MRI images from March 2019.  The pituitary gland is normal looking.    Around the age of 23, she was diagnosed with follicular thyroid cancer.  The patient does not remember details regarding thyroid surgery but she does remember being treated with radioiodine treatment.  Surgery was done in [place] and the patient has not been able to retrieve the prior medical records.  Denies ever being treated with thyroid hormone replacement.  There is no family history of thyroid disease or thyroid cancer and no personal history of radiation exposure. On the 3/2019 CT head and neck angio the left thyroid gland was reported to be hypoplastic or surgically absent.  I reviewed the CT images.  The right thyroid lobe is present, with no identifiable nodules.    Parathyroid hormone level was elevated at 126 on 10/31/2021, associated with a low calcium of 8.2, for a normal albumin of 3.7. Alkaline phosphatase was normal. The liver enzymes were mildly elevated.  The patient has been experiencing intermittent episodes of mild hypocalcemia, dating back to 2018.  The patient reports taking a liquid vitamin D3 supplement  ([employer]), 10,000 units daily (2 daily drops), for the last 4 years.  She has tried other vitamin D supplements but she was not able to tolerate them.  She might have a glass of milk a day and she eats a lot of cheese.  For the last 3 to 4 years, she has been taking Tums, 3 to 4 pills several times a day, to try to alleviate the stomach pain.    Pertinent labs reviewed:  12/2020 ferritin 50, free T4 2.6, free T4 0.98, TSH 2.07, normal ceruloplasmin. Most recent TSH 2.78, on 10/31/2021.  Normal FSH in April 2021 and October 2020  10/31/21 A1c 5%   2/2020 vitamin B12 459 and vitamin D 15  10/31/2021 normal CBC, TSH 2.78     1/6/2021 - adrenal glands grossly unremarkable on the abd CT. Images reviewed by me.     Her mother was recently diagnosed with hemochromatosis.    Past Medical History   Past Medical History:   Diagnosis Date     ASCUS of cervix with negative high risk HPV 09/19/2016    [facility]     Cancer (H)      Depressive disorder      Follicular cancer of thyroid (H)      Hyperprolactinemia (H)      Mitral valve prolapse      PE (pulmonary thromboembolism) (H)      Pituitary tumor      Schizophrenia (H)     reports spontaneous remission during last pregnancy     Thyroid disease    Melanocytic nevi  Mitral valve prolapse - mild   Posttraumatic stress disorder  Nonalcoholic fatty liver disease  Psychophysiological insomnia  Biliary dyskinesia  Bipolar disorder  Anxiety  On Mirena IUD - since 2019    Past Surgical History   Past Surgical History:   Procedure Laterality Date     APPENDECTOMY       ENT SURGERY      Partial thyroidectomy     LAPAROSCOPIC CHOLECYSTECTOMY N/A 3/4/2021    Procedure: Laparoscopic cholecystectomy;  Surgeon: Osmany Smith MD;  Location:  OR       Current Medications    Current Outpatient Medications:      levonorgestrel (MIRENA) 20 MCG/24HR IUD, 1 each (20 mcg) by Intrauterine route once, Disp: , Rfl:      paliperidone ER (INVEGA) 6 MG 24 hr tablet, Take 1 tablet  "(6 mg) by mouth every morning, Disp: 30 tablet, Rfl: 5     sertraline (ZOLOFT) 100 MG tablet, Take 1 tablet (100 mg) by mouth daily, Disp: 30 tablet, Rfl: 11     zolpidem (AMBIEN) 5 MG tablet, Take 1 tablet (5 mg) by mouth nightly as needed for sleep, Disp: 30 tablet, Rfl: 1    Family History   Problem Relation Age of Onset     Hypertension  Father      Cerebrovascular Disease Father 56        Passed away from double brain stem clot     Mental Illness Father      Depression Father      Anxiety Disorder Father      Alcoholism Father      Schizophrenia Father      Asthma Brother      Cancer Maternal Grandfather         lung     Schizophrenia Paternal Grandfather      Suicide Paternal Grandfather 53     Autism Spectrum Disorder Son      Kidney Disease Son         \"has a third kidney\"   Mother - hemochromatosis.     Social History  , she has 3 children, 2 living with her. She uses e-ciggs, intermittently for 3 years. She denies drinking alcohol or using illicit drugs. Occupation:  - works from home.     Review of Systems   Systemic:             As above   Eye:                      Episodes of blurred vision    Nam-Laryngeal:     Sometimes she has a pressure sensation on her neck - intermittently, no dysphagia, no hoarseness, no cough     Breast:                  No breast symptoms  Cardiovascular:    Chest tightness; pounding palpitations   Pulmonary:           SOB with exertion; she hasn't been able to exercise in the last month  Gastrointestinal:   Alternating episodes of diarrhea or constipation; no heartburns    Genitourinary:      increased thirst and urination; urinates 3-4 times a night - more often in the last 3-4 months; doesn't drink water during the night; during daytime, she drinks approximately 60 ounces of liquids daily  Endocrine:            Hard time finding the right temperature   Neurological:        Headaches present a couple of times a week for the last year- temporal areas " and back of the skull; no tremor  Musculoskeletal:  As above   Skin:                     chronic dry skin, no hair falling out, no significant acne or facial hair    Psychological:     Anxiety and depression are controlled               Vital Signs     Previous Weights:    Wt Readings from Last 10 Encounters:   11/18/21 83.5 kg (184 lb)   08/17/21 80.7 kg (178 lb)   03/04/21 79.4 kg (175 lb)   03/01/21 79.4 kg (175 lb)   02/16/21 77.1 kg (170 lb)   01/11/21 77.3 kg (170 lb 6.4 oz)   01/06/21 76.9 kg (169 lb 8 oz)   12/06/20 74.8 kg (164 lb 12.8 oz)   02/18/20 79.3 kg (174 lb 12.8 oz)   02/13/20 77.9 kg (171 lb 12.8 oz)        BP Readings from Last 6 Encounters:   08/17/21 132/78   03/04/21 124/80   03/01/21 108/78   02/28/21 112/76   02/16/21 128/74   01/11/21 104/62     LMP  (LMP Unknown)     Physical Exam  General Appearance: alert, no distress noted   Eyes: grossly normal to inspection, conjunctivae and sclerae normal, no lid lag or stare   Respiratory: no audible wheeze, cough, or visible cyanosis.  No visible retractions or increased work of breathing.  Able to speak fully in complete sentences.  Neurological: Cranial nerves grossly intact, mentation intact and speech normal; no tremor of the hands   Skin: no lesions on exposed skin   Psychological: mentation appears normal, affect normal, judgement and insight intact, normal speech and appearance well-groomed    Lab Results  I reviewed prior lab results documented in Epic.  TSH   Date Value Ref Range Status   10/31/2021 2.78 0.40 - 4.00 mU/L Final   04/21/2021 1.80 0.40 - 4.00 mU/L Final   12/11/2020 2.07 0.40 - 4.00 mU/L Final   01/27/2020 2.25 0.40 - 4.00 mU/L Final   11/05/2018 2.05 0.40 - 4.00 mU/L Final   06/04/2018 2.36 0.40 - 4.00 mU/L Final     Assessment     Isaura Gray is a 36 year old female with a past medical history significant for schizophrenia (dx at age 25), anxiety, depression, follicular thyroid carcinoma, pulmonary embolism (x 2,  possible related to BCP), seen in consultation for evaluation of multiple symptoms and recently diagnosed hyperparathyroidism, on lab work.    Clinically, the patient complains of generalized fatigue and weakness, diffuse musculoskeletal pain, numbness and tingling sensation, nausea, lightheadedness, weight gain, polyuria and polydipsia, cold intolerance.      1.  Fatigue, lightheadedness, nausea  Overall low suspicion for adrenal insufficiency based on clinical presentation.  Recommended to screen for this by checking morning ACTH and cortisol level.    2.  Intermittent episodes of mild hypocalcemia/hypoparathyroidism  It is interesting the vitamin D level was low in February 2020, while taking high-dose of vitamin D3, of 10,000 units daily.  I suspect the hyperparathyroidism is a consequence of hypocalcemia and expect it to correct once the calcium level normalizes.  Recommendations:  Screen for celiac disease by checking tissue transglutaminase antibodies, to evaluate for possible malabsorption  Start taking 600 mg calcium twice daily  Have the calcium, magnesium, phosphorus, PTH and vitamin D level checked in 1 month.    3.  History of hyperprolactinemia  The recent MRI was negative for pituitary tumor.  I suspect the elevated prolactin was a consequence of treatment with antipsychotics.  Follow-up prolactin level.    4.  History of follicular thyroid cancer, status post left hemithyroidectomy and radioiodine treatment  The patient has not required treatment with thyroid hormone.  Prior TSH levels have been normal.  I added a free T4 to her next lab work, to confirm the euthyroid status.    Given her symptoms, I recommended to consult neurology.    Orders Placed This Encounter   Procedures     Prolactin     Tissue transglutaminase antibody IgA     Basic metabolic panel     Albumin level     25 Hydroxyvitamin D2 and D3     Parathyroid Hormone Intact     Adrenal corticotropin     Cortisol     T4 free     Magnesium      65 minutes spent on the date of the encounter doing chart review, history and exam, documentation and further activities as noted above.       Answers for HPI/ROS submitted by the patient on 11/22/2021  General Symptoms: Yes  Skin Symptoms: Yes  HENT Symptoms: Yes  EYE SYMPTOMS: Yes  HEART SYMPTOMS: Yes  LUNG SYMPTOMS: No  INTESTINAL SYMPTOMS: Yes  URINARY SYMPTOMS: Yes  GYNECOLOGIC SYMPTOMS: No  BREAST SYMPTOMS: Yes  SKELETAL SYMPTOMS: Yes  BLOOD SYMPTOMS: No  NERVOUS SYSTEM SYMPTOMS: Yes  MENTAL HEALTH SYMPTOMS: Yes  Ear pain: No  Ear discharge: No  Hearing loss: No  Tinnitus: Yes  Nosebleeds: No  Congestion: Yes  Sinus pain: Yes  Trouble swallowing: Yes   Voice hoarseness: No  Mouth sores: No  Sore throat: No  Tooth pain: No  Gum tenderness: No  Bleeding gums: No  Change in taste: No  Change in sense of smell: No  Dry mouth: No  Hearing aid used: No  Neck lump: Yes  Fever: No  Loss of appetite: No  Weight loss: No  Weight gain: Yes  Fatigue: Yes  Night sweats: Yes  Chills: Yes  Increased stress: Yes  Excessive hunger: Yes  Excessive thirst: Yes  Feeling hot or cold when others believe the temperature is normal: Yes  Loss of height: No  Post-operative complications: No  Surgical site pain: No  Hallucinations: Yes  Change in or Loss of Energy: Yes  Hyperactivity: No  Confusion: Yes  Changes in hair: No  Changes in moles/birth marks: No  Itching: Yes  Rashes: No  Changes in nails: No  Acne: No  Hair in places you don't want it: No  Change in facial hair: No  Warts: No  Non-healing sores: No  Scarring: No  Flaking of skin: Yes  Color changes of hands/feet in cold : Yes  Sun sensitivity: No  Skin thickening: No  Eye pain: No  Vision loss: Yes  Dry eyes: No  Watery eyes: Yes  Eye bulging: No  Double vision: No  Flashing of lights: No  Spots: No  Floaters: No  Redness: No  Crossed eyes: No  Tunnel Vision: No  Yellowing of eyes: No  Eye irritation: No  Chest pain or pressure: Yes  Fast or irregular heartbeat:  Yes  Pain in legs with walking: Yes  Trouble breathing while lying down: Yes  Fingers or toes appear blue: No  High blood pressure: No  Low blood pressure: Yes  Fainting: Yes  Murmurs: No  Pacemaker: No  Varicose veins: No  Edema or swelling: Yes  Wake up at night with shortness of breath: Yes  Light-headedness: Yes  Exercise intolerance: Yes  Heart burn or indigestion: No  Nausea: Yes  Vomiting: Yes  Abdominal pain: Yes  Bloating: Yes  Constipation: Yes  Diarrhea: Yes  Blood in stool: No  Black stools: No  Rectal or Anal pain: No  Fecal incontinence: No  Yellowing of skin or eyes: No  Vomit with blood: No  Change in stools: No  Trouble holding urine or incontinence: Yes  Pain or burning: No  Trouble starting or stopping: No  Increased frequency of urination: Yes  Blood in urine: No  Decreased frequency of urination: No  Frequent nighttime urination: Yes  Flank pain: No  Difficulty emptying bladder: No  Discharge: Yes  Lumps: No  Pain: No  Nipple retraction: No  Back pain: Yes  Muscle aches: Yes  Neck pain: Yes  Swollen joints: Yes  Joint pain: Yes  Bone pain: Yes  Muscle cramps: Yes  Muscle weakness: Yes  Joint stiffness: Yes  Bone fracture: No  Trouble with coordination: Yes  Dizziness or trouble with balance: Yes  Fainting or black-out spells: Yes  Memory loss: Yes  Headache: Yes  Seizures: No  Speech problems: No  Tingling: Yes  Tremor: No  Weakness: Yes  Difficulty walking: No  Paralysis: No  Numbness: No  Nervous or Anxious: Yes  Depression: Yes  Trouble sleeping: Yes  Trouble thinking or concentrating: Yes  Mood changes: Yes  Panic attacks: Yes    65 minutes spent on the date of the encounter doing chart review, history and exam, documentation and further activities as noted above.      Isaura is a 36 year old who is being evaluated via a billable video visit.      How would you like to obtain your AVS? MyChart  If the video visit is dropped, the invitation should be resent by: Send to e-mail at:  [email or web address]  Will anyone else be joining your video visit? No     Gracie Ly CMA  Adult Endocrinology  [facility] [place]          Again, thank you for allowing me to participate in the care of your patient.        Sincerely,        Merced Ko MD

## 2021-11-23 NOTE — PROGRESS NOTES
Isaura is a 36 year old who is being evaluated via a billable video visit.      How would you like to obtain your AVS? MyChart  If the video visit is dropped, the invitation should be resent by: Send to e-mail at: [email or web address]  Will anyone else be joining your video visit? No     Gracie Ly CMA  Adult Endocrinology  [facility]       agitation...

## 2021-11-27 ENCOUNTER — OFFICE VISIT (OUTPATIENT)
Dept: URGENT CARE | Facility: URGENT CARE | Age: 36
End: 2021-11-27
Payer: MEDICARE

## 2021-11-27 VITALS
TEMPERATURE: 98.6 F | OXYGEN SATURATION: 99 % | BODY MASS INDEX: 28.7 KG/M2 | DIASTOLIC BLOOD PRESSURE: 81 MMHG | HEART RATE: 105 BPM | SYSTOLIC BLOOD PRESSURE: 127 MMHG | WEIGHT: 200 LBS

## 2021-11-27 DIAGNOSIS — R30.0 DYSURIA: Primary | ICD-10-CM

## 2021-11-27 DIAGNOSIS — N15.9 KIDNEY INFECTION: ICD-10-CM

## 2021-11-27 LAB
ALBUMIN UR-MCNC: NEGATIVE MG/DL
APPEARANCE UR: CLEAR
BILIRUB UR QL STRIP: NEGATIVE
CLUE CELLS: ABNORMAL
COLOR UR AUTO: YELLOW
GLUCOSE UR STRIP-MCNC: NEGATIVE MG/DL
HGB UR QL STRIP: NEGATIVE
KETONES UR STRIP-MCNC: NEGATIVE MG/DL
LEUKOCYTE ESTERASE UR QL STRIP: NEGATIVE
NITRATE UR QL: NEGATIVE
PH UR STRIP: 5.5 [PH] (ref 5–7)
SP GR UR STRIP: 1.01 (ref 1–1.03)
TRICHOMONAS, WET PREP: ABNORMAL
UROBILINOGEN UR STRIP-ACNC: 0.2 E.U./DL
WBC'S/HIGH POWER FIELD, WET PREP: ABNORMAL
YEAST, WET PREP: ABNORMAL

## 2021-11-27 PROCEDURE — 99213 OFFICE O/P EST LOW 20 MIN: CPT | Performed by: NURSE PRACTITIONER

## 2021-11-27 PROCEDURE — 87210 SMEAR WET MOUNT SALINE/INK: CPT | Performed by: NURSE PRACTITIONER

## 2021-11-27 PROCEDURE — 81003 URINALYSIS AUTO W/O SCOPE: CPT | Performed by: NURSE PRACTITIONER

## 2021-11-27 ASSESSMENT — ENCOUNTER SYMPTOMS
RESPIRATORY NEGATIVE: 1
FEVER: 1
CHILLS: 1
NEUROLOGICAL NEGATIVE: 1
FLANK PAIN: 1
CARDIOVASCULAR NEGATIVE: 1
DYSURIA: 0

## 2021-11-27 NOTE — PATIENT INSTRUCTIONS
Patient Education     Kidney Infection (Adult Female)     An infection in one or both kidneys is called pyelonephritis. It usually happens when bacteria ) get into the kidney. Rarely it is caused when other germs such as viruses, fungi, or other disease-causing organisms get into the kidney. The bacteria or other disease-causing organisms can enter the kidneys from the bladder or blood traveling from other parts of the body. A kidney infection can become serious. It can cause severe illness, scarring of the kidneys, or kidney failure if not treated correctly.  Common causes for this problem include:    Not keeping the genital area clean and dry, which promotes the growth of bacteria    Wiping back to front. This drags bacteria from the rectum toward the urinary opening (urethra).    Wearing tight pants or underwear. This lets moisture build up in the genital area, which helps bacteria grow.    Holding urine in for long periods of time    Dehydration    Urinary tract infections    Blockages of urine draining from the kidney, such as a kidney stone  Kidney infections can cause symptoms similar to a bladder infection. Symptoms include:    Pain (or burning) when urinating    Having to urinate more often than usual    Blood in the urine (pink or red)    Belly (abdominal) pain or discomfort, usually in the lower abdomen    Pain in the side or back    Pain above the pubic bone    Fever or chills    Vomiting    Loss of appetite  Treatment is oral antibiotics. More severe cases are treated with intramuscular or IV (intravenous) antibiotics. These are started right away and may be changed once urine culture results show the infecting organisms. Treatment helps prevent a more serious kidney infection. Symptoms of kidney infections can vary based on your age.  Medicines  Medicines can help in the treatment of a bladder infection:    Take antibiotics exactly as prescribed and until they are used up, even if you feel better.  It's important to finish them to make sure the infection is gone.    Unless another medicine was prescribed, you can use over-the-counter medicines for pain, fever, or discomfort. If you have chronic liver of kidney disease, talk with your healthcare provider before using these medicines. Also talk with your provider if you've ever had a stomach ulcer or digestive bleeding, or are taking blood thinners.  Home care  The following are general care guidelines:    Stay home from work or school. Rest in bed until your fever breaks and you are feeling better, or as advised by your healthcare provider.    Drink lots of fluid unless you must restrict fluids for other medical reasons. This will force the medicine into your urinary system and flush the bacteria out of your body. Ask your healthcare provider how much you should drink.    Don't have sex until you have finished all of your medicine and your symptoms are gone.    Don't have caffeine, alcohol, or spicy foods. These foods may irritate the kidneys and bladder.    Don't take bubble baths. Sensitivity to the chemicals in bubble baths can irritate the urethra.    Make sure you wipe from front to back after using the toilet.    Wear loose cloths and cotton underwear.  Prevention  These self-care steps can help prevent future infections:    Drink plenty of fluids to prevent dehydration and flush out the bladder. Do this unless you must restrict fluids for other health reasons, or your healthcare provider told you not to.    Correct cleaning after going to the bathroom in important. Make sure you wipe from front to back after using the toilet.    Urinate more often. Don't try to hold urine in for a long time.    Don't wear tight-fitting pants and underwear.    Improve your diet to prevent constipation. Eat more fruits, vegetables, and fiber. Eat less junk and fatty foods. Constipation can make a urinary tract infection more likely. Talk with your healthcare provider if  you have trouble with bowel movements.    Urinate right after sex to flush out the bladder.  Follow-up care  Follow up with your healthcare provider, or as advised. Additional testing may be needed to make sure the infection has cleared. Close follow-up and further testing is very important to find the cause and to prevent future infections.  If a urine culture was done, you will be contacted if your treatment needs to be changed. If directed, you may call to find out the results.  If you had an X-ray, CT scan, or other diagnostic test, you will be notified of any new findings that may affect your care.  Call 911  Call 911 if any of the following occur:    Trouble breathing    Fainting or loss of consciousness    Rapid or very slow heart rate    Weakness, dizziness, or fainting    Trouble arousing or confusion  When to seek medical advice  Call your healthcare provider right away if any of these occur:    Fever 100.4 F (38 C) or higher, or as directed by your healthcare provider    Not feeling better or symptoms get worse within 1 to 2 days after starting antibiotics    Any symptom that continues after 3 days of treatment    Increasing pain in the stomach, back, side, or groin area    Repeated vomiting    Not able to take prescribed medicine due to nausea or another reason    Bloody, dark-colored, or foul smelling urine    Trouble urinating or decreased urine output    No urine for 8 hours, no tears when crying, confusion, sunken eyes, or dry mouth  Nani last reviewed this educational content on 11/1/2019 2000-2021 [employer]. All rights reserved. This information is not intended as a substitute for professional medical care. Always follow your healthcare professional's instructions.

## 2021-11-27 NOTE — PROGRESS NOTES
HPI  Patient is a 36-year-old female who comes in with bilateral flank pain worse on the left than the right.  She notes that she was recently treated for a kidney stone and suspects she has pyelonephritis.  She currently is afebrile but has felt feverish at home she has had nausea without vomiting and significant flank pain.  She has taken ibuprofen with some relief.    Review of Systems   Constitutional: Positive for chills, fever and malaise/fatigue.   HENT: Negative.    Respiratory: Negative.    Cardiovascular: Negative.    Genitourinary: Positive for flank pain. Negative for dysuria.   Neurological: Negative.      Past Medical History:   Diagnosis Date     ASCUS of cervix with negative high risk HPV 2016    [facility]     Cancer (H)      Depressive disorder      Follicular cancer of thyroid (H)      Hyperprolactinemia (H)      Mitral valve prolapse      PE (pulmonary thromboembolism) (H)      Pituitary tumor      Schizophrenia (H)     reports spontaneous remission during last pregnancy     Thyroid disease      Patient Active Problem List   Diagnosis     Vitamin D deficiency     Supervision of other high risk pregnancies, unspecified trimester     PE (pulmonary thromboembolism) (H)     Hyperprolactinemia (H)     Follicular cancer of thyroid (H)     Lactose intolerance in adult     Schizophrenia (H)     Encounter for triage in pregnant patient     Cough     Chronic bilateral low back pain without sciatica     Normal labor     Anxiety     Cancer (H)     H/O  delivery, currently pregnant     History of pulmonary embolism     History of thyroid cancer     Mitral valve disorder      (normal spontaneous vaginal delivery)     Moderate major depression (H)     PTSD (post-traumatic stress disorder)     ASCUS of cervix with negative high risk HPV     Non-alcoholic fatty liver disease     Psychophysiological insomnia     Family history of Hashimoto thyroiditis     Biliary dyskinesia     Bipolar II  disorder (H)     Pituitary tumor - history      Past Surgical History:   Procedure Laterality Date     APPENDECTOMY       ENT SURGERY      Partial thyroidectomy     LAPAROSCOPIC CHOLECYSTECTOMY N/A 3/4/2021    Procedure: Laparoscopic cholecystectomy;  Surgeon: Saarh, MD Osmany;  Location: [facility]     Allergies   Allergen Reactions     Ciprofloxacin Hives     Sulfa Drugs Hives     Hydrocodone-Acetaminophen Itching           Oxycodone-Acetaminophen Itching     Current Outpatient Medications   Medication     amoxicillin-clavulanate (AUGMENTIN) 875-125 MG tablet     calcium carbonate (OS-DEBI) 1500 (600 Ca) MG tablet     levonorgestrel (MIRENA) 20 MCG/24HR IUD     paliperidone ER (INVEGA) 6 MG 24 hr tablet     sertraline (ZOLOFT) 100 MG tablet     zolpidem (AMBIEN) 5 MG tablet     No current facility-administered medications for this visit.         Physical Exam  Vitals and nursing note reviewed.   Constitutional:       General: She is not in acute distress.     Appearance: She is not toxic-appearing.      Comments: /81   Pulse 105   Temp 98.6  F (37  C) (Tympanic)   Wt 90.7 kg (200 lb)   LMP  (LMP Unknown)   SpO2 99%   BMI 28.70 kg/m       HENT:      Head: Normocephalic.   Eyes:      General: No scleral icterus.     Conjunctiva/sclera: Conjunctivae normal.   Cardiovascular:      Rate and Rhythm: Tachycardia present.      Heart sounds: Normal heart sounds.   Pulmonary:      Effort: Pulmonary effort is normal.      Breath sounds: Normal breath sounds.   Abdominal:      Tenderness: There is no abdominal tenderness. There is right CVA tenderness and left CVA tenderness.   Skin:     General: Skin is warm and dry.      Coloration: Skin is not jaundiced.   Neurological:      Mental Status: She is alert and oriented to person, place, and time.       Results for orders placed or performed in visit on 11/27/21   UA macro with reflex to Microscopic and Culture - Clinc Collect     Status: Normal    Specimen: Urine, Clean  Catch   Result Value Ref Range    Color Urine Yellow Colorless, Straw, Light Yellow, Yellow    Appearance Urine Clear Clear    Glucose Urine Negative Negative mg/dL    Bilirubin Urine Negative Negative    Ketones Urine Negative Negative mg/dL    Specific Gravity Urine 1.010 1.003 - 1.035    Blood Urine Negative Negative    pH Urine 5.5 5.0 - 7.0    Protein Albumin Urine Negative Negative mg/dL    Urobilinogen Urine 0.2 0.2, 1.0 E.U./dL    Nitrite Urine Negative Negative    Leukocyte Esterase Urine Negative Negative    Narrative    Microscopic not indicated   Wet prep - Clinic Collect     Status: Abnormal    Specimen: Vagina; Swab   Result Value Ref Range    Trichomonas Absent Absent    Yeast Absent Absent    Clue Cells Absent Absent    WBCs/high power field 2+ (A) None     Assessment:  1. Dysuria    2. Kidney infection        Plan:  Orders Placed This Encounter     UA macro with reflex to Microscopic and Culture - Clinc Collect     amoxicillin-clavulanate (AUGMENTIN) 875-125 MG tablet     Instructions regarding self-care of patient/child reviewed.   Written instructions provided in after visit summary and reviewed.  Patient instructed to see primary care provider for new or persistent symptoms.   Red flag symptoms reviewed and patient has been instructed to seek emergent care  Please contact pharmacy for medication questions.  Patient instructed to take medications as directed on package.    Continue other medications as previously prescribed.    Arianna White, DNP, APRN, CNP

## 2021-12-04 ENCOUNTER — APPOINTMENT (OUTPATIENT)
Dept: LAB | Facility: CLINIC | Age: 36
End: 2021-12-04
Payer: MEDICARE

## 2021-12-21 ENCOUNTER — LAB (OUTPATIENT)
Dept: LAB | Facility: OTHER | Age: 36
End: 2021-12-21

## 2021-12-21 DIAGNOSIS — R53.83 MALAISE AND FATIGUE: ICD-10-CM

## 2021-12-21 DIAGNOSIS — R53.81 MALAISE AND FATIGUE: ICD-10-CM

## 2021-12-21 DIAGNOSIS — E83.51 HYPOCALCEMIA: ICD-10-CM

## 2021-12-21 DIAGNOSIS — Z86.39 H/O HYPERPROLACTINEMIA: ICD-10-CM

## 2021-12-21 LAB
ALBUMIN SERPL-MCNC: 3.9 G/DL (ref 3.4–5)
ANION GAP SERPL CALCULATED.3IONS-SCNC: 5 MMOL/L (ref 3–14)
BUN SERPL-MCNC: 13 MG/DL (ref 7–30)
CALCIUM SERPL-MCNC: 9.4 MG/DL (ref 8.5–10.1)
CHLORIDE BLD-SCNC: 107 MMOL/L (ref 94–109)
CO2 SERPL-SCNC: 27 MMOL/L (ref 20–32)
CORTIS SERPL-MCNC: 14.8 UG/DL (ref 4–22)
CREAT SERPL-MCNC: 0.78 MG/DL (ref 0.52–1.04)
GFR SERPL CREATININE-BSD FRML MDRD: >90 ML/MIN/1.73M2
GLUCOSE BLD-MCNC: 100 MG/DL (ref 70–99)
MAGNESIUM SERPL-MCNC: 2.3 MG/DL (ref 1.6–2.3)
POTASSIUM BLD-SCNC: 4.1 MMOL/L (ref 3.4–5.3)
PROLACTIN SERPL-MCNC: 116 UG/L (ref 3–27)
PTH-INTACT SERPL-MCNC: 64 PG/ML (ref 18–80)
SODIUM SERPL-SCNC: 139 MMOL/L (ref 133–144)
T4 FREE SERPL-MCNC: 0.88 NG/DL (ref 0.76–1.46)

## 2021-12-21 PROCEDURE — 83970 ASSAY OF PARATHORMONE: CPT

## 2021-12-21 PROCEDURE — 83516 IMMUNOASSAY NONANTIBODY: CPT

## 2021-12-21 PROCEDURE — 82024 ASSAY OF ACTH: CPT

## 2021-12-21 PROCEDURE — 36415 COLL VENOUS BLD VENIPUNCTURE: CPT

## 2021-12-21 PROCEDURE — 84439 ASSAY OF FREE THYROXINE: CPT

## 2021-12-21 PROCEDURE — 83735 ASSAY OF MAGNESIUM: CPT

## 2021-12-21 PROCEDURE — 82533 TOTAL CORTISOL: CPT

## 2021-12-21 PROCEDURE — 82306 VITAMIN D 25 HYDROXY: CPT

## 2021-12-21 PROCEDURE — 82040 ASSAY OF SERUM ALBUMIN: CPT

## 2021-12-21 PROCEDURE — 84146 ASSAY OF PROLACTIN: CPT

## 2021-12-21 PROCEDURE — 80048 BASIC METABOLIC PNL TOTAL CA: CPT

## 2021-12-22 LAB
ACTH PLAS-MCNC: 17 PG/ML
TTG IGA SER-ACNC: 0.3 U/ML

## 2021-12-24 ENCOUNTER — PATIENT OUTREACH (OUTPATIENT)
Dept: FAMILY MEDICINE | Facility: CLINIC | Age: 36
End: 2021-12-24
Payer: MEDICARE

## 2021-12-24 LAB
DEPRECATED CALCIDIOL+CALCIFEROL SERPL-MC: <21 UG/L (ref 20–75)
VITAMIN D2 SERPL-MCNC: <5 UG/L
VITAMIN D3 SERPL-MCNC: 16 UG/L

## 2021-12-24 NOTE — PROGRESS NOTES
Clinic Care Coordination Contact    Follow Up Progress Note      Assessment: pt still reporting that she is not feeling the best.  She is going to get a Covid test today to make sure she is safe to visit family.     She is not getting tasks done as she would like.  Encouraged breaking them down into smaller chunks and being ok with not getting it all done with the holidays.     Pt completed her ABX with some associated diarrhea.  She had no other questions concerns about her medications. Last reviewed on 11/27/21 so not reviewed today.     Care Gaps:    Health Maintenance Due   Topic Date Due     URINE DRUG SCREEN  Never done     ADVANCE CARE PLANNING  Never done     MEDICARE ANNUAL WELLNESS VISIT  02/13/2021     COVID-19 Vaccine (2 - Booster for William series) 07/01/2021     INFLUENZA VACCINE (1) 09/01/2021       Care Gaps Last addressed on 8-19-21 and on calls since then she has not been feeling well enough for longer calls    Goals addressed this encounter:   Goals Addressed                    This Visit's Progress       Functional (pt-stated)   70%      Goal Statement: I want to get the upstairs cleaned and will break it down into manageable tasks.   Date Goal set: 5/4/2021   Barriers: my mental health, two young children  Strengths: I just got an henrietta to help with identifying tasks  Date to Achieve By: 5/4/2022  Patient expressed understanding of goal: yes  Action steps to achieve this goal:  1. I will stop looking at the job as one big task I need to complete and break it down into manageable steps.  2. I will learn how to use the henrietta and identify steps  3. I will start to work on the tasks at a reasonable rate  4. If I start to feel overwhelmed with what is ahead of me I will look at what I have completed and remind myself to focus on steps and not the entire task              Intervention/Education provided during outreach: encouraged pt to continue to break tasks down into manageable chunks.  Reminded her  that with the holidays it is easy to put too much stress on self to have house clean.  Encouraged to do only what she feels is needed since she is not feeling well.      Outreach Frequency: monthly    Plan:   Pt to not over do and break things down into manageable tasks.   Care Coordinator will follow up in one month.     TRAMAIEN Ramirez, Certified Financial [facility] - Care Coordinator   12/24/2021   9:57 AM  [phone]

## 2021-12-27 NOTE — RESULT ENCOUNTER NOTE
Isaura Flores!   The labs are normal, with the following exceptions:   - the prolactin level is elevated. I suspect this is due to the medication called paliperidine - which is known to have this as a side effect. I recommend to have the prolactin level monitored once a year.   - the vitamin D level is low normal. According to my notes, you are now taking 50928 U vitamin D daily from an over the counter supplement. This is a high dose and does not correlate with your vitamin D level. Some of the supplements do not actually contain the dose of vitamin D stated on the label. Would you be willing to try taking a different supplement?

## 2021-12-29 NOTE — PROGRESS NOTES
Does Isaura have a CPAP/Bipap?  No        [place] Sleep Scale: 13    Isaura is a 36 year old who is being evaluated via a billable video visit.      How would you like to obtain your AVS? [place]art  If the video visit is dropped, the invitation should be resent by: Text to cell phone: [phone]   Will anyone else be joining your video visit? No      Video Start Time: 2:08 PM        Subjective   Isaura is a 36 year old who presents for the following health issues Malaise and fatigue, salt craving, persistent insomnia, weight gain, pituitary tumor, snoring     Vitals:  No vitals were obtained today due to virtual visit.        Video-Visit Details    Type of service:  Video Visit    Video End Time:2:47 PM    Originating Location (pt. Location): Home    Distant Location (provider location):  [facility]     Platform used for Video Visit: [email or web address]       Sleep Consultation:    Date on this visit: 1/3/2022    Isaura Gray is sent by Blair Wan for a sleep consultation regarding snoring, un refreshed sleep. .    Primary Physician: Timmy Umana     Isaura Gray reports frequent snoring and occasional snorting and poor quality of sleep for 4 years.     Isaura goes to sleep at 9:00 PM during the week. She wakes up at 6:00 AM without an alarm. She falls asleep in 2-3 hours.  Isaura has difficulty falling asleep.  She wakes up 2-4 times a night for 5 - 60 minutes before falling back to sleep.  Isaura wakes up to go to the bathroom and bad dreams, external stimuli..  On weekends, Isaura goes to sleep at 9:00 PM.  She wakes up at 6:00 AM without an alarm.  Patient gets an average of 6 hours of sleep per night.     Patient does not use electronics in bed, watch TV in bed and read in bed.     Isaura does not do shift work.  She works day shifts.      Isaura does snore some nights. Patient does have a regular bed partner. There is report of snoring, gasping and choking.  She does  have witnessed apneas. They occasionally sleep separately.  Patient sleeps on her side and stomach. She has occasional sleep disturbance, snort arousals, morning dry mouth and morning confusion,. Isaura has occasional sleep paralysis and hypnogogic/hypnopompic hallucinations.    She confirms sleep walking, sleep talking and sleep terrors as a child.  Isaura has depression and anxiety.      Isaura has gained 60 pounds in the last year.  Patient describes themself as neither a morning or night person.  She would prefer to go to sleep at 10:00 PM and wake up at 9:00 AM.  Patient's [place] Sleepiness score 13/24 consistent with excessive  daytime sleepiness.      Isaura naps 3-4 times per week for  minutes, feels refreshed after naps. She takes some inadvertant naps.  She admits falling asleep at stop lights. The most recent episode was 6 month(s) ago.  Patient was counseled on the importance of driving while alert, to pull over if drowsy, or nap before getting into the vehicle if sleepy.  She uses 2 cups/day of coffee. Last caffeine intake is usually before 2 PM.    Allergies:    Allergies   Allergen Reactions     Ciprofloxacin Hives     Sulfa Drugs Hives     Hydrocodone-Acetaminophen Itching           Oxycodone-Acetaminophen Itching       Medications:    Current Outpatient Medications   Medication Sig Dispense Refill     calcium carbonate (OS-DEBI) 1500 (600 Ca) MG tablet Take 1 tablet (600 mg) by mouth 2 times daily (with meals) 180 tablet 1     levonorgestrel (MIRENA) 20 MCG/24HR IUD 1 each (20 mcg) by Intrauterine route once       paliperidone ER (INVEGA) 6 MG 24 hr tablet Take 1 tablet (6 mg) by mouth every morning 30 tablet 5     sertraline (ZOLOFT) 100 MG tablet Take 1 tablet (100 mg) by mouth daily 30 tablet 11     zolpidem (AMBIEN) 5 MG tablet Take 1 tablet (5 mg) by mouth nightly as needed for sleep 30 tablet 1       Problem List:  Patient Active Problem List    Diagnosis Date Noted     Pituitary  tumor - history      Priority: Medium     Bipolar II disorder (H) 2021     Priority: Medium     Biliary dyskinesia 2021     Priority: Medium     Added automatically from request for surgery [id number]       Non-alcoholic fatty liver disease 2020     Priority: Medium     Psychophysiological insomnia 2020     Priority: Medium     Family history of Hashimoto thyroiditis 2020     Priority: Medium     Moderate major depression (H) 2020     Priority: Medium     Cancer (H)      Priority: Medium     Anxiety 2018     Priority: Medium     History of thyroid cancer 2018     Priority: Medium      (normal spontaneous vaginal delivery) 2018     Priority: Medium     Normal labor 2018     Priority: Medium     Cough 2018     Priority: Medium     Chronic bilateral low back pain without sciatica 2018     Priority: Medium     Encounter for triage in pregnant patient 2018     Priority: Medium     Schizophrenia (H)      Priority: Medium     reports spontaneous remission during last pregnancy       Vitamin D deficiency 2018     Priority: Medium     Supervision of other high risk pregnancies, unspecified trimester 2018     Priority: Medium     Lactose intolerance in adult 2018     Priority: Medium     PE (pulmonary thromboembolism) (H)      Priority: Medium     Hyperprolactinemia (H)      Priority: Medium     Follicular cancer of thyroid (H)      Priority: Medium     Mitral valve disorder 2018     Priority: Medium     H/O  delivery, currently pregnant 2016     Priority: Medium     History of pulmonary embolism 10/19/2016     Priority: Medium     ASCUS of cervix with negative high risk HPV 2016     Priority: Medium     16 ASCUS pap, neg HPV. [facility] at [facility]. Plan: Cotest in 3 yr  20 NIL Pap, Neg HPV. Plan: pending provider.        PTSD (post-traumatic stress disorder) 2015     Priority: Medium         Past Medical/Surgical History:  Past Medical History:   Diagnosis Date     ASCUS of cervix with negative high risk HPV 2016    [facility]     Cancer (H)      Depressive disorder      Follicular cancer of thyroid (H)      Hyperprolactinemia (H)      Mitral valve prolapse      PE (pulmonary thromboembolism) (H)      Pituitary tumor      Schizophrenia (H)     reports spontaneous remission during last pregnancy     Thyroid disease      Past Surgical History:   Procedure Laterality Date     APPENDECTOMY       ENT SURGERY      Partial thyroidectomy     LAPAROSCOPIC CHOLECYSTECTOMY N/A 3/4/2021    Procedure: Laparoscopic cholecystectomy;  Surgeon: Osmany Smith MD;  Location:  OR       Social History:  Social History     Socioeconomic History     Marital status: Single     Spouse name: Not on file     Number of children: 3     Years of education: Not on file     Highest education level: Some college, no degree   Occupational History     Not on file   Tobacco Use     Smoking status: Former Smoker     Packs/day: 0.50     Years: 5.00     Pack years: 2.50     Types: Cigarettes     Quit date: 2016     Years since quittin.4     Smokeless tobacco: Never Used   Vaping Use     Vaping Use: Never used   Substance and Sexual Activity     Alcohol use: No     Comment: Has an issue with her liver (not alcohol related)     Drug use: No     Sexual activity: Yes     Partners: Male     Birth control/protection: None   Other Topics Concern     Parent/sibling w/ CABG, MI or angioplasty before 65F 55M? No   Social History Narrative    3/2018  Lives in [place] with S.O.Edgar and her daughter, Sari and son Ralph.  No smokers in the home.  No indoor cats/kittens.  No concerns about domestic violence.       Social Determinants of Health     Financial Resource Strain: High Risk     Difficulty of Paying Living Expenses: Hard   Food Insecurity: Food Insecurity Present     Worried About Running Out of Food in the  "Last Year: Sometimes true     Ran Out of Food in the Last Year: Sometimes true   Transportation Needs: No Transportation Needs     Lack of Transportation (Medical): No     Lack of Transportation (Non-Medical): No   Physical Activity: Inactive     Days of Exercise per Week: 0 days     Minutes of Exercise per Session: 0 min   Stress: Stress Concern Present     Feeling of Stress : Very much   Social Connections: Socially Isolated     Frequency of Communication with Friends and Family: Never     Frequency of Social Gatherings with Friends and Family: Never     Attends Restoration Services: Never     Active Member of Clubs or Organizations: No     Attends Club or Organization Meetings: Never     Marital Status: Living with partner   Intimate Partner Violence: Not At Risk     Fear of Current or Ex-Partner: No     Emotionally Abused: No     Physically Abused: No     Sexually Abused: No   Housing Stability: Not on file       Family History:  Family History   Problem Relation Age of Onset     No Known Problems Mother      Hypertension Father      Cerebrovascular Disease Father 56        Passed away from double brain stem clot     Mental Illness Father      Depression Father      Anxiety Disorder Father      Alcoholism Father      Schizophrenia Father      Asthma Brother      Cancer Maternal Grandfather         lung     No Known Problems Paternal Grandmother      Schizophrenia Paternal Grandfather      Suicide Paternal Grandfather 53     Autism Spectrum Disorder Son      Kidney Disease Son         \"has a third kidney\"     No Known Problems Daughter          Impression/Plan:    Snoring, witnessed apneas, remote hx sleep paralysis, frequent  hypnogogic/hypnopompic hallucinations as well as frequent sleep onset insomnia.History includes depression/anxiety. Bipolar 2 disorder, schizophrenia, PTSD. Also has history of pituitary tumor and pulmonary embolism. We will plan on a lab study for suspected sleep apnea. If positive will " assess other problems after nixon is well treated. Consider actigraphy/MSLT.   Literature provided regarding sleep apnea.      She will follow up with me in approximately two weeks after her sleep study has been competed to review the results and discuss plan of care.       Polysomnography reviewed.  Obstructive sleep apnea reviewed.  Complications of untreated sleep apnea were reviewed.        CC: Blair Wan

## 2022-01-02 ENCOUNTER — E-VISIT (OUTPATIENT)
Dept: URGENT CARE | Facility: CLINIC | Age: 37
End: 2022-01-02
Payer: MEDICARE

## 2022-01-02 DIAGNOSIS — Z20.822 SUSPECTED COVID-19 VIRUS INFECTION: Primary | ICD-10-CM

## 2022-01-02 PROCEDURE — 99207 PR NO BILLABLE SERVICE THIS VISIT: CPT | Performed by: NURSE PRACTITIONER

## 2022-01-03 ENCOUNTER — LAB (OUTPATIENT)
Dept: FAMILY MEDICINE | Facility: CLINIC | Age: 37
End: 2022-01-03
Attending: NURSE PRACTITIONER
Payer: MEDICARE

## 2022-01-03 ENCOUNTER — VIRTUAL VISIT (OUTPATIENT)
Dept: SLEEP MEDICINE | Facility: CLINIC | Age: 37
End: 2022-01-03
Attending: PHYSICIAN ASSISTANT
Payer: MEDICARE

## 2022-01-03 DIAGNOSIS — D49.7 PITUITARY TUMOR: ICD-10-CM

## 2022-01-03 DIAGNOSIS — R63.5 WEIGHT GAIN: ICD-10-CM

## 2022-01-03 DIAGNOSIS — Z20.822 SUSPECTED COVID-19 VIRUS INFECTION: ICD-10-CM

## 2022-01-03 DIAGNOSIS — G47.00 PERSISTENT INSOMNIA: ICD-10-CM

## 2022-01-03 DIAGNOSIS — R06.83 SNORING: ICD-10-CM

## 2022-01-03 DIAGNOSIS — R53.81 MALAISE AND FATIGUE: ICD-10-CM

## 2022-01-03 DIAGNOSIS — R53.83 MALAISE AND FATIGUE: ICD-10-CM

## 2022-01-03 DIAGNOSIS — G47.9 SLEEP DISORDER: ICD-10-CM

## 2022-01-03 DIAGNOSIS — R29.818 SUSPECTED SLEEP APNEA: Primary | ICD-10-CM

## 2022-01-03 DIAGNOSIS — R63.8 SALT CRAVING: ICD-10-CM

## 2022-01-03 PROCEDURE — 99203 OFFICE O/P NEW LOW 30 MIN: CPT | Mod: 95 | Performed by: PHYSICIAN ASSISTANT

## 2022-01-03 PROCEDURE — U0003 INFECTIOUS AGENT DETECTION BY NUCLEIC ACID (DNA OR RNA); SEVERE ACUTE RESPIRATORY SYNDROME CORONAVIRUS 2 (SARS-COV-2) (CORONAVIRUS DISEASE [COVID-19]), AMPLIFIED PROBE TECHNIQUE, MAKING USE OF HIGH THROUGHPUT TECHNOLOGIES AS DESCRIBED BY CMS-2020-01-R: HCPCS

## 2022-01-03 PROCEDURE — U0005 INFEC AGEN DETEC AMPLI PROBE: HCPCS

## 2022-01-03 NOTE — PATIENT INSTRUCTIONS
"      MY TREATMENT INFORMATION FOR SLEEP APNEA-  Isaura Gray    DOCTOR : ABHISHEK Mckeon-GÉNESIS    Am I having a sleep study at a sleep center?  --->Due to insurance clearance delays, you will be contacted within 2-4 weeks to schedule    Am I having a home sleep study?  --->Watch the video for the device you are using:    -/drop off device-   https://www.theAudienceube.com/watch?v=yGGFBdELGhk    -Disposable device sent out require phone/computer application-   https://www.Favery.com/watch?v=BCce_vbiwxE      Frequently asked questions:  1. What is Obstructive Sleep Apnea (TED)? TED is the most common type of sleep apnea. Apnea means, \"without breath.\"  Apnea is most often caused by narrowing or collapse of the upper airway as muscles relax during sleep.   Almost everyone has occasional apneas. Most people with sleep apnea have had brief interruptions at night frequently for many years.  The severity of sleep apnea is related to how frequent and severe the events are.   2. What are the consequences of TED? Symptoms include: feeling sleepy during the day, snoring loudly, gasping or stopping of breathing, trouble sleeping, and occasionally morning headaches or heartburn at night.  Sleepiness can be serious and even increase the risk of falling asleep while driving. Other health consequences may include development of high blood pressure and other cardiovascular disease in persons who are susceptible. Untreated TED  can contribute to heart disease, stroke and diabetes.   3. What are the treatment options? In most situations, sleep apnea is a lifelong disease that must be managed with daily therapy. Medications are not effective for sleep apnea and surgery is generally not considered until other therapies have been tried. Your treatment is your choice . Continuous Positive Airway (CPAP) works right away and is the therapy that is effective in nearly everyone. An oral device to hold your jaw forward is usually the " next most reliable option. Other options include postioning devices (to keep you off your back), weight loss, and surgery including a tongue pacing device. There is more detail about some of these options below.  4. Are my sleep studies covered by insurance? Although we will request verification of coverage, we advise you also check in advance of the study to ensure there is coverage.    Important tips for those choosing CPAP and similar devices   Know your equipment:  CPAP is continuous positive airway pressure that prevents obstructive sleep apnea by keeping the throat from collapsing while you are sleeping. In most cases, the device is  smart  and can slowly self-adjusts if your throat collapses and keeps a record every day of how well you are treated-this information is available to you and your care team.  BPAP is bilevel positive airway pressure that keeps your throat open and also assists each breath with a pressure boost to maintain adequate breathing.  Special kinds of BPAP are used in patients who have inadequate breathing from lung or heart disease. In most cases, the device is  smart  and can slowly self-adjusts to assist breathing. Like CPAP, the device keeps a record of how well you are treated.  Your mask is your connection to the device. You get to choose what feels most comfortable and the staff will help to make sure if fits. Here: are some examples of the different masks that are available:       Key points to remember on your journey with sleep apnea:  1. Sleep study.  PAP devices often need to be adjusted during a sleep study to show that they are effective and adjusted right.  2. Good tips to remember: Try wearing just the mask during a quiet time during the day so your body adapts to wearing it. A humidifier is recommended for comfort in most cases to prevent drying of your nose and throat. Allergy medication from your provider may help you if you are having nasal congestion.  3. Getting  settled-in. It takes more than one night for most of us to get used to wearing a mask. Try wearing just the mask during a quiet time during the day so your body adapts to wearing it. A humidifier is recommended for comfort in most cases. Our team will work with you carefully on the first day and will be in contact within 4 days and again at 2 and 4 weeks for advice and remote device adjustments. Your therapy is evaluated by the device each day.   4. Use it every night. The more you are able to sleep naturally for 7-8 hours, the more likely you will have good sleep and to prevent health risks or symptoms from sleep apnea. Even if you use it 4 hours it helps. Occasionally all of us are unable to use a medical therapy, in sleep apnea, it is not dangerous to miss one night.   5. Communicate. Call our skilled team on the number provided on the first day if your visit for problems that make it difficult to wear the device. Over 2 out of 3 patients can learn to wear the device long-term with help from our team. Remember to call our team or your sleep providers if you are unable to wear the device as we may have other solutions for those who cannot adapt to mask CPAP therapy. It is recommended that you sleep your sleep provider within the first 3 months and yearly after that if you are not having problems.   6. Use it for your health. We encourage use of CPAP masks during daytime quiet periods to allow your face and brain to adapt to the sensation of CPAP so that it will be a more natural sensation to awaken to at night or during naps. This can be very useful during the first few weeks or months of adapting to CPAP though it does not help medically to wear CPAP during wakefulness and  should not be used as a strategy just to meet guidelines.  7. Take care of your equipment. Make sure you clean your mask and tubing using directions every day and that your filter and mask are replaced as recommended or if they are not  working.     BESIDES CPAP, WHAT OTHER THERAPIES ARE THERE?    Positioning Device  Positioning devices are generally used when sleep apnea is mild and only occurs on your back.This example shows a pillow that straps around the waist. It may be appropriate for those whose sleep study shows milder sleep apnea that occurs primarily when lying flat on one's back. Preliminary studies have shown benefit but effectiveness at home may need to be verified by a home sleep test. These devices are generally not covered by medical insurance.  Examples of devices that maintain sleeping on the back to prevent snoring and mild sleep apnea.    Belt type body positioner  http://[employer]/    Electronic reminder  http://nightshifttherapy.com/  http://www.Doctor kinetic.[employer].au/      Oral Appliance  What is oral appliance therapy?  An oral appliance device fits on your teeth at night like a retainer used after having braces. The device is made by a specialized dentist and requires several visits over 1-2 months before a manufactured device is made to fit your teeth and is adjusted to prevent your sleep apnea. Once an oral device is working properly, snoring should be improved. A home sleep test may be recommended at that time if to determine whether the sleep apnea is adequately treated.       Some things to remember:  -Oral devices are often, but not always, covered by your medical insurance. Be sure to check with your insurance provider.   -If you are referred for oral therapy, you will be given a list of specialized dentists to consider or you may choose to visit the Web site of the American Academy of Dental Sleep Medicine  -Oral devices are less likely to work if you have severe sleep apnea or are extremely overweight.     More detailed information  An oral appliance is a small acrylic device that fits over the upper and lower teeth  (similar to a retainer or a mouth guard). This device slightly moves jaw forward, which moves the base  of the tongue forward, opens the airway, improves breathing for effective treat snoring and obstructive sleep apnea in perhaps 7 out of 10 people .  The best working devices are custom-made by a dental device  after a mold is made of the teeth 1, 2, 3.  When is an oral appliance indicated?  Oral appliance therapy is recommended as a first-line treatment for patients with primary snoring, mild sleep apnea, and for patients with moderate sleep apnea who prefer appliance therapy to use of CPAP4, 5. Severity of sleep apnea is determined by sleep testing and is based on the number of respiratory events per hour of sleep.   How successful is oral appliance therapy?  The success rate of oral appliance therapy in patients with mild sleep apnea is 75-80% while in patients with moderate sleep apnea it is 50-70%. The chance of success in patients with severe sleep apnea is 40-50%. The research also shows that oral appliances have a beneficial effect on the cardiovascular health of TED patients at the same magnitude as CPAP therapy7.  Oral appliances should be a second-line treatment in cases of severe sleep apnea, but if not completely successful then a combination therapy utilizing CPAP plus oral appliance therapy may be effective. Oral appliances tend to be effective in a broad range of patients although studies show that the patients who have the highest success are females, younger patients, those with milder disease, and less severe obesity. 3, 6.   Finding a dentist that practices dental sleep medicine  Specific training is available through the American Academy of Dental Sleep Medicine for dentists interested in working in the field of sleep. To find a dentist who is educated in the field of sleep and the use of oral appliances, near you, visit the Web site of the American Academy of Dental Sleep Medicine.    References  1. yashira Camargo al. Objectively measured vs self-reported compliance during oral  appliance therapy for sleep-disordered breathing. Chest 2013; 144(5): 2315-4547.  2. Dedrick, et al. Objective measurement of compliance during oral appliance therapy for sleep-disordered breathing. Thorax 2013; 68(1): 91-96.  3. Carlton et al. Mandibular advancement devices in 620 men and women with TED and snoring: tolerability and predictors of treatment success. Chest 2004; 125: 1312-6714.  4. Lilia, et al. Oral appliances for snoring and TED: a review. Sleep 2006; 29: 244-262.  5. Pablo et al. Oral appliance treatment for TED: an update. J Clin Sleep Med 2014; 10(2): 215-227.  6. Herbert et al. Predictors of OSAH treatment outcome. J Dent Res 2007; 86: 5306-8844.      Weight Loss:    Weight loss is a long-term strategy that may improve sleep apnea in some patients.    Weight management is a personal decision and the decision should be based on your interest and the potential benefits.  If you are interested in exploring weight loss strategies, the following discussion covers the impact on weight loss on sleep apnea and the approaches that may be successful.    Being overweight does not necessarily mean you will have health consequences.  Those who have BMI over 35 or over 27 with existing medical conditions carries greater risk.   Weight loss decreases severity of sleep apnea in most people with obesity. For those with mild obesity who have developed snoring with weight gain, even 15-30 pound weight loss can improve and occasionally eliminate sleep apnea.  Structured and life-long dietary and health habits are necessary to lose weight and keep healthier weight levels.     Though there may be significant health benefits from weight loss, long-term weight loss is very difficult to achieve- studies show success with dietary management in less than 10% of people. In addition, substantial weight loss may require years of dietary control and may be difficult if patients have severe obesity. In these  cases, surgical management may be considered.  Finally, older individuals who have tolerated obesity without health complications may be less likely to benefit from weight loss strategies.        Your BMI is There is no height or weight on file to calculate BMI.  Weight management is a personal decision.  If you are interested in exploring weight loss strategies, the following discussion covers the approaches that may be successful. Body mass index (BMI) is one way to tell whether you are at a healthy weight, overweight, or obese. It measures your weight in relation to your height.  A BMI of 18.5 to 24.9 is in the healthy range. A person with a BMI of 25 to 29.9 is considered overweight, and someone with a BMI of 30 or greater is considered obese. More than two-thirds of American adults are considered overweight or obese.  Being overweight or obese increases the risk for further weight gain. Excess weight may lead to heart disease and diabetes.  Creating and following plans for healthy eating and physical activity may help you improve your health.  Weight control is part of healthy lifestyle and includes exercise, emotional health, and healthy eating habits. Careful eating habits lifelong are the mainstay of weight control. Though there are significant health benefits from weight loss, long-term weight loss with diet alone may be very difficult to achieve- studies show long-term success with dietary management in less than 10% of people. Attaining a healthy weight may be especially difficult to achieve in those with severe obesity. In some cases, medications, devices and surgical management might be considered.  What can you do?  If you are overweight or obese and are interested in methods for weight loss, you should discuss this with your provider.     Consider reducing daily calorie intake by 500 calories.     Keep a food journal.     Avoiding skipping meals, consider cutting portions instead.    Diet combined  with exercise helps maintain muscle while optimizing fat loss. Strength training is particularly important for building and maintaining muscle mass. Exercise helps reduce stress, increase energy, and improves fitness. Increasing exercise without diet control, however, may not burn enough calories to loose weight.       Start walking three days a week 10-20 minutes at a time    Work towards walking thirty minutes five days a week     Eventually, increase the speed of your walking for 1-2 minutes at time    In addition, we recommend that you review healthy lifestyles and methods for weight loss available through the National Institutes of Health patient information sites:  https://www.nhlbi.nih.gov/health-topics/overweight-and-obesity    And look into health and wellness programs that may be available through your health insurance provider, employer, local community center, or [employer]ah club.          Surgery:    Surgery for obstructive sleep apnea is considered generally only when other therapies fail to work. Surgery may be discussed with you if you are having a difficult time tolerating CPAP and or when there is an abnormal structure that requires surgical correction.  Nose and throat surgeries often enlarge the airway to prevent collapse.  Most of these surgeries create pain for 1-2 weeks and up to half of the most common surgeries are not effective throughout life.  You should carefully discuss the benefits and drawbacks to surgery with your sleep provider and surgeon to determine if it is the best solution for you.   More information  Surgery for TED is directed at areas that are responsible for narrowing or complete obstruction of the airway during sleep.  There are a wide range of procedures available to enlarge and/or stabilize the airway to prevent blockage of breathing in the three major areas where it can occur: the palate, tongue, and nasal regions.  Successful surgical treatment depends on the accurate  identification of the factors responsible for obstructive sleep apnea in each person.  A personalized approach is required because there is no single treatment that works well for everyone.  Because of anatomic variation, consultation with an examination by a sleep surgeon is a critical first step in determining what surgical options are best for each patient.  In some cases, examination during sedation may be recommended in order to guide the selection of procedures.  Patients will be counseled about risks and benefits as well as the typical recovery course after surgery. Surgery is typically not a cure for a person s TED.  However, surgery will often significantly improve one s TED severity (termed  success rate ).  Even in the absence of a cure, surgery will decrease the cardiovascular risk associated with OSA7; improve overall quality of life8 (sleepiness, functionality, sleep quality, etc).      Palate Procedures:  Patients with TED often have narrowing of their airway in the region of their tonsils and uvula.  The goals of palate procedures are to widen the airway in this region as well as to help the tissues resist collapse.  Modern palate procedure techniques focus on tissue conservation and soft tissue rearrangement, rather than tissue removal.  Often the uvula is preserved in this procedure. Residual sleep apnea is common in patient after pharyngoplasty with an average reduction in sleep apnea events of 33%2.      Tongue Procedures:  ExamWhile patients are awake, the muscles that surround the throat are active and keep this region open for breathing. These muscles relax during sleep, allowing the tongue and other structures to collapse and block breathing.  There are several different tongue procedures available.  Selection of a tongue base procedure depends on characteristics seen on physical exam.  Generally, procedures are aimed at removing bulky tissues in this area or preventing the back of the tongue  from falling back during sleep.  Success rates for tongue surgery range from 50-62%3.    Hypoglossal Nerve Stimulation:  Hypoglossal nerve stimulation has recently received approval from the [place] Food and Drug Administration for the treatment of obstructive sleep apnea.  This is based on research showing that the system was safe and effective in treating sleep apnea6.  Results showed that the median AHI score decreased 68%, from 29.3 to 9.0. This therapy uses an implant system that senses breathing patterns and delivers mild stimulation to airway muscles, which keeps the airway open during sleep.  The system consists of three fully implanted components: a small generator (similar in size to a pacemaker), a breathing sensor, and a stimulation lead.  Using a small handheld remote, a patient turns the therapy on before bed and off upon awakening.    Candidates for this device must be greater than 22 years of age, have moderate to severe TED (AHI between 20-65), BMI less than 32, have tried CPAP/oral appliance without success, and have appropriate upper airway anatomy (determined by a sleep endoscopy performed by Dr. Espinal).    Hypoglossal Nerve Stimulation Pathway:    The sleep surgeon s office will work with the patient through the insurance prior-authorization process (including communications and appeals).    Nasal Procedures:  Nasal obstruction can interfere with nasal breathing during the day and night.  Studies have shown that relief of nasal obstruction can improve the ability of some patients to tolerate positive airway pressure therapy for obstructive sleep apnea1.  Treatment options include medications such as nasal saline, topical corticosteroid and antihistamine sprays, and oral medications such as antihistamines or decongestants. Non-surgical treatments can include external nasal dilators for selected patients. If these are not successful by themselves, surgery can improve the nasal airway either  alone or in combination with these other options.      Combination Procedures:  Combination of surgical procedures and other treatments may be recommended, particularly if patients have more than one area of narrowing or persistent positional disease.  The success rate of combination surgery ranges from 66-80%2,3.    References  1. [employer]ist CAPPS. The Role of the Nose in Snoring and Obstructive Sleep Apnoea: An Update.  Eur Arch Otorhinolaryngol. 2011; 268: 1365-73.  2.  Krunal SM; Maryan JA; Shamar JR; Pallanch JF; Junior MB; Maco SG; Hyacinth FOUNTAIN. Surgical modifications of the upper airway for obstructive sleep apnea in adults: a systematic review and meta-analysis. SLEEP 2010;33(10):3452-7748. Иван SANTANA. Hypopharyngeal surgery in obstructive sleep apnea: an evidence-based medicine review.  Arch Otolaryngol Head Neck Surg. 2006 Feb;132(2):206-13.  3. Oswald YH1, Roshni Y, Krzysztof DEUCE. The efficacy of anatomically based multilevel surgery for obstructive sleep apnea. Otolaryngol Head Neck Surg. 2003 Oct;129(4):327-35.  4. Иван SANTANA, Goldberg A. Hypopharyngeal Surgery in Obstructive Sleep Apnea: An Evidence-Based Medicine Review. Arch Otolaryngol Head Neck Surg. 2006 Feb;132(2):206-13.  5. Roe SAM et al. Upper-Airway Stimulation for Obstructive Sleep Apnea.  N Engl J Med. 2014 Jan 9;370(2):139-49.  6. Kavita Y et al. Increased Incidence of Cardiovascular Disease in Middle-aged Men with Obstructive Sleep Apnea. Am J Respir Crit Care Med; 2002 166: 159-165  7. Howell EM et al. Studying Life Effects and Effectiveness of Palatopharyngoplasty (SLEEP) study: Subjective Outcomes of Isolated Uvulopalatopharyngoplasty. Otolaryngol Head Neck Surg. 2011; 144: 623-631.        WHAT IF I ONLY HAVE SNORING?      Mandibular advancement devices, lateral sleep positioning, long-term weight loss and treatment of nasal allergies has been shown to improve snoring.    Exercising tongue muscles with a game  (https://www.ncbi.nlm.nih.gov/pubmed/99067160) or stimulating the tongue during the day with a device (https://doi.org/10.3390/tcg24720323) have t snoring    Remember to Drive Safe... Drive Alive     Sleep health profoundly affects your health, mood, and your safety.  Thirty three percent of the population (one in three of us) is not getting enough sleep and many have a sleep disorder. Not getting enough sleep or having an untreated / undertreated sleep condition may make us sleepy without even knowing it. In fact, our driving could be dramatically impaired due to our sleep health. As your provider, here are some things I would like you to know about driving:     Here are some warning signs for impairment and dangerous drowsy driving:              -Having been awake more than 16 hours               -Looking tired               -Eyelid drooping              -Head nodding (it could be too late at this point)              -Driving for more than 30 minutes     Some things you could do to make the driving safer if you are experiencing some drowsiness:              -Stop driving and rest              -Call for transportation              -Make sure your sleep disorder is adequately treated     Some things that have been shown NOT to work when experiencing drowsiness while driving:              -Turning on the radio              -Opening windows              -Eating any  distracting  /  entertaining  foods (e.g., sunflower seeds, candy, or any other)              -Talking on the phone      Your decision may not only impact your life, but also the life of others. Please, remember to drive safe for yourself and all of us.

## 2022-01-03 NOTE — PATIENT INSTRUCTIONS
Isaura,      Based on your responses, you may have coronavirus (COVID-19). This illness can cause fever, cough and trouble breathing. Many people get a mild case and get better on their own. Some people can get very sick.    Will I be tested for COVID-19?  We would like to test you for COVID-19 virus. I have placed orders for this test.     To schedule: go to your docTrackr home page and scroll down to the section that says  You have an appointment that needs to be scheduled  and click the large green button that says  Schedule Now  and follow the steps to find the next available openings.    If you are unable to complete these docTrackr scheduling steps, please call [phone] to schedule your testing.     Return to work/school/ guidance:  Please let your workplace manager and staffing office know when your isolation ends.       If you receive a positive COVID-19 test result, follow the guidance of the those who are giving you the results. Usually the return to work is 10 days from symptom onset or positive test date, (or in some cases 20 days if you are immunocompromised). If your symptoms started after your positive test, the 10 days should start when your symptoms started.   o If you work at [employer], you must also be cleared by Employee Occupational Health and Safety to return to work.      If you receive a negative COVID-19 test result and did not have a high risk exposure to someone with a known positive COVID-19 test, you can return to work once you're free of fever for 24 hours without fever-reducing medication and your symptoms are improving or resolved.    If you receive a negative COVID-19 test and had a high-risk exposure to someone who has tested positive for COVID-19 then you can return to work 14 days after your last contact with the positive individual. Follow quarantine guidance given by your doctor or public health officials.     Sign up for [employer]:  We know it's scary to  hear that you might have COVID-19. We want to track your symptoms to make sure you're okay over the next 2 weeks. Please look for an email from [employer]--this is a free, online program that we'll use to keep in touch. To sign up, follow the link in the email you will receive. Learn more at [email or web address]    How can I take care of myself?  Over the counter medications may help with your symptoms like congestion, cough, chills, or fever.    There are not many effective prescription treatments for early COVID-19. Hydroxychloroquine, ivermectin, and azithromycin are not effective or recommended for COVID-19.    If your symptoms started in the last 10 days, you may be able to receive a treatment with monoclonal antibodies. This treatment can lower your risk of severe illness and going to the hospital. It is given through an IV or under your skin (subcutaneous) and must be given at an infusion center. You must be 12 or older, weight at least 88 pounds, and have a positive COVID-19 test.     If you would like to sign up to be considered to receive the monoclonal antibody medicine, please complete a participation form through the [facility] here: MNRAP ([email or web address]/diseases/[email or web address]/[email or web address]). You may also call the [facility] COVID-19 Public Hotline at 1-470.173.7292 (open Mon-Fri: 9am-7pm and Sat: 10am-6pm).     Not all people who are eligible will receive the medicine, since supply is limited. You will be contacted in the next 1 to 2 business days only if you are selected. If you do not receive a call, you have not been selected to receive the medicine. If you have any questions about this medication, please contact your primary care provider. For more information, see [email or web address]/diseases/[email or web address]/meds.pdf      Get lots of rest. Drink extra fluids (unless a doctor has told you not to)    Take Tylenol (acetaminophen) or ibuprofen for fever  or pain. If you have liver or kidney problems, ask your family doctor if it's okay to take Tylenol o ibuprofen    Take over the counter medications for your symptoms, as directed by your doctor. You may also talk to your pharmacist.      If you have other health problems (like cancer, heart failure, an organ transplant or severe kidney disease): Call your specialty clinic if you don't feel better in the next 2 days.    Know when to call 911. Emergency warning signs include:  o Trouble breathing or shortness of breath  o Pain or pressure in the chest that doesn't go away  o Feeling confused like you haven't felt before, or not being able to wake up  o Bluish-colored lips or face    Where can I get more information?    [facility] - About COVID-19: [email or web address]     CDC - What to Do If You're Sick:     www.cdc.gov/coronavirus/2019-ncov/about/steps-when-sick.html    CDC - Ending Home Isolation:  https://www.cdc.gov/coronavirus/2019-ncov/your-health/quarantine-isolation.html    CDC - Caring for Someone:  www.cdc.gov/coronavirus/2019-ncov/if-you-are-sick/care-for-someone.html    [facility] clinical trials (COVID-19 research studies): clinicalaffairs.[facility]/n-clinical-trials    Below are the COVID-19 hotlines at the [facility] ([facility]). Interpreters are available.  o For health questions: Call [phone] or [phone] (7 a.m. to 7 p.m.)  o For questions about schools and childcare: Call [phone] or [phone] (7 a.m. to 7 p.m.)

## 2022-01-04 LAB — SARS-COV-2 RNA RESP QL NAA+PROBE: NEGATIVE

## 2022-01-18 ENCOUNTER — LAB (OUTPATIENT)
Dept: FAMILY MEDICINE | Facility: CLINIC | Age: 37
End: 2022-01-18
Attending: FAMILY MEDICINE
Payer: MEDICARE

## 2022-01-18 DIAGNOSIS — Z20.822 SUSPECTED 2019 NOVEL CORONAVIRUS INFECTION: ICD-10-CM

## 2022-01-18 PROCEDURE — U0003 INFECTIOUS AGENT DETECTION BY NUCLEIC ACID (DNA OR RNA); SEVERE ACUTE RESPIRATORY SYNDROME CORONAVIRUS 2 (SARS-COV-2) (CORONAVIRUS DISEASE [COVID-19]), AMPLIFIED PROBE TECHNIQUE, MAKING USE OF HIGH THROUGHPUT TECHNOLOGIES AS DESCRIBED BY CMS-2020-01-R: HCPCS | Performed by: FAMILY MEDICINE

## 2022-01-19 LAB
SARS-COV-2 RNA RESP QL NAA+PROBE: DETECTED
SARS-COV-2 RNA RESP QL NAA+PROBE: NORMAL

## 2022-01-20 NOTE — RESULT ENCOUNTER NOTE
Isaura,    Your COVID test was positive.  If you are having symptoms that might require more treatment than over-the-counter medications, fluids, and rest, please reach out to the clinic for virtual visit.  Be sure to quarantine according to CDC guidelines.  Let us know if you have questions.    Thanks,    Dr. Umana

## 2022-01-21 ENCOUNTER — TELEPHONE (OUTPATIENT)
Dept: FAMILY MEDICINE | Facility: CLINIC | Age: 37
End: 2022-01-21
Payer: MEDICARE

## 2022-01-21 NOTE — TELEPHONE ENCOUNTER
"Coronavirus (COVID-19) Notification    Caller Name (Patient, parent, daughter/son, grandparent, etc)  Patient    Reason for call  Notify of Positive Coronavirus (COVID-19) lab results, assess symptoms,  review [facility] recommendations    Lab Result    Lab test:  2019-nCoV rRt-PCR or SARS-CoV-2 PCR    Oropharyngeal AND/OR nasopharyngeal swabs is POSITIVE for 2019-nCoV RNA/SARS-COV-2 PCR (COVID-19 virus)    RN Recommendations/Instructions per [facility] Coronavirus COVID-19 recommendations    Brief introduction script  Introduce self then review script:  \"I am calling on behalf of [employer].  We were notified that your Coronavirus test (COVID-19) for was POSITIVE for the virus.  I have some information to relay to you but first I wanted to mention that the [place] Dept of Health will be contacting you shortly [it's possible MD already called Patient] to talk to you more about how you are feeling and other people you have had contact with who might now also have the virus.  Also, [facility] is Partnering with the [facility] for Covid-19 research, you may be contacted directly by research staff.\"      Assessment (Inquire about Patient's current symptoms)   Assessment   Current Symptoms at time of phone call: (if no symptoms, document No symptoms] Nausea, sore throat, cough,head congestion, foggy feeling   Symptoms onset (if applicable) 1/15/22     If at time of call, Patients symptoms hare worsened, the Patient should contact 911 or have someone drive them to Emergency Dept promptly:      If Patient calling 911, inform 911 personal that you have tested positive for the Coronavirus (COVID-19).  Place mask on and await 911 to arrive.    If Emergency Dept, If possible, please have another adult drive you to the Emergency Dept but you need to wear mask when in contact with other people.          Treatment Options:   Patient classified as COVID treatment eligible by Epic high risk algorithm: " No  Is the patient symptomatic at the time of result notification? Yes. Was the onset of symptoms within the last 5 days? No. You may be eligible to receive a new treatment with a monoclonal antibody for preventing hospitalization in patients at high risk for complications from COVID-19.   This medication is still experimental and available on a limited basis; it is given through an IV and must be given at an infusion center. Please note that not all people who are eligible will receive the medication since it is in limited supply.  Are you interested in being considered for this medication?  Yes.   Is the patient symptomatic?  Yes. Is the patient 18 years of age or older? Yes.  Is the patient newly (within the last week) on supplemental oxygen or requiring more oxygen than usual?  No. Patient criteria for selection: Is the patients weight equal to or greater than 40 kg (88 lbs)? Yes.  Is the patient's age 65 years or older?  No. Is the patient 55 years or older and have one or more of the following conditions: Hypertension, CHF, COPD/Chronic pulmonary disease?  No. Is the patient 18 years or older and has one or more of the following conditions: Chronic Kidney Disease, Diabetes Mellitus, BMI equal to or greater than 35, Immunosuppressive Disease or taking Immunosuppressive medications?  Yes.  Patient qualifies, refer patient to MNRAP (health.[facility].[place]./diseases/coronavirus/mnrappeople.html)    Review information with Patient    Your result was positive. This means you have COVID-19 (coronavirus).  We have sent you a letter that reviews the information that I'll be reviewing with you now.    How can I protect others?    If you have symptoms: stay home and away from others (self-isolate) until:    You've had no fever--and no medicine that reduces fever--for 1 full day (24 hours). And       Your other symptoms have gotten better. For example, your cough or breathing has improved. And     At least 10 days have passed  since your symptoms started. (If you've been told by a doctor that you have a weak immune system, wait 20 days.)     If you don't have symptoms: Stay home and away from others (self-isolate) until at least 10 days have passed since your first positive COVID-19 test. (Date test collected)    During this time:    Stay in your own room, including for meals. Use your own bathroom if you can.    Stay away from others in your home. No hugging, kissing or shaking hands. No visitors.     Don't go to work, school or anywhere else.     Clean  high touch  surfaces often (doorknobs, counters, handles, etc.). Use a household cleaning spray or wipes. You'll find a full list on the EPA website at www.epa.gov/pesticide-registration/list-n-disinfectants-use-against-sars-cov-2.     Cover your mouth and nose with a mask, tissue or other face covering to avoid spreading germs.    Wash your hands and face often with soap and water.    Make a list of people you have been in close contact with recently, even if either of you wore a face covering.   - Start your list from 2 days before you became ill or had a positive test.  - Include anyone that was within 6 feet of you for a cumulative total of 15 minutes or more in 24 hours. (Example: if you sat next to Zion for 5 minutes in the morning and 10 minutes in the afternoon, then you were in close contact for 15 minutes total that day. Zion would be added to your list.)    A public health worker will call or text you. It is important that you answer. They will ask you questions about possible exposures to COVID-19, such as people you have been in direct contact with and places you have visited.    Tell the people on your list that you have COVID-19; they should stay away from others for 14 days starting from the last time they were in contact with you (unless you are told something different from a public health worker).     Caregivers in these groups are at risk for severe illness due to  COVID-19:  o People 65 years and older  o People who live in a nursing home or long-term care facility  o People with chronic disease (lung, heart, cancer, diabetes, kidney, liver, immunologic)  o People who have a weakened immune system, including those who:  - Are in cancer treatment  - Take medicine that weakens the immune system, such as corticosteroids  - Had a bone marrow or organ transplant  - Have an immune deficiency  - Have poorly controlled HIV or AIDS  - Are obese (body mass index of 40 or higher)  - Smoke regularly    Caregivers should wear gloves while washing dishes, handling laundry and cleaning bedrooms and bathrooms.    Wash and dry laundry with special caution. Don't shake dirty laundry, and use the warmest water setting you can.    If you have a weakened immune system, ask your doctor about other actions you should take.    For more tips, go to www.cdc.gov/coronavirus/2019-ncov/downloads/10Things.pdf.    You should not go back to work until you meet the guidelines above for ending your home isolation. You don't need to be retested for COVID-19 before going back to work--studies show that you won't spread the virus if it's been at least 10 days since your symptoms started (or 20 days, if you have a weak immune system).    Employers: This document serves as formal notice of your employee's medical guidelines for going back to work. They must meet the above guidelines before going back to work in person.    How can I take care of myself?    1. Get lots of rest. Drink extra fluids (unless a doctor has told you not to).    2. Take Tylenol (acetaminophen) for fever or pain. If you have liver or kidney problems, ask your family doctor if it's okay to take Tylenol.     Take either:     650 mg (two 325 mg pills) every 4 to 6 hours, or     1,000 mg (two 500 mg pills) every 8 hours as needed.     Note: Don't take more than 3,000 mg in one day. Acetaminophen is found in many medicines (both prescribed and  over-the-counter medicines). Read all labels to be sure you don't take too much.    For children, check the Tylenol bottle for the right dose (based on their age or weight).    3. If you have other health problems (like cancer, heart failure, an organ transplant or severe kidney disease): Call your specialty clinic if you don't feel better in the next 2 days.    4. Know when to call 911: Emergency warning signs include:    Trouble breathing or shortness of breath    Pain or pressure in the chest that doesn't go away    Feeling confused like you haven't felt before, or not being able to wake up    Bluish-colored lips or face    5. Sign up for [employer]. We know it's scary to hear that you have COVID-19. We want to track your symptoms to make sure you're okay over the next 2 weeks. Please look for an email from [employer]--this is a free, online program that we'll use to keep in touch. To sign up, follow the link in the email. Learn more at [email or web address].    Where can I get more information?    [facility]: [email or web address]    Coronavirus Basics: www.health.[facility].[place]/diseases/coronavirus/basics.html    What to Do If You're Sick: www.cdc.gov/coronavirus/2019-ncov/about/steps-when-sick.html    Ending Home Isolation: www.cdc.gov/coronavirus/2019-ncov/hcp/disposition-in-home-patients.html     Caring for Someone with COVID-19: www.cdc.gov/coronavirus/2019-ncov/if-you-are-sick/care-for-someone.html     [facility] clinical trials (COVID-19 research studies): clinicalaffairs.[facility]/n-clinical-trials     A Positive COVID-19 letter will be sent via [employer] or the mail. (Exception, no letters sent to Presurgerical/Preprocedure Patients)    Krystal Valladares LPN

## 2022-01-23 ENCOUNTER — HOSPITAL ENCOUNTER (EMERGENCY)
Facility: CLINIC | Age: 37
Discharge: HOME | End: 2022-01-23
Attending: NURSE PRACTITIONER | Admitting: NURSE PRACTITIONER
Payer: MEDICARE

## 2022-01-23 ENCOUNTER — E-VISIT (OUTPATIENT)
Dept: URGENT CARE | Facility: CLINIC | Age: 37
End: 2022-01-23
Payer: MEDICARE

## 2022-01-23 ENCOUNTER — APPOINTMENT (OUTPATIENT)
Dept: CT IMAGING | Facility: CLINIC | Age: 37
End: 2022-01-23
Attending: NURSE PRACTITIONER
Payer: MEDICARE

## 2022-01-23 VITALS
WEIGHT: 200 LBS | TEMPERATURE: 98.2 F | DIASTOLIC BLOOD PRESSURE: 91 MMHG | HEART RATE: 49 BPM | RESPIRATION RATE: 38 BRPM | SYSTOLIC BLOOD PRESSURE: 125 MMHG | BODY MASS INDEX: 28.7 KG/M2 | OXYGEN SATURATION: 97 %

## 2022-01-23 DIAGNOSIS — U07.1 INFECTION DUE TO 2019 NOVEL CORONAVIRUS: Primary | ICD-10-CM

## 2022-01-23 DIAGNOSIS — U07.1 INFECTION DUE TO 2019 NOVEL CORONAVIRUS: ICD-10-CM

## 2022-01-23 DIAGNOSIS — R00.1 SINUS BRADYCARDIA: ICD-10-CM

## 2022-01-23 LAB
ALBUMIN SERPL-MCNC: 3.8 G/DL (ref 3.4–5)
ALP SERPL-CCNC: 78 U/L (ref 40–150)
ALT SERPL W P-5'-P-CCNC: 27 U/L (ref 0–50)
ANION GAP SERPL CALCULATED.3IONS-SCNC: 6 MMOL/L (ref 3–14)
APTT PPP: 32 SECONDS (ref 22–38)
AST SERPL W P-5'-P-CCNC: 15 U/L (ref 0–45)
BASOPHILS # BLD AUTO: 0 10E3/UL (ref 0–0.2)
BASOPHILS NFR BLD AUTO: 0 %
BILIRUB SERPL-MCNC: 0.9 MG/DL (ref 0.2–1.3)
BUN SERPL-MCNC: 10 MG/DL (ref 7–30)
CALCIUM SERPL-MCNC: 8.8 MG/DL (ref 8.5–10.1)
CHLORIDE BLD-SCNC: 112 MMOL/L (ref 94–109)
CK SERPL-CCNC: 60 U/L (ref 30–225)
CO2 SERPL-SCNC: 22 MMOL/L (ref 20–32)
CREAT SERPL-MCNC: 0.7 MG/DL (ref 0.52–1.04)
CRP SERPL-MCNC: <2.9 MG/L (ref 0–8)
D DIMER PPP FEU-MCNC: 0.68 UG/ML FEU (ref 0–0.5)
EOSINOPHIL # BLD AUTO: 0.1 10E3/UL (ref 0–0.7)
EOSINOPHIL NFR BLD AUTO: 1 %
ERYTHROCYTE [DISTWIDTH] IN BLOOD BY AUTOMATED COUNT: 12.7 % (ref 10–15)
ERYTHROCYTE [SEDIMENTATION RATE] IN BLOOD BY WESTERGREN METHOD: 8 MM/HR (ref 0–20)
GFR SERPL CREATININE-BSD FRML MDRD: >90 ML/MIN/1.73M2
GLUCOSE BLD-MCNC: 88 MG/DL (ref 70–99)
HCT VFR BLD AUTO: 41.8 % (ref 35–47)
HGB BLD-MCNC: 15.1 G/DL (ref 11.7–15.7)
IMM GRANULOCYTES # BLD: 0 10E3/UL
IMM GRANULOCYTES NFR BLD: 0 %
INR PPP: 0.96 (ref 0.85–1.15)
LYMPHOCYTES # BLD AUTO: 2 10E3/UL (ref 0.8–5.3)
LYMPHOCYTES NFR BLD AUTO: 39 %
MAGNESIUM SERPL-MCNC: 2 MG/DL (ref 1.6–2.3)
MCH RBC QN AUTO: 29.3 PG (ref 26.5–33)
MCHC RBC AUTO-ENTMCNC: 36.1 G/DL (ref 31.5–36.5)
MCV RBC AUTO: 81 FL (ref 78–100)
MONOCYTES # BLD AUTO: 0.3 10E3/UL (ref 0–1.3)
MONOCYTES NFR BLD AUTO: 5 %
NEUTROPHILS # BLD AUTO: 2.8 10E3/UL (ref 1.6–8.3)
NEUTROPHILS NFR BLD AUTO: 55 %
NRBC # BLD AUTO: 0 10E3/UL
NRBC BLD AUTO-RTO: 0 /100
NT-PROBNP SERPL-MCNC: 294 PG/ML (ref 0–450)
PLATELET # BLD AUTO: 262 10E3/UL (ref 150–450)
POTASSIUM BLD-SCNC: 4 MMOL/L (ref 3.4–5.3)
PROT SERPL-MCNC: 7.5 G/DL (ref 6.8–8.8)
RBC # BLD AUTO: 5.16 10E6/UL (ref 3.8–5.2)
SODIUM SERPL-SCNC: 140 MMOL/L (ref 133–144)
TROPONIN I SERPL HS-MCNC: 4 NG/L
TSH SERPL DL<=0.005 MIU/L-ACNC: 3.99 MU/L (ref 0.4–4)
WBC # BLD AUTO: 5.2 10E3/UL (ref 4–11)

## 2022-01-23 PROCEDURE — 84443 ASSAY THYROID STIM HORMONE: CPT | Performed by: NURSE PRACTITIONER

## 2022-01-23 PROCEDURE — 258N000003 HC RX IP 258 OP 636: Performed by: NURSE PRACTITIONER

## 2022-01-23 PROCEDURE — 85610 PROTHROMBIN TIME: CPT | Performed by: NURSE PRACTITIONER

## 2022-01-23 PROCEDURE — 250N000011 HC RX IP 250 OP 636: Performed by: NURSE PRACTITIONER

## 2022-01-23 PROCEDURE — G1004 CDSM NDSC: HCPCS

## 2022-01-23 PROCEDURE — 85379 FIBRIN DEGRADATION QUANT: CPT | Performed by: NURSE PRACTITIONER

## 2022-01-23 PROCEDURE — 80053 COMPREHEN METABOLIC PANEL: CPT | Performed by: NURSE PRACTITIONER

## 2022-01-23 PROCEDURE — 36415 COLL VENOUS BLD VENIPUNCTURE: CPT | Performed by: NURSE PRACTITIONER

## 2022-01-23 PROCEDURE — 96361 HYDRATE IV INFUSION ADD-ON: CPT | Performed by: NURSE PRACTITIONER

## 2022-01-23 PROCEDURE — 86140 C-REACTIVE PROTEIN: CPT | Performed by: NURSE PRACTITIONER

## 2022-01-23 PROCEDURE — 85652 RBC SED RATE AUTOMATED: CPT | Performed by: NURSE PRACTITIONER

## 2022-01-23 PROCEDURE — 82550 ASSAY OF CK (CPK): CPT | Performed by: NURSE PRACTITIONER

## 2022-01-23 PROCEDURE — 99207 PR NO BILLABLE SERVICE THIS VISIT: CPT | Performed by: NURSE PRACTITIONER

## 2022-01-23 PROCEDURE — 96374 THER/PROPH/DIAG INJ IV PUSH: CPT | Mod: 59 | Performed by: NURSE PRACTITIONER

## 2022-01-23 PROCEDURE — 84484 ASSAY OF TROPONIN QUANT: CPT | Performed by: NURSE PRACTITIONER

## 2022-01-23 PROCEDURE — 93005 ELECTROCARDIOGRAM TRACING: CPT | Performed by: NURSE PRACTITIONER

## 2022-01-23 PROCEDURE — 85025 COMPLETE CBC W/AUTO DIFF WBC: CPT | Performed by: NURSE PRACTITIONER

## 2022-01-23 PROCEDURE — 83880 ASSAY OF NATRIURETIC PEPTIDE: CPT | Performed by: NURSE PRACTITIONER

## 2022-01-23 PROCEDURE — 85730 THROMBOPLASTIN TIME PARTIAL: CPT | Performed by: NURSE PRACTITIONER

## 2022-01-23 PROCEDURE — 99285 EMERGENCY DEPT VISIT HI MDM: CPT | Mod: 25 | Performed by: NURSE PRACTITIONER

## 2022-01-23 PROCEDURE — 93010 ELECTROCARDIOGRAM REPORT: CPT | Performed by: NURSE PRACTITIONER

## 2022-01-23 PROCEDURE — 83735 ASSAY OF MAGNESIUM: CPT | Performed by: NURSE PRACTITIONER

## 2022-01-23 PROCEDURE — 96375 TX/PRO/DX INJ NEW DRUG ADDON: CPT | Performed by: NURSE PRACTITIONER

## 2022-01-23 PROCEDURE — 250N000009 HC RX 250: Performed by: NURSE PRACTITIONER

## 2022-01-23 RX ORDER — SODIUM CHLORIDE 9 MG/ML
INJECTION, SOLUTION INTRAVENOUS CONTINUOUS
Status: DISCONTINUED | OUTPATIENT
Start: 2022-01-23 | End: 2022-01-23 | Stop reason: HOSPADM

## 2022-01-23 RX ORDER — IOPAMIDOL 755 MG/ML
500 INJECTION, SOLUTION INTRAVASCULAR ONCE
Status: COMPLETED | OUTPATIENT
Start: 2022-01-23 | End: 2022-01-23

## 2022-01-23 RX ORDER — KETOROLAC TROMETHAMINE 15 MG/ML
15 INJECTION, SOLUTION INTRAMUSCULAR; INTRAVENOUS ONCE
Status: COMPLETED | OUTPATIENT
Start: 2022-01-23 | End: 2022-01-23

## 2022-01-23 RX ORDER — ONDANSETRON 2 MG/ML
4 INJECTION INTRAMUSCULAR; INTRAVENOUS EVERY 30 MIN PRN
Status: DISCONTINUED | OUTPATIENT
Start: 2022-01-23 | End: 2022-01-23 | Stop reason: HOSPADM

## 2022-01-23 RX ADMIN — SODIUM CHLORIDE 1000 ML: 9 INJECTION, SOLUTION INTRAVENOUS at 18:55

## 2022-01-23 RX ADMIN — KETOROLAC TROMETHAMINE 15 MG: 15 INJECTION, SOLUTION INTRAMUSCULAR; INTRAVENOUS at 19:38

## 2022-01-23 RX ADMIN — ONDANSETRON 4 MG: 2 INJECTION INTRAMUSCULAR; INTRAVENOUS at 18:56

## 2022-01-23 RX ADMIN — SODIUM CHLORIDE 70 ML: 9 INJECTION, SOLUTION INTRAVENOUS at 18:15

## 2022-01-23 RX ADMIN — IOPAMIDOL 75 ML: 755 INJECTION, SOLUTION INTRAVENOUS at 18:15

## 2022-01-23 NOTE — ED TRIAGE NOTES
Symptoms for 6 days, positive covid test 5 days ago.  Here with cough, weakness, dizzy, chest pain

## 2022-01-23 NOTE — PATIENT INSTRUCTIONS
So sorry you are not feeling well with COVID-19. If you are seeking treatment please reach out to [facility] at [phone] to discuss monoclonal antibody infusions within 10 days of symptom onset. You could also fill out the [facility] MNRAP form on their website. However, if you are severely ill with shortness of breath, chest pain, bloody sputum, dizziness, calf swelling you should be seen at the ER.

## 2022-01-23 NOTE — ED PROVIDER NOTES
History     Chief Complaint   Patient presents with     Cough     Generalized Weakness     Shortness of Breath     HPI  Isaura Gray is a 36 year old female with history of schizophrenia, pituitary tumor, mitral valve prolapse, thyroid disease, nonalcoholic fatty liver disease, bipolar disorder, and PE (in 2016, no longer on blood thinner-only need to be on blood thinner when she is pregnant) who presents for evaluation of cough, weakness, and shortness of breath. Symptoms started 6-7 days ago. Tested positive for Covid-19 on . Increased shortness of breath and started having chest pain the last couple days. Reports sleeping hard,  had difficulty waking her. Her HR drops to 43 bpm while sleeping. Chest hurts when she is walking. Lightheaded.     Allergies:  Allergies   Allergen Reactions     Ciprofloxacin Hives     Sulfa Drugs Hives     Hydrocodone-Acetaminophen Itching           Oxycodone-Acetaminophen Itching       Problem List:    Patient Active Problem List    Diagnosis Date Noted     Pituitary tumor - history      Priority: Medium     Bipolar II disorder (H) 2021     Priority: Medium     Biliary dyskinesia 2021     Priority: Medium     Added automatically from request for surgery [id number]       Non-alcoholic fatty liver disease 2020     Priority: Medium     Psychophysiological insomnia 2020     Priority: Medium     Family history of Hashimoto thyroiditis 2020     Priority: Medium     Moderate major depression (H) 2020     Priority: Medium     Cancer (H)      Priority: Medium     Anxiety 2018     Priority: Medium     History of thyroid cancer 2018     Priority: Medium      (normal spontaneous vaginal delivery) 2018     Priority: Medium     Normal labor 2018     Priority: Medium     Cough 2018     Priority: Medium     Chronic bilateral low back pain without sciatica 2018     Priority: Medium     Encounter for triage in  pregnant patient 2018     Priority: Medium     Schizophrenia (H)      Priority: Medium     reports spontaneous remission during last pregnancy       Vitamin D deficiency 2018     Priority: Medium     Supervision of other high risk pregnancies, unspecified trimester 2018     Priority: Medium     Lactose intolerance in adult 2018     Priority: Medium     PE (pulmonary thromboembolism) (H)      Priority: Medium     Hyperprolactinemia (H)      Priority: Medium     Follicular cancer of thyroid (H)      Priority: Medium     Mitral valve disorder 2018     Priority: Medium     H/O  delivery, currently pregnant 2016     Priority: Medium     History of pulmonary embolism 10/19/2016     Priority: Medium     ASCUS of cervix with negative high risk HPV 2016     Priority: Medium     16 ASCUS pap, neg HPV. Done at [facility]. Plan: Cotest in 3 yr  20 NIL Pap, Neg HPV. Plan: pending provider.        PTSD (post-traumatic stress disorder) 2015     Priority: Medium        Past Medical History:    Past Medical History:   Diagnosis Date     ASCUS of cervix with negative high risk HPV 2016     Cancer (H)      Depressive disorder      Follicular cancer of thyroid (H)      Hyperprolactinemia (H)      Mitral valve prolapse      PE (pulmonary thromboembolism) (H)      Pituitary tumor      Schizophrenia (H)      Thyroid disease        Past Surgical History:    Past Surgical History:   Procedure Laterality Date     APPENDECTOMY       ENT SURGERY      Partial thyroidectomy     LAPAROSCOPIC CHOLECYSTECTOMY N/A 3/4/2021    Procedure: Laparoscopic cholecystectomy;  Surgeon: Osmany Smith MD;  Location: [facility] OR       Family History:    Family History   Problem Relation Age of Onset     No Known Problems Mother      Hypertension Father      Cerebrovascular Disease Father 56        Passed away from double brain stem clot     Mental Illness Father      Depression Father       "Anxiety Disorder Father      Alcoholism Father      Schizophrenia Father      Asthma Brother      Cancer Maternal Grandfather         lung     No Known Problems Paternal Grandmother      Schizophrenia Paternal Grandfather      Suicide Paternal Grandfather 53     Autism Spectrum Disorder Son      Kidney Disease Son         \"has a third kidney\"     No Known Problems Daughter        Social History:  Marital Status:  Single [1]  Social History     Tobacco Use     Smoking status: Former Smoker     Packs/day: 0.50     Years: 5.00     Pack years: 2.50     Types: Cigarettes     Quit date: 2016     Years since quittin.4     Smokeless tobacco: Never Used   Vaping Use     Vaping Use: Never used   Substance Use Topics     Alcohol use: No     Comment: Has an issue with her liver (not alcohol related)     Drug use: No        Medications:    calcium carbonate (OS-DEBI) 1500 (600 Ca) MG tablet  levonorgestrel (MIRENA) 20 MCG/24HR IUD  paliperidone ER (INVEGA) 6 MG 24 hr tablet  sertraline (ZOLOFT) 100 MG tablet  zolpidem (AMBIEN) 5 MG tablet          Review of Systems  As mentioned above in the history present illness. All other systems were reviewed and are negative.    Physical Exam   BP: 113/84  Pulse: 60  Temp: 98.2  F (36.8  C)  Resp: 18  Weight: 90.7 kg (200 lb)  SpO2: 95 %      Physical Exam  Constitutional:       General: She is not in acute distress.     Appearance: Normal appearance. She is well-developed. She is not ill-appearing.   HENT:      Head: Normocephalic and atraumatic.      Right Ear: External ear normal.      Left Ear: External ear normal.      Nose: Nose normal.      Mouth/Throat:      Mouth: Mucous membranes are moist.   Eyes:      Conjunctiva/sclera: Conjunctivae normal.   Cardiovascular:      Rate and Rhythm: Normal rate and regular rhythm.      Heart sounds: Normal heart sounds. No murmur heard.      Pulmonary:      Effort: Pulmonary effort is normal. No respiratory distress.      Breath sounds: " Normal breath sounds.   Abdominal:      General: Bowel sounds are normal. There is no distension.      Palpations: Abdomen is soft.      Tenderness: There is no abdominal tenderness.   Musculoskeletal:         General: Normal range of motion.   Skin:     General: Skin is warm and dry.      Findings: No rash.   Neurological:      General: No focal deficit present.      Mental Status: She is alert and oriented to person, place, and time.         ED Course                 Procedures            EKG Interpretation:      Interpreted by FLORIDA Zimmer CNP  Time reviewed: 1809   Symptoms at time of EKG: short of breath, chest pain  Rhythm: Normal sinus   Rate: Bradycardia and 40-50  Axis: Normal  Ectopy: None  Conduction: Normal  ST Segments/ T Waves: No ST-T wave changes and No acute ischemic changes  Q Waves: None  Comparison to prior: new sinus bradycardia, but otherwise unchanged morphology    Clinical Impression: sinus bradycardia             Results for orders placed or performed during the hospital encounter of 01/23/22 (from the past 24 hour(s))   CBC with platelets differential    Narrative    The following orders were created for panel order CBC with platelets differential.  Procedure                               Abnormality         Status                     ---------                               -----------         ------                     CBC with platelets and d...[[id number]]                      Final result                 Please view results for these tests on the individual orders.   Comprehensive metabolic panel   Result Value Ref Range    Sodium 140 133 - 144 mmol/L    Potassium 4.0 3.4 - 5.3 mmol/L    Chloride 112 (H) 94 - 109 mmol/L    Carbon Dioxide (CO2) 22 20 - 32 mmol/L    Anion Gap 6 3 - 14 mmol/L    Urea Nitrogen 10 7 - 30 mg/dL    Creatinine 0.70 0.52 - 1.04 mg/dL    Calcium 8.8 8.5 - 10.1 mg/dL    Glucose 88 70 - 99 mg/dL    Alkaline Phosphatase 78 40 - 150 U/L    AST 15 0 - 45  U/L    ALT 27 0 - 50 U/L    Protein Total 7.5 6.8 - 8.8 g/dL    Albumin 3.8 3.4 - 5.0 g/dL    Bilirubin Total 0.9 0.2 - 1.3 mg/dL    GFR Estimate >90 >60 mL/min/1.73m2   Troponin I   Result Value Ref Range    Troponin I High Sensitivity 4 <54 ng/L   D dimer quantitative   Result Value Ref Range    D-Dimer Quantitative 0.68 (H) 0.00 - 0.50 ug/mL FEU    Narrative    This D-dimer assay is intended for use in conjunction with a clinical pretest probability assessment model to exclude pulmonary embolism (PE) and deep venous thrombosis (DVT) in outpatients suspected of PE or DVT. The cut-off value is 0.50 ug/mL FEU.   INR   Result Value Ref Range    INR 0.96 0.85 - 1.15   Partial thromboplastin time   Result Value Ref Range    aPTT 32 22 - 38 Seconds   CBC with platelets and differential   Result Value Ref Range    WBC Count 5.2 4.0 - 11.0 10e3/uL    RBC Count 5.16 3.80 - 5.20 10e6/uL    Hemoglobin 15.1 11.7 - 15.7 g/dL    Hematocrit 41.8 35.0 - 47.0 %    MCV 81 78 - 100 fL    MCH 29.3 26.5 - 33.0 pg    MCHC 36.1 31.5 - 36.5 g/dL    RDW 12.7 10.0 - 15.0 %    Platelet Count 262 150 - 450 10e3/uL    % Neutrophils 55 %    % Lymphocytes 39 %    % Monocytes 5 %    % Eosinophils 1 %    % Basophils 0 %    % Immature Granulocytes 0 %    NRBCs per 100 WBC 0 <1 /100    Absolute Neutrophils 2.8 1.6 - 8.3 10e3/uL    Absolute Lymphocytes 2.0 0.8 - 5.3 10e3/uL    Absolute Monocytes 0.3 0.0 - 1.3 10e3/uL    Absolute Eosinophils 0.1 0.0 - 0.7 10e3/uL    Absolute Basophils 0.0 0.0 - 0.2 10e3/uL    Absolute Immature Granulocytes 0.0 <=0.4 10e3/uL    Absolute NRBCs 0.0 10e3/uL   Magnesium   Result Value Ref Range    Magnesium 2.0 1.6 - 2.3 mg/dL   TSH with free T4 reflex   Result Value Ref Range    TSH 3.99 0.40 - 4.00 mU/L   NT pro BNP   Result Value Ref Range    N terminal Pro BNP Inpatient 294 0 - 450 pg/mL   CK total   Result Value Ref Range    CK 60 30 - 225 U/L   Erythrocyte sedimentation rate auto   Result Value Ref Range     Erythrocyte Sedimentation Rate 8 0 - 20 mm/hr   CRP inflammation   Result Value Ref Range    CRP Inflammation <2.9 0.0 - 8.0 mg/L   CT Chest Pulmonary Embolism w Contrast    Narrative    EXAM: CT CHEST PULMONARY EMBOLISM W CONTRAST  LOCATION: [facility]  DATE/TIME: 1/23/2022 6:13 PM    INDICATION: Chest pain. Shortness of breath.  COMPARISON: Chest CT performed 11/26/2017.  TECHNIQUE: CT chest pulmonary angiogram during arterial phase injection of IV contrast. Multiplanar reformats and MIP reconstructions were performed. Dose reduction techniques were used.   CONTRAST: 75 mL Isovue 370.     FINDINGS:  ANGIOGRAM CHEST: Pulmonary arteries are normal caliber and negative for pulmonary emboli. The thoracic aorta is not well opacified, however there is no evidence for thoracic aortic aneurysm. No CT evidence of right heart strain.    LUNGS AND PLEURA: Small bilateral pleural effusions. The lungs are clear. No lung masses or consolidations are seen.    MEDIASTINUM/AXILLAE: No enlarged lymph nodes are identified in the chest. No pericardial effusion. The left thyroid lobe is not seen, and is likely surgically absent.    CORONARY ARTERY CALCIFICATION: None.    UPPER ABDOMEN: Cholecystectomy. Limited views of the upper abdomen are otherwise unremarkable.    MUSCULOSKELETAL: Unremarkable.      Impression    IMPRESSION:  1.  No evidence for pulmonary embolism.  2.  Small bilateral pleural effusions, new since the previous exam.       Medications   0.9% sodium chloride BOLUS (0 mLs Intravenous Stopped 1/23/22 2037)     Followed by   sodium chloride 0.9% infusion (has no administration in time range)   ondansetron (ZOFRAN) injection 4 mg (4 mg Intravenous Given 1/23/22 1856)   Saline Bag 100mL  CT  flush use only (70 mLs Intravenous Given 1/23/22 1815)   iopamidol (ISOVUE-370) solution 500 mL (75 mLs Intravenous Given 1/23/22 1815)   ketorolac (TORADOL) injection 15 mg (15 mg Intravenous Given  1/23/22 1938)       2030: at bedside. Orthostatic BP and HR done. Patient's HR increases to 60bpm, but then comes down to 47bpm. She does feel fatigue and lightheaded when standing. No orthostatic hypotension. HR appropriately increases with movement.             Assessments & Plan (with Medical Decision Making)       36 year old female with history of schizophrenia, pituitary tumor, mitral valve prolapse, thyroid disease, nonalcoholic fatty liver disease, bipolar disorder, and PE (in 2016, no longer on blood thinner-only need to be on blood thinner when she is pregnant) who presents for evaluation of cough, weakness, and shortness of breath. Symptoms started 6-7 days ago. Tested positive for Covid-19 on 1/18. Increased shortness of breath and started having chest pain the last couple days. Reports sleeping hard,  had difficulty waking her. Her HR drops to 43 bpm while sleeping. Chest hurts when she is walking. Lightheaded.     On exam she is alert and oriented. Does not appear distressed. Lung sounds CTA. Normotensive. No hypoxia.  EKG sinus bradycardia with HR 47bpm. No ST-T wave changes or evidence of ischemia. No signs of myocarditis or pericarditis (no tachycardia, no fevers).    D-dimer mildly elevated otherwise her labs are normal. Including normal Troponin. Normal CK. Normal sed rate and CRP. Normal Bnp.  Chest CT negative for PE. Small pleural fluid bilaterally.  Unclear cause for her marked sinus bradycardia. Possibly related to Covid-19 infection. Orthostatic BP and HR done as noted above. She is lightheaded but this could be related to Covid. No hypotension. Normal increase in HR with moving around.     I discussed the sinus bradycardia with both Dr. Hummel and Dr. Zavaleta. Considered consulting with cardiology, but patient appears stable, no evidence of heart block or arrhythmia.   Plan:    Your Chest CT is negative for PE. There is a small amount of bilateral pleural fluid at the base of your  lungs which is likely related to COVID-19 infection.  No worrisome lab findings.  Your EKG does show sinus bradycardia (slower heart rate). Unclear etiology. Could be related to COVID-19 infection.  Call tomorrow to make virtual appointment for recheck.  Return for fevers, increased chest pain, vomiting, or any new symptoms of concern.    Discharge Medication List as of 1/23/2022  8:37 PM          Final diagnoses:   Sinus bradycardia   Infection due to 2019 novel coronavirus       1/23/2022   [facility] EMERGENCY DEPT     Darlyn, FLORIDA Hastings CNP  01/23/22 3607

## 2022-01-24 ENCOUNTER — PATIENT OUTREACH (OUTPATIENT)
Dept: NURSING | Facility: CLINIC | Age: 37
End: 2022-01-24
Payer: MEDICARE

## 2022-01-24 NOTE — DISCHARGE INSTRUCTIONS
Your Chest CT is negative for PE. There is a small amount of bilateral pleural fluid at the base of your lungs which is likely related to COVID-19 infection.  No worrisome lab findings.  Your EKG does show sinus bradycardia (slower heart rate). Unclear etiology. Could be related to COVID-19 infection.  Call tomorrow to make virtual appointment for recheck.  Return for fevers, increased chest pain, vomiting, or any new symptoms of concern.

## 2022-01-24 NOTE — PROGRESS NOTES
Clinic Care Coordination Contact    Clinic Care Coordination Contact  OUTREACH    Referral Information:  Referral Source: ED Follow-Up    Primary Diagnosis: Behavioral Health    Chief Complaint   Patient presents with     Clinic Care Coordination - Post Hospital     ED follow up - COVID-19, Sinus bradycardia        [place] Utilization:   Clinic Utilization  Difficulty keeping appointments: No  Compliance Concerns: No  No-Show Concerns: No  No PCP office visit in Past Year: No    Utilization    Hospital Admissions  1             ED Visits  3             No Show Count (past year)  0                Current as of: 2022  9:23 PM              Clinical Concerns:  Current Medical Concerns: Pt presented to [facility] ED 22 for evaluation of cough, weakness, SOB. Pt tested positive for COVID-19 22.     Patient Active Problem List   Diagnosis     Vitamin D deficiency     Supervision of other high risk pregnancies, unspecified trimester     PE (pulmonary thromboembolism) (H)     Hyperprolactinemia (H)     Follicular cancer of thyroid (H)     Lactose intolerance in adult     Schizophrenia (H)     Encounter for triage in pregnant patient     Cough     Chronic bilateral low back pain without sciatica     Normal labor     Anxiety     Cancer (H)     H/O  delivery, currently pregnant     History of pulmonary embolism     History of thyroid cancer     Mitral valve disorder      (normal spontaneous vaginal delivery)     Moderate major depression (H)     PTSD (post-traumatic stress disorder)     ASCUS of cervix with negative high risk HPV     Non-alcoholic fatty liver disease     Psychophysiological insomnia     Family history of Hashimoto thyroiditis     Biliary dyskinesia     Bipolar II disorder (H)     Pituitary tumor - history       Current Behavioral Concerns: n/a      Education Provided to patient: ED follow up      Pt confirmed she has connected with [facility]. No questions or concerns today for  care team. CC encouraged pt to connect with [facility].     SANDIE CC and pt made call to [facility] back scheduling line. Made VV for 1/27/22 at 11 am.    Pain  Pain: Not discussed     Health Maintenance Reviewed: Due/Overdue     Health Maintenance Due   Topic Date Due     URINE DRUG SCREEN  Never done     ADVANCE CARE PLANNING  Never done     MEDICARE ANNUAL WELLNESS VISIT  02/13/2021     COVID-19 Vaccine (2 - Booster for William series) 07/01/2021     INFLUENZA VACCINE (1) 09/01/2021       Clinical Pathway: None    Medication Management:  Medication review status: Medications reviewed and no changes reported per patient.           No medication changes in ED     Functional Status:  Dependent ADLs: Independent  Dependent IADLs: Cleaning,Meal Preparation,Money Management  Bed or wheelchair confined: No  Mobility Status: Independent    Living Situation:  Current living arrangement: I live in a private home with family (2 children and boyfriend)  Type of residence: Private home - stairs    Lifestyle & Psychosocial Needs:    Social Determinants of Health     Tobacco Use: Medium Risk     Smoking Tobacco Use: Former Smoker     Smokeless Tobacco Use: Never Used   Alcohol Use: Not on file   Financial Resource Strain: High Risk     Difficulty of Paying Living Expenses: Hard   Food Insecurity: Food Insecurity Present     Worried About Running Out of Food in the Last Year: Sometimes true     Ran Out of Food in the Last Year: Sometimes true   Transportation Needs: No Transportation Needs     Lack of Transportation (Medical): No     Lack of Transportation (Non-Medical): No   Physical Activity: Inactive     Days of Exercise per Week: 0 days     Minutes of Exercise per Session: 0 min   Stress: Stress Concern Present     Feeling of Stress : Very much   Social Connections: Socially Isolated     Frequency of Communication with Friends and Family: Never     Frequency of Social Gatherings with Friends and Family: Never     Attends [employer]  Services: Never     Active Member of Clubs or Organizations: No     Attends Club or Organization Meetings: Never     Marital Status: Living with partner   Intimate Partner Violence: Not At Risk     Fear of Current or Ex-Partner: No     Emotionally Abused: No     Physically Abused: No     Sexually Abused: No   Depression: At risk     PHQ-2 Score: 3   Housing Stability: Not on file       Diet: Regular  Inadequate nutrition: No  Tube Feeding: No  Inadequate activity/exercise: Yes  Significant changes in sleep pattern: Yes  Transportation means: Regular car     Amish or spiritual beliefs that impact treatment: No  Mental health DX: Yes  Mental health DX how managed: Medication,Outpatient Counseling,Psychiatrist  Mental health management concern: Yes (some but better)  Informal Support system: Significant other     Care Coordinator has reviewed patient's Social Determinants of Health (SDoH) on this date. Upon review, changes were not made.      Resources and Interventions:  Current Resources:   Community Resources: Financial/Insurance  Supplies used at home: None  Equipment Currently Used at Home: none  Employment Status: disabled,employment seeking    Advance Care Plan/Directive  Advanced Care Plans/Directives on file: No  Advanced Care Plan/Directive Status: Considering Options    Referrals Placed: [facility] Resources,Other,Mental Health,Disability Linkage line (employment, food)     Goals:   Goals        General     Functional (pt-stated)      Notes - Note edited  10/22/2021 10:36 AM by Violette Wren LSW     Goal Statement: I want to get the upstairs cleaned and will break it down into manageable tasks.   Date Goal set: 5/4/2021   Barriers: my mental health, two young children  Strengths: I just got an henrietta to help with identifying tasks  Date to Achieve By: 5/4/2022  Patient expressed understanding of goal: yes  Action steps to achieve this goal:  1. I will stop looking at the job as one big task  I need to complete and break it down into manageable steps.  2. I will learn how to use the henrietta and identify steps  3. I will start to work on the tasks at a reasonable rate  4. If I start to feel overwhelmed with what is ahead of me I will look at what I have completed and remind myself to focus on steps and not the entire task              Patient/Caregiver understanding: Pt reports understanding and denies any additional questions or concerns at this times. SANDIE CC engaged in AIDET communication during encounter.    Outreach Frequency: Monthly    Future Appointments              In 3 days Timmy Umana MD [facility]    In 3 weeks Chuy Cowart PA-C [place] [facility]          Plan: Lead SANDIE CC will follow up this week for goal check in. Pt will attend PCP VV this week.     TRAMAINE Ibarra   Social Work Clinic Care Coordinator   [facility]  [phone]  [email or web address]

## 2022-01-27 ENCOUNTER — VIRTUAL VISIT (OUTPATIENT)
Dept: FAMILY MEDICINE | Facility: OTHER | Age: 37
End: 2022-01-27
Payer: MEDICARE

## 2022-01-27 DIAGNOSIS — U07.1 INFECTION DUE TO 2019 NOVEL CORONAVIRUS: Primary | ICD-10-CM

## 2022-01-27 DIAGNOSIS — R00.1 SINUS BRADYCARDIA: ICD-10-CM

## 2022-01-27 PROCEDURE — 99214 OFFICE O/P EST MOD 30 MIN: CPT | Mod: 95 | Performed by: FAMILY MEDICINE

## 2022-01-27 RX ORDER — DEXAMETHASONE 6 MG/1
6 TABLET ORAL DAILY
Qty: 5 TABLET | Refills: 0 | Status: SHIPPED | OUTPATIENT
Start: 2022-01-27 | End: 2022-04-11

## 2022-01-27 NOTE — PROGRESS NOTES
Isaura is a 36 year old who is being evaluated via a billable video visit.      How would you like to obtain your AVS? MyChart  If the video visit is dropped, the invitation should be resent by: Text to cell phone: [phone]  Will anyone else be joining your video visit? No      Video Start Time: 11:37 AM    Assessment & Plan     Infection due to 2019 novel coronavirus  Discussed possible trial of steroids to help with her shortness of breath, fatigue, and poor appetite.  Patient was open to this.  Discussed potential side effects to watch for.  Follow-up if not improving.  If concerning side effects from the medication, she should stop this.  - dexamethasone (DECADRON) 6 MG tablet; Take 1 tablet (6 mg) by mouth daily for 5 days    Sinus bradycardia  May be related to an effect of Covid.  Reviewed her work-up in the ER and agree that at this time, it does not appear that she has myocarditis, but need to have a low threshold of suspicion for this.  If she has any worsening of her symptoms, I recommended immediate return to care.    Portions of this note were completed using Dragon dictation software.  Although reviewed, there may be typographical and other inadvertent errors that remain.       Review of the result(s) of each unique test - Cardiac enzymes, EKG, D-dimer, other labs  Prescription drug management  34 minutes spent on the date of the encounter doing chart review, history and exam, documentation and further activities per the note       Patient Instructions   Thank you for visiting [facility]    If you have worsening of your chest discomfort, shortness of breath, or you develop significant leg swelling, then you need to be seen again more urgently.  In that scenario, I would want to recheck the tests to screen for possible myocarditis.  Since these were good at the ER and you are not feeling worse, I do not know that we need to proceed with those at this time.    As we discussed, let  see if dexamethasone helps to reduce some of your symptoms.  If this makes anything worse or causes you to feel somewhat manic, then stop the medication immediately and let me know.    Continue current medications that are helpful with controlling her symptoms.    If you continue to have low pulse rates after recovering from the illness, then we should consider having you visit with cardiology or doing an echocardiogram.    Contact us or return if questions or concerns.     Have a nice day!    Dr. Umana     Return in about 2 weeks (around 2/10/2022) for Recheck, E-visit, video, or phone visit.      If you need medication refills, please contact your pharmacy 3 days before your prescriptions runs out or download the [facility] henrietta for your smart phone. If you are out of refills, your pharmacy will contact contact the clinic.                                     [facility] Assistance [phone]                       Return in about 2 weeks (around 2/10/2022) for Recheck, E-visit, video, or phone visit.    Timmy Umana MD, MD  [facility] Clinic[facility] Delarosa is a 36 year old who presents for the following health issues     HPI     ED/UC Followup:    Facility:  [facility]  Date of visit: 1/23/2022  Reason for visit: COVID infection  Current Status: still feeling fairly ill     She was diagnosed with COVID and bradycardia at the ER.  Had a lot of SOB and fatigue.  First symptoms were on Monday 1/17/22.    She notes that her heart rate drops at times.  Has chest pain when this occurs.  While sleeping, her pulse has gotten into the 30s, but has been in the 40s when awake, up into the 60-70s when awake.      With much activity at all, she gets quite short of breath and notes a lot of chest discomfort with this.      O2 saturations have been above 92, even with activity.      Denies significant leg swelling.    NO chest discomfort when at rest.  No change in severity of symptoms  since the ER.      Some fevers, up to 102 degrees.  Taking tylenol for these.          Review of Systems   Constitutional, HEENT, cardiovascular, pulmonary, gi and gu systems are negative, except as otherwise noted.      Objective           Vitals:  No vitals were obtained today due to virtual visit.    Physical Exam   GENERAL: Healthy, alert and no distress  EYES: Eyes grossly normal to inspection.  No discharge or erythema, or obvious scleral/conjunctival abnormalities.  RESP: No audible wheeze, cough, or visible cyanosis.  No visible retractions or increased work of breathing.    SKIN: Visible skin clear. No significant rash, abnormal pigmentation or lesions.  NEURO: Cranial nerves grossly intact.  Mentation and speech appropriate for age.  PSYCH: Mentation appears normal, affect normal/bright, judgement and insight intact, normal speech and appearance well-groomed.    Admission on 01/23/2022, Discharged on 01/23/2022   Component Date Value Ref Range Status     Sodium 01/23/2022 140  133 - 144 mmol/L Final     Potassium 01/23/2022 4.0  3.4 - 5.3 mmol/L Final     Chloride 01/23/2022 112* 94 - 109 mmol/L Final     Carbon Dioxide (CO2) 01/23/2022 22  20 - 32 mmol/L Final     Anion Gap 01/23/2022 6  3 - 14 mmol/L Final     Urea Nitrogen 01/23/2022 10  7 - 30 mg/dL Final     Creatinine 01/23/2022 0.70  0.52 - 1.04 mg/dL Final     Calcium 01/23/2022 8.8  8.5 - 10.1 mg/dL Final     Glucose 01/23/2022 88  70 - 99 mg/dL Final     Alkaline Phosphatase 01/23/2022 78  40 - 150 U/L Final     AST 01/23/2022 15  0 - 45 U/L Final     ALT 01/23/2022 27  0 - 50 U/L Final     Protein Total 01/23/2022 7.5  6.8 - 8.8 g/dL Final     Albumin 01/23/2022 3.8  3.4 - 5.0 g/dL Final     Bilirubin Total 01/23/2022 0.9  0.2 - 1.3 mg/dL Final     GFR Estimate 01/23/2022 >90  >60 mL/min/1.73m2 Final    Effective December 21, 2021 eGFRcr in adults is calculated using the 2021 CKD-EPI creatinine equation which includes age and gender (Carrie et  al., [facility], DOI: 10.1056/KZVBpp2104207)     Troponin I High Sensitivity 01/23/2022 4  <54 ng/L Final    This Troponin-I result was obtained using a Siemens Dimension Vista High Sensitivity Troponin-I assay (TNIH). Effective 11/23/21, nine labs/sites in the [facility] switched from a Siemens Sandpoint Contemporary Troponin I assay (CTNI) to a Siemens Sandpoint High-Sensitivity Troponin I assay (TNIH).     D-Dimer Quantitative 01/23/2022 0.68* 0.00 - 0.50 ug/mL FEU Final     INR 01/23/2022 0.96  0.85 - 1.15 Final     aPTT 01/23/2022 32  22 - 38 Seconds Final     WBC Count 01/23/2022 5.2  4.0 - 11.0 10e3/uL Final     RBC Count 01/23/2022 5.16  3.80 - 5.20 10e6/uL Final     Hemoglobin 01/23/2022 15.1  11.7 - 15.7 g/dL Final     Hematocrit 01/23/2022 41.8  35.0 - 47.0 % Final     MCV 01/23/2022 81  78 - 100 fL Final     MCH 01/23/2022 29.3  26.5 - 33.0 pg Final     MCHC 01/23/2022 36.1  31.5 - 36.5 g/dL Final     RDW 01/23/2022 12.7  10.0 - 15.0 % Final     Platelet Count 01/23/2022 262  150 - 450 10e3/uL Final     % Neutrophils 01/23/2022 55  % Final     % Lymphocytes 01/23/2022 39  % Final     % Monocytes 01/23/2022 5  % Final     % Eosinophils 01/23/2022 1  % Final     % Basophils 01/23/2022 0  % Final     % Immature Granulocytes 01/23/2022 0  % Final     NRBCs per 100 WBC 01/23/2022 0  <1 /100 Final     Absolute Neutrophils 01/23/2022 2.8  1.6 - 8.3 10e3/uL Final     Absolute Lymphocytes 01/23/2022 2.0  0.8 - 5.3 10e3/uL Final     Absolute Monocytes 01/23/2022 0.3  0.0 - 1.3 10e3/uL Final     Absolute Eosinophils 01/23/2022 0.1  0.0 - 0.7 10e3/uL Final     Absolute Basophils 01/23/2022 0.0  0.0 - 0.2 10e3/uL Final     Absolute Immature Granulocytes 01/23/2022 0.0  <=0.4 10e3/uL Final     Absolute NRBCs 01/23/2022 0.0  10e3/uL Final     Magnesium 01/23/2022 2.0  1.6 - 2.3 mg/dL Final     TSH 01/23/2022 3.99  0.40 - 4.00 mU/L Final     N terminal Pro BNP Inpatient 01/23/2022 294  0 - 450 pg/mL Final    Reference range  shown and results flagged as abnormal are suggested inpatient cut points for confirming diagnosis if CHF in an acute setting. Establishing a baseline value for each individual patient is useful for follow-up. An inpatient or emergency department NT-proPBNP <300 pg/mL effectively rules out acute CHF, with 99% negative predictive value.    The outpatient non-acute reference range for ruling out CHF is:  0-125 pg/mL (age 18 to less than 75)  0-450 pg/mL (age 75 yrs and older)      CK 01/23/2022 60  30 - 225 U/L Final     Erythrocyte Sedimentation Rate 01/23/2022 8  0 - 20 mm/hr Final     CRP Inflammation 01/23/2022 <2.9  0.0 - 8.0 mg/L Final     CT Chest Pulmonary Embolism w Contrast    Result Date: 1/23/2022  EXAM: CT CHEST PULMONARY EMBOLISM W CONTRAST LOCATION: [facility] DATE/TIME: 1/23/2022 6:13 PM INDICATION: Chest pain. Shortness of breath. COMPARISON: Chest CT performed 11/26/2017. TECHNIQUE: CT chest pulmonary angiogram during arterial phase injection of IV contrast. Multiplanar reformats and MIP reconstructions were performed. Dose reduction techniques were used. CONTRAST: 75 mL Isovue 370. FINDINGS: ANGIOGRAM CHEST: Pulmonary arteries are normal caliber and negative for pulmonary emboli. The thoracic aorta is not well opacified, however there is no evidence for thoracic aortic aneurysm. No CT evidence of right heart strain. LUNGS AND PLEURA: Small bilateral pleural effusions. The lungs are clear. No lung masses or consolidations are seen. MEDIASTINUM/AXILLAE: No enlarged lymph nodes are identified in the chest. No pericardial effusion. The left thyroid lobe is not seen, and is likely surgically absent. CORONARY ARTERY CALCIFICATION: None. UPPER ABDOMEN: Cholecystectomy. Limited views of the upper abdomen are otherwise unremarkable. MUSCULOSKELETAL: Unremarkable.     IMPRESSION: 1.  No evidence for pulmonary embolism. 2.  Small bilateral pleural effusions, new since the  previous exam.            Video-Visit Details    Type of service:  Video Visit    Video End Time:11:56 AM    Originating Location (pt. Location): Home    Distant Location (provider location):  [facility]     Platform used for Video Visit: Jingit

## 2022-01-27 NOTE — PATIENT INSTRUCTIONS
Thank you for visiting [facility]    If you have worsening of your chest discomfort, shortness of breath, or you develop significant leg swelling, then you need to be seen again more urgently.  In that scenario, I would want to recheck the tests to screen for possible myocarditis.  Since these were good at the ER and you are not feeling worse, I do not know that we need to proceed with those at this time.    As we discussed, let see if dexamethasone helps to reduce some of your symptoms.  If this makes anything worse or causes you to feel somewhat manic, then stop the medication immediately and let me know.    Continue current medications that are helpful with controlling her symptoms.    If you continue to have low pulse rates after recovering from the illness, then we should consider having you visit with cardiology or doing an echocardiogram.    Contact us or return if questions or concerns.     Have a nice day!    Dr. Umana     Return in about 2 weeks (around 2/10/2022) for Recheck, E-visit, video, or phone visit.      If you need medication refills, please contact your pharmacy 3 days before your prescriptions runs out or download the [facility] henrietta for your smart phone. If you are out of refills, your pharmacy will contact contact the clinic.                                     [facility] [phone]

## 2022-01-31 ENCOUNTER — PATIENT OUTREACH (OUTPATIENT)
Dept: CARE COORDINATION | Facility: CLINIC | Age: 37
End: 2022-01-31
Payer: MEDICARE

## 2022-01-31 NOTE — PROGRESS NOTES
Clinic Care Coordination Contact  [facility]/Voicemail       Clinical Data: Care Coordinator Outreach  Outreach attempted x 1.  Left message on patient's voicemail with call back information and requested return call.  Plan:  Care Coordinator will try to reach patient again in 4-6 business days.    TRAMAINE Ramirez, Certified Financial SW  [facility] - Care Coordinator   1/31/2022   9:09 AM  [phone]

## 2022-02-07 ENCOUNTER — PATIENT OUTREACH (OUTPATIENT)
Dept: FAMILY MEDICINE | Facility: CLINIC | Age: 37
End: 2022-02-07
Payer: MEDICARE

## 2022-02-07 NOTE — PROGRESS NOTES
Clinic Care Coordination Contact    Follow Up Progress Note      Assessment: pt continues to feel fatigue and nausea with her Covid recovery.  She was not able to handle the steroid as it was causing her to stay awake for over 24 hours plus hallucinations.  She stopped it yet did not reach out for any new medications.  Reviewed notes and it stated to follow up in 2 weeks or sooner if not feeling better. Reminded her of this and encouraged her to minimally send a [employer] message otherwise schedule a virtual appointment. She did not note any wheezing yet gets very tired quickly.     Pt meds were not reviewed more then above as they were discussed at her last virtual appointment.     She has not made any progress on her goal due to Covid and fatigue.     Care Gaps:    Health Maintenance Due   Topic Date Due     URINE DRUG SCREEN  Never done     ADVANCE CARE PLANNING  Never done     MEDICARE ANNUAL WELLNESS VISIT  02/13/2021     COVID-19 Vaccine (2 - Booster for William series) 07/01/2021     INFLUENZA VACCINE (1) 09/01/2021       not discussed due to pt not feeling well with recent covid she will most likely need to wait a bit to take covid booster and flu shot.    Goals addressed this encounter:   Goals Addressed                    This Visit's Progress       Functional (pt-stated)   70%      Goal Statement: I want to get the upstairs cleaned and will break it down into manageable tasks.   Date Goal set: 5/4/2021   Barriers: my mental health, two young children  Strengths: I just got an henrietta to help with identifying tasks  Date to Achieve By: 5/4/2022  Patient expressed understanding of goal: yes  Action steps to achieve this goal:  1. I will stop looking at the job as one big task I need to complete and break it down into manageable steps.  2. I will learn how to use the henrietta and identify steps  3. I will start to work on the tasks at a reasonable rate  4. If I start to feel overwhelmed with what is ahead of me I will  look at what I have completed and remind myself to focus on steps and not the entire task              Intervention/Education provided during outreach: encouraged pt to follow up with her provider regarding the steroid side effects and continued fatigue/nausea.      Outreach Frequency: monthly    Plan:   Pt to send Ops[employer] message or schedule appointment.   Care Coordinator will follow up in one month.     TRAMAINE Ramirez, Certified [facility] - Care Coordinator   2/7/2022   3:05 PM  [phone]

## 2022-03-02 ENCOUNTER — THERAPY VISIT (OUTPATIENT)
Dept: SLEEP MEDICINE | Facility: CLINIC | Age: 37
End: 2022-03-02
Payer: MEDICARE

## 2022-03-02 DIAGNOSIS — R63.5 WEIGHT GAIN: ICD-10-CM

## 2022-03-02 DIAGNOSIS — G47.00 PERSISTENT INSOMNIA: ICD-10-CM

## 2022-03-02 DIAGNOSIS — G47.9 SLEEP DISORDER: ICD-10-CM

## 2022-03-02 DIAGNOSIS — R53.81 MALAISE AND FATIGUE: ICD-10-CM

## 2022-03-02 DIAGNOSIS — R06.83 SNORING: ICD-10-CM

## 2022-03-02 DIAGNOSIS — R29.818 SUSPECTED SLEEP APNEA: ICD-10-CM

## 2022-03-02 DIAGNOSIS — R53.83 MALAISE AND FATIGUE: ICD-10-CM

## 2022-03-02 PROCEDURE — 95810 POLYSOM 6/> YRS 4/> PARAM: CPT | Performed by: OTOLARYNGOLOGY

## 2022-03-08 ENCOUNTER — PATIENT OUTREACH (OUTPATIENT)
Dept: FAMILY MEDICINE | Facility: CLINIC | Age: 37
End: 2022-03-08
Payer: MEDICARE

## 2022-03-08 NOTE — PROGRESS NOTES
Clinic Care Coordination Contact    Follow Up Progress Note      Assessment: pt reporting that she is slowly getting better.  She is trying to find ways to increase her heart rate and not over do.  She had no questions. She did note that the side effects she was having from the steroid have resolved.     She is going a little slower at getting tasks completed with the affects of Covid and work.  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      Patient is knowledgeable on medications and is adherent.  No financial concerns reported at this time.  Medication review was completed with the patient and there are no questions or concerns at this time.    Care Gaps:    Health Maintenance Due   Topic Date Due     URINE DRUG SCREEN  Never done     ADVANCE CARE PLANNING  Never done     MEDICARE ANNUAL WELLNESS VISIT  02/13/2021     COVID-19 Vaccine (2 - Booster for William series) 07/01/2021     INFLUENZA VACCINE (1) 09/01/2021       Care Gaps Last addressed on 3-8-22 and pt will schedule her annual wellness visit via [employer].    Goals addressed this encounter:   Goals Addressed                    This Visit's Progress       Annual Wellness visit (pt-stated)         Goal Statement: I will schedule my Annual Wellness Visit.  Date Goal set: 3/8/2022  Barriers: scheduling  Strengths: desire to complete  Date to Achieve By: 9/4/22   Patient expressed understanding of goal: yes  Action steps to achieve this goal:  1. I will call scheduling or go on [employer] to schedule my Annual Wellness Visit  2. I will attend the appointment           Functional (pt-stated)   70%      Goal Statement: I want to get the upstairs cleaned and will break it down into manageable tasks.   Date Goal set: 5/4/2021   Barriers: my mental health, two young children  Strengths: I just got an henrietta to help with identifying tasks  Date to Achieve By: 5/4/2022  Patient expressed understanding of goal: yes  Action steps to achieve this goal:  1. I  will stop looking at the job as one big task I need to complete and break it down into manageable steps.  2. I will learn how to use the henrietta and identify steps  3. I will start to work on the tasks at a reasonable rate  4. If I start to feel overwhelmed with what is ahead of me I will look at what I have completed and remind myself to focus on steps and not the entire task              Intervention/Education provided during outreach: encouraged pt to work on improving her strength and recovery at a pace she can manage.  Discussed that her Annual wellness visit is due and she wanted to schedule via [employer] vs being tansferred to the scheduling line.      Outreach Frequency: monthly    Plan:   Pt to schedule her annual wellness video.  Pt to continue to work on completing tasks. Updated care plan sent via [employer].   Care Coordinator will follow up in one month.      TRAMAINE Ramirez, Certified Financial [facility] - Care Coordinator   3/8/2022   12:44 PM  [phone]

## 2022-03-08 NOTE — LETTER
It was a pleasure to speak with you.  I would like to provide you with the enclosed information for your records.  As part of care coordination, we are developing care plans to assist in accomplishing your health care goals.  When we speak next, please feel free to let me know if you want to add or change any information on your care plans.    As always, feel free to contact me if you have any questions or concerns.  I look forward to working with you in the effort to achieve your health care and wellness goals .        Sincerely,      Violette Wren, [facility]  Clinic Care Coordination  [phone]    [facility]  Patient Centered Plan of Care  About Me:        Patient Name:  Isaura Gray    YOB: 1985  Age:         36 year old   [place] MRN:    [id number] Telephone Information:  Home Phone [phone]   Mobile [phone]       Address:  [address] [address] [place] [address] Email address:  [email or web address]      Emergency Contact(s)    Name Relationship Lgl Grd Work Phone Home Phone Mobile Phone   1. FAMILIA AKERS Significant ot*   [phone]           Primary language:  English     needed? No   [place] [facility]:  [phone] op. 1  Other communication barriers:Other (due to MH and Anxiety)    Preferred Method of Communication:  Mail  Current living arrangement: I live in a private home with family (2 children and boyfriend)    Mobility Status/ Medical Equipment: Independent        Health Maintenance  Health Maintenance Reviewed: Due/Overdue   Health Maintenance Due   Topic Date Due     URINE DRUG SCREEN  Never done     ADVANCE CARE PLANNING  Never done     MEDICARE ANNUAL WELLNESS VISIT  02/13/2021     COVID-19 Vaccine (2 - Booster for William series) 07/01/2021     INFLUENZA VACCINE (1) 09/01/2021           My Access Plan  Medical Emergency 911   Primary Clinic Line [facility] - [phone]   24 Hour  Appointment Line [phone] or  [phone]) (toll-free)   24 Hour Nurse Line [phone] (toll-free)   Preferred Urgent Care [facility], [phone]     [facility]  [phone]     Preferred Pharmacy [facility] - [place], [place] - [address]     Behavioral Health Crisis Line The National Suicide Prevention Lifeline at 1-841.490.8276 or 911             My Care Team Members  Patient Care Team       Relationship Specialty Notifications Start End    Timmy Umana MD PCP - General Family Practice  3/25/18     Phone: [phone] Fax: [phone]         [address] [facility] [address]    Simi Bearden MD MD Hematology & Oncology Admissions 9/16/16     Phone: [phone] Fax: [phone]         [address] [facility] [address]    Timmy Umana MD Assigned PCP   12/20/20     Phone: [phone] Fax: [phone]         [address] [facility] [address]    Leventhal, Thomas Michael, MD Assigned Gastroenterology Provider   12/20/20     Phone: [phone] Fax: [phone]         [address] [facility] [address]    Osmany Smith MD Assigned Surgical Provider   2/21/21     Phone: [phone] Fax: [phone]         [address] [facility] [address]    Violette Wren LSW Lead Care Coordinator Primary Care - CC Admissions 5/4/21     Phone: [phone] Fax: [phone]        Merced Ko MD MD Endocrinology, Diabetes, and Metabolism  10/29/21     Phone: [phone] Fax: [phone] [address] [facility] [address]    Blair Wan PA-C Referring Physician Family Medicine  10/29/21     Phone: [phone] Fax: [phone]         [address] [place], [place] [address] [place] [facility] [address]    Merced Ko MD Assigned Endocrinology Provider   11/28/21     Phone: [phone] Fax: [phone] [address] [address] [place] [address]  [place] [address]    Chuy Cowart PA-C Assigned Sleep Provider   1/9/22     Phone: [phone] Fax: [phone] [address] [facility] [address] [place] [address]    Barbara Montgomery MD Assigned Behavioral Health Provider   1/23/22     Phone: [phone] Fax: [phone] [address] [facility] [address] [place] [address]            My Care Plans  Self Management and Treatment Plan  Goals and (Comments)  Goals        General     Annual Wellness visit (pt-stated)      Notes - Note edited  3/8/2022 12:31 PM by Violette Wren LSW     Goal Statement: I will schedule my Annual Wellness Visit.  Date Goal set: 3/8/2022  Barriers: scheduling  Strengths: desire to complete  Date to Achieve By: 9/4/22   Patient expressed understanding of goal: yes  Action steps to achieve this goal:  1. I will call scheduling or go on [employer] to schedule my Annual Wellness Visit  2. I will attend the appointment         Functional (pt-stated)      Notes - Note edited  10/22/2021 10:36 AM by Violette Wren LSW     Goal Statement: I want to get the upstairs cleaned and will break it down into manageable tasks.   Date Goal set: 5/4/2021   Barriers: my mental health, two young children  Strengths: I just got an henrietta to help with identifying tasks  Date to Achieve By: 5/4/2022  Patient expressed understanding of goal: yes  Action steps to achieve this goal:  1. I will stop looking at the job as one big task I need to complete and break it down into manageable steps.  2. I will learn how to use the henrietta and identify steps  3. I will start to work on the tasks at a reasonable rate  4. If I start to feel overwhelmed with what is ahead of me I will look at what I have completed and remind myself to focus on steps and not the entire task               Action Plans on File:                       Advance Care Plans/Directives Type:   Nothing on file    My Medical and Care Information  Problem List   Patient Active Problem List   Diagnosis      Vitamin D deficiency     Supervision of other high risk pregnancies, unspecified trimester     PE (pulmonary thromboembolism) (H)     Hyperprolactinemia (H)     Follicular cancer of thyroid (H)     Lactose intolerance in adult     Schizophrenia (H)     Encounter for triage in pregnant patient     Cough     Chronic bilateral low back pain without sciatica     Normal labor     Anxiety     Cancer (H)     H/O  delivery, currently pregnant     History of pulmonary embolism     History of thyroid cancer     Mitral valve disorder      (normal spontaneous vaginal delivery)     Moderate major depression (H)     PTSD (post-traumatic stress disorder)     ASCUS of cervix with negative high risk HPV     Non-alcoholic fatty liver disease     Psychophysiological insomnia     Family history of Hashimoto thyroiditis     Biliary dyskinesia     Bipolar II disorder (H)     Pituitary tumor - history      Current Medications and Allergies:       Allergies   Allergen Reactions     Ciprofloxacin Hives     Sulfa Drugs Hives     Hydrocodone-Acetaminophen Itching           Oxycodone-Acetaminophen Itching         Current Outpatient Medications:      calcium carbonate (OS-DEBI) 1500 (600 Ca) MG tablet, Take 1 tablet (600 mg) by mouth 2 times daily (with meals), Disp: 180 tablet, Rfl: 1     dexamethasone (DECADRON) 6 MG tablet, Take 1 tablet (6 mg) by mouth daily for 5 days, Disp: 5 tablet, Rfl: 0     levonorgestrel (MIRENA) 20 MCG/24HR IUD, 1 each (20 mcg) by Intrauterine route once, Disp: , Rfl:      paliperidone ER (INVEGA) 6 MG 24 hr tablet, Take 1 tablet (6 mg) by mouth every morning, Disp: 30 tablet, Rfl: 5     sertraline (ZOLOFT) 100 MG tablet, Take 1 tablet (100 mg) by mouth daily, Disp: 30 tablet, Rfl: 11     zolpidem (AMBIEN) 5 MG tablet, Take 1 tablet (5 mg) by mouth nightly as needed for sleep, Disp: 30 tablet, Rfl: 1      Care Coordination Start Date: 2021   Frequency of Care Coordination: monthly     Form Last  Updated: 03/08/2022

## 2022-03-15 ENCOUNTER — APPOINTMENT (OUTPATIENT)
Dept: GENERAL RADIOLOGY | Facility: CLINIC | Age: 37
End: 2022-03-15
Attending: FAMILY MEDICINE
Payer: MEDICARE

## 2022-03-15 ENCOUNTER — HOSPITAL ENCOUNTER (EMERGENCY)
Facility: CLINIC | Age: 37
Discharge: HOME | End: 2022-03-15
Attending: FAMILY MEDICINE | Admitting: FAMILY MEDICINE
Payer: MEDICARE

## 2022-03-15 VITALS
SYSTOLIC BLOOD PRESSURE: 116 MMHG | BODY MASS INDEX: 28.3 KG/M2 | OXYGEN SATURATION: 99 % | HEART RATE: 86 BPM | RESPIRATION RATE: 18 BRPM | WEIGHT: 197.2 LBS | DIASTOLIC BLOOD PRESSURE: 98 MMHG | TEMPERATURE: 98.3 F

## 2022-03-15 DIAGNOSIS — R07.89 ATYPICAL CHEST PAIN: ICD-10-CM

## 2022-03-15 LAB
ANION GAP SERPL CALCULATED.3IONS-SCNC: 7 MMOL/L (ref 3–14)
BASOPHILS # BLD AUTO: 0 10E3/UL (ref 0–0.2)
BASOPHILS NFR BLD AUTO: 0 %
BUN SERPL-MCNC: 13 MG/DL (ref 7–30)
CALCIUM SERPL-MCNC: 9.1 MG/DL (ref 8.5–10.1)
CHLORIDE BLD-SCNC: 107 MMOL/L (ref 94–109)
CO2 SERPL-SCNC: 24 MMOL/L (ref 20–32)
CREAT SERPL-MCNC: 0.74 MG/DL (ref 0.52–1.04)
D DIMER PPP FEU-MCNC: 0.33 UG/ML FEU (ref 0–0.5)
EOSINOPHIL # BLD AUTO: 0 10E3/UL (ref 0–0.7)
EOSINOPHIL NFR BLD AUTO: 1 %
ERYTHROCYTE [DISTWIDTH] IN BLOOD BY AUTOMATED COUNT: 13.6 % (ref 10–15)
GFR SERPL CREATININE-BSD FRML MDRD: >90 ML/MIN/1.73M2
GLUCOSE BLD-MCNC: 92 MG/DL (ref 70–99)
HCT VFR BLD AUTO: 42.3 % (ref 35–47)
HGB BLD-MCNC: 14.7 G/DL (ref 11.7–15.7)
IMM GRANULOCYTES # BLD: 0 10E3/UL
IMM GRANULOCYTES NFR BLD: 0 %
LYMPHOCYTES # BLD AUTO: 2.3 10E3/UL (ref 0.8–5.3)
LYMPHOCYTES NFR BLD AUTO: 27 %
MCH RBC QN AUTO: 29.1 PG (ref 26.5–33)
MCHC RBC AUTO-ENTMCNC: 34.8 G/DL (ref 31.5–36.5)
MCV RBC AUTO: 84 FL (ref 78–100)
MONOCYTES # BLD AUTO: 0.7 10E3/UL (ref 0–1.3)
MONOCYTES NFR BLD AUTO: 8 %
NEUTROPHILS # BLD AUTO: 5.4 10E3/UL (ref 1.6–8.3)
NEUTROPHILS NFR BLD AUTO: 64 %
NRBC # BLD AUTO: 0 10E3/UL
NRBC BLD AUTO-RTO: 0 /100
PLATELET # BLD AUTO: 284 10E3/UL (ref 150–450)
POTASSIUM BLD-SCNC: 3.7 MMOL/L (ref 3.4–5.3)
RBC # BLD AUTO: 5.05 10E6/UL (ref 3.8–5.2)
SLPCOMP: NORMAL
SODIUM SERPL-SCNC: 138 MMOL/L (ref 133–144)
TROPONIN I SERPL HS-MCNC: <3 NG/L
WBC # BLD AUTO: 8.4 10E3/UL (ref 4–11)

## 2022-03-15 PROCEDURE — 93010 ELECTROCARDIOGRAM REPORT: CPT | Performed by: FAMILY MEDICINE

## 2022-03-15 PROCEDURE — 96374 THER/PROPH/DIAG INJ IV PUSH: CPT

## 2022-03-15 PROCEDURE — 36415 COLL VENOUS BLD VENIPUNCTURE: CPT | Performed by: FAMILY MEDICINE

## 2022-03-15 PROCEDURE — 85041 AUTOMATED RBC COUNT: CPT | Performed by: FAMILY MEDICINE

## 2022-03-15 PROCEDURE — 85379 FIBRIN DEGRADATION QUANT: CPT | Performed by: FAMILY MEDICINE

## 2022-03-15 PROCEDURE — 85014 HEMATOCRIT: CPT | Performed by: FAMILY MEDICINE

## 2022-03-15 PROCEDURE — 82310 ASSAY OF CALCIUM: CPT | Performed by: FAMILY MEDICINE

## 2022-03-15 PROCEDURE — 93005 ELECTROCARDIOGRAM TRACING: CPT

## 2022-03-15 PROCEDURE — 71045 X-RAY EXAM CHEST 1 VIEW: CPT

## 2022-03-15 PROCEDURE — 84484 ASSAY OF TROPONIN QUANT: CPT | Performed by: FAMILY MEDICINE

## 2022-03-15 PROCEDURE — 99285 EMERGENCY DEPT VISIT HI MDM: CPT | Mod: 25

## 2022-03-15 PROCEDURE — 250N000011 HC RX IP 250 OP 636: Performed by: FAMILY MEDICINE

## 2022-03-15 PROCEDURE — 99284 EMERGENCY DEPT VISIT MOD MDM: CPT | Mod: 25 | Performed by: FAMILY MEDICINE

## 2022-03-15 RX ORDER — ONDANSETRON 2 MG/ML
4 INJECTION INTRAMUSCULAR; INTRAVENOUS EVERY 30 MIN PRN
Status: DISCONTINUED | OUTPATIENT
Start: 2022-03-15 | End: 2022-03-15 | Stop reason: HOSPADM

## 2022-03-15 RX ADMIN — ONDANSETRON 4 MG: 2 INJECTION INTRAMUSCULAR; INTRAVENOUS at 19:27

## 2022-03-15 NOTE — ED TRIAGE NOTES
"Pt reports having fatigue and a \"funny feeling in her chest\" with high and low heart rates today and reports she feels like she is struggling to breath even though she is not  "

## 2022-03-16 NOTE — ED PROVIDER NOTES
History     Chief Complaint   Patient presents with     Chest Pain     Fatigue     HPI  Isaura Gray is a 36 year old female who presents with chest tightness that started this afternoon.  Patient also felt like she was choking on a bunch of phlegm or something in her chest that she had a cough for a long period of time to get clear.  Denies any fevers or chills.  Patient's had significant amounts of nausea today, she has had 2-3 episodes of vomiting and 2-3 episodes of loose watery stools today.  Denies any abdominal pain.  Denies any back pain.  Denies any dysuria or hematuria.  Nothing makes symptoms better or worse.    Allergies:  Allergies   Allergen Reactions     Ciprofloxacin Hives     Sulfa Drugs Hives     Hydrocodone-Acetaminophen Itching           Oxycodone-Acetaminophen Itching       Problem List:    Patient Active Problem List    Diagnosis Date Noted     Pituitary tumor - history      Priority: Medium     Bipolar II disorder (H) 2021     Priority: Medium     Biliary dyskinesia 2021     Priority: Medium     Added automatically from request for surgery [id number]       Non-alcoholic fatty liver disease 2020     Priority: Medium     Psychophysiological insomnia 2020     Priority: Medium     Family history of Hashimoto thyroiditis 2020     Priority: Medium     Moderate major depression (H) 2020     Priority: Medium     Cancer (H)      Priority: Medium     Anxiety 2018     Priority: Medium     History of thyroid cancer 2018     Priority: Medium      (normal spontaneous vaginal delivery) 2018     Priority: Medium     Normal labor 2018     Priority: Medium     Cough 2018     Priority: Medium     Chronic bilateral low back pain without sciatica 2018     Priority: Medium     Encounter for triage in pregnant patient 2018     Priority: Medium     Schizophrenia (H)      Priority: Medium     reports spontaneous remission during  last pregnancy       Vitamin D deficiency 2018     Priority: Medium     Supervision of other high risk pregnancies, unspecified trimester 2018     Priority: Medium     Lactose intolerance in adult 2018     Priority: Medium     PE (pulmonary thromboembolism) (H)      Priority: Medium     Hyperprolactinemia (H)      Priority: Medium     Follicular cancer of thyroid (H)      Priority: Medium     Mitral valve disorder 2018     Priority: Medium     H/O  delivery, currently pregnant 2016     Priority: Medium     History of pulmonary embolism 10/19/2016     Priority: Medium     ASCUS of cervix with negative high risk HPV 2016     Priority: Medium     16 ASCUS pap, neg HPV. Done at [facility]. Plan: Cotest in 3 yr  20 NIL Pap, Neg HPV. Plan: pending provider.        PTSD (post-traumatic stress disorder) 2015     Priority: Medium        Past Medical History:    Past Medical History:   Diagnosis Date     ASCUS of cervix with negative high risk HPV 2016     Cancer (H)      Depressive disorder      Follicular cancer of thyroid (H)      Hyperprolactinemia (H)      Mitral valve prolapse      PE (pulmonary thromboembolism) (H)      Pituitary tumor      Schizophrenia (H)      Thyroid disease        Past Surgical History:    Past Surgical History:   Procedure Laterality Date     APPENDECTOMY       ENT SURGERY      Partial thyroidectomy     LAPAROSCOPIC CHOLECYSTECTOMY N/A 3/4/2021    Procedure: Laparoscopic cholecystectomy;  Surgeon: Osmany Smith MD;  Location:  OR       Family History:    Family History   Problem Relation Age of Onset     No Known Problems Mother      Hypertension Father      Cerebrovascular Disease Father 56        Passed away from double brain stem clot     Mental Illness Father      Depression Father      Anxiety Disorder Father      Alcoholism Father      Schizophrenia Father      Asthma Brother      Cancer Maternal Grandfather          "lung     No Known Problems Paternal Grandmother      Schizophrenia Paternal Grandfather      Suicide Paternal Grandfather 53     Autism Spectrum Disorder Son      Kidney Disease Son         \"has a third kidney\"     No Known Problems Daughter        Social History:  Marital Status:  Single [1]  Social History     Tobacco Use     Smoking status: Former Smoker     Packs/day: 0.50     Years: 5.00     Pack years: 2.50     Types: Cigarettes, Vaping Device     Quit date: 2016     Years since quittin.6     Smokeless tobacco: Never Used   Vaping Use     Vaping Use: Never used   Substance Use Topics     Alcohol use: No     Comment: Has an issue with her liver (not alcohol related)     Drug use: No        Medications:    calcium carbonate (OS-DEBI) 1500 (600 Ca) MG tablet  dexamethasone (DECADRON) 6 MG tablet  levonorgestrel (MIRENA) 20 MCG/24HR IUD  paliperidone ER (INVEGA) 6 MG 24 hr tablet  sertraline (ZOLOFT) 100 MG tablet  zolpidem (AMBIEN) 5 MG tablet          Review of Systems   All other systems reviewed and are negative.      Physical Exam   BP: (!) 137/92  Pulse: 74  Temp: 98.3  F (36.8  C)  Resp: 18  Weight: 89.4 kg (197 lb 3.2 oz)  SpO2: 96 %      Physical Exam  Vitals and nursing note reviewed.   Constitutional:       General: She is not in acute distress.     Appearance: She is well-developed. She is not diaphoretic.   HENT:      Head: Normocephalic and atraumatic.      Nose: Nose normal.      Mouth/Throat:      Pharynx: No oropharyngeal exudate.   Eyes:      Conjunctiva/sclera: Conjunctivae normal.   Cardiovascular:      Rate and Rhythm: Normal rate and regular rhythm.      Heart sounds: Normal heart sounds. No murmur heard.    No friction rub.   Pulmonary:      Effort: Pulmonary effort is normal. No respiratory distress.      Breath sounds: Normal breath sounds. No stridor. No wheezing or rales.   Abdominal:      General: Bowel sounds are normal. There is no distension.      Palpations: Abdomen is soft. " There is no mass.      Tenderness: There is no abdominal tenderness. There is no guarding.   Musculoskeletal:         General: No tenderness. Normal range of motion.      Cervical back: Normal range of motion and neck supple.   Skin:     General: Skin is warm and dry.      Capillary Refill: Capillary refill takes less than 2 seconds.      Findings: No erythema.   Neurological:      Mental Status: She is alert and oriented to person, place, and time.   Psychiatric:         Judgment: Judgment normal.         ED Course                 Procedures            EKG Interpretation:      Interpreted by You Sylvester  Time reviewed: now   Symptoms at time of EKG: now   Rhythm: normal sinus   Rate: normal  Axis: NORMAL  Ectopy: none  Conduction: normal  ST Segments/ T Waves: No ST-T wave changes  Q Waves: none  Comparison to prior: No old EKG available    Clinical Impression: normal EKG    Results for orders placed or performed during the hospital encounter of 03/15/22 (from the past 24 hour(s))   CBC with platelets differential    Narrative    The following orders were created for panel order CBC with platelets differential.  Procedure                               Abnormality         Status                     ---------                               -----------         ------                     CBC with platelets and d...[[id number]]                      Final result                 Please view results for these tests on the individual orders.   D dimer quantitative   Result Value Ref Range    D-Dimer Quantitative 0.33 0.00 - 0.50 ug/mL FEU    Narrative    This D-dimer assay is intended for use in conjunction with a clinical pretest probability assessment model to exclude pulmonary embolism (PE) and deep venous thrombosis (DVT) in outpatients suspected of PE or DVT. The cut-off value is 0.50 ug/mL FEU.   Basic metabolic panel   Result Value Ref Range    Sodium 138 133 - 144 mmol/L    Potassium 3.7 3.4 - 5.3 mmol/L     Chloride 107 94 - 109 mmol/L    Carbon Dioxide (CO2) 24 20 - 32 mmol/L    Anion Gap 7 3 - 14 mmol/L    Urea Nitrogen 13 7 - 30 mg/dL    Creatinine 0.74 0.52 - 1.04 mg/dL    Calcium 9.1 8.5 - 10.1 mg/dL    Glucose 92 70 - 99 mg/dL    GFR Estimate >90 >60 mL/min/1.73m2   Troponin I   Result Value Ref Range    Troponin I High Sensitivity <3 <54 ng/L   CBC with platelets and differential   Result Value Ref Range    WBC Count 8.4 4.0 - 11.0 10e3/uL    RBC Count 5.05 3.80 - 5.20 10e6/uL    Hemoglobin 14.7 11.7 - 15.7 g/dL    Hematocrit 42.3 35.0 - 47.0 %    MCV 84 78 - 100 fL    MCH 29.1 26.5 - 33.0 pg    MCHC 34.8 31.5 - 36.5 g/dL    RDW 13.6 10.0 - 15.0 %    Platelet Count 284 150 - 450 10e3/uL    % Neutrophils 64 %    % Lymphocytes 27 %    % Monocytes 8 %    % Eosinophils 1 %    % Basophils 0 %    % Immature Granulocytes 0 %    NRBCs per 100 WBC 0 <1 /100    Absolute Neutrophils 5.4 1.6 - 8.3 10e3/uL    Absolute Lymphocytes 2.3 0.8 - 5.3 10e3/uL    Absolute Monocytes 0.7 0.0 - 1.3 10e3/uL    Absolute Eosinophils 0.0 0.0 - 0.7 10e3/uL    Absolute Basophils 0.0 0.0 - 0.2 10e3/uL    Absolute Immature Granulocytes 0.0 <=0.4 10e3/uL    Absolute NRBCs 0.0 10e3/uL   XR Chest Port 1 View    Narrative    EXAM: XR CHEST PORT 1 VIEW  LOCATION: [facility]  DATE/TIME: 3/15/2022 7:48 PM    INDICATION: chest pain  COMPARISON: 01/23/2022      Impression    IMPRESSION: No focal infiltrate, pleural effusion or pneumothorax. The cardiac and mediastinal silhouettes are normal.       Medications   ondansetron (ZOFRAN) injection 4 mg (4 mg Intravenous Given 3/15/22 1927)     Labs to come back and are all unremarkable including a negative D-dimer and troponin.  Chest x-ray was clear.  At this point this does not appear to be any significant cause for this chest pain.  Patient states that she is coughing a little bit more now, she could be coming down with some type of viral process.  Recommend conservative  care including Tylenol as needed for discomfort, she can try a decongestant.  Patient will follow up if there is no improvement over the next few days.    Assessments & Plan (with Medical Decision Making)  Atypical chest pain     I have reviewed the nursing notes.    I have reviewed the findings, diagnosis, plan and need for follow up with the patient.              3/15/2022   [facility] EMERGENCY DEPT     You Sylvester MD  03/15/22 2018

## 2022-03-24 ENCOUNTER — MYC MEDICAL ADVICE (OUTPATIENT)
Dept: SLEEP MEDICINE | Facility: CLINIC | Age: 37
End: 2022-03-24
Payer: MEDICARE

## 2022-03-25 NOTE — PROGRESS NOTES
Does Isaura have a CPAP/Bipap?  No     [place] Sleep Scale: 23    Isaura is a 36 year old who is being evaluated via a billable video visit.      How would you like to obtain your AVS? MyChart  If the video visit is dropped, the invitation should be resent by: Text to cell phone: [phone]  Will anyone else be joining your video visit? No      Video Start Time: changed to phone        Subjective   Isaura is a 36 year old who presents for the following health issues study results  Vitals:  No vitals were obtained today due to virtual visit.        Video-Visit Details    Type of service:  unable to connect via video, changed to phone visit.         Sleep Study Follow-Up Visit:    Date on this visit: 3/29/2022    Isaura Gray comes in today for follow-up of her sleep study done on 3/2/22 at the [facility] for excessive daytime sleepiness and possible sleep apnea.    Sleep latency 14.5 minutes with Ambien.  REM achieved.   REM latency 239.5 minutes.  Sleep efficiency 81.1%. Total sleep time 409 minutes.    Sleep architecture:  Stage 1, 8.3% (5%), stage 2, 48.2% (45-55%), stage 3, 30% (15-20%), stage REM, 13.6% (20-25%).  AHI was 0.4, without desaturations. RDI 1.2.  REM RDI 1.1, consistent with no REM TED.  Supine RDI 1.0, consistent with no SUPINE TED.  Periodic Limb Movement Index 12.5/hour.           These findings were reviewed with patient.     Past medical/surgical history, family history, social history, medications and allergies were reviewed.      Problem List:  Patient Active Problem List    Diagnosis Date Noted     Pituitary tumor - history      Priority: Medium     Bipolar II disorder (H) 03/23/2021     Priority: Medium     Biliary dyskinesia 02/17/2021     Priority: Medium     Added automatically from request for surgery [id number]       Non-alcoholic fatty liver disease 12/11/2020     Priority: Medium     Psychophysiological insomnia 12/11/2020     Priority: Medium      Family history of Hashimoto thyroiditis 2020     Priority: Medium     Moderate major depression (H) 2020     Priority: Medium     Cancer (H)      Priority: Medium     Anxiety 2018     Priority: Medium     History of thyroid cancer 2018     Priority: Medium      (normal spontaneous vaginal delivery) 2018     Priority: Medium     Normal labor 2018     Priority: Medium     Cough 2018     Priority: Medium     Chronic bilateral low back pain without sciatica 2018     Priority: Medium     Encounter for triage in pregnant patient 2018     Priority: Medium     Schizophrenia (H)      Priority: Medium     reports spontaneous remission during last pregnancy       Vitamin D deficiency 2018     Priority: Medium     Supervision of other high risk pregnancies, unspecified trimester 2018     Priority: Medium     Lactose intolerance in adult 2018     Priority: Medium     PE (pulmonary thromboembolism) (H)      Priority: Medium     Hyperprolactinemia (H)      Priority: Medium     Follicular cancer of thyroid (H)      Priority: Medium     Mitral valve disorder 2018     Priority: Medium     H/O  delivery, currently pregnant 2016     Priority: Medium     History of pulmonary embolism 10/19/2016     Priority: Medium     ASCUS of cervix with negative high risk HPV 2016     Priority: Medium     16 ASCUS pap, neg HPV. Done at [facility]. Plan: Cotest in 3 yr  20 NIL Pap, Neg HPV. Plan: pending provider.        PTSD (post-traumatic stress disorder) 2015     Priority: Medium        Impression/Plan:    No sleep apnea- discussed sleep psychology to help address her insomnia issues, ongoing fatigue. We discussed additional testing, but would need her to be of Zoloft. From a mental health standpoint she would not want to do that at this time. Recommend she work on improving mental health, work with sleep psychollogy to improve  sleep. Referral placed.   She will follow up with me in about 3 month(s).     Twenty-five minutes spent with patient, all of which were spent face-to-face counseling, consulting, coordinating plan of care.          CC: Timmy Umana

## 2022-03-29 ENCOUNTER — VIRTUAL VISIT (OUTPATIENT)
Dept: SLEEP MEDICINE | Facility: CLINIC | Age: 37
End: 2022-03-29
Payer: MEDICARE

## 2022-03-29 DIAGNOSIS — R40.0 SOMNOLENCE: ICD-10-CM

## 2022-03-29 DIAGNOSIS — G47.10 HYPERSOMNIA: ICD-10-CM

## 2022-03-29 DIAGNOSIS — F51.04 PSYCHOPHYSIOLOGIC INSOMNIA: ICD-10-CM

## 2022-03-29 DIAGNOSIS — R53.83 FATIGUE, UNSPECIFIED TYPE: Primary | ICD-10-CM

## 2022-03-29 PROCEDURE — 99443 PR PHYSICIAN TELEPHONE EVALUATION 21-30 MIN: CPT | Mod: 95 | Performed by: PHYSICIAN ASSISTANT

## 2022-03-29 ASSESSMENT — SLEEP AND FATIGUE QUESTIONNAIRES
HOW LIKELY ARE YOU TO NOD OFF OR FALL ASLEEP WHILE SITTING QUIETLY AFTER LUNCH WITHOUT ALCOHOL: HIGH CHANCE OF DOZING
HOW LIKELY ARE YOU TO NOD OFF OR FALL ASLEEP WHEN YOU ARE A PASSENGER IN A CAR FOR AN HOUR WITHOUT A BREAK: HIGH CHANCE OF DOZING
HOW LIKELY ARE YOU TO NOD OFF OR FALL ASLEEP WHILE SITTING AND READING: HIGH CHANCE OF DOZING
HOW LIKELY ARE YOU TO NOD OFF OR FALL ASLEEP WHILE SITTING INACTIVE IN A PUBLIC PLACE: HIGH CHANCE OF DOZING
HOW LIKELY ARE YOU TO NOD OFF OR FALL ASLEEP IN A CAR, WHILE STOPPED FOR A FEW MINUTES IN TRAFFIC: MODERATE CHANCE OF DOZING
HOW LIKELY ARE YOU TO NOD OFF OR FALL ASLEEP WHILE LYING DOWN TO REST IN THE AFTERNOON WHEN CIRCUMSTANCES PERMIT: HIGH CHANCE OF DOZING
HOW LIKELY ARE YOU TO NOD OFF OR FALL ASLEEP WHILE SITTING AND TALKING TO SOMEONE: HIGH CHANCE OF DOZING
HOW LIKELY ARE YOU TO NOD OFF OR FALL ASLEEP WHILE WATCHING TV: HIGH CHANCE OF DOZING

## 2022-03-29 NOTE — PATIENT INSTRUCTIONS
[place] [facility]      Treating Insomnia  Good sleeping habits are a key part of treatment. If needed, some medications may help you sleep better at first. Making healthy lifestyle changes and learning to relax can improve your sleep. Treating insomnia takes commitment, but trust that your efforts will pay off. Talk to your doctor before taking any medication.    Healthy Lifestyle  Your lifestyle affects your health and your sleep. Here are some healthy habits:    Keep a regular sleep schedule. Go to bed and get up at the same time each day.    Exercise regularly. It may help you reduce stress. Avoid strenuous exercise for two to four hours before bedtime.    Avoid or limit naps.    Use your bed only for sleep and sex.    Don t spend too much time in bed trying to fall asleep. If you can t fall asleep, get up and do something until you become tired and drowsy.    Avoid or limit caffeine and nicotine. They can keep you awake at night. Also avoid alcohol. It may help you fall asleep at first, but your sleep will not be restful.    Before Bedtime  To sleep better every night, try these tips:    Have a bedtime routine to let your body and mind know when it s time to sleep.    Going to bed should be relaxing so try to do only relaxing things around bedtime. Sleep will come sooner.    If your worries don t let you sleep, write them down in a diary. Then close it, and go to bed.    Make sure the room is not too hot or too cold. If it s not dark enough, an eye mask can help. If it s noisy, try using earplugs.    Learn to Relax  Stress, anxiety, and body tension may keep you awake at night. To unwind before bedtime, try reading a book, meditation, or yoga. Also, try the following:    Deep breathing. Sit or lie back in a chair. Take a slow, deep breath. Hold it for 5 counts. Then breathe out slowly through your mouth. Keep doing this until you feel relaxed.    Imagery. Think of the last fun trip you took. In your  mind, walk through the trip from start to finish. Put as much detail into the memory as you can remember. It will help you relax.    Cognitive Behavioral Treatment (CBT)  CBT is the most effective treatment for long-term insomnia. It tries to address the underlying causes of your sleep problems, including your habits and how you think about sleep.      Individual Therapy   Edgar Bravo    [phone]     [address] [address]      Online Programs     www.[employer] (pronounced shut eye). There is a fee for this program. Enter the code  [facility]  if you decide to enroll in this program.      www.sleepIO.com (pronounced sleep ee oh). There is a fee for this program. Enter the code  [facility]  if you decide to enroll in this program.     Suggested Resources  Insomnia Treatment Books     Overcoming Insomnia by Gurvinder uHber and Myra Clark (2008)    No More Sleepless Nights by Michael Chaves and Aby Tejada (1996)    Say Michael to Insomnia by Charlie Gloria (2009)    The Insomnia Workbook by Isidra Walden and Les Sainz (2009)    The Insomnia Answer by Cheng Blackmon and Richard Torres (2006)      Stress Management and Relaxation Books    The Relaxation and Stress Reduction Workbook by Kelsie Brownlee, Ivania Steele and Shay Bunn (2008)    Stress Management Workbook: Techniques and Self-Assessment Procedures by Kirti Veras and Pradip Rosado (1997)    A Mindfulness-Based Stress Reduction Workbook by Derrek Zepeda and Maranda Davis (2010)    The Complete Stress Management Workbook by Payam Montes, Jay Dior and Miko Pedro (1996)    Assert Yourself by Marilee Jackson and Zion Jackson (1977)    Relaxation Resources for Computer Download   These websites offer resources to help you relax. This list is for information only. [facility] is not responsible for the quality of services or the actions of any person or organization.  Progressive Muscle  Relaxation (PMR):     http://www.[employer]/progressive-muscle-relaxation-exercise.html     http://studentsupport.[facility]/counseling/resources/self-help/relaxation-and-stress-management/   Deep Breathing Exercises:    http://www.[employer]/breathing-awareness.html

## 2022-04-04 ENCOUNTER — E-VISIT (OUTPATIENT)
Dept: URGENT CARE | Facility: CLINIC | Age: 37
End: 2022-04-04
Payer: MEDICARE

## 2022-04-04 ENCOUNTER — VIRTUAL VISIT (OUTPATIENT)
Dept: BEHAVIORAL HEALTH | Facility: CLINIC | Age: 37
End: 2022-04-04
Payer: MEDICARE

## 2022-04-04 DIAGNOSIS — R19.7 DIARRHEA, UNSPECIFIED TYPE: Primary | ICD-10-CM

## 2022-04-04 DIAGNOSIS — F31.81 BIPOLAR II DISORDER (H): Primary | ICD-10-CM

## 2022-04-04 DIAGNOSIS — F41.9 ANXIETY: ICD-10-CM

## 2022-04-04 DIAGNOSIS — F20.9 SCHIZOPHRENIA, UNSPECIFIED TYPE (H): ICD-10-CM

## 2022-04-04 PROCEDURE — 99207 PR NON-BILLABLE SERV PER CHARTING: CPT | Performed by: FAMILY MEDICINE

## 2022-04-04 PROCEDURE — 90832 PSYTX W PT 30 MINUTES: CPT | Mod: 95 | Performed by: SOCIAL WORKER

## 2022-04-04 ASSESSMENT — PATIENT HEALTH QUESTIONNAIRE - PHQ9
SUM OF ALL RESPONSES TO PHQ QUESTIONS 1-9: 24
10. IF YOU CHECKED OFF ANY PROBLEMS, HOW DIFFICULT HAVE THESE PROBLEMS MADE IT FOR YOU TO DO YOUR WORK, TAKE CARE OF THINGS AT HOME, OR GET ALONG WITH OTHER PEOPLE: EXTREMELY DIFFICULT
SUM OF ALL RESPONSES TO PHQ QUESTIONS 1-9: 24

## 2022-04-04 NOTE — PROGRESS NOTES
"[facility] Collaborative Care Psychiatry Service   2022      Behavioral Health Clinician Progress Note    Patient Name: Isaura Gray           Service Type:  Individual      Service Location:   Phone call (patient / identified key support person reached)     Session Start Time: 8:30a  Session End Time: 8:50a      Session Length: 16 - 37      Attendees: Client     Service Modality:  Phone Visit:      Provider verified identity through the following two step process.  Patient provided:  Patient     The patient has been notified of the following:      \"We have found that certain health care needs can be provided without the need for a face to face visit.  This service lets us provide the care you need with a phone conversation.       I will have full access to your [facility] medical record during this entire phone call.   I will be taking notes for your medical record.      Since this is like an office visit, we will bill your insurance company for this service.       There are potential benefits and risks of telephone visits (e.g. limits to patient confidentiality) that differ from in-person visits.?Confidentiality still applies for telephone services, and nobody will record the visit.  It is important to be in a quiet, private space that is free of distractions (including cell phone or other devices) during the visit.??      If during the course of the call I believe a telephone visit is not appropriate, you will not be charged for this service\"     Consent has been obtained for this service by care team member: Yes     Visit Activities (Refresh list every visit): [facility] Only    Diagnostic Assessment Date: 3/23/21- Updated DA will be completed at next visit  Treatment Plan Review Date: 22  See Flowsheets for today's PHQ-9 and AVERY-7 results  Previous PHQ-9:   PHQ-9 SCORE 2021   PHQ-9 Total Score MyChart 10 (Moderate depression) 24 (Severe depression) 24 (Severe " "depression)   PHQ-9 Total Score - - 24   Some encounter information is confidential and restricted. Go to Review Flowsheets activity to see all data.     Previous AVERY-7:   AVERY-7 SCORE 11/18/2021 3/30/2022 4/1/2022   Total Score 8 (mild anxiety) 21 (severe anxiety) 21 (severe anxiety)   Total Score - 21 -   Some encounter information is confidential and restricted. Go to Review Flowsheets activity to see all data.       PRECIOUS LEVEL:  PRECIOUS Score (Last Two) 3/28/2018   PRECIOUS Raw Score 35   Activation Score 72.1   PRECIOUS Level 3       DATA  Extended Session (60+ minutes): No  Interactive Complexity: No  Crisis: No  [facility] Patient: No    Treatment Objective(s) Addressed in This Session:  Target Behavior(s): health issues, anxiety, depression    Depressed Mood: Feel less tired and more energy during the day   Anxiety: will experience a reduction in anxiety    Current Stressors / Issues:  Had a sleep study recently and it was inconclusive.  Provider wants her to go off all her medications and can then do a test for narcolepsy.    Is sleeping about 10 hours a night but is still very tired during the day and will get physically sick to the point where she \"needs to sleep\".  Isn't driving anymore due to falling asleep when driving.  Was having chest pain and went to ED last month and nothing was determined.  Is frustrated that she doesn't seem to get any answers.  Nausea and vomiting seem to happen daily and are related to when she is very tired.    Continues to work but her hours are less and she \"works when she can\".  Other than sleeping and a few hours of work daily she says that she isn't able to do anything else.    Did say that since she had COVID her symptoms have definitely worsened.  Talks to a therapist regularly.    Is feeling a lot of anxiety daily with not having answers and feeling that she is getting worse and has no answers.        Progress on Treatment Objective(s) / Homework:  Minimal progress - ACTION (Actively " working towards change); Intervened by reinforcing change plan / affirming steps taken    Motivational Interviewing    MI Intervention: Expressed Empathy/Understanding, Open-ended questions and Reframe     Change Talk Expressed by the Patient: Committment to change Activation    Provider Response to Change Talk: E - Evoked more info from patient about behavior change, A - Affirmed patient's thoughts, decisions, or attempts at behavior change, R - Reflected patient's change talk and S - Summarized patient's change talk statements      Care Plan review completed: Yes    Medication Review:  Changes to psychiatric medications, see updated Medication List in EPIC. Med provider not available today.  Will see provider next week.    Medication Compliance:  Yes    Changes in Health Issues:   Yes: lots of nausea, extreme fatigue, Associated Psychological Distress    Chemical Use Review:   Substance Use: Chemical use reviewed, no active concerns identified      Tobacco Use: No current tobacco use.      Assessment: Current Emotional / Mental Status (status of significant symptoms):  Risk status (Self / Other harm or suicidal ideation)  Patient denies a history of suicidal ideation, suicide attempts, self-injurious behavior, homicidal ideation, homicidal behavior and and other safety concerns  Patient denies current fears or concerns for personal safety.  Patient denies current or recent suicidal ideation or behaviors.  Patient denies current or recent homicidal ideation or behaviors.  Patient denies current or recent self injurious behavior or ideation.  Patient denies other safety concerns.  A safety and risk management plan has not been developed at this time, however patient was encouraged to call [facility] should there be a change in any of these risk factors.    Appearance:   phone visit   Eye Contact:   phone visit   Psychomotor Behavior: phone visit   Attitude:   Cooperative   Orientation:   All  Speech   Rate  / Production: Normal    Volume:  Normal   Mood:    Normal  Affect:    Appropriate   Thought Content:  Clear   Thought Form:  Coherent  Logical   Insight:    Good     Diagnoses:  1. Bipolar II disorder (H)    2. Schizophrenia, unspecified type (H)    3. Anxiety        Collateral Reports Completed:  Not Applicable    Plan: (Homework, other):  Patient was given information about behavioral services and encouraged to schedule a follow up appointment with the clinic [facility] in 2 weeks.  She was also given information about mental health symptoms and treatment options .  CD Recommendations: No indications of CD issues.  Ginette Oliver, [facility], [facility]      ______________________________________________________________________    Integrated Primary Care Behavioral Health Treatment Plan    Patient's Name: Isaura Gray  YOB: 1985    Date of Creation: 4/4/22  Date Treatment Plan Last Reviewed/Revised: 7/4/22    DSM5 Diagnoses: 295.90  (F20.9) Schizophrenia, 296.89 Bipolar II Disorder Depressed or 300.00 (F41.9) Unspecified Anxiety Disorder  Psychosocial / Contextual Factors: health issues, fatigue, work stress, home stress  PROMIS (reviewed every 90 days): 10    Referral / Collaboration:  Referral to another professional/service is not indicated at this time..    Anticipated number of session for this episode of care: 5-8  Anticipation frequency of session: Every 1-2 months  Anticipated Duration of each session: 16-37 minutes  Treatment plan will be reviewed in 90 days or when goals have been changed.       MeasurableTreatment Goal(s) related to diagnosis / functional impairment(s)  Goal 1: Patient will experience a reduction in depressive symptoms along with corresponding increase in positive emotions and life satisfaction.      Objective #A (Patient Action)    Patient will Feel less tired and more energy during the day .  Status: New - Date: 4/4/22     Intervention(s)  Therapist will discuss with pt CBT and  ACT topics aimed to help reduce depression and anxiety.      Patient has reviewed and agreed to the above plan.      DAVID Rose, LICSW, [facility] [place]  April 4, 2022

## 2022-04-05 ENCOUNTER — PATIENT OUTREACH (OUTPATIENT)
Dept: CARE COORDINATION | Facility: CLINIC | Age: 37
End: 2022-04-05
Payer: MEDICARE

## 2022-04-05 ASSESSMENT — PATIENT HEALTH QUESTIONNAIRE - PHQ9: SUM OF ALL RESPONSES TO PHQ QUESTIONS 1-9: 24

## 2022-04-05 NOTE — PROGRESS NOTES
Clinic Care Coordination Contact  [facility]/Voicemail       Clinical Data: Care Coordinator Outreach  Outreach attempted x 1.  Left message on patient's voicemail with call back information and requested return call.  Plan:  Care Coordinator will try to reach patient again in 1-2 business days.    TRAMAINE Ramirez, Certified Financial [facility] Primary Care - Care Coordinator   4/5/2022   10:05 AM  [phone]

## 2022-04-07 ENCOUNTER — PATIENT OUTREACH (OUTPATIENT)
Dept: NURSING | Facility: CLINIC | Age: 37
End: 2022-04-07
Payer: MEDICARE

## 2022-04-07 NOTE — LETTER
[facility]  Patient Centered Plan of Care  About Me:        Patient Name:  Isaura Gray    YOB: 1985  Age:         36 year old   [place] MRN:    [id number] Telephone Information:  Home Phone [phone]   Mobile [phone]       Address:  [address] [address] [place] [address] Email address:  [email or web address]      Emergency Contact(s)    Name Relationship Lgl Grd Work Phone Home Phone Mobile Phone   1. FAMILIA AKERS Significant ot*   [phone]           Primary language:  English     needed? No   [place] [employer] [facility]:  [phone] op. 1  Other communication barriers:Other   Preferred Method of Communication:  Mail  Current living arrangement: I live in a private home with family (2 children and boyfriend)  Mobility Status/ Medical Equipment: Independent    Health Maintenance  Health Maintenance Reviewed: Due/Overdue   Health Maintenance Due   Topic Date Due     URINE DRUG SCREEN  Never done     ADVANCE CARE PLANNING  Never done     MEDICARE ANNUAL WELLNESS VISIT  02/13/2021     COVID-19 Vaccine (2 - Booster for William series) 07/01/2021     INFLUENZA VACCINE (1) 09/01/2021     My Access Plan  Medical Emergency 911   Primary Clinic Line [facility] [phone]   24 Hour Appointment Line [phone] or  [phone]) (toll-free)   24 Hour Nurse Line [phone] (toll-free)   Preferred Urgent Care [facility] [phone]     [facility]  [phone]     Preferred Pharmacy [facility] [address] [place] [address] [address]     Behavioral Health Crisis Line The National Suicide Prevention Lifeline at 1-835.926.8801 or 911     My Care Team Members  Patient Care Team       Relationship Specialty Notifications Start End    Timmy Umana MD PCP - General Family Practice  3/25/18     Phone: [phone] Fax:  [phone]         [address] [facility] [address]    Simi Bearden MD MD Hematology & Oncology Admissions 9/16/16     Phone: [phone] Fax: [phone]         [address] [facility] [address]    Timmy Umana MD Assigned PCP   12/20/20     Phone: [phone] Fax: [phone]         [address] [facility] [address]    Leventhal, Thomas Michael, MD Assigned Gastroenterology Provider   12/20/20     Phone: [phone] Fax: [phone]         [address] [facility] [address]    Osmany Smith MD Assigned Surgical Provider   2/21/21     Phone: [phone] Fax: [phone]         [address] [place] [address]    Violette Wren LSW Lead Care Coordinator Primary Care - CC Admissions 5/4/21     Phone: [phone] Fax: [phone]        Merced Ko MD MD Endocrinology, Diabetes, and Metabolism  10/29/21     Phone: [phone] Fax: [phone]         [address] [facility] [address]    Blair Wan PA-C Referring Physician Family Medicine  10/29/21     Phone: [phone] Fax: [phone]         [address] [facility] [address]    Merced Ko MD Assigned Endocrinology Provider   11/28/21     Phone: [phone] Fax: [phone]         [address] [facility] [address]    Chuy Cowart PA-C Assigned Sleep Provider   1/9/22     Phone: [phone] Fax: [phone]         [address] [facility] [place] [address]    Barbara Montgomery MD Assigned Behavioral Health Provider   1/23/22     Phone: [phone] Fax: [phone]         [address] [place] [address]            My Care Plans  Self Management and Treatment Plan  Goals and (Comments)  Goals        General     1. Annual Wellness visit (pt-stated)      Notes - Note edited  4/7/2022 10:29 AM by Aruna Boykin LSW     Goal Statement: I will schedule my Annual Wellness Visit.  Date Goal set: 3/8/2022  Barriers: scheduling  Strengths:  desire to complete  Date to Achieve By: 9/4/22   Patient expressed understanding of goal: Yes    Action steps to achieve this goal:  1. I will call scheduling or go on [employer] to schedule my Annual Wellness Visit.  2. I will attend the appointment.         2. Functional (pt-stated)      Notes - Note edited  4/7/2022 10:29 AM by Aruna Boykin LSW     Goal Statement: I want to get the upstairs cleaned and will break it down into manageable tasks.   Date Goal set: 5/4/2021   Barriers: my mental health, two young children  Strengths: I just got an henrietta to help with identifying tasks  Date to Achieve By: 5/4/2022  Patient expressed understanding of goal: Yes    Action steps to achieve this goal:  1. I will stop looking at the job as one big task I need to complete and break it down into manageable steps.  2. I will learn how to use the henrietta and identify steps.  3. I will start to work on the tasks at a reasonable rate.  4. If I start to feel overwhelmed with what is ahead of me I will look at what I have completed and remind myself to focus on steps and not the entire task.               Action Plans on File:                       Advance Care Plans/Directives Type:   No data recorded    Honoring Choices    Advance Care Planning and Health Care Directives  When it comes to decisions about your health care, it s important that your voice is heard. You may not always be able to speak for yourself.    We encourage you to have discussions with your loved ones, cultural or spiritual leaders and health care providers about your goals, values, beliefs and choices.    We are a part of [employer] [place] , supporting and promoting the benefits of advance care planning conversations.    Our goals are to:    Help you make informed decisions about your healthcare choices and ensure that those choices are honored    Offer advance care planning discussions with trained staff    Ensure your choices are clearly defined, documented  and available in your medical record    Translate your choices into medical orders as needed    Why is Advance Care Planning important?    Know what your health care choices are and decide what is right for you    An unexpected illness or injury could leave you unable to participate in important treatment decisions    When choices are left to others to decide that responsibility can be difficult and stressful    By discussing and outlining your choices, your voice is heard in the care you want to receive    How can I learn more?  We offer free classes at multiple locations, days and times. Our trained facilitators will provide information and guide you through a Health Care Directive document. They can also review, notarize and add your document to your medical record.    Call [employer] at [phone] or toll free at [phone] for assistance.      My Medical and Care Information  Problem List   Patient Active Problem List   Diagnosis     Vitamin D deficiency     Supervision of other high risk pregnancies, unspecified trimester     PE (pulmonary thromboembolism) (H)     Hyperprolactinemia (H)     Follicular cancer of thyroid (H)     Lactose intolerance in adult     Schizophrenia (H)     Encounter for triage in pregnant patient     Cough     Chronic bilateral low back pain without sciatica     Normal labor     Anxiety     Cancer (H)     H/O  delivery, currently pregnant     History of pulmonary embolism     History of thyroid cancer     Mitral valve disorder      (normal spontaneous vaginal delivery)     Moderate major depression (H)     PTSD (post-traumatic stress disorder)     ASCUS of cervix with negative high risk HPV     Non-alcoholic fatty liver disease     Psychophysiological insomnia     Family history of Hashimoto thyroiditis     Biliary dyskinesia     Bipolar II disorder (H)     Pituitary tumor - history      Current Medications and Allergies:   Current Outpatient Medications    Medication Instructions     calcium carbonate (OS-DEBI) 600 mg, Oral, 2 TIMES DAILY WITH MEALS     dexamethasone (DECADRON) 6 mg, Oral, DAILY     levonorgestrel (MIRENA) 20 MCG/24HR IUD 1 each, Intrauterine, ONCE     paliperidone ER (INVEGA) 6 mg, Oral, EVERY MORNING     sertraline (ZOLOFT) 100 mg, Oral, DAILY     zolpidem (AMBIEN) 5 mg, Oral, AT BEDTIME PRN     Care Coordination Start Date: 5/4/2021   Frequency of Care Coordination: monthly     Form Last Updated: 04/07/2022

## 2022-04-07 NOTE — PROGRESS NOTES
Clinic Care Coordination Contact    Clinic Care Coordination Contact  OUTREACH    Referral Information:  Referral Source: ED Follow-Up    Primary Diagnosis: Behavioral Health    Chief Complaint   Patient presents with     Clinic Care Coordination - [facility]     ED follow up - abdominal pain, unspecified abdominal location        [place] Utilization:   Clinic Utilization  Difficulty keeping appointments: No  Compliance Concerns: No  No-Show Concerns: No  No PCP office visit in Past Year: No    Utilization    Hospital Admissions  0             ED Visits  3             No Show Count (past year)  0                Current as of: 4/7/2022  9:10 AM              Clinical Concerns:  Current Medical Concerns: Isaura Gray is a 36 y.o. female presenting with abdominal pain. Patient has had epigastric abdominal pain along with nausea, vomiting, diarrhea. Symptoms of been present for nearly 2 weeks. On arrival here patient was actively retching but otherwise hemodynamically stable and a reassuring exam. Labs were obtained here and unremarkable. Patient is given the above medications and felt much improved. She is now requesting discharge at this time. She was able to tolerate p.o. fluids without any difficulty. She was given Reglan here but I do not feel that this is an ideal medication given her extensive psychiatric medications as well. As such she will be discharged with Zofran and should continue to push fluids. She will follow-up with her primary care doctor next week for reevaluation.    Patient Active Problem List   Diagnosis     Vitamin D deficiency     Supervision of other high risk pregnancies, unspecified trimester     PE (pulmonary thromboembolism) (H)     Hyperprolactinemia (H)     Follicular cancer of thyroid (H)     Lactose intolerance in adult     Schizophrenia (H)     Encounter for triage in pregnant patient     Cough     Chronic bilateral low back pain without sciatica     Normal labor     Anxiety  "    Cancer (H)     H/O  delivery, currently pregnant     History of pulmonary embolism     History of thyroid cancer     Mitral valve disorder      (normal spontaneous vaginal delivery)     Moderate major depression (H)     PTSD (post-traumatic stress disorder)     ASCUS of cervix with negative high risk HPV     Non-alcoholic fatty liver disease     Psychophysiological insomnia     Family history of Hashimoto thyroiditis     Biliary dyskinesia     Bipolar II disorder (H)     Pituitary tumor - history       Current Behavioral Concerns: n/a      Education Provided to patient: ED follow up review     SW CC outreach to pt for ED follow up. Pt reported she is \"ok, still sick.\" Discussed that she went to ED for \"major stomach issues.\" Discussed she was given Regalin which helped but couldnt Rx because of other medications she is on.     CC offered to help pt make PCP ED follow up appt. Pt agreed.     SANDIE CC and pt made call to  back scheduling line. Made VV for 22 at 2 pm per pt request for virtual.    No other needs or questions today.     Pain  Pain: Not discussed in detail     Health Maintenance Reviewed: Due/Overdue     Health Maintenance Due   Topic Date Due     URINE DRUG SCREEN  Never done     ADVANCE CARE PLANNING  Never done     MEDICARE ANNUAL WELLNESS VISIT  2021     COVID-19 Vaccine (2 - Booster for William series) 2021     INFLUENZA VACCINE (1) 2021       Clinical Pathway: None    Medication Management:  Medication review status: Medications reviewed and no changes reported per patient.           START taking these medications     ondansetron (ZOFRAN) 4 mg oral tablet Take 1 tablet (4 mg) by mouth every 8 (eight) hours as needed., Disp-15 tablet, R-0, e-Prescribe     Functional Status:  Dependent ADLs: Independent  Dependent IADLs: Cleaning, Meal Preparation, Money Management  Bed or wheelchair confined: No  Mobility Status: Independent  Fallen 2 or more times in the past " year?: No  Any fall with injury in the past year?: No    Living Situation:  Current living arrangement: I live in a private home with family (2 children and boyfriend)  [facility]    Lifestyle & Psychosocial Needs:    Social Determinants of Health     Tobacco Use: Medium Risk     Smoking Tobacco Use: Former Smoker     Smokeless Tobacco Use: Never Used   Alcohol Use: Not on file   Financial Resource Strain: High Risk     Difficulty of Paying Living Expenses: Hard   Food Insecurity: Food Insecurity Present     Worried About Running Out of Food in the Last Year: Sometimes true     Ran Out of Food in the Last Year: Sometimes true   Transportation Needs: No Transportation Needs     Lack of Transportation (Medical): No     Lack of Transportation (Non-Medical): No   Physical Activity: Inactive     Days of Exercise per Week: 0 days     Minutes of Exercise per Session: 0 min   Stress: Stress Concern Present     Feeling of Stress : Very much   Social Connections: Socially Isolated     Frequency of Communication with Friends and Family: Never     Frequency of Social Gatherings with Friends and Family: Never     Attends Mu-ism Services: Never     Active Member of Clubs or Organizations: No     Attends Club or Organization Meetings: Never     Marital Status: Living with partner   Intimate Partner Violence: Not At Risk     Fear of Current or Ex-Partner: No     Emotionally Abused: No     Physically Abused: No     Sexually Abused: No   Depression: At risk     PHQ-2 Score: 6   Housing Stability: Not on file       Diet: Regular  Inadequate nutrition: No  Tube Feeding: No  Inadequate activity/exercise: Yes  Significant changes in sleep pattern: Yes  Transportation means: Regular car     Mu-ism or spiritual beliefs that impact treatment: No  Mental health DX: Yes  Mental health DX how managed: Medication, Outpatient Counseling, Psychiatrist  Mental health management concern: Yes (some but  better)  Chemical Dependency Status: No Current Concerns  Informal Support system: Significant other     Care Coordinator has reviewed patient's Social Determinants of Health (SDoH) on this date. Upon review, changes were not made.      Resources and Interventions:  Current Resources:   Community Resources: Financial/Insurance  Supplies Currently Used at Home: None  Equipment Currently Used at Home: none  Employment Status: disabled, employment seeking     Advance Care Plan/Directive  Advanced Care Plans/Directives on file:: No  Advanced Care Plan/Directive Status: Considering Options    Referrals Placed: [facility], [facility], Other, Mental Health, Disability Linkage line (employment, food)     Goals:   Goals        General     1. Annual Wellness visit (pt-stated)      Notes - Note edited  4/7/2022 10:29 AM by Aruna Boykin LSW     Goal Statement: I will schedule my Annual Wellness Visit.  Date Goal set: 3/8/2022  Barriers: scheduling  Strengths: desire to complete  Date to Achieve By: 9/4/22   Patient expressed understanding of goal: Yes    Action steps to achieve this goal:  1. I will call scheduling or go on [employer] to schedule my Annual Wellness Visit.  2. I will attend the appointment.         2. Functional (pt-stated)      Notes - Note edited  4/7/2022 10:29 AM by Aruna Boykin LSW     Goal Statement: I want to get the upstairs cleaned and will break it down into manageable tasks.   Date Goal set: 5/4/2021   Barriers: my mental health, two young children  Strengths: I just got an henrietta to help with identifying tasks  Date to Achieve By: 5/4/2022  Patient expressed understanding of goal: Yes    Action steps to achieve this goal:  1. I will stop looking at the job as one big task I need to complete and break it down into manageable steps.  2. I will learn how to use the henrietta and identify steps.  3. I will start to work on the tasks at a reasonable rate.  4. If I start to feel overwhelmed with what is  ahead of me I will look at what I have completed and remind myself to focus on steps and not the entire task.              Patient/Caregiver understanding: Pt reports understanding and denies any additional questions or concerns at this times. SW CC engaged in AIDET communication during encounter.    Outreach Frequency: Monthly    Future Appointments              In 4 days Timmy Umana MD [facility], [place]    In 1 week Ginette Oliver MSW [facility] [place] [facility]    In 1 week Barbara Montgomery MD [facility] [place] [facility]          Plan: Patient was provided with this writer's contact information and encouraged to call with any questions or concerns.     Coverage SW CC will send updated complex care plan to patient. Lead SW CC will follow up in 2-3 weeks.     TRAMAINE Ibarra   Social Work Primary Care Clinic Care Coordinator   [facility]  [phone]  [email or web address]

## 2022-04-11 ENCOUNTER — VIRTUAL VISIT (OUTPATIENT)
Dept: FAMILY MEDICINE | Facility: OTHER | Age: 37
End: 2022-04-11
Payer: MEDICARE

## 2022-04-11 DIAGNOSIS — E21.3 HYPERPARATHYROIDISM (H): ICD-10-CM

## 2022-04-11 DIAGNOSIS — U09.9 POST-COVID CHRONIC DYSPNEA: ICD-10-CM

## 2022-04-11 DIAGNOSIS — R19.7 VOMITING AND DIARRHEA: ICD-10-CM

## 2022-04-11 DIAGNOSIS — R53.83 FATIGUE, UNSPECIFIED TYPE: Primary | ICD-10-CM

## 2022-04-11 DIAGNOSIS — R06.09 POST-COVID CHRONIC DYSPNEA: ICD-10-CM

## 2022-04-11 DIAGNOSIS — R11.10 VOMITING AND DIARRHEA: ICD-10-CM

## 2022-04-11 DIAGNOSIS — R10.13 ABDOMINAL PAIN, EPIGASTRIC: ICD-10-CM

## 2022-04-11 DIAGNOSIS — I26.99 PE (PULMONARY THROMBOEMBOLISM) (H): ICD-10-CM

## 2022-04-11 DIAGNOSIS — F20.9 SCHIZOPHRENIA, UNSPECIFIED TYPE (H): ICD-10-CM

## 2022-04-11 DIAGNOSIS — R11.2 NAUSEA AND VOMITING, INTRACTABILITY OF VOMITING NOT SPECIFIED, UNSPECIFIED VOMITING TYPE: ICD-10-CM

## 2022-04-11 DIAGNOSIS — C73 FOLLICULAR THYROID CANCER (H): ICD-10-CM

## 2022-04-11 PROCEDURE — 99215 OFFICE O/P EST HI 40 MIN: CPT | Mod: 95 | Performed by: FAMILY MEDICINE

## 2022-04-11 RX ORDER — ALBUTEROL SULFATE 90 UG/1
2 AEROSOL, METERED RESPIRATORY (INHALATION) EVERY 6 HOURS
Qty: 18 G | Refills: 0 | Status: SHIPPED | OUTPATIENT
Start: 2022-04-11 | End: 2023-02-15

## 2022-04-11 RX ORDER — METOCLOPRAMIDE 5 MG/1
5 TABLET ORAL 4 TIMES DAILY PRN
Qty: 30 TABLET | Refills: 1 | Status: SHIPPED | OUTPATIENT
Start: 2022-04-11 | End: 2022-04-18

## 2022-04-11 NOTE — PATIENT INSTRUCTIONS
Thank you for visiting [facility]    OK to try reglan for suspected gastroparesis/nausea/abdominal pain.      If not getting significant positive response or if you have worrisome side effects, then stop the medication.  OK to take benadryl if you have side effects.      I would continue acid blockers if you continue to have abdominal pain.  If needing these long-term, we might want to do an upper endoscopy.      Try albuterol for your shortness of breath.  If you get a repsonse to this, we should try an inhaled steroid to help with your symptoms.      Check labs to screen for thyroid problems if not improving with the above.      If your mood isn't going well, reach out to me or your psychiatrist right away.      I recommend COVID vaccination in a few weeks.      If you want to do stool testing, the C diff test is the one that requires liquid stool.      Contact us or return if questions or concerns.     Have a nice day!    Dr. Umana     Return in about 4 weeks (around 5/9/2022) for Recheck.      If you need medication refills, please contact your pharmacy 3 days before your prescriptions runs out or download the [facility] henrietta for your smart phone. If you are out of refills, your pharmacy will contact contact the clinic.                                     [employer] [place] Assistance [phone]

## 2022-04-11 NOTE — PROGRESS NOTES
Isaura is a 36 year old who is being evaluated via a billable video visit.      How would you like to obtain your AVS? Mail a copy  If the video visit is dropped, the invitation should be resent by: Text to cell phone: [phone]  Will anyone else be joining your video visit? No      Video Start Time: 2:10 PM    Assessment & Plan       ICD-10-CM    1. Fatigue, unspecified type  R53.83 TSH with free T4 reflex   2. Nausea and vomiting, intractability of vomiting not specified, unspecified vomiting type  R11.2 TSH with free T4 reflex     metoclopramide (REGLAN) 5 MG tablet   3. Vomiting and diarrhea  R11.10 TSH with free T4 reflex    R19.7    4. Abdominal pain, epigastric  R10.13 metoclopramide (REGLAN) 5 MG tablet   5. Post-COVID chronic dyspnea  R06.09 albuterol (PROAIR HFA/PROVENTIL HFA/VENTOLIN HFA) 108 (90 Base) MCG/ACT inhaler    U09.9    6. Schizophrenia, unspecified type (H)  F20.9    7. PE (pulmonary thromboembolism) (H)  I26.99    8. Hyperparathyroidism (H)  E21.3    9. Follicular thyroid cancer (H)  C73       1.  Unclear etiology.  Last TSH was borderline elevated.  We will recheck this as a spent several months.  CBC was reassuring.  This may simply be a post Covid symptoms.  We will try treating other post Covid symptoms.  In-person evaluation may be helpful if labs and other treatments are continue to be ineffective.  Also following with sleep specialist.  2, 3, 4.  Unclear etiology.  I am suspicious of gastroparesis given her significant improvement with Reglan in the ER.  Discussed potential risks of using Reglan in her situation.  Patient wished to proceed and will monitor for side effects and complications carefully.  Low-dose Reglan was given.  Follow-up in 1 month.  Okay to continue other acid blockers and symptom control medications.  5.  Discussed trial of albuterol to help with this.  If respond, we can consider inhaled corticosteroids.  6.  Primarily managed by psychiatry.  Interestingly,  they are stopping her Invega and her sertraline in preparation for her MSLT sleep evaluation.  Asked her to clarify with her psychiatrist if she needs to be on anything else in the meantime.  Monitor carefully for any worsening symptoms.  7.  Recent D-dimer was reassuring.  No ongoing symptoms related to this.  8, 9.  Primarily managed by endocrinology.  Will follow assist with this as able.    Portions of this note were completed using Dragon dictation software.  Although reviewed, there may be typographical and other inadvertent errors that remain.         Review of the result(s) of each unique test - TSH, D-dimer, CBC, metabolic profile, imaging, etc.  Ordering of each unique test  Prescription drug management  40 minutes spent on the date of the encounter doing chart review, history and exam, documentation and further activities per the note       Patient Instructions   Thank you for visiting [facility]    OK to try reglan for suspected gastroparesis/nausea/abdominal pain.      If not getting significant positive response or if you have worrisome side effects, then stop the medication.  OK to take benadryl if you have side effects.      I would continue acid blockers if you continue to have abdominal pain.  If needing these long-term, we might want to do an upper endoscopy.      Try albuterol for your shortness of breath.  If you get a repsonse to this, we should try an inhaled steroid to help with your symptoms.      Check labs to screen for thyroid problems if not improving with the above.      If your mood isn't going well, reach out to me or your psychiatrist right away.      I recommend COVID vaccination in a few weeks.      If you want to do stool testing, the C diff test is the one that requires liquid stool.      Contact us or return if questions or concerns.     Have a nice day!    Dr. Umana     Return in about 4 weeks (around 5/9/2022) for Recheck.      If you need medication refills,  please contact your pharmacy 3 days before your prescriptions runs out or download the [facility] henrietta for your smart phone. If you are out of refills, your pharmacy will contact contact the clinic.                                     Bobby Kwan [phone]                    Return in about 4 weeks (around 5/9/2022) for Recheck.    Timmy Umana MD, MD  [facility] SHAI Delarosa is a 36 year old who presents for the following health issues  accompanied by her children.    HPI     ED/UC Followup:    Facility:  [facility]   Date of visit: 04/04/2022  Reason for visit: Abdominal pain  Current Status: Still have symptoms improving but not totally better     Pt started with abdominal pain/nausea/diarrhea about 3 weeks ago.  Was in the ER for this last week.  Associated with fatigue.      Denies recent medication changes.  She didn't get the stool tests done.  Her stools are more formed, not liquid.  Soft, voluminous stools.      Starting next week, will be phasing out Invega and Zoloft.  Will be doing a MSLT.  This will be 28 days after she completes taking these.  It's unclear if she'll be taking anything for her mood.      She was given Reglan and benadryl which helped dramatically with her symptoms.  Zofran didn't help much.      Rx for Zofran after discharge without any improvement.      Her symptoms aren't really affected by eating.  She has moments through the day when she gets incredibly tired.  If she doesn't sleep, her abdominal symptoms will worsen with the fatigue.  The fatigue has been really severe.  She's been sleeping 12 hours/night and then taking several naps/day.      Her abdominal pain is her epigastrium.  Has been taking a lot of tums.  Has also been taking mylanta, omeprazole.  Minimal benefit from these.  Mylanta will help sometimes.      Her severe nausea and upper abdomen will hurt.  Remainder of stomach will gurgle and she feels incredibly  hungry.    Has been under incredibly high stress at work.  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      She did have a distant h/o gastric emptying study that was borderline abnormal.      Having ongoing shortness of breath since her COVID infection in January.  She hasn't tried any inhalers for this.  She did get some help form the dexamethasone.        Review of Systems   Constitutional, HEENT, cardiovascular, pulmonary, gi and gu systems are negative, except as otherwise noted.  Chills, SOB, fatigue, etc.  See above.        Objective           Vitals:  No vitals were obtained today due to virtual visit.    Physical Exam   GENERAL: Healthy, alert and no distress  EYES: Eyes grossly normal to inspection.  No discharge or erythema, or obvious scleral/conjunctival abnormalities.  RESP: No audible wheeze, cough, or visible cyanosis.  No visible retractions or increased work of breathing.    SKIN: Visible skin clear. No significant rash, abnormal pigmentation or lesions.  NEURO: Cranial nerves grossly intact.  Mentation and speech appropriate for age.  PSYCH: Mentation appears normal, affect normal/bright, judgement and insight intact, normal speech and appearance well-groomed.    Admission on 03/15/2022, Discharged on 03/15/2022   Component Date Value Ref Range Status     D-Dimer Quantitative 03/15/2022 0.33  0.00 - 0.50 ug/mL FEU Final     Sodium 03/15/2022 138  133 - 144 mmol/L Final     Potassium 03/15/2022 3.7  3.4 - 5.3 mmol/L Final     Chloride 03/15/2022 107  94 - 109 mmol/L Final     Carbon Dioxide (CO2) 03/15/2022 24  20 - 32 mmol/L Final     Anion Gap 03/15/2022 7  3 - 14 mmol/L Final     Urea Nitrogen 03/15/2022 13  7 - 30 mg/dL Final     Creatinine 03/15/2022 0.74  0.52 - 1.04 mg/dL Final     Calcium 03/15/2022 9.1  8.5 - 10.1 mg/dL Final     Glucose 03/15/2022 92  70 - 99 mg/dL Final     GFR Estimate 03/15/2022 >90  >60 mL/min/1.73m2 Final    Effective December 21, 2021 eGFRcr in  adults is calculated using the 2021 CKD-EPI creatinine equation which includes age and gender (Carrie et al., [facility], DOI: 10.1056/ERDCkj4288710)     Troponin I High Sensitivity 03/15/2022 <3  <54 ng/L Final    This Troponin-I result was obtained using a Siemens Dimension Vista High Sensitivity Troponin-I assay (TNIH). Effective 11/23/21, nine labs/sites in the [facility] switched from a Siemens [place] [employer] Contemporary Troponin I assay (CTNI) to a Siemens [place] Plane High-Sensitivity Troponin I assay (TNIH).     WBC Count 03/15/2022 8.4  4.0 - 11.0 10e3/uL Final     RBC Count 03/15/2022 5.05  3.80 - 5.20 10e6/uL Final     Hemoglobin 03/15/2022 14.7  11.7 - 15.7 g/dL Final     Hematocrit 03/15/2022 42.3  35.0 - 47.0 % Final     MCV 03/15/2022 84  78 - 100 fL Final     MCH 03/15/2022 29.1  26.5 - 33.0 pg Final     MCHC 03/15/2022 34.8  31.5 - 36.5 g/dL Final     RDW 03/15/2022 13.6  10.0 - 15.0 % Final     Platelet Count 03/15/2022 284  150 - 450 10e3/uL Final     % Neutrophils 03/15/2022 64  % Final     % Lymphocytes 03/15/2022 27  % Final     % Monocytes 03/15/2022 8  % Final     % Eosinophils 03/15/2022 1  % Final     % Basophils 03/15/2022 0  % Final     % Immature Granulocytes 03/15/2022 0  % Final     NRBCs per 100 WBC 03/15/2022 0  <1 /100 Final     Absolute Neutrophils 03/15/2022 5.4  1.6 - 8.3 10e3/uL Final     Absolute Lymphocytes 03/15/2022 2.3  0.8 - 5.3 10e3/uL Final     Absolute Monocytes 03/15/2022 0.7  0.0 - 1.3 10e3/uL Final     Absolute Eosinophils 03/15/2022 0.0  0.0 - 0.7 10e3/uL Final     Absolute Basophils 03/15/2022 0.0  0.0 - 0.2 10e3/uL Final     Absolute Immature Granulocytes 03/15/2022 0.0  <=0.4 10e3/uL Final     Absolute NRBCs 03/15/2022 0.0  10e3/uL Final     No results found for any visits on 04/11/22.  No results found for this or any previous visit (from the past 24 hour(s)).            Video-Visit Details    Type of service:  Video Visit    Video End Time:2:32 PM    Originating Location (pt.  Location): Home    Distant Location (provider location):  [facility]     Platform used for Video Visit: Laura

## 2022-04-12 ENCOUNTER — MYC MEDICAL ADVICE (OUTPATIENT)
Dept: FAMILY MEDICINE | Facility: OTHER | Age: 37
End: 2022-04-12
Payer: MEDICARE

## 2022-04-12 RX ORDER — ALBUTEROL SULFATE 90 UG/1
AEROSOL, METERED RESPIRATORY (INHALATION)
Qty: 54 G | OUTPATIENT
Start: 2022-04-12

## 2022-04-14 ENCOUNTER — VIRTUAL VISIT (OUTPATIENT)
Dept: PSYCHIATRY | Facility: CLINIC | Age: 37
End: 2022-04-14
Payer: MEDICARE

## 2022-04-14 ENCOUNTER — VIRTUAL VISIT (OUTPATIENT)
Dept: BEHAVIORAL HEALTH | Facility: CLINIC | Age: 37
End: 2022-04-14
Payer: MEDICARE

## 2022-04-14 DIAGNOSIS — F41.9 ANXIETY: ICD-10-CM

## 2022-04-14 DIAGNOSIS — F31.81 BIPOLAR II DISORDER (H): Primary | ICD-10-CM

## 2022-04-14 DIAGNOSIS — R44.2 HYPNAGOGIC HALLUCINATIONS: Primary | ICD-10-CM

## 2022-04-14 DIAGNOSIS — F43.10 PTSD (POST-TRAUMATIC STRESS DISORDER): ICD-10-CM

## 2022-04-14 DIAGNOSIS — F20.9 SCHIZOPHRENIA, UNSPECIFIED TYPE (H): ICD-10-CM

## 2022-04-14 PROBLEM — G47.00 PERSISTENT INSOMNIA: Status: ACTIVE | Noted: 2022-04-14

## 2022-04-14 PROCEDURE — 90832 PSYTX W PT 30 MINUTES: CPT | Mod: 95 | Performed by: SOCIAL WORKER

## 2022-04-14 PROCEDURE — 99215 OFFICE O/P EST HI 40 MIN: CPT | Mod: 95 | Performed by: PSYCHIATRY & NEUROLOGY

## 2022-04-14 RX ORDER — PALIPERIDONE 3 MG/1
6 TABLET, EXTENDED RELEASE ORAL EVERY MORNING
Status: CANCELLED | OUTPATIENT
Start: 2022-04-14

## 2022-04-14 NOTE — PROGRESS NOTES
Telemedicine Visit: The patient's condition can be safely assessed and treated via synchronous audio and visual telemedicine encounter.      Reason for Telemedicine Visit: Services only offered telehealth    Originating Site (Patient Location): Patient's home    Distant Site (Provider Location): Provider Remote Setting- Home Office    Consent:  The patient/guardian has verbally consented to: the potential risks and benefits of telemedicine (video visit) versus in person care; bill my insurance or make self-payment for services provided; and responsibility for payment of non-covered services.     Mode of Communication:  Video Conference via ItsPlatonic    As the provider I attest to compliance with applicable laws and regulations related to telemedicine.      Isaura is a 36 year old who is being evaluated via a billable video visit.      How would you like to obtain your AVS? MyChart  If the video visit is dropped, the invitation should be resent by: Text to cell phone: [phone]  Will anyone else be joining your video visit? No           Outpatient Psychiatric Progress Note    Name: Isaura Gray   : 1985                    Primary Care Provider: Timmy Umana MD, MD   Therapist: [place] [facility] [place]        PHQ-9 scores:  PHQ-9 SCORE 2022   PHQ-9 Total Score MyChart 24 (Severe depression) 24 (Severe depression) 27 (Severe depression)   PHQ-9 Total Score - 24 -   Some encounter information is confidential and restricted. Go to Review Flowsheets activity to see all data.       AVERY-7 scores:  AVERY-7 SCORE 3/30/2022 2022 2022   Total Score 21 (severe anxiety) 21 (severe anxiety) 21 (severe anxiety)   Total Score 21 - -   Some encounter information is confidential and restricted. Go to Review Flowsheets activity to see all data.       Patient Identification:  Isaura is a 36 year old year old female  who presents for return visit with me.  Patient attended the session  "alone.     Interim History:  Per Ginette Oliver, [facility]:  Pt shares that she has been struggling with stomach pain that has been ongoing and not improving. Has been to the ED 2 weeks ago for her stomach pain and she shares that it has continued and they can't find anything specific wrong. Is keeping a journal and feels it is related to stress when her stomach pain worsens. Explains that continues to have extreme fatigue daily and that everything affects her very easily and causes her to feel stressed/anxious.  Has started to taper off psych medications as the sleep specialist recommended an MSLT sleep study but has to be off psych medications for 1 month. Further testing would be for narcolepsy, idiopathic insomnia, etc.  Shares that last week she was only able to put in about 2 hours of work the past 2 weeks.  When seen in ED last week was given reglan and benedryl. She feels that the benedryl has been somewhat helpful with her symptoms although it gives her restless legs. Saw PCP a couple of days ago and PCP feels that pt may have dysautonomia due to her symptom presentation over the past year or so. Pt reports that she feels desperate for answers and how she cannot continue to feel the way she is.    Isaura reports that she has not been doing well physically.  She has had pretty severe stomach pain for several months but is present all day.  She is wondering whether or not it might be related to increased stress, but she denies that anything significant has changed recently.  She reports having watery diarrhea multiple times a day in addition to her stomach pain.  She \"is not able to keep anything down\" other than chicken broth and soda crackers.  We have not made any changes to her psychotropic medications, and this does not sound like a psychiatric problem.    She did have a sleep study and was told that she does not have sleep apnea.  A multiple sleep latency test was recommended, but she needs to be off her " sertraline before she can do it.  She has decreased her sertraline to 50 mg a day.  I recommended that she decrease it again to 25 mg a day for a week and then discontinue it.      She has always had problems with extreme fatigue and reports that she often falls asleep immediately after feeling high levels of stress.  As we were reviewing her symptoms, she reports that she has always only had hallucinations if she is on the verge of falling asleep or waking up.  This sounds much more like hypnagogic and hypnopompic hallucinations then psychotic illness.  She reports that when she was first diagnosed with schizophrenia, she was sleepwalking and went into a neighbor's home to turn off their stereo.  She has been on antipsychotic medications, but they do not seem to affect her symptoms significantly.    We agreed to decrease her paliperidone to 3 mg daily for a week and then discontinue it.  We will check in again in a week to be sure she is not worse.    She is extremely short of breath and does not tolerate much activity at all.  She went upstairs during our appointment, and was noticeably short of breath after climbing flight of stairs.  She reports that she has had more pain and more shortness of breath since she had COVID.  She may benefit from being seen by the long term COVID clinic.    Isaura is very discouraged about feeling bad for such a long period of time.  I really do not think that the symptoms are psychosomatic or symptomatic of any other psychiatric illness.  I would recommend a thorough physical work-up for her stomach pain, diarrhea, and fatigue.    Vital Signs:   No vital signs taken for this encounter.    Labs:  Ref Range & Units 10 d ago    LIPASE 73 - 350 U/L 70 Low       Ref Range & Units 10 d ago    ALT 12 - 68 U/L 19    ALKALINE P'TASE 45 - 117 U/L 60    AST (SGOT) 15 - 37 U/L 14 Low     PROTEIN TOTAL 6.4 - 8.2 g/dL 7.2    ALBUMIN 3.4 - 5.0 g/dL 4.0    BILIRUBIN-DIRECT <=0.20 mg/dL 0.21 High      BILIRUBIN-TOTAL 0.2 - 1.0 mg/dL 1.1 High       Ref Range & Units 10 d ago    SODIUM 136 - 145 mmol/L 137    POTASSIUM 3.5 - 5.1 mmol/L 3.5    CHLORIDE 98 - 107 mmol/L 103    CARBON DIOXIDE 21 - 32 mmol/L 23    BUN (UREA NITRO) 7 - 18 mg/dL 8    CREATININE 0.55 - 1.02 mg/dL 0.70    EST GFR (CKD-EPI) >60.00 mL/min >60.00    EST GFR IF AFRICAN AM >60.00 mL/min >60.00    GLUCOSE 70 - 110 mg/dL 95    CALCIUM, SERUM 8.5 - 10.1 mg/dL 8.7    ANION GAP 0.0 - 15.0 mmol/L 11.0      Ref Range & Units 10 d ago    WBC 4.3 - 10.8 K/uL 7.8    RBC 4.20 - 5.40 M/uL 5.12    HEMOGLOBIN 12.0 - 16.0 gm/dL 14.9    HEMATOCRIT 36.0 - 48.0 % 42.8    MCV 80 - 100 fl 84    MCH 27 - 33 pg 29    MCHC 33 - 36 gm/dL 35    RDW 11.5 - 14.5 % 13.3    PLATELET COUNT 150 - 400 K/    MPV 6.5 - 12 11.4    PMN % % 71.9    IG% <=1.0 % 0.3    LYMPH % % 21.1    MONO % % 6.0    EOS % % 0.3    BASO % % 0.4    PMN ABSOLUTE 1.80 - 7.80 K/uL 5.59    IG ABSOLUTE K/uL 0.02    LYMPH ABSOLUTE 1.00 - 4.00 K/uL 1.64    MONO ABSOLUTE 0.00 - 1.00 K/uL 0.47    EOS ABSOLUTE 0.00 - 0.45 K/uL 0.02    BASO ABSOLUTE 0.00 - 0.20 K/uL 0.03    NUCL RBC % 0.0 - 0.0 /100 WBC 0.0    Resulting Agency  [facility]   Specimen Collected: 04/04/22  3:35 PM Last Resulted: 04/04/22  3:50 PM         Current Medications:  Current Outpatient Medications   Medication     albuterol (PROAIR HFA/PROVENTIL HFA/VENTOLIN HFA) 108 (90 Base) MCG/ACT inhaler     calcium carbonate (OS-DEBI) 1500 (600 Ca) MG tablet     levonorgestrel (MIRENA) 20 MCG/24HR IUD     metoclopramide (REGLAN) 5 MG tablet     zolpidem (AMBIEN) 5 MG tablet     No current facility-administered medications for this visit.        The [place] Prescription Monitoring Program has been reviewed and there are no concerns about diversionary activity for controlled substances at this time.      Mental Status Examination:  Isaura is a 36-year-old woman in some emotional distress.  Speech is clear and normal in rate and  tone.  Eye contact is good over the video connection.  Affect is somewhat blunted.  Mood is dysphoric and anxious, she is tearful at times.  Thoughts are logical and spontaneous with no loose associations or flight of ideas.  Thought content shows no psychosis.  She is feeling somewhat hopeless and discouraged, but denies suicidal ideation with plan or intent.      Assessment and Plan:    ICD-10-CM    1. Hypnagogic hallucinations  R44.2    2. PTSD (post-traumatic stress disorder)  F43.10    3. Anxiety  F41.9        Medical comorbidities include:   Patient Active Problem List   Diagnosis     Vitamin D deficiency     Supervision of other high risk pregnancies, unspecified trimester     PE (pulmonary thromboembolism) (H)     Hyperprolactinemia (H)     Follicular cancer of thyroid (H)     Lactose intolerance in adult     Schizophrenia (H)     Encounter for triage in pregnant patient     Cough     Chronic bilateral low back pain without sciatica     Normal labor     Anxiety     Cancer (H)     H/O  delivery, currently pregnant     History of pulmonary embolism     History of thyroid cancer     Mitral valve disorder      (normal spontaneous vaginal delivery)     Moderate major depression (H)     PTSD (post-traumatic stress disorder)     ASCUS of cervix with negative high risk HPV     Non-alcoholic fatty liver disease     Psychophysiological insomnia     Family history of Hashimoto thyroiditis     Biliary dyskinesia     Bipolar II disorder (H)     Pituitary tumor - history     Persistent insomnia       Treatment Plan:  Patient Instructions   Continue Ambien 5 mg at bedtime as needed for sleep.     Decrease Invega to 3 mg daily for one week, then stop.     Decrease sertraline to 50 mg daily for one week, then stop.    Scheduled MSLT per sleep specialist.     Continue all other medications per your primary care provider.    Schedule an appointment with me in 1 week or sooner as needed.  You may call  [facility] at [phone] to schedule.    [place] Resources:      Go to the Emergency Department as needed or call after hours crisis line at [phone].      To schedule individual or family therapy, call [facility] at [phone].     Follow up with primary care provider as planned or sooner for acute medical concerns.    Call the psychiatric nurse line with medication questions or concerns at [phone].    Orphazymehart may be used to communicate with your provider, but this is not intended to be used for emergencies.    Community Resources:      National Suicide Prevention Lifeline: 154.305.2183 (TTY: 412.765.4771). Call anytime for help.  (www.suicidepreventionlifeline.org)    National [place] on Mental Illness (www.alexi.org): 907.294.2189 or 939-385-1119.     Mental Health Association (www.mentalhealth.org): 832.513.3514 or 379-297-5487.    [place] Crisis Text Line: Text [place] to 516559    Suicide LifeLine Chat: suicidepreGround Up Biosolutionsline.org/chat         Administrative Billing:   Video call duration: 42 minutes   Start: 8:24 AM   Stop: 9:06 AM  Total time spent, including chart review and documentation: 58 minutes    Patient Status:  This is a continuous care patient and medications will be prescribed by the psychiatric provider until further indicated.

## 2022-04-14 NOTE — NURSING NOTE
Medications Phoned  to Pharmacy [] yes [x]no     Medications ordered this visit were e-scribed.  Verified by order class [] yes  [x] no  List medications sent to pharmacy:     Medication changes or discontinuations were communicated to patient's pharmacy: [] yes  [x] no     Dictation completed at time of chart check: [x] yes  [] no     I have checked the documentation for today s encounters and the above information has been reviewed and completed.        Kim Blankenship MA April 14, 2022 2:40 PM

## 2022-04-14 NOTE — PATIENT INSTRUCTIONS
Continue Ambien 5 mg at bedtime as needed for sleep.     Decrease Invega to 3 mg daily for one week, then stop.     Decrease sertraline to 50 mg daily for one week, then stop.    Scheduled MSLT per sleep specialist.     Continue all other medications per your primary care provider.    Schedule an appointment with me in 1 week or sooner as needed.  You may call [facility] at [phone] to schedule.    [place] Resources:    Go to the Emergency Department as needed or call after hours crisis line at [phone].    To schedule individual or family therapy, call [facility] at [phone].   Follow up with primary care provider as planned or sooner for acute medical concerns.  Call the psychiatric nurse line with medication questions or concerns at [phone].  [employer] may be used to communicate with your provider, but this is not intended to be used for emergencies.    Community Resources:    National Suicide Prevention Lifeline: 125.316.4899 (TTY: 797.506.8724). Call anytime for help.  (www.suicidepreventionlifeline.org)  National [place] on Mental Illness (www.alexi.org): 114.730.1082 or 186-854-3291.   Mental Health Association (www.mentalhealth.org): 612.156.5140 or 144-031-3948.  [place] Crisis Text Line: Text [place] to 311440  Suicide LifeLine Chat: suicidepreExam18line.org/chat

## 2022-04-14 NOTE — PROGRESS NOTES
[facility] Collaborative Care Psychiatry Service   April 14, 2022      Behavioral Health Clinician Progress Note    Patient Name: Isaura Gray           Service Type:  Individual      Service Location:   [employer] / Email (patient reached)     Session Start Time: 7:42a  Session End Time: 8:00a      Session Length: 16 - 37      Attendees: Client     Service Modality:  Phone Visit:      Telemedicine Visit: The patient's condition can be safely assessed and treated via synchronous audio and visual telemedicine encounter.      Reason for Telemedicine Visit: Services only offered telehealth    Originating Site (Patient Location): Patient's home    Distant Site (Provider Location): Provider Remote Setting- Home Office    Consent:  The patient/guardian has verbally consented to: the potential risks and benefits of telemedicine (video visit) versus in person care; bill my insurance or make self-payment for services provided; and responsibility for payment of non-covered services.     Mode of Communication:  Video Conference via Samfind    As the provider I attest to compliance with applicable laws and regulations related to telemedicine.        Visit Activities (Refresh list every visit): [facility] Only    Diagnostic Assessment Date: 3/23/21- Updated DA will be completed at next visit  Treatment Plan Review Date: 7/4/22  See Flowsheets for today's PHQ-9 and AVERY-7 results  Previous PHQ-9:   PHQ-9 SCORE 4/1/2022 4/4/2022 4/12/2022   PHQ-9 Total Score MyChart 24 (Severe depression) 24 (Severe depression) 27 (Severe depression)   PHQ-9 Total Score - 24 -   Some encounter information is confidential and restricted. Go to Review Flowsheets activity to see all data.     Previous AVERY-7:   AVERY-7 SCORE 3/30/2022 4/1/2022 4/12/2022   Total Score 21 (severe anxiety) 21 (severe anxiety) 21 (severe anxiety)   Total Score 21 - -   Some encounter information is confidential and restricted. Go to Review Flowsheets activity to see  all data.       PRECIOUS LEVEL:  PRECIOUS Score (Last Two) 3/28/2018   PRECIOUS Raw Score 35   Activation Score 72.1   PRECIOUS Level 3       DATA  Extended Session (60+ minutes): No  Interactive Complexity: No  Crisis: No  [facility] Patient: No    Treatment Objective(s) Addressed in This Session:  Target Behavior(s): health issues, anxiety, depression    Depressed Mood: Feel less tired and more energy during the day   Anxiety: will experience a reduction in anxiety    Current Stressors / Issues:  Pt shares that she has been struggling with stomach pain that has been ongoing and not improving.  Has been to the ED 2 weeks ago for her stomach pain and she shares that it has continued and they can't find anything specific wrong.  Is keeping a journal and feels it is related to stress when her stomach pain worsens.  Explains that continues to have extreme fatigue daily and that everything affects her very easily and causes her to feel stressed/anxious.    Has started to taper off psych medications as the sleep specialist recommended an MSLT sleep study but has to be off psych medications for 1 month. Further testing would be for narcolepsy, idiopathic insomnia, etc.    Shares that last week she was only able to put in about 2 hours of work the past 2 weeks.    When seen in ED last week was given reglan and benedryl.  She feels that the benedryl has been somewhat helpful with her symptoms although it gives her restless legs.  Saw PCP a couple of days ago and PCP feels that pt may have dysautonomia due to her symptom presentation over the past year or so.  Pt reports that she feels desperate for answers and how she cannot continue to feel the way she is.      Progress on Treatment Objective(s) / Homework:  Minimal progress - ACTION (Actively working towards change); Intervened by reinforcing change plan / affirming steps taken    Motivational Interviewing    MI Intervention: Expressed Empathy/Understanding, Open-ended questions and Reframe      Change Talk Expressed by the Patient: Committment to change Activation    Provider Response to Change Talk: E - Evoked more info from patient about behavior change, A - Affirmed patient's thoughts, decisions, or attempts at behavior change, R - Reflected patient's change talk and S - Summarized patient's change talk statements      Care Plan review completed: Yes    Medication Review:  Changes to psychiatric medications, see updated Medication List in EPIC.     Medication Compliance:  Yes    Changes in Health Issues:   Yes: lots of nausea, extreme fatigue, Associated Psychological Distress    Chemical Use Review:   Substance Use: Chemical use reviewed, no active concerns identified      Tobacco Use: No current tobacco use.      Assessment: Current Emotional / Mental Status (status of significant symptoms):  Risk status (Self / Other harm or suicidal ideation)  Patient denies a history of suicidal ideation, suicide attempts, self-injurious behavior, homicidal ideation, homicidal behavior and and other safety concerns  Patient denies current fears or concerns for personal safety.  Patient denies current or recent suicidal ideation or behaviors.  Patient denies current or recent homicidal ideation or behaviors.  Patient denies current or recent self injurious behavior or ideation.  Patient denies other safety concerns.  A safety and risk management plan has not been developed at this time, however patient was encouraged to call [place] [phone] should there be a change in any of these risk factors.    Appearance:   Appropriate   Eye Contact:   Fair   Psychomotor Behavior: Normal   Attitude:   Cooperative   Orientation:   All  Speech   Rate / Production: Normal    Volume:  Normal   Mood:    Normal  Affect:    Appropriate   Thought Content:  Clear   Thought Form:  Coherent  Logical   Insight:    Good     Diagnoses:  1. Bipolar II disorder (H)    2. Schizophrenia, unspecified type (H)    3. Anxiety         Collateral Reports Completed:  Not Applicable    Plan: (Homework, other):  Patient was given information about behavioral services and encouraged to schedule a follow up appointment with the clinic [facility] as needed.  She was also given information about mental health symptoms and treatment options .  CD Recommendations: No indications of CD issues.  Ginette Oliver, JUAN C, [facility]      ______________________________________________________________________    Integrated Primary Care Behavioral Health Treatment Plan    Patient's Name: Isaura Gray  YOB: 1985    Date of Creation: 4/4/22  Date Treatment Plan Last Reviewed/Revised: 7/4/22    DSM5 Diagnoses: 295.90  (F20.9) Schizophrenia, 296.89 Bipolar II Disorder Depressed or 300.00 (F41.9) Unspecified Anxiety Disorder  Psychosocial / Contextual Factors: health issues, fatigue, work stress, home stress  PROMIS (reviewed every 90 days): 10    Referral / Collaboration:  Referral to another professional/service is not indicated at this time..    Anticipated number of session for this episode of care: 5-8  Anticipation frequency of session: Every 1-2 months  Anticipated Duration of each session: 16-37 minutes  Treatment plan will be reviewed in 90 days or when goals have been changed.       MeasurableTreatment Goal(s) related to diagnosis / functional impairment(s)  Goal 1: Patient will experience a reduction in depressive symptoms along with corresponding increase in positive emotions and life satisfaction.      Objective #A (Patient Action)    Patient will Feel less tired and more energy during the day .  Status: New - Date: 4/4/22     Intervention(s)  Therapist will discuss with pt CBT and ACT topics aimed to help reduce depression and anxiety.      Patient has reviewed and agreed to the above plan.      Ginette Oliver, DAVID, [facility], [facility]  April 14, 2022

## 2022-04-14 NOTE — Clinical Note
Timmy Flores,  I am rethinking Isaura's diagnosis based on her report that her hallucinations have always been associated with falling asleep or waking.  Her physical symptoms are certainly causing a lot of problems with her daily functioning.  I encouraged her to make an appointment with you for further evaluation.  Thanks so much!  Erin Montgomery MD Collaborative Care Psychiatry [facility]

## 2022-04-21 ENCOUNTER — VIRTUAL VISIT (OUTPATIENT)
Dept: PSYCHIATRY | Facility: CLINIC | Age: 37
End: 2022-04-21
Payer: MEDICARE

## 2022-04-21 ENCOUNTER — VIRTUAL VISIT (OUTPATIENT)
Dept: BEHAVIORAL HEALTH | Facility: CLINIC | Age: 37
End: 2022-04-21
Payer: MEDICARE

## 2022-04-21 VITALS — BODY MASS INDEX: 28.27 KG/M2 | WEIGHT: 197 LBS

## 2022-04-21 DIAGNOSIS — F41.9 ANXIETY: ICD-10-CM

## 2022-04-21 DIAGNOSIS — R44.2 HYPNAGOGIC HALLUCINATIONS: Primary | ICD-10-CM

## 2022-04-21 DIAGNOSIS — F31.81 BIPOLAR II DISORDER (H): Primary | ICD-10-CM

## 2022-04-21 DIAGNOSIS — F43.10 PTSD (POST-TRAUMATIC STRESS DISORDER): ICD-10-CM

## 2022-04-21 PROCEDURE — 90832 PSYTX W PT 30 MINUTES: CPT | Mod: 95 | Performed by: SOCIAL WORKER

## 2022-04-21 PROCEDURE — 99214 OFFICE O/P EST MOD 30 MIN: CPT | Mod: 95 | Performed by: PSYCHIATRY & NEUROLOGY

## 2022-04-21 NOTE — PROGRESS NOTES
[facility] Collaborative Care Psychiatry Service   April 21, 2022      Behavioral Health Clinician Progress Note    Patient Name: Isaura Gray           Service Type:  Individual      Service Location:   [facility] / Email (patient reached)     Session Start Time: 7:14a  Session End Time: 7:30a      Session Length: 16 - 37      Attendees: Client     Service Modality:  Phone Visit:      Telemedicine Visit: The patient's condition can be safely assessed and treated via synchronous audio and visual telemedicine encounter.      Reason for Telemedicine Visit: Services only offered telehealth    Originating Site (Patient Location): Patient's home    Distant Site (Provider Location): Provider Remote Setting- Home Office    Consent:  The patient/guardian has verbally consented to: the potential risks and benefits of telemedicine (video visit) versus in person care; bill my insurance or make self-payment for services provided; and responsibility for payment of non-covered services.     Mode of Communication:  Video Conference via [employer]    As the provider I attest to compliance with applicable laws and regulations related to telemedicine.        Visit Activities (Refresh list every visit): [facility] Only    Diagnostic Assessment Date: 3/23/21- Updated DA will be completed at next visit  Treatment Plan Review Date: 7/4/22  See Flowsheets for today's PHQ-9 and AVERY-7 results  Previous PHQ-9:   PHQ-9 SCORE 4/1/2022 4/4/2022 4/12/2022   PHQ-9 Total Score MyChart 24 (Severe depression) 24 (Severe depression) 27 (Severe depression)   PHQ-9 Total Score - 24 -   Some encounter information is confidential and restricted. Go to Review Flowsheets activity to see all data.     Previous AVERY-7:   AVERY-7 SCORE 3/30/2022 4/1/2022 4/12/2022   Total Score 21 (severe anxiety) 21 (severe anxiety) 21 (severe anxiety)   Total Score 21 - -   Some encounter information is confidential and restricted. Go to Review Flowsheets activity to see  all data.       PRECIOUS LEVEL:  PRECIOUS Score (Last Two) 3/28/2018   PRECIOUS Raw Score 35   Activation Score 72.1   PRECIOUS Level 3       DATA  Extended Session (60+ minutes): No  Interactive Complexity: No  Crisis: No  [facility] Patient: No    Treatment Objective(s) Addressed in This Session:  Target Behavior(s): health issues, anxiety, depression    Depressed Mood: Feel less tired and more energy during the day   Anxiety: will experience a reduction in anxiety    Current Stressors / Issues:  Reports that she quit her job last week.  Felt that since the company has entered a merger, it was split and she was in the middle.  Does want to get another job for financial reasons so is trying to get some resumes submitted.  She say that mentally she is feeling a bit better since quitting her job.  Did get off both the sertraline and invega and hasn't noticed any negative side effects.    Is noticing more difficulty with focus recently but feels could be related to a lot of things (feeling sick/tired and not having work to think about).  Is planning to get the MSLT sleep study scheduled and she shares that her PCP has also recommended a few tests like an endoscopy.  Shares that she continues to have all the same physical concerns with being so tired/fatigued and that causing her to feel sick to stomach.    Encouraged pt to reach out to [facility] providers if she is having any increased mental health concerns.      Progress on Treatment Objective(s) / Homework:  Minimal progress - ACTION (Actively working towards change); Intervened by reinforcing change plan / affirming steps taken    Motivational Interviewing    MI Intervention: Expressed Empathy/Understanding, Open-ended questions and Reframe     Change Talk Expressed by the Patient: Committment to change Activation    Provider Response to Change Talk: E - Evoked more info from patient about behavior change, A - Affirmed patient's thoughts, decisions, or attempts at behavior change, R - Reflected  patient's change talk and S - Summarized patient's change talk statements      Care Plan review completed: Yes    Medication Review:  Changes to psychiatric medications, see updated Medication List in EPIC.     Medication Compliance:  Yes    Changes in Health Issues:   Yes: lots of nausea, extreme fatigue, Associated Psychological Distress    Chemical Use Review:   Substance Use: Chemical use reviewed, no active concerns identified      Tobacco Use: No current tobacco use.      Assessment: Current Emotional / Mental Status (status of significant symptoms):  Risk status (Self / Other harm or suicidal ideation)  Patient denies a history of suicidal ideation, suicide attempts, self-injurious behavior, homicidal ideation, homicidal behavior and and other safety concerns  Patient denies current fears or concerns for personal safety.  Patient denies current or recent suicidal ideation or behaviors.  Patient denies current or recent homicidal ideation or behaviors.  Patient denies current or recent self injurious behavior or ideation.  Patient denies other safety concerns.  A safety and risk management plan has not been developed at this time, however patient was encouraged to call [place] [phone] should there be a change in any of these risk factors.    Appearance:   Appropriate   Eye Contact:   Fair   Psychomotor Behavior: Normal   Attitude:   Cooperative   Orientation:   All  Speech   Rate / Production: Normal    Volume:  Normal   Mood:    Normal  Affect:    Appropriate   Thought Content:  Clear   Thought Form:  Coherent  Logical   Insight:    Good     Diagnoses:  1. Bipolar II disorder (H)    2. Anxiety        Collateral Reports Completed:  [facility]    Plan: (Homework, other):  Patient was given information about behavioral services and encouraged to schedule a follow up appointment with the clinic [facility] as needed.  She was also given information about mental health symptoms and treatment  options .  CD Recommendations: No indications of CD issues.  Ginette Oliver, JUAN C, [facility]      ______________________________________________________________________    Integrated Primary Care Behavioral Health Treatment Plan    Patient's Name: Isaura Gray  YOB: 1985    Date of Creation: 4/4/22  Date Treatment Plan Last Reviewed/Revised: 7/4/22    DSM5 Diagnoses: 295.90  (F20.9) Schizophrenia, 296.89 Bipolar II Disorder Depressed or 300.00 (F41.9) Unspecified Anxiety Disorder  Psychosocial / Contextual Factors: health issues, fatigue, work stress, home stress  PROMIS (reviewed every 90 days): 10    Referral / Collaboration:  Referral to another professional/service is not indicated at this time..    Anticipated number of session for this episode of care: 5-8  Anticipation frequency of session: Every 1-2 months  Anticipated Duration of each session: 16-37 minutes  Treatment plan will be reviewed in 90 days or when goals have been changed.       MeasurableTreatment Goal(s) related to diagnosis / functional impairment(s)  Goal 1: Patient will experience a reduction in depressive symptoms along with corresponding increase in positive emotions and life satisfaction.      Objective #A (Patient Action)    Patient will Feel less tired and more energy during the day .  Status: New - Date: 4/4/22     Intervention(s)  Therapist will discuss with pt CBT and ACT topics aimed to help reduce depression and anxiety.      Patient has reviewed and agreed to the above plan.      Ginette Oliver, DAVID, LICSW, [facility]  April 21, 2022

## 2022-04-21 NOTE — PATIENT INSTRUCTIONS
Continue Ambien 5 mg at bedtime as needed for sleep.     Schedule MSLT per sleep specialist.     Continue all other medications per your primary care provider.    Schedule an appointment with me after your sleep study or sooner as needed.  You may call [facility] at [phone] to schedule.    [place] Resources:    Go to the Emergency Department as needed or call after hours crisis line at [phone].    To schedule individual or family therapy, call [facility] at [phone].   Follow up with primary care provider as planned or sooner for acute medical concerns.  Call the psychiatric nurse line with medication questions or concerns at [phone].  [employer] may be used to communicate with your provider, but this is not intended to be used for emergencies.    Community Resources:    National Suicide Prevention Lifeline: 572.299.6276 (TTY: 569.306.1611). Call anytime for help.  (www.suicidepreventionlifeline.org)  National [place] [facility] on Mental Illness (www.alexi.org): 207.314.3941 or 401-637-0223.   Mental Health Association (www.mentalhealth.org): 127.427.2310 or 951-355-9584.  [place] Crisis Text Line: Text [place] to 382989  Suicide LifeLine Chat: suicidepreQQTechnologylifeline.org/chat

## 2022-04-21 NOTE — PROGRESS NOTES
Telemedicine Visit: The patient's condition can be safely assessed and treated via synchronous audio and visual telemedicine encounter.      Reason for Telemedicine Visit: Services only offered telehealth    Originating Site (Patient Location): Patient's home    Distant Site (Provider Location): Provider Remote Setting- Home Office    Consent:  The patient/guardian has verbally consented to: the potential risks and benefits of telemedicine (video visit) versus in person care; bill my insurance or make self-payment for services provided; and responsibility for payment of non-covered services.     Mode of Communication:  Video Conference via [employer]    As the provider I attest to compliance with applicable laws and regulations related to telemedicine.      Isaura is a 36 year old who is being evaluated via a billable video visit.      How would you like to obtain your AVS? MyChart  If the video visit is dropped, the invitation should be resent by: Text to cell phone: [phone]  Will anyone else be joining your video visit? No         Outpatient Psychiatric Progress Note    Name: Isaura Gray   : 1985                    Primary Care Provider: Timmy Umana MD, MD   Therapist: Referred to [facility]         PHQ-9 scores:  PHQ-9 SCORE 2022   PHQ-9 Total Score MyChart 24 (Severe depression) 24 (Severe depression) 27 (Severe depression)   PHQ-9 Total Score - 24 -   Some encounter information is confidential and restricted. Go to Review Flowsheets activity to see all data.       AVERY-7 scores:  AVERY-7 SCORE 3/30/2022 2022 2022   Total Score 21 (severe anxiety) 21 (severe anxiety) 21 (severe anxiety)   Total Score 21 - -   Some encounter information is confidential and restricted. Go to Review Flowsheets activity to see all data.       Patient Identification:  Isaura is a 36 year old year old female  who presents for return visit with me.  Patient attended the session  alone.     Interim History:  Per Ginette Oliver, [facility]:  Reports that she quit her job last week.  Felt that since the company has entered a merger, it was split and she was in the middle.  Does want to get another job for financial reasons so is trying to get some resumes submitted.  She say that mentally she is feeling a bit better since quitting her job.  Did get off both the sertraline and invega and hasn't noticed any negative side effects.    Is noticing more difficulty with focus recently but feels could be related to a lot of things (feeling sick/tired and not having work to think about).  Is planning to get the MSLT sleep study scheduled and she shares that her PCP has also recommended a few tests like an endoscopy.  Shares that she continues to have all the same physical concerns with being so tired/fatigued and that causing her to feel sick to stomach.    Encouraged pt to reach out to [facility] providers if she is having any increased mental health concerns.    Isaura reports that she remains extremely tired, she has dark circles around her eyes.  She has been off of sertraline and Invega now for 2 days, and denies any adverse side effects.  She is not able to describe episodes that sound like arsh or even hypomania.  She reports that she has mood cycling that happens multiple times a day, but not for days at a time.    She reports that she was treated for attention deficit disorder in the past, and was taking Adderall prior to having her first child.  She will obtain records and forward them to us.    She is planning to schedule a multiple sleep latency test, and will schedule a follow-up appointment with me after that.  We will make a decision about where to go next at that time.    Vital Signs:   Wt 89.4 kg (197 lb)   LMP  (LMP Unknown)   Breastfeeding No   BMI 28.27 kg/m        Current Medications:  Current Outpatient Medications   Medication     albuterol (PROAIR HFA/PROVENTIL HFA/VENTOLIN HFA) 108  (90 Base) MCG/ACT inhaler     calcium carbonate (OS-DEBI) 1500 (600 Ca) MG tablet     levonorgestrel (MIRENA) 20 MCG/24HR IUD     zolpidem (AMBIEN) 5 MG tablet     No current facility-administered medications for this visit.        Mental Status Examination:  Isarua is a 36-year-old woman who appears her stated age and in no acute distress.  Speech is clear and normal in rate and tone.  Eye contact is good over the video connection.  Affect is full.  Mood is fair.  Thoughts are logical and spontaneous with no loose associations or flight of ideas.  Thought content shows no psychosis.  No suicidal thoughts.  She is alert and oriented x3.    Assessment and Plan:    ICD-10-CM    1. Hypnagogic hallucinations  R44.2    2. PTSD (post-traumatic stress disorder)  F43.10    3. Anxiety  F41.9        Medical comorbidities include:   Patient Active Problem List   Diagnosis     Vitamin D deficiency     Supervision of other high risk pregnancies, unspecified trimester     PE (pulmonary thromboembolism) (H)     Hyperprolactinemia (H)     Follicular cancer of thyroid (H)     Lactose intolerance in adult     Schizophrenia (H)     Encounter for triage in pregnant patient     Cough     Chronic bilateral low back pain without sciatica     Normal labor     Anxiety     Cancer (H)     H/O  delivery, currently pregnant     History of pulmonary embolism     History of thyroid cancer     Mitral valve disorder      (normal spontaneous vaginal delivery)     Moderate major depression (H)     PTSD (post-traumatic stress disorder)     ASCUS of cervix with negative high risk HPV     Non-alcoholic fatty liver disease     Psychophysiological insomnia     Family history of Hashimoto thyroiditis     Biliary dyskinesia     Bipolar II disorder (H)     Pituitary tumor - history     Persistent insomnia       Treatment Plan:  Patient Instructions   Continue Ambien 5 mg at bedtime as needed for sleep.     Schedule MSLT per sleep specialist.      Continue all other medications per your primary care provider.    Schedule an appointment with me after your sleep study or sooner as needed.  You may call [facility] at [phone] to schedule.    [place] Resources:      Go to the Emergency Department as needed or call after hours crisis line at [phone].      To schedule individual or family therapy, call [facility] at [phone].     Follow up with primary care provider as planned or sooner for acute medical concerns.    Call the psychiatric nurse line with medication questions or concerns at [phone].    [employer] may be used to communicate with your provider, but this is not intended to be used for emergencies.    Community Resources:      National Suicide Prevention Lifeline: 981.239.3500 (TTY: 506.418.3317). Call anytime for help.  (www.suicidepreventionlifeline.org)    National [place] on Mental Illness (www.alexi.org): 635.506.3909 or 732-575-6972.     Mental Health Association (www.mentalhealth.org): 583.470.9957 or 477-400-9623.    [place] Crisis Text Line: Text [place] to 038074    Suicide LifeLine Chat: suicideprePaver Downes Associatesline.org/chat         Administrative   Video call duration: 19 minutes   Start: 7:55 AM   Stop: 8:14 AM  Total time spent, including chart review and documentation: 31 minutes    Patient Status:  This is a continuous care patient and medications will be prescribed by the psychiatric provider until further indicated.

## 2022-04-22 ENCOUNTER — PATIENT OUTREACH (OUTPATIENT)
Dept: FAMILY MEDICINE | Facility: CLINIC | Age: 37
End: 2022-04-22
Payer: MEDICARE

## 2022-04-22 DIAGNOSIS — R53.83 FATIGUE, UNSPECIFIED TYPE: ICD-10-CM

## 2022-04-22 DIAGNOSIS — U07.1 INFECTION DUE TO 2019 NOVEL CORONAVIRUS: ICD-10-CM

## 2022-04-22 DIAGNOSIS — R06.09 POST-COVID CHRONIC DYSPNEA: Primary | ICD-10-CM

## 2022-04-22 DIAGNOSIS — R11.2 NAUSEA AND VOMITING, INTRACTABILITY OF VOMITING NOT SPECIFIED, UNSPECIFIED VOMITING TYPE: ICD-10-CM

## 2022-04-22 DIAGNOSIS — U09.9 POST-COVID CHRONIC DYSPNEA: Primary | ICD-10-CM

## 2022-04-22 NOTE — Clinical Note
Hello,   Pt would like a referral to the post Covid clinic.  She feels that there are enough of her physical symptoms that are potentially related to her Covid.  Pt also noted she was directed in this direction by psychiatry as well.   Thank you  TARMAINE Ramirez & [employer]  [facility] - Care Coordination [facility]  [phone]

## 2022-04-22 NOTE — PROGRESS NOTES
Clinic Care Coordination Contact    Follow Up Progress Note      Assessment: pt is working with her psychiatrist and psychologist to get correctly diagnosed.  She has stopped some long term medications with out any changes in her MH symptoms.  She will be completing some sleep studies and other tests.  She is wondering about some post covid support/assessments at the post covid clinic.  She had noted a provider mentioned this as a next step.  Will ask pcp for referral to the post covid clinic.      Pt reported that she is feeling good about removing the Schizophrenia diagnosis as she does not feel it fits.  She noted the psychiatry felt it is not appropriate given her symptoms.     Care Gaps:    Health Maintenance Due   Topic Date Due     URINE DRUG SCREEN  Never done     ADVANCE CARE PLANNING  Never done     MEDICARE ANNUAL WELLNESS VISIT  02/13/2021     COVID-19 Vaccine (2 - Booster for William series) 07/01/2021       Care Gap Goal set: Yes    Goals addressed this encounter:    Goals Addressed                    This Visit's Progress       1. Annual Wellness visit (pt-stated)   0%      Goal Statement: I will schedule my Annual Wellness Visit.  Date Goal set: 3/8/2022  Barriers: scheduling  Strengths: desire to complete  Date to Achieve By: 9/4/22   Patient expressed understanding of goal: Yes    Action steps to achieve this goal:  1. I will call scheduling or go on [employer] to schedule my Annual Wellness Visit.  2. I will attend the appointment.             2. Functional (pt-stated)   70%      Goal Statement: I want to get the upstairs cleaned and will break it down into manageable tasks.   Date Goal set: 5/4/2021   Barriers: my mental health, two young children  Strengths: I just got an henrietta to help with identifying tasks  Date to Achieve By: 5/4/2022  Patient expressed understanding of goal: Yes    Action steps to achieve this goal:  1. I will stop looking at the job as one big task I need to complete and break  it down into manageable steps.  2. I will learn how to use the henrietta and identify steps.  3. I will start to work on the tasks at a reasonable rate.  4. If I start to feel overwhelmed with what is ahead of me I will look at what I have completed and remind myself to focus on steps and not the entire task.                Intervention/Education provided during outreach: listened and encouraged continued focus on self with balancing self care and other work/life needs.  Congratulated her on recognizing that her job was creating some of the challenges before it created major struggles.      Outreach Frequency: monthly        Plan:   Pt to complete testing and appointments.  Sw will route chart to PCP for consideration for post covid clinic.   Care Coordinator will follow up in one month.     TRAMAINE Ramirez, Certified Financial [facility] - Care Coordinator   4/22/2022   1:16 PM  [phone]

## 2022-04-26 ENCOUNTER — NURSE TRIAGE (OUTPATIENT)
Dept: NURSING | Facility: CLINIC | Age: 37
End: 2022-04-26
Payer: MEDICARE

## 2022-04-26 NOTE — TELEPHONE ENCOUNTER
Called patient to schedule appointment. Patient does not want to go to ED.     Scheduled patient in virtual appointment spot at 1:30 pm for an in-person visit.     Routing to provider as SERGIO OWENN, RN

## 2022-04-26 NOTE — TELEPHONE ENCOUNTER
Nurse Triage SBAR    Is this a 2nd Level Triage? NO    Situation: Worsening Abdominal pain     Background: On going concern with stomach    Assessment: Worsening abdominal pain and weakness. Normally she is just having nausea. Pain started this morning she took ibuprofen which did not help. Pain is constant, 7/10 currently. Pain becomes severe with activity. Feet are sweaty. Vomiting - states it is not new - denied black/blood.     Protocol Recommended Disposition: Emergency department    Recommendation: According to the protocol, patient should go to the ED now. Care advice given. Patient declined stating she would like a message sent to her provider to see if they can help with her pain management as she states she has been to the ED before and they don't really help. She states if it is still present tomorrow she will go into the ED if she has not heard back from her care team.     PCP or covering provider please advise.     COVID 19 Nurse Triage Plan/Patient Instructions    Please be aware that novel coronavirus (COVID-19) may be circulating in the community. If you develop symptoms such as fever, cough, or SOB or if you have concerns about the presence of another infection including coronavirus (COVID-19), please contact your health care provider or visit [email or web address].     Disposition/Instructions    ED Visit recommended. Follow protocol based instructions.     Bring Your Own Device:  Please also bring your smart device(s) (smart phones, tablets, laptops) and their charging cables for your personal use and to communicate with your care team during your visit.    Thank you for taking steps to prevent the spread of this virus.  o Limit your contact with others.  o Wear a simple mask to cover your cough.  o Wash your hands well and often.    Resources    [facility]: About COVID-19: [email or web address]    CDC: What to Do If You're Sick:  "www.cdc.gov/coronavirus/2019-ncov/about/steps-when-sick.html    CDC: Ending Home Isolation: www.cdc.gov/coronavirus/2019-ncov/hcp/disposition-in-home-patients.html     CDC: Caring for Someone: www.cdc.gov/coronavirus/2019-ncov/if-you-are-sick/care-for-someone.html     [facility]: Interim Guidance for Hospital Discharge to Home: www.health.[facility].mn./diseases/coronavirus/hcp/hospdischarge.pdf    [facility] clinical trials (COVID-19 research studies): clinicalaffairs.[facility]-clinical-trials     Below are the COVID-19 hotlines at the [place] Department of Health ([facility]). Interpreters are available.   o For health questions: Call 181-883-1988 or 1-896.865.5217 (7 a.m. to 7 p.m.)  o For questions about schools and childcare: Call 371-234-6559 or 1-297.329.6732 (7 a.m. to 7 p.m.)     Deann Jeter RN Nursing Advisor 4/26/2022 4:22 PM     Reason for Disposition    Pain is mainly in upper abdomen  (if needed ask: \"is it mainly above the belly button?\")    [1] SEVERE pain (e.g., excruciating) AND [2] present > 1 hour    Additional Information    Negative: Shock suspected (e.g., cold/pale/clammy skin, too weak to stand, low BP, rapid pulse)    Negative: Difficult to awaken or acting confused (e.g., disoriented, slurred speech)    Negative: Passed out (i.e., lost consciousness, collapsed and was not responding)    Negative: Sounds like a life-threatening emergency to the triager    Negative: Chest pain    Negative: Severe difficulty breathing (e.g., struggling for each breath, speaks in single words)    Negative: Shock suspected (e.g., cold/pale/clammy skin, too weak to stand, low BP, rapid pulse)    Negative: Difficult to awaken or acting confused (e.g., disoriented, slurred speech)    Negative: Passed out (i.e., lost consciousness, collapsed and was not responding)    Negative: [1] Pain lasts > 10 minutes AND [2] age > 40 AND [3] associated chest, arm, neck, upper back or jaw pain    Negative: Visible sweat on " face or sweat dripping down face    Negative: Sounds like a life-threatening emergency to the triager    Negative: Followed an abdomen (stomach) injury    Negative: Chest pain    Negative: [1] Pain lasts > 10 minutes AND [2] age > 50    Negative: [1] Pain lasts > 10 minutes AND [2] difficulty breathing    Protocols used: ABDOMINAL PAIN - FEMALE-A-AH, ABDOMINAL PAIN - UPPER-A-AH

## 2022-04-26 NOTE — TELEPHONE ENCOUNTER
Provider Response to 2nd Level Triage Request    I have reviewed the RN documentation. My recommendation is:  Face To Face Visit. Next Day: place patient on my schedule, as I have availability if you schedule F2F in virtual spot.  If pt prefers, can go to ED.  But in person assessment is needed.

## 2022-04-27 ENCOUNTER — OFFICE VISIT (OUTPATIENT)
Dept: FAMILY MEDICINE | Facility: OTHER | Age: 37
End: 2022-04-27
Payer: MEDICARE

## 2022-04-27 VITALS
WEIGHT: 182.5 LBS | BODY MASS INDEX: 26.19 KG/M2 | OXYGEN SATURATION: 98 % | RESPIRATION RATE: 18 BRPM | SYSTOLIC BLOOD PRESSURE: 118 MMHG | HEART RATE: 112 BPM | DIASTOLIC BLOOD PRESSURE: 72 MMHG | TEMPERATURE: 98.8 F

## 2022-04-27 DIAGNOSIS — R11.2 NAUSEA AND VOMITING, INTRACTABILITY OF VOMITING NOT SPECIFIED, UNSPECIFIED VOMITING TYPE: ICD-10-CM

## 2022-04-27 DIAGNOSIS — E80.6 CONJUGATED HYPERBILIRUBINEMIA: ICD-10-CM

## 2022-04-27 DIAGNOSIS — R61 NIGHT SWEATS: ICD-10-CM

## 2022-04-27 DIAGNOSIS — R19.7 VOMITING AND DIARRHEA: ICD-10-CM

## 2022-04-27 DIAGNOSIS — R11.10 VOMITING AND DIARRHEA: ICD-10-CM

## 2022-04-27 DIAGNOSIS — R00.0 TACHYCARDIA: ICD-10-CM

## 2022-04-27 DIAGNOSIS — R42 LIGHTHEADEDNESS: ICD-10-CM

## 2022-04-27 DIAGNOSIS — U09.9 CHRONIC POST-COVID-19 SYNDROME: ICD-10-CM

## 2022-04-27 DIAGNOSIS — R53.83 FATIGUE, UNSPECIFIED TYPE: ICD-10-CM

## 2022-04-27 DIAGNOSIS — R10.13 EPIGASTRIC PAIN: Primary | ICD-10-CM

## 2022-04-27 DIAGNOSIS — R11.14 BILIOUS VOMITING WITH NAUSEA: ICD-10-CM

## 2022-04-27 DIAGNOSIS — R19.7 DIARRHEA, UNSPECIFIED TYPE: ICD-10-CM

## 2022-04-27 DIAGNOSIS — K91.5 POST-CHOLECYSTECTOMY SYNDROME: ICD-10-CM

## 2022-04-27 LAB
ALBUMIN SERPL-MCNC: 4.3 G/DL (ref 3.4–5)
ALBUMIN UR-MCNC: NEGATIVE MG/DL
ALP SERPL-CCNC: 67 U/L (ref 40–150)
ALT SERPL W P-5'-P-CCNC: 19 U/L (ref 0–50)
ANION GAP SERPL CALCULATED.3IONS-SCNC: 8 MMOL/L (ref 3–14)
APPEARANCE UR: CLEAR
AST SERPL W P-5'-P-CCNC: 12 U/L (ref 0–45)
BILIRUB SERPL-MCNC: 2.5 MG/DL (ref 0.2–1.3)
BILIRUB UR QL STRIP: ABNORMAL
BUN SERPL-MCNC: 9 MG/DL (ref 7–30)
CALCIUM SERPL-MCNC: 9.2 MG/DL (ref 8.5–10.1)
CHLORIDE BLD-SCNC: 108 MMOL/L (ref 94–109)
CO2 SERPL-SCNC: 22 MMOL/L (ref 20–32)
COLOR UR AUTO: YELLOW
CREAT SERPL-MCNC: 0.71 MG/DL (ref 0.52–1.04)
ERYTHROCYTE [DISTWIDTH] IN BLOOD BY AUTOMATED COUNT: 14.2 % (ref 10–15)
GFR SERPL CREATININE-BSD FRML MDRD: >90 ML/MIN/1.73M2
GLUCOSE BLD-MCNC: 97 MG/DL (ref 70–99)
GLUCOSE UR STRIP-MCNC: NEGATIVE MG/DL
HCT VFR BLD AUTO: 43.4 % (ref 35–47)
HGB BLD-MCNC: 15.1 G/DL (ref 11.7–15.7)
HGB UR QL STRIP: NEGATIVE
KETONES UR STRIP-MCNC: >=160 MG/DL
LEUKOCYTE ESTERASE UR QL STRIP: NEGATIVE
MCH RBC QN AUTO: 29.3 PG (ref 26.5–33)
MCHC RBC AUTO-ENTMCNC: 34.8 G/DL (ref 31.5–36.5)
MCV RBC AUTO: 84 FL (ref 78–100)
NITRATE UR QL: NEGATIVE
PH UR STRIP: 5.5 [PH] (ref 5–7)
PLATELET # BLD AUTO: 287 10E3/UL (ref 150–450)
POTASSIUM BLD-SCNC: 3.8 MMOL/L (ref 3.4–5.3)
PROT SERPL-MCNC: 7.5 G/DL (ref 6.8–8.8)
RBC # BLD AUTO: 5.15 10E6/UL (ref 3.8–5.2)
SODIUM SERPL-SCNC: 138 MMOL/L (ref 133–144)
SP GR UR STRIP: >=1.03 (ref 1–1.03)
TSH SERPL DL<=0.005 MIU/L-ACNC: 1.2 MU/L (ref 0.4–4)
UROBILINOGEN UR STRIP-ACNC: 0.2 E.U./DL
WBC # BLD AUTO: 7.8 10E3/UL (ref 4–11)

## 2022-04-27 PROCEDURE — 84443 ASSAY THYROID STIM HORMONE: CPT | Performed by: FAMILY MEDICINE

## 2022-04-27 PROCEDURE — 93000 ELECTROCARDIOGRAM COMPLETE: CPT | Performed by: FAMILY MEDICINE

## 2022-04-27 PROCEDURE — 99215 OFFICE O/P EST HI 40 MIN: CPT | Performed by: FAMILY MEDICINE

## 2022-04-27 PROCEDURE — 85027 COMPLETE CBC AUTOMATED: CPT | Performed by: FAMILY MEDICINE

## 2022-04-27 PROCEDURE — 80053 COMPREHEN METABOLIC PANEL: CPT | Performed by: FAMILY MEDICINE

## 2022-04-27 PROCEDURE — 36415 COLL VENOUS BLD VENIPUNCTURE: CPT | Performed by: FAMILY MEDICINE

## 2022-04-27 PROCEDURE — 81003 URINALYSIS AUTO W/O SCOPE: CPT | Performed by: FAMILY MEDICINE

## 2022-04-27 RX ORDER — CHOLESTYRAMINE 4 G/9G
2 POWDER, FOR SUSPENSION ORAL 2 TIMES DAILY WITH MEALS
Qty: 400 G | Refills: 1 | Status: SHIPPED | OUTPATIENT
Start: 2022-04-27 | End: 2022-12-07

## 2022-04-27 RX ORDER — PROCHLORPERAZINE MALEATE 10 MG
10 TABLET ORAL EVERY 6 HOURS PRN
Qty: 20 TABLET | Refills: 1 | Status: SHIPPED | OUTPATIENT
Start: 2022-04-27 | End: 2022-12-14

## 2022-04-27 ASSESSMENT — PAIN SCALES - GENERAL: PAINLEVEL: EXTREME PAIN (8)

## 2022-04-27 NOTE — PROGRESS NOTES
Assessment & Plan       ICD-10-CM    1. Epigastric pain  R10.13 cholestyramine (QUESTRAN) 4 GM/DOSE powder     Ova and Parasite Exam Routine     Comprehensive metabolic panel (BMP + Alb, Alk Phos, ALT, AST, Total. Bili, TP)     CBC with platelets     UA Macro with Reflex to Micro and Culture - lab collect     Ova and Parasite Exam Routine     Comprehensive metabolic panel (BMP + Alb, Alk Phos, ALT, AST, Total. Bili, TP)     CBC with platelets     UA Macro with Reflex to Micro and Culture - lab collect   2. Diarrhea, unspecified type  R19.7 cholestyramine (QUESTRAN) 4 GM/DOSE powder     Ova and Parasite Exam Routine     Fat Stool Qual Random Collection     Comprehensive metabolic panel (BMP + Alb, Alk Phos, ALT, AST, Total. Bili, TP)     CBC with platelets     UA Macro with Reflex to Micro and Culture - lab collect     Ova and Parasite Exam Routine     Fat Stool Qual Random Collection     Comprehensive metabolic panel (BMP + Alb, Alk Phos, ALT, AST, Total. Bili, TP)     CBC with platelets     UA Macro with Reflex to Micro and Culture - lab collect   3. Post-cholecystectomy syndrome  K91.5 cholestyramine (QUESTRAN) 4 GM/DOSE powder     Ova and Parasite Exam Routine     Fat Stool Qual Random Collection     Comprehensive metabolic panel (BMP + Alb, Alk Phos, ALT, AST, Total. Bili, TP)     CBC with platelets     UA Macro with Reflex to Micro and Culture - lab collect     Ova and Parasite Exam Routine     Fat Stool Qual Random Collection     Comprehensive metabolic panel (BMP + Alb, Alk Phos, ALT, AST, Total. Bili, TP)     CBC with platelets     UA Macro with Reflex to Micro and Culture - lab collect   4. Tachycardia  R00.0 EKG 12-lead complete w/read - Clinics   5. Lightheadedness  R42 EKG 12-lead complete w/read - Clinics   6. Bilious vomiting with nausea  R11.14 prochlorperazine (COMPAZINE) 10 MG tablet   7. Night sweats  R61    8. Fatigue, unspecified type  R53.83 TSH with free T4 reflex   9. Nausea and vomiting,  intractability of vomiting not specified, unspecified vomiting type  R11.2 TSH with free T4 reflex   10. Vomiting and diarrhea  R11.10 TSH with free T4 reflex    R19.7    11. 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  Exact etiology of her symptoms is still not 100% clear.  I am most suspicious of postcholecystectomy syndrome.  This could certainly explain most if not all of her symptoms.  Discussed the nature of this and options for treatment.  We will start with cholestyramine.  If not responding to this adequately, could consider pancreatic enzymes.  We will also do screening stool test for pancreatic insufficiency.  We will do stool testing for parasitic infection.  We will check some blood work to rule out other ongoing issues with her liver or signs of other abdominal processes.  Trial of Compazine to help with nausea.  Patient has previously not responded adequately to acid blockade, Reglan, and Zofran.  Offered referral to GI.  Patient would be open to this, but wishes to see a different provider than she saw last time.  She would like to see what our testing shows first.  I did offer imaging for gastric emptying study, but patient declined at this time.  Previous work-up has not been helpful.  CT abdomen pelvis as well as CT chest within the last 6 months were normal.  Follow-up 2 weeks to ensure improvement.  4, 5.  Suspect dehydration related to poor.  EKG today was consistent with sinus tachycardia.  Encouraged patient to increase her fluid intake.  Hopefully better nausea control will assist with this.  7.  Unclear etiology.  Will check labs to screen for underlying neoplastic process.  Previous FSH and imaging have been reassuring.  This may improve with better control of her GI condition.  8, 11.  Exact etiology of her fatigue is not clear, but is likely related to post-COVID syndrome.  She has been referred and will be following up with the post-COVID clinic soon.  We will  continue to follow and assist with this as able.    Portions of this note were completed using Dragon dictation software.  Although reviewed, there may be typographical and other inadvertent errors that remain.       Review of prior external note(s) from - [facility] information from [facility] reviewed  Review of the result(s) of each unique test - CT abdomen and CT chest, metabolic profile, TSH, CBC, etc.  Ordering of each unique test  Prescription drug management  57 minutes spent on the date of the encounter doing chart review, history and exam, documentation and further activities per the note       Patient Instructions   Thank you for visiting [facility]    Let's try cholestyramine to help with your loose stools and GI upset.  If not effective, we can consider pancreatic enzymes.  You should have some improvement within a few days at the latest.      Let's also check for parasite infection, and poor fat digestion.    If needed, can use compazine for nausea.  This will tend to make you sleepy.      We'll let you know your lab results as soon as we can.     Contact us or return if questions or concerns.     Have a nice day!    Dr. Umana     Return in about 2 weeks (around 5/11/2022) for E-visit, video, or phone visit.      If you need medication refills, please contact your pharmacy 3 days before your prescriptions runs out or download the [facility] henrietta for your smart phone. If you are out of refills, your pharmacy will contact contact the clinic.                                     [facility] Assistance [phone]                    Return in about 2 weeks (around 5/11/2022) for E-visit, video, or phone visit.    Timmy Umana MD, MD  [facility] SHAI Delarosa is a 36 year old who presents for the following health issues     History of Present Illness       Reason for visit:  Ongoing stomach pain  Symptom onset:  More than a  month  Symptoms include:  Severe pain in upper stomach  Symptom intensity:  Severe  Symptom progression:  Staying the same  Had these symptoms before:  Yes  Has tried/received treatment for these symptoms:  Yes  Previous treatment was successful:  No  What makes it worse:  Standing and walking  What makes it better:  Laying down and sleeping    She eats 2-3 servings of fruits and vegetables daily.She consumes 0 sweetened beverage(s) daily.She exercises with enough effort to increase her heart rate 9 or less minutes per day.  She exercises with enough effort to increase her heart rate 3 or less days per week.   She is taking medications regularly.     Pt has been having abdominal pain for about 2 years.  Longest period without pain in the past couple of years has been a few days.  Workup in 2021 showed gall bladder dyskinesis.  Had a stuart done.      Improved after cholecystectomy, but has since been worsening.  Eating a low fat diet.  Reduced carbohydrates.      Has been having lots of nausea/vomiting.    Usual abdominal pain feels like being super hungry with a nonspecific craving.  Eating anything else makes you vomit.    On days, where she has more pain, doesn't want to eat anything, can't really eat anything.  Pain is in her upper abdomen, has sensation of fullness in her upper abdomen, feels hard or jenniffer.  Below this, feels like it's constantly moving around and she's starving.      Last night, woke up from fecal incontinence, was pure liquid.  This morning, had a normal BM followed by liquid.      She has lots of loose bowel movements.  These started after she had COVID.  Had lots of nausea symptoms when she had COVID.  This was 3 months ago.      Albuterol has helped with shortness of breath.  Still having a lot of fatigue.      She has been taking omeprazole daily without much improvement in her symptoms.      Benadryl seemed to help a little, but seemed to stop being as effective.  Reglan doesn't do  "anything.    Tums and Mylanta make her nauseated.      Ibuprofen doesn't help much, but doesn't make things worse.      She did try marijuana once, which helped with the nausea.  Zofran didn't help with nausea either.  Hasn't tried compazine or promethazine.    No clear improvement with probiotics.  Does note an oily film on her liquid stools.      Has an appointment with post-covid clinic on May 9.    No contacts with similar symptoms.      Had a previous gastric emptying study which was \"inconclusive\".      Pt stopped her psych meds (invega, sertraline) 3 weeks ago in preparation for her MSLT.  Her GI symptoms predated that by a few weeks.        Review of Systems   Constitutional, HEENT, cardiovascular, pulmonary, gi and gu systems are negative, except as otherwise noted.  Chills, sweats.        Objective    /72   Pulse 112   Temp 98.8  F (37.1  C) (Temporal)   Resp 18   Wt 82.8 kg (182 lb 8 oz)   LMP  (LMP Unknown)   SpO2 98%   BMI 26.19 kg/m    Body mass index is 26.19 kg/m .  Physical Exam   GENERAL: healthy, alert and no distress  NECK: no adenopathy, no asymmetry, masses, or scars and thyroid normal to palpation  RESP: lungs clear to auscultation - no rales, rhonchi or wheezes  CV: rapid rate and rhythm, normal S1 S2, no S3 or S4, no murmur, click or rub, no peripheral edema and peripheral pulses strong  ABDOMEN: epigastric firmness and tenderness, but otherwise soft.  Normal bowel sounds.  No other focal tenderness.    MS: no gross musculoskeletal defects noted, no edema    Admission on 03/15/2022, Discharged on 03/15/2022   Component Date Value Ref Range Status     D-Dimer Quantitative 03/15/2022 0.33  0.00 - 0.50 ug/mL FEU Final     Sodium 03/15/2022 138  133 - 144 mmol/L Final     Potassium 03/15/2022 3.7  3.4 - 5.3 mmol/L Final     Chloride 03/15/2022 107  94 - 109 mmol/L Final     Carbon Dioxide (CO2) 03/15/2022 24  20 - 32 mmol/L Final     Anion Gap 03/15/2022 7  3 - 14 mmol/L Final     " Urea Nitrogen 03/15/2022 13  7 - 30 mg/dL Final     Creatinine 03/15/2022 0.74  0.52 - 1.04 mg/dL Final     Calcium 03/15/2022 9.1  8.5 - 10.1 mg/dL Final     Glucose 03/15/2022 92  70 - 99 mg/dL Final     GFR Estimate 03/15/2022 >90  >60 mL/min/1.73m2 Final    Effective December 21, 2021 eGFRcr in adults is calculated using the 2021 CKD-EPI creatinine equation which includes age and gender (Carrie et al., [facility], DOI: 10.1056/ARKXza8601111)     Troponin I High Sensitivity 03/15/2022 <3  <54 ng/L Final    This Troponin-I result was obtained using a Siemens Dimension Vista High Sensitivity Troponin-I assay (TNIH). Effective 11/23/21, nine labs/sites in the [facility] switched from a Siemens Dent Contemporary Troponin I assay (CTNI) to a Siemens Dent High-Sensitivity Troponin I assay (TNIH).     WBC Count 03/15/2022 8.4  4.0 - 11.0 10e3/uL Final     RBC Count 03/15/2022 5.05  3.80 - 5.20 10e6/uL Final     Hemoglobin 03/15/2022 14.7  11.7 - 15.7 g/dL Final     Hematocrit 03/15/2022 42.3  35.0 - 47.0 % Final     MCV 03/15/2022 84  78 - 100 fL Final     MCH 03/15/2022 29.1  26.5 - 33.0 pg Final     MCHC 03/15/2022 34.8  31.5 - 36.5 g/dL Final     RDW 03/15/2022 13.6  10.0 - 15.0 % Final     Platelet Count 03/15/2022 284  150 - 450 10e3/uL Final     % Neutrophils 03/15/2022 64  % Final     % Lymphocytes 03/15/2022 27  % Final     % Monocytes 03/15/2022 8  % Final     % Eosinophils 03/15/2022 1  % Final     % Basophils 03/15/2022 0  % Final     % Immature Granulocytes 03/15/2022 0  % Final     NRBCs per 100 WBC 03/15/2022 0  <1 /100 Final     Absolute Neutrophils 03/15/2022 5.4  1.6 - 8.3 10e3/uL Final     Absolute Lymphocytes 03/15/2022 2.3  0.8 - 5.3 10e3/uL Final     Absolute Monocytes 03/15/2022 0.7  0.0 - 1.3 10e3/uL Final     Absolute Eosinophils 03/15/2022 0.0  0.0 - 0.7 10e3/uL Final     Absolute Basophils 03/15/2022 0.0  0.0 - 0.2 10e3/uL Final     Absolute Immature Granulocytes 03/15/2022 0.0  <=0.4 10e3/uL  Final     Absolute NRBCs 03/15/2022 0.0  10e3/uL Final     No results found for any visits on 04/27/22.  No results found for this or any previous visit (from the past 24 hour(s)).

## 2022-04-27 NOTE — PATIENT INSTRUCTIONS
Thank you for visiting [facility]    Let's try cholestyramine to help with your loose stools and GI upset.  If not effective, we can consider pancreatic enzymes.  You should have some improvement within a few days at the latest.      Let's also check for parasite infection, and poor fat digestion.    If needed, can use compazine for nausea.  This will tend to make you sleepy.      We'll let you know your lab results as soon as we can.     Contact us or return if questions or concerns.     Have a nice day!    Dr. Umana     Return in about 2 weeks (around 5/11/2022) for E-visit, video, or phone visit.      If you need medication refills, please contact your pharmacy 3 days before your prescriptions runs out or download the [facility] henrietta for your smart phone. If you are out of refills, your pharmacy will contact contact the clinic.                                     MyChart Assistance [phone]

## 2022-04-28 NOTE — RESULT ENCOUNTER NOTE
Isaura,    Your bilirubin level is elevated.  Because of this, I think we should obtain an ultrasound to make sure you haven't developed a blockage in your bile duct.  I will order this.      Thanks,    Dr. Umana

## 2022-04-29 ENCOUNTER — HOSPITAL ENCOUNTER (OUTPATIENT)
Dept: ULTRASOUND IMAGING | Facility: CLINIC | Age: 37
Discharge: HOME | End: 2022-04-29
Attending: FAMILY MEDICINE | Admitting: FAMILY MEDICINE
Payer: MEDICARE

## 2022-04-29 DIAGNOSIS — R42 LIGHTHEADEDNESS: ICD-10-CM

## 2022-04-29 DIAGNOSIS — R10.13 EPIGASTRIC PAIN: ICD-10-CM

## 2022-04-29 DIAGNOSIS — R00.0 TACHYCARDIA: ICD-10-CM

## 2022-04-29 DIAGNOSIS — R19.7 DIARRHEA, UNSPECIFIED TYPE: ICD-10-CM

## 2022-04-29 DIAGNOSIS — K91.5 POST-CHOLECYSTECTOMY SYNDROME: ICD-10-CM

## 2022-04-29 DIAGNOSIS — E80.6 CONJUGATED HYPERBILIRUBINEMIA: ICD-10-CM

## 2022-04-29 PROCEDURE — 76705 ECHO EXAM OF ABDOMEN: CPT

## 2022-05-07 SDOH — SOCIAL STABILITY: SOCIAL NETWORK: I HAVE TROUBLE DOING ALL OF MY REGULAR LEISURE ACTIVITIES WITH OTHERS: ALWAYS

## 2022-05-07 SDOH — SOCIAL STABILITY: SOCIAL NETWORK: I HAVE TROUBLE DOING ALL OF THE ACTIVITIES WITH FRIENDS THAT I WANT TO DO: ALWAYS

## 2022-05-07 SDOH — SOCIAL STABILITY: SOCIAL NETWORK: PROMIS ABILITY TO PARTICIPATE IN SOCIAL ROLES & ACTIVITIES T-SCORE: 29

## 2022-05-07 SDOH — SOCIAL STABILITY: SOCIAL NETWORK: I HAVE TROUBLE DOING ALL OF MY USUAL WORK (INCLUDE WORK AT HOME): ALWAYS

## 2022-05-07 SDOH — SOCIAL STABILITY: SOCIAL NETWORK: I HAVE TROUBLE DOING ALL OF THE FAMILY ACTIVITIES THAT I WANT TO DO: ALWAYS

## 2022-05-07 SDOH — SOCIAL STABILITY: SOCIAL NETWORK

## 2022-05-07 ASSESSMENT — ENCOUNTER SYMPTOMS
NERVOUS/ANXIOUS: 1
ALTERED TEMPERATURE REGULATION: 1
STIFFNESS: 0
CONSTIPATION: 0
SLEEP DISTURBANCES DUE TO BREATHING: 1
MUSCLE CRAMPS: 0
MEMORY LOSS: 1
INSOMNIA: 1
MUSCLE WEAKNESS: 1
DECREASED APPETITE: 1
POOR WOUND HEALING: 0
POLYDIPSIA: 1
EYE PAIN: 0
TREMORS: 0
HALLUCINATIONS: 1
BLOATING: 1
NUMBNESS: 0
WHEEZING: 0
DISTURBANCES IN COORDINATION: 1
BREAST PAIN: 1
SPUTUM PRODUCTION: 1
SNORES LOUDLY: 0
NECK PAIN: 0
COUGH: 0
HEMATURIA: 0
POSTURAL DYSPNEA: 1
LEG PAIN: 1
HEMOPTYSIS: 0
ARTHRALGIAS: 0
SMELL DISTURBANCE: 0
TINGLING: 1
SORE THROAT: 0
BACK PAIN: 0
HEARTBURN: 1
HOARSE VOICE: 0
SINUS PAIN: 1
HYPOTENSION: 1
EYE WATERING: 1
DOUBLE VISION: 0
JOINT SWELLING: 0
NECK MASS: 0
SHORTNESS OF BREATH: 1
SPEECH CHANGE: 1
COUGH DISTURBING SLEEP: 1
HEADACHES: 1
SYNCOPE: 1
SKIN CHANGES: 0
VOMITING: 1
PARALYSIS: 0
BLOOD IN STOOL: 0
EYE IRRITATION: 1
DIZZINESS: 1
CHILLS: 1
NAIL CHANGES: 0
FATIGUE: 1
NIGHT SWEATS: 1
FLANK PAIN: 0
SEIZURES: 0
WEIGHT LOSS: 1
DECREASED CONCENTRATION: 1
SINUS CONGESTION: 1
DEPRESSION: 1
ABDOMINAL PAIN: 1
RECTAL PAIN: 1
DYSURIA: 0
BOWEL INCONTINENCE: 0
WEAKNESS: 1
TROUBLE SWALLOWING: 0
BREAST MASS: 0
DYSPNEA ON EXERTION: 1
WEIGHT GAIN: 0
LOSS OF CONSCIOUSNESS: 1
NAUSEA: 1
PALPITATIONS: 1
ORTHOPNEA: 1
FEVER: 0
MYALGIAS: 1
HYPERTENSION: 0
LIGHT-HEADEDNESS: 1
DIFFICULTY URINATING: 1
JAUNDICE: 0
DIARRHEA: 1
INCREASED ENERGY: 1
EYE REDNESS: 0
POLYPHAGIA: 1
PANIC: 1
EXERCISE INTOLERANCE: 1
TASTE DISTURBANCE: 1

## 2022-05-07 ASSESSMENT — ANXIETY QUESTIONNAIRES
GAD7 TOTAL SCORE: 6
5. BEING SO RESTLESS THAT IT IS HARD TO SIT STILL: NOT AT ALL
1. FEELING NERVOUS, ANXIOUS, OR ON EDGE: MORE THAN HALF THE DAYS
7. FEELING AFRAID AS IF SOMETHING AWFUL MIGHT HAPPEN: NOT AT ALL
4. TROUBLE RELAXING: SEVERAL DAYS
7. FEELING AFRAID AS IF SOMETHING AWFUL MIGHT HAPPEN: NOT AT ALL
GAD7 TOTAL SCORE: 6
2. NOT BEING ABLE TO STOP OR CONTROL WORRYING: SEVERAL DAYS
GAD7 TOTAL SCORE: 6
8. IF YOU CHECKED OFF ANY PROBLEMS, HOW DIFFICULT HAVE THESE MADE IT FOR YOU TO DO YOUR WORK, TAKE CARE OF THINGS AT HOME, OR GET ALONG WITH OTHER PEOPLE?: SOMEWHAT DIFFICULT
6. BECOMING EASILY ANNOYED OR IRRITABLE: SEVERAL DAYS
3. WORRYING TOO MUCH ABOUT DIFFERENT THINGS: SEVERAL DAYS

## 2022-05-08 ASSESSMENT — ANXIETY QUESTIONNAIRES: GAD7 TOTAL SCORE: 6

## 2022-05-09 ENCOUNTER — VIRTUAL VISIT (OUTPATIENT)
Dept: PHYSICAL MEDICINE AND REHAB | Facility: CLINIC | Age: 37
End: 2022-05-09
Attending: FAMILY MEDICINE
Payer: MEDICARE

## 2022-05-09 DIAGNOSIS — R11.2 NAUSEA AND VOMITING, INTRACTABILITY OF VOMITING NOT SPECIFIED, UNSPECIFIED VOMITING TYPE: ICD-10-CM

## 2022-05-09 DIAGNOSIS — K90.49 MALABSORPTION DUE TO INTOLERANCE, NOT ELSEWHERE CLASSIFIED: ICD-10-CM

## 2022-05-09 DIAGNOSIS — R00.0 TACHYCARDIA: ICD-10-CM

## 2022-05-09 DIAGNOSIS — U09.9 POST-COVID CHRONIC DYSPNEA: Primary | ICD-10-CM

## 2022-05-09 DIAGNOSIS — I05.9 MITRAL VALVE DISORDER: ICD-10-CM

## 2022-05-09 DIAGNOSIS — R06.09 POST-COVID CHRONIC DYSPNEA: Primary | ICD-10-CM

## 2022-05-09 DIAGNOSIS — R53.83 FATIGUE, UNSPECIFIED TYPE: ICD-10-CM

## 2022-05-09 DIAGNOSIS — U07.1 INFECTION DUE TO 2019 NOVEL CORONAVIRUS: ICD-10-CM

## 2022-05-09 DIAGNOSIS — U09.9 POST COVID-19 CONDITION, UNSPECIFIED: Primary | ICD-10-CM

## 2022-05-09 DIAGNOSIS — G47.9 SLEEP DISTURBANCE: ICD-10-CM

## 2022-05-09 DIAGNOSIS — R00.1 BRADYCARDIA: ICD-10-CM

## 2022-05-09 DIAGNOSIS — G93.32 POST-COVID CHRONIC FATIGUE: ICD-10-CM

## 2022-05-09 DIAGNOSIS — R06.09 POST-COVID CHRONIC DYSPNEA: ICD-10-CM

## 2022-05-09 DIAGNOSIS — U09.9 POST-COVID CHRONIC FATIGUE: ICD-10-CM

## 2022-05-09 DIAGNOSIS — U09.9 POST-COVID CHRONIC DYSPNEA: ICD-10-CM

## 2022-05-09 PROCEDURE — 99205 OFFICE O/P NEW HI 60 MIN: CPT | Mod: 95 | Performed by: PHYSICIAN ASSISTANT

## 2022-05-09 ASSESSMENT — ENCOUNTER SYMPTOMS
DIFFICULTY URINATING: 1
BREAST MASS: 0
DYSURIA: 0
SINUS PAIN: 1
NUMBNESS: 0
MYALGIAS: 1
SEIZURES: 0
NAUSEA: 1
LIGHT-HEADEDNESS: 1
POLYPHAGIA: 1
FLANK PAIN: 0
FATIGUE: 1
DIARRHEA: 1
JOINT SWELLING: 0
TREMORS: 0
EYE REDNESS: 0
VOMITING: 1
DECREASED CONCENTRATION: 1
COUGH: 0
HEADACHES: 1
TROUBLE SWALLOWING: 0
NECK PAIN: 0
HEMATURIA: 0
HALLUCINATIONS: 1
ABDOMINAL PAIN: 1
ARTHRALGIAS: 0
SHORTNESS OF BREATH: 1
SORE THROAT: 0
DIZZINESS: 1
CONSTIPATION: 0
FEVER: 0
POLYDIPSIA: 1
WHEEZING: 0
PALPITATIONS: 1
NERVOUS/ANXIOUS: 1
WEAKNESS: 1
CHILLS: 1
EYE PAIN: 0
BACK PAIN: 0
RECTAL PAIN: 1
HEARTBURN: 1

## 2022-05-09 NOTE — PATIENT INSTRUCTIONS
Post COVID Self Care Suggestions:     Fatigue Management:       https://www.archives-pmr.org/action/showPdf?rht=-8365%2819%7563090-6       Self Care:      [email or web address]/  Recovery World Health Organization:    https://apps.who.int/iris/bitstream/handle/31852/373380/UTP-MRAI-0074-892-89738-38280-eng.pdf  Breathing exercises:    https://www.[facility].org/health/conditions-and-diseases/coronavirus/coronavirus-recovery-breathing-exercises

## 2022-05-09 NOTE — PROGRESS NOTES
Isaura is a 36 year old who is being evaluated via a billable video visit.      How would you like to obtain your AVS? MyChart  If the video visit is dropped, the invitation should be resent by: Text to cell phone: [phone]  Will anyone else be joining your video visit? No         Assessment/ Impression:   1. Post covid-19 condition, unspecified  Patient with increased GI issues since COVID-19.  Now with intractable nausea and vomiting.  See below.     2. Nausea and vomiting, intractability of vomiting not specified, unspecified vomiting type  Patient has had significant GI disturbance since her COVID infection in January. She has had intractable vomiting that is unresponsive to several medications and has lost 15lbs in a month. Will refer to GI for further evaluation.  She may require phenergan suppository if not responsive to current medication. Will check Vitamin D and B12 due to concern for malabsorption.  - Vitamin D Deficiency; Future  - Vitamin B12; Future   - Adult Gastro Ref - Consult Only; Future    3. Malabsorption due to intolerance, not elsewhere classified   See above.   - Vitamin B12; Future   - Vitamin D Deficiency; Future    4. Post-COVID chronic dyspnea  Patient still with shortness of breath walking to kitchen or garage.  With history of PE will check CT chest to evaluate for fibrosis and also for PE. Will also check PFT and 6 minute walk test to evaluate for oxygen drop.  Due to continued dyspnea will refer to Pulmonary and also start pulmonary rehab  - Adult Post Covid Clinic Referral  - General PFT Lab (Please always keep checked); Future  - Pulmonary Function Test; Future  - 6 minute walk test; Future  - Adult Pulmonary Medicine Referral; Future  - Pulmonary Rehab Referral; Future  - CT Chest Pulmonary embolism w Contrast; Future    5. Post-COVID chronic fatigue  Patient with chronic fatigue which may be due to sleep dysfunction. Will refer to PT/OT for energy conservation program.   -  Physical Therapy Referral; Future  - Occupational Therapy Referral; Future  - Vitamin B12; Future   - Vitamin D Deficiency; Future    6. Sleep disturbance  Patient is already working with sleep medicine. Encouraged patient to reach out to sleep psychologist as recommended.     7. Mitral valve disorder/ Tachycardia/ Bradycardia  History of MVP and no updated ECHO with patient having chest pain.  Will update ECHO and also refer to Cardiology for her irregular heart rate. Advised to track heart rate to better evaluate for fluctuations.   - Echocardiogram Complete; Future  - Cardiology referral; Future      Plan:  1. I reviewed present knowledge on long-Covid.  Education was provided and question were answered.  2. Orders as above.  3. I will follow up with Isaura Gray in 3 months. I will review progress and consider need for any other therapeutic interventions. If there are any questions and/or concerns she will call the clinic.      On day of encounter time spent in chart review and with patient in consultation, exam, education and coordination of care, excluding procedures: 70 minutes       Kyung Valderrama PA-C  [facility] PHYSICAL MEDICINE AND REHABILITATION CLINIC [facility]   This 36 year old female presents to the [facility] Post-COVID clinic as a new consult to evaluate continuing symptoms after COVID infection initially diagnosed January 19, 2022.  Isaura Gray presented to their primary care physician on 1/23/2022  complaining of Sore throat, shortness of breath, cough, fever, chills, headache, generalized aches and pains, diarrhea, nausea, brain fog, fatigue and weakness. Treatment was symptomatic.  Patient course was complicated by chest pain and nausea with weight loss.  Patient was seen the ER several times for tachycardia and bradycardia.Continuing symptoms include weakness, difficulty sleeping, fatigue, shortness of breath,  diarrhea and nausea. Patient has lost 15 pounds in a month without trying.  Patient  States she has tried multiple medications and currently her medication is helping her nausea.  She also has encountered chest pain which she describes as tightness. EKG was normal however she was diagnosed with bradycardia during COVID which is a new diagnosis.   She also is struggling with fatigue and shortness of breath. She has seen sleep medicine to evaluate her daytime somnolence.  She will get short of breath with walking to the kitchen.  She is struggling managing daily activities due to her chronic fatigue.  Past medical history is significant for pulmonary embolism and DVT.  The patient was vaccinated against COVID x1 with [employer]..  Previous activity was full time work. Isaura Gray feels they are functioning at 20% of pre COVID level.    History of COVID-19 infection: January 19th, 2022  Date of first symptoms: 1/12/2022  Diagnosis: PCR  Hospitalization: ER 1/23/2022  Treatment: Symptomatic, Decadron 6mg x5 days  Current Symptoms: See subjective     Goals of Care: increase energy, decrease shortness of breath, decrease coughing, decrease anxiety, decrease depression, improve thinking, improve quality of life and return to work      PHQ Assesment Total Score(s) 5/7/2022   PHQ-2 Score 2   Some recent data might be hidden     AVERY-7 Results 5/7/2022   AVERY 7 TOTAL SCORE 6 (mild anxiety)   Some recent data might be hidden     PTSD Screen Score 5/7/2022   Have you ever experienced this kind of event? Yes   PTSD Screen (Score of 3 or more suggests positive screen) 5   Some recent data might be hidden     PROMIS-29 5/7/2022   PROMIS Physical Function T-Score 30.7 (moderate dysfunction)   PROMIS Anxiety T-Score 77.9 (severe)   PROMIS Depression T-Score 65.7 (moderate)   PROMIS Fatigue T-Score 75.8 (severe)   PROMIS Sleep Disturbance T-Score 66 (moderate)   PROMIS Ability to Participate in Social Roles & Activities  "T-Score 29 (severe dysfunction)   PROMIS Pain Interference T-Score 71.6 (severe)   PROMIS Pain Intensity 7       Past Medical History:   Diagnosis Date     ASCUS of cervix with negative high risk HPV 2016    [facility]     Cancer (H)      Depressive disorder      Follicular cancer of thyroid (H)      Hyperprolactinemia (H)      Mitral valve prolapse      PE (pulmonary thromboembolism) (H)      Pituitary tumor      Schizophrenia (H)     reports spontaneous remission during last pregnancy     Thyroid disease        Past Surgical History:   Procedure Laterality Date     APPENDECTOMY       ENT SURGERY      Partial thyroidectomy     LAPAROSCOPIC CHOLECYSTECTOMY N/A 3/4/2021    Procedure: Laparoscopic cholecystectomy;  Surgeon: Osmany Smith MD;  Location: [facility] OR       Family History   Problem Relation Age of Onset     No Known Problems Mother      Hypertension Father      Cerebrovascular Disease Father 56        Passed away from double brain stem clot     Mental Illness Father      Depression Father      Anxiety Disorder Father      Alcoholism Father      Schizophrenia Father      Asthma Brother      Cancer Maternal Grandfather         lung     No Known Problems Paternal Grandmother      Schizophrenia Paternal Grandfather      Suicide Paternal Grandfather 53     Autism Spectrum Disorder Son      Kidney Disease Son         \"has a third kidney\"     No Known Problems Daughter        Social History     Tobacco Use     Smoking status: Former Smoker     Packs/day: 0.50     Years: 5.00     Pack years: 2.50     Types: Cigarettes, Vaping Device     Quit date: 2016     Years since quittin.7     Smokeless tobacco: Never Used   Vaping Use     Vaping Use: Never used   Substance Use Topics     Alcohol use: No     Comment: Has an issue with her liver (not alcohol related)     Drug use: No         Current Outpatient Medications:      albuterol (PROAIR HFA/PROVENTIL HFA/VENTOLIN HFA) 108 (90 Base) MCG/ACT inhaler, " Inhale 2 puffs into the lungs in the morning and 2 puffs at noon and 2 puffs in the evening and 2 puffs before bedtime., Disp: 18 g, Rfl: 0     calcium carbonate (OS-DEBI) 1500 (600 Ca) MG tablet, Take 1 tablet (600 mg) by mouth 2 times daily (with meals), Disp: 180 tablet, Rfl: 1     cholestyramine (QUESTRAN) 4 GM/DOSE powder, Take 2 g by mouth 2 times daily (with meals) Adjust dose and frequency to keep bowels more consistent., Disp: 400 g, Rfl: 1     levonorgestrel (MIRENA) 20 MCG/24HR IUD, 1 each (20 mcg) by Intrauterine route once, Disp: , Rfl:      prochlorperazine (COMPAZINE) 10 MG tablet, Take 1 tablet (10 mg) by mouth every 6 hours as needed for nausea or vomiting, Disp: 20 tablet, Rfl: 1     zolpidem (AMBIEN) 5 MG tablet, Take 1 tablet (5 mg) by mouth nightly as needed for sleep, Disp: 30 tablet, Rfl: 1      Review of Systems   Constitutional: Positive for chills and fatigue. Negative for fever.   HENT: Positive for mouth sores and sinus pain. Negative for ear discharge, ear pain, hearing loss, nosebleeds, sore throat, tinnitus and trouble swallowing.    Eyes: Negative for pain and redness.   Respiratory: Positive for shortness of breath. Negative for cough and wheezing.    Cardiovascular: Positive for chest pain and palpitations.   Gastrointestinal: Positive for abdominal pain, diarrhea, heartburn, nausea, rectal pain and vomiting. Negative for constipation.   Endocrine: Positive for polydipsia and polyphagia.   Breasts:  Positive for discharge. Negative for breast mass.   Genitourinary: Positive for difficulty urinating and urgency. Negative for dysuria, flank pain and hematuria.   Musculoskeletal: Positive for myalgias. Negative for arthralgias, back pain, joint swelling and neck pain.   Skin: Negative for rash.   Neurological: Positive for dizziness, weakness, light-headedness and headaches. Negative for tremors, seizures and numbness.   Psychiatric/Behavioral: Positive for decreased concentration and  hallucinations. The patient is nervous/anxious.           Objective    Vitals - Patient Reported  Weight (Patient Reported): 79.8 kg (176 lb)  Height (Patient Reported): 182.9 cm (6')  BMI (Based on Pt Reported Ht/Wt): 23.87  Pain Score: Severe Pain (7)  Pain Loc: Other - see comment (stomach)      Physical Exam   GENERAL: Healthy, alert and no distress  EYES: Eyes grossly normal to inspection.  No discharge or erythema, or obvious scleral/conjunctival abnormalities.  HENT: Normal cephalic/atraumatic.  External ears, nose and mouth without ulcers or lesions.  No nasal drainage visible.  NECK: No asymmetry, visible masses or scars  RESP: No audible wheeze, cough, or visible cyanosis.  No visible retractions or increased work of breathing.    MS: No gross musculoskeletal defects noted.  Normal range of motion.  No visible edema.  SKIN: Visible skin clear. No significant rash, abnormal pigmentation or lesions.  NEURO: Cranial nerves grossly intact.  Mentation and speech appropriate for age.  PSYCH: Mentation appears normal, affect normal/bright, judgement and insight intact, normal speech and appearance well-groomed.      CRP Inflammation   Date Value Ref Range Status   01/23/2022 <2.9 0.0 - 8.0 mg/L Final   04/21/2021 <2.9 0.0 - 8.0 mg/L Final      Sed Rate   Date Value Ref Range Status   04/21/2021 5 0 - 20 mm/h Final     Erythrocyte Sedimentation Rate   Date Value Ref Range Status   01/23/2022 8 0 - 20 mm/hr Final      Last Renal Panel:  Sodium   Date Value Ref Range Status   04/27/2022 138 133 - 144 mmol/L Final   04/21/2021 139 133 - 144 mmol/L Final     Potassium   Date Value Ref Range Status   04/27/2022 3.8 3.4 - 5.3 mmol/L Final   04/21/2021 3.6 3.4 - 5.3 mmol/L Final     Chloride   Date Value Ref Range Status   04/27/2022 108 94 - 109 mmol/L Final   04/21/2021 106 94 - 109 mmol/L Final     Carbon Dioxide   Date Value Ref Range Status   04/21/2021 26 20 - 32 mmol/L Final     Carbon Dioxide (CO2)   Date Value  Ref Range Status   04/27/2022 22 20 - 32 mmol/L Final     Comment:     This result was previously suppressed from the chart.     Anion Gap   Date Value Ref Range Status   04/27/2022 8 3 - 14 mmol/L Final     Comment:     This result was previously suppressed from the chart.   04/21/2021 7 3 - 14 mmol/L Final     Glucose   Date Value Ref Range Status   04/27/2022 97 70 - 99 mg/dL Final     Comment:     This result was previously suppressed from the chart.   04/21/2021 92 70 - 99 mg/dL Final     Urea Nitrogen   Date Value Ref Range Status   04/27/2022 9 7 - 30 mg/dL Final     Comment:     This result was previously suppressed from the chart.   04/21/2021 13 7 - 30 mg/dL Final     Creatinine   Date Value Ref Range Status   04/27/2022 0.71 0.52 - 1.04 mg/dL Final     Comment:     This result was previously suppressed from the chart.   04/21/2021 0.87 0.52 - 1.04 mg/dL Final     GFR Estimate   Date Value Ref Range Status   04/27/2022 >90 >60 mL/min/1.73m2 Final     Comment:     Effective December 21, 2021 eGFRcr in adults is calculated using the 2021 CKD-EPI creatinine equation which includes age and gender (Carrie et al., NEJM, DOI: 10.1056/FPSGxg2090524)  This result was previously suppressed from the chart.   04/21/2021 88 >60 mL/min/[1.73_m2] Final     Comment:     Non  GFR Calc  Starting 12/18/2018, serum creatinine based estimated GFR (eGFR) will be   calculated using the Chronic Kidney Disease Epidemiology Collaboration   (CKD-EPI) equation.       Calcium   Date Value Ref Range Status   04/27/2022 9.2 8.5 - 10.1 mg/dL Final     Comment:     This result was previously suppressed from the chart.   04/21/2021 9.0 8.5 - 10.1 mg/dL Final     Albumin   Date Value Ref Range Status   04/27/2022 4.3 3.4 - 5.0 g/dL Final     Comment:     This result was previously suppressed from the chart.   04/21/2021 4.2 3.4 - 5.0 g/dL Final      Lab Results   Component Value Date    WBC 7.8 04/27/2022    WBC 6.7  04/21/2021     Lab Results   Component Value Date    RBC 5.15 04/27/2022    RBC 4.90 04/21/2021     Lab Results   Component Value Date    HGB 15.1 04/27/2022    HGB 14.8 04/21/2021     Lab Results   Component Value Date    HCT 43.4 04/27/2022    HCT 42.9 04/21/2021     No components found for: MCT  Lab Results   Component Value Date    MCV 84 04/27/2022    MCV 88 04/21/2021     Lab Results   Component Value Date    MCH 29.3 04/27/2022    MCH 30.2 04/21/2021     Lab Results   Component Value Date    MCHC 34.8 04/27/2022    MCHC 34.5 04/21/2021     Lab Results   Component Value Date    RDW 14.2 04/27/2022    RDW 13.8 04/21/2021     Lab Results   Component Value Date     04/27/2022     04/21/2021      Lab Results   Component Value Date    A1C 5.0 10/31/2021      TSH   Date Value Ref Range Status   04/27/2022 1.20 0.40 - 4.00 mU/L Final   04/21/2021 1.80 0.40 - 4.00 mU/L Final      Lab Results   Component Value Date    VITDT 15 (L) 02/18/2020      Recent Labs   Lab Test 01/23/22  1756 12/21/21  0755   MAG 2.0 2.3     Last Comprehensive Metabolic Panel:  Sodium   Date Value Ref Range Status   04/27/2022 138 133 - 144 mmol/L Final   04/21/2021 139 133 - 144 mmol/L Final     Potassium   Date Value Ref Range Status   04/27/2022 3.8 3.4 - 5.3 mmol/L Final   04/21/2021 3.6 3.4 - 5.3 mmol/L Final     Chloride   Date Value Ref Range Status   04/27/2022 108 94 - 109 mmol/L Final   04/21/2021 106 94 - 109 mmol/L Final     Carbon Dioxide   Date Value Ref Range Status   04/21/2021 26 20 - 32 mmol/L Final     Carbon Dioxide (CO2)   Date Value Ref Range Status   04/27/2022 22 20 - 32 mmol/L Final     Comment:     This result was previously suppressed from the chart.     Anion Gap   Date Value Ref Range Status   04/27/2022 8 3 - 14 mmol/L Final     Comment:     This result was previously suppressed from the chart.   04/21/2021 7 3 - 14 mmol/L Final     Glucose   Date Value Ref Range Status   04/27/2022 97 70 - 99 mg/dL  Final     Comment:     This result was previously suppressed from the chart.   04/21/2021 92 70 - 99 mg/dL Final     Urea Nitrogen   Date Value Ref Range Status   04/27/2022 9 7 - 30 mg/dL Final     Comment:     This result was previously suppressed from the chart.   04/21/2021 13 7 - 30 mg/dL Final     Creatinine   Date Value Ref Range Status   04/27/2022 0.71 0.52 - 1.04 mg/dL Final     Comment:     This result was previously suppressed from the chart.   04/21/2021 0.87 0.52 - 1.04 mg/dL Final     GFR Estimate   Date Value Ref Range Status   04/27/2022 >90 >60 mL/min/1.73m2 Final     Comment:     Effective December 21, 2021 eGFRcr in adults is calculated using the 2021 CKD-EPI creatinine equation which includes age and gender (Carrie ac al., NE, DOI: 10.1056/EZXIee9668837)  This result was previously suppressed from the chart.   04/21/2021 88 >60 mL/min/[1.73_m2] Final     Comment:     Non  GFR Calc  Starting 12/18/2018, serum creatinine based estimated GFR (eGFR) will be   calculated using the Chronic Kidney Disease Epidemiology Collaboration   (CKD-EPI) equation.       Calcium   Date Value Ref Range Status   04/27/2022 9.2 8.5 - 10.1 mg/dL Final     Comment:     This result was previously suppressed from the chart.   04/21/2021 9.0 8.5 - 10.1 mg/dL Final     Bilirubin Total   Date Value Ref Range Status   04/27/2022 2.5 (H) 0.2 - 1.3 mg/dL Final     Comment:     This result was previously suppressed from the chart.   04/21/2021 1.8 (H) 0.2 - 1.3 mg/dL Final     Alkaline Phosphatase   Date Value Ref Range Status   04/27/2022 67 40 - 150 U/L Final     Comment:     This result was previously suppressed from the chart.   04/21/2021 75 40 - 150 U/L Final     ALT   Date Value Ref Range Status   04/27/2022 19 0 - 50 U/L Final     Comment:     This result was previously suppressed from the chart.   04/21/2021 17 0 - 50 U/L Final     AST   Date Value Ref Range Status   04/27/2022 12 0 - 45 U/L Final      Comment:     This result was previously suppressed from the chart.   04/21/2021 5 0 - 45 U/L Final     Most Recent D-dimer:  Recent Labs   Lab Test 03/15/22  1918   DD 0.33       Office Visit on 04/27/2022   Component Date Value Ref Range Status     TSH 04/27/2022 1.20  0.40 - 4.00 mU/L Final     Sodium 04/27/2022 138  133 - 144 mmol/L Final     Potassium 04/27/2022 3.8  3.4 - 5.3 mmol/L Final     Chloride 04/27/2022 108  94 - 109 mmol/L Final     Carbon Dioxide (CO2) 04/27/2022 22  20 - 32 mmol/L Final    This result was previously suppressed from the chart.     Anion Gap 04/27/2022 8  3 - 14 mmol/L Final    This result was previously suppressed from the chart.     Urea Nitrogen 04/27/2022 9  7 - 30 mg/dL Final    This result was previously suppressed from the chart.     Creatinine 04/27/2022 0.71  0.52 - 1.04 mg/dL Final    This result was previously suppressed from the chart.     Calcium 04/27/2022 9.2  8.5 - 10.1 mg/dL Final    This result was previously suppressed from the chart.     Glucose 04/27/2022 97  70 - 99 mg/dL Final    This result was previously suppressed from the chart.     Alkaline Phosphatase 04/27/2022 67  40 - 150 U/L Final    This result was previously suppressed from the chart.     AST 04/27/2022 12  0 - 45 U/L Final    This result was previously suppressed from the chart.     ALT 04/27/2022 19  0 - 50 U/L Final    This result was previously suppressed from the chart.     Protein Total 04/27/2022 7.5  6.8 - 8.8 g/dL Final    This result was previously suppressed from the chart.     Albumin 04/27/2022 4.3  3.4 - 5.0 g/dL Final    This result was previously suppressed from the chart.     Bilirubin Total 04/27/2022 2.5 (A) 0.2 - 1.3 mg/dL Final    This result was previously suppressed from the chart.     GFR Estimate 04/27/2022 >90  >60 mL/min/1.73m2 Final    Comment: Effective December 21, 2021 eGFRcr in adults is calculated using the 2021 CKD-EPI creatinine equation which includes age and  gender (Carrie ac al., [facility], DOI: 10.1056/XSFAhg9692062)                             This result was previously suppressed from the chart.     WBC Count 04/27/2022 7.8  4.0 - 11.0 10e3/uL Final     RBC Count 04/27/2022 5.15  3.80 - 5.20 10e6/uL Final     Hemoglobin 04/27/2022 15.1  11.7 - 15.7 g/dL Final     Hematocrit 04/27/2022 43.4  35.0 - 47.0 % Final     MCV 04/27/2022 84  78 - 100 fL Final     MCH 04/27/2022 29.3  26.5 - 33.0 pg Final     MCHC 04/27/2022 34.8  31.5 - 36.5 g/dL Final     RDW 04/27/2022 14.2  10.0 - 15.0 % Final     Platelet Count 04/27/2022 287  150 - 450 10e3/uL Final     Color Urine 04/27/2022 Yellow  Colorless, Straw, Light Yellow, Yellow Final     Appearance Urine 04/27/2022 Clear  Clear Final     Glucose Urine 04/27/2022 Negative  Negative mg/dL Final     Bilirubin Urine 04/27/2022 Small (A) Negative Final     Ketones Urine 04/27/2022 >=160 (A) Negative mg/dL Final     Specific Gravity Urine 04/27/2022 >=1.030  1.003 - 1.035 Final     Blood Urine 04/27/2022 Negative  Negative Final     pH Urine 04/27/2022 5.5  5.0 - 7.0 Final     Protein Albumin Urine 04/27/2022 Negative  Negative mg/dL Final     Urobilinogen Urine 04/27/2022 0.2  0.2, 1.0 E.U./dL Final     Nitrite Urine 04/27/2022 Negative  Negative Final     Leukocyte Esterase Urine 04/27/2022 Negative  Negative Final           Narrative & Impression   EXAM: XR CHEST PORT 1 VIEW  LOCATION: [facility]  DATE/TIME: 3/15/2022 7:48 PM     INDICATION: chest pain  COMPARISON: 01/23/2022                                                                      IMPRESSION: No focal infiltrate, pleural effusion or pneumothorax. The cardiac and mediastinal silhouettes are normal.        EXAM: CT CHEST PULMONARY EMBOLISM W CONTRAST  LOCATION: [facility]  DATE/TIME: 1/23/2022 6:13 PM     INDICATION: Chest pain. Shortness of breath.  COMPARISON: Chest CT performed 11/26/2017.  TECHNIQUE: CT  chest pulmonary angiogram during arterial phase injection of IV contrast. Multiplanar reformats and MIP reconstructions were performed. Dose reduction techniques were used.   CONTRAST: 75 mL Isovue 370.      FINDINGS:  ANGIOGRAM CHEST: Pulmonary arteries are normal caliber and negative for pulmonary emboli. The thoracic aorta is not well opacified, however there is no evidence for thoracic aortic aneurysm. No CT evidence of right heart strain.     LUNGS AND PLEURA: Small bilateral pleural effusions. The lungs are clear. No lung masses or consolidations are seen.     MEDIASTINUM/AXILLAE: No enlarged lymph nodes are identified in the chest. No pericardial effusion. The left thyroid lobe is not seen, and is likely surgically absent.     CORONARY ARTERY CALCIFICATION: None.     UPPER ABDOMEN: Cholecystectomy. Limited views of the upper abdomen are otherwise unremarkable.     MUSCULOSKELETAL: Unremarkable.                                                                      IMPRESSION:  1.  No evidence for pulmonary embolism.  2.  Small bilateral pleural effusions, new since the previous exam.        Video-Visit Details    Video Start Time: 10:33 AM    Type of service:  Video Visit    Video End Time:11:13 AM    Originating Location (pt. Location): Home    Distant Location (provider location):  [facility] PHYSICAL MEDICINE AND REHABILITATION CLINIC [place]     Platform used for Video Visit: [employer]

## 2022-05-09 NOTE — LETTER
5/9/2022       RE: Isaura Gray  [id number] [facility] [address]     Dear Colleague,    Thank you for referring your patient, Isaura Gray, to the [facility] PHYSICAL MEDICINE AND REHABILITATION CLINIC [place] at [facility]. Please see a copy of my visit note below.      Assessment/ Impression:   1. Post covid-19 condition, unspecified  Patient with increased GI issues since COVID-19.  Now with intractable nausea and vomiting.  See below.     2. Nausea and vomiting, intractability of vomiting not specified, unspecified vomiting type  Patient has had significant GI disturbance since her COVID infection in January. She has had intractable vomiting that is unresponsive to several medications and has lost 15lbs in a month. Will refer to GI for further evaluation.  She may require phenergan suppository if not responsive to current medication. Will check Vitamin D and B12 due to concern for malabsorption.  - Vitamin D Deficiency; Future  - Vitamin B12; Future   - Adult Gastro Ref - Consult Only; Future    3. Malabsorption due to intolerance, not elsewhere classified   See above.   - Vitamin B12; Future   - Vitamin D Deficiency; Future    4. Post-COVID chronic dyspnea  Patient still with shortness of breath walking to kitchen or garage.  With history of PE will check CT chest to evaluate for fibrosis and also for PE. Will also check PFT and 6 minute walk test to evaluate for oxygen drop.  Due to continued dyspnea will refer to Pulmonary and also start pulmonary rehab  - Adult Post Covid Clinic Referral  - General PFT Lab (Please always keep checked); Future  - Pulmonary Function Test; Future  - 6 minute walk test; Future  - Adult Pulmonary Medicine Referral; Future  - Pulmonary Rehab Referral; Future  - CT Chest Pulmonary embolism w Contrast; Future    5. Post-COVID chronic fatigue  Patient with chronic fatigue which may be due to sleep dysfunction.  Will refer to PT/OT for energy conservation program.   - Physical Therapy Referral; Future  - Occupational Therapy Referral; Future  - Vitamin B12; Future   - Vitamin D Deficiency; Future    6. Sleep disturbance  Patient is already working with sleep medicine. Encouraged patient to reach out to sleep psychologist as recommended.     7. Mitral valve disorder/ Tachycardia/ Bradycardia  History of MVP and no updated ECHO with patient having chest pain.  Will update ECHO and also refer to Cardiology for her irregular heart rate. Advised to track heart rate to better evaluate for fluctuations.   - Echocardiogram Complete; Future  - Cardiology referral; Future      Plan:  1. I reviewed present knowledge on long-Covid.  Education was provided and question were answered.  2. Orders as above.  3. I will follow up with Isaura Gray in 3 months. I will review progress and consider need for any other therapeutic interventions. If there are any questions and/or concerns she will call the clinic.      On day of encounter time spent in chart review and with patient in consultation, exam, education and coordination of care, excluding procedures: 70 minutes       Kyung Valderrama PA-C  [facility] PHYSICAL MEDICINE AND REHABILITATION CLINIC [place]    Subjective   This 36 year old female presents to the [facility] Rehabilitation Medicine Post-COVID clinic as a new consult to evaluate continuing symptoms after COVID infection initially diagnosed January 19, 2022.  Isaura Gray presented to their primary care physician on 1/23/2022  complaining of Sore throat, shortness of breath, cough, fever, chills, headache, generalized aches and pains, diarrhea, nausea, brain fog, fatigue and weakness. Treatment was symptomatic.  Patient course was complicated by chest pain and nausea with weight loss.  Patient was seen the ER several times for tachycardia and bradycardia.Continuing symptoms include weakness,  difficulty sleeping, fatigue, shortness of breath, diarrhea and nausea. Patient has lost 15 pounds in a month without trying.  Patient  States she has tried multiple medications and currently her medication is helping her nausea.  She also has encountered chest pain which she describes as tightness. EKG was normal however she was diagnosed with bradycardia during COVID which is a new diagnosis.   She also is struggling with fatigue and shortness of breath. She has seen sleep medicine to evaluate her daytime somnolence.  She will get short of breath with walking to the kitchen.  She is struggling managing daily activities due to her chronic fatigue.  Past medical history is significant for pulmonary embolism and DVT.  The patient was vaccinated against COVID x1 with [employer]..  Previous activity was full time work. Isaura SANTANA Gray feels they are functioning at 20% of pre COVID level.    History of COVID-19 infection: January 19th, 2022  Date of first symptoms: 1/12/2022  Diagnosis: PCR  Hospitalization: ER 1/23/2022  Treatment: Symptomatic, Decadron 6mg x5 days  Current Symptoms: See subjective     Goals of Care: increase energy, decrease shortness of breath, decrease coughing, decrease anxiety, decrease depression, improve thinking, improve quality of life and return to work      PHQ Assesment Total Score(s) 5/7/2022   PHQ-2 Score 2   Some recent data might be hidden     AVERY-7 Results 5/7/2022   AVERY 7 TOTAL SCORE 6 (mild anxiety)   Some recent data might be hidden     PTSD Screen Score 5/7/2022   Have you ever experienced this kind of event? Yes   PTSD Screen (Score of 3 or more suggests positive screen) 5   Some recent data might be hidden     PROMIS-29 5/7/2022   PROMIS Physical Function T-Score 30.7 (moderate dysfunction)   PROMIS Anxiety T-Score 77.9 (severe)   PROMIS Depression T-Score 65.7 (moderate)   PROMIS Fatigue T-Score 75.8 (severe)   PROMIS Sleep Disturbance T-Score 66 (moderate)   PROMIS  "Ability to Participate in Social Roles & Activities T-Score 29 (severe dysfunction)   PROMIS Pain Interference T-Score 71.6 (severe)   PROMIS Pain Intensity 7       Past Medical History:   Diagnosis Date     ASCUS of cervix with negative high risk HPV 2016    [facility]     Cancer (H)      Depressive disorder      Follicular cancer of thyroid (H)      Hyperprolactinemia (H)      Mitral valve prolapse      PE (pulmonary thromboembolism) (H)      Pituitary tumor      Schizophrenia (H)     reports spontaneous remission during last pregnancy     Thyroid disease        Past Surgical History:   Procedure Laterality Date     APPENDECTOMY       ENT SURGERY      Partial thyroidectomy     LAPAROSCOPIC CHOLECYSTECTOMY N/A 3/4/2021    Procedure: Laparoscopic cholecystectomy;  Surgeon: Osmany Smith MD;  Location: [facility] OR       Family History   Problem Relation Age of Onset     No Known Problems Mother      Hypertension Father      Cerebrovascular Disease Father 56        Passed away from double brain stem clot     Mental Illness Father      Depression Father      Anxiety Disorder Father      Alcoholism Father      Schizophrenia Father      Asthma Brother      Cancer Maternal Grandfather         lung     No Known Problems Paternal Grandmother      Schizophrenia Paternal Grandfather      Suicide Paternal Grandfather 53     Autism Spectrum Disorder Son      Kidney Disease Son         \"has a third kidney\"     No Known Problems Daughter        Social History     Tobacco Use     Smoking status: Former Smoker     Packs/day: 0.50     Years: 5.00     Pack years: 2.50     Types: Cigarettes, Vaping Device     Quit date: 2016     Years since quittin.7     Smokeless tobacco: Never Used   Vaping Use     Vaping Use: Never used   Substance Use Topics     Alcohol use: No     Comment: Has an issue with her liver (not alcohol related)     Drug use: No         Current Outpatient Medications:      albuterol (PROAIR HFA/PROVENTIL " HFA/VENTOLIN HFA) 108 (90 Base) MCG/ACT inhaler, Inhale 2 puffs into the lungs in the morning and 2 puffs at noon and 2 puffs in the evening and 2 puffs before bedtime., Disp: 18 g, Rfl: 0     calcium carbonate (OS-DEBI) 1500 (600 Ca) MG tablet, Take 1 tablet (600 mg) by mouth 2 times daily (with meals), Disp: 180 tablet, Rfl: 1     cholestyramine (QUESTRAN) 4 GM/DOSE powder, Take 2 g by mouth 2 times daily (with meals) Adjust dose and frequency to keep bowels more consistent., Disp: 400 g, Rfl: 1     levonorgestrel (MIRENA) 20 MCG/24HR IUD, 1 each (20 mcg) by Intrauterine route once, Disp: , Rfl:      prochlorperazine (COMPAZINE) 10 MG tablet, Take 1 tablet (10 mg) by mouth every 6 hours as needed for nausea or vomiting, Disp: 20 tablet, Rfl: 1     zolpidem (AMBIEN) 5 MG tablet, Take 1 tablet (5 mg) by mouth nightly as needed for sleep, Disp: 30 tablet, Rfl: 1      Review of Systems   Constitutional: Positive for chills and fatigue. Negative for fever.   HENT: Positive for mouth sores and sinus pain. Negative for ear discharge, ear pain, hearing loss, nosebleeds, sore throat, tinnitus and trouble swallowing.    Eyes: Negative for pain and redness.   Respiratory: Positive for shortness of breath. Negative for cough and wheezing.    Cardiovascular: Positive for chest pain and palpitations.   Gastrointestinal: Positive for abdominal pain, diarrhea, heartburn, nausea, rectal pain and vomiting. Negative for constipation.   Endocrine: Positive for polydipsia and polyphagia.   Breasts:  Positive for discharge. Negative for breast mass.   Genitourinary: Positive for difficulty urinating and urgency. Negative for dysuria, flank pain and hematuria.   Musculoskeletal: Positive for myalgias. Negative for arthralgias, back pain, joint swelling and neck pain.   Skin: Negative for rash.   Neurological: Positive for dizziness, weakness, light-headedness and headaches. Negative for tremors, seizures and numbness.    Psychiatric/Behavioral: Positive for decreased concentration and hallucinations. The patient is nervous/anxious.           Objective    Vitals - Patient Reported  Weight (Patient Reported): 79.8 kg (176 lb)  Height (Patient Reported): 182.9 cm (6')  BMI (Based on Pt Reported Ht/Wt): 23.87  Pain Score: Severe Pain (7)  Pain Loc: Other - see comment (stomach)      Physical Exam   GENERAL: Healthy, alert and no distress  EYES: Eyes grossly normal to inspection.  No discharge or erythema, or obvious scleral/conjunctival abnormalities.  HENT: Normal cephalic/atraumatic.  External ears, nose and mouth without ulcers or lesions.  No nasal drainage visible.  NECK: No asymmetry, visible masses or scars  RESP: No audible wheeze, cough, or visible cyanosis.  No visible retractions or increased work of breathing.    MS: No gross musculoskeletal defects noted.  Normal range of motion.  No visible edema.  SKIN: Visible skin clear. No significant rash, abnormal pigmentation or lesions.  NEURO: Cranial nerves grossly intact.  Mentation and speech appropriate for age.  PSYCH: Mentation appears normal, affect normal/bright, judgement and insight intact, normal speech and appearance well-groomed.      CRP Inflammation   Date Value Ref Range Status   01/23/2022 <2.9 0.0 - 8.0 mg/L Final   04/21/2021 <2.9 0.0 - 8.0 mg/L Final      Sed Rate   Date Value Ref Range Status   04/21/2021 5 0 - 20 mm/h Final     Erythrocyte Sedimentation Rate   Date Value Ref Range Status   01/23/2022 8 0 - 20 mm/hr Final      Last Renal Panel:  Sodium   Date Value Ref Range Status   04/27/2022 138 133 - 144 mmol/L Final   04/21/2021 139 133 - 144 mmol/L Final     Potassium   Date Value Ref Range Status   04/27/2022 3.8 3.4 - 5.3 mmol/L Final   04/21/2021 3.6 3.4 - 5.3 mmol/L Final     Chloride   Date Value Ref Range Status   04/27/2022 108 94 - 109 mmol/L Final   04/21/2021 106 94 - 109 mmol/L Final     Carbon Dioxide   Date Value Ref Range Status    04/21/2021 26 20 - 32 mmol/L Final     Carbon Dioxide (CO2)   Date Value Ref Range Status   04/27/2022 22 20 - 32 mmol/L Final     Comment:     This result was previously suppressed from the chart.     Anion Gap   Date Value Ref Range Status   04/27/2022 8 3 - 14 mmol/L Final     Comment:     This result was previously suppressed from the chart.   04/21/2021 7 3 - 14 mmol/L Final     Glucose   Date Value Ref Range Status   04/27/2022 97 70 - 99 mg/dL Final     Comment:     This result was previously suppressed from the chart.   04/21/2021 92 70 - 99 mg/dL Final     Urea Nitrogen   Date Value Ref Range Status   04/27/2022 9 7 - 30 mg/dL Final     Comment:     This result was previously suppressed from the chart.   04/21/2021 13 7 - 30 mg/dL Final     Creatinine   Date Value Ref Range Status   04/27/2022 0.71 0.52 - 1.04 mg/dL Final     Comment:     This result was previously suppressed from the chart.   04/21/2021 0.87 0.52 - 1.04 mg/dL Final     GFR Estimate   Date Value Ref Range Status   04/27/2022 >90 >60 mL/min/1.73m2 Final     Comment:     Effective December 21, 2021 eGFRcr in adults is calculated using the 2021 CKD-EPI creatinine equation which includes age and gender (Carrie et al., NEJ, DOI: 10.1056/EBUSbi5962034)  This result was previously suppressed from the chart.   04/21/2021 88 >60 mL/min/[1.73_m2] Final     Comment:     Non  GFR Calc  Starting 12/18/2018, serum creatinine based estimated GFR (eGFR) will be   calculated using the Chronic Kidney Disease Epidemiology Collaboration   (CKD-EPI) equation.       Calcium   Date Value Ref Range Status   04/27/2022 9.2 8.5 - 10.1 mg/dL Final     Comment:     This result was previously suppressed from the chart.   04/21/2021 9.0 8.5 - 10.1 mg/dL Final     Albumin   Date Value Ref Range Status   04/27/2022 4.3 3.4 - 5.0 g/dL Final     Comment:     This result was previously suppressed from the chart.   04/21/2021 4.2 3.4 - 5.0 g/dL Final       Lab Results   Component Value Date    WBC 7.8 04/27/2022    WBC 6.7 04/21/2021     Lab Results   Component Value Date    RBC 5.15 04/27/2022    RBC 4.90 04/21/2021     Lab Results   Component Value Date    HGB 15.1 04/27/2022    HGB 14.8 04/21/2021     Lab Results   Component Value Date    HCT 43.4 04/27/2022    HCT 42.9 04/21/2021     No components found for: MCT  Lab Results   Component Value Date    MCV 84 04/27/2022    MCV 88 04/21/2021     Lab Results   Component Value Date    MCH 29.3 04/27/2022    MCH 30.2 04/21/2021     Lab Results   Component Value Date    MCHC 34.8 04/27/2022    MCHC 34.5 04/21/2021     Lab Results   Component Value Date    RDW 14.2 04/27/2022    RDW 13.8 04/21/2021     Lab Results   Component Value Date     04/27/2022     04/21/2021      Lab Results   Component Value Date    A1C 5.0 10/31/2021      TSH   Date Value Ref Range Status   04/27/2022 1.20 0.40 - 4.00 mU/L Final   04/21/2021 1.80 0.40 - 4.00 mU/L Final      Lab Results   Component Value Date    VITDT 15 (L) 02/18/2020      Recent Labs   Lab Test 01/23/22  1756 12/21/21  0755   MAG 2.0 2.3     Last Comprehensive Metabolic Panel:  Sodium   Date Value Ref Range Status   04/27/2022 138 133 - 144 mmol/L Final   04/21/2021 139 133 - 144 mmol/L Final     Potassium   Date Value Ref Range Status   04/27/2022 3.8 3.4 - 5.3 mmol/L Final   04/21/2021 3.6 3.4 - 5.3 mmol/L Final     Chloride   Date Value Ref Range Status   04/27/2022 108 94 - 109 mmol/L Final   04/21/2021 106 94 - 109 mmol/L Final     Carbon Dioxide   Date Value Ref Range Status   04/21/2021 26 20 - 32 mmol/L Final     Carbon Dioxide (CO2)   Date Value Ref Range Status   04/27/2022 22 20 - 32 mmol/L Final     Comment:     This result was previously suppressed from the chart.     Anion Gap   Date Value Ref Range Status   04/27/2022 8 3 - 14 mmol/L Final     Comment:     This result was previously suppressed from the chart.   04/21/2021 7 3 - 14 mmol/L Final      Glucose   Date Value Ref Range Status   04/27/2022 97 70 - 99 mg/dL Final     Comment:     This result was previously suppressed from the chart.   04/21/2021 92 70 - 99 mg/dL Final     Urea Nitrogen   Date Value Ref Range Status   04/27/2022 9 7 - 30 mg/dL Final     Comment:     This result was previously suppressed from the chart.   04/21/2021 13 7 - 30 mg/dL Final     Creatinine   Date Value Ref Range Status   04/27/2022 0.71 0.52 - 1.04 mg/dL Final     Comment:     This result was previously suppressed from the chart.   04/21/2021 0.87 0.52 - 1.04 mg/dL Final     GFR Estimate   Date Value Ref Range Status   04/27/2022 >90 >60 mL/min/1.73m2 Final     Comment:     Effective December 21, 2021 eGFRcr in adults is calculated using the 2021 CKD-EPI creatinine equation which includes age and gender (Carrie ac al., NE, DOI: 10.1056/FZOIvm1647226)  This result was previously suppressed from the chart.   04/21/2021 88 >60 mL/min/[1.73_m2] Final     Comment:     Non  GFR Calc  Starting 12/18/2018, serum creatinine based estimated GFR (eGFR) will be   calculated using the Chronic Kidney Disease Epidemiology Collaboration   (CKD-EPI) equation.       Calcium   Date Value Ref Range Status   04/27/2022 9.2 8.5 - 10.1 mg/dL Final     Comment:     This result was previously suppressed from the chart.   04/21/2021 9.0 8.5 - 10.1 mg/dL Final     Bilirubin Total   Date Value Ref Range Status   04/27/2022 2.5 (H) 0.2 - 1.3 mg/dL Final     Comment:     This result was previously suppressed from the chart.   04/21/2021 1.8 (H) 0.2 - 1.3 mg/dL Final     Alkaline Phosphatase   Date Value Ref Range Status   04/27/2022 67 40 - 150 U/L Final     Comment:     This result was previously suppressed from the chart.   04/21/2021 75 40 - 150 U/L Final     ALT   Date Value Ref Range Status   04/27/2022 19 0 - 50 U/L Final     Comment:     This result was previously suppressed from the chart.   04/21/2021 17 0 - 50 U/L Final      AST   Date Value Ref Range Status   04/27/2022 12 0 - 45 U/L Final     Comment:     This result was previously suppressed from the chart.   04/21/2021 5 0 - 45 U/L Final     Most Recent D-dimer:  Recent Labs   Lab Test 03/15/22  1918   DD 0.33       Office Visit on 04/27/2022   Component Date Value Ref Range Status     TSH 04/27/2022 1.20  0.40 - 4.00 mU/L Final     Sodium 04/27/2022 138  133 - 144 mmol/L Final     Potassium 04/27/2022 3.8  3.4 - 5.3 mmol/L Final     Chloride 04/27/2022 108  94 - 109 mmol/L Final     Carbon Dioxide (CO2) 04/27/2022 22  20 - 32 mmol/L Final    This result was previously suppressed from the chart.     Anion Gap 04/27/2022 8  3 - 14 mmol/L Final    This result was previously suppressed from the chart.     Urea Nitrogen 04/27/2022 9  7 - 30 mg/dL Final    This result was previously suppressed from the chart.     Creatinine 04/27/2022 0.71  0.52 - 1.04 mg/dL Final    This result was previously suppressed from the chart.     Calcium 04/27/2022 9.2  8.5 - 10.1 mg/dL Final    This result was previously suppressed from the chart.     Glucose 04/27/2022 97  70 - 99 mg/dL Final    This result was previously suppressed from the chart.     Alkaline Phosphatase 04/27/2022 67  40 - 150 U/L Final    This result was previously suppressed from the chart.     AST 04/27/2022 12  0 - 45 U/L Final    This result was previously suppressed from the chart.     ALT 04/27/2022 19  0 - 50 U/L Final    This result was previously suppressed from the chart.     Protein Total 04/27/2022 7.5  6.8 - 8.8 g/dL Final    This result was previously suppressed from the chart.     Albumin 04/27/2022 4.3  3.4 - 5.0 g/dL Final    This result was previously suppressed from the chart.     Bilirubin Total 04/27/2022 2.5 (A) 0.2 - 1.3 mg/dL Final    This result was previously suppressed from the chart.     GFR Estimate 04/27/2022 >90  >60 mL/min/1.73m2 Final    Comment: Effective December 21, 2021 eGFRcr in adults is  calculated using the 2021 CKD-EPI creatinine equation which includes age and gender (Carrie ac al., [facility], DOI: 10.1056/CTFKev9231980)                             This result was previously suppressed from the chart.     WBC Count 04/27/2022 7.8  4.0 - 11.0 10e3/uL Final     RBC Count 04/27/2022 5.15  3.80 - 5.20 10e6/uL Final     Hemoglobin 04/27/2022 15.1  11.7 - 15.7 g/dL Final     Hematocrit 04/27/2022 43.4  35.0 - 47.0 % Final     MCV 04/27/2022 84  78 - 100 fL Final     MCH 04/27/2022 29.3  26.5 - 33.0 pg Final     MCHC 04/27/2022 34.8  31.5 - 36.5 g/dL Final     RDW 04/27/2022 14.2  10.0 - 15.0 % Final     Platelet Count 04/27/2022 287  150 - 450 10e3/uL Final     Color Urine 04/27/2022 Yellow  Colorless, Straw, Light Yellow, Yellow Final     Appearance Urine 04/27/2022 Clear  Clear Final     Glucose Urine 04/27/2022 Negative  Negative mg/dL Final     Bilirubin Urine 04/27/2022 Small (A) Negative Final     Ketones Urine 04/27/2022 >=160 (A) Negative mg/dL Final     Specific Gravity Urine 04/27/2022 >=1.030  1.003 - 1.035 Final     Blood Urine 04/27/2022 Negative  Negative Final     pH Urine 04/27/2022 5.5  5.0 - 7.0 Final     Protein Albumin Urine 04/27/2022 Negative  Negative mg/dL Final     Urobilinogen Urine 04/27/2022 0.2  0.2, 1.0 E.U./dL Final     Nitrite Urine 04/27/2022 Negative  Negative Final     Leukocyte Esterase Urine 04/27/2022 Negative  Negative Final           Narrative & Impression   EXAM: XR CHEST PORT 1 VIEW  LOCATION: [facility]  DATE/TIME: 3/15/2022 7:48 PM     INDICATION: chest pain  COMPARISON: 01/23/2022                                                                      IMPRESSION: No focal infiltrate, pleural effusion or pneumothorax. The cardiac and mediastinal silhouettes are normal.        EXAM: CT CHEST PULMONARY EMBOLISM W CONTRAST  LOCATION: [facility]  DATE/TIME: 1/23/2022 6:13 PM     INDICATION: Chest pain.  Shortness of breath.  COMPARISON: Chest CT performed 11/26/2017.  TECHNIQUE: CT chest pulmonary angiogram during arterial phase injection of IV contrast. Multiplanar reformats and MIP reconstructions were performed. Dose reduction techniques were used.   CONTRAST: 75 mL Isovue 370.      FINDINGS:  ANGIOGRAM CHEST: Pulmonary arteries are normal caliber and negative for pulmonary emboli. The thoracic aorta is not well opacified, however there is no evidence for thoracic aortic aneurysm. No CT evidence of right heart strain.     LUNGS AND PLEURA: Small bilateral pleural effusions. The lungs are clear. No lung masses or consolidations are seen.     MEDIASTINUM/AXILLAE: No enlarged lymph nodes are identified in the chest. No pericardial effusion. The left thyroid lobe is not seen, and is likely surgically absent.     CORONARY ARTERY CALCIFICATION: None.     UPPER ABDOMEN: Cholecystectomy. Limited views of the upper abdomen are otherwise unremarkable.     MUSCULOSKELETAL: Unremarkable.                                                                      IMPRESSION:  1.  No evidence for pulmonary embolism.  2.  Small bilateral pleural effusions, new since the previous exam.      Sincerely,    Kyung Valderrama PA-C

## 2022-05-10 ENCOUNTER — TELEPHONE (OUTPATIENT)
Dept: GASTROENTEROLOGY | Facility: CLINIC | Age: 37
End: 2022-05-10
Payer: MEDICARE

## 2022-05-10 NOTE — TELEPHONE ENCOUNTER
Patients care is being managed by her PCP at the moment.   Patient is scheduled with Dr. Qureshi in September.  Antonella Murphy RN on 5/10/2022 at 1:31 PM

## 2022-05-10 NOTE — TELEPHONE ENCOUNTER
[facility] Call Center    Phone Message    May a detailed message be left on voicemail: yes     Reason for Call: Other: Pt has referral to GI, Pt is having weight loss - she's lost 20lbs in a month, please review records and call Pt if an earlier appointment is needed, thanks     Action Taken: Other: Peds    Travel Screening: Not Applicable

## 2022-05-11 ENCOUNTER — TELEPHONE (OUTPATIENT)
Dept: PULMONOLOGY | Facility: CLINIC | Age: 37
End: 2022-05-11
Payer: MEDICARE

## 2022-05-11 NOTE — TELEPHONE ENCOUNTER
RECORDS RECEIVED FROM: internal    DATE RECEIVED: 8/2/22    NOTES STATUS DETAILS   OFFICE NOTE from referring provider internal  Kyung Valderrama PA-C   OFFICE NOTE from other specialist     DISCHARGE SUMMARY from hospital     DISCHARGE REPORT from the ER internal  3.15.22 Higinio   1.23.22 Darlyn    MEDICATION LIST internal     IMAGING  (NEED IMAGES AND REPORTS)     CT SCAN internal  Scheduled 5.24.22 1.23.22   CHEST XRAY (CXR)     TESTS     PULMONARY FUNCTION TESTING (PFT) internal  Scheduled 8/2/22

## 2022-05-11 NOTE — TELEPHONE ENCOUNTER
Call to schedule cxr no answer lvm to contact call center at [phone] to schedule for new pt apptmnt 8/2/22.

## 2022-05-13 ENCOUNTER — HOSPITAL ENCOUNTER (OUTPATIENT)
Dept: PHYSICAL THERAPY | Facility: CLINIC | Age: 37
Setting detail: THERAPIES SERIES
Discharge: HOME | End: 2022-05-13
Attending: PHYSICIAN ASSISTANT
Payer: MEDICARE

## 2022-05-13 DIAGNOSIS — U09.9 POST-COVID CHRONIC FATIGUE: ICD-10-CM

## 2022-05-13 DIAGNOSIS — G93.32 POST-COVID CHRONIC FATIGUE: ICD-10-CM

## 2022-05-13 PROCEDURE — 97163 PT EVAL HIGH COMPLEX 45 MIN: CPT | Mod: GP | Performed by: PHYSICAL THERAPIST

## 2022-05-13 NOTE — PROGRESS NOTES
[facility]                                                                                   OUTPATIENT PHYSICAL THERAPY FUNCTIONAL EVALUATION  PLAN OF TREATMENT FOR OUTPATIENT REHABILITATION  (COMPLETE FOR INITIAL CLAIMS ONLY)  Patient's Last Name, First Name, M.I.  YOB: 1985  Isaura Gray     Provider's Name   [facility]   Medical Record No.  [id number]     Start of Care Date:  05/13/22   Onset Date:  01/19/22   Type:     _X__PT   ____OT  ____SLP Medical Diagnosis:   Post-COVID dyspnea.     PT Diagnosis:  Generalized weakness Visits from SOC:  1                              __________________________________________________________________________________  Plan of Treatment/Functional Goals:  balance training, gait training, neuromuscular re-education, ROM, strengthening, stretching, transfer training, manual therapy           GOALS  UE strength  Pt will demonstrate 5/5 on all UE strength MMT in order to demonstrate necessary strength for all ADL's.  07/08/22    LE strength  Pt will demonstrate 5/5 on all LE strength MMT in order to demonstrate necessary strength for household and work duties.  07/08/22    Walk test  Pt to complete 2 minute or 6 minute walk test to establish long term goals for activity tolerance.  07/08/22          Therapy Frequency:  2 times/Week   Predicted Duration of Therapy Intervention:  8 weeks    Pavel Thompson, PT                                    I CERTIFY THE NEED FOR THESE SERVICES FURNISHED UNDER        THIS PLAN OF TREATMENT AND WHILE UNDER MY CARE     (Physician co-signature of this document indicates review and certification of the therapy plan).              Certification Date From:    5/13/22  Certification Date To:   7/8/22    Referring Provider:  Kyung Valderrama PA-C    Initial Assessment  See Epic Evaluation- Start  of Care Date: 05/13/22

## 2022-05-13 NOTE — PROGRESS NOTES
05/13/22 1400   General Information   Start of Care Date 05/13/22   Referring Physician Kyung Valderrama PA-C   Orders Evaluate and Treat as Indicated   Order Date 05/09/22   Medical Diagnosis Post COVID chronic dyspnea.   Onset of illness/injury or Date of Surgery 01/19/22   Precautions/Limitations no known precautions/limitations   Surgical/Medical history reviewed Yes   Pertinent history of current problem (include personal factors and/or comorbidities that impact the POC) Pt was diagnosed with COVID on January 19, 2022.  Since then patient has had ongoing issues with chronic fatigue, shortness of breath, and somnolence.  Pt reports her heart rate will hike up quick with walking across the room, patient has tachycardia at baseline due to mitral valve prolapse but this has been amplified since COVID.  PMH: History of cancer, depression, PE, schizophrenia, thyroid disease, mitral valve prolapse, PTSD, Bipolar, anxiety.   Prior level of functional mobility Ambulation   Ambulation independent   Prior level of function comment independent   Current Community Support Family/friend caregiver   Patient role/Employment history Employed  ( for [employer])   Living environment House/[facility]   Home/Community Accessibility Comments bedrooms are upstairs.   Patient/Family Goals Statement Improve activity tolerance   Fall Risk Screen   Fall screen completed by PT   Have you fallen 2 or more times in the past year? No   Have you fallen and had an injury in the past year? No   Abuse Screen (yes response referral indicated)   Feels Unsafe at Home or Work/School no   Feels Threatened by Someone no   Does Anyone Try to Keep You From Having Contact with Others or Doing Things Outside Your Home? no   Physical Signs of Abuse Present no   Patient needs abuse support services and resources No   Pain   Patient currently in pain No   Vitals Signs   Heart Rate   (Fluctuating between )   SpO2 97   Blood Pressure  121/86   Cognitive Status Examination   Orientation orientation to person, place and time   Level of Consciousness alert   Follows Commands and Answers Questions 100% of the time   Observation   Observation Frequent dyspnea after exertion.   Integumentary   Integumentary No deficits were identified   Posture   Posture Forward head position;Protracted shoulders   Range of Motion (ROM)   ROM Comment WFL   Strength   Strength Comments UE's: shoulder flexion, abduction, biceps, and ER: 4/5, all others 5/5.  LE's globally 4/5.   Gait   Gait Comments WNL for gait pattern but very fatigued after walking from waiting room   Balance   Balance Comments WFL.   Sensory Examination   Sensory Perception no deficits were identified   Coordination   Coordination no deficits were identified   Muscle Tone   Muscle Tone no deficits were identified   Planned Therapy Interventions   Planned Therapy Interventions balance training;gait training;neuromuscular re-education;ROM;strengthening;stretching;transfer training;manual therapy   Clinical Impression   Criteria for Skilled Therapeutic Interventions Met yes, treatment indicated   PT Diagnosis Generalized weakness   Influenced by the following impairments weakness, impaired activity tolerance.   Functional limitations due to impairments Walking, standing, lifting.   Clinical Presentation Unstable/Unpredictable   Clinical Presentation Rationale Clinical judgement   Clinical Decision Making (Complexity) High complexity   Therapy Frequency 2 times/Week   Predicted Duration of Therapy Intervention (days/wks) 8 weeks   Risk & Benefits of therapy have been explained Yes   Patient, Family & other staff in agreement with plan of care Yes   Clinical Impression Comments Pt is a 36 y.o. female who presented to PT with symptoms of dyspnea, fatigue, weakness, and reduced activity tolerance secondary to Post-COVID status.  Pt will benefit from PT to improve strength and activity tolerance, discussed  following up after pending EKG to determine cardiac concerns.   Education Assessment   Preferred Learning Style Demonstration   Barriers to Learning No barriers   GOALS   PT Eval Goals 1;2;3   Goal 1   Goal Identifier UE strength   Goal Description Pt will demonstrate 5/5 on all UE strength MMT in order to demonstrate necessary strength for all ADL's.   Target Date 07/08/22   Goal 2   Goal Identifier LE strength   Goal Description Pt will demonstrate 5/5 on all LE strength MMT in order to demonstrate necessary strength for household and work duties.   Target Date 07/08/22   Goal 3   Goal Identifier Walk test   Goal Description Pt to complete 2 minute or 6 minute walk test to establish long term goals for activity tolerance.   Target Date 07/08/22   Total Evaluation Time   PT Eval, High Complexity Minutes (09085) 35

## 2022-05-16 ENCOUNTER — HEALTH MAINTENANCE LETTER (OUTPATIENT)
Age: 37
End: 2022-05-16

## 2022-05-16 ENCOUNTER — DOCUMENTATION ONLY (OUTPATIENT)
Dept: PHYSICAL MEDICINE AND REHAB | Facility: CLINIC | Age: 37
End: 2022-05-16
Payer: MEDICARE

## 2022-05-16 NOTE — PROGRESS NOTES
Initial Visit Date: 5/9/22   Provider:  [facility]   Followup Requested STATUS DETAILS   Original Provider     LAB/Imaging Labs, Chest CT, Echocardiogram    Referrals STATUS    Pulmonary Rehab Canceled Max attempts reached to contact the patient   OT Canceled Max attempts reached to contact the patient   PT Scheduled 5/13/22    Cardiology Scheduled 5/17/22    Gastro Scheduled 9/16/22    Adult Pulmonary Medicine Schedule 8/2/22      Follow-up in Post Covid Clinic in ~ 3 months.    Zachariah Hood

## 2022-05-20 ENCOUNTER — TELEPHONE (OUTPATIENT)
Dept: PULMONOLOGY | Facility: CLINIC | Age: 37
End: 2022-05-20
Payer: MEDICARE

## 2022-05-20 NOTE — TELEPHONE ENCOUNTER
Pt called back and rescheduled appointment and added CXR to new appointment details confirmed witg pt

## 2022-05-24 ENCOUNTER — ANCILLARY PROCEDURE (OUTPATIENT)
Dept: CARDIOLOGY | Facility: CLINIC | Age: 37
End: 2022-05-24
Attending: PHYSICIAN ASSISTANT
Payer: MEDICARE

## 2022-05-24 ENCOUNTER — ANCILLARY PROCEDURE (OUTPATIENT)
Dept: CT IMAGING | Facility: CLINIC | Age: 37
End: 2022-05-24
Attending: PHYSICIAN ASSISTANT
Payer: MEDICARE

## 2022-05-24 DIAGNOSIS — R06.09 POST-COVID CHRONIC DYSPNEA: ICD-10-CM

## 2022-05-24 DIAGNOSIS — U09.9 POST-COVID CHRONIC DYSPNEA: ICD-10-CM

## 2022-05-24 DIAGNOSIS — R00.0 TACHYCARDIA: ICD-10-CM

## 2022-05-24 DIAGNOSIS — R00.1 BRADYCARDIA: ICD-10-CM

## 2022-05-24 DIAGNOSIS — I05.9 MITRAL VALVE DISORDER: ICD-10-CM

## 2022-05-24 LAB — LVEF ECHO: NORMAL

## 2022-05-24 PROCEDURE — 93306 TTE W/DOPPLER COMPLETE: CPT | Performed by: INTERNAL MEDICINE

## 2022-05-24 PROCEDURE — 71275 CT ANGIOGRAPHY CHEST: CPT | Mod: MF | Performed by: RADIOLOGY

## 2022-05-24 PROCEDURE — G1004 CDSM NDSC: HCPCS | Performed by: RADIOLOGY

## 2022-05-24 RX ORDER — IOPAMIDOL 755 MG/ML
69 INJECTION, SOLUTION INTRAVASCULAR ONCE
Status: COMPLETED | OUTPATIENT
Start: 2022-05-24 | End: 2022-05-24

## 2022-05-24 RX ADMIN — IOPAMIDOL 69 ML: 755 INJECTION, SOLUTION INTRAVASCULAR at 12:40

## 2022-05-31 ENCOUNTER — PATIENT OUTREACH (OUTPATIENT)
Dept: FAMILY MEDICINE | Facility: CLINIC | Age: 37
End: 2022-05-31
Payer: MEDICARE

## 2022-05-31 NOTE — PROGRESS NOTES
Clinic Care Coordination Contact    Follow Up Progress Note      Assessment: pt reporting that she starts her new job tomorrow.  Her hope is that it is not as stressful or as many hours so she can add some day care for her kids back into the schedule.  She said she is improving on recognizing when she is feeling overwhelmed yet her task completion is not as good as before.     She is noting that she is getting 10 hours of sleep but little deep or REM sleep per her fitbit.  She will be doing the sleep study soon to get better ideas about options.     She has not scheduled her AWV due to many other appointments.     Care Gaps:    Health Maintenance Due   Topic Date Due     URINE DRUG SCREEN  Never done     ADVANCE CARE PLANNING  Never done     Pneumococcal Vaccine: Pediatrics (0 to 5 Years) and At-Risk Patients (6 to 64 Years) (1 - PCV) Never done     MEDICARE ANNUAL WELLNESS VISIT  02/13/2021     COVID-19 Vaccine (2 - Booster for William series) 07/01/2021       Care Gap Goal set: Yes    Goals addressed this encounter:    Goals Addressed                    This Visit's Progress       1. Annual Wellness visit (pt-stated)   0%      Goal Statement: I will schedule my Annual Wellness Visit.  Date Goal set: 3/8/2022  Barriers: scheduling  Strengths: desire to complete  Date to Achieve By: 9/4/22   Patient expressed understanding of goal: Yes    Action steps to achieve this goal:  1. I will call scheduling or go on [employer] to schedule my Annual Wellness Visit.  2. I will attend the appointment.             2. Functional (pt-stated)   50%      Goal Statement: I want to get the upstairs cleaned and will break it down into manageable tasks.   Date Goal set: 5/4/2021   Barriers: my mental health, two young children  Strengths: I just got an henrietta to help with identifying tasks  Date to Achieve By: 5/31/23   Patient expressed understanding of goal: Yes    Action steps to achieve this goal:  1. I will stop looking at the job as  one big task I need to complete and break it down into manageable steps.  2. I will learn how to use the henrietta and identify steps.  3. I will start to work on the tasks at a reasonable rate.  4. If I start to feel overwhelmed with what is ahead of me I will look at what I have completed and remind myself to focus on steps and not the entire task.                Intervention/Education provided during outreach: listened and encouraged continued work on understanding when she is feeling overwhelmed.  Encouraged continued day care for the kids when able to afford it to decrease her stress.     Outreach Frequency: monthly      Plan:   Pt to schedule sleep study and work on her recognition of her overwhelm feeling.   Care Coordinator will follow up in one month.     TRAMAINE Ramirez  [facility] - Care Coordinator   5/31/2022   1:52 PM  [phone]

## 2022-06-01 ENCOUNTER — VIRTUAL VISIT (OUTPATIENT)
Dept: FAMILY MEDICINE | Facility: CLINIC | Age: 37
End: 2022-06-01
Payer: MEDICARE

## 2022-06-01 ENCOUNTER — MYC MEDICAL ADVICE (OUTPATIENT)
Dept: PSYCHIATRY | Facility: CLINIC | Age: 37
End: 2022-06-01
Payer: MEDICARE

## 2022-06-01 DIAGNOSIS — G47.00 INSOMNIA, UNSPECIFIED TYPE: Primary | ICD-10-CM

## 2022-06-01 DIAGNOSIS — G47.419 SLEEP ATTACK: ICD-10-CM

## 2022-06-01 PROCEDURE — 99214 OFFICE O/P EST MOD 30 MIN: CPT | Mod: 95 | Performed by: FAMILY MEDICINE

## 2022-06-01 ASSESSMENT — PATIENT HEALTH QUESTIONNAIRE - PHQ9
10. IF YOU CHECKED OFF ANY PROBLEMS, HOW DIFFICULT HAVE THESE PROBLEMS MADE IT FOR YOU TO DO YOUR WORK, TAKE CARE OF THINGS AT HOME, OR GET ALONG WITH OTHER PEOPLE: EXTREMELY DIFFICULT
SUM OF ALL RESPONSES TO PHQ QUESTIONS 1-9: 27
SUM OF ALL RESPONSES TO PHQ QUESTIONS 1-9: 27

## 2022-06-01 NOTE — PROGRESS NOTES
Isaura is a 36 year old who is being evaluated via a billable video visit.      How would you like to obtain your AVS? MyChart  If the video visit is dropped, the invitation should be resent by: Text to cell phone: [phone]   Will anyone else be joining your video visit? No      Video Start Time: 5:15 PM    Assessment & Plan     (G47.00) Insomnia, unspecified type  (primary encounter diagnosis)  Comment: complicated by Sleep Attacks   In consultation with psychiatrist and sleep specialist   ambien and provigit has not been advised per specialists.     Plan: avoid caffeine during the day in order to normalize the sleep cycle.   Recommended against use of additional hypnotics.   Follow sleep hygiene   Follow up with the specialists.           I spent a total of 30 minutes on the day of the visit.   Time spent doing chart review, history and exam, documentation and further activities per the note           No follow-ups on file.    James Meza MD  [facility]   Isaura is a 36 year old with a history of bipolar, PTSD, and schizophrenia managed by the psychiatry for the most part, but unable to get in with her primary.  She has been off of her sertraline and Invega as a schizophrenia has been determined no longer to be the case, but now having issues with insomnia, but then having sleep attacks during the day.  She has had this discussion with her psychiatrist, and Provigil has not been recommended.   She has been taking the caffeine pills during the day to help her stay awake.    She has been to sleep study with the results being inconclusive, and having follow-up appointments to further discuss .   Of note, she has not been using the Ambien for the past 2 weeks      Review of Systems         Objective           Vitals:  No vitals were obtained today due to virtual visit.    Physical Exam   GENERAL: Healthy, alert and no distress  NEURO: Cranial nerves grossly intact.   Mentation and speech appropriate for age.  PSYCH: Mentation appears normal, affect normal/bright, judgement and insight intact, normal speech and appearance well-groomed.                Video-Visit Details    Type of service:  Video Visit    Video End Time:5:45 pm     Originating Location (pt. Location): Home    Distant Location (provider location):  [facility]     Platform used for Video Visit: Doximity  Answers for HPI/ROS submitted by the patient on 6/1/2022  If you checked off any problems, how difficult have these problems made it for you to do your work, take care of things at home, or get along with other people?: Extremely difficult  PHQ9 TOTAL SCORE: 27  What is the reason for your visit today? : Sleep issues  How many servings of fruits and vegetables do you eat daily?: 2-3  On average, how many sweetened beverages do you drink each day (Examples: soda, juice, sweet tea, etc.  Do NOT count diet or artificially sweetened beverages)?: 0  How many minutes a day do you exercise enough to make your heart beat faster?: 30 to 60  How many days a week do you exercise enough to make your heart beat faster?: 3 or less  How many days per week do you miss taking your medication?: 0

## 2022-06-02 ENCOUNTER — VIRTUAL VISIT (OUTPATIENT)
Dept: BEHAVIORAL HEALTH | Facility: CLINIC | Age: 37
End: 2022-06-02

## 2022-06-02 ENCOUNTER — VIRTUAL VISIT (OUTPATIENT)
Dept: PSYCHIATRY | Facility: CLINIC | Age: 37
End: 2022-06-02
Payer: MEDICARE

## 2022-06-02 DIAGNOSIS — F43.10 PTSD (POST-TRAUMATIC STRESS DISORDER): ICD-10-CM

## 2022-06-02 DIAGNOSIS — F41.9 ANXIETY: Primary | ICD-10-CM

## 2022-06-02 DIAGNOSIS — F41.9 ANXIETY: ICD-10-CM

## 2022-06-02 DIAGNOSIS — G47.411 NARCOLEPSY AND CATAPLEXY: Primary | ICD-10-CM

## 2022-06-02 DIAGNOSIS — G47.411 NARCOLEPSY AND CATAPLEXY: ICD-10-CM

## 2022-06-02 PROCEDURE — 90832 PSYTX W PT 30 MINUTES: CPT | Mod: 95 | Performed by: SOCIAL WORKER

## 2022-06-02 PROCEDURE — 99214 OFFICE O/P EST MOD 30 MIN: CPT | Mod: 95 | Performed by: PSYCHIATRY & NEUROLOGY

## 2022-06-02 RX ORDER — DEXTROAMPHETAMINE SACCHARATE, AMPHETAMINE ASPARTATE MONOHYDRATE, DEXTROAMPHETAMINE SULFATE AND AMPHETAMINE SULFATE 2.5; 2.5; 2.5; 2.5 MG/1; MG/1; MG/1; MG/1
10 CAPSULE, EXTENDED RELEASE ORAL DAILY
Qty: 30 CAPSULE | Refills: 0 | Status: SHIPPED | OUTPATIENT
Start: 2022-06-02 | End: 2022-06-25

## 2022-06-02 ASSESSMENT — PATIENT HEALTH QUESTIONNAIRE - PHQ9
10. IF YOU CHECKED OFF ANY PROBLEMS, HOW DIFFICULT HAVE THESE PROBLEMS MADE IT FOR YOU TO DO YOUR WORK, TAKE CARE OF THINGS AT HOME, OR GET ALONG WITH OTHER PEOPLE: EXTREMELY DIFFICULT
SUM OF ALL RESPONSES TO PHQ QUESTIONS 1-9: 18
SUM OF ALL RESPONSES TO PHQ QUESTIONS 1-9: 18

## 2022-06-02 ASSESSMENT — ANXIETY QUESTIONNAIRES
GAD7 TOTAL SCORE: 4
7. FEELING AFRAID AS IF SOMETHING AWFUL MIGHT HAPPEN: NOT AT ALL
5. BEING SO RESTLESS THAT IT IS HARD TO SIT STILL: NOT AT ALL
1. FEELING NERVOUS, ANXIOUS, OR ON EDGE: SEVERAL DAYS
3. WORRYING TOO MUCH ABOUT DIFFERENT THINGS: SEVERAL DAYS
6. BECOMING EASILY ANNOYED OR IRRITABLE: SEVERAL DAYS
7. FEELING AFRAID AS IF SOMETHING AWFUL MIGHT HAPPEN: NOT AT ALL
2. NOT BEING ABLE TO STOP OR CONTROL WORRYING: SEVERAL DAYS
GAD7 TOTAL SCORE: 4
GAD7 TOTAL SCORE: 4
8. IF YOU CHECKED OFF ANY PROBLEMS, HOW DIFFICULT HAVE THESE MADE IT FOR YOU TO DO YOUR WORK, TAKE CARE OF THINGS AT HOME, OR GET ALONG WITH OTHER PEOPLE?: SOMEWHAT DIFFICULT
4. TROUBLE RELAXING: NOT AT ALL

## 2022-06-02 NOTE — PATIENT INSTRUCTIONS
Discontinue Ambien because of sleep walking.     Schedule MSLT or lumbar puncture per sleep specialist.    Start Adderall XR 10 mg daily.  Contact me in about a week to report on its effect.     Continue all other medications per your primary care provider.    Schedule an appointment with me after you have results from sleep testing, sooner as needed.  You may call [facility] at [phone] to schedule.    [place] Resources:    Go to the Emergency Department as needed or call after hours crisis line at [phone].    To schedule individual or family therapy, call [facility] at [phone].   Follow up with primary care provider as planned or sooner for acute medical concerns.  Call the psychiatric nurse line with medication questions or concerns at [phone].  Taplister may be used to communicate with your provider, but this is not intended to be used for emergencies.    Community Resources:    National Suicide Prevention Lifeline: 920.711.6671 (TTY: 694.497.6697). Call anytime for help.  (www.suicidepreventionlifeline.org)  National [place] on Mental Illness (www.alexi.org): 164.430.9685 or 776-495-4438.   Mental Health Association (www.mentalhealth.org): 733.577.6144 or 966-641-7912.  [place] Crisis Text Line: Text [place] to 836779  Suicide LifeLine Chat: [employer].org/chat

## 2022-06-02 NOTE — TELEPHONE ENCOUNTER
RN contacting rooming AVINASH Phillips to coordinate calling patient back (She was the one who initially called)      Susan Pichardo RN on 6/2/2022 at 10:39 AM

## 2022-06-02 NOTE — PROGRESS NOTES
Telemedicine Visit: The patient's condition can be safely assessed and treated via synchronous audio and visual telemedicine encounter.      Reason for Telemedicine Visit: Services only offered telehealth    Originating Site (Patient Location): Patient's home    Distant Site (Provider Location): Provider Remote Setting- Home Office    Consent:  The patient/guardian has verbally consented to: the potential risks and benefits of telemedicine (video visit) versus in person care; bill my insurance or make self-payment for services provided; and responsibility for payment of non-covered services.     Mode of Communication:  Video Conference via Sociercise    As the provider I attest to compliance with applicable laws and regulations related to telemedicine.      Isaura is a 36 year old who is being evaluated via a billable video visit.      How would you like to obtain your AVS? MyChart  If the video visit is dropped, the invitation should be resent by: Text to cell phone: [phone]  Will anyone else be joining your video visit? No         Outpatient Psychiatric Progress Note    Name: Isaura Gray   : 1985                    Primary Care Provider: Timmy Umana MD, MD   Therapist: None     PHQ-9 scores:  PHQ-9 SCORE 2022   PHQ-9 Total Score MyChart 27 (Severe depression) 27 (Severe depression) 18 (Moderately severe depression)   PHQ-9 Total Score - 27 18   Some encounter information is confidential and restricted. Go to Review Flowsheets activity to see all data.       AVERY-7 scores:  AVERY-7 SCORE 2022   Total Score 21 (severe anxiety) 6 (mild anxiety) 4 (minimal anxiety)   Total Score - 6 4   Some encounter information is confidential and restricted. Go to Review Flowsheets activity to see all data.       Patient Identification:  Isaura is a 36 year old year old female  who presents for return visit with me.  Patient attended the session alone.  "    Interim History:  Per Ginette Oliver, [facility]:  Pt shares that her mental health itself seems fine but she has to force herself to stay awake. Started to worsen about 1.5 weeks ago and  took 2 weeks off to watch and observe. She shares that she was experiencing sleep paralysis and knew what was going on around her but she couldn't respond. Went to the mall and her \"legs stopped working\" and she had to slump down on a bench for a period of time. She shares that she is now not able to drive due to her feeling like she needs to sleep and not knowing if she will make it.  She is following sleep hygiene suggested to her. Has a fit bit now and it indicates that she is getting adequate sleep but also suggest she is moving and believes she is sleep walking.  Hasn't been able to get anything done around the house and when she tries she is weak, her knees buckle, she is nauseous and just has to sit down. Has lost about 35 lbs in the past few months as she reports she has no appetite and gets nauseous.  Did get a new job and feels it is working fine and just has to complete tasks and not get in specific hours.  Had an echo due to heart racing but she feels that is related to how she is feeling. Is doing the long covid protocol and has been seeing a number of specialists. Did have one specialist start her on Strattera and was on it 1 week but it caused her heart to flutter/race. Shares that she was on adderall in the past for a brief time prior to a pregnancy and she shares that it did help her and she denies side effects.    As above, right at reports episodes during which it sounds as though she is experiencing cataplexy.  She is in a dreamlike state, but can hear and feel things around her, but cannot move or respond appropriately these seem to happen more when she is overly stimulated.  She also reports extreme fatigue and finds herself needing to sleep during the day in spite of getting 8 hours of sleep at night " (according to her Fitbit).  She has noticed that she gets about 300 steps in the middle of the night on a fairly regular basis, so believes that she is sleepwalking.  Her  noted that she got up in the middle of the night and walked around and mostly for a few minutes before going back to bed.  This seems to be happening with or without zolpidem.  She has not been taking the zolpidem, as she seems to be sleeping fine.    She rates her mood today as 5 out of 10 with 10 being the best.  She denies suicidal ideations.    She did schedule on multiple sleep latency test, but it will not be for a couple of months.  She was offered an alternative of having a lumbar puncture to test for hypocretin in her cerebrospinal fluid.  She would be able to do that procedure more quickly, so will call and try to schedule it.  Apparently, it is diagnostic for narcolepsy.    In the meantime, because she is struggling to stay awake and is attempting to work from home, we agreed to a trial of Adderall.  She has been on it in the past without adverse side effects.  She will monitor her pulse and not take it if her heart rate is over 160.    She has been seeing a lot of specialist through the [facility] clinic.    Vital Signs:   No vital signs taken for this encounter.    Current Medications:  Current Outpatient Medications   Medication     albuterol (PROAIR HFA/PROVENTIL HFA/VENTOLIN HFA) 108 (90 Base) MCG/ACT inhaler     amphetamine-dextroamphetamine (ADDERALL XR) 10 MG 24 hr capsule     calcium carbonate (OS-DEBI) 1500 (600 Ca) MG tablet     cholestyramine (QUESTRAN) 4 GM/DOSE powder     levonorgestrel (MIRENA) 20 MCG/24HR IUD     prochlorperazine (COMPAZINE) 10 MG tablet     No current facility-administered medications for this visit.      Mental Status Examination:  Isaura is a 36-year-old woman who reports that she is struggling to stay awake during our interview.  Speech is clear and normal in rate and tone.  Eye contact is  good over the video connection.  Affect is full.  Mood is discouraged.  Thoughts are logical and spontaneous with no loose associations or flight of ideas.  Thought content shows no psychosis.  No suicidal thoughts.  She is alert and oriented x3.    Assessment and Plan:    ICD-10-CM    1. Narcolepsy and cataplexy  G47.411 amphetamine-dextroamphetamine (ADDERALL XR) 10 MG 24 hr capsule   2. PTSD (post-traumatic stress disorder)  F43.10    3. Anxiety  F41.9        Medical comorbidities include:   Patient Active Problem List   Diagnosis     Vitamin D deficiency     Supervision of other high risk pregnancies, unspecified trimester     PE (pulmonary thromboembolism) (H)     Hyperprolactinemia (H)     Follicular cancer of thyroid (H)     Lactose intolerance in adult     Schizophrenia (H)     Encounter for triage in pregnant patient     Cough     Chronic bilateral low back pain without sciatica     Normal labor     Anxiety     Cancer (H)     H/O  delivery, currently pregnant     History of pulmonary embolism     History of thyroid cancer     Mitral valve disorder      (normal spontaneous vaginal delivery)     Moderate major depression (H)     PTSD (post-traumatic stress disorder)     ASCUS of cervix with negative high risk HPV     Non-alcoholic fatty liver disease     Psychophysiological insomnia     Family history of Hashimoto thyroiditis     Biliary dyskinesia     Bipolar II disorder (H)     Pituitary tumor - history     Persistent insomnia     Post-cholecystectomy syndrome     Narcolepsy and cataplexy       Treatment Plan:  Patient Instructions   Discontinue Ambien because of sleep walking.     Schedule MSLT or lumbar puncture per sleep specialist.    Start Adderall XR 10 mg daily.  Contact me in about a week to report on its effect.     Continue all other medications per your primary care provider.    Schedule an appointment with me after you have results from sleep testing, sooner as needed.  You may call [facility] at [phone] to schedule.    [facility] Resources:      Go to the Emergency Department as needed or call after hours crisis line at [phone].      To schedule individual or family therapy, call  [facility] at [phone].     Follow up with primary care provider as planned or sooner for acute medical concerns.    Call the psychiatric nurse line with medication questions or concerns at [phone].    Veronicahart may be used to communicate with your provider, but this is not intended to be used for emergencies.    Community Resources:      National Suicide Prevention Lifeline: 587.337.1892 (TTY: 492.438.8249). Call anytime for help.  (www.suicidepreventionlifeline.org)    National [place] on Mental Illness (www.alexi.org): 549.604.2760 or 335-166-0871.     Mental Health Association (www.mentalhealth.org): 844.382.7527 or 096-273-6371.    [place] Crisis Text Line: Text [place] to 468463    Suicide LifeLine Chat: suicideprePuzlline.org/chat       Administrative Billing:   Video call duration: 31 minutes   Start: 12:59 PM   Stop: 1:30 PM  Total time spent, including chart review and documentation: 43 minutes    Patient Status:  This is a continuous care patient and medications will be prescribed by the psychiatric provider until further indicated.

## 2022-06-02 NOTE — TELEPHONE ENCOUNTER
Isaura was seen by video conference.    Erin Montgomery MD  Collaborative Care Psychiatry  [facility]

## 2022-06-02 NOTE — PROGRESS NOTES
[facility] Collaborative Care Psychiatry Service   June 6, 2022      Behavioral Health Clinician Progress Note    Patient Name: Isaura Gray           Service Type:  Individual      Service Location:   [facility] / Email (patient reached)     Session Start Time: 12:15p  Session End Time: 12:35p      Session Length: 16 - 37      Attendees: Client     Service Modality:  Phone Visit:      Telemedicine Visit: The patient's condition can be safely assessed and treated via synchronous audio and visual telemedicine encounter.      Reason for Telemedicine Visit: Services only offered telehealth    Originating Site (Patient Location): Patient's home    Distant Site (Provider Location): Provider Remote Setting- Home Office    Consent:  The patient/guardian has verbally consented to: the potential risks and benefits of telemedicine (video visit) versus in person care; bill my insurance or make self-payment for services provided; and responsibility for payment of non-covered services.     Mode of Communication:  Video Conference via playnik    As the provider I attest to compliance with applicable laws and regulations related to telemedicine.        Visit Activities (Refresh list every visit): [facility] Only    Diagnostic Assessment Date: 3/23/21- Updated DA will be completed at next visit  Treatment Plan Review Date: 7/4/22  See Flowsheets for today's PHQ-9 and AVERY-7 results  Previous PHQ-9:   PHQ-9 SCORE 4/12/2022 6/1/2022 6/2/2022   PHQ-9 Total Score MyChart 27 (Severe depression) 27 (Severe depression) 18 (Moderately severe depression)   PHQ-9 Total Score - 27 18   Some encounter information is confidential and restricted. Go to Review Flowsheets activity to see all data.     Previous AVERY-7:   AVERY-7 SCORE 4/12/2022 5/7/2022 6/2/2022   Total Score 21 (severe anxiety) 6 (mild anxiety) 4 (minimal anxiety)   Total Score - 6 4   Some encounter information is confidential and restricted. Go to Review Flowsheets activity  "to see all data.       PRECIOUS LEVEL:  PRECIOUS Score (Last Two) 3/28/2018   PRECIOUS Raw Score 35   Activation Score 72.1   PRECIOUS Level 3       DATA  Extended Session (60+ minutes): No  Interactive Complexity: No  Crisis: No  [facility] Patient: No    Treatment Objective(s) Addressed in This Session:  Target Behavior(s): health issues, anxiety, depression    Depressed Mood: Feel less tired and more energy during the day   Anxiety: will experience a reduction in anxiety    Current Stressors / Issues:  Pt shares that her mental health itself seems fine but she has to force herself to stay awake.  Started to worsen about 1.5 weeks ago and  took 2 weeks off to watch and observe.  She shares that she was experiencing sleep paralysis and knew what was going on around her but she couldn't respond.  Went to the mall and her \"legs stopped working\" and she had to slump down on a bench for a period of time.  She shares that she is now not able to drive due to her feeling like she needs to sleep and not knowing if she will make it.    She is following sleep hygiene suggested to her.  Has a fit bit now and it indicates that she is getting adequate sleep but also suggest she is moving and believes she is sleep walking.    Hasn't been able to get anything done around the house and when she tries she is weak, her knees buckle, she is nauseous and just has to sit down.  Has lost about 35 lbs in the past few months as she reports she has no appetite and gets nauseous.    Did get a new job and feels it is working fine and just has to complete tasks and not get in specific hours.    Had an echo due to heart racing but she feels that is related to how she is feeling.  Is doing the long covid protocol and has been seeing a number of specialists.  Did have one specialist start her on Strattera and was on it 1 week but it caused her heart to flutter/race.  Shares that she was on adderall in the past for a brief time prior to a pregnancy and she shares " that it did help her and she denies side effects.        Progress on Treatment Objective(s) / Homework:  Minimal progress - ACTION (Actively working towards change); Intervened by reinforcing change plan / affirming steps taken    Motivational Interviewing    MI Intervention: Expressed Empathy/Understanding, Open-ended questions and Reframe     Change Talk Expressed by the Patient: Committment to change Activation    Provider Response to Change Talk: E - Evoked more info from patient about behavior change, A - Affirmed patient's thoughts, decisions, or attempts at behavior change, R - Reflected patient's change talk and S - Summarized patient's change talk statements      Care Plan review completed: Yes    Medication Review:  Changes to psychiatric medications, see updated Medication List in EPIC.     Medication Compliance:  Yes    Changes in Health Issues:   Yes: lots of nausea, extreme fatigue, Associated Psychological Distress    Chemical Use Review:   Substance Use: Chemical use reviewed, no active concerns identified      Tobacco Use: No current tobacco use.      Assessment: Current Emotional / Mental Status (status of significant symptoms):  Risk status (Self / Other harm or suicidal ideation)  Patient denies a history of suicidal ideation, suicide attempts, self-injurious behavior, homicidal ideation, homicidal behavior and and other safety concerns  Patient denies current fears or concerns for personal safety.  Patient denies current or recent suicidal ideation or behaviors.  Patient denies current or recent homicidal ideation or behaviors.  Patient denies current or recent self injurious behavior or ideation.  Patient denies other safety concerns.  A safety and risk management plan has not been developed at this time, however patient was encouraged to call [facility] should there be a change in any of these risk factors.    Appearance:   Appropriate   Eye Contact:   Fair   Psychomotor  Behavior: Normal   Attitude:   Cooperative   Orientation:   All  Speech   Rate / Production: Normal    Volume:  Normal   Mood:    Normal  Affect:    Appropriate   Thought Content:  Clear   Thought Form:  Coherent  Logical   Insight:    Good     Diagnoses:  1. Anxiety    2. Narcolepsy and cataplexy        Collateral Reports Completed:  Communicated with: [facility] psychiatrist    Plan: (Homework, other):  Patient was given information about behavioral services and encouraged to schedule a follow up appointment with the clinic [facility] [place] as needed.  She was also given information about mental health symptoms and treatment options .  CD Recommendations: No indications of CD issues.  Ginette Oliver, [facility], [facility] [place]      ______________________________________________________________________    Integrated Primary Care Behavioral Health Treatment Plan    Patient's Name: Isaura Grya  YOB: 1985    Date of Creation: 4/4/22  Date Treatment Plan Last Reviewed/Revised: 7/4/22    DSM5 Diagnoses: 295.90  (F20.9) Schizophrenia, 296.89 Bipolar II Disorder Depressed or 300.00 (F41.9) Unspecified Anxiety Disorder  Psychosocial / Contextual Factors: health issues, fatigue, work stress, home stress  PROMIS (reviewed every 90 days): 10    Referral / Collaboration:  Referral to another professional/service is not indicated at this time..    Anticipated number of session for this episode of care: 5-8  Anticipation frequency of session: Every 1-2 months  Anticipated Duration of each session: 16-37 minutes  Treatment plan will be reviewed in 90 days or when goals have been changed.       MeasurableTreatment Goal(s) related to diagnosis / functional impairment(s)  Goal 1: Patient will experience a reduction in depressive symptoms along with corresponding increase in positive emotions and life satisfaction.      Objective #A (Patient Action)    Patient will Feel less tired and more energy during the day .  Status: New - Date: 4/4/22      Intervention(s)  Therapist will discuss with pt CBT and ACT topics aimed to help reduce depression and anxiety.      Patient has reviewed and agreed to the above plan.      Ginette Oliver, DAVID, [facility], [facility]  June 2, 2022

## 2022-06-13 ENCOUNTER — LAB (OUTPATIENT)
Dept: FAMILY MEDICINE | Facility: CLINIC | Age: 37
End: 2022-06-13
Attending: FAMILY MEDICINE
Payer: MEDICARE

## 2022-06-13 DIAGNOSIS — Z20.822 SUSPECTED 2019 NOVEL CORONAVIRUS INFECTION: ICD-10-CM

## 2022-06-13 LAB — SARS-COV-2 RNA RESP QL NAA+PROBE: NEGATIVE

## 2022-06-13 PROCEDURE — U0003 INFECTIOUS AGENT DETECTION BY NUCLEIC ACID (DNA OR RNA); SEVERE ACUTE RESPIRATORY SYNDROME CORONAVIRUS 2 (SARS-COV-2) (CORONAVIRUS DISEASE [COVID-19]), AMPLIFIED PROBE TECHNIQUE, MAKING USE OF HIGH THROUGHPUT TECHNOLOGIES AS DESCRIBED BY CMS-2020-01-R: HCPCS

## 2022-06-13 PROCEDURE — U0005 INFEC AGEN DETEC AMPLI PROBE: HCPCS

## 2022-06-14 NOTE — TELEPHONE ENCOUNTER
RECORDS RECEIVED FROM: internal     DATE RECEIVED: 8/2/22     NOTES STATUS DETAILS   OFFICE NOTE from referring provider internal  Kyung Valderrama PA-C   OFFICE NOTE from other specialist       DISCHARGE SUMMARY from hospital       DISCHARGE REPORT from the ER internal  3.15.22 Higinio   1.23.22 Darlyn    MEDICATION LIST internal      IMAGING  (NEED IMAGES AND REPORTS)       CT SCAN internal  Scheduled 8/15/22  1.23.22   CHEST XRAY (CXR)       TESTS       PULMONARY FUNCTION TESTING (PFT) internal  Scheduled 8/15/22

## 2022-06-16 ENCOUNTER — VIRTUAL VISIT (OUTPATIENT)
Dept: SLEEP MEDICINE | Facility: CLINIC | Age: 37
End: 2022-06-16
Attending: PHYSICIAN ASSISTANT
Payer: MEDICARE

## 2022-06-16 VITALS — HEIGHT: 72 IN | BODY MASS INDEX: 23.57 KG/M2 | WEIGHT: 174 LBS

## 2022-06-16 DIAGNOSIS — F51.04 PSYCHOPHYSIOLOGIC INSOMNIA: ICD-10-CM

## 2022-06-16 PROCEDURE — 90791 PSYCH DIAGNOSTIC EVALUATION: CPT | Mod: 95 | Performed by: PSYCHOLOGIST

## 2022-06-16 ASSESSMENT — PAIN SCALES - GENERAL: PAINLEVEL: SEVERE PAIN (6)

## 2022-06-16 ASSESSMENT — SLEEP AND FATIGUE QUESTIONNAIRES
HOW LIKELY ARE YOU TO NOD OFF OR FALL ASLEEP WHILE LYING DOWN TO REST IN THE AFTERNOON WHEN CIRCUMSTANCES PERMIT: HIGH CHANCE OF DOZING
HOW LIKELY ARE YOU TO NOD OFF OR FALL ASLEEP WHEN YOU ARE A PASSENGER IN A CAR FOR AN HOUR WITHOUT A BREAK: HIGH CHANCE OF DOZING
HOW LIKELY ARE YOU TO NOD OFF OR FALL ASLEEP WHILE WATCHING TV: HIGH CHANCE OF DOZING
HOW LIKELY ARE YOU TO NOD OFF OR FALL ASLEEP WHILE SITTING AND TALKING TO SOMEONE: MODERATE CHANCE OF DOZING
HOW LIKELY ARE YOU TO NOD OFF OR FALL ASLEEP WHILE SITTING AND READING: HIGH CHANCE OF DOZING
HOW LIKELY ARE YOU TO NOD OFF OR FALL ASLEEP IN A CAR, WHILE STOPPED FOR A FEW MINUTES IN TRAFFIC: MODERATE CHANCE OF DOZING
HOW LIKELY ARE YOU TO NOD OFF OR FALL ASLEEP WHILE SITTING INACTIVE IN A PUBLIC PLACE: HIGH CHANCE OF DOZING
HOW LIKELY ARE YOU TO NOD OFF OR FALL ASLEEP WHILE SITTING QUIETLY AFTER LUNCH WITHOUT ALCOHOL: HIGH CHANCE OF DOZING

## 2022-06-16 NOTE — PATIENT INSTRUCTIONS
Recommendations:    1.  Continue with your current 9-hour sleep schedule between 10-7 AM.  Try to be as consistent as possible about your wake time.    2.  Continue trying to limit the duration of your naps to 30 minutes.  I do recommend he take nap at 2 PM.  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    3.  I am recommending that we proceed to completion of a hypersomnia work-up that would include doing 14 days of medical grade wrist actigraphy, a repeat of your overnight polysomnogram and the MSLT, which includes 5 scheduled naps the following day.    4.  I am glad that you are finding some benefit from introduction of stimulus medication, specifically Adderall.  Continue to follow with your physician around use of this medication.    5.  I like to see you back in our sleep psychology clinic for a follow-up visit once you have completed further testing.

## 2022-06-16 NOTE — PROGRESS NOTES
"Isaura is a 36 year old who is being evaluated via a billable video visit.      How would yo+-u like to obtain your AVS? MyChart  If the video visit is dropped, the invitation should be resent by: Text to cell phone: [phone]  Will anyone else be joining your video visit? Helga    Lissa Krzysztof    Video-Visit Details    Video Start Time: 9:40 AM    Type of service:  Video Visit    Video End Time:10:20 AM    Originating Location (pt. Location): Home    Distant Location (provider location):  [facility]     Platform used for Video Visit: [facility]     SLEEP MEDICINE CONSULTATION  Sleep Psychology  Jun 16, 2022    Name: Isaura Gray MRN# [id number]   Age: 36 year old YOB: 1985     Date of Consultation: Jun 16, 2022  Consultation is requested by: Chuy Cowart PA-C  [address] [place] [address]  Primary care provider: Timmy Umana    Reason for Sleep Consultation     Isaura Gray is a 36 year old female seen today for a behavioral sleep medicine consultation because of insomnia    Assessment and Plan     Sleep Diagnoses:    Chronic Insomnia      Co-Occurring Conditions:    PTSD  Anxiety Disorder    Diagnostic Testing    Negative for TED or clinically significant PLMD    Clinical Impressions:    Isaura Gray was seen for a sleep psychology consultation and possible behavioral sleep intervention and treatment.  This evaluation for insomnia indicates patient has made significant changes in sleep habits following last sleep medicine appointment with my colleague Chuy Cowart.      Her self-report of sleep habits and sleep data indicate that she is getting between 8-9 hours of sleep with normal sleep latency and wake after sleep onset of less than 30 minutes.  here is a psychiaatric diagnosis of \"Narcolepsy and Cateplexy\" thought there has not been confirmatory diagnostic testing for this condition.    Her psychiatrist initiated " stimulant therapy. She indicates this has promoted improved wakefulness during the morning and early afternoon,However she continues to experience excessive sleepiness and napping 1-2 times in the late afternoon and evenings.  She has also reduced total neck time to about 30 minutes for each neck.    Recommendations and Counselin.  CBT for insomnia is not indicated at this time and that she has made sleep hygiene changes based on sleep medicine recommendation that she reports suggests normal total sleep time between 8-9 hours up, relatively stable sleep schedule and sleep latency and wake after sleep onset of less than 30 minutes.  Despite all these behavioral changes and measures, patient continues to report excessive daytime sleepiness.    2.  Recommend patient continue to restrict her nap time to 30 minutes in the early afternoon.    3.  Recommend she follow with her psychiatrist around use of Adderall and treatment of PTSD, Anxiety.    4.  Patient has discontinued sertraline as recommended by Chuy Hu and with that change, along with relatively well consolidated sleep, I do recommend we proceed with completion of a hypersomnia work-up for idiopathic hypersomnia.  The patient was provided information about this process including 14 days of the medical actigraphy, repeat PSG and MSLT.      Patient was advised to consult with their prescribing provider around use of or changes to prescription sleep medication.  Patient was counseled on the importance of avoiding driving if drowsy.    Follow-up: 3 months     History of Present Sleep Complaint     Isaura Gray is a 36 year old year old female with a relevant medical history of pulmonary embolism, mitral vaolve disorder, low Vitamin D, chronic pain, depression, PTSD, multiple past trials of sedative-hypnotic and other psychotropic medications for insomnia, depression, anxiety and reported psychosis, and history of COVID-19  who presents for sleep  "psychology consultation to address psychophysiologic insomnia in the setting of the excessive daytime sleepiness.  A recent sleep study did not demonstrate any evidence of intrinsic sleep disorder such as obstructive sleep apnea, or.  In the movement disorder.  She was last seen by my colleague, Chuy Hu who indicated that further work-up may be needed but that she would need to discontinue sertraline before hypersomnia work-up could be completed.    Since March, patient has been followed by psychiatrist with her mental health diagnoses reportedly in the past having characterized as schizophrenia.  Her most recent psychiatric visit indicated a diagnosis of narcolepsy and cataplexy, PTSD and anxiety.  Interestingly, patient has not had a confirmatory diagnostic testing for this diagnoses.    She is reporting some benefit from Adderall XR which she states this helped her feel more alert, addressed for \"ADHD\", it helped her maintaining alertness without napping until about 2 PM.  Thereafter she feels stronger sleep propensity and flight somnolence through the late afternoon and evening.  She does find activity of her children, staying active and other strategies to help manage and suppress somnolence.  She reports that she does not drive while drowsy and is careful not to engage in use of motorized equipment if feeling sleepy or drowsy.    Patient reports prior trials of trazodone, quetianpine, Ambien, Lunesta for insomnia.  None have been effective.    She reports that recent introduction to stimulant medication Adderall and feels it helps with alertness.    She reports that stress and anxiety affect sense of alertness and she will refert to a nap to cope.      SLEEP-WAKE SCHEDULE:     Shift Work - No  Source of Sleep Estimates:  Verbal Self-report    Time to Bed:  9 PM mostly, sometimes as late as 10 PM  Activity in Bed (stimulus control): none reported  Sleep Latency: 15-20 minutes  Times awakened:  0-1  Total " Time Awake After Sleep Onset: 0-15 minutes  Time Up for the Day: 6-7 AM, nausea in mornings  Total Time in Bed:  9-9.5 hours  Estimated Total Sleep Time:  8-9 hours  Sleep Efficiency Estimate: Greater than 80%    Naps: 1-3 naps a day, after 2 PM, usually less than 30 minutes.  She reports that napping has been reduced to in the morning is up to 2 PM with introduction of stimulant.    SLEEP HYGIENE:    Behaviors affecting Sleep   None reported    Sleep Environment:    Bedroom is quiet, comfortable and dark    Substance Use:    Caffeine: None reported   Alcohol: None reported  Nicotine: None  Other substances:: None reported    PREVIOUS SLEEP EVALUATIONS:    3/2/22 at the [facility] [place] for excessive daytime sleepiness and possible sleep apnea.     Sleep latency 14.5 minutes with Ambien.  REM achieved.   REM latency 239.5 minutes.  Sleep efficiency 81.1%. Total sleep time 409 minutes.     Sleep architecture:  Stage 1, 8.3% (5%), stage 2, 48.2% (45-55%), stage 3, 30% (15-20%), stage REM, 13.6% (20-25%).  AHI was 0.4, without desaturations. RDI 1.2.  REM RDI 1.1, consistent with no REM TED.  Supine RDI 1.0, consistent with no SUPINE TED.  Periodic Limb Movement Index 12.5/hour.             SCALES      EPWORTH SLEEPINESS SCALE    Likeliness of dozing or falling  asleep:    Sitting and reading: High chance of dozing    Watching TV: High chance of dozing    Sitting, inactive in a public place (e.g. a theatre or a meeting): High chance of dozing    As a passenger in a car for an hour without a break: High chance of dozing    Lying down to rest in the afternoon when circumstances permit: High chance of dozing    Sitting and talking to someone: Moderate chance of dozing    Sitting quietly after a lunch without alcohol: High chance of dozing    In a car, while stopped for a few minutes in traffic: Moderate chance of dozing    Total score - Novelty: 22      INSOMNIA SEVERITY INDEX (LILLIAN)      The Insomnia  Severity Index measures the severity of insomnia symptoms over the past two weeks.    1. Difficulty falling asleep: Mild    2. Difficulty staying asleep: Moderate    3. Problems waking up too early: Severe    4. How SATISFIED/DISSATISFIED are youwith your CURRENT sleep pattern? Satisfied    5. How NOTICEABLE to others do you think your sleep problem is in terms of impairing the quality of your life? Very Much Noticeable      6. How WORRIED/DISTRESSED are you about your sleep problem? Very Much Worried    7. To what extent do you consider your sleep problem to INTERFERE with your daily functioning (e.g. daytime fatigue, mood, ability to functon at work/daily chores, concentration, memory, mood, etc.) CURRENTLY?   Very Much Interfering    LILLIAN Total Score: 19    Guidelines for Scoring/Interpretation:     0-7 = No clinically significant insomnia   8-14 = Subthreshold insomnia   15-21 = Clinical insomnia (moderate severity)  22-28 = Clinical insomnia (severe)      Vitals     Ht 1.829 m (6')   Wt 78.9 kg (174 lb)   BMI 23.60 kg/m       Medical History     Allergies:    Allergies   Allergen Reactions     Ciprofloxacin Hives     Sulfa Drugs Hives     Hydrocodone-Acetaminophen Itching           Oxycodone Hives     Oxycodone-Acetaminophen Itching       Medications:    Current Outpatient Medications   Medication Sig Dispense Refill     albuterol (PROAIR HFA/PROVENTIL HFA/VENTOLIN HFA) 108 (90 Base) MCG/ACT inhaler Inhale 2 puffs into the lungs in the morning and 2 puffs at noon and 2 puffs in the evening and 2 puffs before bedtime. 18 g 0     amphetamine-dextroamphetamine (ADDERALL XR) 10 MG 24 hr capsule Take 1 capsule (10 mg) by mouth daily 30 capsule 0     calcium carbonate (OS-DEBI) 1500 (600 Ca) MG tablet Take 1 tablet (600 mg) by mouth 2 times daily (with meals) 180 tablet 1     cholestyramine (QUESTRAN) 4 GM/DOSE powder Take 2 g by mouth 2 times daily (with meals) Adjust dose and frequency to keep bowels more  consistent. 400 g 1     levonorgestrel (MIRENA) 20 MCG/24HR IUD 1 each (20 mcg) by Intrauterine route once       prochlorperazine (COMPAZINE) 10 MG tablet Take 1 tablet (10 mg) by mouth every 6 hours as needed for nausea or vomiting 20 tablet 1       Problem List:  Patient Active Problem List    Diagnosis Date Noted     Narcolepsy and cataplexy 2022     Priority: Medium     Post-cholecystectomy syndrome 2022     Priority: Medium     Persistent insomnia 2022     Priority: Medium     Pituitary tumor - history      Priority: Medium     Bipolar II disorder (H) 2021     Priority: Medium     Biliary dyskinesia 2021     Priority: Medium     Added automatically from request for surgery [id number]       Non-alcoholic fatty liver disease 2020     Priority: Medium     Psychophysiological insomnia 2020     Priority: Medium     Family history of Hashimoto thyroiditis 2020     Priority: Medium     Moderate major depression (H) 2020     Priority: Medium     Cancer (H)      Priority: Medium     Anxiety 2018     Priority: Medium     History of thyroid cancer 2018     Priority: Medium      (normal spontaneous vaginal delivery) 2018     Priority: Medium     Normal labor 2018     Priority: Medium     Cough 2018     Priority: Medium     Chronic bilateral low back pain without sciatica 2018     Priority: Medium     Encounter for triage in pregnant patient 2018     Priority: Medium     Schizophrenia (H)      Priority: Medium     reports spontaneous remission during last pregnancy       Vitamin D deficiency 2018     Priority: Medium     Supervision of other high risk pregnancies, unspecified trimester 2018     Priority: Medium     Lactose intolerance in adult 2018     Priority: Medium     PE (pulmonary thromboembolism) (H)      Priority: Medium     Hyperprolactinemia (H)      Priority: Medium     Follicular cancer of thyroid  (H)      Priority: Medium     Mitral valve disorder 2018     Priority: Medium     H/O  delivery, currently pregnant 2016     Priority: Medium     History of pulmonary embolism 10/19/2016     Priority: Medium     ASCUS of cervix with negative high risk HPV 2016     Priority: Medium     16 ASCUS pap, neg HPV. Done at [facility]. Plan: Cotest in 3 yr  20 NIL Pap, Neg HPV. Plan: pending provider.        PTSD (post-traumatic stress disorder) 2015     Priority: Medium        Past Medical/Surgical History:  Past Medical History:   Diagnosis Date     ASCUS of cervix with negative high risk HPV 2016    [facility]     Cancer (H)      Depressive disorder      Follicular cancer of thyroid (H)      Hyperprolactinemia (H)      Mitral valve prolapse      PE (pulmonary thromboembolism) (H)      Pituitary tumor      Schizophrenia (H)     reports spontaneous remission during last pregnancy     Thyroid disease      Patient Active Problem List    Diagnosis Date Noted     Narcolepsy and cataplexy 2022     Priority: Medium     Post-cholecystectomy syndrome 2022     Priority: Medium     Persistent insomnia 2022     Priority: Medium     Pituitary tumor - history      Priority: Medium     Bipolar II disorder (H) 2021     Priority: Medium     Biliary dyskinesia 2021     Priority: Medium     Added automatically from request for surgery [id number]       Non-alcoholic fatty liver disease 2020     Priority: Medium     Psychophysiological insomnia 2020     Priority: Medium     Family history of Hashimoto thyroiditis 2020     Priority: Medium     Moderate major depression (H) 2020     Priority: Medium     Cancer (H)      Priority: Medium     Anxiety 2018     Priority: Medium     History of thyroid cancer 2018     Priority: Medium      (normal spontaneous vaginal delivery) 2018     Priority: Medium     Normal labor  2018     Priority: Medium     Cough 2018     Priority: Medium     Chronic bilateral low back pain without sciatica 2018     Priority: Medium     Encounter for triage in pregnant patient 2018     Priority: Medium     Schizophrenia (H)      Priority: Medium     reports spontaneous remission during last pregnancy       Vitamin D deficiency 2018     Priority: Medium     Supervision of other high risk pregnancies, unspecified trimester 2018     Priority: Medium     Lactose intolerance in adult 2018     Priority: Medium     PE (pulmonary thromboembolism) (H)      Priority: Medium     Hyperprolactinemia (H)      Priority: Medium     Follicular cancer of thyroid (H)      Priority: Medium     Mitral valve disorder 2018     Priority: Medium     H/O  delivery, currently pregnant 2016     Priority: Medium     History of pulmonary embolism 10/19/2016     Priority: Medium     ASCUS of cervix with negative high risk HPV 2016     Priority: Medium     16 ASCUS pap, neg HPV. Done at [facility]. Plan: Cotest in 3 yr  20 NIL Pap, Neg HPV. Plan: pending provider.        PTSD (post-traumatic stress disorder) 2015     Priority: Medium     Patient Active Problem List   Diagnosis     Vitamin D deficiency     Supervision of other high risk pregnancies, unspecified trimester     PE (pulmonary thromboembolism) (H)     Hyperprolactinemia (H)     Follicular cancer of thyroid (H)     Lactose intolerance in adult     Schizophrenia (H)     Encounter for triage in pregnant patient     Cough     Chronic bilateral low back pain without sciatica     Normal labor     Anxiety     Cancer (H)     H/O  delivery, currently pregnant     History of pulmonary embolism     History of thyroid cancer     Mitral valve disorder      (normal spontaneous vaginal delivery)     Moderate major depression (H)     PTSD (post-traumatic stress disorder)     ASCUS of cervix with  "negative high risk HPV     Non-alcoholic fatty liver disease     Psychophysiological insomnia     Family history of Hashimoto thyroiditis     Biliary dyskinesia     Bipolar II disorder (H)     Pituitary tumor - history     Persistent insomnia     Post-cholecystectomy syndrome     Narcolepsy and cataplexy        Mental Health History     Prior Mental Health Diagnosis:   Medical records suggest prior diagnoses of schizophrenia, PTSD anxiety disorder, depression.  Most recent psychiatry notes indicate diagnoses of PTSD, anxiety and diagnoses of narcolepsy and cataplexy though there has not been any confirmatory work-up for this condition.    Current Mental Health Treatment:   psychiatric medication management, Patient indicates she is taking Adderall XR in the morning and recently discontinued sertraline.  She is participating in psychotherapy.    Chemical Abuse/Treatment:    None reported      Family History     Family History   Problem Relation Age of Onset     No Known Problems Mother      Hypertension Father      Cerebrovascular Disease Father 56        Passed away from double brain stem clot     Mental Illness Father      Depression Father      Anxiety Disorder Father      Alcoholism Father      Schizophrenia Father      Asthma Brother      Cancer Maternal Grandfather         lung     No Known Problems Paternal Grandmother      Schizophrenia Paternal Grandfather      Suicide Paternal Grandfather 53     Autism Spectrum Disorder Son      Kidney Disease Son         \"has a third kidney\"     No Known Problems Daughter        Social History     Social History     Socioeconomic History     Marital status: Single     Spouse name: None     Number of children: 3     Years of education: None     Highest education level: Some college, no degree   Tobacco Use     Smoking status: Former Smoker     Packs/day: 0.50     Years: 5.00     Pack years: 2.50     Types: Cigarettes, Vaping Device     Quit date: 7/31/2016     Years since " quittin.8     Smokeless tobacco: Never Used   Vaping Use     Vaping Use: Never used   Substance and Sexual Activity     Alcohol use: No     Comment: Has an issue with her liver (not alcohol related)     Drug use: No     Sexual activity: Yes     Partners: Male     Birth control/protection: None   Other Topics Concern     Parent/sibling w/ CABG, MI or angioplasty before 65F 55M? No   Social History Narrative    3/2018  Lives in [place] with S.[place] Edgar and her daughter, Sari and son Ralph.  No smokers in the home.  No indoor cats/kittens.  No concerns about domestic violence.       Social Determinants of Health     Financial Resource Strain: High Risk     Difficulty of Paying Living Expenses: Hard   Food Insecurity: Food Insecurity Present     Worried About Running Out of Food in the Last Year: Sometimes true     Ran Out of Food in the Last Year: Sometimes true   Transportation Needs: No Transportation Needs     Lack of Transportation (Medical): No     Lack of Transportation (Non-Medical): No   Intimate Partner Violence: Not At Risk     Fear of Current or Ex-Partner: No     Emotionally Abused: No     Physically Abused: No     Sexually Abused: No            Mental Status Examination     Isaura presented as oriented X3 with speech and language intact.  The patient was cooperative throughout the evaluation with no signs of hallucinations, delusions, loosening of associations or other thought disturbance.  Mood was normal Affect was congruent with mood. Insight and judgement were intact.  Memory appeared intact for recent and remote elements.  There was no report of suicidal ideation, intention or plan. Attention and concentration were within normal.        Edgar Bravo, [place], LP, DBSM  Diplomate, Behavioral Sleep Medicine  [facility]      Copy:   Timmy Umana PA-C  [address] [address]    Note: This dictation  was created using voice recognition software. This document may contain an error not identified before finalizing the document. If the error changes the accuracy of the document, I would appreciate it being brought to my attention.

## 2022-06-16 NOTE — Clinical Note
Isaura Richardson's sleep habits and overal sleep consolidation has improved.  She also has discontinued Zoloft.  I would recommend per your plan that she proceed with a hypersomnia work-up.  Her psychiatrist has initiated low dose of Adderrall XR which has improved alertness through about 2 PM but with continued excessive sleepiness thereafter despite reporting getting between 8-9 hours of sleep a night.  Miguel Angel

## 2022-06-16 NOTE — Clinical Note
See my note, I recommend completion of hypersomnia workup as she discontinued zoloft and nocturnal sleep appears stable and sufficient to further evaluation  EDS.  Her psychiatrist has prescribed stimulant which helps in morning to early afternoon when sleepiness reemerges.  I see that her psychiatrist diagnosed her with Narcolepsy and Cataplexy. INot sure the basis for that dx. 'm guessing this is multifactored idiopathic hypersomnia at most. I will continue to follow if needed.  Miguel Angel

## 2022-06-17 ENCOUNTER — TELEPHONE (OUTPATIENT)
Dept: SLEEP MEDICINE | Facility: CLINIC | Age: 37
End: 2022-06-17
Payer: MEDICARE

## 2022-06-17 NOTE — TELEPHONE ENCOUNTER
Writer FLORENTIN for patient to return a call to the clinic. She needs to discontinue heramphetamine-dextroamphetamine (ADDERALL XR) 10 MG 24 hr capsule  two weeks prior to having her study.    Cecilia BEAN CMA

## 2022-06-22 ENCOUNTER — TELEPHONE (OUTPATIENT)
Dept: SLEEP MEDICINE | Facility: CLINIC | Age: 37
End: 2022-06-22

## 2022-06-23 ENCOUNTER — MYC MEDICAL ADVICE (OUTPATIENT)
Dept: PSYCHIATRY | Facility: CLINIC | Age: 37
End: 2022-06-23

## 2022-06-23 DIAGNOSIS — G47.411 NARCOLEPSY AND CATAPLEXY: ICD-10-CM

## 2022-06-24 NOTE — TELEPHONE ENCOUNTER
.Date of Last Office Visit: 6/2/2022  Date of Next Office Visit: none (scheduling, please connect with patient and schedule follow up appointment with provider)  No shows since last visit: none  Cancellations since last visit: none    Medication requested: Adderall XR 10 mg capsule Date last ordered: 6/2/2022 Qty: 30 Refills: 0     Review of [place] ?: yes  Prescriptions  Total: 3  Private Pay: 0  Showing 1-3 of 3 Items View   15 Items    1 of 1   Filled  Drug  QTY  Days  Prescriber     06/02/2022 Dextroamp-Amphet Er 10 Mg Cap   30.00 30 De Cav    11/18/2021 Zolpidem Tartrate 5 Mg Tablet   30.00 30 De Cav    10/18/2021 Zolpidem Tartrate 5 Mg Tablet   30.00 30 De Cav   Disclaimer  Showing 1-3 of 3 Items View   15 Items    1 of 1       Lapse in medication adherence greater than 5 days?: no  If yes, call patient and gather details: no  Medication refill request verified as identical to current order?: yes  Result of Last DAM, VPA, Li+ Level, CBC, or Carbamazepine Level (at or since last visit): N/A    Last visit treatment plan:    Treatment Plan:  Patient Instructions   Discontinue Ambien because of sleep walking.     Schedule MSLT or lumbar puncture per sleep specialist.     Start Adderall XR 10 mg daily.  Contact me in about a week to report on its effect.     Continue all other medications per your primary care provider.     Schedule an appointment with me after you have results from sleep testing, sooner as needed.  You may call [facility] at [phone] to schedule.         []Medication refilled per  Medication Refill in Ambulatory Care  policy.  [x]Medication unable to be refilled by RN due to criteria not met as indicated below:    []Eligibility - not seen in the last year   []Supervision - no future appointment   []Compliance - no shows, cancellations or lapse in therapy   []Verification - order discrepancy   [x]Controlled medication   [x]Medication not included in policy   []90-day supply  request   []Other

## 2022-06-24 NOTE — TELEPHONE ENCOUNTER
Pt scheduled with Dr. Mendoza. Would like to discuss increasing Adderall and needing refill. Requesting call back from RN.

## 2022-06-25 RX ORDER — DEXTROAMPHETAMINE SACCHARATE, AMPHETAMINE ASPARTATE MONOHYDRATE, DEXTROAMPHETAMINE SULFATE AND AMPHETAMINE SULFATE 2.5; 2.5; 2.5; 2.5 MG/1; MG/1; MG/1; MG/1
10 CAPSULE, EXTENDED RELEASE ORAL DAILY
Qty: 30 CAPSULE | Refills: 0 | Status: SHIPPED | OUTPATIENT
Start: 2022-06-25 | End: 2022-07-19

## 2022-06-30 ENCOUNTER — PATIENT OUTREACH (OUTPATIENT)
Dept: CARE COORDINATION | Facility: CLINIC | Age: 37
End: 2022-06-30

## 2022-06-30 NOTE — PROGRESS NOTES
Clinic Care Coordination Contact    Follow Up Progress Note      Assessment: Spoke with Isaura. She is working part time, at her new job. Liking work and thinks things are going well.   Continues to work with Psychiatrist, , next appt. With Dr. Montgomery is 7/19/22.   Sleep issues on going, but better since starting Adderall.   Sleeping 8 hours a night . Still napping during the day. Has more sleep studies scheduled. Reminded pt of need for Wellness visit.   Review of goals. Pt stated the household chores are not going well, but an [employer] worker will be stating soon. She is hoping the [employer] worker can help her work on this.    Patient had to end conversation or a zoom  call. No other concerns identified at this time.    Care Gaps:    Health Maintenance Due   Topic Date Due     URINE DRUG SCREEN  Never done     ADVANCE CARE PLANNING  Never done     Pneumococcal Vaccine: Pediatrics (0 to 5 Years) and At-Risk Patients (6 to 64 Years) (1 - PCV) Never done     MEDICARE ANNUAL WELLNESS VISIT  02/13/2021     COVID-19 Vaccine (2 - Booster for William series) 07/01/2021         Goals addressed this encounter:    Goals Addressed                    This Visit's Progress       1. Annual Wellness visit (pt-stated)         Goal Statement: I will schedule my Annual Wellness Visit.  Date Goal set: 3/8/2022  Barriers: scheduling  Strengths: desire to complete  Date to Achieve By: 9/4/22   Patient expressed understanding of goal: Yes    Action steps to achieve this goal:  1. I will call scheduling or go on Trade[employer] to schedule my Annual Wellness Visit.  2. I will attend the appointment.  6/20/22 reminded.              2. Functional (pt-stated)         Goal Statement: I want to get the upstairs cleaned and will break it down into manageable tasks.   Date Goal set: 5/4/2021   Barriers: my mental health, two young children  Strengths: I just got an henrietta to help with identifying tasks  Date to Achieve By: 5/31/23   Patient expressed  "understanding of goal: Yes    Action steps to achieve this goal:  1. I will stop looking at the job as one big task I need to complete and break it down into manageable steps.  2. I will learn how to use the henrietta and identify steps.  3. I will start to work on the tasks at a reasonable rate.  4. If I start to feel overwhelmed with what is ahead of me I will look at what I have completed and remind myself to focus on steps and not the entire task.  6/30/22 \"Not going well\"   but CLAUDETTE worker will be starting., hoping that will help                 Intervention/Education provided during outreach: Introduction, review of goals, reminder of appts and need to schedule  wellness visit     Outreach Frequency: monthly      Plan: Patient will start with [facility] worker. Continue to work with Psychiatry and sleep specialists. Will call Lead SANDIE DURÁN with any questions.     Care Coordinator will follow up in 1 month      TRAMAINE Kuo /  TRAMAINE Grossman  [facility]   Care Coordination  [facility]  6/30/2022 11:08 AM  .   "

## 2022-07-19 ENCOUNTER — VIRTUAL VISIT (OUTPATIENT)
Dept: PSYCHIATRY | Facility: CLINIC | Age: 37
End: 2022-07-19
Payer: MEDICARE

## 2022-07-19 ENCOUNTER — VIRTUAL VISIT (OUTPATIENT)
Dept: BEHAVIORAL HEALTH | Facility: CLINIC | Age: 37
End: 2022-07-19
Payer: MEDICARE

## 2022-07-19 VITALS — BODY MASS INDEX: 23.46 KG/M2 | WEIGHT: 173 LBS

## 2022-07-19 DIAGNOSIS — G47.411 NARCOLEPSY AND CATAPLEXY: ICD-10-CM

## 2022-07-19 DIAGNOSIS — F31.81 BIPOLAR II DISORDER (H): Primary | ICD-10-CM

## 2022-07-19 DIAGNOSIS — F41.9 ANXIETY: ICD-10-CM

## 2022-07-19 DIAGNOSIS — F41.9 ANXIETY DISORDER, UNSPECIFIED TYPE: ICD-10-CM

## 2022-07-19 DIAGNOSIS — G47.411 NARCOLEPSY AND CATAPLEXY: Primary | ICD-10-CM

## 2022-07-19 DIAGNOSIS — F43.10 PTSD (POST-TRAUMATIC STRESS DISORDER): ICD-10-CM

## 2022-07-19 PROCEDURE — 90791 PSYCH DIAGNOSTIC EVALUATION: CPT | Mod: 52 | Performed by: SOCIAL WORKER

## 2022-07-19 PROCEDURE — 99214 OFFICE O/P EST MOD 30 MIN: CPT | Mod: 95 | Performed by: PSYCHIATRY & NEUROLOGY

## 2022-07-19 RX ORDER — DEXTROAMPHETAMINE SACCHARATE, AMPHETAMINE ASPARTATE MONOHYDRATE, DEXTROAMPHETAMINE SULFATE AND AMPHETAMINE SULFATE 7.5; 7.5; 7.5; 7.5 MG/1; MG/1; MG/1; MG/1
30 CAPSULE, EXTENDED RELEASE ORAL DAILY
Qty: 30 CAPSULE | Refills: 0 | Status: SHIPPED | OUTPATIENT
Start: 2022-07-19 | End: 2022-08-11

## 2022-07-19 RX ORDER — MIRTAZAPINE 7.5 MG/1
7.5 TABLET, FILM COATED ORAL AT BEDTIME
Qty: 30 TABLET | Refills: 2 | Status: SHIPPED | OUTPATIENT
Start: 2022-07-19 | End: 2023-08-30

## 2022-07-19 ASSESSMENT — ANXIETY QUESTIONNAIRES
7. FEELING AFRAID AS IF SOMETHING AWFUL MIGHT HAPPEN: MORE THAN HALF THE DAYS
8. IF YOU CHECKED OFF ANY PROBLEMS, HOW DIFFICULT HAVE THESE MADE IT FOR YOU TO DO YOUR WORK, TAKE CARE OF THINGS AT HOME, OR GET ALONG WITH OTHER PEOPLE?: VERY DIFFICULT
4. TROUBLE RELAXING: NEARLY EVERY DAY
2. NOT BEING ABLE TO STOP OR CONTROL WORRYING: SEVERAL DAYS
1. FEELING NERVOUS, ANXIOUS, OR ON EDGE: MORE THAN HALF THE DAYS
GAD7 TOTAL SCORE: 12
5. BEING SO RESTLESS THAT IT IS HARD TO SIT STILL: SEVERAL DAYS
3. WORRYING TOO MUCH ABOUT DIFFERENT THINGS: MORE THAN HALF THE DAYS
GAD7 TOTAL SCORE: 12
7. FEELING AFRAID AS IF SOMETHING AWFUL MIGHT HAPPEN: MORE THAN HALF THE DAYS
6. BECOMING EASILY ANNOYED OR IRRITABLE: SEVERAL DAYS
GAD7 TOTAL SCORE: 12

## 2022-07-19 ASSESSMENT — PATIENT HEALTH QUESTIONNAIRE - PHQ9
SUM OF ALL RESPONSES TO PHQ QUESTIONS 1-9: 10
SUM OF ALL RESPONSES TO PHQ QUESTIONS 1-9: 10
10. IF YOU CHECKED OFF ANY PROBLEMS, HOW DIFFICULT HAVE THESE PROBLEMS MADE IT FOR YOU TO DO YOUR WORK, TAKE CARE OF THINGS AT HOME, OR GET ALONG WITH OTHER PEOPLE: VERY DIFFICULT

## 2022-07-19 NOTE — PATIENT INSTRUCTIONS
Start mirtazapine 7.5 mg at bedtime.     Schedule MSLT or lumbar puncture per sleep specialist.     Increase Adderall XR to 30 mg daily.  Contact me in about a week to report on its effect.     Continue all other medications per your primary care provider.    Schedule an appointment with me in 6 weeks or sooner as needed.  You may call [facility] at [phone] to schedule.    [place] Resources:    Go to the Emergency Department as needed or call after hours crisis line at [phone].    To schedule individual or family therapy, call [facility] at [phone].   Follow up with primary care provider as planned or sooner for acute medical concerns.  Call the psychiatric nurse line with medication questions or concerns at [phone].  Minglyt may be used to communicate with your provider, but this is not intended to be used for emergencies.    Community Resources:    National Suicide Prevention Lifeline: 223.322.1855 (TTY: 991.136.3907). Call anytime for help.  (www.suicidepreventionlifeline.org)  National [place] on Mental Illness (www.alexi.org): 413.785.3272 or 066-339-4225.   Mental Health Association (www.mentalhealth.org): 855.322.5028 or 783-197-8566.  [place] Crisis Text Line: Text [place] to 648163  Suicide LifeLine Chat: suicidepreLightningBuyline.org/chat

## 2022-07-19 NOTE — PROGRESS NOTES
[facility] Services   Diagnostic Assessment Update      PATIENT'S NAME: Isaura Gray  MRN:   [id number]  :   1985  DATE OF SERVICE: 2022  SERVICE LOCATION: [facility] / Email (patient reached)/ [facility]  Visit Activities: [facility] Only    Identifying Information:  Patient is a 36 year old year old, , partnered / significant other female.  Patient attended the session alone.        Updates on Presenting Concern(s):  Patient reports the following reason(s) for continuing to receive behavioral services: continues as a [facility] patient.  Patient reported that her symptoms and concerns are improving.  Patient attributed the status of her current symptoms to improvements in their connectedness to resources and support services.      Pt shares that she is doing ok.  Is trying to find somewhere she can get the MSLT testing done as the first 2 sleep studies she had scheduled she found out they don't do the MSLT.  Has met with sleep psychologist a couple of times now.    Feels the adderall was very helpful initially but not as much now. Is still needing to nap 1-2 times a day as she finds that it tends to wear off by noon.  Does find that she can focus better when awake however which she is grateful for.  Continues to work her new job and says it is going fine.  Currently at about 13 hours of sleep a day.  Was trying to do 30 minute naps but finds that she sleeps through the alarm.  Mood has been pretty good overall.  Is now working with a trauma therapist weekly and will be starting EMDR.  Found that she had some panic symptoms one weekend after a therapy session but that overall the therapy is going well.      Patient stated that her symptoms have resulted in the following functional impairments: childcare / parenting and management of the household and or completion of tasks    Patient reports that other professional(s)  are involved in providing support / services.      Standard Screening tools completed, including PHQ9 and GAD7.  See Epic for today's results.  Historical PHQ9:  PHQ-9 SCORE 6/1/2022 6/2/2022 7/19/2022   PHQ-9 Total Score MyChart 27 (Severe depression) 18 (Moderately severe depression) 10 (Moderate depression)   PHQ-9 Total Score 27 18 10   Some encounter information is confidential and restricted. Go to Review Flowsheets activity to see all data.     Historical GAD7:  AVERY-7 SCORE 5/7/2022 6/2/2022 7/19/2022   Total Score 6 (mild anxiety) 4 (minimal anxiety) 12 (moderate anxiety)   Total Score 6 4 12   Some encounter information is confidential and restricted. Go to Review Flowsheets activity to see all data.       Review of Updates to Patient's Life Situation:  Patient reported no significant changes to their relationships and identified support(s).  Patient identified no changes in the stability of their social connections and identified supports. Patient noted that their living situation did not change within the last year.     Patient's employment status did change.  Patient is currently  employed part time and reports @HIS@ is able to function appropriately at work. Reports that it is a struggle with balancing work and kids due to fatigue.    Patient reported no changes or new involvment with the legal system.  There are no ethnic, cultural or Amish factors that may be relevant for therapy.       Mental Health History:  Patient is currently receiving the following services: counseling.  Recently started seeing a therapist for trauma/EMDR    Chemical Health History:   Patient is not currently receiving any chemical dependency treatment. Patient reports no problems as a result of their drinking / drug use.      Cage-AID score is: 0   Based on Cage-Aid score and clinical interview there  are not indications of drug or alcohol abuse.      Discussed the general effects of drugs and alcohol on health and  well-being.      Significant Losses / Trauma / Abuse / Neglect Issues:  There are no indications or report of significant loss, trauma, abuse or neglect issues related to: hx of trauma.    Issues of possible neglect are not present.      Medical History:   Patient Active Problem List   Diagnosis     Vitamin D deficiency     Supervision of other high risk pregnancies, unspecified trimester     PE (pulmonary thromboembolism) (H)     Hyperprolactinemia (H)     Follicular cancer of thyroid (H)     Lactose intolerance in adult     Schizophrenia (H)     Encounter for triage in pregnant patient     Cough     Chronic bilateral low back pain without sciatica     Normal labor     Anxiety     Cancer (H)     H/O  delivery, currently pregnant     History of pulmonary embolism     History of thyroid cancer     Mitral valve disorder      (normal spontaneous vaginal delivery)     Moderate major depression (H)     PTSD (post-traumatic stress disorder)     ASCUS of cervix with negative high risk HPV     Non-alcoholic fatty liver disease     Psychophysiological insomnia     Family history of Hashimoto thyroiditis     Biliary dyskinesia     Bipolar II disorder (H)     Pituitary tumor - history     Persistent insomnia     Post-cholecystectomy syndrome     Narcolepsy and cataplexy       Medication Review:  Current Outpatient Medications   Medication     albuterol (PROAIR HFA/PROVENTIL HFA/VENTOLIN HFA) 108 (90 Base) MCG/ACT inhaler     amphetamine-dextroamphetamine (ADDERALL XR) 10 MG 24 hr capsule     calcium carbonate (OS-DEBI) 1500 (600 Ca) MG tablet     cholestyramine (QUESTRAN) 4 GM/DOSE powder     levonorgestrel (MIRENA) 20 MCG/24HR IUD     prochlorperazine (COMPAZINE) 10 MG tablet     No current facility-administered medications for this visit.       Medication Compliance:  Yes    Patient was provided recommendation to follow-up with physician.      Mental Status Assessment:  Appearance:   Appropriate   Eye  Contact:   Good   Psychomotor Behavior: Normal   Attitude:   Cooperative   Orientation:   All  Speech   Rate / Production: Normal    Volume:  Normal   Mood:    Normal  Affect:    Appropriate   Thought Content:  Clear   Thought Form:  Coherent  Logical   Insight:    Good       Safety Assessment:    Patient denies a history of suicidal ideation, suicide attempts, self-injurious behavior, homicidal ideation, homicidal behavior and and other safety concerns  Patient denies current or recent suicidal ideation or behaviors.  Patient denies current or recent homicidal ideation or behaviors.  Patient denies current or recent self injurious behavior or ideation.  Patient denies other safety concerns.  Patient reports there are no firearms in the house  Protective Factors Children in the home , Sense of responsibility to family and Positive therapeutic releationships   Risk Factors Rise or drop in anxiety that is sudden in nature      Plan for Safety and Risk Management:  A safety and risk management plan has not been developed at this time, however patient was encouraged to call [place] [phone] should there be a change in any of these risk factors.      Patient's Strengths and Limitations:  Patient identified the following strengths or resources that will help her succeed in counseling: commitment to health and well being, family support and intelligence. Patient identified the following supports: family. Things that may interfere with the patient's success in counseling include:none identified.    Diagnostic Criteria:  Unspecified Anxiety Disorder , Symptoms characteristic of an anxiety disorder that caused clinically significant distress or impairment in social, occupational, or other important areas of functioning predominate but do not meet the full criteria for any of the disorders of the anxiety disorders diagnostic class.  Bipolar II   **Must meet all criteria below for Dx of Bipolar II Disorder**   Current  Hypomanic Symptoms includes:  History of Hypomania, symptoms included:  A. A distinct period of abnormally and persistently elevated, expansive, or irritable mood and abnormally and persistently increased activity or energy lasting at least 4 consecutive days and presentmost of the day, nearly every day.  B. During the period of mood disturbance and increased energy and activity, three (or more) of the following symptoms have persisted (four if the mood is only irritable), represent a nonticeable change from usual behaivor, and have been present to a degree:   - inflated self-esteem or grandiosity    - distractibility   - increase in goal-directed activity  C. The episode is associated with an unequivocal change in functioning that is uncharacteristic of the person when not symptomatic  D. The disturbance in mood and the change in functioning are observable by others  E. The episode is not severe enough to cause marked impairment in social or occupational functioning or to necessitate hospitalization, and does not have psychotic features.  F. The symptoms are not attributable to the direct physiological effects of a substance or a general medical condition      Functional Status:  Patient's symptoms have caused reduced functional status in the following areas: Activities of Daily Living - housekeeping, taking care of children  Occupational / Vocational - job duties      DSM5 Diagnoses: (Sustained by DSM5 Criteria Listed Above)  Diagnoses: 296.89 Bipolar II Disorder Depressed  300.00 (F41.9) Unspecified Anxiety Disorder  Psychosocial & Contextual Factors: fatigue/sleep issues, keeping up with daily activities  WHODAS Score: 14  See Media section of EPIC medical record for completed WHODAS    Preliminary Treatment Plan:    Treatment will focus on: Anxiety - anxiety off and on.  often related to sleep/fatigue issues.    The Following referrals were discussed and initiated: pt recently started seeing an individual  therapist to work on trauma    The patient is receiving treatment / structured support from the following professional(s) / service and treatment. Collaboration will be initiated with: individual therapist if needed.    Referral to another professional/service is not indicated at this time.    A Release of Information is not needed at this time.    Report to child or adult protection services was NA.    DAVID Rose, Behavioral Health Clinician, [facility]
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
## 2022-07-19 NOTE — NURSING NOTE
MH&A Post-Appointment Chart -check      Correct pharmacy verified with patient and updated in chart? [x] yes []no    Medications ordered this visit were e-scribed.  Verified by order class [x] yes  [] no    List Medications:  Adderall XR 30mg  Mirtazapine 7.5mg    Medication changes or discontinuations were communicated to patient's pharmacy: [] yes  [x] no    UA collected [] yes  [] no  [x] n/a-virtual     Outside referrals / labs, etc support staff to follow up: [] yes  [x] no    Future appointment was made: [] yes  [x] no  [] n/a  Future Appointments 7/19/2022 - 1/15/2023              Date Visit Type Length Department Provider     8/11/2022  8:00 PM PSG DIAGNOSTIC 240 min PH SLEEP CENTER PH BED 2    Location Instructions:     From [address]: Exit at [address] on south side [facility]. Turn right on [address]. Turn left at stoplight on [address]. [facility] will be in view two blocks ahead. Complete Registration and check in process at the  located on the first floor lobby. Then proceed to the second floor by using the stairs or elevator to the sleep center.              8/15/2022  3:30 PM UMP FULL PULMONARY FUNCTION 60 min UCSC PULM FUNC TESTING UC PFL D    Location Instructions:     Located in the Clinics and Surgery Center at [address] [place] [address]. For parking options, enter the [facility] /arrival plaza from [facility] and attendants can assist you based on your needs.  parking is available for those with limited mobility M-F from 7 a.m. to 5 p.m. Due to short staffing, we are unable to offer  to all patients/visitors. Visit [email or web address] for more details.  Self-parking:&nbsp;  West Lot: Located across from the main entrance, this is a convenient option for patients. Enter on [facility]. Parking attendants available most hours to assist.&nbsp;     [address] Ramp: Enter at the [facility] entrance (one block north of the [facility] main  entrance). Do not enter the ramp from [facility] - this entrance is not staffed and is further from the [facility] main entrance.              8/15/2022  5:00 PM XR CHEST 2 VIEWS 20 min UCSC XRAY [id number]    Location Instructions:     [facility] [place] [address] [place] [address]  Parking Located in the [facility] and [facility] Center at [address] [place] [address]. We're taking every precaution to prevent the spread of COVID-19. Our top priority is to protect and care for our patients, community members, and our staff. For that reason, we are suspending  services until further notice. Please self-park your vehicle before entering our facility. For [facility] [place] please use the [facility] Ramp at [address] [place] [address].  Entrance and check-in location Enter through the main arrival plaza entrance doors. A  will greet you at the door to direct you to your appointment location. The Imaging Center is located on the first floor, to the left from the entrance. Please check-in at with the registration staff in the Imaging/Laboratory waiting area.              8/16/2022  8:00 AM UMP NEW 60 min  CTR LUNG SCIENCE Fernandez, MD Brice    Location Instructions:     Located in the [facility] and [facility] Center at [address] [place] [address]. For parking options, enter the [facility] /arrival plaza from [facility] and attendants can assist you based on your needs.  parking is available for those with limited mobility M-F from 7 a.m. to 5 p.m. Due to short staffing, we are unable to offer  to all patients/visitors. Visit [email or web address] for more details.  Self-parking:&nbsp;  West Lot: Located across from the main entrance, this is a convenient option for patients. Enter on [facility]. Parking attendants available most hours to assist.&nbsp;     [facility] Ramp: Enter at the [facility]  entrance (one block north of the [facility] main entrance). Do not enter the ramp from [facility] - this entrance is not staffed and is further from the [facility] main entrance.              8/18/2022  9:15 AM NEW CARDIOLOGY 30 min [facility] Karie Mckenna MD    Location Instructions:     Our clinic is located at [address] [facility] [address] [address], [place] [address]. You can park your vehicle at the parking ramp west of [facility] across the street from [facility]. You will cross the skyway to access our clinic on the 2nd floor.              8/25/2022  2:00 PM RETURN SLEEP 30 min [facility] Edgar Bravo, PsyD    Location Instructions:     Enter through the main entrance at the front of the building.&nbsp; Proceed to the 2nd floor, using either the stairs or the elevator, and check in at the reception desk in the 2nd floor lobby.              9/16/2022  3:00 PM NEW 40 min PH GASTRO Edgar Qureshi MD    Location Instructions:     From [facility]: Exit at [address] on south side [facility]. Turn right on [address]. Turn left at stoplight on [facility] [address] [facility] [address] [facility]. [facility] will be in view two blocks ahead                   Dictation completed at time of chart check: [] yes  [x] no    I have checked the documentation for today s encounters and the above information has been reviewed and completed.      Kim Blankenship MA on July 19, 2022 at 1:11 PM

## 2022-07-28 NOTE — PROGRESS NOTES
[facility]    Outpatient Physical Therapy Discharge Note  Patient: Isaura Gray  : 1985    Beginning/End Dates of Reporting Period:  22 to 22    Referring Provider: Kyung Valderrama PA-C    Therapy Diagnosis: Post-COVID.     Client Self Report:  See eval.    Objective Measurements:   See eval note.       Outcome Measures (most recent score):  See eval note.    Goals:  Goal Identifier UE strength   Goal Description Pt will demonstrate 5/5 on all UE strength MMT in order to demonstrate necessary strength for all ADL's.   Target Date 22   Date Met      Progress (detail required for progress note):  Pt did note return for follow up.     Goal Identifier LE strength   Goal Description Pt will demonstrate 5/5 on all LE strength MMT in order to demonstrate necessary strength for household and work duties.   Target Date 22   Date Met      Progress (detail required for progress note):  Pt did note return for follow up.     Goal Identifier Walk test   Goal Description Pt to complete 2 minute or 6 minute walk test to establish long term goals for activity tolerance.   Target Date 22   Date Met      Progress (detail required for progress note):  Pt did note return for follow up.         Plan:  Discharge from therapy.    Discharge:    Reason for Discharge: Patient has failed to schedule further appointments.    Equipment Issued: None.    Discharge Plan: Patient to continue home program.

## 2022-08-02 ENCOUNTER — PRE VISIT (OUTPATIENT)
Dept: PULMONOLOGY | Facility: CLINIC | Age: 37
End: 2022-08-02

## 2022-08-02 ENCOUNTER — PATIENT OUTREACH (OUTPATIENT)
Dept: CARE COORDINATION | Facility: CLINIC | Age: 37
End: 2022-08-02

## 2022-08-02 NOTE — PROGRESS NOTES
Clinic Care Coordination Contact  [facility]/Voicemail       Clinical Data: Care Coordinator Outreach  Outreach attempted x 1.  Left message on patient's voicemail with call back information and requested return call.  Plan:  Care Coordinator will try to reach patient again in 5-7 business days.    TRAMAINE Ramirez  [facility] Primary Care - Care Coordinator   8/2/2022   2:32 PM  [phone]

## 2022-08-09 NOTE — PROGRESS NOTES
Clinic Care Coordination Contact  Care Team Conversations    Pt answered and asked for a call back tomorrow afternoon.     Will attempt to call per her wishes.     TRAMAINE Ramirez  [facility] Primary Care - Care Coordinator   8/9/2022   1:35 PM  [phone]

## 2022-08-10 ENCOUNTER — PATIENT OUTREACH (OUTPATIENT)
Dept: CARE COORDINATION | Facility: CLINIC | Age: 37
End: 2022-08-10

## 2022-08-10 NOTE — PROGRESS NOTES
Clinic Care Coordination Contact    Follow Up Progress Note      Assessment: pt noted that every time the family goes out to do some activity they get sick and she is tired of this.  Talked about the importance of finding the balance of self care, family care, and tasks so she can get better.     Pt had no new concerns.  She did agree that prioritizing her tasks/needs was important and to do quiet family events for a few days.     Care Gaps:    Health Maintenance Due   Topic Date Due     URINE DRUG SCREEN  Never done     ADVANCE CARE PLANNING  Never done     Pneumococcal Vaccine: Pediatrics (0 to 5 Years) and At-Risk Patients (6 to 64 Years) (1 - PCV) Never done     MEDICARE ANNUAL WELLNESS VISIT  02/13/2021     COVID-19 Vaccine (2 - Booster for William series) 07/01/2021       Care Gap Goal set: Yes    Goals addressed this encounter:    Goals Addressed                    This Visit's Progress       1. Annual Wellness visit (pt-stated)   0%      Goal Statement: I will schedule my Annual Wellness Visit.  Date Goal set: 3/8/2022  Barriers: scheduling  Strengths: desire to complete  Date to Achieve By: 9/4/22   Patient expressed understanding of goal: Yes    Action steps to achieve this goal:  1. I will call scheduling or go on [employer] to schedule my Annual Wellness Visit.  2. I will attend the appointment.  6/30/22 reminded.                2. Functional (pt-stated)   50%      Goal Statement: I want to get the upstairs cleaned and will break it down into manageable tasks.   Date Goal set: 5/4/2021   Barriers: my mental health, two young children  Strengths: I just got an henrietta to help with identifying tasks  Date to Achieve By: 5/31/23   Patient expressed understanding of goal: Yes    Action steps to achieve this goal:  1. I will stop looking at the job as one big task I need to complete and break it down into manageable steps.  2. I will learn how to use the henrietta and identify steps.  3. I will start to work on the tasks  "at a reasonable rate.  4. If I start to feel overwhelmed with what is ahead of me I will look at what I have completed and remind myself to focus on steps and not the entire task.  6/30/22 \"Not going well\"   but [facility] worker will be starting., hoping that will help                 Intervention/Education provided during outreach: encouraged pt to focus on family self care and prioritize other tasks.      Outreach Frequency: monthly      Plan:   Pt to complete appointments as scheduled.  Pt to work on prioritizing what is needed to be done.   Care Coordinator will follow up in one month.     TRAMAINE Ramirez  [facility] Primary Care - Care Coordinator   8/10/2022   1:55 PM  [phone]    "

## 2022-08-10 NOTE — LETTER
It was a pleasure to speak with you.  I would like to provide you with the enclosed information for your records.  As part of care coordination, we are developing care plans to assist in accomplishing your health care goals.  When we speak next, please feel free to let me know if you want to add or change any information on your care plans.    As always, feel free to contact me if you have any questions or concerns.  I look forward to working with you in the effort to achieve your health care and wellness goals .        Sincerely,      Violette Wren, [facility]  Clinic Care Coordination  [phone]  [facility]  Patient Centered Plan of Care  About Me:        Patient Name:  Isaura Gray    YOB: 1985  Age:         36 year old   [place] MRN:    [id number] Telephone Information:  Home Phone [phone]   Mobile [phone]       Address:  [address] [address] [place] [address] Email address:  [email or web address]      Emergency Contact(s)    Name Relationship Lgl Grd Work Phone Home Phone Mobile Phone   1. FAMILIA AKERS Significant ot*   [phone]           Primary language:  English     needed? No   [place] [employer] [facility]:  [phone] op. 1  Other communication barriers:Other (due to MH and Anxiety)    Preferred Method of Communication:  Mail  Current living arrangement: I live in a private home with family (2 children and boyfriend)    Mobility Status/ Medical Equipment: Independent        Health Maintenance  Health Maintenance Reviewed: Due/Overdue   Health Maintenance Due   Topic Date Due     URINE DRUG SCREEN  Never done     ADVANCE CARE PLANNING  Never done     Pneumococcal Vaccine: Pediatrics (0 to 5 Years) and At-Risk Patients (6 to 64 Years) (1 - PCV) Never done     MEDICARE ANNUAL WELLNESS VISIT  02/13/2021     COVID-19 Vaccine (2 - Booster for William series) 07/01/2021           My Access Plan  Medical Emergency 911   Primary Clinic Line  [facility] [phone]   24 Hour Appointment Line [phone] or  [phone]) (toll-free)   24 Hour Nurse Line [phone] (toll-free)   Preferred Urgent Care [facility], [phone]     [facility]  [phone]     Preferred Pharmacy [facility] [place], [place] - [address]     Behavioral Health Crisis Line The National Suicide Prevention Lifeline at 1-367.112.2178 or 138             My Care Team Members  Patient Care Team       Relationship Specialty Notifications Start End    Timmy Umana MD PCP - General Family Practice  3/25/18     Phone: [phone] Fax: [phone]         [address] [facility] [address]    Simi Bearden MD MD Hematology & Oncology Admissions 9/16/16     Phone: [phone] Fax: [phone]         [address] [facility] [address]    Timmy Umana MD Assigned PCP   12/20/20     Phone: [phone] Fax: [phone]         [address] [facility] [address]    Osmany Smith MD Assigned Surgical Provider   2/21/21     Phone: [phone] Fax: [phone]         [address] [facility] [address]    Violette Wren LSW Lead Care Coordinator Primary Care - CC Admissions 5/4/21     Phone: [phone] Fax: [phone]        Merced Ko MD MD Endocrinology, Diabetes, and Metabolism  10/29/21     Phone: [phone] Fax: [phone] [address] [address] [address]    Blair Wan PA-C Referring Physician Family Medicine  10/29/21     Phone: [phone] Fax: [phone]         [address] [facility] [address]    Merced Ko MD Assigned Endocrinology Provider   11/28/21     Phone: [phone] Fax: [phone] [address] [place] [address]    Chuy Cowart PA-C Assigned Sleep Provider   1/9/22     Phone: [phone] Fax:  [phone]         [address] [place] [address]    Barbara Montgomery MD Assigned Behavioral Health Provider   1/23/22     Phone: [phone] Fax: [phone]         [address] [place] [address]    Brice Fernandez MD MD Internal Medicine  5/9/22     Phone: [phone] Fax: [phone]         [address] [facility] [address]    Kyung Valderrama PA-C Physician Assistant Neurology  5/9/22     Referred to Pulm.    Phone: [phone] Fax: [phone]         [address] [place] [address]    Karie Mckenna MD MD Cardiovascular Disease  5/12/22     Phone: [phone] Pager: [phone] Fax: [phone]        [address] [facility] [address]    Kyung Valderrama PA-C Assigned Neuroscience Provider   5/15/22     Phone: [phone] Fax: [phone]         [address] [place] [address]    Karie Mckenna MD MD Cardiovascular Disease  5/17/22     Phone: [phone] Pager: [phone] Fax: [phone]        [address] [facility] [address]            My Care Plans  Self Management and Treatment Plan  Goals and (Comments)   Goals        General     1. Annual Wellness visit (pt-stated)      Notes - Note edited  6/30/2022 11:02 AM by Ophelia Thompson LSW     Goal Statement: I will schedule my Annual Wellness Visit.  Date Goal set: 3/8/2022  Barriers: scheduling  Strengths: desire to complete  Date to Achieve By: 9/4/22   Patient expressed understanding of goal: Yes    Action steps to achieve this goal:  1. I will call scheduling [facility] to [facility] my Annual Wellness Visit.  2. I will attend the appointment.  6/30/22 reminded.              2. Functional (pt-stated)      Notes - Note edited  6/30/2022 10:51 AM by Ophelia Thompson LSW     Goal Statement: I want to get the upstairs cleaned and will break it down into manageable tasks.   Date Goal set: 5/4/2021   Barriers: my mental health, two young children  Strengths: I just got an henrietta to  "help with identifying tasks  Date to Achieve By: 23   Patient expressed understanding of goal: Yes    Action steps to achieve this goal:  1. I will stop looking at the job as one big task I need to complete and break it down into manageable steps.  2. I will learn how to use the henrietta and identify steps.  3. I will start to work on the tasks at a reasonable rate.  4. If I start to feel overwhelmed with what is ahead of me I will look at what I have completed and remind myself to focus on steps and not the entire task.  22 \"Not going well\"   but [employer] worker will be starting., hoping that will help                  Action Plans on File:                       Advance Care Plans/Directives Type:   Nothing on file      My Medical and Care Information  Problem List   Patient Active Problem List   Diagnosis     Vitamin D deficiency     Supervision of other high risk pregnancies, unspecified trimester     PE (pulmonary thromboembolism) (H)     Hyperprolactinemia (H)     Follicular cancer of thyroid (H)     Lactose intolerance in adult     Schizophrenia (H)     Encounter for triage in pregnant patient     Cough     Chronic bilateral low back pain without sciatica     Normal labor     Anxiety     Cancer (H)     H/O  delivery, currently pregnant     History of pulmonary embolism     History of thyroid cancer     Mitral valve disorder      (normal spontaneous vaginal delivery)     Moderate major depression (H)     PTSD (post-traumatic stress disorder)     ASCUS of cervix with negative high risk HPV     Non-alcoholic fatty liver disease     Psychophysiological insomnia     Family history of Hashimoto thyroiditis     Biliary dyskinesia     Bipolar II disorder (H)     Pituitary tumor - history     Persistent insomnia     Post-cholecystectomy syndrome     Narcolepsy and cataplexy      Current Medications and Allergies:       Allergies   Allergen Reactions     Ciprofloxacin Hives     Sulfa Drugs Hives     " Hydrocodone-Acetaminophen Itching           Oxycodone Hives     Oxycodone-Acetaminophen Itching         Current Outpatient Medications:      albuterol (PROAIR HFA/PROVENTIL HFA/VENTOLIN HFA) 108 (90 Base) MCG/ACT inhaler, Inhale 2 puffs into the lungs in the morning and 2 puffs at noon and 2 puffs in the evening and 2 puffs before bedtime., Disp: 18 g, Rfl: 0     amphetamine-dextroamphetamine (ADDERALL XR) 30 MG 24 hr capsule, Take 1 capsule (30 mg) by mouth daily, Disp: 30 capsule, Rfl: 0     calcium carbonate (OS-DEBI) 1500 (600 Ca) MG tablet, Take 1 tablet (600 mg) by mouth 2 times daily (with meals), Disp: 180 tablet, Rfl: 1     cholestyramine (QUESTRAN) 4 GM/DOSE powder, Take 2 g by mouth 2 times daily (with meals) Adjust dose and frequency to keep bowels more consistent., Disp: 400 g, Rfl: 1     levonorgestrel (MIRENA) 20 MCG/24HR IUD, 1 each (20 mcg) by Intrauterine route once, Disp: , Rfl:      mirtazapine (REMERON) 7.5 MG tablet, Take 1 tablet (7.5 mg) by mouth At Bedtime, Disp: 30 tablet, Rfl: 2     prochlorperazine (COMPAZINE) 10 MG tablet, Take 1 tablet (10 mg) by mouth every 6 hours as needed for nausea or vomiting, Disp: 20 tablet, Rfl: 1      Care Coordination Start Date: 5/4/2021   Frequency of Care Coordination: monthly     Form Last Updated: 08/10/2022

## 2022-08-16 ENCOUNTER — PRE VISIT (OUTPATIENT)
Dept: PULMONOLOGY | Facility: CLINIC | Age: 37
End: 2022-08-16
Payer: MEDICARE

## 2022-08-25 ENCOUNTER — VIRTUAL VISIT (OUTPATIENT)
Dept: SLEEP MEDICINE | Facility: CLINIC | Age: 37
End: 2022-08-25
Payer: MEDICARE

## 2022-08-25 VITALS — HEIGHT: 72 IN | WEIGHT: 156 LBS | BODY MASS INDEX: 21.13 KG/M2

## 2022-08-25 DIAGNOSIS — F51.04 CHRONIC INSOMNIA: Primary | ICD-10-CM

## 2022-08-25 PROCEDURE — 90832 PSYTX W PT 30 MINUTES: CPT | Mod: 95 | Performed by: PSYCHOLOGIST

## 2022-08-25 ASSESSMENT — SLEEP AND FATIGUE QUESTIONNAIRES
HOW LIKELY ARE YOU TO NOD OFF OR FALL ASLEEP WHILE WATCHING TV: HIGH CHANCE OF DOZING
HOW LIKELY ARE YOU TO NOD OFF OR FALL ASLEEP WHILE SITTING AND TALKING TO SOMEONE: MODERATE CHANCE OF DOZING
HOW LIKELY ARE YOU TO NOD OFF OR FALL ASLEEP WHILE SITTING AND READING: HIGH CHANCE OF DOZING
HOW LIKELY ARE YOU TO NOD OFF OR FALL ASLEEP IN A CAR, WHILE STOPPED FOR A FEW MINUTES IN TRAFFIC: HIGH CHANCE OF DOZING
HOW LIKELY ARE YOU TO NOD OFF OR FALL ASLEEP WHEN YOU ARE A PASSENGER IN A CAR FOR AN HOUR WITHOUT A BREAK: HIGH CHANCE OF DOZING
HOW LIKELY ARE YOU TO NOD OFF OR FALL ASLEEP WHILE SITTING QUIETLY AFTER LUNCH WITHOUT ALCOHOL: HIGH CHANCE OF DOZING
HOW LIKELY ARE YOU TO NOD OFF OR FALL ASLEEP WHILE LYING DOWN TO REST IN THE AFTERNOON WHEN CIRCUMSTANCES PERMIT: HIGH CHANCE OF DOZING
HOW LIKELY ARE YOU TO NOD OFF OR FALL ASLEEP WHILE SITTING INACTIVE IN A PUBLIC PLACE: HIGH CHANCE OF DOZING

## 2022-08-25 NOTE — PROGRESS NOTES
Isaura is a 36 year old who is being evaluated via a billable video visit.      How would you like to obtain your AVS? MyChart  If the video visit is dropped, the invitation should be resent by: Text to cell phone: [phone]  Will anyone else be joining your video visit? No      Video-Visit Details    Video Start Time: 2:05 PM    Type of service:  Video Visit    Video End Time:2:25 PM, approximate    Originating Location (pt. Location): Home    Distant Location (provider location):  [facility]     Platform used for Video Visit: Local [employer]    SLEEP MEDICINE VIRTUAL VIDEO FOLLOW-UP VISIT  Sleep Psychology    Patient Name: Isaura Gray  MRN:  [id number]  Date of Service: Aug 25, 2022       Subjective Report     Isaura Gray  returns for a telehealth video visit to discuss progress in implementing behavioral strategies for the management of insomnia.  Patient consent for initiation of video visit was obtained and documented prior to initiation of visit.     Isaura reports she has been maintaining recommended sleep schedule and is waiting to fall asleep by 9:30 PM and is getting up 6 AM.  She is also taking a 30 minute nap around 2 PM.  Adderall dose has been increased to 30 mg. .  She reports feeling more alert with the increase in Adderall.  She is scheduled to complete PSG with MSLT in November.  We reviewed stimulus control and management of sleep schedule.  Discussed importance of limiting and maintaining scheduled nap rather than inadvertent napping.  .     Sleep Data:     Source of Sleep Estimates:  Verbal Self-report    Average Time in Bed: 8.5 hours  Average Total Sleep Time: 7.5 hrs  Sleep Efficiency: Approximately 85%    EPWORTH SLEEPINESS SCALE    Sitting and reading 3   Watching TV 3   Sitting, inactive in a public place (theatre or mtg.) 3    As a passenger in a car 3   Lying down to rest in the afternoon when circumstance permit 3   Sitting  and talking to someone 2   Sitting quietly after lunch without alcohol 3   In a car, while stopped for a few minutes in traffic 3   TOTAL SCORE (nl <11) 23     INSOMNIA SEVERITY INDEX     Difficulty falling asleep 1   Difficult staying asleep 3   Problems waking up to early 4   How SATISFIED/DISSATISFIED are you with your CURRENT sleep pattern? 4   How NOTICEABLE to others do you think your sleep pattern is in terms of your quality of life? 4   How WORRIED/DISTRESSED are you about your current sleep pattern? 4   To what extent do you consider your sleep problem to INTERFERE with your daily fuctioning(e.g. daytime fatigue, mood, ability to function at work/daily chores, concentration, mood,etc.) CURRENTLY? 4   INSOMNIA SEVERITY INDEX TOTAL SCORE 24    Absence of insomnia (0-7); sub-threshold insomnia (8-14); moderate insomnia (15-21); and severe insomnia (22-28)       Interventions     Strategies and recommendations including Stimulus control therapy and Sleep hygiene training were discussed today.       Vital Signs     Ht 1.829 m (6')   Wt 70.8 kg (156 lb)   LMP  (LMP Unknown)   BMI 21.16 kg/m       Mental Status     Orientation:  X3  Mood:  normal  Affect:  Congruent with mood  Speech/Language:  Normal  Thought Process: Intact  Associations:  Normal  Thought Content: Normal  Patient does not report any suicidal ideation, intention or plan.    Diagnostic Impressions and Plan     Chronic insomnia (with excessive Daytime Sleepiness)  F51.04  Parasomnia, Unspecified    Patient is experiencing improved overall sleep quality, reduced daytime sleepiness and less napping with a combination of behavioral sleep plan and increase in Adderall to 30 mg.    Plan:  Continue current sleep schedule and plan Complete PSG and MSLT in November per sleep physician orders.    Follow-up: as needed      Edgar Bravo, [place], LP, DBSM  Diplomate, Behavioral Sleep Medicine  [facility]      Note: This  dictation was created using voice recognition software. This document may contain an error not identified before finalizing the document. If the error changes the accuracy of the document, I would appreciate it being brought to my attention.

## 2022-09-02 ENCOUNTER — PATIENT OUTREACH (OUTPATIENT)
Dept: CARE COORDINATION | Facility: CLINIC | Age: 37
End: 2022-09-02

## 2022-09-02 NOTE — PROGRESS NOTES
Clinic Care Coordination - Chart Review Only    Situation/Background: Patient chart reviewed by care coordinator related to [facility] conversion.    Assessment: Patient continues to be followed by Clinic Care Coordination.    Plan: Patient's chart updated to align with [facility] program for ongoing patient management.    Ely John RN Care Coordinator  [facility] [place]  Email: [email or web address]  Phone: [phone]

## 2022-09-09 ENCOUNTER — MYC REFILL (OUTPATIENT)
Dept: PSYCHIATRY | Facility: CLINIC | Age: 37
End: 2022-09-09

## 2022-09-09 DIAGNOSIS — G47.411 NARCOLEPSY AND CATAPLEXY: ICD-10-CM

## 2022-09-09 NOTE — TELEPHONE ENCOUNTER
Date of Last Office Visit: 7/19/22  Date of Next Office Visit: 10/19/22  No shows since last visit: none  Cancellations since last visit: none    Medication requested: Adderall 30mg Date last ordered: 8/11/22 Qty: 30 Refills: 0     [place] of [place] ?: [place]  Medication last filled date: 8/15/22 Qty filled: 30  [place] on [place] ?: y[place]  If yes, is this a new medication?: no    Lapse in medication adherence greater than 5 days?: no  If yes, call patient and gather details:    Medication refill request verified as identical to current order?: yes  Result of Last DAM, VPA, Li+ Level, CBC, or Carbamazepine Level (at or since last visit): N/A    Last visit treatment plan:   Treatment Plan:  Patient Instructions   Start mirtazapine 7.5 mg at bedtime.     Schedule MSLT or lumbar puncture per sleep specialist.     Increase Adderall XR to 30 mg daily.  Contact me in about a week to report on its effect.     Continue all other medications per your primary care provider.     Schedule an appointment with me in 6 weeks or sooner as needed.  You may call [facility] at [phone] to schedule.         []Medication refilled per  Medication Refill in Ambulatory Care  policy.  [x]Medication unable to be refilled by RN due to criteria not met as indicated below:    []Eligibility - not seen in the last year   []Supervision - no future appointment   []Compliance - no shows, cancellations or lapse in therapy   []Verification - order discrepancy   [x]Controlled medication   []Medication not included in policy   []90-day supply request   []Other

## 2022-09-10 ENCOUNTER — NURSE TRIAGE (OUTPATIENT)
Dept: NURSING | Facility: CLINIC | Age: 37
End: 2022-09-10

## 2022-09-10 DIAGNOSIS — G47.411 NARCOLEPSY AND CATAPLEXY: ICD-10-CM

## 2022-09-10 NOTE — TELEPHONE ENCOUNTER
Pt calling that refill for Adderall is not at her Pharmacy and requesting refill.  Pt out for past day.  Routing refill request to provider for review/approval because:  Drug not on the [facility] refill protocol     Last Written Prescription Date:  8/11/22  Last Fill Quantity: 30,  # refills: 0   Last office visit provider:  7/19/22     There are no medications in this encounter.       Sonam Scott 09/10/22 11:50 AM    Reason for Disposition    Caller requesting a CONTROLLED substance prescription refill (e.g., narcotics, ADHD medicines)    Additional Information    Negative: New-onset or worsening symptoms, see that guideline (e.g., diarrhea, runny nose, sore throat)    Negative: Medicine question not related to refill or renewal    Negative: Caller (e.g., patient or pharmacist) requesting information about a new medicine    Negative: Caller requesting information unrelated to medicine    Negative: [1] Prescription refill request for ESSENTIAL medicine (i.e., likelihood of harm to patient if not taken) AND [2] triager unable to refill per department policy    Negative: [1] Prescription not at pharmacy AND [2] was prescribed by PCP recently  (Exception: triager has access to EMR and prescription is recorded there. Go to Home Care and confirm for pharmacy.)    Negative: [1] Pharmacy calling with prescription questions AND [2] triager unable to answer question    Negative: Prescription request for new medicine (not a refill)    Protocols used: MEDICATION REFILL AND RENEWAL CALL-A-

## 2022-09-11 ENCOUNTER — HEALTH MAINTENANCE LETTER (OUTPATIENT)
Age: 37
End: 2022-09-11

## 2022-09-12 ENCOUNTER — TELEPHONE (OUTPATIENT)
Dept: PSYCHIATRY | Facility: CLINIC | Age: 37
End: 2022-09-12

## 2022-09-12 ENCOUNTER — MYC REFILL (OUTPATIENT)
Dept: PSYCHIATRY | Facility: CLINIC | Age: 37
End: 2022-09-12

## 2022-09-12 DIAGNOSIS — G47.411 NARCOLEPSY AND CATAPLEXY: ICD-10-CM

## 2022-09-12 RX ORDER — DEXTROAMPHETAMINE SACCHARATE, AMPHETAMINE ASPARTATE MONOHYDRATE, DEXTROAMPHETAMINE SULFATE AND AMPHETAMINE SULFATE 7.5; 7.5; 7.5; 7.5 MG/1; MG/1; MG/1; MG/1
30 CAPSULE, EXTENDED RELEASE ORAL DAILY
Qty: 30 CAPSULE | Refills: 0 | OUTPATIENT
Start: 2022-09-12

## 2022-09-12 RX ORDER — DEXTROAMPHETAMINE SACCHARATE, AMPHETAMINE ASPARTATE MONOHYDRATE, DEXTROAMPHETAMINE SULFATE AND AMPHETAMINE SULFATE 7.5; 7.5; 7.5; 7.5 MG/1; MG/1; MG/1; MG/1
30 CAPSULE, EXTENDED RELEASE ORAL DAILY
Qty: 30 CAPSULE | Refills: 0 | Status: SHIPPED | OUTPATIENT
Start: 2022-09-12 | End: 2022-10-07

## 2022-09-12 NOTE — TELEPHONE ENCOUNTER
RN updated patient script for Adderall XR 30 mg was sent to pharmacy.     Susan Pichardo RN on 9/12/2022 at 2:45 PM

## 2022-09-12 NOTE — TELEPHONE ENCOUNTER
Reason for call:  Medication   If this is a refill request, has the caller requested the refill from the pharmacy already? Yes  Will the patient be using a [facility]? No  Name of the pharmacy and phone number for the current request: [facility]     Name of the medication requested: adderrall xr     Other request: na    Phone number to reach patient:  Home number on file [phone] (home)    Best Time:  any    Can we leave a detailed message on this number?  YES    Travel screening: Not Applicable

## 2022-09-14 ENCOUNTER — PATIENT OUTREACH (OUTPATIENT)
Dept: CARE COORDINATION | Facility: CLINIC | Age: 37
End: 2022-09-14

## 2022-09-14 NOTE — PROGRESS NOTES
Clinic Care Coordination Contact  Care Team Conversations    Pt stated it was not a good time to talk.  She was asked to call back when better time or SW will call in about one week.     TRAMAINE Ramirez  [facility] - Care Coordinator   9/14/2022   1:24 PM  [phone]

## 2022-09-20 SDOH — ECONOMIC STABILITY: FOOD INSECURITY: WITHIN THE PAST 12 MONTHS, YOU WORRIED THAT YOUR FOOD WOULD RUN OUT BEFORE YOU GOT MONEY TO BUY MORE.: SOMETIMES TRUE

## 2022-09-20 SDOH — ECONOMIC STABILITY: FOOD INSECURITY: WITHIN THE PAST 12 MONTHS, THE FOOD YOU BOUGHT JUST DIDN'T LAST AND YOU DIDN'T HAVE MONEY TO GET MORE.: SOMETIMES TRUE

## 2022-09-20 SDOH — HEALTH STABILITY: PHYSICAL HEALTH: ON AVERAGE, HOW MANY MINUTES DO YOU ENGAGE IN EXERCISE AT THIS LEVEL?: 0 MIN

## 2022-09-20 SDOH — HEALTH STABILITY: PHYSICAL HEALTH: ON AVERAGE, HOW MANY DAYS PER WEEK DO YOU ENGAGE IN MODERATE TO STRENUOUS EXERCISE (LIKE A BRISK WALK)?: 0 DAYS

## 2022-09-20 SDOH — ECONOMIC STABILITY: INCOME INSECURITY: IN THE LAST 12 MONTHS, WAS THERE A TIME WHEN YOU WERE NOT ABLE TO PAY THE MORTGAGE OR RENT ON TIME?: NO

## 2022-09-20 ASSESSMENT — SOCIAL DETERMINANTS OF HEALTH (SDOH)
IN A TYPICAL WEEK, HOW MANY TIMES DO YOU TALK ON THE PHONE WITH FAMILY, FRIENDS, OR NEIGHBORS?: NEVER
ARE YOU MARRIED, WIDOWED, DIVORCED, SEPARATED, NEVER MARRIED, OR LIVING WITH A PARTNER?: LIVING WITH PARTNER
DO YOU BELONG TO ANY CLUBS OR ORGANIZATIONS SUCH AS CHURCH GROUPS UNIONS, FRATERNAL OR ATHLETIC GROUPS, OR SCHOOL GROUPS?: NO
HOW OFTEN DO YOU ATTENT MEETINGS OF THE CLUB OR ORGANIZATION YOU BELONG TO?: NEVER
HOW OFTEN DO YOU GET TOGETHER WITH FRIENDS OR RELATIVES?: NEVER
HOW OFTEN DO YOU ATTEND CHURCH OR RELIGIOUS SERVICES?: NEVER

## 2022-09-20 ASSESSMENT — LIFESTYLE VARIABLES
AUDIT-C TOTAL SCORE: 0
HOW MANY STANDARD DRINKS CONTAINING ALCOHOL DO YOU HAVE ON A TYPICAL DAY: PATIENT DOES NOT DRINK
SKIP TO QUESTIONS 9-10: 1
HOW OFTEN DO YOU HAVE SIX OR MORE DRINKS ON ONE OCCASION: NEVER
HOW OFTEN DO YOU HAVE A DRINK CONTAINING ALCOHOL: NEVER

## 2022-09-20 NOTE — PROGRESS NOTES
Clinic Care Coordination Contact    Follow Up Progress Note      Assessment: pt noted that she left her job.  She has been able to set aside some money so is not as stressed as she looks.      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     She is trying to find articles or information on the diagnosis and Sw will see what they can find and send via [employer].     Social determinants of health were updated yet pt did not feel the need for any resources at this time.   Care Gaps:    Health Maintenance Due   Topic Date Due     NICOTINE/TOBACCO CESSATION COUNSELING Q 1 YR  Never done     URINE DRUG SCREEN  Never done     ADVANCE CARE PLANNING  Never done     Pneumococcal Vaccine: Pediatrics (0 to 5 Years) and At-Risk Patients (6 to 64 Years) (1 - PCV) Never done     MEDICARE ANNUAL WELLNESS VISIT  02/13/2021     COVID-19 Vaccine (2 - Booster for William series) 07/01/2021     INFLUENZA VACCINE (1) 09/01/2022       Care Gaps Last addressed on 9/20/22    Care Plans  Care Plan: General     Problem: HP GENERAL PROBLEM     Goal: schedule/attend annual wellness visit     Start Date: 3/8/2022 Expected End Date: 12/13/2022    This Visit's Progress: 10%    Note:     Barriers: scheduling  Strengths: desire to complete    Patient expressed understanding of goal: Yes    Action steps to achieve this goal:  1. I will call scheduling or go on [employer] to schedule my Annual Wellness Visit.  2. I will attend the appointment.                    Care Plan: General     Problem: HP GENERAL PROBLEM     Long-Range Goal: break large tasks into manageable steps     Start Date: 5/4/2021 Expected End Date: 5/31/2023    This Visit's Progress: 50%    Note:     Barriers: my mental health, two young children  Strengths: I just got an henrietta to help with identifying tasks    Patient expressed understanding of goal: Yes  Action steps to achieve this goal:  1. I will stop looking at  the job as one big task I need to complete and break it down into manageable steps.  2. I will learn how to use the henrietta and identify steps.  3. I will start to work on the tasks at a reasonable rate.  4. If I start to feel overwhelmed with what is ahead of me I will look at what I have completed and remind myself to focus on steps and not the entire task.                        Intervention/Education provided during outreach: listened and offered support in her decisions.       Outreach Frequency: monthly    Plan:   Pt to continue to look for work.  Pt to research PAD.  Care Coordinator will follow up in one month.     TRAMAINE Ramirez  [facility] - Care Coordinator   9/20/2022   3:15 PM  [phone]

## 2022-09-21 ENCOUNTER — TELEPHONE (OUTPATIENT)
Dept: SLEEP MEDICINE | Facility: CLINIC | Age: 37
End: 2022-09-21

## 2022-10-07 ENCOUNTER — MYC REFILL (OUTPATIENT)
Dept: FAMILY MEDICINE | Facility: OTHER | Age: 37
End: 2022-10-07

## 2022-10-07 DIAGNOSIS — G47.411 NARCOLEPSY AND CATAPLEXY: ICD-10-CM

## 2022-10-10 ENCOUNTER — MYC REFILL (OUTPATIENT)
Dept: FAMILY MEDICINE | Facility: OTHER | Age: 37
End: 2022-10-10

## 2022-10-10 DIAGNOSIS — G47.411 NARCOLEPSY AND CATAPLEXY: ICD-10-CM

## 2022-10-10 RX ORDER — DEXTROAMPHETAMINE SACCHARATE, AMPHETAMINE ASPARTATE MONOHYDRATE, DEXTROAMPHETAMINE SULFATE AND AMPHETAMINE SULFATE 7.5; 7.5; 7.5; 7.5 MG/1; MG/1; MG/1; MG/1
30 CAPSULE, EXTENDED RELEASE ORAL DAILY
Qty: 30 CAPSULE | Refills: 0 | Status: SHIPPED | OUTPATIENT
Start: 2022-10-10 | End: 2022-11-07

## 2022-10-10 RX ORDER — DEXTROAMPHETAMINE SACCHARATE, AMPHETAMINE ASPARTATE MONOHYDRATE, DEXTROAMPHETAMINE SULFATE AND AMPHETAMINE SULFATE 7.5; 7.5; 7.5; 7.5 MG/1; MG/1; MG/1; MG/1
30 CAPSULE, EXTENDED RELEASE ORAL DAILY
Qty: 30 CAPSULE | Refills: 0 | Status: CANCELLED | OUTPATIENT
Start: 2022-10-10

## 2022-10-10 NOTE — TELEPHONE ENCOUNTER
Reason for call:  Other   Patient called regarding (reason for call): call back  Additional comments:  Pt made a request on 10.7 for her adderal to be refilled and the pharmacy says they still don't have the refill order.  Pt requests callback from nursing team.  Haylee Montgomery    Phone number to reach patient:  Cell number on file:    Telephone Information:   Mobile [phone]       Best Time:  anytime    Can we leave a detailed message on this number?  YES    Travel screening: Not Applicable

## 2022-10-10 NOTE — TELEPHONE ENCOUNTER
Date of Last Office Visit: 7/19/22  Date of Next Office Visit: 10/19/22  No shows since last visit: none  Cancellations since last visit: none     Medication requested: Adderall 30mg Date last ordered: 9/12/22 Qty: 30 Refills: 0     [place] ?: [place]  Medication last filled date: 8/12/22 Qty filled: 30  [place] ?[place]  If yes, is this a new medication?: no     Lapse in medication adherence greater than 5 days?: no  If yes, call patient and gather details:    Medication refill request verified as identical to current order?: yes  Result of Last DAM, VPA, Li+ Level, CBC, or Carbamazepine Level (at or since last visit): N/A     Last visit treatment plan:   Treatment Plan:  Patient Instructions   Start mirtazapine 7.5 mg at bedtime.     Schedule MSLT or lumbar puncture per sleep specialist.     Increase Adderall XR to 30 mg daily.  Contact me in about a week to report on its effect.     Continue all other medications per your primary care provider.     Schedule an appointment with me in 6 weeks or sooner as needed.  You may call [facility] at [phone] to schedule.           []?Medication refilled per  Medication Refill in Ambulatory Care  policy.  [x]?Medication unable to be refilled by RN due to criteria not met as indicated below:               []?Eligibility - not seen in the last year              []?Supervision - no future appointment              []?Compliance - no shows, cancellations or lapse in therapy              []?Verification - order discrepancy              [x]?Controlled medication              []?Medication not included in policy              []?90-day supply request              []?Other

## 2022-10-11 NOTE — TELEPHONE ENCOUNTER
Prior Authorization Approval    Authorization Effective Date: 2/25/2021  Authorization Expiration Date: 3/25/2022  Medication: Paliperidone-APPROVED  Approved Dose/Quantity:   Reference #:     Insurance Company: [employer] - Phone [phone] Fax [phone]  Expected CoPay:       CoPay Card Available:      Foundation Assistance Needed:    Which Pharmacy is filling the prescription (Not needed for infusion/clinic administered): [id number] [place], [place] - [address] [facility] [address]  Pharmacy Notified: Yes  Patient Notified: No    Pharmacy will notify patient when medication is ready.     Detail Level: Zone General Sunscreen Counseling: I recommended a broad spectrum sunscreen with a SPF of 30 or higher. If swimming or exercising sunscreen should be reapplied every 45 minutes to an hour after getting wet or sweating.  One ounce, or the equivalent of a shot glass full of sunscreen, is adequate to protect the skin not covered by a bathing suit. Sun protective clothing can be used in lieu of sunscreen but must be worn the entire time you are exposed to the sun's rays.

## 2022-10-17 ENCOUNTER — HOSPITAL ENCOUNTER (EMERGENCY)
Facility: CLINIC | Age: 37
Discharge: HOME | End: 2022-10-17
Attending: NURSE PRACTITIONER | Admitting: NURSE PRACTITIONER
Payer: MEDICARE

## 2022-10-17 ENCOUNTER — APPOINTMENT (OUTPATIENT)
Dept: GENERAL RADIOLOGY | Facility: CLINIC | Age: 37
End: 2022-10-17
Attending: NURSE PRACTITIONER
Payer: MEDICARE

## 2022-10-17 VITALS
TEMPERATURE: 98.4 F | WEIGHT: 161 LBS | SYSTOLIC BLOOD PRESSURE: 123 MMHG | DIASTOLIC BLOOD PRESSURE: 88 MMHG | BODY MASS INDEX: 21.84 KG/M2 | RESPIRATION RATE: 22 BRPM | OXYGEN SATURATION: 100 % | HEART RATE: 95 BPM

## 2022-10-17 DIAGNOSIS — M54.9 UPPER BACK PAIN: ICD-10-CM

## 2022-10-17 DIAGNOSIS — R53.83 TIREDNESS: ICD-10-CM

## 2022-10-17 DIAGNOSIS — R07.9 CHEST PAIN, UNSPECIFIED TYPE: ICD-10-CM

## 2022-10-17 LAB
ALBUMIN SERPL-MCNC: 3.8 G/DL (ref 3.4–5)
ALBUMIN UR-MCNC: NEGATIVE MG/DL
ALP SERPL-CCNC: 57 U/L (ref 40–150)
ALT SERPL W P-5'-P-CCNC: 29 U/L (ref 0–50)
ANION GAP SERPL CALCULATED.3IONS-SCNC: 6 MMOL/L (ref 3–14)
APPEARANCE UR: CLEAR
AST SERPL W P-5'-P-CCNC: 21 U/L (ref 0–45)
BACTERIA #/AREA URNS HPF: ABNORMAL /HPF
BASOPHILS # BLD AUTO: 0 10E3/UL (ref 0–0.2)
BASOPHILS NFR BLD AUTO: 1 %
BILIRUB SERPL-MCNC: 1.7 MG/DL (ref 0.2–1.3)
BILIRUB UR QL STRIP: NEGATIVE
BUN SERPL-MCNC: 12 MG/DL (ref 7–30)
CALCIUM SERPL-MCNC: 9 MG/DL (ref 8.5–10.1)
CHLORIDE BLD-SCNC: 109 MMOL/L (ref 94–109)
CO2 SERPL-SCNC: 25 MMOL/L (ref 20–32)
COLOR UR AUTO: ABNORMAL
CREAT SERPL-MCNC: 0.7 MG/DL (ref 0.52–1.04)
D DIMER PPP FEU-MCNC: 0.33 UG/ML FEU (ref 0–0.5)
EOSINOPHIL # BLD AUTO: 0 10E3/UL (ref 0–0.7)
EOSINOPHIL NFR BLD AUTO: 1 %
ERYTHROCYTE [DISTWIDTH] IN BLOOD BY AUTOMATED COUNT: 12.9 % (ref 10–15)
FLUAV RNA SPEC QL NAA+PROBE: NEGATIVE
FLUBV RNA RESP QL NAA+PROBE: NEGATIVE
GFR SERPL CREATININE-BSD FRML MDRD: >90 ML/MIN/1.73M2
GLUCOSE BLD-MCNC: 98 MG/DL (ref 70–99)
GLUCOSE UR STRIP-MCNC: NEGATIVE MG/DL
HCG UR QL: NEGATIVE
HCT VFR BLD AUTO: 41.9 % (ref 35–47)
HGB BLD-MCNC: 14.6 G/DL (ref 11.7–15.7)
HGB UR QL STRIP: NEGATIVE
IMM GRANULOCYTES # BLD: 0 10E3/UL
IMM GRANULOCYTES NFR BLD: 0 %
KETONES UR STRIP-MCNC: NEGATIVE MG/DL
LEUKOCYTE ESTERASE UR QL STRIP: ABNORMAL
LYMPHOCYTES # BLD AUTO: 1.3 10E3/UL (ref 0.8–5.3)
LYMPHOCYTES NFR BLD AUTO: 18 %
MCH RBC QN AUTO: 30.4 PG (ref 26.5–33)
MCHC RBC AUTO-ENTMCNC: 34.8 G/DL (ref 31.5–36.5)
MCV RBC AUTO: 87 FL (ref 78–100)
MONOCYTES # BLD AUTO: 0.4 10E3/UL (ref 0–1.3)
MONOCYTES NFR BLD AUTO: 6 %
NEUTROPHILS # BLD AUTO: 5.1 10E3/UL (ref 1.6–8.3)
NEUTROPHILS NFR BLD AUTO: 74 %
NITRATE UR QL: NEGATIVE
NRBC # BLD AUTO: 0 10E3/UL
NRBC BLD AUTO-RTO: 0 /100
PH UR STRIP: 7 [PH] (ref 5–7)
PLATELET # BLD AUTO: 275 10E3/UL (ref 150–450)
POTASSIUM BLD-SCNC: 3.8 MMOL/L (ref 3.4–5.3)
PROT SERPL-MCNC: 7 G/DL (ref 6.8–8.8)
RBC # BLD AUTO: 4.81 10E6/UL (ref 3.8–5.2)
RBC URINE: 0 /HPF
RSV RNA SPEC NAA+PROBE: NEGATIVE
SARS-COV-2 RNA RESP QL NAA+PROBE: NEGATIVE
SODIUM SERPL-SCNC: 140 MMOL/L (ref 133–144)
SP GR UR STRIP: 1 (ref 1–1.03)
SQUAMOUS EPITHELIAL: 6 /HPF
TROPONIN I SERPL HS-MCNC: <3 NG/L
TSH SERPL DL<=0.005 MIU/L-ACNC: 1.18 MU/L (ref 0.4–4)
UROBILINOGEN UR STRIP-MCNC: NORMAL MG/DL
WBC # BLD AUTO: 6.9 10E3/UL (ref 4–11)
WBC URINE: 3 /HPF

## 2022-10-17 PROCEDURE — 87637 SARSCOV2&INF A&B&RSV AMP PRB: CPT | Performed by: NURSE PRACTITIONER

## 2022-10-17 PROCEDURE — 258N000003 HC RX IP 258 OP 636: Performed by: NURSE PRACTITIONER

## 2022-10-17 PROCEDURE — C9803 HOPD COVID-19 SPEC COLLECT: HCPCS

## 2022-10-17 PROCEDURE — 82040 ASSAY OF SERUM ALBUMIN: CPT | Performed by: NURSE PRACTITIONER

## 2022-10-17 PROCEDURE — 85379 FIBRIN DEGRADATION QUANT: CPT | Performed by: NURSE PRACTITIONER

## 2022-10-17 PROCEDURE — 81025 URINE PREGNANCY TEST: CPT | Performed by: NURSE PRACTITIONER

## 2022-10-17 PROCEDURE — 36415 COLL VENOUS BLD VENIPUNCTURE: CPT | Performed by: NURSE PRACTITIONER

## 2022-10-17 PROCEDURE — 96360 HYDRATION IV INFUSION INIT: CPT

## 2022-10-17 PROCEDURE — 99285 EMERGENCY DEPT VISIT HI MDM: CPT | Mod: 25

## 2022-10-17 PROCEDURE — 80053 COMPREHEN METABOLIC PANEL: CPT | Performed by: NURSE PRACTITIONER

## 2022-10-17 PROCEDURE — 71045 X-RAY EXAM CHEST 1 VIEW: CPT

## 2022-10-17 PROCEDURE — 84484 ASSAY OF TROPONIN QUANT: CPT | Performed by: NURSE PRACTITIONER

## 2022-10-17 PROCEDURE — 81001 URINALYSIS AUTO W/SCOPE: CPT | Performed by: NURSE PRACTITIONER

## 2022-10-17 PROCEDURE — 93010 ELECTROCARDIOGRAM REPORT: CPT | Performed by: NURSE PRACTITIONER

## 2022-10-17 PROCEDURE — 85004 AUTOMATED DIFF WBC COUNT: CPT | Performed by: NURSE PRACTITIONER

## 2022-10-17 PROCEDURE — 99284 EMERGENCY DEPT VISIT MOD MDM: CPT | Mod: 25 | Performed by: NURSE PRACTITIONER

## 2022-10-17 PROCEDURE — 93005 ELECTROCARDIOGRAM TRACING: CPT

## 2022-10-17 PROCEDURE — 84443 ASSAY THYROID STIM HORMONE: CPT | Performed by: NURSE PRACTITIONER

## 2022-10-17 RX ORDER — SODIUM CHLORIDE 9 MG/ML
INJECTION, SOLUTION INTRAVENOUS CONTINUOUS
Status: DISCONTINUED | OUTPATIENT
Start: 2022-10-17 | End: 2022-10-17 | Stop reason: HOSPADM

## 2022-10-17 RX ORDER — OMEGA-3 FATTY ACIDS/FISH OIL 300-1000MG
200 CAPSULE ORAL EVERY 4 HOURS PRN
COMMUNITY
End: 2022-11-07

## 2022-10-17 RX ADMIN — SODIUM CHLORIDE 1000 ML: 9 INJECTION, SOLUTION INTRAVENOUS at 12:46

## 2022-10-17 ASSESSMENT — ACTIVITIES OF DAILY LIVING (ADL): ADLS_ACUITY_SCORE: 35

## 2022-10-17 NOTE — ED TRIAGE NOTES
Patient c/o chills, fatigue, dizzy, chest pain and back pain x 3 days     Triage Assessment     Row Name 10/17/22 1206       Triage Assessment (Adult)    Airway WDL WDL       Respiratory WDL    Respiratory WDL X;rhythm/pattern    Rhythm/Pattern, Respiratory shortness of breath

## 2022-10-17 NOTE — DISCHARGE INSTRUCTIONS
Rest.  Drink plenty of fluids.  Tylenol 650 mg every 4-6 hours as needed for pain.  Ibuprofen 400-600 mg every 6-8 hours as needed for pain  (take with food, stop if causing stomach pains.)  Recheck in clinic this week if not improving.  Return to the emergency department for fevers, increased pain, shortness of breath, vomiting or new/worsening symptoms.

## 2022-10-17 NOTE — ED PROVIDER NOTES
History     Chief Complaint   Patient presents with     Chest Pain     HPI  sIaura Gray is a 37 year old female with history of schizophrenia, pituitary tumor, mitral valve prolapse, thyroid disease, nonalcoholic fatty liver disease, bipolar disorder, and PE (in 2016, no longer on blood thinner-only need to be on blood thinner when she is pregnant) who presents for evaluation of chills, fatigue, back pain and chest pain. Symptoms started 3 days ago with pain in her back between shoulder blades. Going from hot and cold. Pain then starting wrapping around chest, squeezing feeling. Profoundly fatigued, no energy. Urinary frequency, but no pain with urination. No objective fevers. Denies cough or congestion. Denies nausea or vomiting. She has been able to eat and drink normally. Denies diarrhea or constipation. Denies abdominal pain. Children at home all have cough with croup illness. Nonsmoker. Denies alcohol use.    Allergies:  Allergies   Allergen Reactions     Ciprofloxacin Hives     Sulfa Drugs Hives     Hydrocodone-Acetaminophen Itching           Oxycodone Hives     Oxycodone-Acetaminophen Itching       Problem List:    Patient Active Problem List    Diagnosis Date Noted     Narcolepsy and cataplexy 06/02/2022     Priority: Medium     Post-cholecystectomy syndrome 04/27/2022     Priority: Medium     Persistent insomnia 04/14/2022     Priority: Medium     Pituitary tumor - history      Priority: Medium     Bipolar II disorder (H) 03/23/2021     Priority: Medium     Biliary dyskinesia 02/17/2021     Priority: Medium     Added automatically from request for surgery [id number]       Non-alcoholic fatty liver disease 12/11/2020     Priority: Medium     Psychophysiological insomnia 12/11/2020     Priority: Medium     Family history of Hashimoto thyroiditis 12/11/2020     Priority: Medium     Moderate major depression (H) 02/13/2020     Priority: Medium     Cancer (H)      Priority: Medium     Anxiety 11/19/2018      Priority: Medium     History of thyroid cancer 2018     Priority: Medium      (normal spontaneous vaginal delivery) 2018     Priority: Medium     Normal labor 2018     Priority: Medium     Cough 2018     Priority: Medium     Chronic bilateral low back pain without sciatica 2018     Priority: Medium     Encounter for triage in pregnant patient 2018     Priority: Medium     Schizophrenia (H)      Priority: Medium     reports spontaneous remission during last pregnancy       Vitamin D deficiency 2018     Priority: Medium     Supervision of other high risk pregnancies, unspecified trimester 2018     Priority: Medium     Lactose intolerance in adult 2018     Priority: Medium     PE (pulmonary thromboembolism) (H)      Priority: Medium     Hyperprolactinemia (H)      Priority: Medium     Follicular cancer of thyroid (H)      Priority: Medium     Mitral valve disorder 2018     Priority: Medium     H/O  delivery, currently pregnant 2016     Priority: Medium     History of pulmonary embolism 10/19/2016     Priority: Medium     ASCUS of cervix with negative high risk HPV 2016     Priority: Medium     16 ASCUS pap, neg HPV. [facility]. Plan: Cotest in 3 yr  20 NIL Pap, Neg HPV. Plan: pending provider.        PTSD (post-traumatic stress disorder) 2015     Priority: Medium        Past Medical History:    Past Medical History:   Diagnosis Date     ASCUS of cervix with negative high risk HPV 2016     Cancer (H)      Depressive disorder      Follicular cancer of thyroid (H)      Hyperprolactinemia (H)      Mitral valve prolapse      PE (pulmonary thromboembolism) (H)      Pituitary tumor      Schizophrenia (H)      Thyroid disease        Past Surgical History:    Past Surgical History:   Procedure Laterality Date     APPENDECTOMY       ENT SURGERY      Partial thyroidectomy     LAPAROSCOPIC CHOLECYSTECTOMY N/A  "3/4/2021    Procedure: Laparoscopic cholecystectomy;  Surgeon: Osmany Smith MD;  Location: [facility] OR       Family History:    Family History   Problem Relation Age of Onset     No Known Problems Mother      Hypertension Father      Cerebrovascular Disease Father 56        Passed away from double brain stem clot     Mental Illness Father      Depression Father      Anxiety Disorder Father      Alcoholism Father      Schizophrenia Father      Asthma Brother      Cancer Maternal Grandfather         lung     No Known Problems Paternal Grandmother      Schizophrenia Paternal Grandfather      Suicide Paternal Grandfather 53     Autism Spectrum Disorder Son      Kidney Disease Son         \"has a third kidney\"     No Known Problems Daughter        Social History:  Marital Status:  Single [1]  Social History     Tobacco Use     Smoking status: Former     Packs/day: 0.50     Years: 5.00     Pack years: 2.50     Types: Cigarettes, Vaping Device     Quit date: 2016     Years since quittin.2     Smokeless tobacco: Never   Vaping Use     Vaping Use: Every day   Substance Use Topics     Alcohol use: No     Comment: Has an issue with her liver (not alcohol related)     Drug use: No        Medications:    amphetamine-dextroamphetamine (ADDERALL XR) 30 MG 24 hr capsule  ibuprofen (ADVIL/MOTRIN) 200 MG capsule  prochlorperazine (COMPAZINE) 10 MG tablet  albuterol (PROAIR HFA/PROVENTIL HFA/VENTOLIN HFA) 108 (90 Base) MCG/ACT inhaler  calcium carbonate (OS-DEBI) 1500 (600 Ca) MG tablet  cholestyramine (QUESTRAN) 4 GM/DOSE powder  levonorgestrel (MIRENA) 20 MCG/24HR IUD  mirtazapine (REMERON) 7.5 MG tablet          Review of Systems  As mentioned above in the history present illness. All other systems were reviewed and are negative.    Physical Exam   BP: (!) 125/95  Pulse: (!) 127  Temp: 98.4  F (36.9  C)  Resp: 20  Weight: 73 kg (161 lb)  SpO2: 100 %      Physical Exam  Constitutional:       General: She is not in acute " distress.     Appearance: Normal appearance. She is well-developed. She is not ill-appearing.   HENT:      Head: Normocephalic and atraumatic.      Right Ear: External ear normal.      Left Ear: External ear normal.      Nose: Nose normal.      Mouth/Throat:      Mouth: Mucous membranes are moist.   Eyes:      Conjunctiva/sclera: Conjunctivae normal.   Cardiovascular:      Rate and Rhythm: Regular rhythm. Tachycardia present.      Heart sounds: Normal heart sounds. No murmur heard.  Pulmonary:      Effort: Pulmonary effort is normal. No respiratory distress.      Breath sounds: Normal breath sounds.   Musculoskeletal:         General: Normal range of motion.   Skin:     General: Skin is warm and dry.      Findings: No rash.   Neurological:      General: No focal deficit present.      Mental Status: She is alert and oriented to person, place, and time.         ED Course     Mental Health Risk Assessment      PSS-3    Date and Time Over the past 2 weeks have you felt down, depressed, or hopeless? Over the past 2 weeks have you had thoughts of killing yourself? Have you ever attempted to kill yourself? When did this last happen? User   10/17/22 1207 no no yes more than 6 months ago [facility]                 Procedures              EKG Interpretation:      Interpreted by FLORIDA Zimmer CNP  Time reviewed: 12:24pm   Symptoms at time of EKG: chest pain   Rhythm: Normal sinus   Rate: Normal  Axis: Normal  Ectopy: None  Conduction: Normal  ST Segments/ T Waves: No ST-T wave changes and No acute ischemic changes  Q Waves: None  Comparison to prior: compared to 4/27/2022 and 3/15/2022. No significant changes.    Clinical Impression: NSR with no acute ischemic changes.         Results for orders placed or performed during the hospital encounter of 10/17/22 (from the past 24 hour(s))   Symptomatic; Yes; 10/13/2022 Influenza A/B & SARS-CoV2 (COVID-19) Virus PCR Multiplex Nose    Specimen: Nose; Swab   Result Value Ref  Range    Influenza A PCR Negative Negative    Influenza B PCR Negative Negative    RSV PCR Negative Negative    SARS CoV2 PCR Negative Negative    Narrative    Testing was performed using the Xpert Xpress CoV2/Flu/RSV Assay on the Cepheid GeneXpert Instrument. This test should be ordered for the detection of SARS-CoV-2 and influenza viruses in individuals who meet clinical and/or epidemiological criteria. Test performance is unknown in asymptomatic patients. This test is for in vitro diagnostic use under the FDA EUA for laboratories certified under CLIA to perform high or moderate complexity testing. This test has not been FDA cleared or approved. A negative result does not rule out the presence of PCR inhibitors in the specimen or target RNA in concentration below the limit of detection for the assay. If only one viral target is positive but coinfection with multiple targets is suspected, the sample should be re-tested with another FDA cleared, approved, or authorized test, if coinfection would change clinical management. This test was validated by the [facility]. These laboratories are certified under the Clinical Laboratory Improvement Amendments of 1988 (CLIA-88) as qualified to perform high complexity laboratory testing.   CBC with platelets differential    Narrative    The following orders were created for panel order CBC with platelets differential.  Procedure                               Abnormality         Status                     ---------                               -----------         ------                     CBC with platelets and d...[[id number]]                      Final result                 Please view results for these tests on the individual orders.   Comprehensive metabolic panel   Result Value Ref Range    Sodium 140 133 - 144 mmol/L    Potassium 3.8 3.4 - 5.3 mmol/L    Chloride 109 94 - 109 mmol/L    Carbon Dioxide (CO2) 25 20 - 32 mmol/L    Anion Gap 6 3 - 14 mmol/L     Urea Nitrogen 12 7 - 30 mg/dL    Creatinine 0.70 0.52 - 1.04 mg/dL    Calcium 9.0 8.5 - 10.1 mg/dL    Glucose 98 70 - 99 mg/dL    Alkaline Phosphatase 57 40 - 150 U/L    AST 21 0 - 45 U/L    ALT 29 0 - 50 U/L    Protein Total 7.0 6.8 - 8.8 g/dL    Albumin 3.8 3.4 - 5.0 g/dL    Bilirubin Total 1.7 (H) 0.2 - 1.3 mg/dL    GFR Estimate >90 >60 mL/min/1.73m2   Troponin I   Result Value Ref Range    Troponin I High Sensitivity <3 <54 ng/L   TSH with free T4 reflex   Result Value Ref Range    TSH 1.18 0.40 - 4.00 mU/L   D dimer quantitative   Result Value Ref Range    D-Dimer Quantitative 0.33 0.00 - 0.50 ug/mL FEU    Narrative    This D-dimer assay is intended for use in conjunction with a clinical pretest probability assessment model to exclude pulmonary embolism (PE) and deep venous thrombosis (DVT) in outpatients suspected of PE or DVT. The cut-off value is 0.50 ug/mL FEU.   UA with Microscopic reflex to Culture    Specimen: Urine, Midstream   Result Value Ref Range    Color Urine Straw Colorless, Straw, Light Yellow, Yellow    Appearance Urine Clear Clear    Glucose Urine Negative Negative mg/dL    Bilirubin Urine Negative Negative    Ketones Urine Negative Negative mg/dL    Specific Gravity Urine 1.003 1.003 - 1.035    Blood Urine Negative Negative    pH Urine 7.0 5.0 - 7.0    Protein Albumin Urine Negative Negative mg/dL    Urobilinogen Urine Normal Normal, 2.0 mg/dL    Nitrite Urine Negative Negative    Leukocyte Esterase Urine Small (A) Negative    Bacteria Urine Moderate (A) None Seen /HPF    RBC Urine 0 <=2 /HPF    WBC Urine 3 <=5 /HPF    Squamous Epithelials Urine 6 (H) <=1 /HPF    Narrative    Urine Culture not indicated   HCG qualitative urine (UPT)   Result Value Ref Range    hCG Urine Qualitative Negative Negative   CBC with platelets and differential   Result Value Ref Range    WBC Count 6.9 4.0 - 11.0 10e3/uL    RBC Count 4.81 3.80 - 5.20 10e6/uL    Hemoglobin 14.6 11.7 - 15.7 g/dL    Hematocrit 41.9  35.0 - 47.0 %    MCV 87 78 - 100 fL    MCH 30.4 26.5 - 33.0 pg    MCHC 34.8 31.5 - 36.5 g/dL    RDW 12.9 10.0 - 15.0 %    Platelet Count 275 150 - 450 10e3/uL    % Neutrophils 74 %    % Lymphocytes 18 %    % Monocytes 6 %    % Eosinophils 1 %    % Basophils 1 %    % Immature Granulocytes 0 %    NRBCs per 100 WBC 0 <1 /100    Absolute Neutrophils 5.1 1.6 - 8.3 10e3/uL    Absolute Lymphocytes 1.3 0.8 - 5.3 10e3/uL    Absolute Monocytes 0.4 0.0 - 1.3 10e3/uL    Absolute Eosinophils 0.0 0.0 - 0.7 10e3/uL    Absolute Basophils 0.0 0.0 - 0.2 10e3/uL    Absolute Immature Granulocytes 0.0 <=0.4 10e3/uL    Absolute NRBCs 0.0 10e3/uL   XR Chest Port 1 View    Narrative    XR CHEST PORT 1 VIEW 10/17/2022 1:27 PM    HISTORY: Chest pain.    COMPARISON: CT chest 5/24/2022      Impression    IMPRESSION: Normal chest.    CHARLY ALVARENGA MD         SYSTEM ID:  [id number]       Medications   0.9% sodium chloride BOLUS (0 mLs Intravenous Stopped 10/17/22 1341)     Followed by   sodium chloride 0.9% infusion (has no administration in time range)       Assessments & Plan (with Medical Decision Making)  37 year old female with chills, back pain, chest pain, and fatigue for the last 3 days. No respiratory symptoms. Her children all have viral respiratory illness (Croup).   On exam patient is afebrile. Normotensive. Tachycardia on arrival at 127 bpm, but otherwise HR has remained normal after arrival.  No concerning exam findings. EKG NSR with no acute ischemic changes.  CBC is normal. Electrolytes are normal. Kidney function is normal. Bilirubin mildly elevated at 1.7, but her AST and ALT are normal. D-dimer is normal. Troponin is normal. UA does not appear concerning for infection. UPT is negative. CXR is negative for infiltrate or consolidation.I have low suspicion for acute coronary syndrome, PE, or pneumonia.  Unclear cause for her symptoms. I did consider possibly viral syndrome given her children currently have viral  respiratory illness. Patient instructed to rest, stay hydrated, recheck with her clinic provider this week if not improving or return to the ED for worsening.       New Prescriptions    No medications on file       Final diagnoses:   Upper back pain   Chest pain, unspecified type   Tiredness       10/17/2022   [facility] EMERGENCY DEPT     Darlyn, FLORIDA Hastings CNP  10/17/22 4764

## 2022-10-18 ENCOUNTER — PATIENT OUTREACH (OUTPATIENT)
Dept: CARE COORDINATION | Facility: CLINIC | Age: 37
End: 2022-10-18

## 2022-10-18 NOTE — PROGRESS NOTES
Clinic Care Coordination Contact    Situation: Patient chart reviewed by care coordinator.    Background: patient was to the Ed and discharged 10/17/22.    Assessment: per ED provider note: 37 year old female with chills, back pain, chest pain, and fatigue for the last 3 days. No respiratory symptoms. Her children all have viral respiratory illness (Croup).   On exam patient is afebrile. Normotensive. Tachycardia on arrival at 127 bpm, but otherwise HR has remained normal after arrival.  No concerning exam findings. EKG NSR with no acute ischemic changes.  CBC is normal. Electrolytes are normal. Kidney function is normal. Bilirubin mildly elevated at 1.7, but her AST and ALT are normal. D-dimer is normal. Troponin is normal. UA does not appear concerning for infection. UPT is negative. CXR is negative for infiltrate or consolidation.I have low suspicion for acute coronary syndrome, PE, or pneumonia.  Unclear cause for her symptoms. I did consider possibly viral syndrome given her children currently have viral respiratory illness. Patient instructed to rest, stay hydrated, recheck with her clinic provider this week if not improving or return to the ED for worsening.    Plan/Recommendations: at this time will not call and will extend SW outreach by 3-5 business days, to give time for pt to improve.    TRAMAINE Ramirez  [facility] - Care Coordinator   10/18/2022   9:41 AM  [phone]

## 2022-10-24 ENCOUNTER — PATIENT OUTREACH (OUTPATIENT)
Dept: CARE COORDINATION | Facility: CLINIC | Age: 37
End: 2022-10-24

## 2022-10-24 NOTE — PROGRESS NOTES
Clinic Care Coordination Contact  Care Team Conversations    Call placed to pt.  She was on her way out the door and not able to talk.  Encouraged pt to call when she could and SW will call in 3-5 business days if no call back is received.     TRAMAINE Ramirez  [facility] - Care Coordinator   10/24/2022   1:48 PM  [phone]

## 2022-10-28 NOTE — PROGRESS NOTES
Clinic Care Coordination Contact    Follow Up Progress Note      Assessment: pt noted that she was not pleased with her ED outcome and ended up going to another ED shortly after the  ED.  Her S.O. noted there were still concerns and brought her.  She said that at the second ED there were issues with her EKG and heart.  She had noted some of it on the EKG at  ED yet the provider said all was fine.  Provided her with the patient relations phone number per her wishes.     Pt noted that she is still working on getting better.  Her energy goes quickly even with not working.  She is looking for work and noticing a lot of others are also applying.  She is struggling with the balance and briefly talked about maybe part time.  That would give her a little cash yet not as extended amount of time away/working.     Care Gaps:    Health Maintenance Due   Topic Date Due     NICOTINE/TOBACCO CESSATION COUNSELING Q 1 YR  Never done     URINE DRUG SCREEN  Never done     ADVANCE CARE PLANNING  Never done     HEPATITIS B IMMUNIZATION (1 of 3 - 3-dose series) Never done     Pneumococcal Vaccine: Pediatrics (0 to 5 Years) and At-Risk Patients (6 to 64 Years) (1 - PCV) Never done     MEDICARE ANNUAL WELLNESS VISIT  02/13/2021     COVID-19 Vaccine (2 - Booster for William series) 07/01/2021     INFLUENZA VACCINE (1) 09/01/2022       Postponed to office visit.  goal previously established for AWV.     Care Plans  Care Plan: General     Problem: HP GENERAL PROBLEM     Goal: schedule/attend annual wellness visit     Start Date: 3/8/2022 Expected End Date: 12/13/2022    This Visit's Progress: 10% Recent Progress: 10%    Note:     Barriers: scheduling  Strengths: desire to complete    Patient expressed understanding of goal: Yes    Action steps to achieve this goal:  1. I will call scheduling or go on [employer] to schedule my Annual Wellness Visit.  2. I will attend the appointment.                    Care Plan: General     Problem: HP  GENERAL PROBLEM     Long-Range Goal: break large tasks into manageable steps     Start Date: 5/4/2021 Expected End Date: 5/31/2023    This Visit's Progress: 40% Recent Progress: 50%    Note:     Barriers: my mental health, two young children  Strengths: I just got an henrietta to help with identifying tasks    Patient expressed understanding of goal: Yes  Action steps to achieve this goal:  1. I will stop looking at the job as one big task I need to complete and break it down into manageable steps.  2. I will learn how to use the henrietta and identify steps.  3. I will start to work on the tasks at a reasonable rate.  4. If I start to feel overwhelmed with what is ahead of me I will look at what I have completed and remind myself to focus on steps and not the entire task.                        Intervention/Education provided during outreach: assisted with problem solving and arriving at options. Listened and provided resources.     Outreach Frequency: monthly    Plan:   Pt to call pt relations if she has concerns with her last [facility] ED visit.  Pt to work on completing tasks and scheduling AWV.   Care Coordinator will follow up in one month.     TRAMAINE Ramirez  [facility] - Care Coordinator   10/28/2022   12:49 PM  [phone]

## 2022-11-07 ENCOUNTER — VIRTUAL VISIT (OUTPATIENT)
Dept: BEHAVIORAL HEALTH | Facility: CLINIC | Age: 37
End: 2022-11-07
Payer: MEDICARE

## 2022-11-07 ENCOUNTER — VIRTUAL VISIT (OUTPATIENT)
Dept: PSYCHIATRY | Facility: CLINIC | Age: 37
End: 2022-11-07
Payer: MEDICARE

## 2022-11-07 DIAGNOSIS — F31.81 BIPOLAR II DISORDER (H): ICD-10-CM

## 2022-11-07 DIAGNOSIS — F41.9 ANXIETY: ICD-10-CM

## 2022-11-07 DIAGNOSIS — G47.411 NARCOLEPSY AND CATAPLEXY: Primary | ICD-10-CM

## 2022-11-07 DIAGNOSIS — G47.411 NARCOLEPSY AND CATAPLEXY: ICD-10-CM

## 2022-11-07 DIAGNOSIS — F41.9 ANXIETY DISORDER, UNSPECIFIED TYPE: Primary | ICD-10-CM

## 2022-11-07 DIAGNOSIS — R44.2 HYPNAGOGIC HALLUCINATIONS: ICD-10-CM

## 2022-11-07 DIAGNOSIS — F43.10 PTSD (POST-TRAUMATIC STRESS DISORDER): ICD-10-CM

## 2022-11-07 PROCEDURE — 99214 OFFICE O/P EST MOD 30 MIN: CPT | Mod: 95 | Performed by: PSYCHIATRY & NEUROLOGY

## 2022-11-07 PROCEDURE — 90832 PSYTX W PT 30 MINUTES: CPT | Mod: 95 | Performed by: SOCIAL WORKER

## 2022-11-07 RX ORDER — DEXTROAMPHETAMINE SACCHARATE, AMPHETAMINE ASPARTATE, DEXTROAMPHETAMINE SULFATE AND AMPHETAMINE SULFATE 1.25; 1.25; 1.25; 1.25 MG/1; MG/1; MG/1; MG/1
5 TABLET ORAL 2 TIMES DAILY
Qty: 30 TABLET | Refills: 0 | Status: SHIPPED | OUTPATIENT
Start: 2022-11-07 | End: 2022-12-03

## 2022-11-07 RX ORDER — DEXTROAMPHETAMINE SACCHARATE, AMPHETAMINE ASPARTATE MONOHYDRATE, DEXTROAMPHETAMINE SULFATE AND AMPHETAMINE SULFATE 7.5; 7.5; 7.5; 7.5 MG/1; MG/1; MG/1; MG/1
30 CAPSULE, EXTENDED RELEASE ORAL DAILY
Qty: 30 CAPSULE | Refills: 0 | Status: SHIPPED | OUTPATIENT
Start: 2022-11-07 | End: 2022-12-03

## 2022-11-07 ASSESSMENT — ANXIETY QUESTIONNAIRES
GAD7 TOTAL SCORE: 11
1. FEELING NERVOUS, ANXIOUS, OR ON EDGE: MORE THAN HALF THE DAYS
5. BEING SO RESTLESS THAT IT IS HARD TO SIT STILL: NOT AT ALL
GAD7 TOTAL SCORE: 11
6. BECOMING EASILY ANNOYED OR IRRITABLE: NEARLY EVERY DAY
GAD7 TOTAL SCORE: 11
IF YOU CHECKED OFF ANY PROBLEMS ON THIS QUESTIONNAIRE, HOW DIFFICULT HAVE THESE PROBLEMS MADE IT FOR YOU TO DO YOUR WORK, TAKE CARE OF THINGS AT HOME, OR GET ALONG WITH OTHER PEOPLE: SOMEWHAT DIFFICULT
4. TROUBLE RELAXING: NEARLY EVERY DAY
7. FEELING AFRAID AS IF SOMETHING AWFUL MIGHT HAPPEN: SEVERAL DAYS
8. IF YOU CHECKED OFF ANY PROBLEMS, HOW DIFFICULT HAVE THESE MADE IT FOR YOU TO DO YOUR WORK, TAKE CARE OF THINGS AT HOME, OR GET ALONG WITH OTHER PEOPLE?: SOMEWHAT DIFFICULT
3. WORRYING TOO MUCH ABOUT DIFFERENT THINGS: SEVERAL DAYS
2. NOT BEING ABLE TO STOP OR CONTROL WORRYING: SEVERAL DAYS
7. FEELING AFRAID AS IF SOMETHING AWFUL MIGHT HAPPEN: SEVERAL DAYS

## 2022-11-07 ASSESSMENT — PATIENT HEALTH QUESTIONNAIRE - PHQ9
SUM OF ALL RESPONSES TO PHQ QUESTIONS 1-9: 16
SUM OF ALL RESPONSES TO PHQ QUESTIONS 1-9: 16
10. IF YOU CHECKED OFF ANY PROBLEMS, HOW DIFFICULT HAVE THESE PROBLEMS MADE IT FOR YOU TO DO YOUR WORK, TAKE CARE OF THINGS AT HOME, OR GET ALONG WITH OTHER PEOPLE: VERY DIFFICULT

## 2022-11-07 NOTE — PATIENT INSTRUCTIONS
Start mirtazapine 7.5 mg at bedtime.     Continue Adderall XR 30 mg daily.  Add immediate release Adderall 5 mg daily as needed at the end of the day.     Continue all other medications per your primary care provider.    Schedule an appointment with me in 2 months or sooner as needed.  You may call [facility] at [phone] to schedule.    [place] Resources:    Go to the Emergency Department as needed or call after hours crisis line at [phone].    To schedule individual or family therapy, call [facility] at [phone].   Follow up with primary care provider as planned or sooner for acute medical concerns.  Call the psychiatric nurse line with medication questions or concerns at [phone].  [employer] may be used to communicate with your provider, but this is not intended to be used for emergencies.    Community Resources:    National Suicide Prevention Lifeline: 504.426.2913 (TTY: 865.210.9651). Call anytime for help.  (www.suicidepreventionlifeline.org)  National Blue Eye on Mental Illness (www.alexi.org): 633.233.3306 or 880-926-4824.   Mental Health Association (www.mentalhealth.org): 616.659.6315 or 571-368-2904.  [place] Crisis Text Line: Text [place] to 662691  Suicide LifeLine Chat: suicidepreFilmijoblifeline.org/chat

## 2022-11-07 NOTE — PROGRESS NOTES
Telemedicine Visit: The patient's condition can be safely assessed and treated via synchronous audio and visual telemedicine encounter.      Reason for Telemedicine Visit: Services only offered telehealth    Originating Site (Patient Location): Patient's home    Distant Location (provider location):  Off-site-home    Consent:  The patient/guardian has verbally consented to: the potential risks and benefits of telemedicine (video visit) versus in person care; bill my insurance or make self-payment for services provided; and responsibility for payment of non-covered services.     Mode of Communication:  Video Conference via [employer]    As the provider I attest to compliance with applicable laws and regulations related to telemedicine.      Isaura is a 37 year old who is being evaluated via a billable video visit.      How would you like to obtain your AVS? MyChart  If the video visit is dropped, the invitation should be resent by: Text to cell phone: [phone]  Will anyone else be joining your video visit? No         Outpatient Psychiatric Progress Note    Name: Isaura Gray   : 1985                    Primary Care Provider: Timmy Umana MD, MD   Therapist: [facility]     PHQ-9 scores:  PHQ-9 SCORE 2022   PHQ-9 Total Score MyChart 18 (Moderately severe depression) 10 (Moderate depression) 16 (Moderately severe depression)   PHQ-9 Total Score 18 10 16   Some encounter information is confidential and restricted. Go to Review Flowsheets activity to see all data.       AVERY-7 scores:  AVERY-7 SCORE 2022   Total Score 4 (minimal anxiety) 12 (moderate anxiety) 11 (moderate anxiety)   Total Score 4 12 11   Some encounter information is confidential and restricted. Go to Review Flowsheets activity to see all data.       Patient Identification:  Isaura is a 37 year old year old female  who presents for return visit with me.  Patient attended  "the session alone.     Interim History:  Per Ginette Oliver, [facility]:  Found an office that will take her.  Will do a 2 week sleep study and has discussed some possible medications.  Plan is right now to stick with the adderall and pt shares that with the increase to 30 she is functional but that there is room for improvement.  She shares that the sleep specialist mentioned maybe having an afternoon dose available as well.  Not working at this time.  Is slowly looking for other jobs but wants to focus on the sleep study and her health at this time.    Continues with individual therapy at [facility] and is interested in switching to Psychiatry there in the future as well.  Did testing through [facility] (RAADS-R) test and it was determined that she has autism.  Also has neuropsych testing coming up due to her memory concerns.    Megan is now working days which has been a positive change.  She reports that she was feeling very lonely when he was working nights and now has the support he can provide to the kids in the evenings.    Isaura reports that her mood is \"all over the place.\"  She wakes up feeling pretty good, then quickly becomes tired, depressed, and has low motivation due to physical fatigue.  She has been using Adderall XR 30 mg daily, which she finds helpful.  As we discussed her symptoms further, it seems as though the fatigue and low mood are coming on around 5 PM, which is most likely when her Adderall wears off for the day.  We agreed to add 5 mg of immediate release around 4 or 5:00 in the afternoon as needed.    She reports that she is going to have some neuropsych testing done.  In the meantime, she was diagnosed with an autism spectrum disorder by her therapist.  She continues to work with several other providers regarding other physical conditions.  She is planning to do a 2-week sleep evaluation soon.    She reports that she is doing well even though we discontinued her antipsychotics.  She denies " any auditory or visual hallucinations.    She is hoping to transfer her care to a psychiatrist through [facility] once her sleep evaluation is completed.    Vital Signs:   There were no vitals taken for this visit.    Labs:  TSH   Date Value Ref Range Status   10/17/2022 1.18 0.40 - 4.00 mU/L Final   04/21/2021 1.80 0.40 - 4.00 mU/L Final     Last Comprehensive Metabolic Panel:  Sodium   Date Value Ref Range Status   10/17/2022 140 133 - 144 mmol/L Final   04/21/2021 139 133 - 144 mmol/L Final     Potassium   Date Value Ref Range Status   10/17/2022 3.8 3.4 - 5.3 mmol/L Final   04/21/2021 3.6 3.4 - 5.3 mmol/L Final     Chloride   Date Value Ref Range Status   10/17/2022 109 94 - 109 mmol/L Final   04/21/2021 106 94 - 109 mmol/L Final     Carbon Dioxide   Date Value Ref Range Status   04/21/2021 26 20 - 32 mmol/L Final     Carbon Dioxide (CO2)   Date Value Ref Range Status   10/17/2022 25 20 - 32 mmol/L Final     Anion Gap   Date Value Ref Range Status   10/17/2022 6 3 - 14 mmol/L Final   04/21/2021 7 3 - 14 mmol/L Final     Glucose   Date Value Ref Range Status   10/17/2022 98 70 - 99 mg/dL Final   04/21/2021 92 70 - 99 mg/dL Final     Urea Nitrogen   Date Value Ref Range Status   10/17/2022 12 7 - 30 mg/dL Final   04/21/2021 13 7 - 30 mg/dL Final     Creatinine   Date Value Ref Range Status   10/17/2022 0.70 0.52 - 1.04 mg/dL Final   04/21/2021 0.87 0.52 - 1.04 mg/dL Final     GFR Estimate   Date Value Ref Range Status   10/17/2022 >90 >60 mL/min/1.73m2 Final     Comment:     Effective December 21, 2021 eGFRcr in adults is calculated using the 2021 CKD-EPI creatinine equation which includes age and gender (Carrie ac al., NEJM, DOI: 10.1056/NPLLgt3858045)   04/21/2021 88 >60 mL/min/[1.73_m2] Final     Comment:     Non  GFR Calc  Starting 12/18/2018, serum creatinine based estimated GFR (eGFR) will be   calculated using the Chronic Kidney Disease Epidemiology Collaboration   (CKD-EPI)  equation.       Calcium   Date Value Ref Range Status   10/17/2022 9.0 8.5 - 10.1 mg/dL Final   04/21/2021 9.0 8.5 - 10.1 mg/dL Final     Bilirubin Total   Date Value Ref Range Status   10/17/2022 1.7 (H) 0.2 - 1.3 mg/dL Final   04/21/2021 1.8 (H) 0.2 - 1.3 mg/dL Final     Alkaline Phosphatase   Date Value Ref Range Status   10/17/2022 57 40 - 150 U/L Final   04/21/2021 75 40 - 150 U/L Final     ALT   Date Value Ref Range Status   10/17/2022 29 0 - 50 U/L Final   04/21/2021 17 0 - 50 U/L Final     AST   Date Value Ref Range Status   10/17/2022 21 0 - 45 U/L Final   04/21/2021 5 0 - 45 U/L Final     Lab Results   Component Value Date    WBC 6.9 10/17/2022    WBC 6.7 04/21/2021     Lab Results   Component Value Date    RBC 4.81 10/17/2022    RBC 4.90 04/21/2021     Lab Results   Component Value Date    HGB 14.6 10/17/2022    HGB 14.8 04/21/2021     Lab Results   Component Value Date    HCT 41.9 10/17/2022    HCT 42.9 04/21/2021     No components found for: MCT  Lab Results   Component Value Date    MCV 87 10/17/2022    MCV 88 04/21/2021     Lab Results   Component Value Date    MCH 30.4 10/17/2022    MCH 30.2 04/21/2021     Lab Results   Component Value Date    MCHC 34.8 10/17/2022    MCHC 34.5 04/21/2021     Lab Results   Component Value Date    RDW 12.9 10/17/2022    RDW 13.8 04/21/2021     Lab Results   Component Value Date     10/17/2022     04/21/2021       Current Medications:  Current Outpatient Medications   Medication     albuterol (PROAIR HFA/PROVENTIL HFA/VENTOLIN HFA) 108 (90 Base) MCG/ACT inhaler     amphetamine-dextroamphetamine (ADDERALL XR) 30 MG 24 hr capsule     amphetamine-dextroamphetamine (ADDERALL) 5 MG tablet     calcium carbonate (OS-DEBI) 1500 (600 Ca) MG tablet     cholestyramine (QUESTRAN) 4 GM/DOSE powder     ibuprofen (ADVIL/MOTRIN) 200 MG capsule     levonorgestrel (MIRENA) 20 MCG/24HR IUD     mirtazapine (REMERON) 7.5 MG tablet     prochlorperazine (COMPAZINE) 10 MG tablet      No current facility-administered medications for this visit.        The [place] Prescription Monitoring Program has been reviewed and there are no concerns about diversionary activity for controlled substances at this time.      Mental Status Examination:  Isaura is a 37-year-old woman in no acute distress.  She is neatly groomed in casual clothing.  Speech is clear and normal in rate and tone.  Eye contact is fair over the video connection.  Motor behavior is appropriate.  Affect is full.  Mood is somewhat labile by her report.  Thoughts are logical and spontaneous with no loose associations or flight of ideas.  Thought content shows no psychosis.  No suicidal thoughts.  She is alert and oriented x3.    Assessment and Plan:    ICD-10-CM    1. Narcolepsy and cataplexy  G47.411 amphetamine-dextroamphetamine (ADDERALL XR) 30 MG 24 hr capsule     amphetamine-dextroamphetamine (ADDERALL) 5 MG tablet      2. PTSD (post-traumatic stress disorder)  F43.10       3. Anxiety  F41.9       4. Hypnagogic hallucinations  R44.2           Medical comorbidities include:   Patient Active Problem List   Diagnosis     Vitamin D deficiency     Supervision of other high risk pregnancies, unspecified trimester     PE (pulmonary thromboembolism) (H)     Hyperprolactinemia (H)     Follicular cancer of thyroid (H)     Lactose intolerance in adult     Schizophrenia (H)     Encounter for triage in pregnant patient     Cough     Chronic bilateral low back pain without sciatica     Normal labor     Anxiety     Cancer (H)     H/O  delivery, currently pregnant     History of pulmonary embolism     History of thyroid cancer     Mitral valve disorder      (normal spontaneous vaginal delivery)     Moderate major depression (H)     PTSD (post-traumatic stress disorder)     ASCUS of cervix with negative high risk HPV     Non-alcoholic fatty liver disease     Psychophysiological insomnia     Family history of Hashimoto thyroiditis      Biliary dyskinesia     Bipolar II disorder (H)     Pituitary tumor - history     Persistent insomnia     Post-cholecystectomy syndrome     Narcolepsy and cataplexy       Treatment Plan:  Patient Instructions   Start mirtazapine 7.5 mg at bedtime.     Continue Adderall XR 30 mg daily.  Add immediate release Adderall 5 mg daily as needed at the end of the day.     Continue all other medications per your primary care provider.    Schedule an appointment with me in 2 months or sooner as needed.  You may call [facility] at [phone] to schedule.    [facility] Resources:      Go to the Emergency Department as needed or call after hours crisis line at [phone].      To schedule individual or family therapy, call [facility] at [phone].     Follow up with primary care provider as planned or sooner for acute medical concerns.    Call the psychiatric nurse line with medication questions or concerns at [phone].    Friend[employer]ithart may be used to communicate with your provider, but this is not intended to be used for emergencies.    Community Resources:      National Suicide Prevention Lifeline: 534.387.6021 (TTY: 534.946.6759). Call anytime for help.  (www.suicidepreventionlifeline.org)    National [place] on Mental Illness (www.alexi.org): 529.895.5416 or 922-573-0740.     Mental Health Association (www.mentalhealth.org): 377.429.4486 or 294-719-9427.    [place] Crisis Text Line: Text [place] to 465473    Suicide LifeLine Chat: suicidepreKonga Online Shopping Limitedline.org/chat         Administrative Billing:   Video call duration: 22 minutes   Start 10:24 AM   Stop 10:46 AM  Total time spent, including chart review and documentation: 32 minutes    Patient Status:  The patient is being referred to long term community psychiatry care and provider will provide bridging until patient is established with new community provider.

## 2022-11-07 NOTE — PROGRESS NOTES
[facility]   November 7, 2022      Behavioral Health Clinician Progress Note    Patient Name: Isaura Gray           Service Type:  Individual      Service Location:   [employer] / Email (patient reached)     Session Start Time: 10:00a  Session End Time: 10:16a      Session Length: 16 - 37      Attendees: Client     Service Modality:  Phone Visit:      Telemedicine Visit: The patient's condition can be safely assessed and treated via synchronous audio and visual telemedicine encounter.      Reason for Telemedicine Visit: Services only offered telehealth    Originating Site (Patient Location): Patient's home    Distant Site (Provider Location): Provider Remote Setting- Home Office    Consent:  The patient/guardian has verbally consented to: the potential risks and benefits of telemedicine (video visit) versus in person care; bill my insurance or make self-payment for services provided; and responsibility for payment of non-covered services.     Mode of Communication:  Video Conference via [employer]a    As the provider I attest to compliance with applicable laws and regulations related to telemedicine.        Visit Activities (Refresh list every visit): [facility] Only    Diagnostic Assessment Date: Updated DA 7/19/22  Treatment Plan Review Date: 2/7/23  See Flowsheets for today's PHQ-9 and AVERY-7 results  Previous PHQ-9:   PHQ-9 SCORE 6/2/2022 7/19/2022 11/7/2022   PHQ-9 Total Score MyChart 18 (Moderately severe depression) 10 (Moderate depression) 16 (Moderately severe depression)   PHQ-9 Total Score 18 10 16   Some encounter information is confidential and restricted. Go to Review Flowsheets activity to see all data.     Previous AVERY-7:   AVERY-7 SCORE 6/2/2022 7/19/2022 11/7/2022   Total Score 4 (minimal anxiety) 12 (moderate anxiety) 11 (moderate anxiety)   Total Score 4 12 11   Some encounter information is confidential and restricted. Go to Review Flowsheets activity to  see all data.       PRECIOUS LEVEL:  PRECIOUS Score (Last Two) 3/28/2018   PRECIOUS Raw Score 35   Activation Score 72.1   PRECIOUS Level 3       DATA  Extended Session (60+ minutes): No  Interactive Complexity: No  Crisis: No  [facility] Patient: No    Treatment Objective(s) Addressed in This Session:  Target Behavior(s): health issues, anxiety, depression    Depressed Mood: Feel less tired and more energy during the day   Anxiety: will experience a reduction in anxiety    Current Stressors / Issues:  Found an office that will take her for sleep issues.  Will do a 2 week sleep study and has discussed some possible medications.  Plan is right now to stick with the adderall and pt shares that with the increase to 30 she is functional but that there is room for improvement.  She shares that the sleep specialist mentioned maybe having an afternoon dose available as well.  Not working at this time.  Is slowly looking for other jobs but wants to focus on the sleep study and her health at this time.    Continues with individual therapy at [facility] and is interested in switching to Psychiatry there in the future as well.  Did testing through [facility] (RAADS-R) test and it was determined that she has autism.  Also has neuropsych testing coming up due to her memory concerns.    Megan is now working days which has been a positive change.  She reports that she was feeling very lonely when he was working nights and now has the support he can provide to the kids in the evenings.     Progress on Treatment Objective(s) / Homework:  Minimal progress - ACTION (Actively working towards change); Intervened by reinforcing change plan / affirming steps taken    Motivational Interviewing    MI Intervention: Expressed Empathy/Understanding, Open-ended questions and Reframe     Change Talk Expressed by the Patient: Committment to change Activation    Provider Response to Change Talk: E - Evoked more info from patient about behavior change, A - Affirmed patient's  thoughts, decisions, or attempts at behavior change, R - Reflected patient's change talk and S - Summarized patient's change talk statements      Care Plan review completed: Yes    Medication Review:  Changes to psychiatric medications, see updated Medication List in EPIC.     Medication Compliance:  Yes    Changes in Health Issues:   Yes: lots of nausea, extreme fatigue, Associated Psychological Distress.  Health issues a bit more manageable.      Chemical Use Review:   Substance Use: Chemical use reviewed, no active concerns identified      Tobacco Use: No current tobacco use.      Assessment: Current Emotional / Mental Status (status of significant symptoms):  Risk status (Self / Other harm or suicidal ideation)  Patient denies a history of suicidal ideation, suicide attempts, self-injurious behavior, homicidal ideation, homicidal behavior and and other safety concerns  Patient denies current fears or concerns for personal safety.  Patient denies current or recent suicidal ideation or behaviors.  Patient denies current or recent homicidal ideation or behaviors.  Patient denies current or recent self injurious behavior or ideation.  Patient denies other safety concerns.  A safety and risk management plan has not been developed at this time, however patient was encouraged to call [place] [phone] should there be a change in any of these risk factors.    Appearance:   Appropriate   Eye Contact:   Fair   Psychomotor Behavior: Normal   Attitude:   Cooperative   Orientation:   All  Speech   Rate / Production: Normal    Volume:  Normal   Mood:    Normal  Affect:    Appropriate   Thought Content:  Clear   Thought Form:  Coherent  Logical   Insight:    Good     Diagnoses:  1. Anxiety disorder, unspecified type    2. Bipolar II disorder (H)    3. Narcolepsy and cataplexy        Collateral Reports Completed:  [facility]    Plan: (Homework, other):  Patient was given information about behavioral  services and encouraged to schedule a follow up appointment with the clinic [facility] as needed.  She was also given information about mental health symptoms and treatment options .  CD Recommendations: No indications of CD issues.  Ginette Oliver, JUAN C, [facility]      ______________________________________________________________________    Integrated Primary Care Behavioral Health Treatment Plan    Patient's Name: Isaura Gray  YOB: 1985    Date of Creation: 4/4/22  Date Treatment Plan Last Reviewed/Revised: 11/7/22    DSM5 Diagnoses: 296.89 Bipolar II Disorder Depressed or 300.00 (F41.9) Unspecified Anxiety Disorder  Psychosocial / Contextual Factors: health issues, fatigue, work stress, home stress  PROMIS (reviewed every 90 days): 10    Referral / Collaboration:  Referral to another professional/service is not indicated at this time..    Anticipated number of session for this episode of care: 5-8  Anticipation frequency of session: Every 1-2 months  Anticipated Duration of each session: 16-37 minutes  Treatment plan will be reviewed in 90 days or when goals have been changed.       MeasurableTreatment Goal(s) related to diagnosis / functional impairment(s)  Goal 1: Patient will experience a reduction in depressive symptoms along with corresponding increase in positive emotions and life satisfaction.      Objective #A (Patient Action)    Patient will Feel less tired and more energy during the day .  Status: Continued - Date(s):11/7/22     Intervention(s)  Therapist will discuss with pt CBT and ACT topics aimed to help reduce depression and anxiety.      Patient has reviewed and agreed to the above plan.      Ginette Oliver, DAVID, JUAN C, [facility]  November 7, 2022

## 2022-11-25 ENCOUNTER — PATIENT OUTREACH (OUTPATIENT)
Dept: CARE COORDINATION | Facility: CLINIC | Age: 37
End: 2022-11-25

## 2022-11-25 NOTE — PROGRESS NOTES
Clinic Care Coordination Contact  [facility]/Voicemail       Clinical Data: Care Coordinator Outreach  Outreach attempted x 1.  Left message on patient's voicemail with call back information and requested return call.  Plan:  Care Coordinator will try to reach patient again in 8-10 business days.    TRAMAINE Ramirez  [facility] - Care Coordinator   11/25/2022   1:22 PM  [phone]

## 2022-11-25 NOTE — LETTER
I would like to provide you with the enclosed information for your records.  As part of care coordination, we are developing care plans to assist in accomplishing your health care goals.  When we speak next, please feel free to let me know if you want to add or change any information on your care plans.    As always, feel free to contact me if you have any questions or concerns.  I look forward to working with you in the effort to achieve your health care and wellness goals .        Sincerely,      Violette Wren, [facility]  Clinic Care Coordination  [phone]    [facility]  Patient Centered Plan of Care  About Me:        Patient Name:  Isaura Gray    YOB: 1985  Age:         37 year old   [place] MRN:    [id number] Telephone Information:  Home Phone [phone]   Mobile [phone]       Address:  [address] [place] [address] Email address:  [email or web address]      Emergency Contact(s)    Name Relationship Lgl Grd Work Phone Home Phone Mobile Phone   1. FAMILIA AKERS Significant ot*   [phone]           Primary language:  English     needed? No   [place] [employer]:  [phone] op. 1  Other communication barriers:Other (due to MH and Anxiety)    Preferred Method of Communication:  Mail  Current living arrangement: I live in a private home with family (2 children and boyfriend)    Mobility Status/ Medical Equipment: Independent        Health Maintenance  Health Maintenance Reviewed: Due/Overdue   Health Maintenance Due   Topic Date Due     NICOTINE/TOBACCO CESSATION COUNSELING Q 1 YR  Never done     URINE DRUG SCREEN  Never done     ADVANCE CARE PLANNING  Never done     HEPATITIS B IMMUNIZATION (1 of 3 - 3-dose series) Never done     Pneumococcal Vaccine: Pediatrics (0 to 5 Years) and At-Risk Patients (6 to 64 Years) (1 - PCV) Never done     MEDICARE ANNUAL WELLNESS VISIT  02/13/2021     INFLUENZA VACCINE (1) 09/01/2022     PAP  FOLLOW-UP  02/13/2023     HPV FOLLOW-UP  02/13/2023           My Access Plan  Medical Emergency 911   Primary Clinic Line [facility] [phone]   24 Hour Appointment Line [phone] or  [phone]) (toll-free)   24 Hour Nurse Line [phone] (toll-free)   Preferred Urgent Care [facility], [phone]     [facility]  [phone]     Preferred Pharmacy [facility] [address]     Behavioral Health Crisis Line The National Suicide Prevention Lifeline at 1-734.199.5539 or Text/Call 128             My Care Team Members  Patient Care Team       Relationship Specialty Notifications Start End    Timmy Umana MD PCP - General Family Practice  3/25/18     Phone: [phone] Fax: [phone]         [address] [facility] [address]    Simi Bearden MD MD Hematology & Oncology Admissions 9/16/16     Phone: [phone] Fax: [phone]         [address] [facility] [address]    Timmy Umana MD Assigned PCP   12/20/20     Phone: [phone] Fax: [phone]         [address] [facility] [address]    Violette Wren LSW Lead Care Coordinator Primary Care -  Admissions 5/4/21     Phone: [phone] Fax: [phone]        Merced Ko MD MD Endocrinology, Diabetes, and Metabolism  10/29/21     Phone: [phone] Fax: [phone] [address] [facility] [address]    Blair Wan PA-C Referring Physician Family Medicine  10/29/21     Phone: [phone] Fax: [phone]         [address] [facility] [address]    Merced Ko MD Assigned Endocrinology Provider   11/28/21     Phone: [phone] Fax: [phone] [address] [place] [address]    Barbara Montgomery MD Assigned Behavioral Health Provider   1/23/22     Phone: [phone] Fax:  [phone]         [address] [facility] [address] [place] [address]    Brice Fernandez MD MD Internal Medicine  5/9/22     Phone: [phone] Fax: [phone]         [address] [facility] [address]    Kyung Valderrama PA-C Physician Assistant Neurology  5/9/22     Referred to Pulm.    Phone: [phone] Fax: [phone]         [address] [facility] [address]    Karie Mckenna MD MD Cardiovascular Disease  5/12/22     Phone: [phone] Pager: [phone] Fax: [phone]        [address] [facility] [address]    Kyung Valderrama PA-C Assigned Neuroscience Provider   5/15/22     Phone: [phone] Fax: [phone]         [address] [facility] [address]    Karie Mckenna MD MD Cardiovascular Disease  5/17/22     Phone: [phone] Pager: [phone] Fax: [phone]        [address] [facility] [address]    Edgar Bravo PsyD Assigned Sleep Provider   9/3/22     Phone: [phone] Fax: [phone]         [address] [facility] [address]            My Care Plans  Self Management and Treatment Plan  Care Plan  Care Plan: General     Problem: HP GENERAL PROBLEM     Goal: schedule/attend annual wellness visit     Start Date: 3/8/2022 Expected End Date: 12/13/2022    This Visit's Progress: 10% Recent Progress: 10%    Note:     Barriers: scheduling  Strengths: desire to complete    Patient expressed understanding of goal: Yes    Action steps to achieve this goal:  1. I will call scheduling or go on [employer] to schedule my Annual Wellness Visit.  2. I will attend the appointment.                    Care Plan: General     Problem: HP GENERAL PROBLEM     Long-Range Goal: break large tasks into manageable steps     Start Date: 5/4/2021 Expected End Date: 5/31/2023    This Visit's Progress: 40% Recent Progress: 50%    Note:     Barriers: my mental health, two young children  Strengths: I just got an henrietta to help with identifying tasks    Patient expressed understanding  of goal: Yes  Action steps to achieve this goal:  1. I will stop looking at the job as one big task I need to complete and break it down into manageable steps.  2. I will learn how to use the henrietta and identify steps.  3. I will start to work on the tasks at a reasonable rate.  4. If I start to feel overwhelmed with what is ahead of me I will look at what I have completed and remind myself to focus on steps and not the entire task.                         Action Plans on File:                       Advance Care Plans/Directives Type:   No data recorded    My Medical and Care Information  Problem List   Patient Active Problem List   Diagnosis     Vitamin D deficiency     Supervision of other high risk pregnancies, unspecified trimester     PE (pulmonary thromboembolism) (H)     Hyperprolactinemia (H)     Follicular cancer of thyroid (H)     Lactose intolerance in adult     Schizophrenia (H)     Encounter for triage in pregnant patient     Cough     Chronic bilateral low back pain without sciatica     Normal labor     Anxiety     Cancer (H)     H/O  delivery, currently pregnant     History of pulmonary embolism     History of thyroid cancer     Mitral valve disorder      (normal spontaneous vaginal delivery)     Moderate major depression (H)     PTSD (post-traumatic stress disorder)     ASCUS of cervix with negative high risk HPV     Non-alcoholic fatty liver disease     Psychophysiological insomnia     Family history of Hashimoto thyroiditis     Biliary dyskinesia     Bipolar II disorder (H)     Pituitary tumor - history     Persistent insomnia     Post-cholecystectomy syndrome     Narcolepsy and cataplexy     Migraine with aura and with status migrainosus, not intractable     Chronic post-COVID-19 syndrome      Current Medications and Allergies:       Allergies   Allergen Reactions     Ciprofloxacin Hives     Sulfa Drugs Hives     Hydrocodone-Acetaminophen Itching           Oxycodone Hives      Oxycodone-Acetaminophen Itching         Current Outpatient Medications:      albuterol (PROAIR HFA/PROVENTIL HFA/VENTOLIN HFA) 108 (90 Base) MCG/ACT inhaler, Inhale 2 puffs into the lungs in the morning and 2 puffs at noon and 2 puffs in the evening and 2 puffs before bedtime., Disp: 18 g, Rfl: 0     amphetamine-dextroamphetamine (ADDERALL XR) 30 MG 24 hr capsule, Take 1 capsule (30 mg) by mouth daily, Disp: 30 capsule, Rfl: 0     amphetamine-dextroamphetamine (ADDERALL) 5 MG tablet, Take 1 tablet (5 mg) by mouth 2 times daily, Disp: 30 tablet, Rfl: 0     calcium carbonate (OS-DEBI) 1500 (600 Ca) MG tablet, Take 1 tablet (600 mg) by mouth 2 times daily (with meals), Disp: 180 tablet, Rfl: 1     levonorgestrel (MIRENA) 20 MCG/DAY IUD, 1 each (20 mcg) by Intrauterine route once for 1 dose Placed 2/2020, Disp: 1 each, Rfl: 0     mirtazapine (REMERON) 7.5 MG tablet, Take 1 tablet (7.5 mg) by mouth At Bedtime, Disp: 30 tablet, Rfl: 2     prochlorperazine (COMPAZINE) 10 MG tablet, Take 1 tablet (10 mg) by mouth every 6 hours as needed for nausea or vomiting, Disp: 20 tablet, Rfl: 1      Care Coordination Start Date: 5/4/2021   Frequency of Care Coordination: monthly     Form Last Updated: 12/08/2022

## 2022-12-03 ENCOUNTER — MYC REFILL (OUTPATIENT)
Dept: PSYCHIATRY | Facility: CLINIC | Age: 37
End: 2022-12-03

## 2022-12-03 DIAGNOSIS — G47.411 NARCOLEPSY AND CATAPLEXY: ICD-10-CM

## 2022-12-05 RX ORDER — DEXTROAMPHETAMINE SACCHARATE, AMPHETAMINE ASPARTATE, DEXTROAMPHETAMINE SULFATE AND AMPHETAMINE SULFATE 1.25; 1.25; 1.25; 1.25 MG/1; MG/1; MG/1; MG/1
5 TABLET ORAL 2 TIMES DAILY
Qty: 30 TABLET | Refills: 0 | Status: SHIPPED | OUTPATIENT
Start: 2022-12-05 | End: 2023-01-09

## 2022-12-05 RX ORDER — DEXTROAMPHETAMINE SACCHARATE, AMPHETAMINE ASPARTATE MONOHYDRATE, DEXTROAMPHETAMINE SULFATE AND AMPHETAMINE SULFATE 7.5; 7.5; 7.5; 7.5 MG/1; MG/1; MG/1; MG/1
30 CAPSULE, EXTENDED RELEASE ORAL DAILY
Qty: 30 CAPSULE | Refills: 0 | Status: SHIPPED | OUTPATIENT
Start: 2022-12-05 | End: 2023-01-09

## 2022-12-05 NOTE — TELEPHONE ENCOUNTER
Per last psych note, pt hasn't been referred back to me for this yet.  Will forward back to psychiatry

## 2022-12-07 ENCOUNTER — TELEPHONE (OUTPATIENT)
Dept: PSYCHIATRY | Facility: CLINIC | Age: 37
End: 2022-12-07

## 2022-12-07 ENCOUNTER — VIRTUAL VISIT (OUTPATIENT)
Dept: FAMILY MEDICINE | Facility: OTHER | Age: 37
End: 2022-12-07
Payer: MEDICARE

## 2022-12-07 DIAGNOSIS — F90.9 ATTENTION DEFICIT HYPERACTIVITY DISORDER (ADHD), UNSPECIFIED ADHD TYPE: ICD-10-CM

## 2022-12-07 DIAGNOSIS — G47.411 NARCOLEPSY AND CATAPLEXY: Primary | ICD-10-CM

## 2022-12-07 DIAGNOSIS — U09.9 CHRONIC POST-COVID-19 SYNDROME: ICD-10-CM

## 2022-12-07 DIAGNOSIS — G47.419 SLEEP ATTACK: ICD-10-CM

## 2022-12-07 DIAGNOSIS — B34.9 VIRAL SYNDROME: ICD-10-CM

## 2022-12-07 DIAGNOSIS — G47.00 INSOMNIA, UNSPECIFIED TYPE: ICD-10-CM

## 2022-12-07 DIAGNOSIS — G43.101 MIGRAINE WITH AURA AND WITH STATUS MIGRAINOSUS, NOT INTRACTABLE: ICD-10-CM

## 2022-12-07 PROCEDURE — 99214 OFFICE O/P EST MOD 30 MIN: CPT | Mod: 95 | Performed by: FAMILY MEDICINE

## 2022-12-07 NOTE — PATIENT INSTRUCTIONS
Thank you for visiting Our [facility]    Feel better.  It sounds like you have a virus.      I'm glad things are going so much better.    Contact us or return if questions or concerns.     Have a nice day!    Dr. Umana     Return in about 3 months (around 3/7/2023) for Physical Exam.      If you need medication refills, please contact your pharmacy 3 days before your prescriptions runs out or download the [facility] henrietta for your smart phone. If you are out of refills, your pharmacy will contact contact the clinic.                                     MyChart Assistance [phone]

## 2022-12-07 NOTE — PROGRESS NOTES
Isaura is a 37 year old who is being evaluated via a billable video visit.      How would you like to obtain your AVS? MyChart  If the video visit is dropped, the invitation should be resent by: Text to cell phone: [phone]  Will anyone else be joining your video visit? No          Assessment & Plan       ICD-10-CM    1. Narcolepsy and cataplexy  G47.411       2. Insomnia, unspecified type  G47.00       3. Sleep attack  G47.419       4. Attention deficit hyperactivity disorder (ADHD), unspecified ADHD type  F90.9       5. Chronic post-COVID-19 syndrome  U09.9       6. Migraine with aura and with status migrainosus, not intractable  G43.101       7. Viral syndrome  B34.9          1, 2, 3.  Patient is still undergoing testing and evaluation for these.  Presumptive diagnosis of narcolepsy has been made.  Currently, they are managing.  Patient reports good response to current treatments.  We will continue to follow and assist as able.  Await formal testing and official diagnosis.  Follow-up as needed.  4.  Presumptive diagnosis.  Awaiting final testing.  Patient reports good response to current stimulant regimen.  5.  Patient reports excellent response to Compazine once daily to help with her chronic abdominal complaints related to COVID infection.  We will continue to follow clinically.  Okay to continue Compazine at this time frequent dosing long-term.  6, 7.  Today is her first migraine in quite some time.  Likely related to current viral process.  Did not discuss preventative approaches for her migraines at this time since these appear to be infrequent.  Okay to use supportive cares at this time.  We will follow and assist as able.    Portions of this note were completed using Dragon dictation software.  Although reviewed, there may be typographical and other inadvertent errors that remain.       Prescription drug management    I spent 30 minutes discussing patient's other medical problems and treatment  approaches with patient.  This time also includes preparation and documentation time associated with the visit, day of service.        Patient Instructions   Thank you for visiting Our [facility]    Feel better.  It sounds like you have a virus.      I'm glad things are going so much better.    Contact us or return if questions or concerns.     Have a nice day!    Dr. Umana     Return in about 3 months (around 3/7/2023) for Physical Exam.      If you need medication refills, please contact your pharmacy 3 days before your prescriptions runs out or download the [facility] henrietta for your smart phone. If you are out of refills, your pharmacy will contact contact the clinic.                                     [id number] Assistance [phone]                    Return in about 3 months (around 3/7/2023) for Physical Exam.    Timmy Umana MD, MD  [facility] SHAI Delarosa is a 37 year old accompanied by her son, presenting for the following health issues:  RECHECK      HPI   Pt still hasn't been able to get MSLT testing done.  Psych is treating nacrolepsy and ADHD presumptively while awaiting testing.  Doing well on the Adderall XR.    They do not feel she ever had schizophrenia.  Hallucinations were likely related to her sleep disorder +/- ADHD.  Has been off antipsychotics for over a year now.  Also taking mirtazapine at night.  Helping with her sleep.      Her GI symptoms have dramatically improved since taking compazine regularly.  Just taking once daily.  The COVID clinic has been prescribing this.      Pt has a migraine today.  This is the first in a while.  Her son also has a GI bug, so that may be contributing to her symptoms.      Her Mirena was placed in 2020.          Review of Systems   Constitutional, HEENT, cardiovascular, pulmonary, gi and gu systems are negative, except as otherwise noted.  Current fever, headache.  Some emesis.      Objective            Vitals:  No vitals were obtained today due to virtual visit.    Physical Exam   GENERAL: Healthy, alert and no distress  EYES: Eyes grossly normal to inspection.  No discharge or erythema, or obvious scleral/conjunctival abnormalities.  RESP: No audible wheeze, cough, or visible cyanosis.  No visible retractions or increased work of breathing.    SKIN: Visible skin clear. No significant rash, abnormal pigmentation or lesions.  NEURO: Cranial nerves grossly intact.  Mentation and speech appropriate for age.  PSYCH: Mentation appears normal, affect normal/bright, judgement and insight intact, normal speech and appearance well-groomed.    Admission on 10/17/2022, Discharged on 10/17/2022   Component Date Value Ref Range Status     Sodium 10/17/2022 140  133 - 144 mmol/L Final     Potassium 10/17/2022 3.8  3.4 - 5.3 mmol/L Final     Chloride 10/17/2022 109  94 - 109 mmol/L Final     Carbon Dioxide (CO2) 10/17/2022 25  20 - 32 mmol/L Final     Anion Gap 10/17/2022 6  3 - 14 mmol/L Final     Urea Nitrogen 10/17/2022 12  7 - 30 mg/dL Final     Creatinine 10/17/2022 0.70  0.52 - 1.04 mg/dL Final     Calcium 10/17/2022 9.0  8.5 - 10.1 mg/dL Final     Glucose 10/17/2022 98  70 - 99 mg/dL Final     Alkaline Phosphatase 10/17/2022 57  40 - 150 U/L Final     AST 10/17/2022 21  0 - 45 U/L Final     ALT 10/17/2022 29  0 - 50 U/L Final     Protein Total 10/17/2022 7.0  6.8 - 8.8 g/dL Final     Albumin 10/17/2022 3.8  3.4 - 5.0 g/dL Final     Bilirubin Total 10/17/2022 1.7 (H)  0.2 - 1.3 mg/dL Final     GFR Estimate 10/17/2022 >90  >60 mL/min/1.73m2 Final    Effective December 21, 2021 eGFRcr in adults is calculated using the 2021 CKD-EPI creatinine equation which includes age and gender (Carrie et al., NEJM, DOI: 10.1056/QPNVap9715023)     Troponin I High Sensitivity 10/17/2022 <3  <54 ng/L Final    Comment: This Troponin-I result was obtained using a Siemens Dimension Vista High Sensitivity Troponin-I assay (TNIH).  Effective 11/23/21, nine labs/sites in the [facility] switched from a Siemens [place] Contemporary Troponin I assay (CTNI) to a Siemens [place] High-Sensitivity Troponin I assay (TNIH).  Either a High Sensitivity Troponin I baseline (0 hours) value = 120 ng/mL, or an increase in High Sensitivity Troponin I = 20 ng/mL at 2 hours compared to 0 hours (2-0 hours), suggests myocardial injury, and urgent clinical attention is required.    If the 2-0 hours increase is<20 ng/mL, a High Sensitivity Troponin I result above gender-specific reference ranges warrants further evaluation.  Recommendations for further evaluation include correlation with clinical decision-making tool (e.g., HEART), a 3rd High Sensitivity Troponin I test 2 hours after the 2nd (a 20% change from baseline would represent concern), admission for observation, close PCC/cardiology follow-up, or urgent outpatient                            provocative testing.       TSH 10/17/2022 1.18  0.40 - 4.00 mU/L Final     D-Dimer Quantitative 10/17/2022 0.33  0.00 - 0.50 ug/mL FEU Final     Influenza A PCR 10/17/2022 Negative  Negative Final     Influenza B PCR 10/17/2022 Negative  Negative Final     RSV PCR 10/17/2022 Negative  Negative Final     SARS CoV2 PCR 10/17/2022 Negative  Negative Final    NEGATIVE: SARS-CoV-2 (COVID-19) RNA not detected, presumed negative.     Color Urine 10/17/2022 Straw  Colorless, Straw, Light Yellow, Yellow Final     Appearance Urine 10/17/2022 Clear  Clear Final     Glucose Urine 10/17/2022 Negative  Negative mg/dL Final     Bilirubin Urine 10/17/2022 Negative  Negative Final     Ketones Urine 10/17/2022 Negative  Negative mg/dL Final     Specific Gravity Urine 10/17/2022 1.003  1.003 - 1.035 Final     Blood Urine 10/17/2022 Negative  Negative Final     pH Urine 10/17/2022 7.0  5.0 - 7.0 Final     Protein Albumin Urine 10/17/2022 Negative  Negative mg/dL Final     Urobilinogen Urine 10/17/2022 Normal  Normal, 2.0 mg/dL Final      Nitrite Urine 10/17/2022 Negative  Negative Final     Leukocyte Esterase Urine 10/17/2022 Small (A)  Negative Final     Bacteria Urine 10/17/2022 Moderate (A)  None Seen /HPF Final     RBC Urine 10/17/2022 0  <=2 /HPF Final     WBC Urine 10/17/2022 3  <=5 /HPF Final     Squamous Epithelials Urine 10/17/2022 6 (H)  <=1 /HPF Final     hCG Urine Qualitative 10/17/2022 Negative  Negative Final    This test is for screening purposes.  Results should be interpreted along with the clinical picture.  Confirmation testing is available if warranted by ordering [id number], HCG Quantitative Pregnancy.     WBC Count 10/17/2022 6.9  4.0 - 11.0 10e3/uL Final     RBC Count 10/17/2022 4.81  3.80 - 5.20 10e6/uL Final     Hemoglobin 10/17/2022 14.6  11.7 - 15.7 g/dL Final     Hematocrit 10/17/2022 41.9  35.0 - 47.0 % Final     MCV 10/17/2022 87  78 - 100 fL Final     MCH 10/17/2022 30.4  26.5 - 33.0 pg Final     MCHC 10/17/2022 34.8  31.5 - 36.5 g/dL Final     RDW 10/17/2022 12.9  10.0 - 15.0 % Final     Platelet Count 10/17/2022 275  150 - 450 10e3/uL Final     % Neutrophils 10/17/2022 74  % Final     % Lymphocytes 10/17/2022 18  % Final     % Monocytes 10/17/2022 6  % Final     % Eosinophils 10/17/2022 1  % Final     % Basophils 10/17/2022 1  % Final     % Immature Granulocytes 10/17/2022 0  % Final     NRBCs per 100 WBC 10/17/2022 0  <1 /100 Final     Absolute Neutrophils 10/17/2022 5.1  1.6 - 8.3 10e3/uL Final     Absolute Lymphocytes 10/17/2022 1.3  0.8 - 5.3 10e3/uL Final     Absolute Monocytes 10/17/2022 0.4  0.0 - 1.3 10e3/uL Final     Absolute Eosinophils 10/17/2022 0.0  0.0 - 0.7 10e3/uL Final     Absolute Basophils 10/17/2022 0.0  0.0 - 0.2 10e3/uL Final     Absolute Immature Granulocytes 10/17/2022 0.0  <=0.4 10e3/uL Final     Absolute NRBCs 10/17/2022 0.0  10e3/uL Final     No results found for any visits on 12/07/22.  No results found for this or any previous visit (from the past 24 hour(s)).            Video-Visit  Details    Video Start Time: 2:02 PM    Type of service:  Video Visit    Video End Time:2:22 PM    Originating Location (pt. Location): Home        Distant Location (provider location):  On-site    Platform used for Video Visit: Laura

## 2022-12-07 NOTE — TELEPHONE ENCOUNTER
Reason for call:  Other   Patient called regarding (reason for call): call back  Additional comments:     Pt's Care Coordinator from [facility] is calling and requesting a callback from [employer]    Phone number to reach patient:  {    [facility] #: [phone]      Best Time:  asap    Can we leave a detailed message on this number?  YES    Travel screening: Not Applicable

## 2022-12-07 NOTE — TELEPHONE ENCOUNTER
RN attempted to call HP coordinator to facilitate needs. There was no answer. RN left  with call back number [phone] and requested Pauline ask for a nurse and leave details as to what is needed.     Routing to provider in case she would like to call the   prior to return call.    Myra Herzog RN on 12/7/2022 at 12:41 PM

## 2022-12-08 ENCOUNTER — MYC MEDICAL ADVICE (OUTPATIENT)
Dept: CARE COORDINATION | Facility: CLINIC | Age: 37
End: 2022-12-08

## 2022-12-08 NOTE — PROGRESS NOTES
Clinic Care Coordination Contact    Situation: Patient chart reviewed by care coordinator.    Background: patient is due for a call from care coordination.     Assessment: per chart review pt has a viral illness that was noted in yesterday's office visit.      Plan/Recommendations: will send care plan and a mychart message.  Will postpone call for about one week.    TRAMAINE Ramirez  [facility] Primary Care - Care Coordinator   12/8/2022   9:28 AM  [phone]

## 2022-12-12 NOTE — TELEPHONE ENCOUNTER
Attempted to call Domonique care coordinator.  Left  and will await her return call.    Erin Montgomery MD  Collaborative Care Psychiatry  [facility]

## 2022-12-14 ENCOUNTER — VIRTUAL VISIT (OUTPATIENT)
Dept: FAMILY MEDICINE | Facility: OTHER | Age: 37
End: 2022-12-14
Payer: MEDICARE

## 2022-12-14 ENCOUNTER — LAB (OUTPATIENT)
Dept: LAB | Facility: OTHER | Age: 37
End: 2022-12-14
Payer: MEDICARE

## 2022-12-14 ENCOUNTER — PATIENT OUTREACH (OUTPATIENT)
Dept: CARE COORDINATION | Facility: CLINIC | Age: 37
End: 2022-12-14

## 2022-12-14 DIAGNOSIS — R29.898 WEAKNESS OF LEFT ARM: ICD-10-CM

## 2022-12-14 DIAGNOSIS — R00.0 TACHYCARDIA: ICD-10-CM

## 2022-12-14 DIAGNOSIS — R11.14 BILIOUS VOMITING WITH NAUSEA: ICD-10-CM

## 2022-12-14 DIAGNOSIS — R29.898 RIGHT LEG WEAKNESS: ICD-10-CM

## 2022-12-14 DIAGNOSIS — R06.09 DOE (DYSPNEA ON EXERTION): ICD-10-CM

## 2022-12-14 DIAGNOSIS — R07.2 PRECORDIAL PAIN: ICD-10-CM

## 2022-12-14 DIAGNOSIS — R20.0 NUMBNESS AND TINGLING OF UPPER AND LOWER EXTREMITIES OF BOTH SIDES: ICD-10-CM

## 2022-12-14 DIAGNOSIS — R53.81 MALAISE: Primary | ICD-10-CM

## 2022-12-14 DIAGNOSIS — R20.2 NUMBNESS AND TINGLING OF UPPER AND LOWER EXTREMITIES OF BOTH SIDES: ICD-10-CM

## 2022-12-14 LAB
CRP SERPL-MCNC: <2.9 MG/L (ref 0–8)
D DIMER PPP FEU-MCNC: 0.48 UG/ML FEU (ref 0–0.5)
TROPONIN I SERPL HS-MCNC: 4 NG/L
VIT B12 SERPL-MCNC: 632 PG/ML (ref 232–1245)

## 2022-12-14 PROCEDURE — 86140 C-REACTIVE PROTEIN: CPT

## 2022-12-14 PROCEDURE — 86038 ANTINUCLEAR ANTIBODIES: CPT

## 2022-12-14 PROCEDURE — 85379 FIBRIN DEGRADATION QUANT: CPT

## 2022-12-14 PROCEDURE — 86780 TREPONEMA PALLIDUM: CPT

## 2022-12-14 PROCEDURE — 36415 COLL VENOUS BLD VENIPUNCTURE: CPT

## 2022-12-14 PROCEDURE — 99214 OFFICE O/P EST MOD 30 MIN: CPT | Mod: 95 | Performed by: FAMILY MEDICINE

## 2022-12-14 PROCEDURE — 82607 VITAMIN B-12: CPT

## 2022-12-14 PROCEDURE — 84484 ASSAY OF TROPONIN QUANT: CPT

## 2022-12-14 RX ORDER — METOPROLOL TARTRATE 25 MG/1
12.5 TABLET, FILM COATED ORAL 2 TIMES DAILY
Qty: 30 TABLET | Refills: 1 | Status: SHIPPED | OUTPATIENT
Start: 2022-12-14 | End: 2023-01-20

## 2022-12-14 RX ORDER — PROCHLORPERAZINE MALEATE 10 MG
10 TABLET ORAL EVERY 6 HOURS PRN
Qty: 20 TABLET | Refills: 1 | Status: SHIPPED | OUTPATIENT
Start: 2022-12-14 | End: 2023-02-15

## 2022-12-14 RX ORDER — METOPROLOL TARTRATE 25 MG/1
TABLET, FILM COATED ORAL
Qty: 90 TABLET | OUTPATIENT
Start: 2022-12-14

## 2022-12-14 ASSESSMENT — PATIENT HEALTH QUESTIONNAIRE - PHQ9
SUM OF ALL RESPONSES TO PHQ QUESTIONS 1-9: 16
10. IF YOU CHECKED OFF ANY PROBLEMS, HOW DIFFICULT HAVE THESE PROBLEMS MADE IT FOR YOU TO DO YOUR WORK, TAKE CARE OF THINGS AT HOME, OR GET ALONG WITH OTHER PEOPLE: EXTREMELY DIFFICULT
SUM OF ALL RESPONSES TO PHQ QUESTIONS 1-9: 16

## 2022-12-14 NOTE — PATIENT INSTRUCTIONS
Thank you for visiting [facility]    Please get labs done today.    Start metoprolol to help with your fast heart rate.  Let me know if problems with this.    If no clear answers on today's testing, let's get a cardiac event monitor done.      Hold your Adderall and albuterol for now.  (OK to use albuterol for wheezing.)    We should work you in to the clinic next week if not making solid improvement.    Contact us or return if questions or concerns.     Have a nice day!    Dr. Umana     Return in about 1 week (around 12/21/2022), or if symptoms worsen or fail to improve, for Recheck.      If you need medication refills, please contact your pharmacy 3 days before your prescriptions runs out or download the [facility] henrietta for your smart phone. If you are out of refills, your pharmacy will contact contact the clinic.                                     MyChart Assistance [phone]

## 2022-12-14 NOTE — TELEPHONE ENCOUNTER
Was sent on 12/14/22 with refills, should not be out at this time, please check profile for any held scripts.

## 2022-12-14 NOTE — PROGRESS NOTES
Clinic Care Coordination Contact    Situation: Patient chart reviewed by care coordinator.    Background: pt enrolled with [facility] 5/4/21 and actively working on goals.  She has not been feeling well and was to the ED two days ago.     Assessment: per chart review pt was to the ED after  sent her for cardiac concerns. Pt has an appointment today with her PCP.     Plan/Recommendations: will call pt in 4-6 business days.     TRAMAINE Ramirez  [facility] Primary Care - Care Coordinator   12/14/2022   9:10 AM  [phone]

## 2022-12-14 NOTE — PROGRESS NOTES
Isaura is a 37 year old who is being evaluated via a billable video visit.      How would you like to obtain your AVS? MyChart  If the video visit is dropped, the invitation should be resent by: Text to cell phone: [phone]  Will anyone else be joining your video visit? No          Assessment & Plan       ICD-10-CM    1. Malaise  R53.81       2. Tachycardia  R00.0 D dimer, quantitative     Troponin I     metoprolol tartrate (LOPRESSOR) 25 MG tablet     CRP, inflammation     Adult Cardiac Event Monitor     Anti Nuclear Columba IgG by IFA with Reflex      3. JUAREZ (dyspnea on exertion)  R06.09 D dimer, quantitative     Troponin I     CRP, inflammation     Anti Nuclear Columba IgG by IFA with Reflex      4. Precordial pain  R07.2 D dimer, quantitative     Troponin I     CRP, inflammation     Anti Nuclear Columba IgG by IFA with Reflex      5. Weakness of left arm  R29.898 Vitamin B12     Treponema Abs w Reflex to RPR and Titer     Anti Nuclear Columba IgG by IFA with Reflex      6. Right leg weakness  R29.898 Vitamin B12     Treponema Abs w Reflex to RPR and Titer     Anti Nuclear Columba IgG by IFA with Reflex      7. Numbness and tingling of upper and lower extremities of both sides  R20.0 Vitamin B12    R20.2 Treponema Abs w Reflex to RPR and Titer     Anti Nuclear Columba IgG by IFA with Reflex      8. Bilious vomiting with nausea  R11.14 prochlorperazine (COMPAZINE) 10 MG tablet         1, 2, 3, 4.  Unclear etiology for symptoms.  Work-up at the ER was reassuring, but extremely limited.  Will obtain D-dimer and troponin as well as inflammatory markers to further evaluate.  We will also arrange for a cardiac event monitor given her rapid heart rate.  Finally, will empirically add some low-dose beta-blocker to see if this will help with her symptoms.  I recommended that she avoid taking her stimulants while we are getting to the bottom of what is going on.  Follow-up in clinic if not improving or depending upon results of above  testing.  5, 6, 7.  Unclear etiology.  We will check some screening labs to look for causes of peripheral neuropathy.  This could be related to nerve impingement in the spine as well.  Consider work-up for that, especially if she develops additional neck pain.  Could also consider some brain imaging.  If her symptoms do not improve, she is to let me know.  8.  Patient reports good response to Compazine used infrequently.  She requests refill of this today.  I think Compazine is unlikely to be contributing to her other symptoms, particularly she has not taken any for several days.    Portions of this note were completed using Dragon dictation software.  Although reviewed, there may be typographical and other inadvertent errors that remain.       Review of prior external note(s) from [facility]  Ordering of each unique test  Prescription drug management       MED REC REQUIRED  Post Medication Reconciliation Status: discharge medications reconciled and changed, per note/orders  Patient Instructions   Thank you for visiting [facility]    Please get labs done today.    Start metoprolol to help with your fast heart rate.  Let me know if problems with this.    If no clear answers on today's testing, let's get a cardiac event monitor done.      Hold your Adderall and albuterol for now.  (OK to use albuterol for wheezing.)    We should work you in to the clinic next week if not making solid improvement.    Contact us or return if questions or concerns.     Have a nice day!    Dr. Umana     Return in about 1 week (around 12/21/2022), or if symptoms worsen or fail to improve, for Recheck.      If you need medication refills, please contact your pharmacy 3 days before your prescriptions runs out or download the [facility] henrietta for your smart phone. If you are out of refills, your pharmacy will contact contact the clinic.                                     [facility] [phone]                    Return in about 1 week (around 12/21/2022), or if symptoms worsen or fail to improve, for Recheck.    Timmy Umana MD, MD  [facility] SHAI Delarosa is a 37 year old, presenting for the following health issues:  ER F/U    Patient's son was also seen in the ER and was diagnosed with Influenza A    HPI     ED/UC Followup:    Facility:  [facility] ER  Date of visit: 12/12/22  Reason for visit: Chest Pain, Neck Pain, Back Pain, Diaphoresis, Tachycardia, Confusion  Current Status: Still having same symptoms as above, not sleeping well, pain is radiating to arms, still having a high heart rate    She passed out at the urgent care.  Her pulse was 150.  EMS activated.  Ambulance gave her NTG.  She doesn't recall if that helped with her symptoms.    She raul-heidy'd and had some improvement in her heart rate, but then it went back up.    She had asked for a D-dimer as this felt somewhat like when she's had PEs in the past.      Her HA developed into a neck ache, then a backache.  Feels a sensation of cold radiating from her spine.  Cold will radiate down her arm too.    Does have a lot of chest discomfort too.  Gets winded easily.      Now just has generalized malaise.    Notes racing hear rate.  She feels her heart beating all the time now.  Current pulse is 132.    Her son has influenza A.  She feels feverish, but doesn't have a measured fever for the most part.    Does have a slight cough when going up the stairs.  Produces foam.  White/clear.     Does get confused in the evening at times.      Adderall hasn't helped with her sleepiness.      Albuterol tends to give her a HA.      Pt notes left arm and right leg weakness.  Numbness/tingling of hands when awakening and intermittently during the day.        Review of Systems   Constitutional, HEENT, cardiovascular, pulmonary, gi and gu systems are negative, except as otherwise noted.       Objective    Vitals - Patient Reported  Pulse (Patient Reported): 105 (laying down)  Pain Score: Severe Pain (6)      Vitals:  No vitals were obtained today due to virtual visit.    Physical Exam   GENERAL: mildly ill-appearing  EYES: Eyes grossly normal to inspection.  No discharge or erythema, or obvious scleral/conjunctival abnormalities.  RESP: No increased work of breathing, no cough, no wheezing appreciated.  SKIN: Visible skin clear. No significant rash, abnormal pigmentation or lesions.  NEURO: Cranial nerves grossly intact.  Mentation and speech appropriate for age.  PSYCH: Mentation appears normal, affect normal/bright, judgement and insight intact, normal speech and appearance well-groomed.    Admission on 10/17/2022, Discharged on 10/17/2022   Component Date Value Ref Range Status     Sodium 10/17/2022 140  133 - 144 mmol/L Final     Potassium 10/17/2022 3.8  3.4 - 5.3 mmol/L Final     Chloride 10/17/2022 109  94 - 109 mmol/L Final     Carbon Dioxide (CO2) 10/17/2022 25  20 - 32 mmol/L Final     Anion Gap 10/17/2022 6  3 - 14 mmol/L Final     Urea Nitrogen 10/17/2022 12  7 - 30 mg/dL Final     Creatinine 10/17/2022 0.70  0.52 - 1.04 mg/dL Final     Calcium 10/17/2022 9.0  8.5 - 10.1 mg/dL Final     Glucose 10/17/2022 98  70 - 99 mg/dL Final     Alkaline Phosphatase 10/17/2022 57  40 - 150 U/L Final     AST 10/17/2022 21  0 - 45 U/L Final     ALT 10/17/2022 29  0 - 50 U/L Final     Protein Total 10/17/2022 7.0  6.8 - 8.8 g/dL Final     Albumin 10/17/2022 3.8  3.4 - 5.0 g/dL Final     Bilirubin Total 10/17/2022 1.7 (H)  0.2 - 1.3 mg/dL Final     GFR Estimate 10/17/2022 >90  >60 mL/min/1.73m2 Final    Effective December 21, 2021 eGFRcr in adults is calculated using the 2021 CKD-EPI creatinine equation which includes age and gender (Carrie et al., NEJ, DOI: 10.1056/EPCHiw5424508)     Troponin I High Sensitivity 10/17/2022 <3  <54 ng/L Final    Comment: This Troponin-I result was obtained using a Siemens  Agent [place] High Sensitivity Troponin-I assay (TNIH). Effective 11/23/21, nine labs/sites in the [facility] switched from a Siemens [place] Contemporary Troponin I assay (CTNI) to a Siemens [place] High-Sensitivity Troponin I assay (TNIH).  Either a High Sensitivity Troponin I baseline (0 hours) value = 120 ng/mL, or an increase in High Sensitivity Troponin I = 20 ng/mL at 2 hours compared to 0 hours (2-0 hours), suggests myocardial injury, and urgent clinical attention is required.    If the 2-0 hours increase is<20 ng/mL, a High Sensitivity Troponin I result above gender-specific reference ranges warrants further evaluation.  Recommendations for further evaluation include correlation with clinical decision-making tool (e.g., HEART), a 3rd High Sensitivity Troponin I test 2 hours after the 2nd (a 20% change from baseline would represent concern), admission for observation, close PCC/cardiology follow-up, or urgent outpatient                            provocative testing.       TSH 10/17/2022 1.18  0.40 - 4.00 mU/L Final     D-Dimer Quantitative 10/17/2022 0.33  0.00 - 0.50 ug/mL FEU Final     Influenza A PCR 10/17/2022 Negative  Negative Final     Influenza B PCR 10/17/2022 Negative  Negative Final     RSV PCR 10/17/2022 Negative  Negative Final     SARS CoV2 PCR 10/17/2022 Negative  Negative Final    NEGATIVE: SARS-CoV-2 (COVID-19) RNA not detected, presumed negative.     Color Urine 10/17/2022 Straw  Colorless, Straw, Light Yellow, Yellow Final     Appearance Urine 10/17/2022 Clear  Clear Final     Glucose Urine 10/17/2022 Negative  Negative mg/dL Final     Bilirubin Urine 10/17/2022 Negative  Negative Final     Ketones Urine 10/17/2022 Negative  Negative mg/dL Final     Specific Gravity Urine 10/17/2022 1.003  1.003 - 1.035 Final     Blood Urine 10/17/2022 Negative  Negative Final     pH Urine 10/17/2022 7.0  5.0 - 7.0 Final     Protein Albumin Urine 10/17/2022 Negative  Negative mg/dL Final      Urobilinogen Urine 10/17/2022 Normal  Normal, 2.0 mg/dL Final     Nitrite Urine 10/17/2022 Negative  Negative Final     Leukocyte Esterase Urine 10/17/2022 Small (A)  Negative Final     Bacteria Urine 10/17/2022 Moderate (A)  None Seen /HPF Final     RBC Urine 10/17/2022 0  <=2 /HPF Final     WBC Urine 10/17/2022 3  <=5 /HPF Final     Squamous Epithelials Urine 10/17/2022 6 (H)  <=1 /HPF Final     hCG Urine Qualitative 10/17/2022 Negative  Negative Final    This test is for screening purposes.  Results should be interpreted along with the clinical picture.  Confirmation testing is available if warranted by ordering [id number], HCG Quantitative Pregnancy.     WBC Count 10/17/2022 6.9  4.0 - 11.0 10e3/uL Final     RBC Count 10/17/2022 4.81  3.80 - 5.20 10e6/uL Final     Hemoglobin 10/17/2022 14.6  11.7 - 15.7 g/dL Final     Hematocrit 10/17/2022 41.9  35.0 - 47.0 % Final     MCV 10/17/2022 87  78 - 100 fL Final     MCH 10/17/2022 30.4  26.5 - 33.0 pg Final     MCHC 10/17/2022 34.8  31.5 - 36.5 g/dL Final     RDW 10/17/2022 12.9  10.0 - 15.0 % Final     Platelet Count 10/17/2022 275  150 - 450 10e3/uL Final     % Neutrophils 10/17/2022 74  % Final     % Lymphocytes 10/17/2022 18  % Final     % Monocytes 10/17/2022 6  % Final     % Eosinophils 10/17/2022 1  % Final     % Basophils 10/17/2022 1  % Final     % Immature Granulocytes 10/17/2022 0  % Final     NRBCs per 100 WBC 10/17/2022 0  <1 /100 Final     Absolute Neutrophils 10/17/2022 5.1  1.6 - 8.3 10e3/uL Final     Absolute Lymphocytes 10/17/2022 1.3  0.8 - 5.3 10e3/uL Final     Absolute Monocytes 10/17/2022 0.4  0.0 - 1.3 10e3/uL Final     Absolute Eosinophils 10/17/2022 0.0  0.0 - 0.7 10e3/uL Final     Absolute Basophils 10/17/2022 0.0  0.0 - 0.2 10e3/uL Final     Absolute Immature Granulocytes 10/17/2022 0.0  <=0.4 10e3/uL Final     Absolute NRBCs 10/17/2022 0.0  10e3/uL Final     No results found for any visits on 12/14/22.  No results found for this or any previous  visit (from the past 24 hour(s)).            Video-Visit Details    Video Start Time: 1:55 PM    Type of service:  Video Visit    Video End Time:2:17 PM    Originating Location (pt. Location): Home        Distant Location (provider location):  On-site    Platform used for Video Visit: Laura    Answers for HPI/ROS submitted by the patient on 12/14/2022  If you checked off any problems, how difficult have these problems made it for you to do your work, take care of things at home, or get along with other people?: Extremely difficult  PHQ9 TOTAL SCORE: 16

## 2022-12-15 LAB — T PALLIDUM AB SER QL: NONREACTIVE

## 2022-12-15 NOTE — RESULT ENCOUNTER NOTE
Isaura,    Your labs are normal.  Let's see what happens with your heart monitor.    Have a nice day!    Dr. Umana

## 2022-12-16 LAB — ANA SER QL IF: NEGATIVE

## 2022-12-20 ENCOUNTER — HOSPITAL ENCOUNTER (OUTPATIENT)
Dept: CARDIOLOGY | Facility: CLINIC | Age: 37
Discharge: HOME | End: 2022-12-20
Attending: FAMILY MEDICINE | Admitting: FAMILY MEDICINE
Payer: MEDICARE

## 2022-12-20 DIAGNOSIS — R00.0 TACHYCARDIA: ICD-10-CM

## 2022-12-20 PROCEDURE — 93270 REMOTE 30 DAY ECG REV/REPORT: CPT

## 2022-12-21 ENCOUNTER — PATIENT OUTREACH (OUTPATIENT)
Dept: CARE COORDINATION | Facility: CLINIC | Age: 37
End: 2022-12-21

## 2022-12-21 NOTE — PROGRESS NOTES
Clinic Care Coordination Contact    Follow Up Progress Note      Assessment: patient was driving so conversation was not indepth or long for safety.  She is working with providers for accurate diagnosis.  She will not need to do the sleep study as blood tests indicated narcolepsy.  She is wearing the heart monitor to get a better understanding of what is going on.      Pt is still struggling with getting tasks done.  She is recognizing that she needs a more detailed listing of cleaning of house.  The ones she is finding are general - clean the kitchen, clean the bathroom, etc and they do not help her.  SW will see if they can find any and if not will work with pt to adjust the best option found to fit her needs better.  Pt's focus on task completion has been consistent need for her and continued desire to help her function.     Care Gaps:    Health Maintenance Due   Topic Date Due     NICOTINE/TOBACCO CESSATION COUNSELING Q 1 YR  Never done     URINE DRUG SCREEN  Never done     ADVANCE CARE PLANNING  Never done     HEPATITIS B IMMUNIZATION (1 of 3 - 3-dose series) Never done     Pneumococcal Vaccine: Pediatrics (0 to 5 Years) and At-Risk Patients (6 to 64 Years) (1 - PCV) Never done     MEDICARE ANNUAL WELLNESS VISIT  02/13/2021     COVID-19 Vaccine (2 - Booster for William series) 07/01/2021     INFLUENZA VACCINE (1) 09/01/2022     PAP FOLLOW-UP  02/13/2023     HPV FOLLOW-UP  02/13/2023       Care Gap Goal set: Yes    Care Plans  Care Plan: General     Problem: HP GENERAL PROBLEM     Goal: schedule/attend annual wellness visit     Start Date: 3/8/2022 Expected End Date: 3/8/2023    This Visit's Progress: 10% Recent Progress: 10%    Note:     Barriers: scheduling  Strengths: desire to complete    Patient expressed understanding of goal: Yes    Action steps to achieve this goal:  1. I will call scheduling or go on [employer] to schedule my Annual Wellness Visit.  2. I will attend the appointment.                    Care  Plan: General     Problem: HP GENERAL PROBLEM     Long-Range Goal: break large tasks into manageable steps     Start Date: 5/4/2021 Expected End Date: 5/31/2023    This Visit's Progress: 30% Recent Progress: 40%    Note:     Barriers: my mental health, two young children  Strengths: I just got an henrietta to help with identifying tasks    Patient expressed understanding of goal: Yes  Action steps to achieve this goal:  1. I will stop looking at the job as one big task I need to complete and break it down into manageable steps.  2. I will learn how to use the henrietta and identify steps.  3. I will start to work on the tasks at a reasonable rate.  4. If I start to feel overwhelmed with what is ahead of me I will look at what I have completed and remind myself to focus on steps and not the entire task.                        Intervention/Education provided during outreach: reinforced work and that diagnoses helps to arrive at a better outcome/approach.  Pt agreed.       Outreach Frequency: monthly      Plan:   Pt to continue to work with providers for accurate diagnosis and treatment.  Pt to continue to look for task breakdowns and SW to assist with looking and creating if needed.   Care Coordinator will follow up in 3-4 weeks.     TRAMAINE Ramirez  [facility] - Care Coordinator   12/21/2022   1:47 PM  [phone]

## 2023-01-02 NOTE — RESULT ENCOUNTER NOTE
Isaura,    Your event monitor didn't show any worrisome rhythms.  We can consider increase in your metoprolol dose if needed.  Avoid excessive dosing of your  Adderall as this can raise your heart rate.      Have a nice day!    Dr. Umana

## 2023-01-09 ENCOUNTER — MYC REFILL (OUTPATIENT)
Dept: PSYCHIATRY | Facility: CLINIC | Age: 38
End: 2023-01-09

## 2023-01-09 DIAGNOSIS — G47.411 NARCOLEPSY AND CATAPLEXY: ICD-10-CM

## 2023-01-09 RX ORDER — DEXTROAMPHETAMINE SACCHARATE, AMPHETAMINE ASPARTATE MONOHYDRATE, DEXTROAMPHETAMINE SULFATE AND AMPHETAMINE SULFATE 7.5; 7.5; 7.5; 7.5 MG/1; MG/1; MG/1; MG/1
30 CAPSULE, EXTENDED RELEASE ORAL DAILY
Qty: 30 CAPSULE | Refills: 0 | Status: SHIPPED | OUTPATIENT
Start: 2023-01-09 | End: 2023-01-24 | Stop reason: SINTOL

## 2023-01-09 RX ORDER — DEXTROAMPHETAMINE SACCHARATE, AMPHETAMINE ASPARTATE, DEXTROAMPHETAMINE SULFATE AND AMPHETAMINE SULFATE 1.25; 1.25; 1.25; 1.25 MG/1; MG/1; MG/1; MG/1
5 TABLET ORAL 2 TIMES DAILY
Qty: 30 TABLET | Refills: 0 | Status: SHIPPED | OUTPATIENT
Start: 2023-01-09 | End: 2023-01-24 | Stop reason: SINTOL

## 2023-01-09 NOTE — TELEPHONE ENCOUNTER
Date of Last Office Visit: 11/7/22  Date of Next Office Visit: None, will have [facility] call  No shows since last visit: 0  Cancellations since last visit: 0    Medication requested: amphetamine-dextroamphetamine (ADDERALL) 5 MG tablet Date last ordered: 12/5/22 Qty: 30 Refills: 0    Medication requested: amphetamine-dextroamphetamine (ADDERALL XR) 30 MG 24 hr capsule Date last ordered: 12/5/22 Qty: 30 Refills: 0     Review of [place] ?: Access not approved  Medication last filled date: Unknown Qty filled: 30    Lapse in medication adherence greater than 5 days?: no  If yes, call patient and gather details: na  Medication refill request verified as identical to current order?: yes  Result of Last DAM, VPA, Li+ Level, CBC, or Carbamazepine Level (at or since last visit): N/A    Last visit treatment plan:   Start mirtazapine 7.5 mg at bedtime.   Continue Adderall XR 30 mg daily.  Add immediate release Adderall 5 mg daily as needed at the end of the day.   Continue all other medications per your primary care provider.   Schedule an appointment with me in 2 months or sooner as needed.  You may call [facility] at [phone] to schedule.    []Medication refilled per  Medication Refill in Ambulatory Care  policy.  [x]Medication unable to be refilled by RN due to criteria not met as indicated below:    []Eligibility - not seen in the last year   [x]Supervision - no future appointment   []Compliance - no shows, cancellations or lapse in therapy   []Verification - order discrepancy   [x]Controlled medication   [x]Medication not included in policy   []90-day supply request   []Other

## 2023-01-19 DIAGNOSIS — R00.0 TACHYCARDIA: ICD-10-CM

## 2023-01-20 ENCOUNTER — PATIENT OUTREACH (OUTPATIENT)
Dept: CARE COORDINATION | Facility: CLINIC | Age: 38
End: 2023-01-20
Payer: MEDICARE

## 2023-01-20 ENCOUNTER — TELEPHONE (OUTPATIENT)
Dept: BEHAVIORAL HEALTH | Facility: CLINIC | Age: 38
End: 2023-01-20
Payer: MEDICARE

## 2023-01-20 DIAGNOSIS — G47.411 NARCOLEPSY AND CATAPLEXY: Primary | ICD-10-CM

## 2023-01-20 RX ORDER — METOPROLOL TARTRATE 25 MG/1
TABLET, FILM COATED ORAL
Qty: 90 TABLET | Refills: 1 | Status: SHIPPED | OUTPATIENT
Start: 2023-01-20 | End: 2023-08-30 | Stop reason: ALTCHOICE

## 2023-01-20 NOTE — TELEPHONE ENCOUNTER
Prescription approved per [facility] Refill Protocol.    Jemima Núñez RN on 1/20/2023 at 4:29 PM

## 2023-01-20 NOTE — PROGRESS NOTES
Clinic Care Coordination Contact  Care Team Conversations    Pharmacist response that was sent to pt via [employer].  Compazine should be able to change, but Adderall is a CII controlled substance so a new prescription would need to be sent by prescriber to alternative pharmacy if she is able to fill at another pharmacy (not restricted)    TRAMAINE Ramirez   [facility] - Care Coordination  [facility]   [phone]

## 2023-01-20 NOTE — TELEPHONE ENCOUNTER
Reason for call:  Other   Patient called regarding (reason for call): call back  Additional comments: Patient would like to discuss alternatives to adderall due to the shortage of the medication.    Phone number to reach patient:  Home number on file [phone] (home)    Best Time:  ASAP    Can we leave a detailed message on this number?  YES    Travel screening: Not Applicable

## 2023-01-20 NOTE — PROGRESS NOTES
"Clinic Care Coordination Contact    Follow Up Progress Note      Assessment: pt noted that her only challenges at this time was getting her Adderal and Compazine.  Her pharmacy noted that their suppliers are out for her dose.  She was given a lower does to try and bridge the gap yet it does not help as much.  She tried to change to a different pharmacy to obtain sooner and was told this was \"against the law\" and could not be done.  SW is checking with [place] pharmacist to verify/clarify this law/rule. Will update pt via [employer] on response.     Pt had no other concerns.     Care Gaps:    Health Maintenance Due   Topic Date Due     NICOTINE/TOBACCO CESSATION COUNSELING Q 1 YR  Never done     URINE DRUG SCREEN  Never done     ADVANCE CARE PLANNING  Never done     HEPATITIS B IMMUNIZATION (1 of 3 - 3-dose series) Never done     Pneumococcal Vaccine: Pediatrics (0 to 5 Years) and At-Risk Patients (6 to 64 Years) (1 - PCV) Never done     MEDICARE ANNUAL WELLNESS VISIT  02/13/2021     COVID-19 Vaccine (2 - Booster for William series) 07/01/2021     INFLUENZA VACCINE (1) 09/01/2022     PAP FOLLOW-UP  02/13/2023     HPV FOLLOW-UP  02/13/2023       Care Gap Goal set: Yes   She will schedule AWV - she thought it had been done.     Care Plans  Care Plan: General     Problem: HP GENERAL PROBLEM     Goal: schedule/attend annual wellness visit     Start Date: 3/8/2022 Expected End Date: 3/8/2023    This Visit's Progress: 20% Recent Progress: 10%    Note:     Barriers: scheduling  Strengths: desire to complete    Patient expressed understanding of goal: Yes    Action steps to achieve this goal:  1. I will call scheduling or go on [employer] to schedule my Annual Wellness Visit.  2. I will attend the appointment.                    Care Plan: General     Problem: HP GENERAL PROBLEM     Long-Range Goal: break large tasks into manageable steps     Start Date: 5/4/2021 Expected End Date: 5/31/2023    This Visit's Progress: 30% Recent " Progress: 30%    Note:     Barriers: my mental health, two young children  Strengths: I just got an henrietta to help with identifying tasks    Patient expressed understanding of goal: Yes  Action steps to achieve this goal:  1. I will stop looking at the job as one big task I need to complete and break it down into manageable steps.  2. I will learn how to use the henrietta and identify steps.  3. I will start to work on the tasks at a reasonable rate.  4. If I start to feel overwhelmed with what is ahead of me I will look at what I have completed and remind myself to focus on steps and not the entire task.                        Intervention/Education provided during outreach: SW will check on ability to change pharmacies nd update pt.      Outreach Frequency: monthly    Plan:   Pt to continue to work on tasks and schedule AWV.   Care Coordinator will follow up in one month and update pt via [employer] on [facility] pharmacist's input.     TRAMAINE Ramirez  [facility] - Care Coordinator   1/20/2023   10:40 AM  [phone]

## 2023-01-23 NOTE — TELEPHONE ENCOUNTER
Spoke to patient. She has tried about 7 different pharmacies and they are all out and back ordered for awhile of the Adderall XR. She has been out of it for about 2 weeks now and has been getting sleepy. She is wondering if there is an alternative? She is trying the 5mg regular tab but it is not as effective.  She also wanted you to know she did get the narcolepsy diagnosis added to her chart.   Last visit 11/7/22: She has been using Adderall XR 30 mg daily, which she finds helpful.  As we discussed her symptoms further, it seems as though the fatigue and low mood are coming on around 5 PM, which is most likely when her Adderall wears off for the day.  We agreed to add 5 mg of immediate release around 4 or 5:00 in the afternoon as needed.  Next appt: 2/27/23    Margi Nelson RN on 1/23/2023 at 11:44 AM

## 2023-01-24 ENCOUNTER — MYC MEDICAL ADVICE (OUTPATIENT)
Dept: PSYCHIATRY | Facility: CLINIC | Age: 38
End: 2023-01-24
Payer: MEDICARE

## 2023-01-24 RX ORDER — MODAFINIL 200 MG/1
200 TABLET ORAL DAILY
Qty: 30 TABLET | Refills: 2 | Status: SHIPPED | OUTPATIENT
Start: 2023-01-24 | End: 2023-02-27 | Stop reason: SINTOL

## 2023-01-24 NOTE — TELEPHONE ENCOUNTER
I spoke with Williejn by phone.  She has not been able to obtain Adderall, and would prefer to get away from it due to tachycardia.    We agreed to a trial of modafinil 200 mg daily.  Risks and benefits were reviewed.    Erin Montgomery MD  Collaborative Care Psychiatry  [facility]

## 2023-01-24 NOTE — TELEPHONE ENCOUNTER
Patient stated that new prescription modafinil (PROVIGIL) 200 MG tablet Needs a prior auth. Called pharmacy and they are going to fax right now to our office.   Will let patient know we are going to be working on it.     Margi Nelson RN on 1/24/2023 at 1:49 PM

## 2023-01-31 ENCOUNTER — E-VISIT (OUTPATIENT)
Dept: URGENT CARE | Facility: CLINIC | Age: 38
End: 2023-01-31
Payer: MEDICARE

## 2023-01-31 DIAGNOSIS — R21 RASH AND NONSPECIFIC SKIN ERUPTION: Primary | ICD-10-CM

## 2023-01-31 PROCEDURE — 99207 PR NON-BILLABLE SERV PER CHARTING: CPT | Performed by: EMERGENCY MEDICINE

## 2023-01-31 NOTE — PATIENT INSTRUCTIONS
Dear Isaura Gray,    We are sorry you are not feeling well. Based on the responses you provided, it is recommended that you be seen in-person in urgent care so we can better evaluate your symptoms. Please click here to find the nearest urgent care location to you.   You will not be charged for this Visit. Thank you for trusting us with your care.    Tomás Herndon MD

## 2023-02-01 NOTE — CONFIDENTIAL NOTE
Addressed in second 1/24/23 [facility] Medical Advice encounter.     Starr Hills RN on 2/1/2023 at 11:51 AM

## 2023-02-01 NOTE — CONFIDENTIAL NOTE
"    1) RN reviewed [facility] Medical Advice message regarding modafinil (PROVIGIL) 200 MG tablet: \"I was able to fill it and it works AMAZINGLY.    2) RN reviewed prior encounter message: \"If I put in for a refill on the Adderall 5mg would that be able to be approved?    3) RN reviewed second 1/24/23 [facility] Medical Advice encounter message: \"Noah Montgomery. I was curious if you were able to figure out a good alternative for Adderall?\"    4) RN replied via [facility] expressing gladness that the medication is working well for pt and informing her that RN will send her questions about Adderall to her provider for review and consult.     6) Pt has an appointment scheduled for 2/27/23.     7) RN encouraged pt to reach out via [facility] or [phone] with any additional needs.     Starr Hills RN on 2/1/2023 at 11:47 AM        "

## 2023-02-15 ENCOUNTER — PATIENT OUTREACH (OUTPATIENT)
Dept: CARE COORDINATION | Facility: CLINIC | Age: 38
End: 2023-02-15
Payer: COMMERCIAL

## 2023-02-15 ENCOUNTER — VIRTUAL VISIT (OUTPATIENT)
Dept: FAMILY MEDICINE | Facility: CLINIC | Age: 38
End: 2023-02-15
Payer: MEDICARE

## 2023-02-15 DIAGNOSIS — R53.83 OTHER FATIGUE: ICD-10-CM

## 2023-02-15 DIAGNOSIS — Z87.19 HISTORY OF FATTY INFILTRATION OF LIVER: ICD-10-CM

## 2023-02-15 DIAGNOSIS — Z00.00 ENCOUNTER FOR MEDICARE ANNUAL WELLNESS EXAM: Primary | ICD-10-CM

## 2023-02-15 DIAGNOSIS — R11.14 BILIOUS VOMITING WITH NAUSEA: ICD-10-CM

## 2023-02-15 DIAGNOSIS — R06.09 POST-COVID CHRONIC DYSPNEA: ICD-10-CM

## 2023-02-15 DIAGNOSIS — U09.9 POST-COVID CHRONIC DYSPNEA: ICD-10-CM

## 2023-02-15 PROCEDURE — G0438 PPPS, INITIAL VISIT: HCPCS | Mod: VID | Performed by: FAMILY MEDICINE

## 2023-02-15 PROCEDURE — 99214 OFFICE O/P EST MOD 30 MIN: CPT | Mod: 25 | Performed by: FAMILY MEDICINE

## 2023-02-15 RX ORDER — PROCHLORPERAZINE MALEATE 10 MG
10 TABLET ORAL EVERY 6 HOURS PRN
Qty: 20 TABLET | Refills: 3 | Status: SHIPPED | OUTPATIENT
Start: 2023-02-15 | End: 2023-07-12

## 2023-02-15 RX ORDER — ALBUTEROL SULFATE 90 UG/1
2 AEROSOL, METERED RESPIRATORY (INHALATION) EVERY 6 HOURS
Qty: 18 G | Refills: 0 | Status: SHIPPED | OUTPATIENT
Start: 2023-02-15 | End: 2024-07-11

## 2023-02-15 ASSESSMENT — ENCOUNTER SYMPTOMS
CONSTIPATION: 1
EYE PAIN: 0
NERVOUS/ANXIOUS: 1
NAUSEA: 1
SHORTNESS OF BREATH: 0
DIARRHEA: 0
ARTHRALGIAS: 1
HEARTBURN: 0
HEMATOCHEZIA: 0
CHILLS: 1
BREAST MASS: 0
PARESTHESIAS: 1
HEMATURIA: 0
SORE THROAT: 0
HEADACHES: 1
FEVER: 0
ABDOMINAL PAIN: 1
DYSURIA: 0
FREQUENCY: 1
JOINT SWELLING: 1
MYALGIAS: 1
WEAKNESS: 1
PALPITATIONS: 1
COUGH: 0
DIZZINESS: 1

## 2023-02-15 ASSESSMENT — PATIENT HEALTH QUESTIONNAIRE - PHQ9
10. IF YOU CHECKED OFF ANY PROBLEMS, HOW DIFFICULT HAVE THESE PROBLEMS MADE IT FOR YOU TO DO YOUR WORK, TAKE CARE OF THINGS AT HOME, OR GET ALONG WITH OTHER PEOPLE: VERY DIFFICULT
SUM OF ALL RESPONSES TO PHQ QUESTIONS 1-9: 14
SUM OF ALL RESPONSES TO PHQ QUESTIONS 1-9: 14

## 2023-02-15 ASSESSMENT — ACTIVITIES OF DAILY LIVING (ADL)
CURRENT_FUNCTION: HOUSEWORK REQUIRES ASSISTANCE
CURRENT_FUNCTION: MONEY MANAGEMENT REQUIRES ASSISTANCE
CURRENT_FUNCTION: PREPARING MEALS REQUIRES ASSISTANCE
CURRENT_FUNCTION: LAUNDRY REQUIRES ASSISTANCE

## 2023-02-15 NOTE — PROGRESS NOTES
"SUBJECTIVE:   Isaura is a 37 year old who presents for Preventive Visit.  Isaura is a 37 year old who is being evaluated via a billable video visit.       How would you like to obtain your AVS? Bobby  If the video visit is dropped, the invitation should be resent by: Text to cell phone: [phone]  Will anyone else be joining your video visit? No       Patient has been advised of split billing requirements and indicates understanding: Yes  Are you in the first 12 months of your Medicare coverage?  No    Healthy Habits:     In general, how would you rate your overall health?  Poor    Frequency of exercise:  2-3 days/week    Duration of exercise:  15-30 minutes    Do you usually eat at least 4 servings of fruit and vegetables a day, include whole grains    & fiber and avoid regularly eating high fat or \"junk\" foods?  Yes    Taking medications regularly:  Yes    Medication side effects:  None    Ability to successfully perform activities of daily living:  Preparing meals requires assistance, housework requires assistance, laundry requires assistance and money management requires assistance    Home Safety:  No safety concerns identified    Hearing Impairment:  Difficulty following a conversation in a noisy restaurant or crowded room, feel that people are mumbling or not speaking clearly, difficulty following dialogue in the theater, difficult to understand a speaker at a public meeting or Buddhism service, need to ask people to speak up or repeat themselves, find that men's voices are easier to understand than woman's, difficulty understanding soft or whispered speech and difficulty understanding speech on the telephone    In the past 6 months, have you been bothered by leaking of urine? Yes    In general, how would you rate your overall mental or emotional health?  Fair      PHQ-2 Total Score: 2    Additional concerns today:  No      Have you ever done Advance Care Planning? (For example, a Health Directive, " POLST, or a discussion with a medical provider or your loved ones about your wishes): No, advance care planning information given to patient to review.  Patient plans to discuss their wishes with loved ones or provider.         Fall risk  Fallen 2 or more times in the past year?: No  Any fall with injury in the past year?: No    Cognitive Screening   1) Repeat 3 items (Leader, Season, Table)    2) Clock draw: not done   3) 3 item recall: Recalls 1 object   Results: 1 item     Mini-CogTM Copyright S Yajaira. Licensed by the author for use in [facility]; reprinted with permission ([email or web address]). All rights reserved.      Do you have sleep apnea, excessive snoring or daytime drowsiness?: no    Reviewed and updated as needed this visit by clinical staff   Tobacco  Allergies  Meds  Problems             Reviewed and updated as needed this visit by Provider                 Social History     Tobacco Use     Smoking status: Former     Packs/day: 0.50     Years: 5.00     Pack years: 2.50     Types: Cigarettes, Vaping Device     Quit date: 2016     Years since quittin.5     Smokeless tobacco: Never   Substance Use Topics     Alcohol use: No     Comment: Has an issue with her liver (not alcohol related)     If you drink alcohol do you typically have >3 drinks per day or >7 drinks per week? No    Alcohol Use 2/15/2023   Prescreen: >3 drinks/day or >7 drinks/week? No   Prescreen: >3 drinks/day or >7 drinks/week? -               Current providers sharing in care for this patient include:   Patient Care Team:  Timmy Umana MD as PCP - General (Family Practice)  Simi Bearden MD as MD (Hematology & Oncology)  Timmy Umana MD as Assigned PCP  Violette Wren LSW as Lead Care Coordinator (Primary Care - CC)  Merced Ko MD as MD (Endocrinology, Diabetes, and Metabolism)  Blair Wan PA-C as Referring Physician (Family Medicine)  Merced Ko MD as Assigned  Endocrinology Provider  Brice Fernandez MD as MD (Internal Medicine)  Kyung Valderrama PA-C as Physician Assistant (Neurology)  Karie Mckenna MD as MD (Cardiovascular Disease)  Kyung Valderrama PA-C as Assigned Neuroscience Provider  Karie Mckenna MD as MD (Cardiovascular Disease)  Edgar Bravo PsyD as Assigned Sleep Provider  Ginette Oliver MSW as Assigned Behavioral Health Provider    The following health maintenance items are reviewed in Epic and correct as of today:  Health Maintenance   Topic Date Due     NICOTINE/TOBACCO CESSATION COUNSELING Q 1 YR  Never done     URINE DRUG SCREEN  Never done     HEPATITIS B IMMUNIZATION (1 of 3 - 3-dose series) Never done     Pneumococcal Vaccine: Pediatrics (0 to 5 Years) and At-Risk Patients (6 to 64 Years) (1 - PCV) Never done     COVID-19 Vaccine (2 - Booster for William series) 07/01/2021     INFLUENZA VACCINE (1) 09/01/2022     PAP FOLLOW-UP  02/13/2023     HPV FOLLOW-UP  02/13/2023     ANNUAL REVIEW OF HM ORDERS  04/27/2023     MEDICARE ANNUAL WELLNESS VISIT  02/15/2024     ADVANCE CARE PLANNING  02/16/2028     DTAP/TDAP/TD IMMUNIZATION (4 - Td or Tdap) 06/25/2028     HEPATITIS C SCREENING  Completed     HIV SCREENING  Completed     IPV IMMUNIZATION  Aged Out     MENINGITIS IMMUNIZATION  Aged Out     Lab work is in process  Labs reviewed in EPIC  BP Readings from Last 3 Encounters:   10/17/22 123/88   04/27/22 118/72   03/15/22 (!) 116/98    Wt Readings from Last 3 Encounters:   10/17/22 73 kg (161 lb)   08/25/22 70.8 kg (156 lb)   07/19/22 78.5 kg (173 lb)                  Patient Active Problem List   Diagnosis     Vitamin D deficiency     Supervision of other high risk pregnancies, unspecified trimester     PE (pulmonary thromboembolism) (H)     Hyperprolactinemia (H)     Follicular cancer of thyroid (H)     Lactose intolerance in adult     Schizophrenia (H)     Encounter for triage in pregnant patient     Cough     Chronic bilateral low  "back pain without sciatica     Normal labor     Anxiety     Cancer (H)     H/O  delivery, currently pregnant     History of pulmonary embolism     History of thyroid cancer     Mitral valve disorder      (normal spontaneous vaginal delivery)     Moderate major depression (H)     PTSD (post-traumatic stress disorder)     ASCUS of cervix with negative high risk HPV     Non-alcoholic fatty liver disease     Psychophysiological insomnia     Family history of Hashimoto thyroiditis     Biliary dyskinesia     Bipolar II disorder (H)     Pituitary tumor - history     Persistent insomnia     Post-cholecystectomy syndrome     Narcolepsy and cataplexy     Migraine with aura and with status migrainosus, not intractable     Chronic post-COVID-19 syndrome     Past Surgical History:   Procedure Laterality Date     APPENDECTOMY       ENT SURGERY      Partial thyroidectomy     LAPAROSCOPIC CHOLECYSTECTOMY N/A 3/4/2021    Procedure: Laparoscopic cholecystectomy;  Surgeon: Osmany Smith MD;  Location:  OR       Social History     Tobacco Use     Smoking status: Former     Packs/day: 0.50     Years: 5.00     Pack years: 2.50     Types: Cigarettes, Vaping Device     Quit date: 2016     Years since quittin.5     Smokeless tobacco: Never   Substance Use Topics     Alcohol use: No     Comment: Has an issue with her liver (not alcohol related)     Family History   Problem Relation Age of Onset     No Known Problems Mother      Hypertension Father      Cerebrovascular Disease Father 56        Passed away from double brain stem clot     Mental Illness Father      Depression Father      Anxiety Disorder Father      Alcoholism Father      Schizophrenia Father      Asthma Brother      Cancer Maternal Grandfather         lung     No Known Problems Paternal Grandmother      Schizophrenia Paternal Grandfather      Suicide Paternal Grandfather 53     Autism Spectrum Disorder Son      Kidney Disease Son         \"has a third " "kidney\"     No Known Problems Daughter          Current Outpatient Medications   Medication Sig Dispense Refill     albuterol (PROAIR HFA/PROVENTIL HFA/VENTOLIN HFA) 108 (90 Base) MCG/ACT inhaler Inhale 2 puffs into the lungs every 6 hours 18 g 0     calcium carbonate (OS-DEBI) 1500 (600 Ca) MG tablet Take 1 tablet (600 mg) by mouth 2 times daily (with meals) 180 tablet 1     levonorgestrel (MIRENA) 20 MCG/DAY IUD 1 each (20 mcg) by Intrauterine route once for 1 dose Placed 2/2020 1 each 0     metoprolol tartrate (LOPRESSOR) 25 MG tablet TAKE 1/2 TABLET(12.5 MG) BY MOUTH TWICE DAILY 90 tablet 1     modafinil (PROVIGIL) 200 MG tablet Take 1 tablet (200 mg) by mouth daily 30 tablet 2     prochlorperazine (COMPAZINE) 10 MG tablet Take 1 tablet (10 mg) by mouth every 6 hours as needed for nausea or vomiting 20 tablet 3     mirtazapine (REMERON) 7.5 MG tablet Take 1 tablet (7.5 mg) by mouth At Bedtime (Patient not taking: Reported on 2/15/2023) 30 tablet 2     Allergies   Allergen Reactions     Ciprofloxacin Hives     Sulfa Drugs Hives     Hydrocodone-Acetaminophen Itching           Oxycodone Hives     Oxycodone-Acetaminophen Itching     Mammogram Screening: less than 40     Breast CA Risk Assessment (FHS-7) 2/15/2023   Do you have a family history of breast, colon, or ovarian cancer? No / Unknown           Pertinent mammograms are reviewed under the imaging tab.    Review of Systems   Constitutional: Positive for chills. Negative for fever.   HENT: Positive for congestion. Negative for ear pain, hearing loss and sore throat.    Eyes: Positive for visual disturbance. Negative for pain.   Respiratory: Negative for cough and shortness of breath.    Cardiovascular: Positive for chest pain, palpitations and peripheral edema.   Gastrointestinal: Positive for abdominal pain, constipation and nausea. Negative for diarrhea, heartburn and hematochezia.   Breasts:  Negative for tenderness, breast mass and discharge. "   Genitourinary: Positive for frequency, pelvic pain and urgency. Negative for dysuria, genital sores, hematuria, vaginal bleeding and vaginal discharge.   Musculoskeletal: Positive for arthralgias, joint swelling and myalgias.   Skin: Positive for rash.   Neurological: Positive for dizziness, weakness, headaches and paresthesias.   Psychiatric/Behavioral: Negative for mood changes. The patient is nervous/anxious.          Video-Visit Details     Type of service:  Video Visit   Video Start Time: 4::05PM  Video End Time:4 :20 PM    Originating Location (pt. Location): Home    Distant Location (provider location):  Off-site  Platform used for Video Visit: [employer]     OBJECTIVE:   There were no vitals taken for this visit. Estimated body mass index is 21.84 kg/m  as calculated from the following:    Height as of 8/25/22: 1.829 m (6').    Weight as of 10/17/22: 73 kg (161 lb).  Physical Exam  Constitutional:       Appearance: Normal appearance.   HENT:      Head: Normocephalic and atraumatic.   Skin:     General: Skin is dry.   Neurological:      Mental Status: She is alert and oriented to person, place, and time.   Psychiatric:         Mood and Affect: Mood normal.         Behavior: Behavior normal.         ASSESSMENT / PLAN:       ICD-10-CM    1. Encounter for Medicare annual wellness exam  Z00.00       2. Post-COVID chronic dyspnea  R06.09 albuterol (PROAIR HFA/PROVENTIL HFA/VENTOLIN HFA) 108 (90 Base) MCG/ACT inhaler    U09.9 OFFICE/OUTPT VISIT,EST,LEVL IV      3. Bilious vomiting with nausea  R11.14 prochlorperazine (COMPAZINE) 10 MG tablet     OFFICE/OUTPT VISIT,EST,LEVL IV      4. History of fatty infiltration of liver  Z87.19 US Abdomen Limited     OFFICE/OUTPT VISIT,EST,LEVL IV      5. Other fatigue  R53.83 Estradiol     OFFICE/OUTPT VISIT,EST,LEVL IV      37-year-old female presenting for video visit for medicare wellness exam.        She has a very complex medical history including narcolepsy chronic fatigue,  nausea, post-COVID syndrome also has some mental health illnesses had an extensive history of childhood trauma.      She comes in with multiple problems today wanting to know if there was anything else that could be done about the fatigue that she experiences.     She  reports that her fatigue is related to the nausea and it keeps her from doing everyday tasks.     She asked about about cortisol levels, estradiol levels asking if this could somehow explain her fatigue.    She  says that at some point she was told that she has fatty liver she would like to have another liver scan.      I explained to the patient that given the complexity of her symptoms that she could have chronic fatigue syndrome and it will take a multidisciplinary approach.  Recommended an in person visit given the complexity of her symptoms.  She requested some refills today and medications were faxed.      She has an upcoming appointment with a psychiatrist to discuss changes in her medications for the narcolepsy.  She is also seen several specialist including neurology, cardiology.ordered labs and liver ultrasound.    Patient has been advised of split billing requirements and indicates understanding: Yes      COUNSELING:  Reviewed preventive health counseling, as reflected in patient instructions       Regular exercise       Healthy diet/nutrition        She reports that she quit smoking about 6 years ago. Her smoking use included cigarettes and vaping device. She has a 2.50 pack-year smoking history. She has never used smokeless tobacco.      Appropriate preventive services were discussed with this patient, including applicable screening as appropriate for cardiovascular disease, diabetes, osteopenia/osteoporosis, and glaucoma.  As appropriate for age/gender, discussed screening for colorectal cancer, prostate cancer, breast cancer, and cervical cancer. Checklist reviewing preventive services available has been given to the  patient.    Reviewed patients plan of care and provided an AVS. The Basic Care Plan (routine screening as documented in Health Maintenance) for Isaura meets the Care Plan requirement. This Care Plan has been established and reviewed with the Patient.    {Counseling Resources  [place] Preventive Services Task Force  Cholesterol Screening  Health diet/nutrition  Pooled Cohorts Equation Calculator  [employer] MyPlate  ASA Prophylaxis  Lung CA Screening  Osteoporosis prevention/bone health      Zach Marie MD  [facility]    Identified Health Risks:  Answers for HPI/ROS submitted by the patient on 2/15/2023  If you checked off any problems, how difficult have these problems made it for you to do your work, take care of things at home, or get along with other people?: Very difficult  PHQ9 TOTAL SCORE: 14

## 2023-02-15 NOTE — PROGRESS NOTES
"Isaura is a 37 year old who is being evaluated via a billable video visit.      How would you like to obtain your AVS? {AVS Preference:310073}  If the video visit is dropped, the invitation should be resent by: {video visit invitation (Optional) :534807}  Will anyone else be joining your video visit? {:581151}  {If patient encounters technical issues they should call [phone] :973644}        {PROVIDER CHARTING PREFERENCE:573318}    Subjective   Isaura is a 37 year old{ACCOMPANIED BY STATEMENT (Optional):636880}, presenting for the following health issues:  No chief complaint on file.      Healthy Habits:     In general, how would you rate your overall health?  Poor    Frequency of exercise:  2-3 days/week    Duration of exercise:  15-30 minutes    Do you usually eat at least 4 servings of fruit and vegetables a day, include whole grains    & fiber and avoid regularly eating high fat or \"junk\" foods?  Yes    Taking medications regularly:  Yes    Medication side effects:  None    Ability to successfully perform activities of daily living:  Preparing meals requires assistance, housework requires assistance, laundry requires assistance and money management requires assistance    Home Safety:  No safety concerns identified    Hearing Impairment:  Difficulty following a conversation in a noisy restaurant or crowded room, feel that people are mumbling or not speaking clearly, difficulty following dialogue in the theater, difficult to understand a speaker at a public meeting or Anabaptism service, need to ask people to speak up or repeat themselves, find that men's voices are easier to understand than woman's, difficulty understanding soft or whispered speech and difficulty understanding speech on the telephone    In the past 6 months, have you been bothered by leaking of urine? Yes    In general, how would you rate your overall mental or emotional health?  Fair      PHQ-2 Total Score: 2    Additional concerns today:  " "No       {SUPERLIST (Optional):157780}  {additonal problems for provider to add (Optional):510306}    Review of Systems   Constitutional: Positive for chills. Negative for fever.   HENT: Positive for congestion. Negative for ear pain, hearing loss and sore throat.    Eyes: Positive for visual disturbance. Negative for pain.   Respiratory: Negative for cough and shortness of breath.    Cardiovascular: Positive for chest pain, palpitations and peripheral edema.   Gastrointestinal: Positive for abdominal pain, constipation and nausea. Negative for diarrhea, heartburn and hematochezia.   Breasts:  Negative for tenderness, breast mass and discharge.   Genitourinary: Positive for frequency, pelvic pain and urgency. Negative for dysuria, genital sores, hematuria, vaginal bleeding and vaginal discharge.   Musculoskeletal: Positive for arthralgias, joint swelling and myalgias.   Skin: Positive for rash.   Neurological: Positive for dizziness, weakness, headaches and paresthesias.   Psychiatric/Behavioral: Negative for mood changes. The patient is nervous/anxious.       {ROS COMP (Optional):540001}      Objective           Vitals:  No vitals were obtained today due to virtual visit.    Physical Exam   {video visit exam brief selected:583891::\"GENERAL: Healthy, alert and no distress\",\"EYES: Eyes grossly normal to inspection.  No discharge or erythema, or obvious scleral/conjunctival abnormalities.\",\"RESP: No audible wheeze, cough, or visible cyanosis.  No visible retractions or increased work of breathing.  \",\"SKIN: Visible skin clear. No significant rash, abnormal pigmentation or lesions.\",\"NEURO: Cranial nerves grossly intact.  Mentation and speech appropriate for age.\",\"PSYCH: Mentation appears normal, affect normal/bright, judgement and insight intact, normal speech and appearance well-groomed.\"}    {Diagnostic Test Results (Optional):112057}    {AMBULATORY ATTESTATION (Optional):959434}        Video-Visit Details    Type " "of service:  Video Visit   Video Start Time: {video visit start/end time for provider to select:860298}  Video End Time:{video visit start/end time for provider to select:285092}    Originating Location (pt. Location): {video visit patient location:925388::\"Home\"}  {PROVIDER LOCATION On-site should be selected for visits conducted from your clinic location or adjoining [facility] hospital, academic office, or other nearby [facility] building. Off-site should be selected for all other provider locations, including home:701563}  Distant Location (provider location):  {virtual location provider:601258}  Platform used for Video Visit: {Virtual Visit Platforms:133289::\"Crunched\"}      The patient s PHQ-9 score is consistent with moderate depression. She was provided with information regarding depression and was advised to schedule a follow up appointment in *** weeks to further address this issue.  Answers for HPI/ROS submitted by the patient on 2/15/2023  If you checked off any problems, how difficult have these problems made it for you to do your work, take care of things at home, or get along with other people?: Very difficult  PHQ9 TOTAL SCORE: 14        "

## 2023-02-15 NOTE — PROGRESS NOTES
Clinic Care Coordination Contact    Follow Up Progress Note      Assessment: patient reported that the new med from psychiatry was not helpful.  She started having major headaches two weeks in.  Reducing the strength did not help.  She will see psychiatry on the 27th as well.  She is also out of her compazine and reviewed that there is no refill order.  She does want to go up on the strength and change to every day vs prn.  Discussed her annual wellness visit as a good time to get extra time to fully talk about all that is going on and she will schedule.  Also discussed that it might be a good idea to have an MTM visit with pharmacist to review medications and get input.  She will ask about this referral.     Pt has not been having the energy to do tasks with the lack of the stimulant medication.  She works with her [employer] worker on this as well.     Care Gaps:    Health Maintenance Due   Topic Date Due     NICOTINE/TOBACCO CESSATION COUNSELING Q 1 YR  Never done     URINE DRUG SCREEN  Never done     ADVANCE CARE PLANNING  Never done     HEPATITIS B IMMUNIZATION (1 of 3 - 3-dose series) Never done     Pneumococcal Vaccine: Pediatrics (0 to 5 Years) and At-Risk Patients (6 to 64 Years) (1 - PCV) Never done     MEDICARE ANNUAL WELLNESS VISIT  02/13/2021     COVID-19 Vaccine (2 - Booster for William series) 07/01/2021     INFLUENZA VACCINE (1) 09/01/2022     PAP FOLLOW-UP  02/13/2023     HPV FOLLOW-UP  02/13/2023       pt to schedule    Care Plans  Care Plan: General     Problem: HP GENERAL PROBLEM     Goal: schedule/attend annual wellness visit     Start Date: 3/8/2022 Expected End Date: 3/8/2023    This Visit's Progress: 20% Recent Progress: 20%    Note:     Barriers: scheduling  Strengths: desire to complete    Patient expressed understanding of goal: Yes    Action steps to achieve this goal:  1. I will call scheduling or go on [employer] to schedule my Annual Wellness Visit.  2. I will attend the appointment.                     Care Plan: General     Problem: HP GENERAL PROBLEM     Long-Range Goal: break large tasks into manageable steps     Start Date: 5/4/2021 Expected End Date: 5/31/2023    This Visit's Progress: 30% Recent Progress: 30%    Note:     Barriers: my mental health, two young children  Strengths: I just got an henrietta to help with identifying tasks    Patient expressed understanding of goal: Yes  Action steps to achieve this goal:  1. I will stop looking at the job as one big task I need to complete and break it down into manageable steps.  2. I will learn how to use the henrietta and identify steps.  3. I will start to work on the tasks at a reasonable rate.  4. If I start to feel overwhelmed with what is ahead of me I will look at what I have completed and remind myself to focus on steps and not the entire task.                        Intervention/Education provided during outreach: listened and discussed going forward options.  Encouraged appointment for annual wellness visit to have time to discuss fully all that is going on in total.      Outreach Frequency: monthly    Plan:   Pt to schedule appointments.   Care Coordinator will follow up in one month.     TRAMAINE Ramirez  [facility] - Care Coordinator   2/15/2023   12:43 PM  [phone]

## 2023-02-15 NOTE — PATIENT INSTRUCTIONS
"  Patient Education   Personalized Prevention Plan  You are due for the preventive services outlined below.  Your care team is available to assist you in scheduling these services.  If you have already completed any of these items, please share that information with your care team to update in your medical record.  Health Maintenance Due   Topic Date Due     Talk to your care team about options to quit tobacco use.  Never done     URINE DRUG SCREEN  Never done     Discuss Advance Care Planning  Never done     Hepatitis B Vaccine (1 of 3 - 3-dose series) Never done     Pneumococcal Vaccine (1 - PCV) Never done     Annual Wellness Visit  02/13/2021     COVID-19 Vaccine (2 - Booster for William series) 07/01/2021     Flu Vaccine (1) 09/01/2022     PAP  02/13/2023     HPV Follow Up  02/13/2023       Depression and Suicide in Older Adults    Nearly 2 million older Americans have some type of depression. Some of them even take their own lives. Yet depression among older adults is often ignored. Learn the warning signs. You may help spare a loved one needless pain. You may also save a life.   What is depression?  Depression is a common and serious illness that affects the way you think and feel. It is not a normal part of aging, nor is it a sign of weakness, a character flaw, or something you can snap out of. Most people with depression need treatment to get better. The most common symptom is a feeling of deep sadness. People who are depressed also may seem tired and listless. And nothing seems to give them pleasure. It s normal to grieve or be sad sometimes. But sadness lessens or passes with time. Depression rarely goes away or improves on its own. A person with clinical depression can't \"snap out of it.\" Other symptoms of depression are:     Sleeping more or less than normal    Eating more or less than normal    Having headaches, stomachaches, or other pains that don t go away    Feeling nervous,  empty,  or " worthless    Crying a great deal    Thinking or talking about suicide or death    Loss of interest in activities previously enjoyed    Social isolation    Feeling confused or forgetful  What causes it?  The causes of depression aren t fully known. But it is thought to result from a complex blend of these factors:     Biochemistry. Certain chemicals in the brain play a role.    Genes. Depression does run in families.    Life stress. Life stresses can also trigger depression in some people. Older adults often face many stressors, such as death of friends or a spouse, health problems, and financial concerns.    Chronic conditions. This includes conditions such as diabetes, heart disease, or cancer. These can cause symptoms of depression. Medicine side effects can cause changes in thoughts and behaviors.  How you can help  Often, depressed people may not want to ask for help. When they do, they may be ignored. Or, they may receive the wrong treatment. You can help by showing parents and older friends love and support. If they seem depressed, don t lecture the person, ignore the symptoms, or discount the symptoms as a  normal  part of aging -which they are not. Get involved, listen, and show interest and support.   Help them understand that depression is a treatable illness. Tell them you can help them find the right treatment. Offer to go to their healthcare provider's appointment with them for support when the symptoms are discussed. With their approval, contact a local mental health center, social service agency, or hospital about services.   You can be an advocate for him or her at healthcare appointments. Many older adults have chronic illnesses that can cause symptoms of depression. Medicine side effects can change thoughts and behaviors. You can help make sure that the healthcare provider looks at all of these factors. He or she should refer your family member or friend to a mental healthcare provider when needed.  in some cases, untreated depression can lead to a misdiagnosis. A person may be diagnosed with a brain disorder such as dementia. If the healthcare provider does not take the issue of depression seriously, help your family member or friend to find another provider.   Don't be afraid to ask  If you think an older person you care about could be suicidal, ask,  Have you thought about suicide?  Most people will tell you the truth. If they say  yes,  they may already have a plan for how and when they will attempt it. Find out as much as you can. The more detailed the plan, and the easier it is to carry out, the more danger the person is in right now. Tell the person you are there for them and do not want them to harm him or herself. Don't wait to get help for the person. Call the person's healthcare provider, local hospital, or emergency services.   To learn more    National Suicide Prevention Lifeline (crisis hotline) 233-016-DQHE (845-108-2805)    National [place] of Mental Vgpvek906-455-4903cef.[facility].nih.gov    National [place] on Mental Dskwtjh149-687-3433hvc.alexi.org    Mental Health Ussqhck514-853-5426pmh.[facility].org    National Suicide Lrkrltp747-EFHKHID (803-161-4651)    Call 911  Never leave the person alone. A person who is actively suicidal needs psychiatric care right away. They will need constant supervision. Never leave the person out of sight. Call 911 or the national 24-hour suicide crisis hotline at 651-737-QABB (999-982-3647). You can also take the person to the closest emergency room.   Nani last reviewed this educational content on 5/1/2020 2000-2021 [employer]. All rights reserved. This information is not intended as a substitute for professional medical care. Always follow your healthcare professional's instructions.

## 2023-02-20 ENCOUNTER — HOSPITAL ENCOUNTER (OUTPATIENT)
Dept: ULTRASOUND IMAGING | Facility: CLINIC | Age: 38
Discharge: HOME | End: 2023-02-20
Attending: FAMILY MEDICINE | Admitting: FAMILY MEDICINE
Payer: MEDICARE

## 2023-02-20 DIAGNOSIS — Z87.19 HISTORY OF FATTY INFILTRATION OF LIVER: ICD-10-CM

## 2023-02-20 PROCEDURE — 76705 ECHO EXAM OF ABDOMEN: CPT

## 2023-02-20 NOTE — RESULT ENCOUNTER NOTE
Please inform patient that test result was within normal parameters.   Thank you.     Zach Marie M.D.

## 2023-02-27 ENCOUNTER — VIRTUAL VISIT (OUTPATIENT)
Dept: BEHAVIORAL HEALTH | Facility: CLINIC | Age: 38
End: 2023-02-27
Payer: MEDICARE

## 2023-02-27 ENCOUNTER — VIRTUAL VISIT (OUTPATIENT)
Dept: PSYCHIATRY | Facility: CLINIC | Age: 38
End: 2023-02-27
Payer: MEDICARE

## 2023-02-27 DIAGNOSIS — F31.81 BIPOLAR II DISORDER (H): ICD-10-CM

## 2023-02-27 DIAGNOSIS — F41.9 ANXIETY DISORDER, UNSPECIFIED TYPE: Primary | ICD-10-CM

## 2023-02-27 DIAGNOSIS — G47.411 NARCOLEPSY AND CATAPLEXY: Primary | ICD-10-CM

## 2023-02-27 PROCEDURE — 90832 PSYTX W PT 30 MINUTES: CPT | Mod: VID | Performed by: SOCIAL WORKER

## 2023-02-27 PROCEDURE — 99214 OFFICE O/P EST MOD 30 MIN: CPT | Mod: VID | Performed by: PSYCHIATRY & NEUROLOGY

## 2023-02-27 RX ORDER — ARMODAFINIL 150 MG/1
150 TABLET ORAL EVERY MORNING
Qty: 7 TABLET | Refills: 1 | Status: SHIPPED | OUTPATIENT
Start: 2023-02-27 | End: 2023-05-23 | Stop reason: SINTOL

## 2023-02-27 NOTE — PROGRESS NOTES
Telemedicine Visit: The patient's condition can be safely assessed and treated via synchronous audio and visual telemedicine encounter.      Reason for Telemedicine Visit: Services only offered telehealth    Originating Site (Patient Location): Patient's home    Distant Site (Provider Location): Provider Remote Setting- Home Office    Consent:  The patient/guardian has verbally consented to: the potential risks and benefits of telemedicine (video visit) versus in person care; bill my insurance or make self-payment for services provided; and responsibility for payment of non-covered services.     Mode of Communication:  Video Conference via [employer]    As the provider I attest to compliance with applicable laws and regulations related to telemedicine.      How would you like to obtain your AVS? MyChart  If the video visit is dropped, the invitation should be resent by: Text to cell phone: [phone]  Will anyone else be joining your video visit? No          Outpatient Psychiatric Progress Note    Name: Isaura Gray   : 1985                    Primary Care Provider: Timmy Umana MD, MD   Therapist: [facility]      PHQ-9 scores:  PHQ-9 SCORE 2022 2022 2/15/2023   PHQ-9 Total Score MyChart 16 (Moderately severe depression) 16 (Moderately severe depression) 14 (Moderate depression)   PHQ-9 Total Score 16 16 14   Some encounter information is confidential and restricted. Go to Review Flowsheets activity to see all data.       AVERY-7 scores:  AVERY-7 SCORE 2022   Total Score 4 (minimal anxiety) 12 (moderate anxiety) 11 (moderate anxiety)   Total Score 4 12 11   Some encounter information is confidential and restricted. Go to Review Flowsheets activity to see all data.       Patient Identification:  Isaura is a 37 year old year old female  who presents for return visit with me.  Patient attended the session alone.     Interim History:  Per Ginette  "Melody, LICSW:  Pt shares that she is sleeping A LOT.  Was on modafinil and loved it the first day but found out shortly afterwards that it \"wasn't the right medication for her\".  Stopped completely about 2 weeks ago.  Causing headaches and hangover feeling.  Wants to talk about other options.    Completed all sleep study requirements at this time.  Have not been working but has been applying for some jobs although doesn't think can work until has a medication that works.  Daughter goes to school and son is home with patient during the day.    Reports having interest in doing things but too tired and no motivation to do them.  Expressed frustration with not being able to do things she wants to and needs to do.  Denies any SI although reports that she has a feeling in the back of her head that \"she isn't going to wake up\".    Continues with [facility] although is waiting on a new therapist and getting set up with a Psychiatrist.  Continues with an [place] [facility] worker weekly.    Isaura did not tolerate Provigil.  The first couple of days she had it, she felt as if it was going to be really beneficial, but she developed severe headaches, and reports that it eventually had somewhat sedative effect.  She is hesitant to go back to Adderall because of the shortage of supply.  We discussed other options including Nuvigil and methylphenidate.  We after reviewing risks and benefits we agreed to a trial of Nuvigil 150 mg daily.  If she does not tolerate it, she can contact me and we will substitute methylphenidate.    Isaura has only been using her mirtazapine occasionally if she is not feeling drowsy by around 8:00 in the evening.  She does report that she has nighttime increased appetite that wakes her at about 10:30 PM if she does take it.    She reports her mood has been good.  She is not currently working, and is not planning to look for work until she is found something that helps her maintain " wakefulness.    Vital Signs:   There were no vitals taken for this visit.      Current Medications:  Current Outpatient Medications   Medication     albuterol (PROAIR HFA/PROVENTIL HFA/VENTOLIN HFA) 108 (90 Base) MCG/ACT inhaler     armodafinil (NUVIGIL) 150 MG TABS tablet     calcium carbonate (OS-DEBI) 1500 (600 Ca) MG tablet     metoprolol tartrate (LOPRESSOR) 25 MG tablet     mirtazapine (REMERON) 7.5 MG tablet     prochlorperazine (COMPAZINE) 10 MG tablet     levonorgestrel (MIRENA) 20 MCG/DAY IUD     No current facility-administered medications for this visit.        The [place] Prescription Monitoring Program has been reviewed and there are no concerns about diversionary activity for controlled substances at this time.      Mental Status Examination:  Isaura is a 37-year-old woman in no acute distress.  She is neatly groomed in casual clothing.  Speech is clear and normal in rate and tone.  Eye contact is good over the video connection.  Affect is full.  Mood is good.  Thoughts are logical and spontaneous with no loose associations or flight of ideas.  Thought content shows no psychosis.  No suicidal thoughts.  She is alert and oriented x3.    Assessment and Plan:    ICD-10-CM    1. Narcolepsy and cataplexy  G47.411 armodafinil (NUVIGIL) 150 MG TABS tablet          Medical comorbidities include:   Patient Active Problem List   Diagnosis     Vitamin D deficiency     Supervision of other high risk pregnancies, unspecified trimester     PE (pulmonary thromboembolism) (H)     Hyperprolactinemia (H)     Follicular cancer of thyroid (H)     Lactose intolerance in adult     Schizophrenia (H)     Encounter for triage in pregnant patient     Cough     Chronic bilateral low back pain without sciatica     Normal labor     Anxiety     Cancer (H)     H/O  delivery, currently pregnant     History of pulmonary embolism     History of thyroid cancer     Mitral valve disorder      (normal spontaneous vaginal  delivery)     Moderate major depression (H)     PTSD (post-traumatic stress disorder)     ASCUS of cervix with negative high risk HPV     Non-alcoholic fatty liver disease     Psychophysiological insomnia     Family history of Hashimoto thyroiditis     Biliary dyskinesia     Bipolar II disorder (H)     Pituitary tumor - history     Persistent insomnia     Post-cholecystectomy syndrome     Narcolepsy and cataplexy     Migraine with aura and with status migrainosus, not intractable     Chronic post-COVID-19 syndrome       Treatment Plan:  Patient Instructions   Continue mirtazapine 7.5 mg at bedtime as needed for insomnia.     Stop Provigil due to severe headaches.    Start Nuvigil 150 mg every morning. If you do not tolerate it, let me know and we will try methylphenidate.     Continue all other medications per your primary care provider.    Schedule an appointment with me in 3 months or sooner as needed.  You may call [facility] at [phone] to schedule.    [place] Resources:      Go to the Emergency Department as needed or call after hours crisis line at [phone].      To schedule individual or family therapy, call [facility] at [phone].     Follow up with primary care provider as planned or sooner for acute medical concerns.    Call the psychiatric nurse line with medication questions or concerns at [phone].    [employer] may be used to communicate with your provider, but this is not intended to be used for emergencies.    Community Resources:      National Suicide Prevention Lifeline: 425.110.3695 (TTY: 330.645.5046). Call anytime for help.  (www.suicidepreventionlifeline.org)    National [place] on Mental Illness (www.alexi.org): 541.599.1930 or 994-175-1948.     Mental Health Association (www.mentalhealth.org): 541.654.6470 or 313-566-1004.    [place] Crisis Text Line: Text [place] to 024320    Suicide LifeLine Chat: suicideTrinity-Nobleline.org/chat        Administrative Billing:   Video call duration: 22 minutes   Start: 8:40 AM   Stop: 9:02 AM  Total time spent, including chart review and documentation: 32 minutes    Patient Status:  The patient is being referred to long term community psychiatry care and provider will provide bridging until patient is established with new community provider.

## 2023-02-27 NOTE — PATIENT INSTRUCTIONS
Continue mirtazapine 7.5 mg at bedtime as needed for insomnia.     Stop Provigil due to severe headaches.    Start Nuvigil 150 mg every morning. If you do not tolerate it, let me know and we will try methylphenidate.     Continue all other medications per your primary care provider.    Schedule an appointment with me in 3 months or sooner as needed.  You may call [facility] at [phone] to schedule.    [place] Resources:    Go to the Emergency Department as needed or call after hours crisis line at [phone].    To schedule individual or family therapy, call [facility] at [phone].   Follow up with primary care provider as planned or sooner for acute medical concerns.  Call the psychiatric nurse line with medication questions or concerns at [phone].  [employer] may be used to communicate with your provider, but this is not intended to be used for emergencies.    Community Resources:    National Suicide Prevention Lifeline: 131.455.5450 (TTY: 486.416.2592). Call anytime for help.  (www.suicidepreventionlifeline.org)  National [place] on Mental Illness (www.alexi.org): 136.880.8003 or 794-246-0679.   Mental Health Association (www.mentalhealth.org): 335.162.9550 or 022-178-6509.  [place] Crisis Text Line: Text [place] to 061504  Suicide LifeLine Chat: suicidepreFlukleline.org/chat

## 2023-02-27 NOTE — PROGRESS NOTES
[facility] Collaborative Care Psychiatry Service   February 27, 2023        Behavioral Health Clinician Progress Note    Patient Name: Isaura Gray           Service Type:  Individual      Service Location:   [employer] / Email (patient reached)     Session Start Time: 8:00a  Session End Time: 8:20a      Session Length: 16 - 37      Attendees: Client     Service Modality:  Virtual visit      Telemedicine Visit: The patient's condition can be safely assessed and treated via synchronous audio and visual telemedicine encounter.      Reason for Telemedicine Visit: Services only offered telehealth    Originating Site (Patient Location): Patient's home    Distant Site (Provider Location): Provider Remote Setting- Home Office    Consent:  The patient/guardian has verbally consented to: the potential risks and benefits of telemedicine (video visit) versus in person care; bill my insurance or make self-payment for services provided; and responsibility for payment of non-covered services.     Mode of Communication:  Video Conference via [facility]    As the provider I attest to compliance with applicable laws and regulations related to telemedicine.        Visit Activities (Refresh list every visit): [facility] Only    Diagnostic Assessment Date: Updated DA 7/19/22  Treatment Plan Review Date: 5/27/23  See Flowsheets for today's PHQ-9 and AVERY-7 results  Previous PHQ-9:   PHQ-9 SCORE 11/7/2022 12/14/2022 2/15/2023   PHQ-9 Total Score MyChart 16 (Moderately severe depression) 16 (Moderately severe depression) 14 (Moderate depression)   PHQ-9 Total Score 16 16 14   Some encounter information is confidential and restricted. Go to Review Flowsheets activity to see all data.     Previous AVERY-7:   AVERY-7 SCORE 6/2/2022 7/19/2022 11/7/2022   Total Score 4 (minimal anxiety) 12 (moderate anxiety) 11 (moderate anxiety)   Total Score 4 12 11   Some encounter information is confidential and restricted. Go to Review Flowsheets activity  "to see all data.       PRECIOUS LEVEL:  PRECIOUS Score (Last Two) 3/28/2018   PRECIOUS Raw Score 35   Activation Score 72.1   PRECIOUS Level 3       DATA  Extended Session (60+ minutes): No  Interactive Complexity: No  Crisis: No  [facility] Patient: No    Treatment Objective(s) Addressed in This Session:  Target Behavior(s): health issues, anxiety, depression    Depressed Mood: Feel less tired and more energy during the day   Anxiety: will experience a reduction in anxiety    Current Stressors / Issues:  Pt shares that she is sleeping A LOT.  Was on modafinil and loved it the first day but found out shortly afterwards that it \"wasn't the right medication for her\".  Stopped completely about 2 weeks ago.  Causing headaches and hangover feeling.  Wants to talk about other options.    Completed all sleep study requirements at this time.  Have not been working but has been applying for some jobs although doesn't think can work until has a medication that works.  Daughter goes to school and son is home with patient during the day.    Reports having interest in doing things but too tired and no motivation to do them.  Expressed frustration with not being able to do things she wants to and needs to do.  Denies any SI although reports that she has a feeling in the back of her head that \"she isn't going to wake up\".    Continues with [facility] although is waiting on a new therapist and getting set up with a Psychiatrist.  Continues with an [employer] worker weekly.        Progress on Treatment Objective(s) / Homework:  Minimal progress - ACTION (Actively working towards change); Intervened by reinforcing change plan / affirming steps taken    Motivational Interviewing    MI Intervention: Expressed Empathy/Understanding, Open-ended questions and Reframe     Change Talk Expressed by the Patient: Committment to change Activation    Provider Response to Change Talk: E - Evoked more info from patient about behavior change, A - Affirmed patient's " thoughts, decisions, or attempts at behavior change, R - Reflected patient's change talk and S - Summarized patient's change talk statements      Care Plan review completed: Yes    Medication Review:  Changes to psychiatric medications, see updated Medication List in EPIC.     Medication Compliance:  Yes    Changes in Health Issues:   Yes: lots of nausea, extreme fatigue, Associated Psychological Distress.  Health issues a bit more manageable.  Remains extremely tired.    Chemical Use Review:   Substance Use: Chemical use reviewed, no active concerns identified      Tobacco Use: No current tobacco use.      Assessment: Current Emotional / Mental Status (status of significant symptoms):  Risk status (Self / Other harm or suicidal ideation)  Patient denies a history of suicidal ideation, suicide attempts, self-injurious behavior, homicidal ideation, homicidal behavior and and other safety concerns  Patient denies current fears or concerns for personal safety.  Patient denies current or recent suicidal ideation or behaviors.  Patient denies current or recent homicidal ideation or behaviors.  Patient denies current or recent self injurious behavior or ideation.  Patient denies other safety concerns.  A safety and risk management plan has not been developed at this time, however patient was encouraged to call [place] [phone] should there be a change in any of these risk factors.    Appearance:   Appropriate   Eye Contact:   Fair   Psychomotor Behavior: Normal   Attitude:   Cooperative   Orientation:   All  Speech   Rate / Production: Normal    Volume:  Normal   Mood:    Normal  Affect:    Appropriate   Thought Content:  Clear   Thought Form:  Coherent  Logical   Insight:    Good     Diagnoses:  1. Anxiety disorder, unspecified type    2. Bipolar II disorder (H)        Collateral Reports Completed:  [facility]    Plan: (Homework, other):  Patient was given information about behavioral  services and encouraged to schedule a follow up appointment with the clinic [facility] as needed.  She was also given information about mental health symptoms and treatment options .  CD Recommendations: No indications of CD issues.  JUAN C Tapia, [facility]      ______________________________________________________________________    Integrated Primary Care Behavioral Health Treatment Plan    Patient's Name: Isaura Gray  YOB: 1985    Date of Creation: 4/4/22  Date Treatment Plan Last Reviewed/Revised: 2/27/23    DSM5 Diagnoses: 296.89 Bipolar II Disorder Depressed or 300.00 (F41.9) Unspecified Anxiety Disorder  Psychosocial / Contextual Factors: health issues, fatigue, home stress  PROMIS (reviewed every 90 days): 14    Referral / Collaboration:  Referral to another professional/service is not indicated at this time.    Anticipated number of session for this episode of care: 5-8  Anticipation frequency of session: Every 1-2 months  Anticipated Duration of each session: 16-37 minutes  Treatment plan will be reviewed in 90 days or when goals have been changed.       MeasurableTreatment Goal(s) related to diagnosis / functional impairment(s)  Goal 1: Patient will experience a reduction in depressive symptoms along with corresponding increase in positive emotions and life satisfaction.      Objective #A (Patient Action)    Patient will Feel less tired and more energy during the day .  Status: Continued - Date(s):2/27/23    Intervention(s)  Therapist will discuss with pt CBT and ACT topics aimed to help reduce depression and anxiety.      Patient has reviewed and agreed to the above plan.      Ginette Oliver, DAVID, JUAN C, [facility]  Feburary 27, 2023

## 2023-02-27 NOTE — PROGRESS NOTES
MH&A Post-Appointment Chart -check      Medications ordered this visit were e-scribed.  Verified by order class [x] yes  [] no    List Medications: armodafinil (NUVIGIL) 150 MG TABS tablet    Medication changes or discontinuations were communicated to patient's pharmacy: [x] yes  [] no  modafinil (PROVIGIL) 200 MG tablet (Discontinued)  UA collected [] yes  [] no  [x] n/a-virtual     Outside referrals / labs, etc support staff to follow up: [] yes  [x] no    Future appointment was made: [x] yes  [] no  [] n/a  Future Appointments 2/27/2023 - 8/26/2023      Date Visit Type Length Department Provider     2/28/2023  1:00 PM MYCHART LAB VISIT 15 min PH LAB CLINIC PH LAB              5/23/2023  8:00 AM [facility] B[facility] RETURN 30 min [facility] Isidra Zimmerman, LICSW              5/23/2023  8:30 AM [facility] ADULT PSYCHIATRY RETURN 30 min  PSYCHIATRY Barbara Montgomery MD    Location Instructions:     [facility] has 2 buildings on its campus. Please visit us at [address] [place] [address] and enter the building with the numbers [id number] on it.                    Dictation completed at time of chart check: [x] yes  [] no    I have checked the documentation for today s encounters and the above information has been reviewed and completed.      Sheri Urban CMA on February 27, 2023 at 4:02 PM

## 2023-03-07 ENCOUNTER — TELEPHONE (OUTPATIENT)
Dept: PSYCHIATRY | Facility: CLINIC | Age: 38
End: 2023-03-07
Payer: COMMERCIAL

## 2023-03-08 ENCOUNTER — LAB (OUTPATIENT)
Dept: LAB | Facility: OTHER | Age: 38
End: 2023-03-08
Payer: MEDICARE

## 2023-03-08 DIAGNOSIS — U09.9 POST-COVID CHRONIC FATIGUE: ICD-10-CM

## 2023-03-08 DIAGNOSIS — R11.2 NAUSEA AND VOMITING: ICD-10-CM

## 2023-03-08 DIAGNOSIS — R53.83 OTHER FATIGUE: ICD-10-CM

## 2023-03-08 DIAGNOSIS — K90.49 MALABSORPTION DUE TO INTOLERANCE, NOT ELSEWHERE CLASSIFIED: ICD-10-CM

## 2023-03-08 DIAGNOSIS — G93.32 POST-COVID CHRONIC FATIGUE: ICD-10-CM

## 2023-03-08 LAB
ESTRADIOL SERPL-MCNC: 51 PG/ML
VIT B12 SERPL-MCNC: 631 PG/ML (ref 232–1245)

## 2023-03-08 PROCEDURE — 82306 VITAMIN D 25 HYDROXY: CPT

## 2023-03-08 PROCEDURE — 82607 VITAMIN B-12: CPT

## 2023-03-08 PROCEDURE — 82670 ASSAY OF TOTAL ESTRADIOL: CPT

## 2023-03-08 PROCEDURE — 36415 COLL VENOUS BLD VENIPUNCTURE: CPT

## 2023-03-09 LAB — DEPRECATED CALCIDIOL+CALCIFEROL SERPL-MC: 8 UG/L (ref 20–75)

## 2023-03-10 NOTE — TELEPHONE ENCOUNTER
Central Prior Authorization Team  Phone: [phone]    PA Initiation    Medication: armodafinil (NUVIGIL) 150 MG TABS tablet  Insurance Company: [employer] Part D - Phone [phone] Fax [phone]  Pharmacy Filling the Rx: [id number] - [place], [place] - [address] [facility] [address]  Filling Pharmacy Phone: [phone]  Filling Pharmacy Fax:    Start Date: 3/10/2023

## 2023-03-11 NOTE — TELEPHONE ENCOUNTER
Central Prior Authorization Team  Phone: [phone]    RECEIVED FORMS FROM INSURANCE. FILLED OUT AND FAXED BACK

## 2023-03-11 NOTE — TELEPHONE ENCOUNTER
Central Prior Authorization Team  Phone: [phone]    Prior Authorization Approval    Authorization Effective Date: 1/1/2023  Authorization Expiration Date: 3/10/2024  Medication: armodafinil (NUVIGIL) 150 MG TABS tablet  Approved Dose/Quantity:   Reference #:     Insurance Company: [employer] Part D - Phone [phone] Fax [phone]  Expected CoPay:       CoPay Card Available:      Foundation Assistance Needed:    Which Pharmacy is filling the prescription (Not needed for infusion/clinic administered): [id number] [place], [place] - [address] AT [facility] [address] [facility] [address]  Pharmacy Notified: Yes  Patient Notified:

## 2023-03-16 ENCOUNTER — MYC MEDICAL ADVICE (OUTPATIENT)
Dept: FAMILY MEDICINE | Facility: OTHER | Age: 38
End: 2023-03-16
Payer: COMMERCIAL

## 2023-03-16 ENCOUNTER — MYC MEDICAL ADVICE (OUTPATIENT)
Dept: PSYCHIATRY | Facility: CLINIC | Age: 38
End: 2023-03-16
Payer: COMMERCIAL

## 2023-03-17 NOTE — TELEPHONE ENCOUNTER
Patient wondering about the PA for Nuvigil. Saw that it had been approved. Called pharmacy and they had not received confirmation yet but they were able to run it. Notified the patient.     Prior Authorization Approval     Authorization Effective Date: 1/1/2023  Authorization Expiration Date: 3/10/2024    Margi Nelson RN on 3/17/2023 at 2:47 PM

## 2023-03-20 ENCOUNTER — PATIENT OUTREACH (OUTPATIENT)
Dept: CARE COORDINATION | Facility: CLINIC | Age: 38
End: 2023-03-20
Payer: COMMERCIAL

## 2023-03-20 NOTE — PROGRESS NOTES
Clinic Care Coordination Contact  [facility]/Voicemail       Clinical Data: Care Coordinator Outreach  Outreach attempted x 1.  Left message on patient's voicemail with call back information and requested return call.  Plan:  Care Coordinator will try to reach patient again in 10 business days.    TRAMAINE Ramirez  [facility] Primary Care - Care Coordinator   3/20/2023   10:28 AM  [phone]

## 2023-03-20 NOTE — LETTER
I would like to provide you with the enclosed information for your records.  As part of care coordination, we are developing care plans to assist in accomplishing your health care goals.  When we speak next, please feel free to let me know if you want to add or change any information on your care plans.    As always, feel free to contact me if you have any questions or concerns.  I look forward to working with you in the effort to achieve your health care and wellness goals .        Sincerely,      Violette Wren, [facility]  Clinic Care Coordination  [phone]    [facility]  Patient Centered Plan of Care  About Me:        Patient Name:  Isaura Gray    YOB: 1985  Age:         37 year old   [place] MRN:    [id number] Telephone Information:  Home Phone [phone]   Mobile [phone]       Address:  [address] [place] [address] Email address:  [email or web address]      Emergency Contact(s)    Name Relationship Lgl Grd Work Phone Home Phone Mobile Phone   1. FAMILIA AKERS Significant ot*   [phone]           Primary language:  English     needed? No   [facility] [employer] [facility]:  [phone] op. 1  Other communication barriers:Other (due to MH and Anxiety)    Preferred Method of Communication:  Mail  Current living arrangement: I live in a private home with family (2 children and boyfriend)    Mobility Status/ Medical Equipment: Independent        Health Maintenance  Health Maintenance Reviewed: Due/Overdue   Health Maintenance Due   Topic Date Due     NICOTINE/TOBACCO CESSATION COUNSELING Q 1 YR  Never done     URINE DRUG SCREEN  Never done     HEPATITIS B IMMUNIZATION (1 of 3 - 3-dose series) Never done     Pneumococcal Vaccine: Pediatrics (0 to 5 Years) and At-Risk Patients (6 to 64 Years) (1 - PCV) Never done     COVID-19 Vaccine (2 - Booster for William series) 07/01/2021     INFLUENZA VACCINE (1) 09/01/2022     PAP FOLLOW-UP   02/13/2023     HPV FOLLOW-UP  02/13/2023           My Access Plan  Medical Emergency 911   Primary Clinic Line [facility] [phone]   24 Hour Appointment Line [phone] or  [phone]) (toll-free)   24 Hour Nurse Line [phone] (toll-free)   Preferred Urgent Care [facility], [phone]     [facility]  [phone]     Preferred Pharmacy [facility] [address] [place] [address] [address]     Behavioral Health Crisis Line The National Suicide Prevention Lifeline at 1-877.464.3192 or Text/Call 858             My Care Team Members  Patient Care Team       Relationship Specialty Notifications Start End    Timmy Umana MD PCP - General Family Practice  3/25/18     Phone: [phone] Fax: [phone]         [address] [facility] [address]    Simi Bearden MD MD Hematology & Oncology Admissions 9/16/16     Phone: [phone] Fax: [phone]          [facility] [address]    Timmy Umana MD Assigned PCP   12/20/20     Phone: [phone] Fax: [phone]         [address] [facility] [address]    Violette Wren LSW Lead Care Coordinator Primary Care -  Admissions 5/4/21     Phone: [phone] Fax: [phone]        Merced Ko MD MD Endocrinology, Diabetes, and Metabolism  10/29/21     Phone: [phone] Fax: [phone]         [address] [facility] [address]    Blair Wan PA-C Referring Physician Family Medicine  10/29/21     Phone: [phone] Fax: [phone]         [address] [facility] [address]    Merced Ko MD Assigned Endocrinology Provider   11/28/21     Phone: [phone] Fax: [phone] [address] [address] [facility] [address]    Brice Fernandez MD MD Internal Medicine  5/9/22     Phone: [phone] Fax: [phone] [address] [facility]  [facility] [address]    Kyung Valderrama PA-C Physician Assistant Neurology  5/9/22     Referred to Pulm.    Phone: [phone] Fax: [phone]         [address] [facility] [address]    Karie Mckenna MD MD Cardiovascular Disease  5/12/22     Phone: [phone] Pager: [phone] Fax: [phone]        [address] [facility] [address]    Kyung Valderrama PA-C Assigned Neuroscience Provider   5/15/22     Phone: [phone] Fax: [phone] [address] [facility] [address]    Karie Mckenna MD MD Cardiovascular Disease  5/17/22     Phone: [phone] Pager: [phone] Fax: [phone] [address] [facility] [address]    Edgar Bravo PsyD Assigned Sleep Provider   9/3/22     Phone: [phone] Fax: [phone]         [address] [facility] [address]    Ginette Oliver, DAVID Assigned Behavioral Health Provider   1/28/23     Phone: [phone] Fax: [phone]         [address] [facility] [address]            My Care Plans  Self Management and Treatment Plan  Care Plan  Care Plan: General     Problem: HP GENERAL PROBLEM             Care Plan: General     Problem: HP GENERAL PROBLEM     Long-Range Goal: break large tasks into manageable steps     Start Date: 5/4/2021 Expected End Date: 5/31/2023    This Visit's Progress: 30% Recent Progress: 30%    Note:     Barriers: my mental health, two young children  Strengths: I just got an henrietta to help with identifying tasks    Patient expressed understanding of goal: Yes  Action steps to achieve this goal:  1. I will stop looking at the job as one big task I need to complete and break it down into manageable steps.  2. I will learn how to use the henrietta and identify steps.  3. I will start to work on the tasks at a reasonable rate.  4. If I start to feel overwhelmed with what is ahead of me I will look at what I have completed and remind myself to focus on steps and not the  entire task.                         Action Plans on File:                       Advance Care Plans/Directives Type:   Nothing on file     My Medical and Care Information  Problem List   Patient Active Problem List   Diagnosis     Vitamin D deficiency     Supervision of other high risk pregnancies, unspecified trimester     PE (pulmonary thromboembolism) (H)     Hyperprolactinemia (H)     Follicular cancer of thyroid (H)     Lactose intolerance in adult     Encounter for triage in pregnant patient     Cough     Chronic bilateral low back pain without sciatica     Normal labor     Anxiety     Cancer (H)     H/O  delivery, currently pregnant     History of pulmonary embolism     History of thyroid cancer     Mitral valve disorder      (normal spontaneous vaginal delivery)     Moderate major depression (H)     PTSD (post-traumatic stress disorder)     ASCUS of cervix with negative high risk HPV     Non-alcoholic fatty liver disease     Psychophysiological insomnia     Family history of Hashimoto thyroiditis     Biliary dyskinesia     Pituitary tumor - history     Persistent insomnia     Post-cholecystectomy syndrome     Narcolepsy and cataplexy     Migraine with aura and with status migrainosus, not intractable     Chronic post-COVID-19 syndrome      Current Medications and Allergies:  See printed Medication Report from last visit.    Care Coordination Start Date: 2021   Frequency of Care Coordination: monthly     Form Last Updated: 2023

## 2023-03-28 ENCOUNTER — OFFICE VISIT (OUTPATIENT)
Dept: FAMILY MEDICINE | Facility: OTHER | Age: 38
End: 2023-03-28
Payer: MEDICARE

## 2023-03-28 VITALS
RESPIRATION RATE: 20 BRPM | WEIGHT: 167.5 LBS | BODY MASS INDEX: 23.98 KG/M2 | HEIGHT: 70 IN | SYSTOLIC BLOOD PRESSURE: 102 MMHG | TEMPERATURE: 99.5 F | HEART RATE: 103 BPM | OXYGEN SATURATION: 98 % | DIASTOLIC BLOOD PRESSURE: 72 MMHG

## 2023-03-28 DIAGNOSIS — M54.2 NECK PAIN: ICD-10-CM

## 2023-03-28 DIAGNOSIS — E55.9 VITAMIN D DEFICIENCY: ICD-10-CM

## 2023-03-28 DIAGNOSIS — R10.814 LEFT LOWER QUADRANT ABDOMINAL TENDERNESS WITHOUT REBOUND TENDERNESS: ICD-10-CM

## 2023-03-28 DIAGNOSIS — R19.7 DIARRHEA, UNSPECIFIED TYPE: Primary | ICD-10-CM

## 2023-03-28 DIAGNOSIS — N64.52 NIPPLE DISCHARGE: ICD-10-CM

## 2023-03-28 DIAGNOSIS — R10.32 LLQ ABDOMINAL PAIN: ICD-10-CM

## 2023-03-28 LAB
BASOPHILS # BLD AUTO: 0 10E3/UL (ref 0–0.2)
BASOPHILS NFR BLD AUTO: 0 %
EOSINOPHIL # BLD AUTO: 0.1 10E3/UL (ref 0–0.7)
EOSINOPHIL NFR BLD AUTO: 1 %
ERYTHROCYTE [DISTWIDTH] IN BLOOD BY AUTOMATED COUNT: 13.4 % (ref 10–15)
FSH SERPL IRP2-ACNC: 2.9 MIU/ML
HCT VFR BLD AUTO: 41.2 % (ref 35–47)
HGB BLD-MCNC: 13.9 G/DL (ref 11.7–15.7)
LH SERPL-ACNC: 6.7 MIU/ML
LYMPHOCYTES # BLD AUTO: 1.8 10E3/UL (ref 0.8–5.3)
LYMPHOCYTES NFR BLD AUTO: 25 %
MCH RBC QN AUTO: 29.4 PG (ref 26.5–33)
MCHC RBC AUTO-ENTMCNC: 33.7 G/DL (ref 31.5–36.5)
MCV RBC AUTO: 87 FL (ref 78–100)
MONOCYTES # BLD AUTO: 0.6 10E3/UL (ref 0–1.3)
MONOCYTES NFR BLD AUTO: 8 %
NEUTROPHILS # BLD AUTO: 4.7 10E3/UL (ref 1.6–8.3)
NEUTROPHILS NFR BLD AUTO: 66 %
PLATELET # BLD AUTO: 283 10E3/UL (ref 150–450)
PROLACTIN SERPL 3RD IS-MCNC: 9 NG/ML (ref 5–23)
RBC # BLD AUTO: 4.72 10E6/UL (ref 3.8–5.2)
TSH SERPL DL<=0.005 MIU/L-ACNC: 1.88 UIU/ML (ref 0.3–4.2)
WBC # BLD AUTO: 7.1 10E3/UL (ref 4–11)

## 2023-03-28 PROCEDURE — 83001 ASSAY OF GONADOTROPIN (FSH): CPT | Performed by: PHYSICIAN ASSISTANT

## 2023-03-28 PROCEDURE — 84146 ASSAY OF PROLACTIN: CPT | Performed by: PHYSICIAN ASSISTANT

## 2023-03-28 PROCEDURE — 83002 ASSAY OF GONADOTROPIN (LH): CPT | Performed by: PHYSICIAN ASSISTANT

## 2023-03-28 PROCEDURE — 36415 COLL VENOUS BLD VENIPUNCTURE: CPT | Performed by: PHYSICIAN ASSISTANT

## 2023-03-28 PROCEDURE — 85025 COMPLETE CBC W/AUTO DIFF WBC: CPT | Performed by: PHYSICIAN ASSISTANT

## 2023-03-28 PROCEDURE — 84443 ASSAY THYROID STIM HORMONE: CPT | Performed by: PHYSICIAN ASSISTANT

## 2023-03-28 PROCEDURE — 99214 OFFICE O/P EST MOD 30 MIN: CPT | Performed by: PHYSICIAN ASSISTANT

## 2023-03-28 RX ORDER — ERGOCALCIFEROL 1.25 MG/1
50000 CAPSULE, LIQUID FILLED ORAL WEEKLY
Qty: 12 CAPSULE | Refills: 0 | Status: SHIPPED | OUTPATIENT
Start: 2023-03-28 | End: 2024-02-08

## 2023-03-28 RX ORDER — NAPROXEN 500 MG/1
500 TABLET ORAL 2 TIMES DAILY WITH MEALS
Qty: 60 TABLET | Refills: 0 | Status: SHIPPED | OUTPATIENT
Start: 2023-03-28 | End: 2023-05-02

## 2023-03-28 ASSESSMENT — ENCOUNTER SYMPTOMS
ABDOMINAL DISTENTION: 0
VOMITING: 0
COUGH: 0
APPETITE CHANGE: 0
HEMATOCHEZIA: 0
CHILLS: 0
WEAKNESS: 0
ABDOMINAL PAIN: 0
FEVER: 0
SHORTNESS OF BREATH: 0
HEMATURIA: 0
FATIGUE: 1
NAUSEA: 1
HEADACHES: 0
CHEST TIGHTNESS: 0

## 2023-03-28 ASSESSMENT — PAIN SCALES - GENERAL: PAINLEVEL: SEVERE PAIN (7)

## 2023-03-28 ASSESSMENT — PATIENT HEALTH QUESTIONNAIRE - PHQ9
SUM OF ALL RESPONSES TO PHQ QUESTIONS 1-9: 13
SUM OF ALL RESPONSES TO PHQ QUESTIONS 1-9: 13
10. IF YOU CHECKED OFF ANY PROBLEMS, HOW DIFFICULT HAVE THESE PROBLEMS MADE IT FOR YOU TO DO YOUR WORK, TAKE CARE OF THINGS AT HOME, OR GET ALONG WITH OTHER PEOPLE: EXTREMELY DIFFICULT

## 2023-03-28 NOTE — PROGRESS NOTES
Assessment & Plan     Diarrhea, unspecified type  LLQ abdominal pain  Left lower quadrant abdominal tenderness without rebound tenderness  Differential includes appendicitis, hypothyroidism, viral colitis, IBD, ulcerative colitis, C. Diff, and Chron's disease. Unlikely appendicitis due to negative appendicitis tests and no fever. Will get stool cultures for possible bacterial colitis. Unlikely C diff due to no recent abx use but will get PCR test to rule out. Will consider inflammatory tests if test below are benign for ulcerative colitis. Will consider CT for appendicitis if list of tests below in benign.  Discussed drinking plenty of fluids and monitoring for any bleeding in BM.    Advised ED if worsening pain.  - TSH with free T4 reflex; Future  - Enteric Bacteria and Virus Panel by ROGELIO Stool; Future  - C. difficile Toxin B PCR with reflex to C. difficile Antigen and Toxins A/B EIA; Future  - TSH with free T4 reflex  - Enteric Bacteria and Virus Panel by ROGELIO Stool  - C. difficile Toxin B PCR with reflex to C. difficile Antigen and Toxins A/B EIA  - CBC with platelets and differential; Future  - CBC with platelets and differential    Nipple discharge  Pt to continue to monitor for worsening symptoms. Differential includes breast cancer, hyperprolactinemia, hypothyroidism, and mastitis.Will get mammogram and US to rule out breast cancer. Will get prolactin levels to rule out hyperprolactinemia. Will get TSH for possible hypothyroidism. Unlikely mastitis as discharge has minimally produced with manipulation and no erythema or swelling of breast.  Consider repeat MRI.  - Prolactin; Future  - TSH with free T4 reflex; Future  - Prolactin  - TSH with free T4 reflex  - Luteinizing Hormone; Future  - Follicle stimulating hormone; Future  - Luteinizing Hormone  - Follicle stimulating hormone  - MA Diagnostic Digital Bilateral; Future  - US Breast Right Complete 4 Quadrants; Future    Neck pain  Muscle spasm  Discussed  stretches and using ice or heat for additional treatment. Discussed possibly using muscle relaxant but pt refuse due to the side effect of tiredness. Unlikely C spine pathology as there are no radiculopathies presents and muscle spasm over area.  - naproxen (NAPROSYN) 500 MG tablet; Take 1 tablet (500 mg) by mouth 2 times daily (with meals)    Vitamin D Deficiency:  Vitamin D is very low.  Recommended supplementing with higher dose of 50,000 international unit(s) weekly.    Will start this and recheck in 3 months.  This could also be contributing to her more significant fatigue.   Discussed worrisome symptoms to seek medical attention. Discussed follow up after possible lab values and pt agreed to plan. Pt was engaged with the plan and decision making.     I, Tashi Olivera PA-C, was present with the Physician Assistant student who participated in the service and in the documentation of the note.  I have verified the history and personally performed the physical exam and medical decision making.  I agree with the assessment and plan of care as documented in the note.       Tashi Olivera PA-C  [facility]    Chester Delarosa is a 37 year old, presenting for the following health issues:  Fatigue and Breast Pain  No flowsheet data found.  Fatigue  Associated symptoms include fatigue and nausea. Pertinent negatives include no abdominal pain, chest pain, chills, coughing, fever, headaches, vomiting or weakness.   History of Present Illness       Reason for visit:  Muscle pain, breast pain, fluid coming from breast, extreme fatigue  Symptom onset:  3-7 days ago  Symptoms include:  Breast pain, extreme fatigue, nausea, muscle pain especially in upper back  Symptom intensity:  Moderate  Symptom progression:  Worsening  Had these symptoms before:  No  What makes it worse:  No  What makes it better:  No    She eats 2-3 servings of fruits and vegetables daily.She consumes 0 sweetened beverage(s)  daily.She exercises with enough effort to increase her heart rate 9 or less minutes per day.  She exercises with enough effort to increase her heart rate 3 or less days per week.   She is taking medications regularly.    Today's PHQ-9         PHQ-9 Total Score: 13    PHQ-9 Q9 Thoughts of better off dead/self-harm past 2 weeks :   Not at all    How difficult have these problems made it for you to do your work, take care of things at home, or get along with other people: Extremely difficult     Pt is has felt fatigue throughout her entire body for the past week. Pt says she has slept for 72 hours in the past three days and says that is more than he usually amount as the pt does have narcolepsy. Pt was started on Nuvigil 7 days ago. Pt says that she has had breast tenderness bilaterally for the past week and that the right breast has a yellowish discharge when expressed. Pt has had no treatment for hyperprolinemia in the past and has not breast feed in over 2 years. Pt says that she has had upper back pain that radiates to her neck and is relieved with laying on her side. Pt has done nothing for treatment recently but has seen physical therapy in the past. Pt say she has had 6-7 episodes of diarrhea each day for the past two weeks. Pt has taken nothing for the diarrhea. Pt denies blood in stool, pain with defecation, pain with urination or increased urinary frequency.       Review of Systems   Constitutional: Positive for fatigue. Negative for appetite change, chills and fever.   Respiratory: Negative for cough, chest tightness and shortness of breath.    Cardiovascular: Negative for chest pain.   Gastrointestinal: Positive for nausea. Negative for abdominal distention, abdominal pain, hematochezia and vomiting.   Endocrine: Negative for cold intolerance and heat intolerance.   Breasts:  Positive for tenderness.   Genitourinary: Negative for hematuria.   Neurological: Negative for weakness and headaches.     "  Constitutional, HEENT, cardiovascular, pulmonary, gi and gu systems are negative, except as otherwise noted.      Objective    /72   Pulse 103   Temp 99.5  F (37.5  C) (Temporal)   Resp 20   Ht 1.772 m (5' 9.76\")   Wt 76 kg (167 lb 8 oz)   SpO2 98%   BMI 24.20 kg/m    Body mass index is 24.2 kg/m .     Physical Exam  Constitutional:       General: She is not in acute distress.     Appearance: Normal appearance. She is normal weight. She is not ill-appearing or toxic-appearing.   HENT:      Head: Normocephalic.   Cardiovascular:      Rate and Rhythm: Regular rhythm. Tachycardia present.      Pulses: Normal pulses.      Heart sounds: Normal heart sounds.   Pulmonary:      Effort: Pulmonary effort is normal. No respiratory distress.      Breath sounds: Normal breath sounds. No stridor. No wheezing, rhonchi or rales.   Chest:      Chest wall: No mass, deformity, swelling, tenderness or edema.   Breasts:     Breasts are symmetrical.      Right: Nipple discharge and tenderness present. No swelling, bleeding, inverted nipple, mass or skin change.      Left: No swelling, bleeding, inverted nipple, mass, nipple discharge, skin change or tenderness.   Abdominal:      General: Abdomen is flat. Bowel sounds are increased. There is no distension.      Palpations: There is no mass.      Tenderness: There is abdominal tenderness in the left lower quadrant. There is no guarding or rebound. Negative signs include Pinon's sign, Rovsing's sign, McBurney's sign, psoas sign and obturator sign.      Hernia: No hernia is present.   Musculoskeletal:      Comments: Tenderness over bilateral trapezius region with increased muscle tone.   Lymphadenopathy:      Upper Body:      Right upper body: No supraclavicular or axillary adenopathy.      Left upper body: No supraclavicular or axillary adenopathy.   Skin:     General: Skin is warm.   Neurological:      Mental Status: She is alert.   Psychiatric:         Mood and Affect: " Mood normal.         Behavior: Behavior normal.

## 2023-03-29 ENCOUNTER — APPOINTMENT (OUTPATIENT)
Dept: LAB | Facility: OTHER | Age: 38
End: 2023-03-29
Payer: COMMERCIAL

## 2023-03-29 PROCEDURE — 87506 IADNA-DNA/RNA PROBE TQ 6-11: CPT | Performed by: PHYSICIAN ASSISTANT

## 2023-03-29 PROCEDURE — 87493 C DIFF AMPLIFIED PROBE: CPT | Mod: 59 | Performed by: PHYSICIAN ASSISTANT

## 2023-03-30 LAB
C COLI+JEJUNI+LARI FUSA STL QL NAA+PROBE: NOT DETECTED
C DIFF TOX B STL QL: NEGATIVE
EC STX1 GENE STL QL NAA+PROBE: NOT DETECTED
EC STX2 GENE STL QL NAA+PROBE: NOT DETECTED
NOROV GI+II ORF1-ORF2 JNC STL QL NAA+PR: NOT DETECTED
RVA NSP5 STL QL NAA+PROBE: NOT DETECTED
SALMONELLA SP RPOD STL QL NAA+PROBE: NOT DETECTED
SHIGELLA SP+EIEC IPAH STL QL NAA+PROBE: NOT DETECTED
V CHOL+PARA RFBL+TRKH+TNAA STL QL NAA+PR: NOT DETECTED
Y ENTERO RECN STL QL NAA+PROBE: NOT DETECTED

## 2023-03-30 NOTE — PROGRESS NOTES
Clinic Care Coordination Contact    Patient answered and asked for a call back next week.     Will call in about one week,     [facility] - Care Coordinator   3/30/2023   2:07 PM  [phone]

## 2023-04-03 ENCOUNTER — MYC MEDICAL ADVICE (OUTPATIENT)
Dept: PSYCHIATRY | Facility: CLINIC | Age: 38
End: 2023-04-03
Payer: COMMERCIAL

## 2023-04-03 DIAGNOSIS — G47.411 NARCOLEPSY AND CATAPLEXY: Primary | ICD-10-CM

## 2023-04-03 NOTE — TELEPHONE ENCOUNTER
Patient doesn't think the Nuvigil is doing anything. She is still extremely tired but not as bad as the Modafinil. She's wondering about trying Vyvanse?     Last visit note 2/27/23: Isaura did not tolerate Provigil.  The first couple of days she had it, she felt as if it was going to be really beneficial, but she developed severe headaches, and reports that it eventually had somewhat sedative effect.  She is hesitant to go back to Adderall because of the shortage of supply.  We discussed other options including Nuvigil and methylphenidate.  We after reviewing risks and benefits we agreed to a trial of Nuvigil 150 mg daily.  If she does not tolerate it, she can contact me and we will substitute methylphenidate.    Margi Nelson RN on 4/3/2023 at 2:49 PM

## 2023-04-05 RX ORDER — DEXTROAMPHETAMINE SACCHARATE, AMPHETAMINE ASPARTATE MONOHYDRATE, DEXTROAMPHETAMINE SULFATE AND AMPHETAMINE SULFATE 7.5; 7.5; 7.5; 7.5 MG/1; MG/1; MG/1; MG/1
30 CAPSULE, EXTENDED RELEASE ORAL DAILY
Qty: 30 CAPSULE | Refills: 0 | Status: SHIPPED | OUTPATIENT
Start: 2023-04-05 | End: 2024-02-08

## 2023-04-05 NOTE — TELEPHONE ENCOUNTER
Patient wants to go back on the Adderall as your suggestion. I suggested that she call her pharmacy to make sure they have it in stock or call other pharmacy's if not.     Margi Nelson RN on 4/5/2023 at 10:21 AM

## 2023-04-05 NOTE — TELEPHONE ENCOUNTER
Patient wants the Adderall sent to  pharmacy in [place].     Margi Nelson RN on 4/5/2023 at 12:59 PM

## 2023-04-05 NOTE — TELEPHONE ENCOUNTER
Adderall XR 30 mg sent to requested pharmacy.    Erin Montgomery MD  Collaborative Care Psychiatry  [facility]

## 2023-04-07 NOTE — PROGRESS NOTES
Clinic Care Coordination Contact  [facility]/Voicemail       Clinical Data: Care Coordinator Outreach  Outreach attempted x 2.  Left message on patient's voicemail with call back information and requested return call.  Plan: Care Coordinator will send unable to contact letter with care coordinator contact information via [employer]. Care Coordinator will try to reach patient again in 1 month.    TRAMAINE Ramirez  [facility] Primary Care - Care Coordinator   4/7/2023   9:40 AM  [phone]

## 2023-05-02 ENCOUNTER — MYC REFILL (OUTPATIENT)
Dept: PSYCHIATRY | Facility: CLINIC | Age: 38
End: 2023-05-02
Payer: COMMERCIAL

## 2023-05-02 DIAGNOSIS — G47.411 NARCOLEPSY AND CATAPLEXY: ICD-10-CM

## 2023-05-02 DIAGNOSIS — M54.2 NECK PAIN: ICD-10-CM

## 2023-05-02 RX ORDER — NAPROXEN 500 MG/1
TABLET ORAL
Qty: 60 TABLET | Refills: 0 | Status: SHIPPED | OUTPATIENT
Start: 2023-05-02 | End: 2024-02-08

## 2023-05-02 RX ORDER — DEXTROAMPHETAMINE SACCHARATE, AMPHETAMINE ASPARTATE MONOHYDRATE, DEXTROAMPHETAMINE SULFATE AND AMPHETAMINE SULFATE 7.5; 7.5; 7.5; 7.5 MG/1; MG/1; MG/1; MG/1
30 CAPSULE, EXTENDED RELEASE ORAL DAILY
Qty: 30 CAPSULE | Refills: 0 | Status: CANCELLED | OUTPATIENT
Start: 2023-05-02

## 2023-05-03 RX ORDER — DEXTROAMPHETAMINE SACCHARATE, AMPHETAMINE ASPARTATE MONOHYDRATE, DEXTROAMPHETAMINE SULFATE AND AMPHETAMINE SULFATE 7.5; 7.5; 7.5; 7.5 MG/1; MG/1; MG/1; MG/1
30 CAPSULE, EXTENDED RELEASE ORAL DAILY
Qty: 30 CAPSULE | Refills: 0 | Status: SHIPPED | OUTPATIENT
Start: 2023-07-04 | End: 2023-08-03

## 2023-05-03 RX ORDER — DEXTROAMPHETAMINE SACCHARATE, AMPHETAMINE ASPARTATE MONOHYDRATE, DEXTROAMPHETAMINE SULFATE AND AMPHETAMINE SULFATE 7.5; 7.5; 7.5; 7.5 MG/1; MG/1; MG/1; MG/1
30 CAPSULE, EXTENDED RELEASE ORAL DAILY
Qty: 30 CAPSULE | Refills: 0 | Status: SHIPPED | OUTPATIENT
Start: 2023-05-03 | End: 2023-06-02

## 2023-05-03 RX ORDER — DEXTROAMPHETAMINE SACCHARATE, AMPHETAMINE ASPARTATE MONOHYDRATE, DEXTROAMPHETAMINE SULFATE AND AMPHETAMINE SULFATE 7.5; 7.5; 7.5; 7.5 MG/1; MG/1; MG/1; MG/1
30 CAPSULE, EXTENDED RELEASE ORAL DAILY
Qty: 30 CAPSULE | Refills: 0 | Status: SHIPPED | OUTPATIENT
Start: 2023-06-03 | End: 2023-07-03

## 2023-05-03 NOTE — TELEPHONE ENCOUNTER
Three months of prescriptions provided.    Erin Montgomery MD  Collaborative Care Psychiatry  [facility]

## 2023-05-03 NOTE — TELEPHONE ENCOUNTER
Date of Last Office Visit: 2/27/23  Date of Next Office Visit: 5/23/23  No shows since last visit: no  Cancellations since last visit: no    Medication requested: amphetamine-dextroamphetamine (ADDERALL XR) 30 MG 24 hr capsule Date last ordered: 4/5/23 Qty: 30 Refills: 0     Review of [place] ?: Not authorized by Provider.        Lapse in medication adherence greater than 5 days?: no  If yes, call patient and gather details: na  Medication refill request verified as identical to current order?: yes  Result of Last DAM, VPA, Li+ Level, CBC, or Carbamazepine Level (at or since last visit): N/A    Last visit treatment plan: Instructions       Return in about 3 months (around 5/27/2023).  Continue mirtazapine 7.5 mg at bedtime as needed for insomnia.     Stop Provigil due to severe headaches.     Start Nuvigil 150 mg every morning. If you do not tolerate it, let me know and we will try methylphenidate.     Continue all other medications per your primary care provider.     Schedule an appointment with me in 3 months or sooner as needed.  You may call [facility] at [phone] to schedule.            []Medication refilled per  Medication Refill in Ambulatory Care  policy.  [x]Medication unable to be refilled by RN due to criteria not met as indicated below:    []Eligibility - not seen in the last year   []Supervision - no future appointment   []Compliance - no shows, cancellations or lapse in therapy   []Verification - order discrepancy   [x]Controlled medication   [x]Medication not included in policy   []90-day supply request   []Other

## 2023-05-08 ENCOUNTER — PATIENT OUTREACH (OUTPATIENT)
Dept: CARE COORDINATION | Facility: CLINIC | Age: 38
End: 2023-05-08
Payer: COMMERCIAL

## 2023-05-08 NOTE — PROGRESS NOTES
Clinic Care Coordination Contact    Follow Up Progress Note      Assessment: patient reported that she is still working on breaking larger tasks into smaller ones.  She feels she has made progress yet when having a good day she over does.  Added this to action steps.  She wanted to keep the goal so extended the target date.  She noted she finds benefit and wants to maintain the goal.     She has been applying for jobs and not having luck.  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     Care Gaps:    Health Maintenance Due   Topic Date Due     NICOTINE/TOBACCO CESSATION COUNSELING Q 1 YR  Never done     URINE DRUG SCREEN  Never done     HEPATITIS B IMMUNIZATION (1 of 3 - 3-dose series) Never done     Pneumococcal Vaccine: Pediatrics (0 to 5 Years) and At-Risk Patients (6 to 64 Years) (1 - PCV) Never done     COVID-19 Vaccine (2 - Booster for William series) 07/01/2021     INFLUENZA VACCINE (1) 09/01/2022     PAP FOLLOW-UP  02/13/2023     HPV FOLLOW-UP  02/13/2023       Postponed to next office visit     Care Plans  Care Plan: General     Problem: HP GENERAL PROBLEM     Long-Range Goal: break large tasks into manageable steps     Start Date: 5/4/2021 Expected End Date: 11/4/2023    This Visit's Progress: 50% Recent Progress: 30%    Note:     Barriers: my mental health, two young children  Strengths: I just got an henrietta to help with identifying tasks    Patient expressed understanding of goal: Yes  Action steps to achieve this goal:  1. I will stop looking at the job as one big task I need to complete and break it down into manageable steps.  2. I will learn how to use the henrietta and identify steps.  3. I will start to work on the tasks at a reasonable rate.  4. If I start to feel overwhelmed with what is ahead of me I will look at what I have completed and remind myself to focus on steps and not the entire task.  5. I will listen to my body and not overdo                         Intervention/Education provided during outreach: encouraged continued work on breaking down tasks and added step to not over do.       Outreach Frequency: monthly    Plan:   Pt to continue to work on completing tasks by smaller steps.    Care Coordinator will follow up in one month    TRAMAINE Ramirez  [facility] - Care Coordinator   5/8/2023   10:52 AM  [phone]

## 2023-05-23 ENCOUNTER — VIRTUAL VISIT (OUTPATIENT)
Dept: PSYCHIATRY | Facility: CLINIC | Age: 38
End: 2023-05-23
Payer: COMMERCIAL

## 2023-05-23 DIAGNOSIS — G47.411 NARCOLEPSY AND CATAPLEXY: Primary | ICD-10-CM

## 2023-05-23 DIAGNOSIS — F41.9 ANXIETY: ICD-10-CM

## 2023-05-23 DIAGNOSIS — F43.10 PTSD (POST-TRAUMATIC STRESS DISORDER): ICD-10-CM

## 2023-05-23 PROCEDURE — 99214 OFFICE O/P EST MOD 30 MIN: CPT | Mod: VID | Performed by: PSYCHIATRY & NEUROLOGY

## 2023-05-23 ASSESSMENT — ANXIETY QUESTIONNAIRES
7. FEELING AFRAID AS IF SOMETHING AWFUL MIGHT HAPPEN: MORE THAN HALF THE DAYS
4. TROUBLE RELAXING: NEARLY EVERY DAY
3. WORRYING TOO MUCH ABOUT DIFFERENT THINGS: NEARLY EVERY DAY
7. FEELING AFRAID AS IF SOMETHING AWFUL MIGHT HAPPEN: MORE THAN HALF THE DAYS
GAD7 TOTAL SCORE: 16
5. BEING SO RESTLESS THAT IT IS HARD TO SIT STILL: NOT AT ALL
2. NOT BEING ABLE TO STOP OR CONTROL WORRYING: NEARLY EVERY DAY
GAD7 TOTAL SCORE: 16
8. IF YOU CHECKED OFF ANY PROBLEMS, HOW DIFFICULT HAVE THESE MADE IT FOR YOU TO DO YOUR WORK, TAKE CARE OF THINGS AT HOME, OR GET ALONG WITH OTHER PEOPLE?: EXTREMELY DIFFICULT
1. FEELING NERVOUS, ANXIOUS, OR ON EDGE: NEARLY EVERY DAY
GAD7 TOTAL SCORE: 16
6. BECOMING EASILY ANNOYED OR IRRITABLE: MORE THAN HALF THE DAYS
IF YOU CHECKED OFF ANY PROBLEMS ON THIS QUESTIONNAIRE, HOW DIFFICULT HAVE THESE PROBLEMS MADE IT FOR YOU TO DO YOUR WORK, TAKE CARE OF THINGS AT HOME, OR GET ALONG WITH OTHER PEOPLE: EXTREMELY DIFFICULT

## 2023-05-23 NOTE — NURSING NOTE
Is the patient currently in the state of [place]? YES    Visit mode:VIDEO    If the visit is dropped, the patient can be reconnected by: VIDEO VISIT: Text to cell phone:   Telephone Information:   Mobile [phone]       Will anyone else be joining the visit? No  (If patient encounters technical issues they should call [phone])    How would you like to obtain your AVS? MyChart    Are changes needed to the allergy or medication list? NO    Rooming Documentation: Assigned questionnaire(s) completed .    Reason for visit: RECHECK     Paula Sheth F        Care team has reviewed attendance agreement with patient. Patient advised that two failed appointments within 6 months may lead to termination of current episode of care.

## 2023-05-23 NOTE — PROGRESS NOTES
Virtual Visit Details    Type of service:  Video Visit   Originating Location (pt. Location): Home  Distant Location (provider location):  Off-site  Platform used for Video Visit: [facility]       Outpatient Psychiatric Progress Note    Name: Isaura Gray   : 1985                    Primary Care Provider: Timmy Umana MD, MD   Therapist: Beverly and .      PHQ-9 scores:      2022     1:50 PM 2/15/2023     1:43 PM 3/28/2023     9:19 AM   PHQ-9 SCORE   PHQ-9 Total Score MyChart 16 (Moderately severe depression) 14 (Moderate depression) 13 (Moderate depression)   PHQ-9 Total Score 16 14 13       AVERY-7 scores:      2022     7:39 AM 2022     9:52 AM 2023     8:25 AM   AVERY-7 SCORE   Total Score 12 (moderate anxiety) 11 (moderate anxiety) 16 (severe anxiety)   Total Score 12 11 16       Patient Identification:  Isaura is a 37 year old year old female  who presents for return visit with me.  Patient attended the session alone.     Interim History:  Isaura reports that she saw psychiatric provider at [facility] and [facility], but was not happy with the interaction.  She does have an appointment scheduled to see a different provider there.    She reports that her mood is somewhat up-and-down and seems to track with her physical wellbeing.  She has been found to be very low in fat-soluble vitamins such as vitamin D.  She continues to see a sleep specialist.  They may try Xyrem at some point.  This specialist also has some ideas about dosing of Adderall which might be more effective for her throughout the day.    Isaura has some facial twitches today that I have not noticed in the past.  She reports it is because she is wearing her glasses and is trying to adjust further sitting on her nose.    She and her children are going to [place] for a visit.  She is originally from there and has not been home for about 5 years.    As above, Isaura is aware that this is our last visit.  She  has an appointment scheduled with another provider at [facility].    Vital Signs:   There were no vitals taken for this visit.      Current Medications:  Current Outpatient Medications   Medication     albuterol (PROAIR HFA/PROVENTIL HFA/VENTOLIN HFA) 108 (90 Base) MCG/ACT inhaler     amphetamine-dextroamphetamine (ADDERALL XR) 30 MG 24 hr capsule     [START ON 6/3/2023] amphetamine-dextroamphetamine (ADDERALL XR) 30 MG 24 hr capsule     [START ON 7/4/2023] amphetamine-dextroamphetamine (ADDERALL XR) 30 MG 24 hr capsule     amphetamine-dextroamphetamine (ADDERALL XR) 30 MG 24 hr capsule     calcium carbonate (OS-DEBI) 1500 (600 Ca) MG tablet     metoprolol tartrate (LOPRESSOR) 25 MG tablet     mirtazapine (REMERON) 7.5 MG tablet     naproxen (NAPROSYN) 500 MG tablet     prochlorperazine (COMPAZINE) 10 MG tablet     vitamin D2 (ERGOCALCIFEROL) 96206 units (1250 mcg) capsule     levonorgestrel (MIRENA) 20 MCG/DAY IUD     No current facility-administered medications for this visit.        The [place] Prescription Monitoring Program has been reviewed and there are no concerns about diversionary activity for controlled substances at this time.      Mental Status Examination:  Isaura is a 37-year-old woman in no acute distress.  She is neatly groomed in casual clothing.  Speech is clear and normal in rate and tone.  Motor behavior shows some facial twitching, which she reports is due to having her glasses on today.  Eye contact is good over the video connection.  Affect is full.  Mood is somewhat labile.  Thoughts are logical and spontaneous with no loose associations or flight of ideas.  Thought content shows no psychosis.  No suicidal thoughts.  She is alert and oriented x3.    Assessment and Plan:    ICD-10-CM    1. Narcolepsy and cataplexy  G47.411       2. PTSD (post-traumatic stress disorder)  F43.10       3. Anxiety  F41.9           Medical comorbidities include:   Patient Active Problem List    Diagnosis     Vitamin D deficiency     Supervision of other high risk pregnancies, unspecified trimester     PE (pulmonary thromboembolism) (H)     Hyperprolactinemia (H)     Follicular cancer of thyroid (H)     Lactose intolerance in adult     Encounter for triage in pregnant patient     Cough     Chronic bilateral low back pain without sciatica     Normal labor     Anxiety     Cancer (H)     H/O  delivery, currently pregnant     History of pulmonary embolism     History of thyroid cancer     Mitral valve disorder      (normal spontaneous vaginal delivery)     Moderate major depression (H)     PTSD (post-traumatic stress disorder)     ASCUS of cervix with negative high risk HPV     Non-alcoholic fatty liver disease     Psychophysiological insomnia     Family history of Hashimoto thyroiditis     Biliary dyskinesia     Pituitary tumor - history     Persistent insomnia     Post-cholecystectomy syndrome     Narcolepsy and cataplexy     Migraine with aura and with status migrainosus, not intractable     Chronic post-COVID-19 syndrome       Treatment Plan:  Patient Instructions   Continue mirtazapine 7.5 mg at bedtime as needed for insomnia.     Continue Adderall XR 30 mg daily.     Continue all other medications per your primary care provider.    Per our conversation, your care is being transferred to a psychiatric provider in the community.    It has been a pleasure working with you, best wishes!      [place] Resources:      Go to the Emergency Department as needed or call after hours crisis line at [phone].      To schedule individual or family therapy, call [facility] at [phone].     Follow up with primary care provider as planned or sooner for acute medical concerns.    Call the psychiatric nurse line with medication questions or concerns at [phone].    MyChart may be used to communicate with your provider, but this is not intended to be used for  "emergencies.    Community Resources:      National Suicide Prevention Lifeline: 943.736.7213 (TTY: 391.365.2273). Call anytime for help.  (www.suicidepreventionlifeline.org)    National [place] on Mental Illness (www.alexi.org): 143.447.2383 or 007-282-3429.     Mental Health Association (www.mentalhealth.org): 208.676.4294 or 367-176-8706.    [place] Crisis Text Line: Text [place] to 185529    Suicide LifeLine Chat: suicidepreJetSuiteline.org/chat    Patient Education   Collaborative Care Psychiatry Service  What to Expect  Here's what to expect from your Collaborative Care Psychiatry Service (CCPS).   About CCPS  CCPS means 2 people work together to help you get better. You'll meet with a behavioral health clinician and a psychiatric doctor. A behavioral health clinician helps people with mental health problems by talking with them. A psychiatric doctor helps people by giving them medicine.  How it works  At every visit, you'll see the behavioral health clinician (BHC) first. They'll talk with you about how you're doing and teach you how to feel better.   Then you'll see the psychiatric doctor. This doctor can help you deal with troubling thoughts and feelings by giving you medicine. They'll make sure you know the plan for your care.   CCPS usually takes 3 to 6 visits. If you need more visits, we may have you start seeing a different psychiatric doctor for ongoing care.  If you have any questions or concerns, we'll be glad to talk with you.  About visits  Be open  At your visits, please talk openly about your problems. It may feel hard, but it's the best way for us to help you.  Cancelling visits  If you can't come to your visit, please call us right away at [phone]. If you don't cancel at least 24 hours (1 full day) before your visit, that's \"late cancellation.\"  Being late to visits  Being very late is the same as not showing up. You will be a \"no show\" if:    Your appointment starts with a BHC, and " you're more than 15 minutes late for a 30-minute (half hour) visit. This will also cancel your appointment with the psychiatric doctor.    Your appointment is with a psychiatric doctor only, and you're more than 15 minutes late for a 30-minute (half hour) visit.    Your appointment is with a psychiatric doctor only, and you're more than 30 minutes late for a 60-minute (full hour) visit.  If you cancel late or don't show up 2 times within 6 months, we may end your care.   Getting help between visits  If you need help between visits, you can call us Monday to Friday from 8 a.m. to 4:30 p.m. at [phone].  Emergency care  Call 911 or go to the nearest emergency department if your life or someone else's life is in danger.  Call 308 anytime to reach the national Suicide and Crisis hotline.  Medicine refills  To refill your medicine, call your pharmacy. You can also call [facility]'s Behavioral Access at [phone], Monday to Friday, 8 a.m. to 4:30 p.m. It can take 1 to 3 business days to get a refill.   Forms, letters, and tests  You may have papers to fill out, like FMLA, short-term disability, and workability. We can help you with these forms at your visits, but you must have an appointment. You may need more than 1 visit for this, to be in an intensive therapy program, or both.  Before we can give you medicine for ADHD, we may refer you to get tested for it or confirm it another way.  We may not be able to give you an emotional support animal letter.  We don't do mental health checks ordered by the court.   We don't do mental health testing, but we can refer you to get tested.   Thank you for choosing us for your care.  For informational purposes only. Not to replace the advice of your health care provider. Copyright   2022 [facility]. All rights reserved. [employer] [id number] - 12/22.        Administrative Billing:   Video call duration: 17 minutes   Start: 8:43 AM   Stop: 9 AM   Total time  spent, including chart review and documentation: 27 minutes    Patient Status:  The patient is being referred to long term community psychiatry care and provider will provide bridging until patient is established with new community provider.          Answers for HPI/ROS submitted by the patient on 5/23/2023  AVERY 7 TOTAL SCORE: 16

## 2023-05-23 NOTE — PATIENT INSTRUCTIONS
Continue mirtazapine 7.5 mg at bedtime as needed for insomnia.     Continue Adderall XR 30 mg daily.     Continue all other medications per your primary care provider.    Per our conversation, your care is being transferred to a psychiatric provider in the community.    It has been a pleasure working with you, best wishes!      [place] Resources:    Go to the Emergency Department as needed or call after hours crisis line at [phone].    To schedule individual or family therapy, call [facility] at [phone].   Follow up with primary care provider as planned or sooner for acute medical concerns.  Call the psychiatric nurse line with medication questions or concerns at [phone].  BiancaMedhart may be used to communicate with your provider, but this is not intended to be used for emergencies.    Community Resources:    National Suicide Prevention Lifeline: 388.451.8333 (TTY: 161.888.8977). Call anytime for help.  (www.suicidepreventionlifeline.org)  National [place] on Mental Illness (www.alexi.org): 189.758.9449 or 283-532-1311.   Mental Health Association (www.mentalhealth.org): 382.986.8391 or 225-906-7909.  [place] Crisis Text Line: Text [place] to 947171  Suicide LifeLine Chat: suicidepreventionlifeline.org/chat    Patient Education   Collaborative Care Psychiatry Service  What to Expect  Here's what to expect from your Collaborative Care Psychiatry Service (CCPS).   About CCPS  CCPS means 2 people work together to help you get better. You'll meet with a behavioral health clinician and a psychiatric doctor. A behavioral health clinician helps people with mental health problems by talking with them. A psychiatric doctor helps people by giving them medicine.  How it works  At every visit, you'll see the behavioral health clinician (BHC) first. They'll talk with you about how you're doing and teach you how to feel better.   Then you'll see the psychiatric doctor. This doctor can help you deal  "with troubling thoughts and feelings by giving you medicine. They'll make sure you know the plan for your care.   CCPS usually takes 3 to 6 visits. If you need more visits, we may have you start seeing a different psychiatric doctor for ongoing care.  If you have any questions or concerns, we'll be glad to talk with you.  About visits  Be open  At your visits, please talk openly about your problems. It may feel hard, but it's the best way for us to help you.  Cancelling visits  If you can't come to your visit, please call us right away at [phone]. If you don't cancel at least 24 hours (1 full day) before your visit, that's \"late cancellation.\"  Being late to visits  Being very late is the same as not showing up. You will be a \"no show\" if:  Your appointment starts with a [facility], and you're more than 15 minutes late for a 30-minute (half hour) visit. This will also cancel your appointment with the psychiatric doctor.  Your appointment is with a psychiatric doctor only, and you're more than 15 minutes late for a 30-minute (half hour) visit.  Your appointment is with a psychiatric doctor only, and you're more than 30 minutes late for a 60-minute (full hour) visit.  If you cancel late or don't show up 2 times within 6 months, we may end your care.   Getting help between visits  If you need help between visits, you can call us Monday to Friday from 8 a.m. to 4:30 p.m. at [phone].  Emergency care  Call 911 or go to the nearest emergency department if your life or someone else's life is in danger.  Call 988 anytime to reach the national Suicide and Crisis hotline.  Medicine refills  To refill your medicine, call your pharmacy. You can also call [facility]'s Behavioral Access at [phone], Monday to Friday, 8 a.m. to 4:30 p.m. It can take 1 to 3 business days to get a refill.   Forms, letters, and tests  You may have papers to fill out, like FMLA, short-term disability, and workability. We can help " you with these forms at your visits, but you must have an appointment. You may need more than 1 visit for this, to be in an intensive therapy program, or both.  Before we can give you medicine for ADHD, we may refer you to get tested for it or confirm it another way.  We may not be able to give you an emotional support animal letter.  We don't do mental health checks ordered by the court.   We don't do mental health testing, but we can refer you to get tested.   Thank you for choosing us for your care.  For informational purposes only. Not to replace the advice of your health care provider. Copyright   2022 [facility]. All rights reserved. [employer] [id number] - 12/22.

## 2023-06-05 ENCOUNTER — TELEPHONE (OUTPATIENT)
Dept: PSYCHIATRY | Facility: CLINIC | Age: 38
End: 2023-06-05
Payer: COMMERCIAL

## 2023-06-05 NOTE — TELEPHONE ENCOUNTER
Called pharmacy and the patient has state insurance as a secondary. The pharmacist was able to run that insurance and it covers the brand of Adderall. They will fill it for her to . Called patient to let her know.  She also has an appt set up with a community provider on 8/17/23 so I let her know that she can get her refills through us until that time.     Margi Nelson RN on 6/5/2023 at 1:53 PM

## 2023-06-05 NOTE — TELEPHONE ENCOUNTER
Reason for call:  Prior auth and med refill!  Patient called regarding (reason for call): pt requesting a prior auth for non generic adderall. No where has the generic in stock per pt  Additional comments: who does she talk to because she was a pt of Dr Montgomery. [facility] pharmacy and [phone] for the refill. Pt will be out by 6/10/23    Phone number to reach patient:  Cell number on file:    Telephone Information:   Mobile [phone]       Best Time:  anytime    Can we leave a detailed message on this number?  YES    Travel screening: Not Applicable

## 2023-06-07 ENCOUNTER — PATIENT OUTREACH (OUTPATIENT)
Dept: CARE COORDINATION | Facility: CLINIC | Age: 38
End: 2023-06-07
Payer: COMMERCIAL

## 2023-06-07 NOTE — PROGRESS NOTES
Clinic Care Coordination Contact  [facility]/Voicemail       Clinical Data: Care Coordinator Outreach  Outreach attempted x 1.  Left message on patient's voicemail with call back information and requested return call.  Plan: Care Coordinator will try to reach patient again in 10 business days.    TRAMAINE Ramirez  [facility] - Care Coordinator   6/7/2023   2:53 PM  [phone]

## 2023-06-07 NOTE — LETTER
It was a pleasure to speak with you.  I would like to provide you with the enclosed information for your records.  As part of care coordination, we are developing care plans to assist in accomplishing your health care goals.  When we speak next, please feel free to let me know if you want to add or change any information on your care plans.    As always, feel free to contact me if you have any questions or concerns.  I look forward to working with you in the effort to achieve your health care and wellness goals .        Sincerely,      Violette Wren, [facility]  Clinic Care Coordination  [phone]  [facility]  Patient Centered Plan of Care  About Me:        Patient Name:  Isaura Gray    YOB: 1985  Age:         37 year old   [facility] MRN:    [id number] Telephone Information:  Home Phone [phone]   Mobile [phone]       Address:  [address] [address] [place] [address] Email address:  [email or web address]      Emergency Contact(s)    Name Relationship Lgl Grd Work Phone Home Phone Mobile Phone   1. FAMILIA AKERS Significant ot*   [phone]           Primary language:  English     needed? No   [facility] [employer]:  [phone] op. 1  Other communication barriers:Other (due to MH and Anxiety)    Preferred Method of Communication:  Mail  Current living arrangement: I live in a private home with family (2 children and boyfriend)    Mobility Status/ Medical Equipment: Independent        Health Maintenance  Health Maintenance Reviewed: Due/Overdue   Health Maintenance Due   Topic Date Due    NICOTINE/TOBACCO CESSATION COUNSELING Q 1 YR  Never done    URINE DRUG SCREEN  Never done    HEPATITIS B IMMUNIZATION (1 of 3 - 3-dose series) Never done    COVID-19 Vaccine (2 - Booster for William series) 07/01/2021    PAP FOLLOW-UP  02/13/2023    HPV FOLLOW-UP  02/13/2023           My Access Plan  Medical Emergency 911   Primary Clinic Line [facility]  [facility] - [phone]   24 Hour Appointment Line [phone] or  [phone]) (toll-free)   24 Hour Nurse Line [phone] (toll-free)   Preferred Urgent Care [facility], [phone]     Preferred Hospital [facility]  [phone]     Preferred Pharmacy [facility] [address]      Behavioral Health Crisis Line The National Suicide Prevention Lifeline at 1-549.688.5226 or Text/Call 218             My Care Team Members  Patient Care Team         Relationship Specialty Notifications Start End    Timmy Umana MD PCP - General Family Practice  3/25/18     Phone: [phone] Fax: [phone]         [address] [facility] [address]    Simi Bearden MD MD Hematology & Oncology Admissions 9/16/16     Phone: [phone] Fax: [phone]         [address] [place] [address]    Timmy Umana MD Assigned PCP   12/20/20     Phone: [phone] Fax: [phone]         [address] [facility] [address]    Violette Wren LSW Lead Care Coordinator Primary Care - CC Admissions 5/4/21     Phone: [phone] Fax: [phone]        Merced Ko MD MD Endocrinology, Diabetes, and Metabolism  10/29/21     Phone: [phone] Fax: [phone]         [address] [facility] [address]    Blair Vigil PA-C Referring Physician Family Medicine  10/29/21     Phone: [phone] Fax: [phone]         [address] [facility] [address]    Brice Fernandez MD MD Internal Medicine  5/9/22     Phone: [phone] Fax: [phone]         [address] [facility] [address]    Kyung Valderrama PA-C Physician Assistant Neurology  5/9/22     Referred to Pulm.    Phone: [phone] Fax: [phone]         [address] [facility] [address]    Karie Mckenna MD MD Cardiovascular Disease  5/12/22     Phone: [phone] Pager: [phone] Fax:  [phone] [address] [facility] [address]    Kyung Valderrama PA-C Assigned Neuroscience Provider   5/15/22     Phone: [phone] Fax: [phone]         [address] [facility] [address]    Karie Mckenna MD MD Cardiovascular Disease  5/17/22     Phone: [phone] Pager: [phone] Fax: [phone] [address] [facility] [address]    Edgar Bravo PsyD Assigned Sleep Provider   5/6/23     Phone: [phone] Fax: [phone] [address] [facility] [address]    Barbara Montgomery MD Assigned Behavioral Health Provider   5/27/23     Phone: [phone] Fax: [phone] [address] [facility] [address] [place] [address]              My Care Plans  Self Management and Treatment Plan  Care Plan  Care Plan: General       Problem: HP GENERAL PROBLEM       Long-Range Goal: break large tasks into manageable steps       Start Date: 5/4/2021 Expected End Date: 11/4/2023    This Visit's Progress: 50% Recent Progress: 30%    Note:     Barriers: my mental health, two young children  Strengths: I just got an henrietta to help with identifying tasks    Patient expressed understanding of goal: Yes  Action steps to achieve this goal:  1. I will stop looking at the job as one big task I need to complete and break it down into manageable steps.  2. I will learn how to use the henrietta and identify steps.  3. I will start to work on the tasks at a reasonable rate.  4. If I start to feel overwhelmed with what is ahead of me I will look at what I have completed and remind myself to focus on steps and not the entire task.  5. I will listen to my body and not overdo                               Action Plans on File:                       Advance Care Plans/Directives Type:   No data recorded    My Medical and Care Information  Problem List   Patient Active Problem List   Diagnosis    Vitamin D deficiency    Supervision of other high risk pregnancies, unspecified trimester     PE (pulmonary thromboembolism) (H)    Hyperprolactinemia (H)    Follicular cancer of thyroid (H)    Lactose intolerance in adult    Encounter for triage in pregnant patient    Cough    Chronic bilateral low back pain without sciatica    Normal labor    Anxiety    Cancer (H)    H/O  delivery, currently pregnant    History of pulmonary embolism    History of thyroid cancer    Mitral valve disorder     (normal spontaneous vaginal delivery)    Moderate major depression (H)    PTSD (post-traumatic stress disorder)    ASCUS of cervix with negative high risk HPV    Non-alcoholic fatty liver disease    Psychophysiological insomnia    Family history of Hashimoto thyroiditis    Biliary dyskinesia    Pituitary tumor - history    Persistent insomnia    Post-cholecystectomy syndrome    Narcolepsy and cataplexy    Migraine with aura and with status migrainosus, not intractable    Chronic post-COVID-19 syndrome      Current Medications and Allergies:     Allergies   Allergen Reactions    Ciprofloxacin Hives    Sulfa Antibiotics Hives    Hydrocodone-Acetaminophen Itching          Oxycodone Hives    Oxycodone-Acetaminophen Itching         Current Outpatient Medications:     albuterol (PROAIR HFA/PROVENTIL HFA/VENTOLIN HFA) 108 (90 Base) MCG/ACT inhaler, Inhale 2 puffs into the lungs every 6 hours, Disp: 18 g, Rfl: 0    amphetamine-dextroamphetamine (ADDERALL XR) 30 MG 24 hr capsule, Take 1 capsule (30 mg) by mouth daily for 30 days, Disp: 30 capsule, Rfl: 0    [START ON 2023] amphetamine-dextroamphetamine (ADDERALL XR) 30 MG 24 hr capsule, Take 1 capsule (30 mg) by mouth daily for 30 days, Disp: 30 capsule, Rfl: 0    amphetamine-dextroamphetamine (ADDERALL XR) 30 MG 24 hr capsule, Take 1 capsule (30 mg) by mouth daily, Disp: 30 capsule, Rfl: 0    calcium carbonate (OS-DEBI) 1500 (600 Ca) MG tablet, Take 1 tablet (600 mg) by mouth 2 times daily (with meals), Disp: 180 tablet, Rfl: 1    levonorgestrel (MIRENA) 20 MCG/DAY  IUD, 1 each (20 mcg) by Intrauterine route once for 1 dose Placed 2/2020, Disp: 1 each, Rfl: 0    metoprolol tartrate (LOPRESSOR) 25 MG tablet, TAKE 1/2 TABLET(12.5 MG) BY MOUTH TWICE DAILY, Disp: 90 tablet, Rfl: 1    mirtazapine (REMERON) 7.5 MG tablet, Take 1 tablet (7.5 mg) by mouth At Bedtime, Disp: 30 tablet, Rfl: 2    naproxen (NAPROSYN) 500 MG tablet, TAKE 1 TABLET(500 MG) BY MOUTH TWICE DAILY WITH MEALS, Disp: 60 tablet, Rfl: 0    prochlorperazine (COMPAZINE) 10 MG tablet, Take 1 tablet (10 mg) by mouth every 6 hours as needed for nausea or vomiting, Disp: 20 tablet, Rfl: 3    vitamin D2 (ERGOCALCIFEROL) 53520 units (1250 mcg) capsule, Take 1 capsule (50,000 Units) by mouth once a week, Disp: 12 capsule, Rfl: 0      Care Coordination Start Date: 5/4/2021   Frequency of Care Coordination: monthly     Form Last Updated: 06/22/2023

## 2023-06-22 NOTE — PROGRESS NOTES
Clinic Care Coordination Contact    Call placed to pt.  She noted it was not a good time to talk.  She was asked to call back when it was a better time.     If no return call will call in about 10 business days.     TRAMAINE Ramirez  [facility] Primary Care - Care Coordinator   6/22/2023   9:18 AM  [phone]

## 2023-07-07 ENCOUNTER — PATIENT OUTREACH (OUTPATIENT)
Dept: CARE COORDINATION | Facility: CLINIC | Age: 38
End: 2023-07-07
Payer: COMMERCIAL

## 2023-07-07 NOTE — PROGRESS NOTES
Clinic Care Coordination Contact  [facility]/Voicemail       Clinical Data: Care Coordinator Outreach  Outreach attempted x 3.  Left message on patient's voicemail with call back information and requested return call.  Plan: Care Coordinator will send unable to contact letter with care coordinator contact information via [employer]. Care Coordinator will try to reach patient again in 1 month.    TRAMAINE Ramirez  [facility] Primary Care - Care Coordinator   7/7/2023   2:44 PM  [phone]

## 2023-07-12 ENCOUNTER — VIRTUAL VISIT (OUTPATIENT)
Dept: FAMILY MEDICINE | Facility: OTHER | Age: 38
End: 2023-07-12
Payer: COMMERCIAL

## 2023-07-12 DIAGNOSIS — G47.00 INSOMNIA, UNSPECIFIED TYPE: ICD-10-CM

## 2023-07-12 DIAGNOSIS — R11.10 VOMITING, UNSPECIFIED VOMITING TYPE, UNSPECIFIED WHETHER NAUSEA PRESENT: ICD-10-CM

## 2023-07-12 DIAGNOSIS — E55.9 VITAMIN D DEFICIENCY: Primary | ICD-10-CM

## 2023-07-12 DIAGNOSIS — Z87.19 HISTORY OF FATTY INFILTRATION OF LIVER: ICD-10-CM

## 2023-07-12 DIAGNOSIS — K76.0 FATTY (CHANGE OF) LIVER, NOT ELSEWHERE CLASSIFIED: ICD-10-CM

## 2023-07-12 DIAGNOSIS — E80.6 CONJUGATED HYPERBILIRUBINEMIA: ICD-10-CM

## 2023-07-12 DIAGNOSIS — R11.2 NAUSEA AND VOMITING, UNSPECIFIED VOMITING TYPE: ICD-10-CM

## 2023-07-12 DIAGNOSIS — N64.52 NIPPLE DISCHARGE: ICD-10-CM

## 2023-07-12 DIAGNOSIS — R11.14 BILIOUS VOMITING WITH NAUSEA: ICD-10-CM

## 2023-07-12 DIAGNOSIS — G47.411 NARCOLEPSY AND CATAPLEXY: ICD-10-CM

## 2023-07-12 PROCEDURE — 99214 OFFICE O/P EST MOD 30 MIN: CPT | Mod: VID | Performed by: PHYSICIAN ASSISTANT

## 2023-07-12 RX ORDER — PROCHLORPERAZINE MALEATE 10 MG
10 TABLET ORAL EVERY 6 HOURS PRN
Qty: 20 TABLET | Refills: 1 | Status: SHIPPED | OUTPATIENT
Start: 2023-07-12 | End: 2023-08-30

## 2023-07-12 ASSESSMENT — ENCOUNTER SYMPTOMS: FATIGUE: 1

## 2023-07-12 ASSESSMENT — PATIENT HEALTH QUESTIONNAIRE - PHQ9
SUM OF ALL RESPONSES TO PHQ QUESTIONS 1-9: 8
SUM OF ALL RESPONSES TO PHQ QUESTIONS 1-9: 8
10. IF YOU CHECKED OFF ANY PROBLEMS, HOW DIFFICULT HAVE THESE PROBLEMS MADE IT FOR YOU TO DO YOUR WORK, TAKE CARE OF THINGS AT HOME, OR GET ALONG WITH OTHER PEOPLE: EXTREMELY DIFFICULT

## 2023-07-12 NOTE — PROGRESS NOTES
Isaura is a 37 year old who is being evaluated via a billable video visit.      How would you like to obtain your AVS? MyChart  If the video visit is dropped, the invitation should be resent by: Text to cell phone: [phone]  Will anyone else be joining your video visit? No    Assessment & Plan     Vitamin D deficiency  Need to recheck her levels and determine if we need to supplement   - Vitamin D Deficiency; Future    Nipple discharge  She will schedule for the mammogram imaging.   Did reach out to Endocrinology to see if any additional work up required since her prolactin levels have been normal. Consider repeat MRI but was negative in 2019.     History of fatty infiltration of liver  Rechecking her labs given recent symptoms, recurrence of nausea/vomiting.   - Comprehensive metabolic panel (BMP + Alb, Alk Phos, ALT, AST, Total. Bili, TP); Future    Narcolepsy and cataplexy  She is seeing new psychiatrist at [facility] but they may not be taking over her medication.   Will reach out to sleep medicine to see if they can help get back in touch with her for the next step in their evaluation. May need to see sleep specialist who manages Narcolepsy and does med management.   - CBC with platelets and differential; Future  - Iron and iron binding capacity; Future  - Ferritin; Future    Nausea and vomiting, unspecified vomiting type  This has recurred. Compazine hasn't been enough. Refilled the compazine for now.   Had questioned if some of her medications including compazine, mirtazapine, adderall and naproxen would vincenzo using any of her symptoms but she has not been on Compazine for over a month, she isn't taking the mirtazapine. Rarely uses the Naproxen.    Recommended upper endoscopy since she is having persistent issues. Sounds like she has a potential for some gastroparesis as she had gastric emptying study in the past.  Question diagnosis of EDS given gastroparesis, MVP, and hypermobility and persistent cardiac  symptoms.   - Adult GI  Referral - Procedure Only; Future    Insomnia, unspecified type  Rechecking iron levels. She notes hx of high iron and had abnormal hemachromatosis evaluation in past.  Mother has this condition.   - Iron and iron binding capacity; Future  - Ferritin; Future    Fatty (change of) liver, not elsewhere classified  See above.   - Iron and iron binding capacity; Future    Conjugated hyperbilirubinemia  See above  - Ferritin; Future    Vomiting, unspecified vomiting type, unspecified whether nausea present  See above  - Ferritin; Future    She will get Mammogram scheduled as well as Labs.   We will reach out to specialists  Follow-up after work-up unless changes.     Options for treatment and follow-up care were reviewed with the patient and/or guardian. Patient and/or guardian engaged in the decision making process and verbalized understanding of the options discussed and agreed with the final plan.       MARCI [facility]    Chester Delarosa is a 37 year old, presenting for the following health issues:  Recheck Medication (Compazine), LAB REQUEST (Iron and Vitamin levels), and Fatigue  - Previous High Iron levels and low Vitamin D levels. Would like other vitamins just in case.  -Adderall not doing what it needs to do anymore. Little energy. Sleeping majority of the day  -Upset stomach, haven't been able to get compazine refilled.  -Low energy brings on the muscle/jjoint point  -Really bad dry mouth that patient can not seem to get over. Been going a little over a month.       7/12/2023     1:33 PM   Additional Questions   Roomed by Demarco EDWARDS   Accompanied by Self         7/12/2023     1:33 PM   Patient Reported Additional Medications   Patient reports taking the following new medications None     Fatigue  Associated symptoms include fatigue.   History of Present Illness       Reason for visit:  Stomach issues, fatigue, muscle pain/spasm, joint  pain    She eats 2-3 servings of fruits and vegetables daily.She consumes 0 sweetened beverage(s) daily.She exercises with enough effort to increase her heart rate 9 or less minutes per day.  She exercises with enough effort to increase her heart rate 4 days per week.   She is taking medications regularly.    Today's PHQ-9         PHQ-9 Total Score: 8    PHQ-9 Q9 Thoughts of better off dead/self-harm past 2 weeks :   Not at all    How difficult have these problems made it for you to do your work, take care of things at home, or get along with other people: Extremely difficult   - She has been very low energy.  She is back to having more stomach issues.  She feels nauseated all day.  Muscles keep twitching and they feel like they are in pain.    - She isn't sure how well the adderall is working for her. Some days it will work great. The last 4 days and it is not helping her at all.   - No adjustments in any of her medications.   - Currently just taking Metoprolol and her Adderall in the AM. Not taking Mirtazepine for sleep at this time.   - Has been taking Naproxen as needed for the pain but not regularly.   - Compazine was previously taking it every morning and she ran out of it 2-3 months ago. She was doing ok without it but then recently having more stomach problems. Her BMs did go back to being more regularly. Last month she only had 2 bowel movements but now back to semiregular.  No reflux issues.  Will vomit after having been nauseated for long time. She does get full very fast.  She did have a gastric emptying study done a long time ago, it showed that her upper area is slower but not completely gastroparesis.    - She notes very hypermobile joints but not skin.    - Saw new Psychiatrist they are deciding if she will keep her as patient right now because she doesn't have experience with Narcolepsy.  [facility].   - Back in [place] she had two unprovoked deep venous thrombosis, she had full  clotting work-up and nothing was found. She thinks that was the start of her issues.   - She was supposed to have a test done for her sleep but at the time had to get off her zoloft which she is now off and her insurance declined covering but she has new insurance.  - She notes the galactorrhea hasn't changed. Has occurred ever since she had the issue with high prolactin.  It was worse when on risperdal which she is no longer on.  Symptoms have not changed otherwise.  She didn't get a call to schedule the mammogram.        Review of Systems   Constitutional: Positive for fatigue.      Constitutional, HEENT, cardiovascular, pulmonary, GI, , musculoskeletal, neuro, skin, endocrine and psych systems are negative, except as otherwise noted.      Objective           Vitals:  No vitals were obtained today due to virtual visit.    Physical Exam   GENERAL: Healthy, alert and no distress  EYES: Eyes grossly normal to inspection.  No discharge or erythema, or obvious scleral/conjunctival abnormalities.  RESP: No audible wheeze, cough, or visible cyanosis.  No visible retractions or increased work of breathing.    SKIN: Visible skin clear. No significant rash, abnormal pigmentation or lesions.  NEURO: Cranial nerves grossly intact.  Mentation and speech appropriate for age.  PSYCH: Mentation appears normal, affect normal/bright, judgement and insight intact, normal speech and appearance well-groomed.            Video-Visit Details    Type of service:  Video Visit   Video Start Time: 2:17 PM  Video End Time:2:57 PM    Originating Location (pt. Location): Home  Distant Location (provider location):  Off-site  Platform used for Video Visit: Laura

## 2023-07-13 ENCOUNTER — LAB (OUTPATIENT)
Dept: LAB | Facility: OTHER | Age: 38
End: 2023-07-13
Payer: COMMERCIAL

## 2023-07-13 DIAGNOSIS — E55.9 VITAMIN D DEFICIENCY: ICD-10-CM

## 2023-07-13 DIAGNOSIS — G47.411 NARCOLEPSY AND CATAPLEXY: ICD-10-CM

## 2023-07-13 DIAGNOSIS — G47.00 INSOMNIA, UNSPECIFIED TYPE: ICD-10-CM

## 2023-07-13 DIAGNOSIS — R11.10 VOMITING, UNSPECIFIED VOMITING TYPE, UNSPECIFIED WHETHER NAUSEA PRESENT: ICD-10-CM

## 2023-07-13 DIAGNOSIS — K76.0 FATTY (CHANGE OF) LIVER, NOT ELSEWHERE CLASSIFIED: ICD-10-CM

## 2023-07-13 DIAGNOSIS — Z87.19 HISTORY OF FATTY INFILTRATION OF LIVER: ICD-10-CM

## 2023-07-13 DIAGNOSIS — E80.6 CONJUGATED HYPERBILIRUBINEMIA: ICD-10-CM

## 2023-07-13 LAB
ALBUMIN SERPL BCG-MCNC: 4.6 G/DL (ref 3.5–5.2)
ALP SERPL-CCNC: 66 U/L (ref 35–104)
ALT SERPL W P-5'-P-CCNC: 13 U/L (ref 0–50)
ANION GAP SERPL CALCULATED.3IONS-SCNC: 10 MMOL/L (ref 7–15)
AST SERPL W P-5'-P-CCNC: 17 U/L (ref 0–45)
BASOPHILS # BLD AUTO: 0 10E3/UL (ref 0–0.2)
BASOPHILS NFR BLD AUTO: 0 %
BILIRUB SERPL-MCNC: 2.5 MG/DL
BUN SERPL-MCNC: 16.2 MG/DL (ref 6–20)
CALCIUM SERPL-MCNC: 9.2 MG/DL (ref 8.6–10)
CHLORIDE SERPL-SCNC: 104 MMOL/L (ref 98–107)
CREAT SERPL-MCNC: 0.82 MG/DL (ref 0.51–0.95)
DEPRECATED HCO3 PLAS-SCNC: 26 MMOL/L (ref 22–29)
EOSINOPHIL # BLD AUTO: 0.1 10E3/UL (ref 0–0.7)
EOSINOPHIL NFR BLD AUTO: 1 %
ERYTHROCYTE [DISTWIDTH] IN BLOOD BY AUTOMATED COUNT: 12.8 % (ref 10–15)
FERRITIN SERPL-MCNC: 100 NG/ML (ref 6–175)
GFR SERPL CREATININE-BSD FRML MDRD: >90 ML/MIN/1.73M2
GLUCOSE SERPL-MCNC: 87 MG/DL (ref 70–99)
HCT VFR BLD AUTO: 41.6 % (ref 35–47)
HGB BLD-MCNC: 14.1 G/DL (ref 11.7–15.7)
IMM GRANULOCYTES # BLD: 0 10E3/UL
IMM GRANULOCYTES NFR BLD: 0 %
IRON BINDING CAPACITY (ROCHE): 279 UG/DL (ref 240–430)
IRON SATN MFR SERPL: 81 % (ref 15–46)
IRON SERPL-MCNC: 226 UG/DL (ref 37–145)
LYMPHOCYTES # BLD AUTO: 1.4 10E3/UL (ref 0.8–5.3)
LYMPHOCYTES NFR BLD AUTO: 16 %
MCH RBC QN AUTO: 30.3 PG (ref 26.5–33)
MCHC RBC AUTO-ENTMCNC: 33.9 G/DL (ref 31.5–36.5)
MCV RBC AUTO: 90 FL (ref 78–100)
MONOCYTES # BLD AUTO: 0.4 10E3/UL (ref 0–1.3)
MONOCYTES NFR BLD AUTO: 5 %
NEUTROPHILS # BLD AUTO: 6.9 10E3/UL (ref 1.6–8.3)
NEUTROPHILS NFR BLD AUTO: 78 %
PLATELET # BLD AUTO: 280 10E3/UL (ref 150–450)
POTASSIUM SERPL-SCNC: 4 MMOL/L (ref 3.4–5.3)
PROT SERPL-MCNC: 7.1 G/DL (ref 6.4–8.3)
RBC # BLD AUTO: 4.65 10E6/UL (ref 3.8–5.2)
SODIUM SERPL-SCNC: 140 MMOL/L (ref 136–145)
WBC # BLD AUTO: 8.8 10E3/UL (ref 4–11)

## 2023-07-13 PROCEDURE — 83540 ASSAY OF IRON: CPT

## 2023-07-13 PROCEDURE — 80053 COMPREHEN METABOLIC PANEL: CPT

## 2023-07-13 PROCEDURE — 85025 COMPLETE CBC W/AUTO DIFF WBC: CPT

## 2023-07-13 PROCEDURE — 36415 COLL VENOUS BLD VENIPUNCTURE: CPT

## 2023-07-13 PROCEDURE — 83550 IRON BINDING TEST: CPT

## 2023-07-13 PROCEDURE — 82728 ASSAY OF FERRITIN: CPT

## 2023-07-13 PROCEDURE — 82306 VITAMIN D 25 HYDROXY: CPT

## 2023-07-14 LAB — DEPRECATED CALCIDIOL+CALCIFEROL SERPL-MC: 22 UG/L (ref 20–75)

## 2023-07-21 ENCOUNTER — TELEPHONE (OUTPATIENT)
Dept: GASTROENTEROLOGY | Facility: CLINIC | Age: 38
End: 2023-07-21
Payer: COMMERCIAL

## 2023-07-21 ENCOUNTER — MYC MEDICAL ADVICE (OUTPATIENT)
Dept: GASTROENTEROLOGY | Facility: CLINIC | Age: 38
End: 2023-07-21
Payer: COMMERCIAL

## 2023-07-21 ENCOUNTER — HOSPITAL ENCOUNTER (OUTPATIENT)
Facility: CLINIC | Age: 38
End: 2023-07-21
Attending: SURGERY | Admitting: SURGERY

## 2023-07-21 NOTE — TELEPHONE ENCOUNTER
"Endoscopy Scheduling Screen    Have you had a positive Covid test in the last 14 days?  No    Are you active on MyChart?   Yes    What insurance is in the chart?  Other:  [facility]    Ordering/Referring Provider: Jarod   (If ordering provider performs procedure, schedule with ordering provider unless otherwise instructed. )    BMI: Estimated body mass index is 24.2 kg/m  as calculated from the following:    Height as of 3/28/23: 1.772 m (5' 9.76\").    Weight as of 3/28/23: 76 kg (167 lb 8 oz).     Sedation Ordered  moderate sedation.   If patient BMI > 50 do not schedule in ASC.    Are you taking any prescription medications for pain?   No    Are you taking methadone or Suboxone?  No    Do you have a history of malignant hyperthermia or adverse reaction to anesthesia?  No-but does have narcolepsy so does take longer to wake up    (Females) Are you currently pregnant?   No     Have you been diagnosed or told you have pulmonary hypertension?   No    Do you have an LVAD?  No    Have you been told you have moderate to severe sleep apnea?  No    Have you been told you have COPD, asthma, or any other lung disease?  Yes     What breathing problems do you have?  Asthma     Do you use home oxygen?  No    Have your breathing problems required an ED visit or hospitalization in the last year?  No    Do you have any heart conditions?  Yes- tachycardia and mitral valve disorder    In the past 6 months, have you had any hospitalizations for heart related issues including cardiomyopathy, heart attack, or stent placement?  No    Do you have any implantable devices in your body (pacemaker, ICD)?  No    Do you take nitroglycerine?  No    Have you ever had or are you awaiting a heart or lung transplant?   No    Have you had a stroke or transient ischemic attack (TIA aka \"mini stroke\" in the last 6 months?   No    Have you been diagnosed with or been told you have cirrhosis of the liver?   No    Are you currently on dialysis?   No    Do you " "need assistance transferring?   No    BMI: Estimated body mass index is 24.2 kg/m  as calculated from the following:    Height as of 3/28/23: 1.772 m (5' 9.76\").    Weight as of 3/28/23: 76 kg (167 lb 8 oz).     Is patients BMI > 40 and scheduling location [facility]?  No    Do you take the medication Phentermine, Ozempic or Wegovy?  No    Do you take the medication Naltrexone?  No    Do you take blood thinners?  No      Prep   Are you currently on dialysis or do you have chronic kidney disease?  No    Do you have a diagnosis of diabetes?  No    Do you have a diagnosis of cystic fibrosis (CF)?  No    On a regular basis do you go 3 -5 days between bowel movements?  No    BMI > 40?  No    Preferred Pharmacy:      [id number] [place], [place] - [address] [facility] [address]  [address] [facility] [address]  Phone: [phone] Fax: [phone]      Final Scheduling Details   Colonoscopy prep sent?  N/A    Procedure scheduled  Upper endoscopy (EGD)    Surgeon:  Krissy    Date of procedure:  9/14/23     Schedule PAC:   No    Location  PH    Sedation   MAC/Deep Sedation    Patient Reminders:    You will receive a call from a Nurse to review instructions and health history.  This assessment must be completed prior to your procedure.  Failure to complete the Nurse assessment may result in the procedure being cancelled.       On the day of your procedure, please designate an adult(s) who can drive you home stay with you for the next 24 hours. The medicines used in the exam will make you sleepy. You will not be able to drive.       You cannot take public transportation, ride share services, or non-medical taxi service without a responsible caregiver.  Medical transport services are allowed with the requirement that a responsible caregiver will receive you at your destination.  We require that drivers and caregivers are confirmed prior to your procedure.    "

## 2023-07-25 ENCOUNTER — PATIENT OUTREACH (OUTPATIENT)
Dept: CARE COORDINATION | Facility: CLINIC | Age: 38
End: 2023-07-25
Payer: COMMERCIAL

## 2023-07-25 NOTE — PROGRESS NOTES
Clinic Care Coordination Contact  Follow Up Progress Note      Assessment:  SW talked with pt today.  Pt indicated that she now has both a psychiatrist and therapist at [facility].  She plans to see them regularly.  Pt indicated that she hasn't been taking her adderall on a regular basis as pt doesn't feel it helps her.  Pt reports that she is no longer looking for work as she started school and is studying [employer].  Pt indicates that she is working ahead on her homework.  Pt also indicated that she is doing OK financially.  Pt is anxious to have her esophagoscopy, gastroscopy, duodenoscopy (EGD), combined, to assist in determining the cause of her nausea/vomiting.        Care Gaps:          Health Maintenance Due   Topic Date Due    NICOTINE/TOBACCO CESSATION COUNSELING Q 1 YR  Never done    URINE DRUG SCREEN  Never done    HEPATITIS B IMMUNIZATION (1 of 3 - 3-dose series) Never done    COVID-19 Vaccine (2 - Booster for William series) 07/01/2021    PAP FOLLOW-UP  02/13/2023    HPV FOLLOW-UP  02/13/2023         Delayed discussion of Care Gaps until next SW contact     Care Plans  Care Plan: General         Problem: HP GENERAL PROBLEM         Long-Range Goal: break large tasks into manageable steps         Start Date: 5/4/2021 Expected End Date: 11/4/2023     This Visit's Progress: 10% Recent Progress: 50%     Note:      Barriers: my mental health, two young children  Strengths: I just got an henrietta to help with identifying tasks     Patient expressed understanding of goal: Yes  Action steps to achieve this goal:  1. I will stop looking at the job as one big task I need to complete and break it down into manageable steps.  2. I will learn how to use the henrietta and identify steps.  3. I will start to work on the tasks at a reasonable rate.  4. If I start to feel overwhelmed with what is ahead of me I will look at what I have completed and remind myself to focus on steps and not the entire task.  5. I will  listen to my body and not overdo                                       Intervention/Education provided during outreach: SW talked with pt.  Pt has been able to break some goals into small tasks and wants to continue to work on that.   SW provided active listening, validation of feelings, and praise to pt during contact.     Outreach Frequency: monthly           Plan:   Pt will continue to see psychiatrist and therapist at [facility] and will continue to work on breaking goals into small tasks as to not become overwhelmed with them.     Care Coordinator will follow up in one month.     Jaden Bonilla, Casual , for Violette Wren,   [facility] Care Coordination  [phone]

## 2023-07-25 NOTE — LETTER
I would like to provide you with the enclosed information for your records.  As part of care coordination, we are developing care plans to assist in accomplishing your health care goals.  When we speak next, please feel free to let me know if you want to add or change any information on your care plans.    As always, feel free to contact me if you have any questions or concerns.  I look forward to working with you in the effort to achieve your health care and wellness goals .        Sincerely,      Violette Wren, [facility]  Clinic Care Coordination  [phone]    [facility]  Patient Centered Plan of Care  About Me:        Patient Name:  Isaura Gray    YOB: 1985  Age:         37 year old   [facility] MRN:    [id number] Telephone Information:  Home Phone [phone]   Mobile [phone]       Address:  [address] [place] [address] Email address:  [email or web address]      Emergency Contact(s)    Name Relationship Lgl Grd Work Phone Home Phone Mobile Phone   1. FAMILIA AKERS Significant ot*   [phone]           Primary language:  English     needed? No   [facility]:  [phone] op. 1  Other communication barriers:Other (due to MH and Anxiety)    Preferred Method of Communication:  Mail  Current living arrangement: No data recorded  Mobility Status/ Medical Equipment: No data recorded      Health Maintenance  Health Maintenance Reviewed: Due/Overdue   Health Maintenance Due   Topic Date Due    NICOTINE/TOBACCO CESSATION COUNSELING Q 1 YR  Never done    URINE DRUG SCREEN  Never done    HEPATITIS B IMMUNIZATION (1 of 3 - 3-dose series) Never done    COVID-19 Vaccine (2 - Booster for William series) 07/01/2021    PAP FOLLOW-UP  02/13/2023    HPV FOLLOW-UP  02/13/2023           My Access Plan  Medical Emergency 911   Primary Clinic Line [facility] - [phone]   24 Hour Appointment Line [phone]  or  [phone]) (toll-free)   24 Hour Nurse Line [phone] (toll-free)   Preferred Urgent Care No data recorded   Preferred Hospital No data recorded   Preferred Pharmacy [facility] - [id number]     Behavioral Health Crisis Line [facility] [place] Suicide Prevention Lifeline at 1-403.697.4129 or Text/Call 988             My Care Team Members  Patient Care Team         Relationship Specialty Notifications Start End    Timmy Umana MD PCP - General Family Practice  3/25/18     Phone: [phone] Fax: [phone]         [address] [facility] [address]    Simi Bearden MD MD Hematology & Oncology Admissions 9/16/16     Phone: [phone] Fax: [phone]         [address] [place] [address]    Timmy Umana MD Assigned PCP   12/20/20     Phone: [phone] Fax: [phone]         [address] [facility] [address]    Violette Wren LSW Lead Care Coordinator Primary Care - CC Admissions 5/4/21     Phone: [phone] Fax: [phone]        Merced Ko MD MD Endocrinology, Diabetes, and Metabolism  10/29/21     Phone: [phone] Fax: [phone]         [address] [facility] [address]    Blair Vigil, PA-C Referring Physician Family Medicine  10/29/21     Phone: [phone] Fax: [phone]         [address] [place] [address]    Brice Fernandez MD MD Internal Medicine  5/9/22     Phone: [phone] Fax: [phone]         [address] [place] [address]    Kyung Valderrama PA-C Physician Assistant Neurology  5/9/22     Referred to Pulm.    Phone: [phone] Fax: [phone]         [address] [place] [address]    Karie Mckenna MD MD Cardiovascular Disease  5/12/22     Phone: [phone] Pager: [phone] Fax: [phone]        [address] [facility] [address]    Kyung Valderrama PAYvonC Assigned Neuroscience Provider   5/15/22     Phone: [phone] Fax:  [phone] [address] [facility] [address]    Karie Mckenna MD MD Cardiovascular Disease  5/17/22     Phone: [phone] Pager: [phone] Fax: [phone] [address] [facility] [address]    Edgar Bravo PsyD Assigned Sleep Provider   5/6/23     Phone: [phone] Fax: [phone] [address] [facility] [address]    Ginette Oliver MSW Assigned Behavioral Health Provider   6/24/23     Phone: [phone] Fax: [phone] [address] [facility] [address]              My Care Plans  Self Management and Treatment Plan  Care Plan  Care Plan: General       Problem: HP GENERAL PROBLEM       Long-Range Goal: break large tasks into manageable steps       Start Date: 5/4/2021 Expected End Date: 11/4/2023    This Visit's Progress: 10% Recent Progress: 50%    Note:     Barriers: my mental health, two young children  Strengths: I just got an henrietta to help with identifying tasks    Patient expressed understanding of goal: Yes  Action steps to achieve this goal:  1. I will stop looking at the job as one big task I need to complete and break it down into manageable steps.  2. I will learn how to use the henrietta and identify steps.  3. I will start to work on the tasks at a reasonable rate.  4. If I start to feel overwhelmed with what is ahead of me I will look at what I have completed and remind myself to focus on steps and not the entire task.  5. I will listen to my body and not overdo                               Action Plans on File:                       Advance Care Plans/Directives Type:   No data recorded    My Medical and Care Information  Problem List   Patient Active Problem List   Diagnosis    Vitamin D deficiency    Supervision of other high risk pregnancies, unspecified trimester    PE (pulmonary thromboembolism) (H)    Hyperprolactinemia (H)    Follicular cancer of thyroid (H)    Lactose intolerance in adult    Encounter for  triage in pregnant patient    Cough    Chronic bilateral low back pain without sciatica    Normal labor    Anxiety    Cancer (H)    H/O  delivery, currently pregnant    History of pulmonary embolism    History of thyroid cancer    Mitral valve disorder     (normal spontaneous vaginal delivery)    Moderate major depression (H)    PTSD (post-traumatic stress disorder)    ASCUS of cervix with negative high risk HPV    Non-alcoholic fatty liver disease    Psychophysiological insomnia    Family history of Hashimoto thyroiditis    Biliary dyskinesia    Pituitary tumor - history    Persistent insomnia    Post-cholecystectomy syndrome    Narcolepsy and cataplexy    Migraine with aura and with status migrainosus, not intractable    Chronic post-COVID-19 syndrome      Current Medications and Allergies:     Allergies   Allergen Reactions    Ciprofloxacin Hives    Sulfa Antibiotics Hives    Hydrocodone-Acetaminophen Itching          Oxycodone Hives    Oxycodone-Acetaminophen Itching         Current Outpatient Medications:     albuterol (PROAIR HFA/PROVENTIL HFA/VENTOLIN HFA) 108 (90 Base) MCG/ACT inhaler, Inhale 2 puffs into the lungs every 6 hours, Disp: 18 g, Rfl: 0    amphetamine-dextroamphetamine (ADDERALL XR) 30 MG 24 hr capsule, Take 1 capsule (30 mg) by mouth daily for 30 days, Disp: 30 capsule, Rfl: 0    amphetamine-dextroamphetamine (ADDERALL XR) 30 MG 24 hr capsule, Take 1 capsule (30 mg) by mouth daily, Disp: 30 capsule, Rfl: 0    calcium carbonate (OS-DEBI) 1500 (600 Ca) MG tablet, Take 1 tablet (600 mg) by mouth 2 times daily (with meals), Disp: 180 tablet, Rfl: 1    levonorgestrel (MIRENA) 20 MCG/DAY IUD, 1 each (20 mcg) by Intrauterine route once for 1 dose Placed 2020, Disp: 1 each, Rfl: 0    metoprolol tartrate (LOPRESSOR) 25 MG tablet, TAKE 1/2 TABLET(12.5 MG) BY MOUTH TWICE DAILY, Disp: 90 tablet, Rfl: 1    mirtazapine (REMERON) 7.5 MG tablet, Take 1 tablet (7.5 mg) by mouth At Bedtime, Disp: 30  tablet, Rfl: 2    naproxen (NAPROSYN) 500 MG tablet, TAKE 1 TABLET(500 MG) BY MOUTH TWICE DAILY WITH MEALS, Disp: 60 tablet, Rfl: 0    prochlorperazine (COMPAZINE) 10 MG tablet, Take 1 tablet (10 mg) by mouth every 6 hours as needed for nausea or vomiting, Disp: 20 tablet, Rfl: 1    vitamin D2 (ERGOCALCIFEROL) 91376 units (1250 mcg) capsule, Take 1 capsule (50,000 Units) by mouth once a week, Disp: 12 capsule, Rfl: 0      Care Coordination Start Date: 5/4/2021   Frequency of Care Coordination: monthly     Form Last Updated: 07/31/2023

## 2023-08-09 NOTE — CONFIDENTIAL NOTE
LVM for pt to call back to schedule, actigraphy p/u, PSG, MSLT and F/U.    FRANKI Deal, Clinical Specialist - Gill Fernandez

## 2023-08-18 ENCOUNTER — MYC REFILL (OUTPATIENT)
Dept: PSYCHIATRY | Facility: CLINIC | Age: 38
End: 2023-08-18
Payer: COMMERCIAL

## 2023-08-18 DIAGNOSIS — G47.411 NARCOLEPSY AND CATAPLEXY: ICD-10-CM

## 2023-08-18 NOTE — CONFIDENTIAL NOTE
Per last visit on 5/23/23 pt was referred to a community clinic for on going care.    Treatment Plan:  Patient Instructions   Continue mirtazapine 7.5 mg at bedtime as needed for insomnia.     Continue Adderall XR 30 mg daily.     Continue all other medications per your primary care provider.     Per our conversation, your care is being transferred to a psychiatric provider in the community.

## 2023-08-21 ENCOUNTER — MYC MEDICAL ADVICE (OUTPATIENT)
Dept: FAMILY MEDICINE | Facility: OTHER | Age: 38
End: 2023-08-21
Payer: COMMERCIAL

## 2023-08-21 RX ORDER — DEXTROAMPHETAMINE SACCHARATE, AMPHETAMINE ASPARTATE MONOHYDRATE, DEXTROAMPHETAMINE SULFATE AND AMPHETAMINE SULFATE 7.5; 7.5; 7.5; 7.5 MG/1; MG/1; MG/1; MG/1
30 CAPSULE, EXTENDED RELEASE ORAL DAILY
Qty: 30 CAPSULE | Refills: 0 | OUTPATIENT
Start: 2023-08-21

## 2023-08-21 NOTE — TELEPHONE ENCOUNTER
Per 5/23/23 office visit with Dr. Montgomery, the plan was to transfer care to a community provider. Patient reports that she has not been able to establish with a provider yet. She will go to her PCP to bridge her until her appointment with a community provider.     JESS PINTO RN on 8/21/2023 at 11:02 AM

## 2023-08-24 ENCOUNTER — PATIENT OUTREACH (OUTPATIENT)
Dept: CARE COORDINATION | Facility: CLINIC | Age: 38
End: 2023-08-24
Payer: COMMERCIAL

## 2023-08-24 NOTE — PROGRESS NOTES
Clinic Care Coordination Contact  Follow Up Progress Note      Assessment: pt reporting that she is finding out what timing she needs for the adderall administration.  She is finding out when not to take it and timing as she was finding it does help her out with not taking daily.     She is focusing on keeping the kitchen clean as her focus vs multiple areas.  She notes this is working for her as well.  She is still doing her schooling and not focusing so much on staying 4 months ahead on the work.  Again talked about breaking the tasks down vs doing it all at once.     Care Gaps:    Health Maintenance Due   Topic Date Due    NICOTINE/TOBACCO CESSATION COUNSELING Q 1 YR  Never done    URINE DRUG SCREEN  Never done    HEPATITIS B IMMUNIZATION (1 of 3 - 3-dose series) Never done    COVID-19 Vaccine (2 - Booster for William series) 07/01/2021    PAP FOLLOW-UP  02/13/2023    HPV FOLLOW-UP  02/13/2023       Postponed to future office visits.     Care Plans  Care Plan: General       Problem: HP GENERAL PROBLEM       Long-Range Goal: break large tasks into manageable steps       Start Date: 5/4/2021 Expected End Date: 11/4/2023    This Visit's Progress: 20% Recent Progress: 10%    Note:     Barriers: my mental health, two young children  Strengths: I just got an henrietta to help with identifying tasks    Patient expressed understanding of goal: Yes  Action steps to achieve this goal:  1. I will stop looking at the job as one big task I need to complete and break it down into manageable steps.  2. I will learn how to use the henrietta and identify steps.  3. I will start to work on the tasks at a reasonable rate.  4. If I start to feel overwhelmed with what is ahead of me I will look at what I have completed and remind myself to focus on steps and not the entire task.  5. I will listen to my body and not overdo                              Intervention/Education provided during outreach: congratulated pt on finding the right balance  for her.  Encouraged continued work on small task completion vs full task completion.      Outreach Frequency: monthly    Plan:   Pt to continue to work with providers on her med management.  Pt to continue to break large tasks into smaller steps.   Care Coordinator will follow up in one month.     TRAMAINE Ramirez  [facility] - Care Coordinator   8/24/2023   10:31 AM  [phone]

## 2023-08-30 ENCOUNTER — VIRTUAL VISIT (OUTPATIENT)
Dept: FAMILY MEDICINE | Facility: OTHER | Age: 38
End: 2023-08-30
Payer: COMMERCIAL

## 2023-08-30 DIAGNOSIS — G47.411 NARCOLEPSY AND CATAPLEXY: Primary | ICD-10-CM

## 2023-08-30 DIAGNOSIS — F41.1 GENERALIZED ANXIETY DISORDER: ICD-10-CM

## 2023-08-30 DIAGNOSIS — R00.0 TACHYCARDIA: ICD-10-CM

## 2023-08-30 DIAGNOSIS — F41.0 PANIC ATTACK: ICD-10-CM

## 2023-08-30 DIAGNOSIS — R11.14 BILIOUS VOMITING WITH NAUSEA: ICD-10-CM

## 2023-08-30 DIAGNOSIS — F43.10 PTSD (POST-TRAUMATIC STRESS DISORDER): ICD-10-CM

## 2023-08-30 PROCEDURE — 99215 OFFICE O/P EST HI 40 MIN: CPT | Mod: VID | Performed by: FAMILY MEDICINE

## 2023-08-30 RX ORDER — DEXTROAMPHETAMINE SACCHARATE, AMPHETAMINE ASPARTATE MONOHYDRATE, DEXTROAMPHETAMINE SULFATE AND AMPHETAMINE SULFATE 7.5; 7.5; 7.5; 7.5 MG/1; MG/1; MG/1; MG/1
30 CAPSULE, EXTENDED RELEASE ORAL DAILY
Qty: 30 CAPSULE | Refills: 0 | Status: SHIPPED | OUTPATIENT
Start: 2023-08-30 | End: 2023-10-03

## 2023-08-30 RX ORDER — HYDROXYZINE PAMOATE 25 MG/1
25 CAPSULE ORAL 3 TIMES DAILY PRN
Qty: 30 CAPSULE | Refills: 1 | Status: SHIPPED | OUTPATIENT
Start: 2023-08-30 | End: 2023-10-04

## 2023-08-30 RX ORDER — PROCHLORPERAZINE MALEATE 10 MG
10 TABLET ORAL EVERY 6 HOURS PRN
Qty: 30 TABLET | Refills: 3 | Status: SHIPPED | OUTPATIENT
Start: 2023-08-30 | End: 2023-10-04

## 2023-08-30 RX ORDER — DEXTROAMPHETAMINE SACCHARATE, AMPHETAMINE ASPARTATE MONOHYDRATE, DEXTROAMPHETAMINE SULFATE AND AMPHETAMINE SULFATE 7.5; 7.5; 7.5; 7.5 MG/1; MG/1; MG/1; MG/1
30 CAPSULE, EXTENDED RELEASE ORAL DAILY
Qty: 30 CAPSULE | Refills: 0 | Status: SHIPPED | OUTPATIENT
Start: 2023-10-31 | End: 2023-11-30

## 2023-08-30 RX ORDER — DEXTROAMPHETAMINE SACCHARATE, AMPHETAMINE ASPARTATE MONOHYDRATE, DEXTROAMPHETAMINE SULFATE AND AMPHETAMINE SULFATE 7.5; 7.5; 7.5; 7.5 MG/1; MG/1; MG/1; MG/1
30 CAPSULE, EXTENDED RELEASE ORAL DAILY
Qty: 30 CAPSULE | Refills: 0 | Status: SHIPPED | OUTPATIENT
Start: 2023-09-30 | End: 2023-10-30

## 2023-08-30 RX ORDER — MIRTAZAPINE 15 MG/1
15 TABLET, FILM COATED ORAL AT BEDTIME
Qty: 30 TABLET | Refills: 1 | Status: SHIPPED | OUTPATIENT
Start: 2023-08-30 | End: 2023-10-04

## 2023-08-30 RX ORDER — METOPROLOL SUCCINATE 25 MG/1
25 TABLET, EXTENDED RELEASE ORAL DAILY
Qty: 90 TABLET | Refills: 1 | Status: SHIPPED | OUTPATIENT
Start: 2023-08-30 | End: 2024-02-08

## 2023-08-30 ASSESSMENT — ANXIETY QUESTIONNAIRES
GAD7 TOTAL SCORE: 7
4. TROUBLE RELAXING: SEVERAL DAYS
GAD7 TOTAL SCORE: 7
7. FEELING AFRAID AS IF SOMETHING AWFUL MIGHT HAPPEN: NOT AT ALL
3. WORRYING TOO MUCH ABOUT DIFFERENT THINGS: SEVERAL DAYS
IF YOU CHECKED OFF ANY PROBLEMS ON THIS QUESTIONNAIRE, HOW DIFFICULT HAVE THESE PROBLEMS MADE IT FOR YOU TO DO YOUR WORK, TAKE CARE OF THINGS AT HOME, OR GET ALONG WITH OTHER PEOPLE: VERY DIFFICULT
5. BEING SO RESTLESS THAT IT IS HARD TO SIT STILL: NOT AT ALL
1. FEELING NERVOUS, ANXIOUS, OR ON EDGE: MORE THAN HALF THE DAYS
2. NOT BEING ABLE TO STOP OR CONTROL WORRYING: SEVERAL DAYS
6. BECOMING EASILY ANNOYED OR IRRITABLE: MORE THAN HALF THE DAYS

## 2023-08-30 ASSESSMENT — PATIENT HEALTH QUESTIONNAIRE - PHQ9
SUM OF ALL RESPONSES TO PHQ QUESTIONS 1-9: 16
SUM OF ALL RESPONSES TO PHQ QUESTIONS 1-9: 16
10. IF YOU CHECKED OFF ANY PROBLEMS, HOW DIFFICULT HAVE THESE PROBLEMS MADE IT FOR YOU TO DO YOUR WORK, TAKE CARE OF THINGS AT HOME, OR GET ALONG WITH OTHER PEOPLE: EXTREMELY DIFFICULT

## 2023-08-30 NOTE — PATIENT INSTRUCTIONS
As we discussed, lets change your metoprolol to the extended release version and have you take this once daily.  Let me know if any problems with this.    Try increasing your mirtazapine to 15 mg nightly to try to help with sleep and mood.  I have also put in a referral to psychiatry.    We will continue on your current dosing of Adderall XR to help with your narcolepsy.  If you get more sedation during the day after the increased dose of mirtazapine, let me know.    Can use hydroxyzine as needed for panic attacks.  This can make you sleepy.  Let me know if any other complications.    Continue GI work-up.  I have refilled your Compazine to help control your nausea in the meantime.    Follow-up in 1 to 3 months depending upon how you are doing and whether you have gotten in with psychiatry.    Contact us or return if questions or concerns.    Have a nice day!    Dr. Umana

## 2023-08-30 NOTE — PROGRESS NOTES
Isaura is a 37 year old who is being evaluated via a billable video visit.      How would you like to obtain your AVS? MyChart  If the video visit is dropped, the invitation should be resent by: Text to cell phone: [phone]  Will anyone else be joining your video visit? No      Patient completed E-check in. Questionnaires were blown in and Patient checked in electronically without being called.    Assessment & Plan       ICD-10-CM    1. Narcolepsy and cataplexy  G47.411 [facility]     amphetamine-dextroamphetamine (ADDERALL XR) 30 MG 24 hr capsule     amphetamine-dextroamphetamine (ADDERALL XR) 30 MG 24 hr capsule     amphetamine-dextroamphetamine (ADDERALL XR) 30 MG 24 hr capsule      2. Generalized anxiety disorder  F41.1 [facility]      3. Tachycardia  R00.0 metoprolol succinate ER (TOPROL XL) 25 MG 24 hr tablet      4. Panic attack  F41.0 Adult Mental [facility] Referral     metoprolol succinate ER (TOPROL XL) 25 MG 24 hr tablet     hydrOXYzine (VISTARIL) 25 MG capsule      5. Bilious vomiting with nausea  R11.14 prochlorperazine (COMPAZINE) 10 MG tablet      6. PTSD (post-traumatic stress disorder)  F43.10 mirtazapine (REMERON) 15 MG tablet          1. Narcolepsy.  She is doing well with the current dosing of Adderall. She will continue on the current dose of Adderall.  Will refer to psychiatry to get better long-term management plan.  If needed, can refer to a sleep specialist.    2, 4, 6. Anxiety.  Fair control currently.We will try hydroxyzine for panic attacks. We will increase her mirtazapine to 15 mg at night to see if that helps more with sleep without making her sleepy during the day.  Refer to psychiatry.  Follow-up in 1 to 3 months depending on how she is doing and if she has gotten in with a new psychiatrist.    3.  We will change her metoprolol to the extended release version once a day in the morning.  Otherwise, this appears to be  well controlled.    5.  Unclear etiology.  Good response to Compazine.  We will refill her Compazine while she is getting her GI work-up.  Ultrasound did not appear to show any clear cause for her symptoms.  We will see what upper endoscopy shows.  Follow and assist as able.    Follow-up  The patient will follow up in 1 to 3 months depending on how she is doing.    Portions of this note were completed using [employer] AI and/or Dragon dictation software.  If [employer] was used, patient gave verbal consent prior to the start of the visit.  Although reviewed, there may be typographical and other inadvertent errors that remain.       Review of the result(s) of each unique test - liver ultrasound  Ordering of each unique test  Prescription drug management  40 minutes spent by me on the date of the encounter doing chart review, history and exam, documentation and further activities per the note       Patient Instructions   As we discussed, lets change your metoprolol to the extended release version and have you take this once daily.  Let me know if any problems with this.    Try increasing your mirtazapine to 15 mg nightly to try to help with sleep and mood.  I have also put in a referral to psychiatry.    We will continue on your current dosing of Adderall XR to help with your narcolepsy.  If you get more sedation during the day after the increased dose of mirtazapine, let me know.    Can use hydroxyzine as needed for panic attacks.  This can make you sleepy.  Let me know if any other complications.    Continue GI work-up.  I have refilled your Compazine to help control your nausea in the meantime.    Follow-up in 1 to 3 months depending upon how you are doing and whether you have gotten in with psychiatry.    Contact us or return if questions or concerns.    Have a nice day!    Dr. Dong Umana MD, MD  [facility] SHAI Delarosa is a 37 year old, presenting for the  "following health issues:  Sleep Problem (Narcolepsy [facility])        8/30/2023     1:02 PM   Additional Questions   Roomed by Demarco EDWARDS   Accompanied by Self         8/30/2023     1:02 PM   Patient Reported Additional Medications   Patient reports taking the following new medications None       History of Present Illness       Reason for visit:  Bridge care for narcolepsy    She eats 2-3 servings of fruits and vegetables daily.She consumes 0 sweetened beverage(s) daily.She exercises with enough effort to increase her heart rate 10 to 19 minutes per day.  She exercises with enough effort to increase her heart rate 3 or less days per week.   She is taking medications regularly.     The patient is a 37-year-old female who presents via virtual visit for followup of narcolepsy treatment, bridging care until she can establish with a new provider.    She was doing fine on the Adderall, but sometimes she could not get it, so she would save it. She would take it on the days she absolutely needed it and then take a day off. It was working pretty well along with the Compazine and mirtazapine. Dr. Montgomery retired, so she went to [facility] and spoke with 3 different psychiatrists through [facility].  All 3 of them reportedly did not want to take her as a client because they do not have \"access to her full health records\". The third person told her that they were not comfortable with her narcolepsy. Mental health wise, consensus was that she has severe anxiety.  She needs a new referral to psychiatry. She has been taking Adderall XR 30 mg a day at least some days. She takes metoprolol 1 tablet in the morning because if she takes the second one towards the evening, she can not function because she is so tired. There have been some days where she has definitely had to take it around 5:00 PM. She has noticed that if she takes metoprolol while she is having a panic attack, everything calms down. She is taking a very " low dose of mirtazapine for sleep. She is not taking anything for anxiety right now, but the metoprolol is the one thing that helps during the panic attacks. She has never taken hydroxyzine. She has taken buspirone before.     For her narcolepsy, she has tried armodafinil and modafinil, but they made her very uncomfortable, so she stopped taking those. She was told she has generalized anxiety but there is also a possibility for severe bipolar. She has never had a real manic event. They are trying to figure out if it is energy related and not mood related. She takes naproxen here and there. She takes Compazine 1 daily. When she wakes up in the morning, her stomach feels horrible. On the days she does not have the Compazine, she will throw up all day. She is scheduled for an upper endoscopy soon. Her bilirubin is always high. They did some imaging and told her it was Gilbert's. She takes marijuana but not often. If she has so much nausea, she will use it. The Compazine does seem to work throughout the day if she takes it in the morning. It does not make her sleepy. She has not had a problem with increasing her appetite. She has actually decreased her appetite. She is down to 136 now. She feels more comfortable with this weight, but she wants to keep an eye on it in case it drops any further. Some nights she falls asleep very quickly, but she is very restless at nighttime. Some nights it is difficult for her to fall asleep altogether because she can not turn her brain off. Some days she will not sleep until 5:00 AM and then some days she will pass out at 7:00 PM. She did visit with a sleep specialist and he told her she had trouble getting out of REM sleep. The sleep study showed no sleep apnea, just persistent hypersomnia. She did cognitive behavioral therapy for sleep. She follows their patterns.  This is helped, but not as much as she had hoped.  She can not turn her brain off, which keeps her up. She will sit in a  dark room and the longest she sat was 4 hours trying to fall asleep. She gets random pain at night like cramping and spasms in her buttocks, which wakes her up a lot. She has not tried a higher dose of mirtazapine. The sleep psychiatrist told her to get up after 20 to 30 minutes and do something calming and relaxing and not to look at a screen. She takes magnesium supplements, but she stopped taking them recently because she was taking vitamin D supplements. Even on the nights that she sleeps really well, she has very vivid dreams. They last forever and are getting to the point where they are messing with her memory. She is not taking Chantix. She has not used nicotine for almost a year now. She does not vape. She uses CBD every once in a while. She just went in for a Pap smear because her IUD was checked because she thought it needed to be replaced. She denies any fevers, chills, heart, lungs, stomach, or urine issues. She still has random tachycardia and weird heartbeats ever so often, but it has not gotten worse.          Review of Systems   Constitutional, HEENT, cardiovascular, pulmonary, gi and gu systems are negative, except as otherwise noted.      Objective           Vitals:  No vitals were obtained today due to virtual visit.    Physical Exam   GENERAL: Healthy, alert and no distress  EYES: Eyes grossly normal to inspection.  No discharge or erythema, or obvious scleral/conjunctival abnormalities.  RESP: No audible wheeze, cough, or visible cyanosis.  No visible retractions or increased work of breathing.    SKIN: Visible skin clear. No significant rash, abnormal pigmentation or lesions.  NEURO: Cranial nerves grossly intact.  Mentation and speech appropriate for age.  PSYCH: Mentation appears normal, affect normal/bright, judgement and insight intact, normal speech and appearance well-groomed.    Lab on 07/13/2023   Component Date Value Ref Range Status    Ferritin 07/13/2023 100  6 - 175 ng/mL Final     Iron 07/13/2023 226 (H)  37 - 145 ug/dL Final    Iron Binding Capacity 07/13/2023 279  240 - 430 ug/dL Final    Iron Sat Index 07/13/2023 81 (H)  15 - 46 % Final    Sodium 07/13/2023 140  136 - 145 mmol/L Final    Potassium 07/13/2023 4.0  3.4 - 5.3 mmol/L Final    Chloride 07/13/2023 104  98 - 107 mmol/L Final    Carbon Dioxide (CO2) 07/13/2023 26  22 - 29 mmol/L Final    Anion Gap 07/13/2023 10  7 - 15 mmol/L Final    Urea Nitrogen 07/13/2023 16.2  6.0 - 20.0 mg/dL Final    Creatinine 07/13/2023 0.82  0.51 - 0.95 mg/dL Final    Calcium 07/13/2023 9.2  8.6 - 10.0 mg/dL Final    Glucose 07/13/2023 87  70 - 99 mg/dL Final    Alkaline Phosphatase 07/13/2023 66  35 - 104 U/L Final    AST 07/13/2023 17  0 - 45 U/L Final    Reference intervals for this test were updated on 6/12/2023 to more accurately reflect our healthy population. There may be differences in the flagging of prior results with similar values performed with this method. Interpretation of those prior results can be made in the context of the updated reference intervals.    ALT 07/13/2023 13  0 - 50 U/L Final    Reference intervals for this test were updated on 6/12/2023 to more accurately reflect our healthy population. There may be differences in the flagging of prior results with similar values performed with this method. Interpretation of those prior results can be made in the context of the updated reference intervals.      Protein Total 07/13/2023 7.1  6.4 - 8.3 g/dL Final    Albumin 07/13/2023 4.6  3.5 - 5.2 g/dL Final    Bilirubin Total 07/13/2023 2.5 (H)  <=1.2 mg/dL Final    GFR Estimate 07/13/2023 >90  >60 mL/min/1.73m2 Final    Vitamin D, Total (25-Hydroxy) 07/13/2023 22  20 - 75 ug/L Final    WBC Count 07/13/2023 8.8  4.0 - 11.0 10e3/uL Final    RBC Count 07/13/2023 4.65  3.80 - 5.20 10e6/uL Final    Hemoglobin 07/13/2023 14.1  11.7 - 15.7 g/dL Final    Hematocrit 07/13/2023 41.6  35.0 - 47.0 % Final    MCV 07/13/2023 90  78 - 100 fL Final     MCH 07/13/2023 30.3  26.5 - 33.0 pg Final    MCHC 07/13/2023 33.9  31.5 - 36.5 g/dL Final    RDW 07/13/2023 12.8  10.0 - 15.0 % Final    Platelet Count 07/13/2023 280  150 - 450 10e3/uL Final    % Neutrophils 07/13/2023 78  % Final    % Lymphocytes 07/13/2023 16  % Final    % Monocytes 07/13/2023 5  % Final    % Eosinophils 07/13/2023 1  % Final    % Basophils 07/13/2023 0  % Final    % Immature Granulocytes 07/13/2023 0  % Final    Absolute Neutrophils 07/13/2023 6.9  1.6 - 8.3 10e3/uL Final    Absolute Lymphocytes 07/13/2023 1.4  0.8 - 5.3 10e3/uL Final    Absolute Monocytes 07/13/2023 0.4  0.0 - 1.3 10e3/uL Final    Absolute Eosinophils 07/13/2023 0.1  0.0 - 0.7 10e3/uL Final    Absolute Basophils 07/13/2023 0.0  0.0 - 0.2 10e3/uL Final    Absolute Immature Granulocytes 07/13/2023 0.0  <=0.4 10e3/uL Final               Video-Visit Details    Type of service:  Video Visit   Video Start Time: 1:29 PM  Video End Time:1:50 PM    Originating Location (pt. Location): Home    Distant Location (provider location):  On-site  Platform used for Video Visit: Laura

## 2023-09-13 RX ORDER — LIDOCAINE 40 MG/G
CREAM TOPICAL
Status: CANCELLED | OUTPATIENT
Start: 2023-09-13

## 2023-09-13 RX ORDER — ONDANSETRON 2 MG/ML
4 INJECTION INTRAMUSCULAR; INTRAVENOUS
Status: CANCELLED | OUTPATIENT
Start: 2023-09-13

## 2023-09-28 ENCOUNTER — PATIENT OUTREACH (OUTPATIENT)
Dept: CARE COORDINATION | Facility: CLINIC | Age: 38
End: 2023-09-28

## 2023-09-28 NOTE — LETTER
It was a pleasure to speak with you.  I would like to provide you with the enclosed information for your records.  As part of care coordination, we are developing care plans to assist in accomplishing your health care goals.  When we speak next, please feel free to let me know if you want to add or change any information on your care plans.    As always, feel free to contact me if you have any questions or concerns.  I look forward to working with you in the effort to achieve your health care and wellness goals .        Sincerely,      Violette Wrne, [facility]  Clinic Care Coordination  [phone]    [facility]  Patient Centered Plan of Care  About Me:        Patient Name:  Isaura Gray    YOB: 1985  Age:         37 year old   [place] MRN:    [id number] Telephone Information:  Home Phone [phone]   Mobile [phone]       Address:  [address] [place] [address] Email address:  [email or web address]      Emergency Contact(s)    Name Relationship Lgl Grd Work Phone Home Phone Mobile Phone   1. FAMILIA AKERS Significant ot*   [phone]           Primary language:  English     needed? No   [place] [employer] [facility]:  [phone] op. 1  Other communication barriers:Other (due to MH and Anxiety)    Preferred Method of Communication:  Mail  Current living arrangement: I live in a private home with family (2 children and boyfriend)    Mobility Status/ Medical Equipment: Independent        Health Maintenance  Health Maintenance Reviewed: Due/Overdue   Health Maintenance Due   Topic Date Due    HEPATITIS B IMMUNIZATION (1 of 3 - 3-dose series) Never done    COVID-19 Vaccine (2 - Booster for William series) 07/01/2021    INFLUENZA VACCINE (1) 09/01/2023           My Access Plan  Medical Emergency 911   Primary Clinic Line [facility] [phone]   24 Hour Appointment Line [phone] or  [phone])  (toll-free)   24 Hour Nurse Line [phone] (toll-free)   Preferred Urgent Care [facility], [phone]     Preferred Hospital [facility] [place]  [phone]     Preferred Pharmacy [facility] [place], [place] - [address] [address]      Behavioral Health Crisis Line The National Suicide Prevention Lifeline at 1-774.312.8920 or Text/Call 988             My Care Team Members  Patient Care Team         Relationship Specialty Notifications Start End    Timmy Umana MD PCP - General Family Practice  3/25/18     Phone: [phone] Fax: [phone]         [address] [facility] [address]    Simi Bearden MD MD Hematology & Oncology Admissions 9/16/16     Phone: [phone] Fax: [phone]         [address] [place] [address]    Timmy Umana MD Assigned PCP   12/20/20     Phone: [phone] Fax: [phone]         [address] [facility] [address]    Violette Wren LSW Lead Care Coordinator Primary Care - CC Admissions 5/4/21     Phone: [phone] Fax: [phone]        Merced Ko MD MD Endocrinology, Diabetes, and Metabolism  10/29/21     Phone: [phone] Fax: [phone] [address] [facility] [address]    Blair Vigil PA-C Referring Physician Family Medicine  10/29/21     Phone: [phone] Fax: [phone]         [address] [facility] [address]    Brice Fernandez MD MD Internal Medicine  5/9/22     Phone: [phone] Fax: [phone]         [address] [facility] [address]    Kyung Valderrama PAYvonC Physician Assistant Neurology  5/9/22     Referred to Pulm.    Phone: [phone] Fax: [phone]         [address] [facility] [address]    Karie Mckenna MD MD Cardiovascular Disease  5/12/22     Phone: [phone] Pager: [phone] Fax: [phone] [address] [facility] [address]    Kyung Valderrama PAYvonC Assigned Neuroscience Provider    5/15/22     Phone: [phone] Fax: [phone] [address] [facility] [address]    Karie Mckenna MD MD Cardiovascular Disease  5/17/22     Phone: [phone] Pager: [phone] Fax: [phone] [address] [facility] [address]    Edgar Bravo PsyD Assigned Sleep Provider   5/6/23     Phone: [phone] Fax: [phone] [address] [facility] [address]    Ginette Oliver, DAVID Assigned Behavioral Health Provider   6/24/23     Phone: [phone] Fax: [phone] [address] [facility] [address]              My Care Plans  Self Management and Treatment Plan  Care Plan  Care Plan: General       Problem: HP GENERAL PROBLEM       Long-Range Goal: break large tasks into manageable steps       Start Date: 5/4/2021 Expected End Date: 11/4/2023    This Visit's Progress: 30% Recent Progress: 20%    Note:     Barriers: my mental health, two young children  Strengths: I just got an henrietta to help with identifying tasks    Patient expressed understanding of goal: Yes  Action steps to achieve this goal:  1. I will stop looking at the job as one big task I need to complete and break it down into manageable steps.  2. I will learn how to use the henrietta and identify steps.  3. I will start to work on the tasks at a reasonable rate.  4. If I start to feel overwhelmed with what is ahead of me I will look at what I have completed and remind myself to focus on steps and not the entire task.  5. I will listen to my body and not overdo                               Action Plans on File:                       Advance Care Plans/Directives Type:   No data recorded    My Medical and Care Information  Problem List   Patient Active Problem List   Diagnosis    Vitamin D deficiency    Supervision of other high risk pregnancies, unspecified trimester    PE (pulmonary thromboembolism) (H)    Hyperprolactinemia (H)    Follicular cancer of thyroid (H)    Lactose  intolerance in adult    Encounter for triage in pregnant patient    Cough    Chronic bilateral low back pain without sciatica    Normal labor    Anxiety    Cancer (H)    H/O  delivery, currently pregnant    History of pulmonary embolism    History of thyroid cancer    Mitral valve disorder     (normal spontaneous vaginal delivery)    Moderate major depression (H)    PTSD (post-traumatic stress disorder)    ASCUS of cervix with negative high risk HPV    Non-alcoholic fatty liver disease    Psychophysiological insomnia    Family history of Hashimoto thyroiditis    Biliary dyskinesia    Pituitary tumor - history    Persistent insomnia    Post-cholecystectomy syndrome    Narcolepsy and cataplexy    Migraine with aura and with status migrainosus, not intractable    Chronic post-COVID-19 syndrome    Tachycardia    Generalized anxiety disorder      Current Medications and Allergies:     Allergies   Allergen Reactions    Ciprofloxacin Hives    Sulfa Antibiotics Hives    Hydrocodone-Acetaminophen Itching          Oxycodone Hives    Oxycodone-Acetaminophen Itching         Current Outpatient Medications:     albuterol (PROAIR HFA/PROVENTIL HFA/VENTOLIN HFA) 108 (90 Base) MCG/ACT inhaler, Inhale 2 puffs into the lungs every 6 hours, Disp: 18 g, Rfl: 0    amphetamine-dextroamphetamine (ADDERALL XR) 30 MG 24 hr capsule, Take 1 capsule (30 mg) by mouth daily for 30 days, Disp: 30 capsule, Rfl: 0    [START ON 2023] amphetamine-dextroamphetamine (ADDERALL XR) 30 MG 24 hr capsule, Take 1 capsule (30 mg) by mouth daily for 30 days, Disp: 30 capsule, Rfl: 0    [START ON 10/31/2023] amphetamine-dextroamphetamine (ADDERALL XR) 30 MG 24 hr capsule, Take 1 capsule (30 mg) by mouth daily for 30 days, Disp: 30 capsule, Rfl: 0    amphetamine-dextroamphetamine (ADDERALL XR) 30 MG 24 hr capsule, Take 1 capsule (30 mg) by mouth daily, Disp: 30 capsule, Rfl: 0    calcium carbonate (OS-DEBI) 1500 (600 Ca) MG tablet, Take 1 tablet  (600 mg) by mouth 2 times daily (with meals), Disp: 180 tablet, Rfl: 1    hydrOXYzine (VISTARIL) 25 MG capsule, Take 1 capsule (25 mg) by mouth 3 times daily as needed for anxiety, Disp: 30 capsule, Rfl: 1    levonorgestrel (MIRENA) 20 MCG/DAY IUD, 1 each (20 mcg) by Intrauterine route once for 1 dose Placed 2/2020, Disp: 1 each, Rfl: 0    metoprolol succinate ER (TOPROL XL) 25 MG 24 hr tablet, Take 1 tablet (25 mg) by mouth daily, Disp: 90 tablet, Rfl: 1    mirtazapine (REMERON) 15 MG tablet, Take 1 tablet (15 mg) by mouth At Bedtime, Disp: 30 tablet, Rfl: 1    naproxen (NAPROSYN) 500 MG tablet, TAKE 1 TABLET(500 MG) BY MOUTH TWICE DAILY WITH MEALS, Disp: 60 tablet, Rfl: 0    prochlorperazine (COMPAZINE) 10 MG tablet, Take 1 tablet (10 mg) by mouth every 6 hours as needed for nausea or vomiting, Disp: 30 tablet, Rfl: 3    vitamin D2 (ERGOCALCIFEROL) 57711 units (1250 mcg) capsule, Take 1 capsule (50,000 Units) by mouth once a week, Disp: 12 capsule, Rfl: 0      Care Coordination Start Date: 5/4/2021   Frequency of Care Coordination: monthly     Form Last Updated: 09/28/2023

## 2023-09-28 NOTE — PROGRESS NOTES
Clinic Care Coordination Contact  Clinic Care Coordination Contact  OUTREACH - annual    Referral Information:  Referral Source: ED Follow-Up    Primary Diagnosis: Behavioral Health    Chief Complaint   Patient presents with    Clinic Care Coordination - Follow-up     [facility]        [place] Utilization: as below  Clinic Utilization  Difficulty keeping appointments:: No  Compliance Concerns: No  No-Show Concerns: No  No PCP office visit in Past Year: No  Utilization      Hospital Admissions  0             ED Visits  1             No Show Count (past year)  3                    Current as of: 9/28/2023  2:49 AM                Clinical Concerns:  Current Medical Concerns: Patient continues to be treated by gastro and is in need of an EGD.  This was canceled due to loss of insurance.  Current Behavioral Concerns: Patient continues with psychiatry and counseling.  She is having trouble getting refills of medications due to loss of insurance.  Education Provided to patient: Patient noted she had been doing better with managing her mental health yet has concerns now due to unable to get refills.  Pain  Pain (GOAL):: No  Health Maintenance Reviewed: Due/Overdue   Health Maintenance Due   Topic Date Due    HEPATITIS B IMMUNIZATION (1 of 3 - 3-dose series) Never done    COVID-19 Vaccine (2 - Booster for William series) 07/01/2021    INFLUENZA VACCINE (1) 09/01/2023       Clinical Pathway: None    Medication Management:  Medication review status: Medications reviewed and no changes reported per patient.        Due to no insurance patient has not been able to get refills.    Functional Status:  Dependent ADLs:: Independent  Dependent IADLs:: Cleaning, Meal Preparation, Money Management  Bed or wheelchair confined:: No  Mobility Status: Independent  Fallen 2 or more times in the past year?: No  Any fall with injury in the past year?: No    Living Situation:  Current living arrangement:: I live in a private home with family (2  children and boyfriend)  Type of residence:: Private home - stairs    Lifestyle & Psychosocial Needs:    Social Determinants of Health     Food Insecurity: Food Insecurity Present (9/20/2022)    Hunger Vital Sign     Worried About Running Out of Food in the Last Year: Sometimes true     Ran Out of Food in the Last Year: Sometimes true   Depression: At risk (8/30/2023)    PHQ-2     PHQ-2 Score: 3   Housing Stability: Low Risk  (9/20/2022)    Housing Stability Vital Sign     Unable to Pay for Housing in the Last Year: No     Number of Places Lived in the Last Year: 1     Unstable Housing in the Last Year: No   Tobacco Use: Medium Risk (8/30/2023)    Patient History     Smoking Tobacco Use: Former     Smokeless Tobacco Use: Never     Passive Exposure: Not on file   Financial Resource Strain: High Risk (5/4/2021)    Overall Financial Resource Strain (CARDIA)     Difficulty of Paying Living Expenses: Hard   Alcohol Use: Not At Risk (9/20/2022)    AUDIT-C     Frequency of Alcohol Consumption: Never     Average Number of Drinks: Patient does not drink     Frequency of Binge Drinking: Never   Transportation Needs: No Transportation Needs (5/4/2021)    PRAPARE - Transportation     Lack of Transportation (Medical): No     Lack of Transportation (Non-Medical): No   Physical Activity: Inactive (9/20/2022)    Exercise Vital Sign     Days of Exercise per Week: 0 days     Minutes of Exercise per Session: 0 min   Interpersonal Safety: Not At Risk (5/4/2021)    Humiliation, Afraid, Rape, and Kick questionnaire     Fear of Current or Ex-Partner: No     Emotionally Abused: No     Physically Abused: No     Sexually Abused: No   Stress: Stress Concern Present (9/20/2022)    [place] [employer] - Occupational Stress Questionnaire     Feeling of Stress : Rather much   Social Connections: Socially Isolated (9/20/2022)    Social Connection and Isolation Panel [NHANES]     Frequency of Communication with Friends and  Family: Never     Frequency of Social Gatherings with Friends and Family: Never     Attends Yazidi Services: Never     Active Member of Clubs or Organizations: No     Attends Club or Organization Meetings: Never     Marital Status: Living with partner     Diet:: Regular  Inadequate nutrition (GOAL):: No  Tube Feeding: No  Inadequate activity/exercise (GOAL):: Yes  Significant changes in sleep pattern (GOAL): Yes  Transportation means:: Regular car     Yazidi or spiritual beliefs that impact treatment:: No  Mental health DX:: Yes  Mental health DX how managed:: Medication, Outpatient Counseling, Psychiatrist  Mental health management concern (GOAL):: Yes (some but better)  Chemical Dependency Status: No Current Concerns  Informal Support system:: Significant other        Patient noted that she got paperwork to be filled out for [employer] and did send that in.  When she called back they said they had not received it so they resent again and she again resubmitted.  She is having trouble getting in touch with [facility] to find out what is going on as her insurance ended on 8/1/2023.  Provided patient with the phone numbers and her member ID numbers as we have it in the system.  Patient will call the new numbers and see if that helps get her in touch with a person that she can find out what is going on.    Patient noted no other changes from initial assessment.  She continues to work on her mental health and her physical health.  She continues to work on the goal of breaking down larger tasks into smaller steps for improved completion.    Care Coordinator has reviewed patient's Social Determinants of Health (SDoH) on this date. Upon review, changes were not made.        Resources and Interventions:  Current Resources:      Community Resources: Financial/Insurance, [facility] (6/30/22 [facility] will be starting)  Supplies Currently Used at Home: None  Equipment Currently Used at Home: none  Employment Status: disabled, employed  part-time         Advance Care Plan/Directive  Advanced Care Plans/Directives on file:: No  Advanced Care Plan/Directive Status: Considering Options    Referrals Placed: [facility], [facility], Other , Mental Health, Disability Linkage line (employment, food)         Care Plan:  Care Plan: General       Problem: HP GENERAL PROBLEM       Long-Range Goal: break large tasks into manageable steps       Start Date: 5/4/2021 Expected End Date: 11/4/2023    This Visit's Progress: 30% Recent Progress: 20%    Note:     Barriers: my mental health, two young children  Strengths: I just got an henrietta to help with identifying tasks    Patient expressed understanding of goal: Yes  Action steps to achieve this goal:  1. I will stop looking at the job as one big task I need to complete and break it down into manageable steps.  2. I will learn how to use the henrietta and identify steps.  3. I will start to work on the tasks at a reasonable rate.  4. If I start to feel overwhelmed with what is ahead of me I will look at what I have completed and remind myself to focus on steps and not the entire task.  5. I will listen to my body and not overdo                              Patient/Caregiver understanding: Patient to continue to work on reducing large tasks into manageable steps.    Outreach Frequency: monthly  Future Appointments                In 6 days Alondra Bunn APRN CNP [facility]    In 2 months Timmy Umana MD [facility]            Plan: We will send care plan via [employer].  We will call in 1 month.    TRAMAINE Ramirez [facility] - Care Coordinator   9/28/2023   9:00 AM  [phone]

## 2023-10-03 ENCOUNTER — MYC REFILL (OUTPATIENT)
Dept: FAMILY MEDICINE | Facility: OTHER | Age: 38
End: 2023-10-03

## 2023-10-03 DIAGNOSIS — G47.411 NARCOLEPSY AND CATAPLEXY: ICD-10-CM

## 2023-10-04 ENCOUNTER — VIRTUAL VISIT (OUTPATIENT)
Dept: PSYCHIATRY | Facility: CLINIC | Age: 38
End: 2023-10-04
Attending: FAMILY MEDICINE

## 2023-10-04 ENCOUNTER — MYC MEDICAL ADVICE (OUTPATIENT)
Dept: FAMILY MEDICINE | Facility: OTHER | Age: 38
End: 2023-10-04

## 2023-10-04 DIAGNOSIS — F32.A DEPRESSION, UNSPECIFIED DEPRESSION TYPE: ICD-10-CM

## 2023-10-04 DIAGNOSIS — F41.0 PANIC ATTACK: ICD-10-CM

## 2023-10-04 DIAGNOSIS — F41.1 GENERALIZED ANXIETY DISORDER: ICD-10-CM

## 2023-10-04 DIAGNOSIS — G47.411 NARCOLEPSY AND CATAPLEXY: Primary | ICD-10-CM

## 2023-10-04 DIAGNOSIS — R11.14 BILIOUS VOMITING WITH NAUSEA: ICD-10-CM

## 2023-10-04 PROCEDURE — 99205 OFFICE O/P NEW HI 60 MIN: CPT | Mod: VID | Performed by: NURSE PRACTITIONER

## 2023-10-04 RX ORDER — HYDROXYZINE PAMOATE 25 MG/1
25 CAPSULE ORAL 3 TIMES DAILY PRN
Qty: 30 CAPSULE | Refills: 2 | Status: SHIPPED | OUTPATIENT
Start: 2023-10-04 | End: 2024-03-04

## 2023-10-04 RX ORDER — DEXTROAMPHETAMINE SACCHARATE, AMPHETAMINE ASPARTATE MONOHYDRATE, DEXTROAMPHETAMINE SULFATE AND AMPHETAMINE SULFATE 7.5; 7.5; 7.5; 7.5 MG/1; MG/1; MG/1; MG/1
30 CAPSULE, EXTENDED RELEASE ORAL DAILY
Qty: 30 CAPSULE | Refills: 0 | Status: SHIPPED | OUTPATIENT
Start: 2023-10-04 | End: 2023-11-03

## 2023-10-04 RX ORDER — DEXTROAMPHETAMINE SACCHARATE, AMPHETAMINE ASPARTATE MONOHYDRATE, DEXTROAMPHETAMINE SULFATE AND AMPHETAMINE SULFATE 7.5; 7.5; 7.5; 7.5 MG/1; MG/1; MG/1; MG/1
30 CAPSULE, EXTENDED RELEASE ORAL DAILY
Qty: 30 CAPSULE | Refills: 0 | Status: SHIPPED | OUTPATIENT
Start: 2023-12-05 | End: 2024-01-04

## 2023-10-04 RX ORDER — MIRTAZAPINE 15 MG/1
7.5 TABLET, FILM COATED ORAL AT BEDTIME
Qty: 30 TABLET | Refills: 1 | Status: SHIPPED | OUTPATIENT
Start: 2023-10-04 | End: 2024-02-08 | Stop reason: SINTOL

## 2023-10-04 RX ORDER — DEXTROAMPHETAMINE SACCHARATE, AMPHETAMINE ASPARTATE MONOHYDRATE, DEXTROAMPHETAMINE SULFATE AND AMPHETAMINE SULFATE 7.5; 7.5; 7.5; 7.5 MG/1; MG/1; MG/1; MG/1
30 CAPSULE, EXTENDED RELEASE ORAL DAILY
Qty: 30 CAPSULE | Refills: 0 | Status: SHIPPED | OUTPATIENT
Start: 2023-10-04 | End: 2024-02-06

## 2023-10-04 RX ORDER — DEXTROAMPHETAMINE SACCHARATE, AMPHETAMINE ASPARTATE MONOHYDRATE, DEXTROAMPHETAMINE SULFATE AND AMPHETAMINE SULFATE 7.5; 7.5; 7.5; 7.5 MG/1; MG/1; MG/1; MG/1
30 CAPSULE, EXTENDED RELEASE ORAL DAILY
Qty: 30 CAPSULE | Refills: 0 | Status: SHIPPED | OUTPATIENT
Start: 2023-11-04 | End: 2023-12-04

## 2023-10-04 RX ORDER — PROCHLORPERAZINE MALEATE 10 MG
10 TABLET ORAL EVERY 6 HOURS PRN
Qty: 30 TABLET | Refills: 3 | Status: SHIPPED | OUTPATIENT
Start: 2023-10-04 | End: 2024-02-05

## 2023-10-04 ASSESSMENT — ANXIETY QUESTIONNAIRES
GAD7 TOTAL SCORE: 12
GAD7 TOTAL SCORE: 12
3. WORRYING TOO MUCH ABOUT DIFFERENT THINGS: MORE THAN HALF THE DAYS
7. FEELING AFRAID AS IF SOMETHING AWFUL MIGHT HAPPEN: NOT AT ALL
1. FEELING NERVOUS, ANXIOUS, OR ON EDGE: SEVERAL DAYS
6. BECOMING EASILY ANNOYED OR IRRITABLE: MORE THAN HALF THE DAYS
4. TROUBLE RELAXING: NEARLY EVERY DAY
5. BEING SO RESTLESS THAT IT IS HARD TO SIT STILL: MORE THAN HALF THE DAYS
IF YOU CHECKED OFF ANY PROBLEMS ON THIS QUESTIONNAIRE, HOW DIFFICULT HAVE THESE PROBLEMS MADE IT FOR YOU TO DO YOUR WORK, TAKE CARE OF THINGS AT HOME, OR GET ALONG WITH OTHER PEOPLE: EXTREMELY DIFFICULT
2. NOT BEING ABLE TO STOP OR CONTROL WORRYING: MORE THAN HALF THE DAYS

## 2023-10-04 ASSESSMENT — PATIENT HEALTH QUESTIONNAIRE - PHQ9
SUM OF ALL RESPONSES TO PHQ QUESTIONS 1-9: 13
10. IF YOU CHECKED OFF ANY PROBLEMS, HOW DIFFICULT HAVE THESE PROBLEMS MADE IT FOR YOU TO DO YOUR WORK, TAKE CARE OF THINGS AT HOME, OR GET ALONG WITH OTHER PEOPLE: EXTREMELY DIFFICULT
SUM OF ALL RESPONSES TO PHQ QUESTIONS 1-9: 13

## 2023-10-04 ASSESSMENT — PAIN SCALES - GENERAL: PAINLEVEL: SEVERE PAIN (7)

## 2023-10-04 ASSESSMENT — ENCOUNTER SYMPTOMS: NAUSEA: 1

## 2023-10-04 NOTE — NURSING NOTE
Is the patient currently in the state of [place]? YES    Visit mode:VIDEO    If the visit is dropped, the patient can be reconnected by: VIDEO VISIT: Text to cell phone:   Telephone Information:   Mobile [phone]       Will anyone else be joining the visit? No  (If patient encounters technical issues they should call [phone])    How would you like to obtain your AVS? MyChart    Are changes needed to the allergy or medication list? No    Rooming Documentation: Assigned questionnaire(s) completed .    Reason for visit: Consult     ISAEL Jensen
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
## 2023-10-04 NOTE — PROGRESS NOTES
"Virtual Visit Details    Type of service:  Video Visit   Video Start Time: {video visit start/end time for provider to select:767367}  Video End Time:{video visit start/end time for provider to select:917375}    Originating Location (pt. Location): {video visit patient location:705574::\"Home\"}  {PROVIDER LOCATION On-site should be selected for visits conducted from your clinic location or adjoining [facility] hospital, academic office, or other nearby [facility] building. Off-site should be selected for all other provider locations, including home:324945}  Distant Location (provider location):  {virtual location provider:261999}  Platform used for Video Visit: {Virtual Visit Platforms:387665::\"[employer]\"}    "

## 2023-10-27 ENCOUNTER — PATIENT OUTREACH (OUTPATIENT)
Dept: CARE COORDINATION | Facility: CLINIC | Age: 38
End: 2023-10-27

## 2023-10-27 NOTE — PROGRESS NOTES
Clinic Care Coordination Contact    Call placed to pt and she said it was not a good time to talk.  She asked for a call back Monday.      Violette Wren ALEXX   [facility] - Care Coordination  [facility]   [phone]

## 2023-10-30 NOTE — PROGRESS NOTES
Clinic Care Coordination Contact  [facility]/Voicemail    Clinical Data: Care Coordinator Outreach    Outreach Documentation Number of Outreach Attempt   10/30/2023   2:09 PM 1       Left message on patient's voicemail with call back information and requested return call.    Plan: Care Coordinator will try to reach patient again in 8-10 business days.    TRAMAINE Ramirez   [facility] - Care Coordination  [facility]   [phone]

## 2023-11-10 NOTE — PROGRESS NOTES
Clinic Care Coordination Contact    Call placed to pt and she indicated she was about to get on a call with her school mentor.  Pt was asked to call SW back.  If no call back in 2-3 weeks SW will call pt.     Violette Wren [facility] - Care Coordinator   11/10/2023   10:13 AM  [phone]

## 2023-11-29 ENCOUNTER — PATIENT OUTREACH (OUTPATIENT)
Dept: CARE COORDINATION | Facility: CLINIC | Age: 38
End: 2023-11-29

## 2023-11-29 NOTE — PROGRESS NOTES
Clinic Care Coordination Contact    Call placed to pt and she said it was not a good time to talk and asked for a call back later.  Encouraged pt to call when it was a good time to talk.    Will call in 4-6 business days if pt does not return call.     Violette Wren [facility] - Care Coordinator   11/29/2023   2:43 PM  [phone]

## 2023-12-05 NOTE — PROGRESS NOTES
Clinic Care Coordination Contact  Follow Up Progress Note      Assessment: Patient notes that she is still without insurance and had to completely reapply through [employer] website.  She should find out by January what her insurance is.   [place] and [place] was no [place] on insurance.    Patient noted that her anxiety is what is the main challenge that she is having.  She has stopped a lot of her meds except for 2 that she really feels is important for her anxiety.  She has been able to get these through the good Rx program at her pharmacy.  We did talk about the [employer] prescription assistance program and provided her the number for the emergency Fund option.  Patient did feel like she was managing her anxiety fairly well considering she has not been able to get to counseling.  She did have some good techniques she was utilizing to help her when she is experiencing increased anxiety.    Care Gaps:    Health Maintenance Due   Topic Date Due    HEPATITIS B IMMUNIZATION (1 of 3 - 3-dose series) Never done    INFLUENZA VACCINE (1) 09/01/2023    COVID-19 Vaccine (2 - 2023-24 season) 09/01/2023       Postponed to future appointments     Care Plans  Care Plan: General       Problem: HP GENERAL PROBLEM       Long-Range Goal: break large tasks into manageable steps       Start Date: 5/4/2021 Expected End Date: 12/5/2024    Recent Progress: 30%    Note:     Barriers: my mental health, two young children  Strengths: I just got an henrietta to help with identifying tasks    Patient expressed understanding of goal: Yes  Action steps to achieve this goal:  1. I will stop looking at the job as one big task I need to complete and break it down into manageable steps.  2. I will learn how to use the henrietta and identify steps.  3. I will start to work on the tasks at a reasonable rate.  4. If I start to feel overwhelmed with what is ahead of me I will look at what I have completed and remind myself to focus  on steps and not the entire task.  5. I will listen to my body and not overdo                              Intervention/Education provided during outreach: We did discuss the goal and she does at this time want to maintain that same goal despite limited progress.  Encouraged patient to continue to utilize different techniques to help manage the anxiety.  Encouraged patient to call if she needs any assistance.  She does have crisis lines and warm lines available to call if she needs.     Outreach Frequency: monthly, more frequently as needed        Plan:   Patient to schedule appointments once she has insurance.  Patient to work on managing her anxiety and reach out if she has needs before the next phone call.  Care Coordinator will follow up in 1 month.    TRAMAINE Ramirez   [facility] - Care Coordination  [facility]   [phone]

## 2024-01-04 ENCOUNTER — PATIENT OUTREACH (OUTPATIENT)
Dept: CARE COORDINATION | Facility: CLINIC | Age: 39
End: 2024-01-04

## 2024-01-04 NOTE — PROGRESS NOTES
Clinic Care Coordination Contact  [facility]/Voicemail    Clinical Data: Care Coordinator Outreach    Outreach Documentation Number of Outreach Attempt   10/30/2023   2:09 PM 1   1/4/2024   1:53 PM 1       Left message on patient's voicemail with call back information and requested return call.    Plan: Care Coordinator  will send care plan via [employer] Care Coordinator will try to reach patient again in 8-10 business days.    TRAMAINE Ramirez   [facility] - Care Coordination     [phone]

## 2024-01-04 NOTE — LETTER
I would like to provide you with the enclosed information for your records.  As part of care coordination, we are developing care plans to assist in accomplishing your health care goals.  When we speak next, please feel free to let me know if you want to add or change any information on your care plans.    As always, feel free to contact me if you have any questions or concerns.  I look forward to working with you in the effort to achieve your health care and wellness goals .        Sincerely,      Violette Wren, [facility]  Clinic Care Coordination  [phone]    [facility]  Patient Centered Plan of Care  About Me:        Patient Name:  Isaura Gray    YOB: 1985  Age:         38 year old   [employer] MRN:    [id number] Telephone Information:  Home Phone [phone]   Mobile [phone]       Address:  [address] [place] [address] Email address:  [email or web address]      Emergency Contact(s)    Name Relationship Lgl Grd Work Phone Home Phone Mobile Phone   1. FAMILIA AKERS Significant ot*   [phone]           Primary language:  English     needed? No   [employer]:  [phone] op. 1  Other communication barriers:Other (due to MH and Anxiety)    Preferred Method of Communication:  Mail  Current living arrangement: I live in a private home with family (2 children and boyfriend)    Mobility Status/ Medical Equipment: Independent        Health Maintenance  Health Maintenance Reviewed: Due/Overdue   Health Maintenance Due   Topic Date Due    HEPATITIS B IMMUNIZATION (1 of 3 - 3-dose series) Never done    INFLUENZA VACCINE (1) 09/01/2023    COVID-19 Vaccine (2 - 2023-24 season) 09/01/2023           My Access Plan  Medical Emergency 911   Primary Clinic Line [facility] [phone]   24 Hour Appointment Line [phone] or  [phone]) (toll-free)   24 Hour Nurse Line [phone]  (toll-free)   Preferred Urgent Care [facility], [phone]     Preferred Hospital [facility] [place]  [phone]     Preferred Pharmacy [facility] [place] [address] [address]      Behavioral Health Crisis Line The National Suicide Prevention Lifeline at 1-885.885.5404 or Text/Call 988           My Care Team Members  Patient Care Team         Relationship Specialty Notifications Start End    Timmy Umana MD PCP - General Family Practice  3/25/18     Phone: [phone] Fax: [phone]         [address] [facility] [address]    Simi Bearden MD MD Hematology & Oncology Admissions 9/16/16     Phone: [phone] Fax: [phone]         [address] [place] [address]    Timmy Umana MD Assigned PCP   12/20/20     Phone: [phone] Fax: [phone]         [address] [facility] [address]    Violette Wren LSW Lead Care Coordinator Primary Care - CC Admissions 5/4/21     Phone: [phone] Fax: [phone]        Merced Ko MD MD Endocrinology, Diabetes, and Metabolism  10/29/21     Phone: [phone] Fax: [phone]         [address] [facility] [address]    Blair Viigl, PA-C Referring Physician Family Medicine  10/29/21     Phone: [phone] Fax: [phone]         [address] [place] [address]    Brice Fernandez MD MD Internal Medicine  5/9/22     Phone: [phone] Fax: [phone]         [address] [facility] [address]    Kuyng Valderrama PA-C Physician Assistant Neurology  5/9/22     Referred to Pulm.    Phone: [phone] Fax: [phone]         [address] [facility] [address]    Karie Mckenna MD MD Cardiovascular Disease  5/12/22     Phone: [phone] Pager: [phone] Fax: [phone]        [address] [facility] [address]    Karie Mckenna MD MD Cardiovascular Disease  5/17/22     Phone: [phone] Pager: [phone] Fax:  [phone]        [address] [facility] [address]    Edgar Bravo PsyD Assigned Sleep Provider   5/6/23     Phone: [phone] Fax: [phone] [address] [facility] [address]    Ginette Oliver MSW Assigned Behavioral Health Provider   6/24/23     Phone: [phone] Fax: [phone]         [address] [facility] [address]                My Care Plans  Self Management and Treatment Plan    Care Plan  Care Plan: General       Problem: HP GENERAL PROBLEM       Long-Range Goal: break large tasks into manageable steps       Start Date: 5/4/2021 Expected End Date: 12/5/2024    Recent Progress: 30%    Note:     Barriers: my mental health, two young children  Strengths: I just got an henrietta to help with identifying tasks    Patient expressed understanding of goal: Yes  Action steps to achieve this goal:  1. I will stop looking at the job as one big task I need to complete and break it down into manageable steps.  2. I will learn how to use the henrietta and identify steps.  3. I will start to work on the tasks at a reasonable rate.  4. If I start to feel overwhelmed with what is ahead of me I will look at what I have completed and remind myself to focus on steps and not the entire task.  5. I will listen to my body and not overdo                              Action Plans on File:                       Advance Care Plans/Directives:   Advanced Care Plan/Directives on file:   No    Discussed with patient/caregiver(s): No data recorded           My Medical and Care Information  Problem List   Patient Active Problem List   Diagnosis    Vitamin D deficiency    Supervision of other high risk pregnancies, unspecified trimester    PE (pulmonary thromboembolism) (H)    Hyperprolactinemia (H24)    Follicular cancer of thyroid (H)    Lactose intolerance in adult    Encounter for triage in pregnant patient    Cough    Chronic bilateral low back pain without sciatica    Normal  labor    Anxiety    Cancer (H)    H/O  delivery, currently pregnant    History of pulmonary embolism    History of thyroid cancer    Mitral valve disorder     (normal spontaneous vaginal delivery)    Moderate major depression (H)    PTSD (post-traumatic stress disorder)    ASCUS of cervix with negative high risk HPV    Non-alcoholic fatty liver disease    Psychophysiological insomnia    Family history of Hashimoto thyroiditis    Biliary dyskinesia    Pituitary tumor - history    Persistent insomnia    Post-cholecystectomy syndrome    Narcolepsy and cataplexy    Migraine with aura and with status migrainosus, not intractable    Chronic post-COVID-19 syndrome    Tachycardia    Generalized anxiety disorder      Current Medications and Allergies:   Allergies   Allergen Reactions    Ciprofloxacin Hives    Sulfa Antibiotics Hives    Hydrocodone-Acetaminophen Itching          Oxycodone Hives    Oxycodone-Acetaminophen Itching         Current Outpatient Medications:     albuterol (PROAIR HFA/PROVENTIL HFA/VENTOLIN HFA) 108 (90 Base) MCG/ACT inhaler, Inhale 2 puffs into the lungs every 6 hours, Disp: 18 g, Rfl: 0    amphetamine-dextroamphetamine (ADDERALL XR) 30 MG 24 hr capsule, Take 1 capsule (30 mg) by mouth daily, Disp: 30 capsule, Rfl: 0    amphetamine-dextroamphetamine (ADDERALL XR) 30 MG 24 hr capsule, Take 1 capsule (30 mg) by mouth daily for 30 days, Disp: 30 capsule, Rfl: 0    amphetamine-dextroamphetamine (ADDERALL XR) 30 MG 24 hr capsule, Take 1 capsule (30 mg) by mouth daily, Disp: 30 capsule, Rfl: 0    calcium carbonate (OS-DEBI) 1500 (600 Ca) MG tablet, Take 1 tablet (600 mg) by mouth 2 times daily (with meals), Disp: 180 tablet, Rfl: 1    hydrOXYzine (VISTARIL) 25 MG capsule, Take 1 capsule (25 mg) by mouth 3 times daily as needed for anxiety, Disp: 30 capsule, Rfl: 2    levonorgestrel (MIRENA) 20 MCG/DAY IUD, 1 each (20 mcg) by Intrauterine route once for 1 dose Placed 2020, Disp: 1 each, Rfl:  0    metoprolol succinate ER (TOPROL XL) 25 MG 24 hr tablet, Take 1 tablet (25 mg) by mouth daily, Disp: 90 tablet, Rfl: 1    mirtazapine (REMERON) 15 MG tablet, Take 0.5 tablets (7.5 mg) by mouth At Bedtime, Disp: 30 tablet, Rfl: 1    naproxen (NAPROSYN) 500 MG tablet, TAKE 1 TABLET(500 MG) BY MOUTH TWICE DAILY WITH MEALS, Disp: 60 tablet, Rfl: 0    prochlorperazine (COMPAZINE) 10 MG tablet, Take 1 tablet (10 mg) by mouth every 6 hours as needed for nausea or vomiting, Disp: 30 tablet, Rfl: 3    vitamin D2 (ERGOCALCIFEROL) 45247 units (1250 mcg) capsule, Take 1 capsule (50,000 Units) by mouth once a week, Disp: 12 capsule, Rfl: 0      Care Coordination Start Date: 5/4/2021   Frequency of Care Coordination: monthly, more frequently as needed     Form Last Updated: 01/04/2024

## 2024-01-15 NOTE — PROGRESS NOTES
Clinic Care Coordination Contact  [facility]/Voicemail    Clinical Data: Care Coordinator Outreach    Outreach Documentation Number of Outreach Attempt   10/30/2023   2:09 PM 1   1/4/2024   1:53 PM 1   1/15/2024  10:34 AM 2       Left message on patient's voicemail with call back information and requested return call.    Plan: Care Coordinator will send unable to contact letter with care coordinator contact information via [employer]. Care Coordinator will review chart in about one month to assess if another attempt should be made or disenrol at that time.    Violette Wren, ALEXX   [facility] - Care Coordination  [facility]   [phone]

## 2024-01-24 ENCOUNTER — PATIENT OUTREACH (OUTPATIENT)
Dept: CARE COORDINATION | Facility: CLINIC | Age: 39
End: 2024-01-24

## 2024-02-06 ENCOUNTER — MYC MEDICAL ADVICE (OUTPATIENT)
Dept: FAMILY MEDICINE | Facility: OTHER | Age: 39
End: 2024-02-06

## 2024-02-06 DIAGNOSIS — G47.411 NARCOLEPSY AND CATAPLEXY: ICD-10-CM

## 2024-02-06 RX ORDER — DEXTROAMPHETAMINE SACCHARATE, AMPHETAMINE ASPARTATE MONOHYDRATE, DEXTROAMPHETAMINE SULFATE AND AMPHETAMINE SULFATE 7.5; 7.5; 7.5; 7.5 MG/1; MG/1; MG/1; MG/1
30 CAPSULE, EXTENDED RELEASE ORAL DAILY
Qty: 15 CAPSULE | Refills: 0 | Status: SHIPPED | OUTPATIENT
Start: 2024-02-06 | End: 2024-02-08

## 2024-02-07 ENCOUNTER — PATIENT OUTREACH (OUTPATIENT)
Dept: CARE COORDINATION | Facility: CLINIC | Age: 39
End: 2024-02-07

## 2024-02-07 NOTE — PROGRESS NOTES
Clinic Care Coordination Contact  Clinic Care Coordination Contact  OUTREACH - re-assessment due to changes    Referral Information:  Referral Source: ED Follow-Up    Primary Diagnosis: Behavioral Health    Chief Complaint   Patient presents with    Clinic Care Coordination - Follow-up     SW CC        [place] [employer] Utilization: no known concerns yet pt underutilizes due to no insurance  Clinic Utilization  Difficulty keeping appointments:: No  Compliance Concerns: No  No-Show Concerns: No  No PCP office visit in Past Year: No  Utilization      No Show Count (past year)  4             ED Visits  0             Hospital Admissions  0                    Current as of: 2/6/2024 10:17 PM                Clinical Concerns:  Current Medical Concerns:  nothing new    Current Behavioral Concerns: pt continues to work with her therapist and manage her MH concerns.  She did note that she told her therapist that she was living with  her ex S.O. which was not a good relationship.  Her therapist told her to leave as she would not progress without.  She is now between shelters.    Education Provided to patient: pt denied any needs for education on her BH.    Pain  Pain (GOAL):: No  Health Maintenance Reviewed: Due/Overdue   Health Maintenance Due   Topic Date Due    HEPATITIS B IMMUNIZATION (1 of 3 - 3-dose series) Never done    INFLUENZA VACCINE (1) 09/01/2023    COVID-19 Vaccine (2 - 2023-24 season) 09/01/2023    YEARLY PREVENTIVE VISIT  02/15/2024       Clinical Pathway: None    Medication Management:  Medication review status: not reviewed during today's call     Functional Status:  Dependent ADLs:: Independent  Dependent IADLs:: Cleaning, Meal Preparation, Money Management  Bed or wheelchair confined:: No  Mobility Status: Independent    Living Situation:  Current living arrangement:: Other (2 children and boyfriend)  Type of residence:: Homeless    Lifestyle & Psychosocial Needs:    Social Determinants of Health     Food  Insecurity: Low Risk  (11/24/2023)    Food Insecurity     Within the past 12 months, did you worry that your food would run out before you got money to buy more?: No     Within the past 12 months, did the food you bought just not last and you didn t have money to get more?: No   Depression: Not at risk (10/4/2023)    PHQ-2     PHQ-2 Score: 1   Recent Concern: Depression - At risk (8/30/2023)    PHQ-2     PHQ-2 Score: 3   Housing Stability: High Risk (2/7/2024)    Housing Stability     Do you have housing? : No     Are you worried about losing your housing?: Yes   Tobacco Use: Medium Risk (10/4/2023)    Patient History     Smoking Tobacco Use: Former     Smokeless Tobacco Use: Never     Passive Exposure: Not on file   Financial Resource Strain: High Risk (2/7/2024)    Financial Resource Strain     Within the past 12 months, have you or your family members you live with been unable to get utilities (heat, electricity) when it was really needed?: Yes   Alcohol Use: Not At Risk (9/20/2022)    AUDIT-C     Frequency of Alcohol Consumption: Never     Average Number of Drinks: Patient does not drink     Frequency of Binge Drinking: Never   Transportation Needs: Low Risk  (11/24/2023)    Transportation Needs     Within the past 12 months, has lack of transportation kept you from medical appointments, getting your medicines, non-medical meetings or appointments, work, or from getting things that you need?: No   Physical Activity: Inactive (9/20/2022)    Exercise Vital Sign     Days of Exercise per Week: 0 days     Minutes of Exercise per Session: 0 min   Interpersonal Safety: Not At Risk (5/4/2021)    Humiliation, Afraid, Rape, and Kick questionnaire     Fear of Current or Ex-Partner: No     Emotionally Abused: No     Physically Abused: No     Sexually Abused: No   Stress: Stress Concern Present (9/20/2022)    [employer] - Occupational Stress Questionnaire     Feeling of Stress : Rather much    Social Connections: Socially Isolated (9/20/2022)    Social Connection and Isolation Panel [NHANES]     Frequency of Communication with Friends and Family: Never     Frequency of Social Gatherings with Friends and Family: Never     Attends Evangelical Services: Never     Active Member of Clubs or Organizations: No     Attends Club or Organization Meetings: Never     Marital Status: Living with partner     Diet:: Regular  Inadequate nutrition (GOAL):: No  Tube Feeding: No  Inadequate activity/exercise (GOAL):: Yes  Significant changes in sleep pattern (GOAL): Yes  Transportation means:: Regular car     Evangelical or spiritual beliefs that impact treatment:: No  Mental health DX:: Yes  Mental health DX how managed:: Medication, Outpatient Counseling, Psychiatrist  Mental health management concern (GOAL):: Yes (some but better)  Chemical Dependency Status: No Current Concerns  Informal Support system:: Friends        Pt said she had been at a shelter after leaving the home of her ex S.O. and is right now between shelters.  She noted that her friend is there supporting her.  She has not been able to get supports or insurance through the [facility] access [facility]/state due to S.O.'s income.  Her hope is that she will now get insurance and some cash assistance. Pt said she had not told anyone about the situation of still living with her Ex S.O. nor the challenges with the relationship until she recently told her therapist.    Pt did not want to talk at this time about any current needs yet was welcome to a call in about a month to give her time to sort her options out. She denied any support needs right now.      Resources and Interventions:  Current Resources:      Community Resources: Financial/Insurance, [facility] (6/30/22 ARM will be starting)  Supplies Currently Used at Home: None  Equipment Currently Used at Home: none  Employment Status: disabled, employed part-time         Advance Care Plan/Directive  Advanced Care Plans/Directives on  file:: No    Referrals Placed: None (previously honoring [employer], [employer] resources, MH, disability linkage, employment, food)         Care Plan:  Care Plan: General       Problem: HP GENERAL PROBLEM       Long-Range Goal: break large tasks into manageable steps       Start Date: 5/4/2021 Expected End Date: 12/5/2024    This Visit's Progress: On Hold Recent Progress: 30%    Note:     Barriers: my mental health, two young children  Strengths: I just got an henrietta to help with identifying tasks    Patient expressed understanding of goal: Yes  Action steps to achieve this goal:  1. I will stop looking at the job as one big task I need to complete and break it down into manageable steps.  2. I will learn how to use the henrietta and identify steps.  3. I will start to work on the tasks at a reasonable rate.  4. If I start to feel overwhelmed with what is ahead of me I will look at what I have completed and remind myself to focus on steps and not the entire task.  5. I will listen to my body and not overdo                              Patient/Caregiver understanding: will put goal on hold due to situation and re-evaluate in a month.    Outreach Frequency: monthly, more frequently as needed  Future Appointments                Tomorrow Timmy Umana MD [facility] [place], [place]            Plan: pt to attend appointments and continue to work with [facility] on supports now that she is homeless.  Pt to continue with therapist. Sw to call in one month.     TRAMAINE Ramirez [facility] - Care Coordinator   2/7/2024   2:28 PM  [phone]

## 2024-02-08 ENCOUNTER — VIRTUAL VISIT (OUTPATIENT)
Dept: FAMILY MEDICINE | Facility: OTHER | Age: 39
End: 2024-02-08

## 2024-02-08 DIAGNOSIS — C73 FOLLICULAR CANCER OF THYROID (H): ICD-10-CM

## 2024-02-08 DIAGNOSIS — F33.41 RECURRENT MAJOR DEPRESSIVE DISORDER, IN PARTIAL REMISSION (H): ICD-10-CM

## 2024-02-08 DIAGNOSIS — G47.411 NARCOLEPSY AND CATAPLEXY: Primary | ICD-10-CM

## 2024-02-08 DIAGNOSIS — Z59.9 FINANCIAL DIFFICULTIES: ICD-10-CM

## 2024-02-08 DIAGNOSIS — E22.1 HYPERPROLACTINEMIA (H): ICD-10-CM

## 2024-02-08 DIAGNOSIS — F41.0 PANIC ATTACK: ICD-10-CM

## 2024-02-08 DIAGNOSIS — R00.0 TACHYCARDIA: ICD-10-CM

## 2024-02-08 DIAGNOSIS — F41.1 GENERALIZED ANXIETY DISORDER: ICD-10-CM

## 2024-02-08 DIAGNOSIS — T76.31XA: ICD-10-CM

## 2024-02-08 PROCEDURE — 96127 BRIEF EMOTIONAL/BEHAV ASSMT: CPT | Mod: 59 | Performed by: FAMILY MEDICINE

## 2024-02-08 PROCEDURE — 99214 OFFICE O/P EST MOD 30 MIN: CPT | Mod: 95 | Performed by: FAMILY MEDICINE

## 2024-02-08 RX ORDER — DEXTROAMPHETAMINE SACCHARATE, AMPHETAMINE ASPARTATE MONOHYDRATE, DEXTROAMPHETAMINE SULFATE AND AMPHETAMINE SULFATE 7.5; 7.5; 7.5; 7.5 MG/1; MG/1; MG/1; MG/1
30 CAPSULE, EXTENDED RELEASE ORAL DAILY
Qty: 30 CAPSULE | Refills: 0 | Status: SHIPPED | OUTPATIENT
Start: 2024-04-10 | End: 2024-05-10

## 2024-02-08 RX ORDER — DEXTROAMPHETAMINE SACCHARATE, AMPHETAMINE ASPARTATE MONOHYDRATE, DEXTROAMPHETAMINE SULFATE AND AMPHETAMINE SULFATE 7.5; 7.5; 7.5; 7.5 MG/1; MG/1; MG/1; MG/1
30 CAPSULE, EXTENDED RELEASE ORAL DAILY
Qty: 30 CAPSULE | Refills: 0 | Status: SHIPPED | OUTPATIENT
Start: 2024-03-10 | End: 2024-04-09

## 2024-02-08 RX ORDER — METOPROLOL SUCCINATE 25 MG/1
25 TABLET, EXTENDED RELEASE ORAL DAILY
Qty: 90 TABLET | Refills: 1 | Status: SHIPPED | OUTPATIENT
Start: 2024-02-08 | End: 2024-03-04

## 2024-02-08 RX ORDER — DEXTROAMPHETAMINE SACCHARATE, AMPHETAMINE ASPARTATE MONOHYDRATE, DEXTROAMPHETAMINE SULFATE AND AMPHETAMINE SULFATE 7.5; 7.5; 7.5; 7.5 MG/1; MG/1; MG/1; MG/1
30 CAPSULE, EXTENDED RELEASE ORAL DAILY
Qty: 30 CAPSULE | Refills: 0 | Status: SHIPPED | OUTPATIENT
Start: 2024-02-08 | End: 2024-03-09

## 2024-02-08 SDOH — ECONOMIC STABILITY - INCOME SECURITY: PROBLEM RELATED TO HOUSING AND ECONOMIC CIRCUMSTANCES, UNSPECIFIED: Z59.9

## 2024-02-08 ASSESSMENT — ANXIETY QUESTIONNAIRES
GAD7 TOTAL SCORE: 8
6. BECOMING EASILY ANNOYED OR IRRITABLE: NOT AT ALL
1. FEELING NERVOUS, ANXIOUS, OR ON EDGE: SEVERAL DAYS
GAD7 TOTAL SCORE: 8
5. BEING SO RESTLESS THAT IT IS HARD TO SIT STILL: NOT AT ALL
7. FEELING AFRAID AS IF SOMETHING AWFUL MIGHT HAPPEN: SEVERAL DAYS
3. WORRYING TOO MUCH ABOUT DIFFERENT THINGS: NEARLY EVERY DAY
IF YOU CHECKED OFF ANY PROBLEMS ON THIS QUESTIONNAIRE, HOW DIFFICULT HAVE THESE PROBLEMS MADE IT FOR YOU TO DO YOUR WORK, TAKE CARE OF THINGS AT HOME, OR GET ALONG WITH OTHER PEOPLE: NOT DIFFICULT AT ALL
2. NOT BEING ABLE TO STOP OR CONTROL WORRYING: NEARLY EVERY DAY

## 2024-02-08 ASSESSMENT — PATIENT HEALTH QUESTIONNAIRE - PHQ9
5. POOR APPETITE OR OVEREATING: NOT AT ALL
10. IF YOU CHECKED OFF ANY PROBLEMS, HOW DIFFICULT HAVE THESE PROBLEMS MADE IT FOR YOU TO DO YOUR WORK, TAKE CARE OF THINGS AT HOME, OR GET ALONG WITH OTHER PEOPLE: SOMEWHAT DIFFICULT
SUM OF ALL RESPONSES TO PHQ QUESTIONS 1-9: 11
SUM OF ALL RESPONSES TO PHQ QUESTIONS 1-9: 11

## 2024-02-08 NOTE — PATIENT INSTRUCTIONS
Good luck with everything!    Let's cautiously try resuming fluoxetine.  If you notice any arsh symptoms, stop the medication and let me know.      Eventually, we do want to get you back in with psychiatry, sleep, and/or neurology.      Please follow up for physical once you have insurance.    Contact us or return if questions or concerns.    Have a nice day!    Dr. Umana
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
## 2024-03-04 ENCOUNTER — MYC REFILL (OUTPATIENT)
Dept: PSYCHIATRY | Facility: CLINIC | Age: 39
End: 2024-03-04

## 2024-03-04 ENCOUNTER — MYC REFILL (OUTPATIENT)
Dept: FAMILY MEDICINE | Facility: OTHER | Age: 39
End: 2024-03-04

## 2024-03-04 DIAGNOSIS — F41.1 GENERALIZED ANXIETY DISORDER: ICD-10-CM

## 2024-03-04 DIAGNOSIS — R00.0 TACHYCARDIA: ICD-10-CM

## 2024-03-04 DIAGNOSIS — F33.41 RECURRENT MAJOR DEPRESSIVE DISORDER, IN PARTIAL REMISSION (H): ICD-10-CM

## 2024-03-04 DIAGNOSIS — F41.0 PANIC ATTACK: ICD-10-CM

## 2024-03-04 DIAGNOSIS — R11.14 BILIOUS VOMITING WITH NAUSEA: ICD-10-CM

## 2024-03-05 RX ORDER — HYDROXYZINE PAMOATE 25 MG/1
25 CAPSULE ORAL 3 TIMES DAILY PRN
Qty: 30 CAPSULE | Refills: 2 | Status: SHIPPED | OUTPATIENT
Start: 2024-03-05

## 2024-03-05 RX ORDER — METOPROLOL SUCCINATE 25 MG/1
25 TABLET, EXTENDED RELEASE ORAL DAILY
Qty: 90 TABLET | Refills: 0 | Status: SHIPPED | OUTPATIENT
Start: 2024-03-05

## 2024-03-05 RX ORDER — PROCHLORPERAZINE MALEATE 10 MG
10 TABLET ORAL EVERY 6 HOURS PRN
Qty: 30 TABLET | Refills: 2 | Status: SHIPPED | OUTPATIENT
Start: 2024-03-05 | End: 2024-06-11

## 2024-03-05 NOTE — TELEPHONE ENCOUNTER
Date of Last Office Visit: 10/4/23  Date of Next Office Visit: None  No shows since last visit: 0  Cancellations since last visit: 0    Medication requested: hydrOXYzine (VISTARIL) 25 MG capsule  Date last ordered: 10/4/24 Qty: 30 Refills: 2     Review of [place] ?: NA    Lapse in medication adherence greater than 5 days?: No, prn  If yes, call patient and gather details: na  Medication refill request verified as identical to current order?: yes  Result of Last DAM, VPA, Li+ Level, CBC, or Carbamazepine Level (at or since last visit): N/A    Last visit treatment plan: 1) Medications:   -Continue Adderall XR 30 mg by mouth every morning  -Continue hydroxyzine 25 to 50 mg twice daily as needed for anxiety and/or sleep  -Reduce mirtazapine to 7.5 mg by mouth at bedtime as needed for sleep.   Follow-up: 2 to 3 months, or sooner if needed.     []Medication refilled per  Medication Refill in Ambulatory Care  policy.  [x]Medication unable to be refilled by RN due to criteria not met as indicated below:    []Eligibility - not seen in the last year   [x]Supervision - no future appointment   []Compliance - no shows, cancellations or lapse in therapy   []Verification - order discrepancy   []Controlled medication   []Medication not included in policy   []90-day supply request   []Other

## 2024-03-08 ENCOUNTER — PATIENT OUTREACH (OUTPATIENT)
Dept: CARE COORDINATION | Facility: CLINIC | Age: 39
End: 2024-03-08

## 2024-03-08 NOTE — PROGRESS NOTES
Clinic Care Coordination Contact  [facility]/Voicemail    Clinical Data: Care Coordinator Outreach    Outreach Documentation Number of Outreach Attempt   3/8/2024   8:53 AM 1       Left message on patient's voicemail with call back information and requested return call.    Plan: Care Coordinator will try to reach patient again in 7-10 business days.    TRAMAINE Ramirez   [facility] - Care Coordination  [facility]   [phone]

## 2024-03-11 ENCOUNTER — MYC REFILL (OUTPATIENT)
Dept: FAMILY MEDICINE | Facility: OTHER | Age: 39
End: 2024-03-11

## 2024-03-11 DIAGNOSIS — G47.411 NARCOLEPSY AND CATAPLEXY: ICD-10-CM

## 2024-03-11 RX ORDER — DEXTROAMPHETAMINE SACCHARATE, AMPHETAMINE ASPARTATE MONOHYDRATE, DEXTROAMPHETAMINE SULFATE AND AMPHETAMINE SULFATE 7.5; 7.5; 7.5; 7.5 MG/1; MG/1; MG/1; MG/1
30 CAPSULE, EXTENDED RELEASE ORAL DAILY
Qty: 30 CAPSULE | Refills: 0 | OUTPATIENT
Start: 2024-03-11

## 2024-03-19 NOTE — PROGRESS NOTES
Clinic Care Coordination Contact  [facility]/Voicemail    Clinical Data: Care Coordinator Outreach    Outreach Documentation Number of Outreach Attempt   3/8/2024   8:53 AM 1   3/19/2024   2:07 PM 2       Left message on patient's voicemail with call back information and requested return call.    Plan: Care Coordinator will send unable to contact letter with care coordinator contact information via [employer]. Care Coordinator will try to reach patient again in 1 month.    TRAMAINE Ramirez   [facility] - Care Coordination  [facility]   [phone]

## 2024-04-15 ENCOUNTER — HOSPITAL ENCOUNTER (EMERGENCY)
Facility: CLINIC | Age: 39
Discharge: HOME | End: 2024-04-15
Attending: EMERGENCY MEDICINE | Admitting: EMERGENCY MEDICINE
Payer: COMMERCIAL

## 2024-04-15 VITALS
HEART RATE: 80 BPM | SYSTOLIC BLOOD PRESSURE: 112 MMHG | HEIGHT: 72 IN | DIASTOLIC BLOOD PRESSURE: 88 MMHG | TEMPERATURE: 98.1 F | WEIGHT: 143.2 LBS | RESPIRATION RATE: 20 BRPM | OXYGEN SATURATION: 98 % | BODY MASS INDEX: 19.39 KG/M2

## 2024-04-15 DIAGNOSIS — T74.21XA REPORTED SEXUAL ASSAULT OF ADULT: ICD-10-CM

## 2024-04-15 PROCEDURE — 99283 EMERGENCY DEPT VISIT LOW MDM: CPT | Performed by: EMERGENCY MEDICINE

## 2024-04-15 PROCEDURE — 99284 EMERGENCY DEPT VISIT MOD MDM: CPT | Performed by: EMERGENCY MEDICINE

## 2024-04-15 ASSESSMENT — COLUMBIA-SUICIDE SEVERITY RATING SCALE - C-SSRS
6. HAVE YOU EVER DONE ANYTHING, STARTED TO DO ANYTHING, OR PREPARED TO DO ANYTHING TO END YOUR LIFE?: YES
2. HAVE YOU ACTUALLY HAD ANY THOUGHTS OF KILLING YOURSELF IN THE PAST MONTH?: NO
1. IN THE PAST MONTH, HAVE YOU WISHED YOU WERE DEAD OR WISHED YOU COULD GO TO SLEEP AND NOT WAKE UP?: NO

## 2024-04-15 ASSESSMENT — ACTIVITIES OF DAILY LIVING (ADL)
ADLS_ACUITY_SCORE: 35

## 2024-04-15 NOTE — ED NOTES
Doyle has responded and [facility] advocate has been paged. Both should arrive by 1200. Plan reviewed with patient.
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
## 2024-04-16 ENCOUNTER — PATIENT OUTREACH (OUTPATIENT)
Dept: CARE COORDINATION | Facility: CLINIC | Age: 39
End: 2024-04-16

## 2024-04-16 DIAGNOSIS — F41.9 ANXIETY: Primary | ICD-10-CM
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
## 2024-04-16 NOTE — LETTER
It was a pleasure to speak with you.  I would like to provide you with the enclosed information for your records.  As part of care coordination, we are developing care plans to assist in accomplishing your health care goals.  When we speak next, please feel free to let me know if you want to add or change any information on your care plans.    As always, feel free to contact me if you have any questions or concerns.  I look forward to working with you in the effort to achieve your health care and wellness goals .        Sincerely,      Violette Wren, [facility]  Clinic Care Coordination  [phone]    [facility]  Patient Centered Plan of Care  About Me:        Patient Name:  Isaura Gray    YOB: 1985  Age:         38 year old   [place] MRN:    [id number] Telephone Information:  Home Phone [phone]   Mobile [phone]       Address:  [address] [place] [address] Email address:  [email or web address]      Emergency Contact(s)    Name Relationship Lgl Grd Work Phone Home Phone Mobile Phone   1. FAMILIA AKERS Significant ot*   [phone]           Primary language:  English     needed? No   [place] [facility]:  [phone] op. 1  Other communication barriers:Other (due to MH and Anxiety)    Preferred Method of Communication:  Mail  Current living arrangement: Other (2 children and boyfriend)    Mobility Status/ Medical Equipment: Independent        Health Maintenance  Health Maintenance Reviewed: Due/Overdue   Health Maintenance Due   Topic Date Due    HEPATITIS B IMMUNIZATION (1 of 3 - 19+ 3-dose series) Never done    INFLUENZA VACCINE (1) 09/01/2023    COVID-19 Vaccine (2 - 2023-24 season) 09/01/2023    YEARLY PREVENTIVE VISIT  02/15/2024           My Access Plan  Medical Emergency 911   Primary Clinic Line [facility] - [phone]   24 Hour Appointment Line [phone] or  [phone])  (toll-free)   24 Hour Nurse Line [phone] (toll-free)   Preferred Urgent Care [facility], [phone]     Preferred Hospital [facility] [place]  [phone]     Preferred Pharmacy [facility] [place], [place] - [address] [place]      Behavioral Health Crisis Line The National Suicide Prevention Lifeline at 1-385.166.8890 or Text/Call 988           My Care Team Members  Patient Care Team         Relationship Specialty Notifications Start End    Timmy Umana MD PCP - General Family Practice  3/25/18     Phone: [phone] Fax: [phone]         [address] [facility] [address]    Simi Bearden MD MD Hematology & Oncology Admissions 9/16/16     Phone: [phone] Fax: [phone]         [address] [place] [address]    Timmy Umana MD Assigned PCP   12/20/20     Phone: [phone] Fax: [phone]         [address] [facility] [address]    Violette Wren LSW Lead Care Coordinator Primary Care - CC Admissions 5/4/21     Phone: [phone] Fax: [phone]        Merced Ko MD MD Endocrinology, Diabetes, and Metabolism  10/29/21     Phone: [phone] Fax: [phone] [address] [facility] [address]    Blair Vigil PA-C Referring Physician Family Medicine  10/29/21     Phone: [phone] Fax: [phone]         [address] [facility] [address]    Brice Fernandez MD MD Internal Medicine  5/9/22     Phone: [phone] Fax: [phone]         [address] [facility] [address]    Kyung Valderrama PAYvonC Physician Assistant Neurology  5/9/22     Referred to Pulm.    Phone: [phone] Fax: [phone]         [address] [facility] [address]    Karie Mckenna MD MD Cardiovascular Disease  5/12/22     Phone: [phone] Pager: [phone] Fax: [phone] [address] [facility] [address]    Karie Mckenna MD MD Cardiovascular Disease  5/17/22      Phone: [phone] Pager: [phone] Fax: [phone] [address] [facility] [address]    Ginette Oliver MSW Assigned Behavioral Health Provider   6/24/23     Phone: [phone] Fax: [phone]         [address] [facility] [address]    Carmela Millan MA Financial Resource Worker   4/16/24     Phone: [phone]                     My Care Plans  Self Management and Treatment Plan    Care Plan  Care Plan: General       Problem: HP GENERAL PROBLEM       Long-Range Goal: break large tasks into manageable steps       Start Date: 5/4/2021 Expected End Date: 12/5/2024    This Visit's Progress: On Hold Recent Progress: On Hold    Note:     Barriers: my mental health, two young children  Strengths: I just got an henrietta to help with identifying tasks    Patient expressed understanding of goal: Yes  Action steps to achieve this goal:  1. I will stop looking at the job as one big task I need to complete and break it down into manageable steps.  2. I will learn how to use the henrietta and identify steps.  3. I will start to work on the tasks at a reasonable rate.  4. If I start to feel overwhelmed with what is ahead of me I will look at what I have completed and remind myself to focus on steps and not the entire task.  5. I will listen to my body and not overdo                              Action Plans on File:                       Advance Care Plans/Directives:   Advanced Care Plan/Directives on file:   No    Discussed with patient/caregiver(s): No data recorded           My Medical and Care Information  Problem List   Patient Active Problem List   Diagnosis    Vitamin D deficiency    Supervision of other high risk pregnancies, unspecified trimester    Hyperprolactinemia (H24)    Follicular cancer of thyroid (H)    Lactose intolerance in adult    Encounter for triage in pregnant patient    Cough    Chronic bilateral low back pain without sciatica    Normal labor    Anxiety    Cancer (H)     H/O  delivery, currently pregnant    History of pulmonary embolism    History of thyroid cancer    Mitral valve disorder     (normal spontaneous vaginal delivery)    Moderate major depression (H)    PTSD (post-traumatic stress disorder)    ASCUS of cervix with negative high risk HPV    Non-alcoholic fatty liver disease    Psychophysiological insomnia    Family history of Hashimoto thyroiditis    Biliary dyskinesia    Pituitary tumor - history    Persistent insomnia    Post-cholecystectomy syndrome    Narcolepsy and cataplexy    Migraine with aura and with status migrainosus, not intractable    Chronic post-COVID-19 syndrome    Tachycardia    Generalized anxiety disorder      Current Medications and Allergies:     Allergies   Allergen Reactions    Ciprofloxacin Hives    Sulfa Antibiotics Hives    Hydrocodone-Acetaminophen Itching          Mirtazapine Nausea and Vomiting    Oxycodone Hives    Oxycodone-Acetaminophen Itching         Current Outpatient Medications:     albuterol (PROAIR HFA/PROVENTIL HFA/VENTOLIN HFA) 108 (90 Base) MCG/ACT inhaler, Inhale 2 puffs into the lungs every 6 hours, Disp: 18 g, Rfl: 0    amphetamine-dextroamphetamine (ADDERALL XR) 30 MG 24 hr capsule, Take 1 capsule (30 mg) by mouth daily for 30 days, Disp: 30 capsule, Rfl: 0    calcium carbonate (OS-DEBI) 1500 (600 Ca) MG tablet, Take 1 tablet (600 mg) by mouth 2 times daily (with meals), Disp: 180 tablet, Rfl: 1    FLUoxetine (PROZAC) 20 MG capsule, Take 1 capsule (20 mg) by mouth daily, Disp: 30 capsule, Rfl: 0    hydrOXYzine cecile (VISTARIL) 25 MG capsule, Take 1 capsule (25 mg) by mouth 3 times daily as needed for anxiety, Disp: 30 capsule, Rfl: 2    levonorgestrel (MIRENA) 20 MCG/DAY IUD, 1 each (20 mcg) by Intrauterine route once for 1 dose Placed 2020, Disp: 1 each, Rfl: 0    metoprolol succinate ER (TOPROL XL) 25 MG 24 hr tablet, Take 1 tablet (25 mg) by mouth daily, Disp: 90 tablet, Rfl: 0    prochlorperazine (COMPAZINE)  10 MG tablet, Take 1 tablet (10 mg) by mouth every 6 hours as needed for nausea or vomiting, Disp: 30 tablet, Rfl: 2      Care Coordination Start Date: 5/4/2021   Frequency of Care Coordination: monthly, more frequently as needed     Form Last Updated: 04/17/2024
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
## 2024-04-19 ENCOUNTER — PATIENT OUTREACH (OUTPATIENT)
Dept: CARE COORDINATION | Facility: CLINIC | Age: 39
End: 2024-04-19

## 2024-04-24 ENCOUNTER — PATIENT OUTREACH (OUTPATIENT)
Dept: CARE COORDINATION | Facility: CLINIC | Age: 39
End: 2024-04-24

## 2024-05-04 ENCOUNTER — HEALTH MAINTENANCE LETTER (OUTPATIENT)
Age: 39
End: 2024-05-04

## 2024-05-20 ENCOUNTER — PATIENT OUTREACH (OUTPATIENT)
Dept: CARE COORDINATION | Facility: CLINIC | Age: 39
End: 2024-05-20

## 2024-05-20 NOTE — PROGRESS NOTES
Clinic Care Coordination Contact  [facility]/Voicemail    Clinical Data: Care Coordinator Outreach    Outreach Documentation Number of Outreach Attempt   5/20/2024  12:37 PM 1       Left message on patient's voicemail with call back information and requested return call.    Plan: Care Coordinator will try to reach patient again with in 10 business days.    TRAMAINE Ramirez   [facility] - Care Coordination  [facility]   [phone]

## 2024-05-29 ENCOUNTER — PATIENT OUTREACH (OUTPATIENT)
Dept: CARE COORDINATION | Facility: CLINIC | Age: 39
End: 2024-05-29

## 2024-05-30 NOTE — PROGRESS NOTES
Clinic Care Coordination Contact  [facility]/Voicemail    Clinical Data: Care Coordinator Outreach    Outreach Documentation Number of Outreach Attempt   5/20/2024  12:37 PM 1   5/30/2024   1:37 PM 2       Left message on patient's voicemail with call back information and requested return call.    Plan: Care Coordinator will send unable to contact letter with care coordinator contact information via [employer]. Care Coordinator will try to reach patient again in 1 month.    TRAMAINE Ramirez   [facility] - Care Coordination  [facility]   [phone]

## 2024-06-11 ENCOUNTER — MYC REFILL (OUTPATIENT)
Dept: FAMILY MEDICINE | Facility: OTHER | Age: 39
End: 2024-06-11

## 2024-06-11 DIAGNOSIS — R11.14 BILIOUS VOMITING WITH NAUSEA: ICD-10-CM

## 2024-06-11 RX ORDER — PROCHLORPERAZINE MALEATE 10 MG
10 TABLET ORAL EVERY 6 HOURS PRN
Qty: 30 TABLET | Refills: 2 | Status: SHIPPED | OUTPATIENT
Start: 2024-06-11 | End: 2024-07-11

## 2024-06-28 ENCOUNTER — PATIENT OUTREACH (OUTPATIENT)
Dept: CARE COORDINATION | Facility: CLINIC | Age: 39
End: 2024-06-28
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
## 2024-07-03 ENCOUNTER — PATIENT OUTREACH (OUTPATIENT)
Dept: CARE COORDINATION | Facility: CLINIC | Age: 39
End: 2024-07-03

## 2024-07-11 ENCOUNTER — VIRTUAL VISIT (OUTPATIENT)
Dept: FAMILY MEDICINE | Facility: OTHER | Age: 39
End: 2024-07-11

## 2024-07-11 DIAGNOSIS — Z59.9 FINANCIAL DIFFICULTIES: ICD-10-CM

## 2024-07-11 DIAGNOSIS — J30.1 NON-SEASONAL ALLERGIC RHINITIS DUE TO POLLEN: ICD-10-CM

## 2024-07-11 DIAGNOSIS — F33.41 RECURRENT MAJOR DEPRESSIVE DISORDER, IN PARTIAL REMISSION (H): Primary | ICD-10-CM

## 2024-07-11 DIAGNOSIS — F43.10 PTSD (POST-TRAUMATIC STRESS DISORDER): ICD-10-CM

## 2024-07-11 DIAGNOSIS — F41.1 GENERALIZED ANXIETY DISORDER: ICD-10-CM

## 2024-07-11 DIAGNOSIS — U09.9 POST-COVID CHRONIC DYSPNEA: ICD-10-CM

## 2024-07-11 DIAGNOSIS — R06.09 POST-COVID CHRONIC DYSPNEA: ICD-10-CM

## 2024-07-11 DIAGNOSIS — R11.14 BILIOUS VOMITING WITH NAUSEA: ICD-10-CM

## 2024-07-11 DIAGNOSIS — T76.31XA: ICD-10-CM

## 2024-07-11 DIAGNOSIS — G47.411 NARCOLEPSY AND CATAPLEXY: ICD-10-CM

## 2024-07-11 PROCEDURE — 99214 OFFICE O/P EST MOD 30 MIN: CPT | Mod: 95 | Performed by: FAMILY MEDICINE

## 2024-07-11 PROCEDURE — G2211 COMPLEX E/M VISIT ADD ON: HCPCS | Mod: 95 | Performed by: FAMILY MEDICINE

## 2024-07-11 RX ORDER — METHYLPHENIDATE HYDROCHLORIDE 30 MG/1
30 CAPSULE, EXTENDED RELEASE ORAL DAILY
Qty: 30 CAPSULE | Refills: 0 | Status: SHIPPED | OUTPATIENT
Start: 2024-08-10 | End: 2024-09-09

## 2024-07-11 RX ORDER — METHYLPHENIDATE HYDROCHLORIDE 30 MG/1
30 CAPSULE, EXTENDED RELEASE ORAL DAILY
Qty: 30 CAPSULE | Refills: 0 | Status: SHIPPED | OUTPATIENT
Start: 2024-09-09 | End: 2024-10-03

## 2024-07-11 RX ORDER — MONTELUKAST SODIUM 10 MG/1
10 TABLET ORAL AT BEDTIME
Qty: 30 TABLET | Refills: 1 | Status: SHIPPED | OUTPATIENT
Start: 2024-07-11

## 2024-07-11 RX ORDER — ALBUTEROL SULFATE 90 UG/1
2 AEROSOL, METERED RESPIRATORY (INHALATION) EVERY 6 HOURS
Qty: 18 G | Refills: 0 | Status: SHIPPED | OUTPATIENT
Start: 2024-07-11

## 2024-07-11 RX ORDER — FLUTICASONE PROPIONATE 50 MCG
1 SPRAY, SUSPENSION (ML) NASAL DAILY
COMMUNITY
Start: 2024-07-11

## 2024-07-11 RX ORDER — METHYLPHENIDATE HYDROCHLORIDE 30 MG/1
30 CAPSULE, EXTENDED RELEASE ORAL DAILY
Qty: 30 CAPSULE | Refills: 0 | Status: SHIPPED | OUTPATIENT
Start: 2024-07-11 | End: 2024-08-10

## 2024-07-11 RX ORDER — MONTELUKAST SODIUM 10 MG/1
1 TABLET ORAL AT BEDTIME
Qty: 90 TABLET | OUTPATIENT
Start: 2024-07-11

## 2024-07-11 RX ORDER — PROCHLORPERAZINE MALEATE 10 MG
10 TABLET ORAL EVERY 6 HOURS PRN
Qty: 30 TABLET | Refills: 2 | Status: SHIPPED | OUTPATIENT
Start: 2024-07-11 | End: 2024-09-26

## 2024-07-11 SDOH — ECONOMIC STABILITY - INCOME SECURITY: PROBLEM RELATED TO HOUSING AND ECONOMIC CIRCUMSTANCES, UNSPECIFIED: Z59.9

## 2024-07-11 ASSESSMENT — ANXIETY QUESTIONNAIRES
5. BEING SO RESTLESS THAT IT IS HARD TO SIT STILL: NOT AT ALL
6. BECOMING EASILY ANNOYED OR IRRITABLE: NOT AT ALL
7. FEELING AFRAID AS IF SOMETHING AWFUL MIGHT HAPPEN: NEARLY EVERY DAY
3. WORRYING TOO MUCH ABOUT DIFFERENT THINGS: NEARLY EVERY DAY
IF YOU CHECKED OFF ANY PROBLEMS ON THIS QUESTIONNAIRE, HOW DIFFICULT HAVE THESE PROBLEMS MADE IT FOR YOU TO DO YOUR WORK, TAKE CARE OF THINGS AT HOME, OR GET ALONG WITH OTHER PEOPLE: NOT DIFFICULT AT ALL
GAD7 TOTAL SCORE: 13
1. FEELING NERVOUS, ANXIOUS, OR ON EDGE: SEVERAL DAYS
GAD7 TOTAL SCORE: 13
2. NOT BEING ABLE TO STOP OR CONTROL WORRYING: NEARLY EVERY DAY

## 2024-07-11 ASSESSMENT — PATIENT HEALTH QUESTIONNAIRE - PHQ9
SUM OF ALL RESPONSES TO PHQ QUESTIONS 1-9: 18
5. POOR APPETITE OR OVEREATING: NEARLY EVERY DAY

## 2024-07-11 NOTE — PATIENT INSTRUCTIONS
Lets go ahead and try Ritalin LA to help with your narcolepsy.  It is important to get back in with psychiatry and/or neurology to get to the bottom of this.    Take your Compazine in the evening as this can make you sleepy.    For your allergy symptoms, I would recommend trying a nasal steroid spray like Flonase.  If not responding adequately to this, you can add some Singulair.    I have put in a referral for care coordination.  If you do not hear from them within the next week, please let me know.    Please schedule follow-up in 2 to 3 months to ensure things are getting better.    Contact us or return if questions or concerns.    Have a nice day!    Dr. Umana
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
## 2024-07-12 ENCOUNTER — MYC MEDICAL ADVICE (OUTPATIENT)
Dept: FAMILY MEDICINE | Facility: OTHER | Age: 39
End: 2024-07-12

## 2024-07-25 DIAGNOSIS — R11.14 BILIOUS VOMITING WITH NAUSEA: ICD-10-CM

## 2024-07-25 RX ORDER — PROCHLORPERAZINE MALEATE 10 MG
TABLET ORAL
Qty: 30 TABLET | Refills: 2 | OUTPATIENT
Start: 2024-07-25

## 2024-07-26 ENCOUNTER — PATIENT OUTREACH (OUTPATIENT)
Dept: CARE COORDINATION | Facility: CLINIC | Age: 39
End: 2024-07-26

## 2024-07-26 NOTE — LETTER
It was a pleasure to speak with you.  I would like to provide you with the enclosed information for your records.  As part of care coordination, we are developing care plans to assist in accomplishing your health care goals.  When we speak next, please feel free to let me know if you want to add or change any information on your care plans.    As always, feel free to contact me if you have any questions or concerns.  I look forward to working with you in the effort to achieve your health care and wellness goals .        Sincerely,      Violette Wren, [facility]  Clinic Care Coordination  [phone]    [facility]  Patient Centered Plan of Care  About Me:        Patient Name:  Isaura Gray    YOB: 1985  Age:         38 year old   [place] MRN:    [id number] Telephone Information:  Home Phone [phone]   Mobile [phone]       Address:  [address] [place] [address] Email address:  [email or web address]      Emergency Contact(s)    Name Relationship Lgl Grd Work Phone Home Phone Mobile Phone           Primary language:  English     needed? No   [facility] [employer]:  [phone] op. 1  Other communication barriers:Other (due to MH and Anxiety)    Preferred Method of Communication:  Mail  Current living arrangement: Other (2 children and boyfriend)    Mobility Status/ Medical Equipment: Independent        Health Maintenance  Health Maintenance Reviewed: Due/Overdue   Health Maintenance Due   Topic Date Due    HEPATITIS B IMMUNIZATION (1 of 3 - 19+ 3-dose series) Never done    COVID-19 Vaccine (2 - 2023-24 season) 09/01/2023    YEARLY PREVENTIVE VISIT  02/15/2024    DEPRESSION 6 MO INDEX REPEAT PHQ-9  07/25/2024           My Access Plan  Medical Emergency 911   Primary Clinic Line [facility] [phone]   24 Hour Appointment Line [phone] or  [phone]) (toll-free)   24 Hour Nurse Line [phone] (toll-free)    Preferred Urgent Care [facility], [phone]     Preferred Hospital [facility] [place]  [phone]     Preferred Pharmacy [facility] [place] - [address]      Behavioral Health Crisis Line The National Suicide Prevention Lifeline at 1-369.395.1321 or Text/Call 988           My Care Team Members  Patient Care Team         Relationship Specialty Notifications Start End    Timmy Umana MD PCP - General Family Practice  3/25/18     Phone: [phone] Fax: [phone]         [address] [facility] [address]    Simi Bearden MD MD Hematology & Oncology Admissions 9/16/16     Phone: [phone] Fax: [phone]         [address] [place] [address]    Timmy Umana MD Assigned PCP   12/20/20     Phone: [phone] Fax: [phone]         [address] [facility] [address]    Violette Wren LSW Lead Care Coordinator Primary Care - CC Admissions 5/4/21     Phone: [phone] Fax: [phone]        Merced Ko MD MD Endocrinology, Diabetes, and Metabolism  10/29/21     Phone: [phone] Fax: [phone]         [address] [facility] [address]    Blair Vigil, PA-C Referring Physician Family Medicine  10/29/21     Phone: [phone] Fax: [phone]         [address] [place] [address]    Brice Fernandez MD MD Internal Medicine  5/9/22     Phone: [phone] Fax: [phone]         [address] [facility] [address]    Kyung Valderrama PA-C Physician Assistant Neurology  5/9/22     Referred to Pulm.    Phone: [phone] Fax: [phone]         [address] [place] [address]    Karie Mckenna MD MD Cardiovascular Disease  5/12/22     Phone: [phone] Pager: [phone] Fax: [phone] [address] [facility] [address]    Karie Mckenna MD MD Cardiovascular Disease  5/17/22     Phone: [phone] Pager: [phone] Fax: [phone]         [address] [facility] [address]    Alondra Bunn APRN CNP Assigned Behavioral Health Provider   24     Phone: [phone] Fax: [phone]         [address] [address]                My Care Plans  Self Management and Treatment Plan    Care Plan  Care Plan: General       Problem: HP GENERAL PROBLEM       Long-Range Goal: break large tasks into manageable steps       Start Date: 2021 Expected End Date: 2024    This Visit's Progress: 30% Recent Progress: 20%    Note:     Barriers: my mental health, two young children  Strengths: I just got an henrietta to help with identifying tasks    Patient expressed understanding of goal: Yes  Action steps to achieve this goal:  1. I will stop looking at the job as one big task I need to complete and break it down into manageable steps.  2. I will learn how to use the henrietta and identify steps.  3. I will start to work on the tasks at a reasonable rate.  4. If I start to feel overwhelmed with what is ahead of me I will look at what I have completed and remind myself to focus on steps and not the entire task.  5. I will listen to my body and not overdo                              Action Plans on File:                       Advance Care Plans/Directives:   Advanced Care Plan/Directives on file:   No    Discussed with patient/caregiver(s): No data recorded           My Medical and Care Information  Problem List   Patient Active Problem List   Diagnosis    Vitamin D deficiency    Supervision of other high risk pregnancies, unspecified trimester    Hyperprolactinemia (H24)    Follicular cancer of thyroid (H)    Lactose intolerance in adult    Encounter for triage in pregnant patient    Cough    Chronic bilateral low back pain without sciatica    Normal labor    Anxiety    Cancer (H)    H/O  delivery, currently pregnant    History of pulmonary embolism    History of thyroid cancer    Mitral valve disorder     (normal  spontaneous vaginal delivery)    Moderate major depression (H)    PTSD (post-traumatic stress disorder)    ASCUS of cervix with negative high risk HPV    Non-alcoholic fatty liver disease    Psychophysiological insomnia    Family history of Hashimoto thyroiditis    Biliary dyskinesia    Pituitary tumor - history    Persistent insomnia    Post-cholecystectomy syndrome    Narcolepsy and cataplexy    Migraine with aura and with status migrainosus, not intractable    Chronic post-COVID-19 syndrome    Tachycardia    Generalized anxiety disorder      Current Medications and Allergies:     Allergies   Allergen Reactions    Ciprofloxacin Hives    Sulfa Antibiotics Hives    Hydrocodone-Acetaminophen Itching          Mirtazapine Nausea and Vomiting    Oxycodone Hives    Oxycodone-Acetaminophen Itching         Current Outpatient Medications:     albuterol (PROAIR HFA/PROVENTIL HFA/VENTOLIN HFA) 108 (90 Base) MCG/ACT inhaler, Inhale 2 puffs into the lungs every 6 hours, Disp: 18 g, Rfl: 0    calcium carbonate (OS-DEBI) 1500 (600 Ca) MG tablet, Take 1 tablet (600 mg) by mouth 2 times daily (with meals), Disp: 180 tablet, Rfl: 1    FLUoxetine (PROZAC) 20 MG capsule, Take 1 capsule (20 mg) by mouth daily, Disp: 90 capsule, Rfl: 0    fluticasone (FLONASE) 50 MCG/ACT nasal spray, Spray 1 spray into both nostrils daily, Disp: , Rfl:     hydrOXYzine cecile (VISTARIL) 25 MG capsule, Take 1 capsule (25 mg) by mouth 3 times daily as needed for anxiety, Disp: 30 capsule, Rfl: 2    levonorgestrel (MIRENA) 20 MCG/DAY IUD, 1 each (20 mcg) by Intrauterine route once for 1 dose Placed 2/2020, Disp: 1 each, Rfl: 0    methylphenidate (RITALIN LA) 30 MG 24 hr capsule, Take 1 capsule (30 mg) by mouth daily for 30 days, Disp: 30 capsule, Rfl: 0    [START ON 8/10/2024] methylphenidate (RITALIN LA) 30 MG 24 hr capsule, Take 1 capsule (30 mg) by mouth daily for 30 days, Disp: 30 capsule, Rfl: 0    [START ON 9/9/2024] methylphenidate (RITALIN LA) 30 MG 24  hr capsule, Take 1 capsule (30 mg) by mouth daily for 30 days, Disp: 30 capsule, Rfl: 0    metoprolol succinate ER (TOPROL XL) 25 MG 24 hr tablet, Take 1 tablet (25 mg) by mouth daily, Disp: 90 tablet, Rfl: 0    montelukast (SINGULAIR) 10 MG tablet, Take 1 tablet (10 mg) by mouth at bedtime, Disp: 30 tablet, Rfl: 1    prochlorperazine (COMPAZINE) 10 MG tablet, Take 1 tablet (10 mg) by mouth every 6 hours as needed for nausea or vomiting, Disp: 30 tablet, Rfl: 2      Care Coordination Start Date: 5/4/2021   Frequency of Care Coordination: monthly, more frequently as needed     Form Last Updated: 07/26/2024

## 2024-07-26 NOTE — PROGRESS NOTES
Clinic Care Coordination Contact  Follow Up Progress Note      Assessment: patient noted she was able to get enough work to be able to pay her August rent.  She is still looking for more permanent work yet is doing a little here and there.  She is open to resources being sent and listing of resources are in [employer] message with today's date (7/26/24) for food and financial.     She had no other needs at this time.     Care Gaps:    Health Maintenance Due   Topic Date Due    HEPATITIS B IMMUNIZATION (1 of 3 - 19+ 3-dose series) Never done    COVID-19 Vaccine (2 - 2023-24 season) 09/01/2023    YEARLY PREVENTIVE VISIT  02/15/2024    DEPRESSION 6 MO INDEX REPEAT PHQ-9  07/25/2024       Postponed to improved financial situation     Care Plans  Care Plan: General       Problem: HP GENERAL PROBLEM       Long-Range Goal: break large tasks into manageable steps       Start Date: 5/4/2021 Expected End Date: 12/5/2024    This Visit's Progress: 30% Recent Progress: 20%    Note:     Barriers: my mental health, two young children  Strengths: I just got an henrietta to help with identifying tasks    Patient expressed understanding of goal: Yes  Action steps to achieve this goal:  1. I will stop looking at the job as one big task I need to complete and break it down into manageable steps.  2. I will learn how to use the henrietta and identify steps.  3. I will start to work on the tasks at a reasonable rate.  4. If I start to feel overwhelmed with what is ahead of me I will look at what I have completed and remind myself to focus on steps and not the entire task.  5. I will listen to my body and not overdo                              Intervention/Education provided during outreach: reviewed resources available and sent via [email or web address].       Outreach Frequency: monthly, more frequently as needed      Plan:   Pt to review [employer] and resources to reach out to.   Care Coordinator will follow up in one month. Care plan sent today.     Violette  TRAMAINE Wren  [facility] - Care Coordinator   7/26/2024   10:44 AM  [phone]

## 2024-08-26 ENCOUNTER — PATIENT OUTREACH (OUTPATIENT)
Dept: CARE COORDINATION | Facility: CLINIC | Age: 39
End: 2024-08-26

## 2024-08-26 NOTE — PROGRESS NOTES
Clinic Care Coordination Contact  [facility]/Voicemail    Clinical Data: Care Coordinator Outreach    Outreach Documentation Number of Outreach Attempt   8/26/2024   1:13 PM 1       Left message on patient's voicemail with call back information and requested return call.    Plan: Care Coordinator will try to reach patient again in 10 business days.    TRAMAINE Ramirez   [facility] - Care Coordination  [facility]   [phone]

## 2024-09-09 NOTE — PROGRESS NOTES
Clinic Care Coordination Contact  [facility]/Voicemail    Clinical Data: Care Coordinator Outreach    Outreach Documentation Number of Outreach Attempt   8/26/2024   1:13 PM 1   9/9/2024   8:58 AM 2       Left message on patient's voicemail with call back information and requested return call.    Plan: Care Coordinator will send unable to contact letter with care coordinator contact information via [employer]. Care Coordinator will review chart in one month if no return call to determine next action step.    TRAMAINE Ramirez   [facility] - Care Coordination  [facility]   [phone]

## 2024-09-26 DIAGNOSIS — R11.14 BILIOUS VOMITING WITH NAUSEA: ICD-10-CM

## 2024-09-26 RX ORDER — PROCHLORPERAZINE MALEATE 10 MG
TABLET ORAL
Qty: 30 TABLET | Refills: 0 | Status: SHIPPED | OUTPATIENT
Start: 2024-09-26

## 2024-10-02 ENCOUNTER — MYC REFILL (OUTPATIENT)
Dept: FAMILY MEDICINE | Facility: OTHER | Age: 39
End: 2024-10-02

## 2024-10-02 DIAGNOSIS — G47.411 NARCOLEPSY AND CATAPLEXY: ICD-10-CM

## 2024-10-02 RX ORDER — METHYLPHENIDATE HYDROCHLORIDE 30 MG/1
30 CAPSULE, EXTENDED RELEASE ORAL DAILY
Qty: 30 CAPSULE | Refills: 0 | OUTPATIENT
Start: 2024-10-02

## 2024-10-03 RX ORDER — METHYLPHENIDATE HYDROCHLORIDE 30 MG/1
30 CAPSULE, EXTENDED RELEASE ORAL DAILY
Qty: 15 CAPSULE | Refills: 0 | Status: SHIPPED | OUTPATIENT
Start: 2024-10-03

## 2024-10-03 NOTE — TELEPHONE ENCOUNTER
Patient informed.  She just updated her insurance so she is going to reestablish with specialty in the near future.  She is scheduled with Dr Umana on 10-09 for virtual visit since she lives a ways out.  Please advise refill to get to the appt.  Thank you

## 2024-10-08 ENCOUNTER — PATIENT OUTREACH (OUTPATIENT)
Dept: CARE COORDINATION | Facility: CLINIC | Age: 39
End: 2024-10-08
Payer: MEDICAID

## 2024-10-08 NOTE — LETTER
[facility] ,[place]  [address] [place]   [address]  Tel. [phone]      10/8/2024      Isaura Gray  [address] [place] [id number]      Dear  Isaura,      I have been attempting to reach you since our last contact. I would like to continue to work with you on the goals from our last conversation and provide the support you may need. I would appreciate if you would give me a call at [phone] and let me know if you would like to continue working together. I know that there are many things that can affect our ability to communicate and hope we can plan better moving forward.     All of us at [facility] are invested in your health and are here to assist you in meeting your goals.     Sincerely,    TRAMAINE Ramirez   [facility] - Care Coordination  [facility]   [phone]

## 2024-10-09 ENCOUNTER — VIRTUAL VISIT (OUTPATIENT)
Dept: FAMILY MEDICINE | Facility: OTHER | Age: 39
End: 2024-10-09
Payer: MEDICAID

## 2024-10-09 DIAGNOSIS — F41.1 GENERALIZED ANXIETY DISORDER: ICD-10-CM

## 2024-10-09 DIAGNOSIS — T76.31XA: ICD-10-CM

## 2024-10-09 DIAGNOSIS — R53.83 FATIGUE, UNSPECIFIED TYPE: ICD-10-CM

## 2024-10-09 DIAGNOSIS — G47.411 NARCOLEPSY AND CATAPLEXY: Primary | ICD-10-CM

## 2024-10-09 DIAGNOSIS — R50.9 FEVER, UNSPECIFIED FEVER CAUSE: ICD-10-CM

## 2024-10-09 DIAGNOSIS — F41.0 PANIC ATTACK: ICD-10-CM

## 2024-10-09 DIAGNOSIS — R50.9 FUO (FEVER OF UNKNOWN ORIGIN): ICD-10-CM

## 2024-10-09 DIAGNOSIS — Z59.9 FINANCIAL DIFFICULTIES: ICD-10-CM

## 2024-10-09 DIAGNOSIS — F33.41 RECURRENT MAJOR DEPRESSIVE DISORDER, IN PARTIAL REMISSION (H): ICD-10-CM

## 2024-10-09 DIAGNOSIS — F43.10 PTSD (POST-TRAUMATIC STRESS DISORDER): ICD-10-CM

## 2024-10-09 PROCEDURE — G2211 COMPLEX E/M VISIT ADD ON: HCPCS | Mod: 95 | Performed by: FAMILY MEDICINE

## 2024-10-09 PROCEDURE — 99214 OFFICE O/P EST MOD 30 MIN: CPT | Mod: 95 | Performed by: FAMILY MEDICINE

## 2024-10-09 RX ORDER — METHYLPHENIDATE HYDROCHLORIDE 30 MG/1
30 CAPSULE, EXTENDED RELEASE ORAL DAILY
Qty: 15 CAPSULE | Refills: 0 | Status: CANCELLED | OUTPATIENT
Start: 2024-10-09

## 2024-10-09 RX ORDER — DEXTROAMPHETAMINE SACCHARATE, AMPHETAMINE ASPARTATE MONOHYDRATE, DEXTROAMPHETAMINE SULFATE AND AMPHETAMINE SULFATE 7.5; 7.5; 7.5; 7.5 MG/1; MG/1; MG/1; MG/1
30 CAPSULE, EXTENDED RELEASE ORAL DAILY
Qty: 30 CAPSULE | Refills: 0 | Status: SHIPPED | OUTPATIENT
Start: 2024-10-09 | End: 2024-11-08

## 2024-10-09 RX ORDER — DEXTROAMPHETAMINE SACCHARATE, AMPHETAMINE ASPARTATE MONOHYDRATE, DEXTROAMPHETAMINE SULFATE AND AMPHETAMINE SULFATE 7.5; 7.5; 7.5; 7.5 MG/1; MG/1; MG/1; MG/1
30 CAPSULE, EXTENDED RELEASE ORAL DAILY
Qty: 30 CAPSULE | Refills: 0 | Status: SHIPPED | OUTPATIENT
Start: 2024-12-08 | End: 2025-01-07

## 2024-10-09 RX ORDER — DEXTROAMPHETAMINE SACCHARATE, AMPHETAMINE ASPARTATE MONOHYDRATE, DEXTROAMPHETAMINE SULFATE AND AMPHETAMINE SULFATE 7.5; 7.5; 7.5; 7.5 MG/1; MG/1; MG/1; MG/1
30 CAPSULE, EXTENDED RELEASE ORAL DAILY
Qty: 30 CAPSULE | Refills: 0 | Status: SHIPPED | OUTPATIENT
Start: 2024-11-08 | End: 2024-12-08

## 2024-10-09 SDOH — ECONOMIC STABILITY - INCOME SECURITY: PROBLEM RELATED TO HOUSING AND ECONOMIC CIRCUMSTANCES, UNSPECIFIED: Z59.9

## 2024-10-09 ASSESSMENT — PATIENT HEALTH QUESTIONNAIRE - PHQ9
SUM OF ALL RESPONSES TO PHQ QUESTIONS 1-9: 17
10. IF YOU CHECKED OFF ANY PROBLEMS, HOW DIFFICULT HAVE THESE PROBLEMS MADE IT FOR YOU TO DO YOUR WORK, TAKE CARE OF THINGS AT HOME, OR GET ALONG WITH OTHER PEOPLE: EXTREMELY DIFFICULT
SUM OF ALL RESPONSES TO PHQ QUESTIONS 1-9: 17

## 2024-10-09 ASSESSMENT — ANXIETY QUESTIONNAIRES
6. BECOMING EASILY ANNOYED OR IRRITABLE: MORE THAN HALF THE DAYS
2. NOT BEING ABLE TO STOP OR CONTROL WORRYING: NEARLY EVERY DAY
GAD7 TOTAL SCORE: 13
4. TROUBLE RELAXING: SEVERAL DAYS
1. FEELING NERVOUS, ANXIOUS, OR ON EDGE: MORE THAN HALF THE DAYS
3. WORRYING TOO MUCH ABOUT DIFFERENT THINGS: NEARLY EVERY DAY
5. BEING SO RESTLESS THAT IT IS HARD TO SIT STILL: NOT AT ALL
8. IF YOU CHECKED OFF ANY PROBLEMS, HOW DIFFICULT HAVE THESE MADE IT FOR YOU TO DO YOUR WORK, TAKE CARE OF THINGS AT HOME, OR GET ALONG WITH OTHER PEOPLE?: EXTREMELY DIFFICULT
GAD7 TOTAL SCORE: 13
IF YOU CHECKED OFF ANY PROBLEMS ON THIS QUESTIONNAIRE, HOW DIFFICULT HAVE THESE PROBLEMS MADE IT FOR YOU TO DO YOUR WORK, TAKE CARE OF THINGS AT HOME, OR GET ALONG WITH OTHER PEOPLE: EXTREMELY DIFFICULT
GAD7 TOTAL SCORE: 13
7. FEELING AFRAID AS IF SOMETHING AWFUL MIGHT HAPPEN: MORE THAN HALF THE DAYS
7. FEELING AFRAID AS IF SOMETHING AWFUL MIGHT HAPPEN: MORE THAN HALF THE DAYS

## 2024-10-09 NOTE — PATIENT INSTRUCTIONS
Was changed fluoxetine to sertraline to see if that helps with some of your sexual side effects.  Hopefully, it will also help better improve your mood.  If no side effects on the 50 mg dose of sertraline, increase to 100 mg after 1 to 2 weeks.    Okay to change Ritalin LA to Adderall XR.  Hopefully, you can get insurance coverage so that the cost of this will be more affordable.    Be sure to get scheduled again with neurology and/or sleep medicine to resume managing this.  Without adequate documentation, I cannot prescribe this long-term.    I have ordered some labs to further evaluate your unexpected fever.  Please get these done at your earliest convenience at any [facility] laboratory.    Just in case, I have also placed a referral to infectious disease to help evaluate this.  Let us know if you need any assistance with that.    Consider getting your COVID vaccination.    Once you have insurance coverage, I would also recommend updating your physical.    Contact us or return if questions or concerns.    Have a nice day!    Dr. Umana
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
## 2024-10-17 ENCOUNTER — MYC REFILL (OUTPATIENT)
Dept: FAMILY MEDICINE | Facility: OTHER | Age: 39
End: 2024-10-17
Payer: MEDICAID

## 2024-10-17 DIAGNOSIS — R11.14 BILIOUS VOMITING WITH NAUSEA: ICD-10-CM

## 2024-10-18 RX ORDER — PROCHLORPERAZINE MALEATE 10 MG
10 TABLET ORAL EVERY 6 HOURS PRN
Qty: 30 TABLET | Refills: 0 | Status: SHIPPED | OUTPATIENT
Start: 2024-10-18

## 2024-11-07 ENCOUNTER — PATIENT OUTREACH (OUTPATIENT)
Dept: CARE COORDINATION | Facility: CLINIC | Age: 39
End: 2024-11-07
Payer: MEDICAID

## 2024-11-07 NOTE — PROGRESS NOTES
Clinic Care Coordination Contact    Pt has not responded to calls and [facility] letter mailed was returned - no receptacle.      Will close at this time and pt can be re-referred if she notes interest at an appt.     Violette Wren ALEXX   [facility] - Care Coordination  [facility]   [phone]

## 2024-11-15 ENCOUNTER — VIRTUAL VISIT (OUTPATIENT)
Dept: FAMILY MEDICINE | Facility: OTHER | Age: 39
End: 2024-11-15
Payer: MEDICAID

## 2024-11-15 ENCOUNTER — APPOINTMENT (OUTPATIENT)
Dept: CT IMAGING | Facility: CLINIC | Age: 39
End: 2024-11-15
Attending: EMERGENCY MEDICINE
Payer: MEDICAID

## 2024-11-15 ENCOUNTER — HOSPITAL ENCOUNTER (EMERGENCY)
Facility: CLINIC | Age: 39
Discharge: HOME | End: 2024-11-15
Attending: EMERGENCY MEDICINE | Admitting: EMERGENCY MEDICINE
Payer: MEDICAID

## 2024-11-15 VITALS
HEIGHT: 72 IN | HEART RATE: 113 BPM | WEIGHT: 115 LBS | DIASTOLIC BLOOD PRESSURE: 93 MMHG | RESPIRATION RATE: 23 BRPM | SYSTOLIC BLOOD PRESSURE: 137 MMHG | OXYGEN SATURATION: 92 % | BODY MASS INDEX: 15.58 KG/M2 | TEMPERATURE: 99.3 F

## 2024-11-15 DIAGNOSIS — G47.10 HYPERSOMNOLENCE: ICD-10-CM

## 2024-11-15 DIAGNOSIS — N39.0 URINARY TRACT INFECTION IN FEMALE: ICD-10-CM

## 2024-11-15 DIAGNOSIS — R63.4 UNEXPLAINED WEIGHT LOSS: Primary | ICD-10-CM

## 2024-11-15 DIAGNOSIS — R53.83 FATIGUE, UNSPECIFIED TYPE: ICD-10-CM

## 2024-11-15 DIAGNOSIS — R11.14 BILIOUS VOMITING WITH NAUSEA: ICD-10-CM

## 2024-11-15 DIAGNOSIS — R00.0 TACHYCARDIA: ICD-10-CM

## 2024-11-15 DIAGNOSIS — R55 SYNCOPE, UNSPECIFIED SYNCOPE TYPE: ICD-10-CM

## 2024-11-15 DIAGNOSIS — R19.7 DIARRHEA, UNSPECIFIED TYPE: ICD-10-CM

## 2024-11-15 LAB
ALBUMIN SERPL BCG-MCNC: 4.6 G/DL (ref 3.5–5.2)
ALBUMIN UR-MCNC: 30 MG/DL
ALP SERPL-CCNC: 63 U/L (ref 40–150)
ALT SERPL W P-5'-P-CCNC: 18 U/L (ref 0–50)
ANION GAP SERPL CALCULATED.3IONS-SCNC: 13 MMOL/L (ref 7–15)
ANION GAP SERPL CALCULATED.3IONS-SCNC: <1 MMOL/L (ref 7–15)
APPEARANCE UR: ABNORMAL
AST SERPL W P-5'-P-CCNC: 17 U/L (ref 0–45)
BACTERIA #/AREA URNS HPF: ABNORMAL /HPF
BASOPHILS # BLD AUTO: 0 10E3/UL (ref 0–0.2)
BASOPHILS NFR BLD AUTO: 1 %
BILIRUB SERPL-MCNC: 1.8 MG/DL
BILIRUB UR QL STRIP: NEGATIVE
BUN SERPL-MCNC: 10.1 MG/DL (ref 6–20)
BUN SERPL-MCNC: 9.7 MG/DL (ref 6–20)
CALCIUM SERPL-MCNC: 8.9 MG/DL (ref 8.8–10.4)
CALCIUM SERPL-MCNC: 9.2 MG/DL (ref 8.8–10.4)
CHLORIDE SERPL-SCNC: 104 MMOL/L (ref 98–107)
CHLORIDE SERPL-SCNC: 120 MMOL/L (ref 98–107)
COLOR UR AUTO: ABNORMAL
CREAT SERPL-MCNC: 0.66 MG/DL (ref 0.51–0.95)
CREAT SERPL-MCNC: 0.77 MG/DL (ref 0.51–0.95)
CRP SERPL-MCNC: <3 MG/L
D DIMER PPP FEU-MCNC: 0.32 UG/ML FEU (ref 0–0.5)
EGFRCR SERPLBLD CKD-EPI 2021: >90 ML/MIN/1.73M2
EGFRCR SERPLBLD CKD-EPI 2021: >90 ML/MIN/1.73M2
EOSINOPHIL # BLD AUTO: 0 10E3/UL (ref 0–0.7)
EOSINOPHIL NFR BLD AUTO: 1 %
ERYTHROCYTE [DISTWIDTH] IN BLOOD BY AUTOMATED COUNT: 12.5 % (ref 10–15)
ERYTHROCYTE [SEDIMENTATION RATE] IN BLOOD BY WESTERGREN METHOD: 7 MM/HR (ref 0–20)
FERRITIN SERPL-MCNC: 112 NG/ML (ref 6–175)
GLUCOSE SERPL-MCNC: 100 MG/DL (ref 70–99)
GLUCOSE SERPL-MCNC: 97 MG/DL (ref 70–99)
GLUCOSE UR STRIP-MCNC: NEGATIVE MG/DL
HCO3 SERPL-SCNC: 21 MMOL/L (ref 22–29)
HCO3 SERPL-SCNC: 22 MMOL/L (ref 22–29)
HCT VFR BLD AUTO: 40.2 % (ref 35–47)
HGB BLD-MCNC: 14.1 G/DL (ref 11.7–15.7)
HGB UR QL STRIP: NEGATIVE
HIV 1+2 AB+HIV1 P24 AG SERPL QL IA: NONREACTIVE
HOLD SPECIMEN: NORMAL
HYALINE CASTS: 15 /LPF
IMM GRANULOCYTES # BLD: 0 10E3/UL
IMM GRANULOCYTES NFR BLD: 0 %
KETONES UR STRIP-MCNC: 80 MG/DL
LEUKOCYTE ESTERASE UR QL STRIP: ABNORMAL
LYMPHOCYTES # BLD AUTO: 1.5 10E3/UL (ref 0.8–5.3)
LYMPHOCYTES NFR BLD AUTO: 28 %
MAGNESIUM SERPL-MCNC: 2.1 MG/DL (ref 1.7–2.3)
MCH RBC QN AUTO: 30.2 PG (ref 26.5–33)
MCHC RBC AUTO-ENTMCNC: 35.1 G/DL (ref 31.5–36.5)
MCV RBC AUTO: 86 FL (ref 78–100)
MONOCYTES # BLD AUTO: 0.4 10E3/UL (ref 0–1.3)
MONOCYTES NFR BLD AUTO: 8 %
MONOCYTES NFR BLD AUTO: NEGATIVE %
MUCOUS THREADS #/AREA URNS LPF: PRESENT /LPF
NEUTROPHILS # BLD AUTO: 3.3 10E3/UL (ref 1.6–8.3)
NEUTROPHILS NFR BLD AUTO: 61 %
NITRATE UR QL: NEGATIVE
NRBC # BLD AUTO: 0 10E3/UL
NRBC BLD AUTO-RTO: 0 /100
PH UR STRIP: 5 [PH] (ref 5–7)
PHOSPHATE SERPL-MCNC: 2.7 MG/DL (ref 2.5–4.5)
PLATELET # BLD AUTO: 304 10E3/UL (ref 150–450)
POTASSIUM SERPL-SCNC: 3.7 MMOL/L (ref 3.4–5.3)
POTASSIUM SERPL-SCNC: 3.8 MMOL/L (ref 3.4–5.3)
PROT SERPL-MCNC: 7.2 G/DL (ref 6.4–8.3)
RBC # BLD AUTO: 4.67 10E6/UL (ref 3.8–5.2)
RBC URINE: 3 /HPF
SODIUM SERPL-SCNC: 122 MMOL/L (ref 135–145)
SODIUM SERPL-SCNC: 138 MMOL/L (ref 135–145)
SODIUM UR-SCNC: 86 MMOL/L
SP GR UR STRIP: 1.02 (ref 1–1.03)
SQUAMOUS EPITHELIAL: 8 /HPF
TSH SERPL DL<=0.005 MIU/L-ACNC: 1.06 UIU/ML (ref 0.3–4.2)
UROBILINOGEN UR STRIP-MCNC: NORMAL MG/DL
WBC # BLD AUTO: 5.3 10E3/UL (ref 4–11)
WBC CLUMPS #/AREA URNS HPF: PRESENT /HPF
WBC URINE: 50 /HPF

## 2024-11-15 PROCEDURE — 93005 ELECTROCARDIOGRAM TRACING: CPT

## 2024-11-15 PROCEDURE — 84300 ASSAY OF URINE SODIUM: CPT | Performed by: EMERGENCY MEDICINE

## 2024-11-15 PROCEDURE — 99213 OFFICE O/P EST LOW 20 MIN: CPT | Mod: 95 | Performed by: PHYSICIAN ASSISTANT

## 2024-11-15 PROCEDURE — 85652 RBC SED RATE AUTOMATED: CPT | Performed by: EMERGENCY MEDICINE

## 2024-11-15 PROCEDURE — 82040 ASSAY OF SERUM ALBUMIN: CPT | Performed by: EMERGENCY MEDICINE

## 2024-11-15 PROCEDURE — 84443 ASSAY THYROID STIM HORMONE: CPT | Performed by: EMERGENCY MEDICINE

## 2024-11-15 PROCEDURE — 83735 ASSAY OF MAGNESIUM: CPT | Performed by: EMERGENCY MEDICINE

## 2024-11-15 PROCEDURE — 70450 CT HEAD/BRAIN W/O DYE: CPT

## 2024-11-15 PROCEDURE — 96365 THER/PROPH/DIAG IV INF INIT: CPT

## 2024-11-15 PROCEDURE — 86308 HETEROPHILE ANTIBODY SCREEN: CPT | Performed by: EMERGENCY MEDICINE

## 2024-11-15 PROCEDURE — 82728 ASSAY OF FERRITIN: CPT | Performed by: EMERGENCY MEDICINE

## 2024-11-15 PROCEDURE — 87389 HIV-1 AG W/HIV-1&-2 AB AG IA: CPT | Performed by: EMERGENCY MEDICINE

## 2024-11-15 PROCEDURE — 258N000003 HC RX IP 258 OP 636: Performed by: EMERGENCY MEDICINE

## 2024-11-15 PROCEDURE — 86140 C-REACTIVE PROTEIN: CPT | Performed by: EMERGENCY MEDICINE

## 2024-11-15 PROCEDURE — 86038 ANTINUCLEAR ANTIBODIES: CPT | Performed by: EMERGENCY MEDICINE

## 2024-11-15 PROCEDURE — 81001 URINALYSIS AUTO W/SCOPE: CPT | Performed by: EMERGENCY MEDICINE

## 2024-11-15 PROCEDURE — 86618 LYME DISEASE ANTIBODY: CPT | Performed by: EMERGENCY MEDICINE

## 2024-11-15 PROCEDURE — 99285 EMERGENCY DEPT VISIT HI MDM: CPT | Performed by: EMERGENCY MEDICINE

## 2024-11-15 PROCEDURE — 86431 RHEUMATOID FACTOR QUANT: CPT | Performed by: EMERGENCY MEDICINE

## 2024-11-15 PROCEDURE — 36415 COLL VENOUS BLD VENIPUNCTURE: CPT | Performed by: EMERGENCY MEDICINE

## 2024-11-15 PROCEDURE — 83935 ASSAY OF URINE OSMOLALITY: CPT | Performed by: EMERGENCY MEDICINE

## 2024-11-15 PROCEDURE — 87040 BLOOD CULTURE FOR BACTERIA: CPT | Performed by: EMERGENCY MEDICINE

## 2024-11-15 PROCEDURE — 85379 FIBRIN DEGRADATION QUANT: CPT | Performed by: EMERGENCY MEDICINE

## 2024-11-15 PROCEDURE — 87798 DETECT AGENT NOS DNA AMP: CPT | Performed by: EMERGENCY MEDICINE

## 2024-11-15 PROCEDURE — 82565 ASSAY OF CREATININE: CPT | Performed by: EMERGENCY MEDICINE

## 2024-11-15 PROCEDURE — 82310 ASSAY OF CALCIUM: CPT | Performed by: EMERGENCY MEDICINE

## 2024-11-15 PROCEDURE — 99285 EMERGENCY DEPT VISIT HI MDM: CPT | Mod: 25

## 2024-11-15 PROCEDURE — 85004 AUTOMATED DIFF WBC COUNT: CPT | Performed by: EMERGENCY MEDICINE

## 2024-11-15 PROCEDURE — 87088 URINE BACTERIA CULTURE: CPT | Performed by: EMERGENCY MEDICINE

## 2024-11-15 PROCEDURE — 85014 HEMATOCRIT: CPT | Performed by: EMERGENCY MEDICINE

## 2024-11-15 PROCEDURE — 84100 ASSAY OF PHOSPHORUS: CPT | Performed by: EMERGENCY MEDICINE

## 2024-11-15 PROCEDURE — 250N000011 HC RX IP 250 OP 636: Performed by: EMERGENCY MEDICINE

## 2024-11-15 RX ORDER — ONDANSETRON 4 MG/1
4 TABLET, ORALLY DISINTEGRATING ORAL EVERY 6 HOURS PRN
Qty: 10 TABLET | Refills: 0 | Status: SHIPPED | OUTPATIENT
Start: 2024-11-15 | End: 2024-11-18

## 2024-11-15 RX ORDER — CEFTRIAXONE 2 G/1
2 INJECTION, POWDER, FOR SOLUTION INTRAMUSCULAR; INTRAVENOUS ONCE
Status: COMPLETED | OUTPATIENT
Start: 2024-11-15 | End: 2024-11-15

## 2024-11-15 RX ORDER — PROCHLORPERAZINE MALEATE 10 MG
10 TABLET ORAL EVERY 6 HOURS PRN
Qty: 30 TABLET | Refills: 0 | Status: SHIPPED | OUTPATIENT
Start: 2024-11-15

## 2024-11-15 RX ORDER — CEPHALEXIN 500 MG/1
500 CAPSULE ORAL 3 TIMES DAILY
Qty: 18 CAPSULE | Refills: 0 | Status: SHIPPED | OUTPATIENT
Start: 2024-11-15 | End: 2024-11-21

## 2024-11-15 RX ADMIN — CEFTRIAXONE SODIUM 2 G: 2 INJECTION, POWDER, FOR SOLUTION INTRAMUSCULAR; INTRAVENOUS at 18:51

## 2024-11-15 RX ADMIN — SODIUM CHLORIDE 1000 ML: 9 INJECTION, SOLUTION INTRAVENOUS at 18:50

## 2024-11-15 ASSESSMENT — COLUMBIA-SUICIDE SEVERITY RATING SCALE - C-SSRS
6. HAVE YOU EVER DONE ANYTHING, STARTED TO DO ANYTHING, OR PREPARED TO DO ANYTHING TO END YOUR LIFE?: NO
2. HAVE YOU ACTUALLY HAD ANY THOUGHTS OF KILLING YOURSELF IN THE PAST MONTH?: NO
1. IN THE PAST MONTH, HAVE YOU WISHED YOU WERE DEAD OR WISHED YOU COULD GO TO SLEEP AND NOT WAKE UP?: NO

## 2024-11-15 ASSESSMENT — ANXIETY QUESTIONNAIRES
GAD7 TOTAL SCORE: 21
6. BECOMING EASILY ANNOYED OR IRRITABLE: NEARLY EVERY DAY
GAD7 TOTAL SCORE: 21
GAD7 TOTAL SCORE: 21
IF YOU CHECKED OFF ANY PROBLEMS ON THIS QUESTIONNAIRE, HOW DIFFICULT HAVE THESE PROBLEMS MADE IT FOR YOU TO DO YOUR WORK, TAKE CARE OF THINGS AT HOME, OR GET ALONG WITH OTHER PEOPLE: EXTREMELY DIFFICULT
7. FEELING AFRAID AS IF SOMETHING AWFUL MIGHT HAPPEN: NEARLY EVERY DAY
GAD7 TOTAL SCORE: 21
5. BEING SO RESTLESS THAT IT IS HARD TO SIT STILL: NEARLY EVERY DAY
3. WORRYING TOO MUCH ABOUT DIFFERENT THINGS: NEARLY EVERY DAY
6. BECOMING EASILY ANNOYED OR IRRITABLE: NEARLY EVERY DAY
4. TROUBLE RELAXING: NEARLY EVERY DAY
5. BEING SO RESTLESS THAT IT IS HARD TO SIT STILL: NEARLY EVERY DAY
7. FEELING AFRAID AS IF SOMETHING AWFUL MIGHT HAPPEN: NEARLY EVERY DAY
1. FEELING NERVOUS, ANXIOUS, OR ON EDGE: NEARLY EVERY DAY
2. NOT BEING ABLE TO STOP OR CONTROL WORRYING: NEARLY EVERY DAY
7. FEELING AFRAID AS IF SOMETHING AWFUL MIGHT HAPPEN: NEARLY EVERY DAY
8. IF YOU CHECKED OFF ANY PROBLEMS, HOW DIFFICULT HAVE THESE MADE IT FOR YOU TO DO YOUR WORK, TAKE CARE OF THINGS AT HOME, OR GET ALONG WITH OTHER PEOPLE?: EXTREMELY DIFFICULT
IF YOU CHECKED OFF ANY PROBLEMS ON THIS QUESTIONNAIRE, HOW DIFFICULT HAVE THESE PROBLEMS MADE IT FOR YOU TO DO YOUR WORK, TAKE CARE OF THINGS AT HOME, OR GET ALONG WITH OTHER PEOPLE: EXTREMELY DIFFICULT
2. NOT BEING ABLE TO STOP OR CONTROL WORRYING: NEARLY EVERY DAY
1. FEELING NERVOUS, ANXIOUS, OR ON EDGE: NEARLY EVERY DAY
3. WORRYING TOO MUCH ABOUT DIFFERENT THINGS: NEARLY EVERY DAY

## 2024-11-15 ASSESSMENT — PATIENT HEALTH QUESTIONNAIRE - PHQ9
SUM OF ALL RESPONSES TO PHQ QUESTIONS 1-9: 25
10. IF YOU CHECKED OFF ANY PROBLEMS, HOW DIFFICULT HAVE THESE PROBLEMS MADE IT FOR YOU TO DO YOUR WORK, TAKE CARE OF THINGS AT HOME, OR GET ALONG WITH OTHER PEOPLE: EXTREMELY DIFFICULT
SUM OF ALL RESPONSES TO PHQ QUESTIONS 1-9: 25
5. POOR APPETITE OR OVEREATING: NEARLY EVERY DAY

## 2024-11-15 ASSESSMENT — ACTIVITIES OF DAILY LIVING (ADL)
ADLS_ACUITY_SCORE: 0

## 2024-11-15 NOTE — ED TRIAGE NOTES
Sx ongoing for a week c/o laying in bed all day, has been going 'in and out of consciousness', N/V/D, mid upper abd pain.      Triage Assessment (Adult)       Row Name 11/15/24 0185          Triage Assessment    Airway WDL WDL        Respiratory WDL    Respiratory WDL WDL        Cardiac WDL    Cardiac WDL WDL

## 2024-11-15 NOTE — PROGRESS NOTES
Isaura is a 39 year old who is being evaluated via a billable video visit.    How would you like to obtain your AVS? Xeniahart  If the video visit is dropped, the invitation should be resent by: Text to cell phone: [phone]  Will anyone else be joining your video visit? No      Assessment & Plan     Unexplained weight loss  Syncope, unspecified syncope type  Fatigue, unspecified type  Diarrhea, unspecified type  Bilious vomiting with nausea  Very concerned with Isaura. She is currently laying in her bed with her children because she is unable to be upright.  She notes if she tries to stand up and walk around she feels like she is going to pass out and she has at times experienced syncope.  She notes the stimulants at this point do not help her narcolepsy but she doesn't feel like this is the problem. She also has history of mental health concerns but she feels the motivation to do things and get things done she is more physically limited. She has not been able to eat well, she has had diarrhea for the last week. She is weak.  She has had unexplained fevers per Dr. Umana last note. She was unable to do labs until recent because of her insurance but is set up for Sunday.  She has lost weight over the last year is down 52 lbs.      At this time via virtual visit discussed not sure what we can offer her virtually especially since I cannot do an exam. However do feel that she needs to be seen. Even if it is not to diagnose her actual base conditions at least evaluation for concern for dehydration, malnutrition due to symptoms and get labs done, maybe additional work-up if deemed appropriate based on exam.     - Refilled her Compazine.   - prochlorperazine (COMPAZINE) 10 MG tablet; Take 1 tablet (10 mg) by mouth every 6 hours as needed for nausea or vomiting. TAKE 1 TABLET(10 MG) BY MOUTH EVERY 6 HOURS AS NEEDED FOR NAUSEA OR VOMITING          Depression Screening Follow Up        11/15/2024    12:47 PM   PHQ   PHQ-9  Total Score 25    Q9: Thoughts of better off dead/self-harm past 2 weeks Several days    F/U: Thoughts of suicide or self-harm Yes    F/U: Self harm-plan Yes    F/U: Self-harm action No    F/U: Safety concerns No        Patient-reported           Options for treatment and follow-up care were reviewed with the patient and/or guardian. Patient and/or guardian engaged in the decision making process and verbalized understanding of the options discussed and agreed with the final plan.      Chester Delarosa is a 39 year old, presenting for the following health issues:  No chief complaint on file.      Video Start Time: 3:21 PM    Anxiety is so bad so can't get out of bed    History of Present Illness       Back Pain:  She presents for follow up of back pain. Patient's back pain is a chronic problem.  Location of back pain:  Right lower back, left lower back, right middle of back, left middle of back, right upper back, left upper back, right side of neck, left side of neck, right shoulder, left shoulder, right buttock, left buttock, right hip, left hip, right side of waist and left side of waist  Description of back pain: burning, dull ache and gnawing  Back pain spreads: right foot and left foot    Since patient first noticed back pain, pain is: rapidly worsening  Does back pain interfere with her job:  Yes       Mental Health Follow-up:  Patient presents to follow-up on Depression & Anxiety.Patient's depression since last visit has been:  Worse  The patient is having other symptoms associated with depression.  Patient's anxiety since last visit has been:  Worse  The patient is having other symptoms associated with anxiety.  Any significant life events: job concerns, financial concerns, housing concerns, grief or loss and health concerns  Patient is feeling anxious or having panic attacks.  Patient has no concerns about alcohol or drug use.    Vascular Disease:  She presents for follow up of vascular disease.     She  never takes nitroglycerin. She is not taking daily aspirin.    She eats 0-1 servings of fruits and vegetables daily.She consumes 0 sweetened beverage(s) daily.She exercises with enough effort to increase her heart rate 9 or less minutes per day.  She exercises with enough effort to increase her heart rate 3 or less days per week.   She is taking medications regularly.       Medication Followup of Compazine  Taking Medication as prescribed: yes  Side Effects:  None  Medication Helping Symptoms:  unknown    - If she tries to stand up she'll pass out.   - She feels like she is drugged and she says it is getting worse and worse.   - She cannot get out of bed.   - She is scheduled for labs.   - She is weak and shaky. She is constantly fighting sleep.    - She changed to Sertraline - doesn't feel like it made any difference.   - She has motivation, she wakes up feeling motivated. But then when she stands up she feels like she's going to black out.   - Hasn't been able to eat much this last week. Has had diarrhea for the last week as well.    - Unexpected weight loss. Slowly losing weight unexpectedly.   - Current weight 115 lbs.     Review of Systems  Constitutional, HEENT, cardiovascular, pulmonary, gi and gu systems are negative, except as otherwise noted.      Objective           Vitals:  No vitals were obtained today due to virtual visit.    Physical Exam   GENERAL: alert and laying in bed.   EYES: Eyes grossly normal to inspection.  No discharge or erythema, or obvious scleral/conjunctival abnormalities.  RESP: No audible wheeze, cough, or visible cyanosis.    SKIN: Visible skin clear. No significant rash, abnormal pigmentation or lesions.  NEURO: Cranial nerves grossly intact.  Mentation and speech appropriate for age.  PSYCH: Appropriate affect, tone, and pace of words          Video-Visit Details    Type of service:  Video Visit   Video End Time:3:36 PM  Originating Location (pt. Location): Home    Distant  Location (provider location):  Off-site  Platform used for Video Visit: Laura  Signed Electronically by: Tashi Olivera PA-C

## 2024-11-16 LAB
A PHAGOCYTOPH DNA BLD QL NAA+PROBE: NOT DETECTED
BABESIA DNA BLD QL NAA+PROBE: NOT DETECTED
EHRLICHIA DNA SPEC QL NAA+PROBE: NOT DETECTED
OSMOLALITY UR: 664 MMOL/KG (ref 100–1200)
RHEUMATOID FACT SERPL-ACNC: <10 IU/ML

## 2024-11-16 NOTE — MEDICATION SCRIBE - ADMISSION MEDICATION HISTORY
Medication Scribe Admission Medication History    Admission medication history is complete. The information provided in this note is only as accurate as the sources available at the time of the update.    Information Source(s): Patient and CareEverywhere/SureScripts via in-person    Pertinent Information: n/a    Changes made to PTA medication list:  Added: None  Deleted: Ritalin LA  Changed: flonase is prn, zoloft dose is 100mg now    Allergies reviewed with patient and updates made in EHR: yes    Medication History Completed By: MARIELLE MATTHEWS 11/15/2024 6:16 PM    PTA Med List   Medication Sig Note Last Dose/Taking    albuterol (PROAIR HFA/PROVENTIL HFA/VENTOLIN HFA) 108 (90 Base) MCG/ACT inhaler Inhale 2 puffs into the lungs every 6 hours  More than a month    amphetamine-dextroamphetamine (ADDERALL XR) 30 MG 24 hr capsule Take 1 capsule (30 mg) by mouth daily.  11/15/2024 at  9:00 AM    [START ON 12/8/2024] amphetamine-dextroamphetamine (ADDERALL XR) 30 MG 24 hr capsule Take 1 capsule (30 mg) by mouth daily.  Taking    calcium carbonate (OS-DEBI) 1500 (600 Ca) MG tablet Take 1 tablet (600 mg) by mouth 2 times daily (with meals)  11/15/2024 at  9:00 AM    fluticasone (FLONASE) 50 MCG/ACT nasal spray Spray 1 spray into both nostrils daily (Patient taking differently: Spray 1 spray into both nostrils daily as needed for rhinitis.)  More than a month    hydrOXYzine cecile (VISTARIL) 25 MG capsule Take 1 capsule (25 mg) by mouth 3 times daily as needed for anxiety  More than a month    levonorgestrel (MIRENA) 20 MCG/DAY IUD 1 each (20 mcg) by Intrauterine route once for 1 dose Placed 2/2020 11/15/2024: current Taking    metoprolol succinate ER (TOPROL XL) 25 MG 24 hr tablet Take 1 tablet (25 mg) by mouth daily  11/15/2024 at  9:00 AM    montelukast (SINGULAIR) 10 MG tablet Take 1 tablet (10 mg) by mouth at bedtime 11/15/2024: Not started Taking    prochlorperazine (COMPAZINE) 10 MG tablet Take 1 tablet (10 mg) by mouth  every 6 hours as needed for nausea or vomiting. TAKE 1 TABLET(10 MG) BY MOUTH EVERY 6 HOURS AS NEEDED FOR NAUSEA OR VOMITING (Patient taking differently: Take 10 mg by mouth every 6 hours as needed for nausea or vomiting.)  11/15/2024 at  9:00 AM    sertraline (ZOLOFT) 50 MG tablet Take 1 tablet (50 mg) by mouth daily. Increase to 100 mg daily after 1-2 weeks if no side effects. (Patient taking differently: Take 100 mg by mouth daily.)  11/15/2024 at  9:00 AM

## 2024-11-16 NOTE — DISCHARGE INSTRUCTIONS
You have evidence of a bladder infection.  You were given first dose of antibiotics in the ED.  Rx sent to pharmacy.  We will call you if any changes need to be made based on culture of the urine.    Rx for Zofran also sent to pharmacy.    Otherwise, there are no significant acute abnormalities in your lab work, recheck of your sodium was normal.    Some of the labs requested by your primary care provider are still pending and will be available in [facility].    I will send a message to your primary care provider to let them know about your ED visit and the findings.    Follow-up with your primary care and return for significant worsening, changes or concerns.    I hope you have a good weekend!!

## 2024-11-16 NOTE — ED PROVIDER NOTES
"  History     Chief Complaint   Patient presents with    Syncope    Nausea, Vomiting, & Diarrhea     HPI  History per patient, medical records    This is a 39-year-old female, r history of follicular thyroid cancer, anxiety, chronic low back pain, PE, mitral valve disorder, migraine, postcholecystectomy presenting with nausea, vomiting, diarrhea and syncope.  Patient states to me that for the last 2 weeks that she just \"cannot get out of bed\".  She notes significant fatigue.  She does carry a diagnosis of narcolepsy and is on Adderall but she feels it is not helping.  She states that her \"brain just does not feel right\".  She is felt weak.  She notes that she just does not have the energy to get up and feels sedated and has to fight to stay awake.  No fever but her face sometimes feels hot.  She has muscle pain everywhere, the worst is her upper stomach and butt cheeks.  She states she has this \"all the time\".  She has been drinking fluids okay.  She has early satiety and then later states she has gastroparesis.  She denies sore throat or cough.  She has had dry heaves and nausea.  She notes liquid diarrhea since her gallbladder was removed.  No obvious sick contacts.  She has chronic nausea and has been on Compazine for 2 years.  She states that she is only had about 2 years of regular.  Some when she was in her 20s, nothing recently for vaginal discharge or pain.  No specific rash but she does have a few dry spots.    Patient was seen by her primary care provider and many lab tests or ordered.    Allergies:  Allergies   Allergen Reactions    Ciprofloxacin Hives    Sulfa Antibiotics Hives    Hydrocodone-Acetaminophen Itching          Mirtazapine Nausea and Vomiting    Oxycodone Hives    Oxycodone-Acetaminophen Itching       Problem List:    Patient Active Problem List    Diagnosis Date Noted    Tachycardia 08/30/2023     Priority: Medium    Generalized anxiety disorder 08/30/2023     Priority: Medium    Migraine " with aura and with status migrainosus, not intractable 2022     Priority: Medium    Chronic post-COVID-19 syndrome 2022     Priority: Medium    Narcolepsy and cataplexy 2022     Priority: Medium    Post-cholecystectomy syndrome 2022     Priority: Medium    Persistent insomnia 2022     Priority: Medium    Pituitary tumor - history      Priority: Medium    Biliary dyskinesia 2021     Priority: Medium     Added automatically from request for surgery [id number]      Non-alcoholic fatty liver disease 2020     Priority: Medium    Psychophysiological insomnia 2020     Priority: Medium    Family history of Hashimoto thyroiditis 2020     Priority: Medium    Moderate major depression (H) 2020     Priority: Medium    Cancer (H)      Priority: Medium    Anxiety 2018     Priority: Medium    History of thyroid cancer 2018     Priority: Medium     (normal spontaneous vaginal delivery) 2018     Priority: Medium    Normal labor 2018     Priority: Medium    Cough 2018     Priority: Medium    Chronic bilateral low back pain without sciatica 2018     Priority: Medium    Encounter for triage in pregnant patient 2018     Priority: Medium    Vitamin D deficiency 2018     Priority: Medium    Supervision of other high risk pregnancies, unspecified trimester 2018     Priority: Medium    Lactose intolerance in adult 2018     Priority: Medium    Hyperprolactinemia (H)      Priority: Medium    Follicular cancer of thyroid (H)      Priority: Medium    Mitral valve disorder 2018     Priority: Medium    H/O  delivery, currently pregnant 2016     Priority: Medium    History of pulmonary embolism 10/19/2016     Priority: Medium    ASCUS of cervix with negative high risk HPV 2016     Priority: Medium     16 ASCUS pap, neg HPV. Done at [facility]. Plan: Cotest in 3 yr  20 NIL Pap, Neg HPV.  "Plan: pending provider.       PTSD (post-traumatic stress disorder) 2015     Priority: Medium        Past Medical History:    Past Medical History:   Diagnosis Date    ASCUS of cervix with negative high risk HPV 2016    Cancer (H)     Depressive disorder     Follicular cancer of thyroid (H)     Hyperprolactinemia (H)     Mitral valve prolapse     PE (pulmonary thromboembolism) (H)     Pituitary tumor     Schizophrenia (H)     Thyroid disease        Past Surgical History:    Past Surgical History:   Procedure Laterality Date    APPENDECTOMY      ENT SURGERY      Partial thyroidectomy    LAPAROSCOPIC CHOLECYSTECTOMY N/A 3/4/2021    Procedure: Laparoscopic cholecystectomy;  Surgeon: Osmany Smith MD;  Location: [facility] OR       Family History:    Family History   Problem Relation Age of Onset    Hemochromatosis Mother     Hypertension Father     Cerebrovascular Disease Father 56        Passed away from double brain stem clot    Mental Illness Father     Depression Father     Anxiety Disorder Father     Alcoholism Father     Schizophrenia Father     Asthma Brother     Cancer Maternal Grandfather         lung    No Known Problems Paternal Grandmother     Schizophrenia Paternal Grandfather     Suicide Paternal Grandfather 53    No Known Problems Daughter     Autism Spectrum Disorder Son     Kidney Disease Son         \"has a third kidney\"       Social History:  Marital Status:  Single [1]  Social History     Tobacco Use    Smoking status: Former     Current packs/day: 0.00     Average packs/day: 0.5 packs/day for 5.0 years (2.5 ttl pk-yrs)     Types: Cigarettes, Vaping Device     Start date: 2011     Quit date: 2016     Years since quittin.3     Passive exposure: Never    Smokeless tobacco: Never   Vaping Use    Vaping status: Former    Substances: CBD, Flavoring   Substance Use Topics    Alcohol use: No     Comment: Has an issue with her liver (not alcohol related)    Drug use: No        Medications: "    albuterol (PROAIR HFA/PROVENTIL HFA/VENTOLIN HFA) 108 (90 Base) MCG/ACT inhaler  amphetamine-dextroamphetamine (ADDERALL XR) 30 MG 24 hr capsule  [START ON 12/8/2024] amphetamine-dextroamphetamine (ADDERALL XR) 30 MG 24 hr capsule  calcium carbonate (OS-DEBI) 1500 (600 Ca) MG tablet  cephALEXin (KEFLEX) 500 MG capsule  fluticasone (FLONASE) 50 MCG/ACT nasal spray  hydrOXYzine cecile (VISTARIL) 25 MG capsule  levonorgestrel (MIRENA) 20 MCG/DAY IUD  metoprolol succinate ER (TOPROL XL) 25 MG 24 hr tablet  montelukast (SINGULAIR) 10 MG tablet  ondansetron (ZOFRAN ODT) 4 MG ODT tab  prochlorperazine (COMPAZINE) 10 MG tablet  sertraline (ZOLOFT) 50 MG tablet          Review of Systems  All other ROS reviewed and are negative or non-contributory except as stated in HPI.     Physical Exam   BP: (!) 118/95  Pulse: (!) 133  Temp: 99.3  F (37.4  C)  Resp: 18  Height: 182.9 cm (6')  Weight: 52.2 kg (115 lb)  SpO2: 100 %      Physical Exam  Vitals and nursing note reviewed.   Constitutional:       Appearance: She is normal weight.      Comments: Patient is lying on her side in the bed.  Slightly pale  She seems to be sleepy   HENT:      Head: Normocephalic.      Right Ear: Tympanic membrane and ear canal normal.      Left Ear: Tympanic membrane and ear canal normal.      Nose: Nose normal.      Mouth/Throat:      Mouth: Mucous membranes are moist.      Pharynx: Oropharynx is clear.   Eyes:      Extraocular Movements: Extraocular movements intact.      Conjunctiva/sclera: Conjunctivae normal.   Cardiovascular:      Rate and Rhythm: Normal rate and regular rhythm.      Pulses: Normal pulses.      Heart sounds: Normal heart sounds.   Pulmonary:      Effort: Pulmonary effort is normal.      Breath sounds: Normal breath sounds.   Abdominal:      Palpations: Abdomen is soft.      Tenderness: There is no abdominal tenderness.   Musculoskeletal:         General: Normal range of motion.      Cervical back: Normal range of motion and neck  supple.      Right lower leg: No edema.      Left lower leg: No edema.   Skin:     General: Skin is warm and dry.   Neurological:      General: No focal deficit present.      Mental Status: Mental status is at baseline.   Psychiatric:         Mood and Affect: Mood normal.         Behavior: Behavior normal.         ED Course (with Medical Decision Making)    Pt seen and examined by me.  RN and EPIC notes reviewed.      Patient presenting with What sounds like a lot of drowsiness.  She has had diarrhea symptoms since her cholecystectomy.  Is possible that patient has a tickborne illness, mono, abnormalities in electrolytes or there is something else nonemergent.    I will order the labs that her primary care provider requested, adding a few of my own.    EKG was done.  Machine reads it as atrial fibrillation but I think this is normal sinus rhythm with frequent PACs.  Rate is 100.  There is no ST segment abnormalities.    CBC normal.  D-dimer normal  Magnesium and phosphorus normal  Mononucleosis screen negative  Normal ferritin  Normal CRP and ESR  Normal TSH    CMP came back with low sodium but not negligible anion gap, glucose 100.  Bilirubin 1.8.  I am not sure if this is real or not, I did add some urine osmolality and sodium.  Patient is urine analysis is abnormal with leukocyte esterase, bacteria, white cell clumps, red and white cells.  Sent for culture.    Repeat basic metabolic panel shows normal sodium, no evidence for abnormal anion gap.    I did give the patient ceftriaxone IV for her urine while we are awaiting the labs.  I also going to send Rx to pharmacy for Keflex.  We will call if any changes need to be made.  I think she should follow-up either with sleep personnel or her primary care or pulmonology for continued symptoms.  Return for worsening, changes or concerns.        Procedures    Results for orders placed or performed during the hospital encounter of 11/15/24 (from the past 24 hours)    Victor Draw    Narrative    The following orders were created for panel order Victor Draw.  Procedure                               Abnormality         Status                     ---------                               -----------         ------                     Extra Blue Top Tube[[id number]]                              Final result               Extra Green Top (Lithium...[[id number]]                      Final result               Extra Purple Top Tube[[id number]]                            Final result                 Please view results for these tests on the individual orders.   Extra Blue Top Tube   Result Value Ref Range    Hold Specimen JIC    Extra Green Top (Lithium Heparin) Tube   Result Value Ref Range    Hold Specimen JIC    Extra Purple Top Tube   Result Value Ref Range    Hold Specimen JIC    CBC with platelets differential    Narrative    The following orders were created for panel order CBC with platelets differential.  Procedure                               Abnormality         Status                     ---------                               -----------         ------                     CBC with platelets and d...[[id number]]                      Final result                 Please view results for these tests on the individual orders.   Comprehensive metabolic panel   Result Value Ref Range    Sodium 122 (L) 135 - 145 mmol/L    Potassium 3.7 3.4 - 5.3 mmol/L    Carbon Dioxide (CO2) 22 22 - 29 mmol/L    Anion Gap <1 (L) 7 - 15 mmol/L    Urea Nitrogen 10.1 6.0 - 20.0 mg/dL    Creatinine 0.77 0.51 - 0.95 mg/dL    GFR Estimate >90 >60 mL/min/1.73m2    Calcium 9.2 8.8 - 10.4 mg/dL    Chloride 120 (H) 98 - 107 mmol/L    Glucose 100 (H) 70 - 99 mg/dL    Alkaline Phosphatase 63 40 - 150 U/L    AST 17 0 - 45 U/L    ALT 18 0 - 50 U/L    Protein Total 7.2 6.4 - 8.3 g/dL    Albumin 4.6 3.5 - 5.2 g/dL    Bilirubin Total 1.8 (H) <=1.2 mg/dL   TSH with free T4 reflex   Result Value Ref Range    TSH 1.06  0.30 - 4.20 uIU/mL   Erythrocyte sedimentation rate auto   Result Value Ref Range    Erythrocyte Sedimentation Rate 7 0 - 20 mm/hr   CRP inflammation   Result Value Ref Range    CRP Inflammation <3.00 <5.00 mg/L   Rheumatoid factor   Result Value Ref Range    Rheumatoid Factor <10 <14 IU/mL   Ferritin   Result Value Ref Range    Ferritin 112 6 - 175 ng/mL   Mononucleosis screen   Result Value Ref Range    Mononucleosis Screen Negative Negative   Magnesium   Result Value Ref Range    Magnesium 2.1 1.7 - 2.3 mg/dL   Phosphorus   Result Value Ref Range    Phosphorus 2.7 2.5 - 4.5 mg/dL   CBC with platelets and differential   Result Value Ref Range    WBC Count 5.3 4.0 - 11.0 10e3/uL    RBC Count 4.67 3.80 - 5.20 10e6/uL    Hemoglobin 14.1 11.7 - 15.7 g/dL    Hematocrit 40.2 35.0 - 47.0 %    MCV 86 78 - 100 fL    MCH 30.2 26.5 - 33.0 pg    MCHC 35.1 31.5 - 36.5 g/dL    RDW 12.5 10.0 - 15.0 %    Platelet Count 304 150 - 450 10e3/uL    % Neutrophils 61 %    % Lymphocytes 28 %    % Monocytes 8 %    % Eosinophils 1 %    % Basophils 1 %    % Immature Granulocytes 0 %    NRBCs per 100 WBC 0 <1 /100    Absolute Neutrophils 3.3 1.6 - 8.3 10e3/uL    Absolute Lymphocytes 1.5 0.8 - 5.3 10e3/uL    Absolute Monocytes 0.4 0.0 - 1.3 10e3/uL    Absolute Eosinophils 0.0 0.0 - 0.7 10e3/uL    Absolute Basophils 0.0 0.0 - 0.2 10e3/uL    Absolute Immature Granulocytes 0.0 <=0.4 10e3/uL    Absolute NRBCs 0.0 10e3/uL   D dimer quantitative   Result Value Ref Range    D-Dimer Quantitative 0.32 0.00 - 0.50 ug/mL FEU    Narrative    This D-dimer assay is intended for use in conjunction with a clinical pretest probability assessment model to exclude pulmonary embolism (PE) and deep venous thrombosis (DVT) in outpatients suspected of PE or DVT. The cut-off value is 0.50 ug/mL FEU.   UA with Microscopic reflex to Culture    Specimen: Urine, Clean Catch   Result Value Ref Range    Color Urine Red (A) Colorless, Straw, Light Yellow, Yellow     Appearance Urine Cloudy (A) Clear    Glucose Urine Negative Negative mg/dL    Bilirubin Urine Negative Negative    Ketones Urine 80 (A) Negative mg/dL    Specific Gravity Urine 1.019 1.003 - 1.035    Blood Urine Negative Negative    pH Urine 5.0 5.0 - 7.0    Protein Albumin Urine 30 (A) Negative mg/dL    Urobilinogen Urine Normal Normal, 2.0 mg/dL    Nitrite Urine Negative Negative    Leukocyte Esterase Urine Moderate (A) Negative    Bacteria Urine Many (A) None Seen /HPF    WBC Clumps Urine Present (A) None Seen /HPF    Mucus Urine Present (A) None Seen /LPF    RBC Urine 3 (H) <=2 /HPF    WBC Urine 50 (H) <=5 /HPF    Squamous Epithelials Urine 8 (H) <=1 /HPF    Hyaline Casts Urine 15 (H) <=2 /LPF    Narrative    Urine Culture ordered based on laboratory criteria   Sodium random urine   Result Value Ref Range    Sodium Urine mmol/L 86 mmol/L   Osmolality urine   Result Value Ref Range    Osmolality Urine 664 100 - 1,200 mmol/kg    Narrative    Reference Ranges depend on patient's hydration status and renal function.   Neonates:  mmol/kg   2 years and older, random specimens: 100-1200 mmol/kg; Greater than 850 mmol/kg after 12 hour fluid restriction  Urine/serum osmolality ratio: 2 years and older: 1.0-3.0; 3.0-4.7 after 12 hour fluid restriction   HIV Antigen Antibody Combo Cascade   Result Value Ref Range    HIV Antigen Antibody Combo Nonreactive Nonreactive   Head CT w/o contrast    Narrative    EXAM: CT HEAD W/O CONTRAST  LOCATION: [facility]  DATE: 11/15/2024    INDICATION: hypersomnolence  COMPARISON: None.  TECHNIQUE: Routine CT Head without IV contrast. Multiplanar reformats. Dose reduction techniques were used.    FINDINGS:  INTRACRANIAL CONTENTS: No evidence of acute intracranial hemorrhage or mass effect. Brain attenuation and morphology are normal. The ventricles and sulci are normal for age. Normal gray-white matter differentiation. The basilar cisterns are  patent.    VISUALIZED ORBITS/SINUSES/MASTOIDS: The globes are unremarkable. The partially imaged paranasal sinuses, mastoid air cells and middle ear cavities are unremarkable.     BONES/SOFT TISSUES: The visualized skull base and calvarium are unremarkable.      Impression    IMPRESSION:    1.  No evidence of acute intracranial hemorrhage or mass effect.   Basic metabolic panel   Result Value Ref Range    Sodium 138 135 - 145 mmol/L    Potassium 3.8 3.4 - 5.3 mmol/L    Chloride 104 98 - 107 mmol/L    Carbon Dioxide (CO2) 21 (L) 22 - 29 mmol/L    Anion Gap 13 7 - 15 mmol/L    Urea Nitrogen 9.7 6.0 - 20.0 mg/dL    Creatinine 0.66 0.51 - 0.95 mg/dL    GFR Estimate >90 >60 mL/min/1.73m2    Calcium 8.9 8.8 - 10.4 mg/dL    Glucose 97 70 - 99 mg/dL       Medications   sodium chloride 0.9% BOLUS 1,000 mL (0 mLs Intravenous Stopped 11/15/24 2003)   cefTRIAXone (ROCEPHIN) 2 g vial to attach to  ml bag for ADULTS or NS 50 ml bag for PEDS (0 g Intravenous Stopped 11/15/24 1930)       Assessments & Plan      I have reviewed the findings, diagnosis, plan and need for follow up with the patient.    Discharge Medication List as of 11/15/2024  8:03 PM        START taking these medications    Details   cephALEXin (KEFLEX) 500 MG capsule Take 1 capsule (500 mg) by mouth 3 times daily for 6 days., Disp-18 capsule, R-0, E-Prescribe      ondansetron (ZOFRAN ODT) 4 MG ODT tab Take 1 tablet (4 mg) by mouth every 6 hours as needed for nausea or vomiting., Disp-10 tablet, R-0, E-Prescribe             Final diagnoses:   Hypersomnolence   Urinary tract infection in female   Tachycardia     Disposition: Patient discharged home in stable condition.  Plan as above.  Return for concerns.     Note: Chart documentation done in part with Dragon Voice Recognition software. Although reviewed after completion, some word and grammatical errors may remain.     11/15/2024   [facility] EMERGENCY DEPT       Sara Hummel,  MD  11/16/24 0507

## 2024-11-16 NOTE — ED NOTES
11/15/24 1800 11/15/24 1801 11/15/24 1802   Vital Signs   Resp (!) 32 13 14   Pulse (!) 123 93 104   Oximeter Heart Rate 124 bpm 112 bpm 112 bpm   /73  --  109/87   Patient Position Supine  --  Sitting      11/15/24 1803 11/15/24 1804   Vital Signs   Resp 28 24   Pulse (!) (S)  137 (!) (S)  142   Oximeter Heart Rate 127 bpm 155 bpm   /89  --    Patient Position Standing Standing         Orthostatic BP obtained. Pt reported feeling dizzy and weak when standing. Dr. Hummel notified.

## 2024-11-17 LAB
BACTERIA UR CULT: ABNORMAL
BACTERIA UR CULT: ABNORMAL

## 2024-11-18 LAB
ANA SER QL IF: NEGATIVE
B BURGDOR IGG+IGM SER QL: 0.35

## 2024-11-20 LAB — BACTERIA BLD CULT: NO GROWTH

## 2024-11-24 ENCOUNTER — MYC REFILL (OUTPATIENT)
Dept: FAMILY MEDICINE | Facility: OTHER | Age: 39
End: 2024-11-24
Payer: MEDICAID

## 2024-11-24 DIAGNOSIS — G47.411 NARCOLEPSY AND CATAPLEXY: ICD-10-CM

## 2024-11-26 RX ORDER — DEXTROAMPHETAMINE SACCHARATE, AMPHETAMINE ASPARTATE MONOHYDRATE, DEXTROAMPHETAMINE SULFATE AND AMPHETAMINE SULFATE 7.5; 7.5; 7.5; 7.5 MG/1; MG/1; MG/1; MG/1
30 CAPSULE, EXTENDED RELEASE ORAL DAILY
Qty: 30 CAPSULE | Refills: 0 | Status: SHIPPED | OUTPATIENT
Start: 2024-11-26

## 2024-12-22 ENCOUNTER — MYC REFILL (OUTPATIENT)
Dept: PSYCHIATRY | Facility: CLINIC | Age: 39
End: 2024-12-22
Payer: COMMERCIAL

## 2024-12-22 ENCOUNTER — MYC REFILL (OUTPATIENT)
Dept: FAMILY MEDICINE | Facility: OTHER | Age: 39
End: 2024-12-22
Payer: COMMERCIAL

## 2024-12-22 DIAGNOSIS — R00.0 TACHYCARDIA: ICD-10-CM

## 2024-12-22 DIAGNOSIS — F41.0 PANIC ATTACK: ICD-10-CM

## 2024-12-22 DIAGNOSIS — R11.14 BILIOUS VOMITING WITH NAUSEA: ICD-10-CM

## 2024-12-23 RX ORDER — PROCHLORPERAZINE MALEATE 10 MG
10 TABLET ORAL EVERY 6 HOURS PRN
Qty: 30 TABLET | Refills: 0 | Status: SHIPPED | OUTPATIENT
Start: 2024-12-23

## 2024-12-23 RX ORDER — HYDROXYZINE PAMOATE 25 MG/1
25 CAPSULE ORAL 3 TIMES DAILY PRN
Qty: 30 CAPSULE | Refills: 2 | Status: SHIPPED | OUTPATIENT
Start: 2024-12-23

## 2024-12-23 RX ORDER — METOPROLOL SUCCINATE 25 MG/1
25 TABLET, EXTENDED RELEASE ORAL DAILY
Qty: 90 TABLET | Refills: 0 | Status: SHIPPED | OUTPATIENT
Start: 2024-12-23

## 2024-12-23 NOTE — TELEPHONE ENCOUNTER
Patient is still taking medication and was not insured for a while so she was not taking as often as she should.    Shanna Alcantar RN on 12/23/2024 at 10:44 AM

## 2024-12-23 NOTE — TELEPHONE ENCOUNTER
Patient is calling looking to see if prescriptions are filled yet.   Patient was notified that refills are in process.  Jemima Núñez RN on 12/23/2024 at 1:16 PM

## 2024-12-23 NOTE — TELEPHONE ENCOUNTER
Clinic RN: Please investigate patient's chart or contact patient if the information cannot be found because patient should have run out of this medication on 6/4/24. Confirm patient is taking this medication as prescribed. Document findings and route refill encounter to provider for approval or denial.     Please also pend pharmacy.     Amanda Castillo, LEXIEN, RN

## 2024-12-23 NOTE — TELEPHONE ENCOUNTER
Date of Last Office Visit: 10/4/2023  Date of Next Office Visit: None; routing for A to assist pt with scheduling.    No shows since last visit: No  More than one patient-initiated cancellation (with reschedule) since last seen in clinic? No    []Medication refilled per  Medication Refill in Ambulatory Care  policy.  []Medication unable to be refilled by RN due to criteria not met as indicated below:    [x]Eligibility: has not had a provider visit within last 6 months   [x]Supervision: no future appointment; < 7 days before next appointment   []Compliance: no shows; cancellations; lapse in therapy   []Verification: order discrepancy; may need modification...   [] > 30-day supply request   []Advanced refill request: > 7 days before refill date   []Controlled medication   []Medication not included in policy   []Review: new med; med adjusted <= 30 days; safety alert; requires lab monitoring...   [x]Scope of Practice: refill request processed by LPN/MA   []Other:      Medication(s) requested:   Disp Refills Start End JUSTICE    hydrOXYzine cecile (VISTARIL) 25 MG capsule 30 capsule 2 3/5/2024 -- No   Sig - Route: Take 1 capsule (25 mg) by mouth 3 times daily as needed for anxiety - Oral     Action taken:providers review patient hasn't been seen since 10/04/2023    Any Controlled Substance(s)? No      Requested medication(s) verified as identical to current order? Yes    Any lapse in adherence to medication(s) greater than 5 days? No      Additional action taken? routed encounter to provider for review.      Last visit treatment plan:   Continue Adderall XR 30 mg by mouth every morning  -Continue hydroxyzine 25 to 50 mg twice daily as needed for anxiety and/or sleep  -Reduce mirtazapine to 7.5 mg by mouth at bedtime as needed for sleep.   Follow-up: 2 to 3 months, or sooner if needed.     Any medication(s) require lab monitoring? No

## 2025-01-16 DIAGNOSIS — R11.14 BILIOUS VOMITING WITH NAUSEA: ICD-10-CM

## 2025-01-16 RX ORDER — PROCHLORPERAZINE MALEATE 10 MG
TABLET ORAL
Qty: 30 TABLET | Refills: 0 | Status: SHIPPED | OUTPATIENT
Start: 2025-01-16

## 2025-03-10 DIAGNOSIS — R11.14 BILIOUS VOMITING WITH NAUSEA: ICD-10-CM

## 2025-03-11 RX ORDER — PROCHLORPERAZINE MALEATE 10 MG
TABLET ORAL
Qty: 30 TABLET | Refills: 0 | Status: SHIPPED | OUTPATIENT
Start: 2025-03-11

## 2025-04-02 DIAGNOSIS — R11.14 BILIOUS VOMITING WITH NAUSEA: ICD-10-CM

## 2025-04-02 RX ORDER — PROCHLORPERAZINE MALEATE 10 MG
TABLET ORAL
Qty: 15 TABLET | Refills: 0 | Status: SHIPPED | OUTPATIENT
Start: 2025-04-02

## 2025-04-14 DIAGNOSIS — R11.14 BILIOUS VOMITING WITH NAUSEA: ICD-10-CM

## 2025-04-14 RX ORDER — PROCHLORPERAZINE MALEATE 10 MG
TABLET ORAL
Qty: 15 TABLET | Refills: 0 | Status: SHIPPED | OUTPATIENT
Start: 2025-04-14

## 2025-05-15 DIAGNOSIS — R11.14 BILIOUS VOMITING WITH NAUSEA: ICD-10-CM

## 2025-05-15 RX ORDER — PROCHLORPERAZINE MALEATE 10 MG
TABLET ORAL
Qty: 15 TABLET | Refills: 0 | OUTPATIENT
Start: 2025-05-15

## 2025-05-17 ENCOUNTER — HEALTH MAINTENANCE LETTER (OUTPATIENT)
Age: 40
End: 2025-05-17

## 2025-06-13 NOTE — RESULT ENCOUNTER NOTE
Isaura,    Ultrasound didn't show any blockage of your bile duct, which is good. There was some fatty liver disease present.  Working on weight loss, exercise should help with this over time.      Thanks,    Dr. Umana       Patient will need to come into office for an appointment for those to be filled out.  Last time he was seen there was no mention of short term disability.  Thank you

## (undated) DEVICE — ESU HOLDER LAP INST DISP PURPLE LONG 330MM H-PRO-330

## (undated) DEVICE — ENDO TROCAR FIRST ENTRY KII FIOS ADV FIX 11X100MM CFF33

## (undated) DEVICE — CLIP APPLIER ENDO 5MM M/L LIGAMAX EL5ML

## (undated) DEVICE — SOL NACL 0.9% INJ 1000ML BAG 07983-09

## (undated) DEVICE — GLOVE ESTEEM POWDER FREE 5.5  2D72PL55

## (undated) DEVICE — PACK GENERAL LAPAOSCOPY

## (undated) DEVICE — ESU CORD MONOPOLAR HIGH FREQUENCY 26006M-D/10

## (undated) DEVICE — ESU ENDO SCISSORS 5MM CVD 5DCS

## (undated) DEVICE — DRAPE POUCH INSTRUMENT 3 POCKET 1018L

## (undated) DEVICE — EVAC SYSTEM CLEAR FLOW SC082500

## (undated) DEVICE — ADH SKIN CLOSURE PREMIERPRO EXOFIN 1.0ML 3470

## (undated) DEVICE — ENDO TROCAR SLEEVE KII Z-THREADED 05X100MM CTS02

## (undated) DEVICE — SU MONOCRYL 4-0 PS-2 18" UND Y496G

## (undated) DEVICE — ENDO TROCAR FIRST ENTRY KII FIOS Z-THRD 05X100MM CTF03

## (undated) DEVICE — DEVICE SUTURE GRASPER TROCAR CLOSURE 14GA PMITCSG

## (undated) DEVICE — GLOVE ESTEEM BLUE W/NEU-THERA 6.0  2D73PB60

## (undated) DEVICE — ENDO POUCH UNIV RETRIEVAL SYSTEM INZII 10MM CD001

## (undated) RX ORDER — KETOROLAC TROMETHAMINE 30 MG/ML
INJECTION, SOLUTION INTRAMUSCULAR; INTRAVENOUS
Status: DISPENSED
Start: 2021-03-04

## (undated) RX ORDER — LIDOCAINE HYDROCHLORIDE 20 MG/ML
INJECTION, SOLUTION EPIDURAL; INFILTRATION; INTRACAUDAL; PERINEURAL
Status: DISPENSED
Start: 2021-03-04

## (undated) RX ORDER — DEXAMETHASONE SODIUM PHOSPHATE 10 MG/ML
INJECTION, SOLUTION INTRAMUSCULAR; INTRAVENOUS
Status: DISPENSED
Start: 2021-03-04

## (undated) RX ORDER — FENTANYL CITRATE 50 UG/ML
INJECTION, SOLUTION INTRAMUSCULAR; INTRAVENOUS
Status: DISPENSED
Start: 2021-03-04

## (undated) RX ORDER — PROPOFOL 10 MG/ML
INJECTION, EMULSION INTRAVENOUS
Status: DISPENSED
Start: 2021-03-04

## (undated) RX ORDER — HYDROMORPHONE HYDROCHLORIDE 1 MG/ML
INJECTION, SOLUTION INTRAMUSCULAR; INTRAVENOUS; SUBCUTANEOUS
Status: DISPENSED
Start: 2021-03-04

## (undated) RX ORDER — ONDANSETRON 2 MG/ML
INJECTION INTRAMUSCULAR; INTRAVENOUS
Status: DISPENSED
Start: 2021-03-04